# Patient Record
Sex: MALE | Race: WHITE | Employment: OTHER | URBAN - NONMETROPOLITAN AREA
[De-identification: names, ages, dates, MRNs, and addresses within clinical notes are randomized per-mention and may not be internally consistent; named-entity substitution may affect disease eponyms.]

---

## 2017-01-17 ENCOUNTER — OFFICE VISIT (OUTPATIENT)
Dept: OTOLARYNGOLOGY | Age: 62
End: 2017-01-17

## 2017-01-17 VITALS
TEMPERATURE: 98 F | DIASTOLIC BLOOD PRESSURE: 60 MMHG | HEART RATE: 60 BPM | RESPIRATION RATE: 12 BRPM | HEIGHT: 71 IN | WEIGHT: 174.5 LBS | SYSTOLIC BLOOD PRESSURE: 110 MMHG | BODY MASS INDEX: 24.43 KG/M2

## 2017-01-17 DIAGNOSIS — J38.4 EDEMA OF LARYNX: ICD-10-CM

## 2017-01-17 DIAGNOSIS — K21.00 GASTROESOPHAGEAL REFLUX DISEASE WITH ESOPHAGITIS: ICD-10-CM

## 2017-01-17 DIAGNOSIS — K14.8 LARGE TONGUE: ICD-10-CM

## 2017-01-17 DIAGNOSIS — K22.70 BARRETT'S ESOPHAGUS WITHOUT DYSPLASIA: ICD-10-CM

## 2017-01-17 DIAGNOSIS — H93.11 TINNITUS, RIGHT: ICD-10-CM

## 2017-01-17 DIAGNOSIS — G47.30 SLEEP-RELATED BREATHING DISORDER: ICD-10-CM

## 2017-01-17 DIAGNOSIS — C32.9 PRIMARY SQUAMOUS CELL CARCINOMA OF LARYNX (HCC): Primary | ICD-10-CM

## 2017-01-17 DIAGNOSIS — T66.XXXD EFFECTS, RADIATION, SUBSEQUENT ENCOUNTER: ICD-10-CM

## 2017-01-17 PROCEDURE — G8598 ASA/ANTIPLAT THER USED: HCPCS | Performed by: OTOLARYNGOLOGY

## 2017-01-17 PROCEDURE — 1036F TOBACCO NON-USER: CPT | Performed by: OTOLARYNGOLOGY

## 2017-01-17 PROCEDURE — G8484 FLU IMMUNIZE NO ADMIN: HCPCS | Performed by: OTOLARYNGOLOGY

## 2017-01-17 PROCEDURE — 3017F COLORECTAL CA SCREEN DOC REV: CPT | Performed by: OTOLARYNGOLOGY

## 2017-01-17 PROCEDURE — 31575 DIAGNOSTIC LARYNGOSCOPY: CPT | Performed by: OTOLARYNGOLOGY

## 2017-01-17 PROCEDURE — G8427 DOCREV CUR MEDS BY ELIG CLIN: HCPCS | Performed by: OTOLARYNGOLOGY

## 2017-01-17 PROCEDURE — G8420 CALC BMI NORM PARAMETERS: HCPCS | Performed by: OTOLARYNGOLOGY

## 2017-01-17 PROCEDURE — 99212 OFFICE O/P EST SF 10 MIN: CPT | Performed by: OTOLARYNGOLOGY

## 2017-01-17 ASSESSMENT — ENCOUNTER SYMPTOMS
CHOKING: 0
SINUS PRESSURE: 0
CHEST TIGHTNESS: 0
SHORTNESS OF BREATH: 0
WHEEZING: 0
RHINORRHEA: 0
DIARRHEA: 0
STRIDOR: 0
COUGH: 0
ABDOMINAL PAIN: 0
VOICE CHANGE: 0
COLOR CHANGE: 0
SORE THROAT: 0
NAUSEA: 0
TROUBLE SWALLOWING: 0
APNEA: 0
FACIAL SWELLING: 0
VOMITING: 0

## 2017-02-17 ENCOUNTER — TELEPHONE (OUTPATIENT)
Dept: INTERNAL MEDICINE | Age: 62
End: 2017-02-17

## 2017-02-17 ENCOUNTER — OFFICE VISIT (OUTPATIENT)
Dept: OTOLARYNGOLOGY | Age: 62
End: 2017-02-17

## 2017-02-17 VITALS
RESPIRATION RATE: 12 BRPM | HEART RATE: 60 BPM | SYSTOLIC BLOOD PRESSURE: 108 MMHG | TEMPERATURE: 98.1 F | BODY MASS INDEX: 24.58 KG/M2 | HEIGHT: 71 IN | WEIGHT: 175.6 LBS | DIASTOLIC BLOOD PRESSURE: 60 MMHG

## 2017-02-17 DIAGNOSIS — I71.20 THORACIC AORTIC ANEURYSM WITHOUT RUPTURE: Primary | ICD-10-CM

## 2017-02-17 DIAGNOSIS — T66.XXXD EFFECTS, RADIATION, SUBSEQUENT ENCOUNTER: ICD-10-CM

## 2017-02-17 DIAGNOSIS — C32.9 PRIMARY SQUAMOUS CELL CARCINOMA OF LARYNX (HCC): Primary | ICD-10-CM

## 2017-02-17 DIAGNOSIS — R53.83 OTHER FATIGUE: ICD-10-CM

## 2017-02-17 PROCEDURE — G8427 DOCREV CUR MEDS BY ELIG CLIN: HCPCS | Performed by: OTOLARYNGOLOGY

## 2017-02-17 PROCEDURE — 1036F TOBACCO NON-USER: CPT | Performed by: OTOLARYNGOLOGY

## 2017-02-17 PROCEDURE — G8484 FLU IMMUNIZE NO ADMIN: HCPCS | Performed by: OTOLARYNGOLOGY

## 2017-02-17 PROCEDURE — 3017F COLORECTAL CA SCREEN DOC REV: CPT | Performed by: OTOLARYNGOLOGY

## 2017-02-17 PROCEDURE — 31575 DIAGNOSTIC LARYNGOSCOPY: CPT | Performed by: OTOLARYNGOLOGY

## 2017-02-17 PROCEDURE — 99213 OFFICE O/P EST LOW 20 MIN: CPT | Performed by: OTOLARYNGOLOGY

## 2017-02-17 PROCEDURE — G8420 CALC BMI NORM PARAMETERS: HCPCS | Performed by: OTOLARYNGOLOGY

## 2017-02-17 PROCEDURE — G8598 ASA/ANTIPLAT THER USED: HCPCS | Performed by: OTOLARYNGOLOGY

## 2017-02-17 ASSESSMENT — ENCOUNTER SYMPTOMS
SORE THROAT: 0
SHORTNESS OF BREATH: 0
NAUSEA: 0
VOMITING: 0
APNEA: 0
CHEST TIGHTNESS: 0
SINUS PRESSURE: 0
RHINORRHEA: 0
ABDOMINAL PAIN: 0
CHOKING: 0
FACIAL SWELLING: 0
DIARRHEA: 0
TROUBLE SWALLOWING: 0
STRIDOR: 0
COUGH: 0
WHEEZING: 0
COLOR CHANGE: 0
VOICE CHANGE: 0

## 2017-02-21 ENCOUNTER — TELEPHONE (OUTPATIENT)
Dept: INTERNAL MEDICINE | Age: 62
End: 2017-02-21

## 2017-02-23 ENCOUNTER — INITIAL CONSULT (OUTPATIENT)
Dept: PULMONOLOGY | Age: 62
End: 2017-02-23

## 2017-02-23 VITALS
BODY MASS INDEX: 24.58 KG/M2 | SYSTOLIC BLOOD PRESSURE: 108 MMHG | OXYGEN SATURATION: 95 % | HEIGHT: 71 IN | WEIGHT: 175.6 LBS | DIASTOLIC BLOOD PRESSURE: 62 MMHG | HEART RATE: 68 BPM

## 2017-02-23 DIAGNOSIS — R06.81 WITNESSED APNEIC SPELLS: ICD-10-CM

## 2017-02-23 DIAGNOSIS — R06.83 SNORING: ICD-10-CM

## 2017-02-23 DIAGNOSIS — G47.19 EXCESSIVE DAYTIME SLEEPINESS: Primary | ICD-10-CM

## 2017-02-23 DIAGNOSIS — G47.8 NON-RESTORATIVE SLEEP: ICD-10-CM

## 2017-02-23 PROCEDURE — G8420 CALC BMI NORM PARAMETERS: HCPCS | Performed by: INTERNAL MEDICINE

## 2017-02-23 PROCEDURE — 3017F COLORECTAL CA SCREEN DOC REV: CPT | Performed by: INTERNAL MEDICINE

## 2017-02-23 PROCEDURE — 1036F TOBACCO NON-USER: CPT | Performed by: INTERNAL MEDICINE

## 2017-02-23 PROCEDURE — G8484 FLU IMMUNIZE NO ADMIN: HCPCS | Performed by: INTERNAL MEDICINE

## 2017-02-23 PROCEDURE — G8598 ASA/ANTIPLAT THER USED: HCPCS | Performed by: INTERNAL MEDICINE

## 2017-02-23 PROCEDURE — G8427 DOCREV CUR MEDS BY ELIG CLIN: HCPCS | Performed by: INTERNAL MEDICINE

## 2017-02-23 PROCEDURE — 99214 OFFICE O/P EST MOD 30 MIN: CPT | Performed by: INTERNAL MEDICINE

## 2017-02-28 DIAGNOSIS — G47.33 OSA (OBSTRUCTIVE SLEEP APNEA): Primary | ICD-10-CM

## 2017-03-02 DIAGNOSIS — G47.33 OSA (OBSTRUCTIVE SLEEP APNEA): Primary | ICD-10-CM

## 2017-03-24 ENCOUNTER — OFFICE VISIT (OUTPATIENT)
Dept: OTOLARYNGOLOGY | Age: 62
End: 2017-03-24

## 2017-03-24 VITALS
HEART RATE: 76 BPM | SYSTOLIC BLOOD PRESSURE: 100 MMHG | WEIGHT: 177.7 LBS | TEMPERATURE: 98 F | BODY MASS INDEX: 24.78 KG/M2 | DIASTOLIC BLOOD PRESSURE: 76 MMHG | RESPIRATION RATE: 16 BRPM

## 2017-03-24 DIAGNOSIS — C32.9 PRIMARY SQUAMOUS CELL CARCINOMA OF LARYNX (HCC): Primary | ICD-10-CM

## 2017-03-24 DIAGNOSIS — T66.XXXD EFFECTS, RADIATION, SUBSEQUENT ENCOUNTER: ICD-10-CM

## 2017-03-24 DIAGNOSIS — K05.6 PERIODONTAL DISEASE: ICD-10-CM

## 2017-03-24 DIAGNOSIS — K22.70 BARRETT'S ESOPHAGUS WITHOUT DYSPLASIA: ICD-10-CM

## 2017-03-24 PROCEDURE — 3017F COLORECTAL CA SCREEN DOC REV: CPT | Performed by: OTOLARYNGOLOGY

## 2017-03-24 PROCEDURE — G8420 CALC BMI NORM PARAMETERS: HCPCS | Performed by: OTOLARYNGOLOGY

## 2017-03-24 PROCEDURE — G8427 DOCREV CUR MEDS BY ELIG CLIN: HCPCS | Performed by: OTOLARYNGOLOGY

## 2017-03-24 PROCEDURE — 31575 DIAGNOSTIC LARYNGOSCOPY: CPT | Performed by: OTOLARYNGOLOGY

## 2017-03-24 PROCEDURE — 99213 OFFICE O/P EST LOW 20 MIN: CPT | Performed by: OTOLARYNGOLOGY

## 2017-03-24 PROCEDURE — G8484 FLU IMMUNIZE NO ADMIN: HCPCS | Performed by: OTOLARYNGOLOGY

## 2017-03-24 PROCEDURE — G8598 ASA/ANTIPLAT THER USED: HCPCS | Performed by: OTOLARYNGOLOGY

## 2017-03-24 PROCEDURE — 1036F TOBACCO NON-USER: CPT | Performed by: OTOLARYNGOLOGY

## 2017-03-24 ASSESSMENT — ENCOUNTER SYMPTOMS
NAUSEA: 0
CHEST TIGHTNESS: 0
VOMITING: 0
SHORTNESS OF BREATH: 0
VOICE CHANGE: 0
APNEA: 0
SINUS PRESSURE: 0
ABDOMINAL PAIN: 0
WHEEZING: 0
FACIAL SWELLING: 0
TROUBLE SWALLOWING: 0
SORE THROAT: 0
RHINORRHEA: 0
COLOR CHANGE: 0
CHOKING: 0
COUGH: 0
STRIDOR: 0
DIARRHEA: 0

## 2017-04-25 ENCOUNTER — OFFICE VISIT (OUTPATIENT)
Dept: OTOLARYNGOLOGY | Age: 62
End: 2017-04-25

## 2017-04-25 VITALS
HEIGHT: 71 IN | WEIGHT: 180 LBS | HEART RATE: 62 BPM | DIASTOLIC BLOOD PRESSURE: 60 MMHG | SYSTOLIC BLOOD PRESSURE: 100 MMHG | BODY MASS INDEX: 25.2 KG/M2 | RESPIRATION RATE: 15 BRPM | TEMPERATURE: 98.5 F

## 2017-04-25 DIAGNOSIS — C32.9 PRIMARY SQUAMOUS CELL CARCINOMA OF LARYNX (HCC): Primary | ICD-10-CM

## 2017-04-25 DIAGNOSIS — T66.XXXD EFFECTS, RADIATION, SUBSEQUENT ENCOUNTER: ICD-10-CM

## 2017-04-25 PROCEDURE — 99212 OFFICE O/P EST SF 10 MIN: CPT | Performed by: OTOLARYNGOLOGY

## 2017-04-25 PROCEDURE — G8419 CALC BMI OUT NRM PARAM NOF/U: HCPCS | Performed by: OTOLARYNGOLOGY

## 2017-04-25 PROCEDURE — 3017F COLORECTAL CA SCREEN DOC REV: CPT | Performed by: OTOLARYNGOLOGY

## 2017-04-25 PROCEDURE — G8598 ASA/ANTIPLAT THER USED: HCPCS | Performed by: OTOLARYNGOLOGY

## 2017-04-25 PROCEDURE — 31575 DIAGNOSTIC LARYNGOSCOPY: CPT | Performed by: OTOLARYNGOLOGY

## 2017-04-25 PROCEDURE — 1036F TOBACCO NON-USER: CPT | Performed by: OTOLARYNGOLOGY

## 2017-04-25 PROCEDURE — G8427 DOCREV CUR MEDS BY ELIG CLIN: HCPCS | Performed by: OTOLARYNGOLOGY

## 2017-04-25 ASSESSMENT — ENCOUNTER SYMPTOMS
COUGH: 0
APNEA: 0
CHEST TIGHTNESS: 0
SHORTNESS OF BREATH: 0
RHINORRHEA: 0
ABDOMINAL PAIN: 0
STRIDOR: 0
FACIAL SWELLING: 0
SINUS PRESSURE: 0
SORE THROAT: 0
TROUBLE SWALLOWING: 0
DIARRHEA: 0
VOMITING: 0
NAUSEA: 0
COLOR CHANGE: 0
WHEEZING: 0
VOICE CHANGE: 0
CHOKING: 0

## 2017-05-05 ENCOUNTER — OFFICE VISIT (OUTPATIENT)
Dept: PULMONOLOGY | Age: 62
End: 2017-05-05

## 2017-05-05 VITALS
BODY MASS INDEX: 25.2 KG/M2 | OXYGEN SATURATION: 98 % | DIASTOLIC BLOOD PRESSURE: 64 MMHG | SYSTOLIC BLOOD PRESSURE: 114 MMHG | RESPIRATION RATE: 17 BRPM | WEIGHT: 180 LBS | HEART RATE: 58 BPM | HEIGHT: 71 IN

## 2017-05-05 DIAGNOSIS — Z99.89 OSA ON CPAP: ICD-10-CM

## 2017-05-05 DIAGNOSIS — G47.33 OSA ON CPAP: ICD-10-CM

## 2017-05-05 PROCEDURE — 3017F COLORECTAL CA SCREEN DOC REV: CPT | Performed by: PHYSICIAN ASSISTANT

## 2017-05-05 PROCEDURE — 1036F TOBACCO NON-USER: CPT | Performed by: PHYSICIAN ASSISTANT

## 2017-05-05 PROCEDURE — G8419 CALC BMI OUT NRM PARAM NOF/U: HCPCS | Performed by: PHYSICIAN ASSISTANT

## 2017-05-05 PROCEDURE — G8427 DOCREV CUR MEDS BY ELIG CLIN: HCPCS | Performed by: PHYSICIAN ASSISTANT

## 2017-05-05 PROCEDURE — G8598 ASA/ANTIPLAT THER USED: HCPCS | Performed by: PHYSICIAN ASSISTANT

## 2017-05-05 PROCEDURE — 99213 OFFICE O/P EST LOW 20 MIN: CPT | Performed by: PHYSICIAN ASSISTANT

## 2017-05-19 DIAGNOSIS — K21.00 GASTROESOPHAGEAL REFLUX DISEASE WITH ESOPHAGITIS: ICD-10-CM

## 2017-05-24 RX ORDER — OMEPRAZOLE 40 MG/1
CAPSULE, DELAYED RELEASE ORAL
Qty: 60 CAPSULE | Refills: 6 | Status: SHIPPED | OUTPATIENT
Start: 2017-05-24 | End: 2017-06-08 | Stop reason: SDUPTHER

## 2017-06-08 ENCOUNTER — OFFICE VISIT (OUTPATIENT)
Dept: INTERNAL MEDICINE | Age: 62
End: 2017-06-08

## 2017-06-08 VITALS
SYSTOLIC BLOOD PRESSURE: 120 MMHG | HEART RATE: 64 BPM | DIASTOLIC BLOOD PRESSURE: 72 MMHG | BODY MASS INDEX: 25.31 KG/M2 | HEIGHT: 71 IN | WEIGHT: 180.8 LBS

## 2017-06-08 DIAGNOSIS — E78.00 PURE HYPERCHOLESTEROLEMIA: ICD-10-CM

## 2017-06-08 DIAGNOSIS — I25.810 CORONARY ARTERY DISEASE INVOLVING CORONARY BYPASS GRAFT OF NATIVE HEART WITHOUT ANGINA PECTORIS: Primary | ICD-10-CM

## 2017-06-08 DIAGNOSIS — I10 ESSENTIAL HYPERTENSION: ICD-10-CM

## 2017-06-08 DIAGNOSIS — K21.00 GASTROESOPHAGEAL REFLUX DISEASE WITH ESOPHAGITIS: ICD-10-CM

## 2017-06-08 PROCEDURE — G8598 ASA/ANTIPLAT THER USED: HCPCS | Performed by: INTERNAL MEDICINE

## 2017-06-08 PROCEDURE — 3017F COLORECTAL CA SCREEN DOC REV: CPT | Performed by: INTERNAL MEDICINE

## 2017-06-08 PROCEDURE — G8419 CALC BMI OUT NRM PARAM NOF/U: HCPCS | Performed by: INTERNAL MEDICINE

## 2017-06-08 PROCEDURE — 1036F TOBACCO NON-USER: CPT | Performed by: INTERNAL MEDICINE

## 2017-06-08 PROCEDURE — G8427 DOCREV CUR MEDS BY ELIG CLIN: HCPCS | Performed by: INTERNAL MEDICINE

## 2017-06-08 PROCEDURE — 93000 ELECTROCARDIOGRAM COMPLETE: CPT | Performed by: INTERNAL MEDICINE

## 2017-06-08 PROCEDURE — 99213 OFFICE O/P EST LOW 20 MIN: CPT | Performed by: INTERNAL MEDICINE

## 2017-06-08 RX ORDER — ATORVASTATIN CALCIUM 20 MG/1
TABLET, FILM COATED ORAL
Qty: 30 TABLET | Refills: 6 | Status: SHIPPED | OUTPATIENT
Start: 2017-06-08 | End: 2018-03-22 | Stop reason: SDUPTHER

## 2017-06-08 RX ORDER — OMEPRAZOLE 40 MG/1
CAPSULE, DELAYED RELEASE ORAL
Qty: 30 CAPSULE | Refills: 6 | Status: ON HOLD | OUTPATIENT
Start: 2017-06-08 | End: 2018-08-27 | Stop reason: ALTCHOICE

## 2017-06-08 RX ORDER — TERAZOSIN 5 MG/1
5 CAPSULE ORAL NIGHTLY
Qty: 30 CAPSULE | Refills: 6 | Status: ON HOLD | OUTPATIENT
Start: 2017-06-08 | End: 2017-10-04 | Stop reason: HOSPADM

## 2017-06-08 RX ORDER — METOPROLOL SUCCINATE 25 MG/1
25 TABLET, EXTENDED RELEASE ORAL 2 TIMES DAILY
Qty: 60 TABLET | Refills: 6 | Status: SHIPPED | OUTPATIENT
Start: 2017-06-08 | End: 2017-09-29 | Stop reason: SDUPTHER

## 2017-06-08 ASSESSMENT — PATIENT HEALTH QUESTIONNAIRE - PHQ9
SUM OF ALL RESPONSES TO PHQ QUESTIONS 1-9: 0
2. FEELING DOWN, DEPRESSED OR HOPELESS: 0
SUM OF ALL RESPONSES TO PHQ9 QUESTIONS 1 & 2: 0
1. LITTLE INTEREST OR PLEASURE IN DOING THINGS: 0

## 2017-07-13 ENCOUNTER — OFFICE VISIT (OUTPATIENT)
Dept: OTOLARYNGOLOGY | Age: 62
End: 2017-07-13

## 2017-07-13 VITALS
HEIGHT: 71 IN | DIASTOLIC BLOOD PRESSURE: 84 MMHG | BODY MASS INDEX: 25.7 KG/M2 | SYSTOLIC BLOOD PRESSURE: 134 MMHG | WEIGHT: 183.6 LBS | HEART RATE: 68 BPM | RESPIRATION RATE: 16 BRPM | TEMPERATURE: 97.7 F

## 2017-07-13 DIAGNOSIS — C32.9 PRIMARY SQUAMOUS CELL CARCINOMA OF LARYNX (HCC): Primary | ICD-10-CM

## 2017-07-13 DIAGNOSIS — T66.XXXD EFFECTS, RADIATION, SUBSEQUENT ENCOUNTER: ICD-10-CM

## 2017-07-13 PROCEDURE — 99212 OFFICE O/P EST SF 10 MIN: CPT | Performed by: OTOLARYNGOLOGY

## 2017-07-13 PROCEDURE — 1036F TOBACCO NON-USER: CPT | Performed by: OTOLARYNGOLOGY

## 2017-07-13 PROCEDURE — 31575 DIAGNOSTIC LARYNGOSCOPY: CPT | Performed by: OTOLARYNGOLOGY

## 2017-07-13 PROCEDURE — 3017F COLORECTAL CA SCREEN DOC REV: CPT | Performed by: OTOLARYNGOLOGY

## 2017-07-13 PROCEDURE — G8598 ASA/ANTIPLAT THER USED: HCPCS | Performed by: OTOLARYNGOLOGY

## 2017-07-13 PROCEDURE — G8427 DOCREV CUR MEDS BY ELIG CLIN: HCPCS | Performed by: OTOLARYNGOLOGY

## 2017-07-13 PROCEDURE — G8419 CALC BMI OUT NRM PARAM NOF/U: HCPCS | Performed by: OTOLARYNGOLOGY

## 2017-07-13 ASSESSMENT — ENCOUNTER SYMPTOMS
TROUBLE SWALLOWING: 0
COLOR CHANGE: 0
SHORTNESS OF BREATH: 0
ABDOMINAL PAIN: 0
SINUS PRESSURE: 0
DIARRHEA: 0
APNEA: 0
RHINORRHEA: 0
FACIAL SWELLING: 0
NAUSEA: 0
COUGH: 0
VOICE CHANGE: 0
CHEST TIGHTNESS: 0
WHEEZING: 0
SORE THROAT: 0
STRIDOR: 0
VOMITING: 0
CHOKING: 0

## 2017-07-28 RX ORDER — NITROGLYCERIN 0.3 MG/1
TABLET SUBLINGUAL
Qty: 30 TABLET | Refills: 3 | Status: ON HOLD | OUTPATIENT
Start: 2017-07-28 | End: 2019-07-13 | Stop reason: HOSPADM

## 2017-09-07 ENCOUNTER — HOSPITAL ENCOUNTER (OUTPATIENT)
Dept: RADIATION ONCOLOGY | Age: 62
Discharge: HOME | End: 2017-09-07
Payer: COMMERCIAL

## 2017-09-07 PROCEDURE — 99211 OFF/OP EST MAY X REQ PHY/QHP: CPT | Performed by: RADIOLOGY

## 2017-09-13 RX ORDER — METOPROLOL SUCCINATE 50 MG/1
TABLET, EXTENDED RELEASE ORAL
Qty: 30 TABLET | Refills: 5 | Status: SHIPPED | OUTPATIENT
Start: 2017-09-13 | End: 2018-02-12 | Stop reason: ALTCHOICE

## 2017-09-14 ENCOUNTER — OFFICE VISIT (OUTPATIENT)
Dept: ENT CLINIC | Age: 62
End: 2017-09-14
Payer: COMMERCIAL

## 2017-09-14 VITALS
RESPIRATION RATE: 16 BRPM | TEMPERATURE: 98 F | WEIGHT: 182.2 LBS | SYSTOLIC BLOOD PRESSURE: 118 MMHG | DIASTOLIC BLOOD PRESSURE: 76 MMHG | HEART RATE: 76 BPM | BODY MASS INDEX: 25.41 KG/M2

## 2017-09-14 DIAGNOSIS — Z99.89 OSA ON CPAP: ICD-10-CM

## 2017-09-14 DIAGNOSIS — J38.7 MASS OF LARYNX: Primary | ICD-10-CM

## 2017-09-14 DIAGNOSIS — G47.33 OSA ON CPAP: ICD-10-CM

## 2017-09-14 DIAGNOSIS — C32.9 PRIMARY SQUAMOUS CELL CARCINOMA OF LARYNX (HCC): ICD-10-CM

## 2017-09-14 PROCEDURE — 99214 OFFICE O/P EST MOD 30 MIN: CPT | Performed by: OTOLARYNGOLOGY

## 2017-09-14 PROCEDURE — 3017F COLORECTAL CA SCREEN DOC REV: CPT | Performed by: OTOLARYNGOLOGY

## 2017-09-14 PROCEDURE — G8598 ASA/ANTIPLAT THER USED: HCPCS | Performed by: OTOLARYNGOLOGY

## 2017-09-14 PROCEDURE — G8427 DOCREV CUR MEDS BY ELIG CLIN: HCPCS | Performed by: OTOLARYNGOLOGY

## 2017-09-14 PROCEDURE — 31575 DIAGNOSTIC LARYNGOSCOPY: CPT | Performed by: OTOLARYNGOLOGY

## 2017-09-14 PROCEDURE — G8417 CALC BMI ABV UP PARAM F/U: HCPCS | Performed by: OTOLARYNGOLOGY

## 2017-09-14 PROCEDURE — 1036F TOBACCO NON-USER: CPT | Performed by: OTOLARYNGOLOGY

## 2017-09-14 ASSESSMENT — ENCOUNTER SYMPTOMS
CHOKING: 0
APNEA: 0
SHORTNESS OF BREATH: 0
COLOR CHANGE: 0
RHINORRHEA: 0
CHEST TIGHTNESS: 0
TROUBLE SWALLOWING: 0
COUGH: 0
VOICE CHANGE: 0
SORE THROAT: 0
SINUS PRESSURE: 0
ABDOMINAL PAIN: 0
STRIDOR: 0
DIARRHEA: 0
VOMITING: 0
WHEEZING: 0
NAUSEA: 0
FACIAL SWELLING: 0

## 2017-09-15 ENCOUNTER — HOSPITAL ENCOUNTER (OUTPATIENT)
Age: 62
Discharge: HOME | End: 2017-09-15
Payer: COMMERCIAL

## 2017-09-15 DIAGNOSIS — J38.7 MASS OF LARYNX: ICD-10-CM

## 2017-09-15 DIAGNOSIS — Z99.89 OSA ON CPAP: ICD-10-CM

## 2017-09-15 DIAGNOSIS — C32.9 PRIMARY SQUAMOUS CELL CARCINOMA OF LARYNX (HCC): ICD-10-CM

## 2017-09-15 DIAGNOSIS — G47.33 OSA ON CPAP: ICD-10-CM

## 2017-09-15 LAB
ALBUMIN SERPL-MCNC: 4.5 G/DL (ref 3.5–5.1)
ALP BLD-CCNC: 76 U/L (ref 38–126)
ALT SERPL-CCNC: 15 U/L (ref 11–66)
ANION GAP SERPL CALCULATED.3IONS-SCNC: 14 MEQ/L (ref 8–16)
AST SERPL-CCNC: 14 U/L (ref 5–40)
BASOPHILS # BLD: 0.6 %
BASOPHILS ABSOLUTE: 0 THOU/MM3 (ref 0–0.1)
BILIRUB SERPL-MCNC: 0.5 MG/DL (ref 0.3–1.2)
BUN BLDV-MCNC: 13 MG/DL (ref 7–22)
CALCIUM SERPL-MCNC: 9.6 MG/DL (ref 8.5–10.5)
CHLORIDE BLD-SCNC: 100 MEQ/L (ref 98–111)
CO2: 28 MEQ/L (ref 23–33)
CREAT SERPL-MCNC: 0.8 MG/DL (ref 0.4–1.2)
EKG ATRIAL RATE: 48 BPM
EKG P AXIS: 31 DEGREES
EKG P-R INTERVAL: 144 MS
EKG Q-T INTERVAL: 432 MS
EKG QRS DURATION: 80 MS
EKG QTC CALCULATION (BAZETT): 385 MS
EKG R AXIS: 57 DEGREES
EKG T AXIS: 69 DEGREES
EKG VENTRICULAR RATE: 48 BPM
EOSINOPHIL # BLD: 1.3 %
EOSINOPHILS ABSOLUTE: 0.1 THOU/MM3 (ref 0–0.4)
GFR SERPL CREATININE-BSD FRML MDRD: > 90 ML/MIN/1.73M2
GLUCOSE BLD-MCNC: 100 MG/DL (ref 70–108)
HCT VFR BLD CALC: 46.4 % (ref 42–52)
HEMOGLOBIN: 15.7 GM/DL (ref 14–18)
LYMPHOCYTES # BLD: 11.5 %
LYMPHOCYTES ABSOLUTE: 0.7 THOU/MM3 (ref 1–4.8)
MCH RBC QN AUTO: 31.8 PG (ref 27–31)
MCHC RBC AUTO-ENTMCNC: 33.8 GM/DL (ref 33–37)
MCV RBC AUTO: 94.1 FL (ref 80–94)
MONOCYTES # BLD: 10.1 %
MONOCYTES ABSOLUTE: 0.6 THOU/MM3 (ref 0.4–1.3)
NUCLEATED RED BLOOD CELLS: 0 /100 WBC
PDW BLD-RTO: 13.6 % (ref 11.5–14.5)
PLATELET # BLD: 226 THOU/MM3 (ref 130–400)
PMV BLD AUTO: 8.3 MCM (ref 7.4–10.4)
POTASSIUM SERPL-SCNC: 4.7 MEQ/L (ref 3.5–5.2)
RBC # BLD: 4.93 MILL/MM3 (ref 4.7–6.1)
RBC # BLD: NORMAL 10*6/UL
SEG NEUTROPHILS: 76.5 %
SEGMENTED NEUTROPHILS ABSOLUTE COUNT: 4.7 THOU/MM3 (ref 1.8–7.7)
SODIUM BLD-SCNC: 142 MEQ/L (ref 135–145)
TOTAL PROTEIN: 6.6 G/DL (ref 6.1–8)
WBC # BLD: 6.1 THOU/MM3 (ref 4.8–10.8)

## 2017-09-15 PROCEDURE — 93005 ELECTROCARDIOGRAM TRACING: CPT

## 2017-09-15 PROCEDURE — 80053 COMPREHEN METABOLIC PANEL: CPT

## 2017-09-15 PROCEDURE — 36415 COLL VENOUS BLD VENIPUNCTURE: CPT

## 2017-09-15 PROCEDURE — 85025 COMPLETE CBC W/AUTO DIFF WBC: CPT

## 2017-09-18 ENCOUNTER — HOSPITAL ENCOUNTER (OUTPATIENT)
Dept: CT IMAGING | Age: 62
Discharge: HOME | End: 2017-09-18
Payer: COMMERCIAL

## 2017-09-18 DIAGNOSIS — J38.7 MASS OF LARYNX: ICD-10-CM

## 2017-09-18 DIAGNOSIS — C32.9 PRIMARY SQUAMOUS CELL CARCINOMA OF LARYNX (HCC): ICD-10-CM

## 2017-09-18 PROCEDURE — 6360000004 HC RX CONTRAST MEDICATION: Performed by: OTOLARYNGOLOGY

## 2017-09-18 PROCEDURE — 70491 CT SOFT TISSUE NECK W/DYE: CPT

## 2017-09-18 RX ADMIN — IOPAMIDOL 65 ML: 755 INJECTION, SOLUTION INTRAVENOUS at 07:54

## 2017-09-20 ENCOUNTER — OFFICE VISIT (OUTPATIENT)
Dept: INTERNAL MEDICINE CLINIC | Age: 62
End: 2017-09-20
Payer: COMMERCIAL

## 2017-09-20 VITALS
HEART RATE: 76 BPM | WEIGHT: 181 LBS | DIASTOLIC BLOOD PRESSURE: 76 MMHG | SYSTOLIC BLOOD PRESSURE: 128 MMHG | BODY MASS INDEX: 25.24 KG/M2

## 2017-09-20 DIAGNOSIS — Z01.818 PREOP EXAMINATION: Primary | ICD-10-CM

## 2017-09-20 DIAGNOSIS — I10 ESSENTIAL HYPERTENSION: ICD-10-CM

## 2017-09-20 DIAGNOSIS — Z95.1 S/P CABG (CORONARY ARTERY BYPASS GRAFT): ICD-10-CM

## 2017-09-20 DIAGNOSIS — C32.9 PRIMARY SQUAMOUS CELL CARCINOMA OF LARYNX (HCC): ICD-10-CM

## 2017-09-20 DIAGNOSIS — E78.5 HYPERLIPIDEMIA, UNSPECIFIED HYPERLIPIDEMIA TYPE: ICD-10-CM

## 2017-09-20 DIAGNOSIS — I25.10 ASCVD (ARTERIOSCLEROTIC CARDIOVASCULAR DISEASE): ICD-10-CM

## 2017-09-20 DIAGNOSIS — Z99.89 OSA ON CPAP: ICD-10-CM

## 2017-09-20 DIAGNOSIS — G47.33 OSA ON CPAP: ICD-10-CM

## 2017-09-20 PROCEDURE — 3017F COLORECTAL CA SCREEN DOC REV: CPT | Performed by: INTERNAL MEDICINE

## 2017-09-20 PROCEDURE — G8598 ASA/ANTIPLAT THER USED: HCPCS | Performed by: INTERNAL MEDICINE

## 2017-09-20 PROCEDURE — 99214 OFFICE O/P EST MOD 30 MIN: CPT | Performed by: INTERNAL MEDICINE

## 2017-09-20 PROCEDURE — G8417 CALC BMI ABV UP PARAM F/U: HCPCS | Performed by: INTERNAL MEDICINE

## 2017-09-20 PROCEDURE — G8427 DOCREV CUR MEDS BY ELIG CLIN: HCPCS | Performed by: INTERNAL MEDICINE

## 2017-09-20 PROCEDURE — 1036F TOBACCO NON-USER: CPT | Performed by: INTERNAL MEDICINE

## 2017-09-20 NOTE — LETTER
[address] [address]  [address]  [address] [address]  Phone: [phone]  Fax: [phone]    Meme Aleman MD                         PATIENT: Helder Coe          : 1955      DATE:  17    TO:  Dr. Deana Gore      Patient can proceed with surgery.     The following procedures were reviewed by the physician:    EKG    Labs    Chest X-ray        Sincerely,        Meme Aleman MD

## 2017-09-20 NOTE — PROGRESS NOTES
[facility] PROFESSIONAL SERVS  PHYSICIANS [address] [place] [address], [address]  Dept: [phone]  Dept Fax: [phone]      Chief Complaint   Patient presents with    Pre-op Exam     This patient was referred to me by Dr. Grzegorz Soni for pre-op evaluation prior to laryngeal biopsy/possible resection which is scheduled for 10/4 at [facility]. 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 Recently had a sore throat. Dr. Grzegorz Soni did a laryngoscopy, which looks suspicious      Past Medical History:   Diagnosis Date    Aneurysm ([address] 02/2017    in his chest    CAD (coronary artery disease)     Chronic back pain     Hyperlipidemia     Hypertension     Nausea & vomiting     FOUZIA on CPAP     PONV (postoperative nausea and vomiting)     S/P CABG (coronary artery bypass graft) 2002    [facility]    Snores        Past Surgical History:   Procedure Laterality Date    CARDIAC CATHETERIZATION  8/2002, 4/2004    [facility]    COLONOSCOPY      CORONARY ANGIOPLASTY WITH STENT PLACEMENT  7/2002    [facility]    CORONARY ARTERY BYPASS GRAFT  2002    [facility]    HEMORRHOID SURGERY      KNEE SURGERY Right     MICROLARYNGOSCOPY W BIOPSY  04/28/2016    by Dr Eder Nielsen Marital status:      Spouse name: N/A    Number of children: N/A    Years of education: N/A     Occupational History    Not on file.      Social History Main Topics    Smoking status: Former Smoker     Packs/day: 2.00     Years: 20.00     Quit date: 12/5/2001    Smokeless tobacco: Never Used    Alcohol use No    Drug use: No    Sexual activity: Not on file     Other Topics Concern    Not on file     Social History Narrative       Current Outpatient Prescriptions   Medication Sig Dispense Refill    metoprolol succinate (TOPROL XL) 50 MG extended release tablet TAKE 1/2 TABLET BY MOUTH TWICE DAILY 30 tablet 5    nitroGLYCERIN (NITROSTAT) 0.3 MG SL tablet Place 1 tablet under tongue as needed for chest pain, may repeat every 5 mins. For maximum of 3 tabs in 15 mins. 30 tablet 3    atorvastatin (LIPITOR) 20 MG tablet TAKE 1 TABLET BY MOUTH ONE TIME A DAY (Patient taking differently: nightly TAKE 1 TABLET BY MOUTH ONE TIME A DAY ) 30 tablet 6    omeprazole (PRILOSEC) 40 MG delayed release capsule 1 cap daily 30 capsule 6    terazosin (HYTRIN) 5 MG capsule Take 1 capsule by mouth nightly New higher dose 30 capsule 6    CPAP Machine MISC by Does not apply route Please change CPAP pressure to 8 cm H20. 1 each 0    aspirin 81 MG tablet Take 81 mg by mouth daily      Diphenhydramine-APAP, sleep, (TYLENOL PM EXTRA STRENGTH PO) Take by mouth nightly 2 nightly      Coenzyme Q10 (CO Q-10) 200 MG CAPS Take  by mouth daily.  Multiple Vitamin (MULTI-VITAMIN) TABS Take  by mouth daily.  ARGININE PO Take 500 mg by mouth daily. 2 tab       No current facility-administered medications for this visit. Family History   Problem Relation Age of Onset    Heart Disease Father      MI    High Blood Pressure Father     Cancer Maternal Grandmother     Cancer Maternal Grandfather      No Known Allergies  There is no family history of bleeding disorders  Review of Systems - General ROS: positive for  - sore throat  Psychological ROS: negative  Hematological and Lymphatic ROS: No history of blood clots or bleeding disorder. Respiratory ROS: no cough, shortness of breath, or wheezing  Cardiovascular ROS: no chest pain or dyspnea on exertion  Gastrointestinal ROS: negative  Genito-Urinary ROS: no dysuria, trouble voiding, or hematuria  Musculoskeletal ROS: negative  Neurological ROS: no TIA or stroke symptoms  Dermatological ROS: negative    This patient has  experienced difficulty with general anesthetic, PONV. SLEEP APNEA SCREENING:patient has sleep apnea    Blood pressure 128/76, pulse 76, weight 181 lb (82.1 kg). Body mass index is 25.24 kg/(m^2).     Physical Examination: General appearance - alert, well appearing, and in no distress  Mental status - alert, oriented to person, place, and time  Neck - Neck is supple, no JVD or carotid bruits. No thyromegaly or adenopathy. Chest - clear to auscultation, no wheezes, rales or rhonchi, symmetric air entry  Heart - normal rate, regular rhythm, normal S1, S2, no murmurs, rubs, clicks or gallops  Abdomen - soft, nontender, nondistended, no masses or organomegaly  Neurological - alert, oriented, normal speech, no focal findings or movement disorder noted  Musculoskeletal - no joint tenderness, deformity or swelling  Extremities - peripheral pulses normal, no pedal edema, no clubbing or cyanosis  Skin - normal coloration and turgor, no rashes, no suspicious skin lesions noted    I have reviewed recent diagnostic testing including EKG, CXR, labs    1. Preop examination     2. S/P CABG (coronary artery bypass graft)     3. ASCVD (arteriosclerotic cardiovascular disease)     4. Essential hypertension     5. Hyperlipidemia, unspecified hyperlipidemia type     6. FOUZIA on CPAP     7. Primary squamous cell carcinoma of larynx [facility])                 Patient has no h/o MI or CHF  Patient is active to > 4 mets without any cardiac symptoms. Planned surgery is  intermediate risk. EKG shows marked sinus bradycardia  CBC, CMP, normal     No further testing needed. There are no contraindications to proceed with surgery. Acceptable risk from a cardiopulmonary/medical standpoint. Will notify referring surgeon. Recommend routine DVT/PE prophylaxis as per surgery.

## 2017-09-29 NOTE — PROGRESS NOTES
In preparation for their surgical procedure above patient was screened for Obstructive Sleep Apnea (FOUZIA) using the STOP-Bang Questionnaire by the Pre-Admission Testing department. This is a pre-surgical screening tool for patient safety and serves as a recommendation, this WILL NOT cause cancellation of surgery. STOP-Bang Questionnaire  * Do you currently see a pulmonologist?  Yes     If yes STOP, do not complete. Patient follows with Dr. Curt Larose uses CPAP        Adapted from:   STOP Questionnaire: A Tool to Screen Patients for Obstructive Sleep Apnea   TYLER Mehta.P.C., oNri Stacy M.B.B.S., Debbie Limon M.D., Taj Galvan. Laci Woodruff, Ph.D., Hazel Mike M.B.B.S., Chasity Jackson M.Sc., Poli Kirk M.D., Jennifer Payan. TYLER Ramos.P.C.    Anesthesiology 2008; 437:845-00 Copyright 2008, the [address] of [employer]esthesi[employer], [place] [address].   ----------------------------------------------------------------------------------------------------------------

## 2017-09-29 NOTE — PROGRESS NOTES
NPO after midnight  [employer] and drivers license  Wear comfortable clean clothing  Do not bring jewelry   Shower night before and morning of surgery with a liquid antibacterial soap  Bring list of medications with dosage and how often taken  Follow all instructions given by your physician   needed at discharge

## 2017-10-04 ENCOUNTER — ANESTHESIA (OUTPATIENT)
Dept: OPERATING ROOM | Age: 62
End: 2017-10-04
Payer: COMMERCIAL

## 2017-10-04 ENCOUNTER — ANESTHESIA EVENT (OUTPATIENT)
Dept: OPERATING ROOM | Age: 62
End: 2017-10-04
Payer: COMMERCIAL

## 2017-10-04 ENCOUNTER — HOSPITAL ENCOUNTER (OUTPATIENT)
Age: 62
Setting detail: OUTPATIENT SURGERY
Discharge: HOME | End: 2017-10-04
Attending: OTOLARYNGOLOGY | Admitting: OTOLARYNGOLOGY
Payer: COMMERCIAL

## 2017-10-04 VITALS
WEIGHT: 180.34 LBS | HEIGHT: 71 IN | RESPIRATION RATE: 18 BRPM | TEMPERATURE: 97.6 F | BODY MASS INDEX: 25.25 KG/M2 | SYSTOLIC BLOOD PRESSURE: 135 MMHG | OXYGEN SATURATION: 97 % | HEART RATE: 64 BPM | DIASTOLIC BLOOD PRESSURE: 73 MMHG

## 2017-10-04 VITALS
SYSTOLIC BLOOD PRESSURE: 135 MMHG | RESPIRATION RATE: 12 BRPM | TEMPERATURE: 98.6 F | DIASTOLIC BLOOD PRESSURE: 77 MMHG | OXYGEN SATURATION: 100 %

## 2017-10-04 LAB — POTASSIUM SERPL-SCNC: 4.3 MEQ/L (ref 3.5–5.2)

## 2017-10-04 PROCEDURE — 2500000003 HC RX 250 WO HCPCS: Performed by: NURSE ANESTHETIST, CERTIFIED REGISTERED

## 2017-10-04 PROCEDURE — 7100000010 HC PHASE II RECOVERY - FIRST 15 MIN: Performed by: OTOLARYNGOLOGY

## 2017-10-04 PROCEDURE — 7100000011 HC PHASE II RECOVERY - ADDTL 15 MIN: Performed by: OTOLARYNGOLOGY

## 2017-10-04 PROCEDURE — 7100000000 HC PACU RECOVERY - FIRST 15 MIN: Performed by: OTOLARYNGOLOGY

## 2017-10-04 PROCEDURE — 6360000002 HC RX W HCPCS: Performed by: NURSE ANESTHETIST, CERTIFIED REGISTERED

## 2017-10-04 PROCEDURE — 2580000003 HC RX 258: Performed by: NURSE ANESTHETIST, CERTIFIED REGISTERED

## 2017-10-04 PROCEDURE — 88305 TISSUE EXAM BY PATHOLOGIST: CPT

## 2017-10-04 PROCEDURE — 3700000000 HC ANESTHESIA ATTENDED CARE: Performed by: OTOLARYNGOLOGY

## 2017-10-04 PROCEDURE — 6360000002 HC RX W HCPCS: Performed by: OTOLARYNGOLOGY

## 2017-10-04 PROCEDURE — 3600000013 HC SURGERY LEVEL 3 ADDTL 15MIN: Performed by: OTOLARYNGOLOGY

## 2017-10-04 PROCEDURE — 6370000000 HC RX 637 (ALT 250 FOR IP): Performed by: OTOLARYNGOLOGY

## 2017-10-04 PROCEDURE — 7100000001 HC PACU RECOVERY - ADDTL 15 MIN: Performed by: OTOLARYNGOLOGY

## 2017-10-04 PROCEDURE — 3700000001 HC ADD 15 MINUTES (ANESTHESIA): Performed by: OTOLARYNGOLOGY

## 2017-10-04 PROCEDURE — 3600000003 HC SURGERY LEVEL 3 BASE: Performed by: OTOLARYNGOLOGY

## 2017-10-04 PROCEDURE — 84132 ASSAY OF SERUM POTASSIUM: CPT

## 2017-10-04 PROCEDURE — 36415 COLL VENOUS BLD VENIPUNCTURE: CPT

## 2017-10-04 PROCEDURE — 2580000003 HC RX 258: Performed by: OTOLARYNGOLOGY

## 2017-10-04 RX ORDER — ONDANSETRON 2 MG/ML
INJECTION INTRAMUSCULAR; INTRAVENOUS PRN
Status: DISCONTINUED | OUTPATIENT
Start: 2017-10-04 | End: 2017-10-04 | Stop reason: SDUPTHER

## 2017-10-04 RX ORDER — SODIUM CHLORIDE 9 MG/ML
INJECTION, SOLUTION INTRAVENOUS CONTINUOUS PRN
Status: DISCONTINUED | OUTPATIENT
Start: 2017-10-04 | End: 2017-10-04 | Stop reason: SDUPTHER

## 2017-10-04 RX ORDER — SODIUM CHLORIDE 9 MG/ML
INJECTION, SOLUTION INTRAVENOUS CONTINUOUS
Status: DISCONTINUED | OUTPATIENT
Start: 2017-10-04 | End: 2017-10-04 | Stop reason: HOSPADM

## 2017-10-04 RX ORDER — DEXAMETHASONE SODIUM PHOSPHATE 4 MG/ML
INJECTION, SOLUTION INTRA-ARTICULAR; INTRALESIONAL; INTRAMUSCULAR; INTRAVENOUS; SOFT TISSUE PRN
Status: DISCONTINUED | OUTPATIENT
Start: 2017-10-04 | End: 2017-10-04 | Stop reason: SDUPTHER

## 2017-10-04 RX ORDER — HYDROCODONE BITARTRATE AND ACETAMINOPHEN 7.5; 325 MG/1; MG/1
1 TABLET ORAL EVERY 4 HOURS PRN
Qty: 20 TABLET | Refills: 0 | Status: SHIPPED | OUTPATIENT
Start: 2017-10-04 | End: 2017-11-09 | Stop reason: ALTCHOICE

## 2017-10-04 RX ORDER — ROCURONIUM BROMIDE 10 MG/ML
INJECTION, SOLUTION INTRAVENOUS PRN
Status: DISCONTINUED | OUTPATIENT
Start: 2017-10-04 | End: 2017-10-04 | Stop reason: SDUPTHER

## 2017-10-04 RX ORDER — LABETALOL HYDROCHLORIDE 5 MG/ML
INJECTION, SOLUTION INTRAVENOUS PRN
Status: DISCONTINUED | OUTPATIENT
Start: 2017-10-04 | End: 2017-10-04 | Stop reason: SDUPTHER

## 2017-10-04 RX ORDER — FENTANYL CITRATE 50 UG/ML
INJECTION, SOLUTION INTRAMUSCULAR; INTRAVENOUS PRN
Status: DISCONTINUED | OUTPATIENT
Start: 2017-10-04 | End: 2017-10-04 | Stop reason: SDUPTHER

## 2017-10-04 RX ORDER — NEOSTIGMINE METHYLSULFATE 1 MG/ML
INJECTION, SOLUTION INTRAVENOUS PRN
Status: DISCONTINUED | OUTPATIENT
Start: 2017-10-04 | End: 2017-10-04 | Stop reason: SDUPTHER

## 2017-10-04 RX ORDER — DEXAMETHASONE SODIUM PHOSPHATE 4 MG/ML
12 INJECTION, SOLUTION INTRA-ARTICULAR; INTRALESIONAL; INTRAMUSCULAR; INTRAVENOUS; SOFT TISSUE
Status: DISCONTINUED | OUTPATIENT
Start: 2017-10-04 | End: 2017-10-04 | Stop reason: HOSPADM

## 2017-10-04 RX ORDER — PROPOFOL 10 MG/ML
INJECTION, EMULSION INTRAVENOUS PRN
Status: DISCONTINUED | OUTPATIENT
Start: 2017-10-04 | End: 2017-10-04 | Stop reason: SDUPTHER

## 2017-10-04 RX ORDER — HYDROCODONE BITARTRATE AND ACETAMINOPHEN 7.5; 325 MG/1; MG/1
TABLET ORAL
Status: DISPENSED
Start: 2017-10-04 | End: 2017-10-04

## 2017-10-04 RX ORDER — DEXAMETHASONE SODIUM PHOSPHATE 4 MG/ML
INJECTION, SOLUTION INTRA-ARTICULAR; INTRALESIONAL; INTRAMUSCULAR; INTRAVENOUS; SOFT TISSUE
Status: DISPENSED
Start: 2017-10-04 | End: 2017-10-04

## 2017-10-04 RX ORDER — GLYCOPYRROLATE 0.2 MG/ML
INJECTION INTRAMUSCULAR; INTRAVENOUS PRN
Status: DISCONTINUED | OUTPATIENT
Start: 2017-10-04 | End: 2017-10-04 | Stop reason: SDUPTHER

## 2017-10-04 RX ORDER — LIDOCAINE HYDROCHLORIDE 20 MG/ML
INJECTION, SOLUTION INFILTRATION; PERINEURAL PRN
Status: DISCONTINUED | OUTPATIENT
Start: 2017-10-04 | End: 2017-10-04 | Stop reason: SDUPTHER

## 2017-10-04 RX ORDER — HYDROCODONE BITARTRATE AND ACETAMINOPHEN 7.5; 325 MG/1; MG/1
1 TABLET ORAL EVERY 6 HOURS PRN
Status: DISCONTINUED | OUTPATIENT
Start: 2017-10-04 | End: 2017-10-04 | Stop reason: HOSPADM

## 2017-10-04 RX ADMIN — DEXAMETHASONE SODIUM PHOSPHATE 10 MG: 4 INJECTION, SOLUTION INTRAMUSCULAR; INTRAVENOUS at 08:10

## 2017-10-04 RX ADMIN — Medication 5 MG: at 08:46

## 2017-10-04 RX ADMIN — DEXAMETHASONE SODIUM PHOSPHATE 12 MG: 4 INJECTION, SOLUTION INTRAMUSCULAR; INTRAVENOUS at 07:19

## 2017-10-04 RX ADMIN — GLYCOPYRROLATE 0.4 MG: 0.2 INJECTION, SOLUTION INTRAMUSCULAR; INTRAVENOUS at 09:11

## 2017-10-04 RX ADMIN — Medication 30 MG: at 08:10

## 2017-10-04 RX ADMIN — SODIUM CHLORIDE: 9 INJECTION, SOLUTION INTRAVENOUS at 07:19

## 2017-10-04 RX ADMIN — LABETALOL HYDROCHLORIDE 5 MG: 5 INJECTION, SOLUTION INTRAVENOUS at 08:52

## 2017-10-04 RX ADMIN — PROPOFOL 200 MG: 10 INJECTION, EMULSION INTRAVENOUS at 08:10

## 2017-10-04 RX ADMIN — LIDOCAINE HYDROCHLORIDE 100 MG: 20 INJECTION, SOLUTION INFILTRATION; PERINEURAL at 08:10

## 2017-10-04 RX ADMIN — ONDANSETRON 4 MG: 2 INJECTION INTRAMUSCULAR; INTRAVENOUS at 08:10

## 2017-10-04 RX ADMIN — SODIUM CHLORIDE: 9 INJECTION, SOLUTION INTRAVENOUS at 08:10

## 2017-10-04 RX ADMIN — FENTANYL CITRATE 100 MCG: 50 INJECTION INTRAMUSCULAR; INTRAVENOUS at 08:10

## 2017-10-04 RX ADMIN — NEOSTIGMINE METHYLSULFATE 3 MG: 1 INJECTION, SOLUTION INTRAVENOUS at 09:11

## 2017-10-04 RX ADMIN — GLYCOPYRROLATE 0.2 MG: 0.2 INJECTION, SOLUTION INTRAMUSCULAR; INTRAVENOUS at 08:10

## 2017-10-04 RX ADMIN — HYDROCODONE BITARTRATE AND ACETAMINOPHEN 1 TABLET: 7.5; 325 TABLET ORAL at 11:05

## 2017-10-04 ASSESSMENT — PAIN SCALES - GENERAL
PAINLEVEL_OUTOF10: 5
PAINLEVEL_OUTOF10: 4
PAINLEVEL_OUTOF10: 5
PAINLEVEL_OUTOF10: 3
PAINLEVEL_OUTOF10: 3
PAINLEVEL_OUTOF10: 5

## 2017-10-04 ASSESSMENT — PULMONARY FUNCTION TESTS
PIF_VALUE: 0
PIF_VALUE: 17
PIF_VALUE: 16
PIF_VALUE: 16
PIF_VALUE: 17
PIF_VALUE: 0
PIF_VALUE: 1
PIF_VALUE: 17
PIF_VALUE: 2
PIF_VALUE: 17
PIF_VALUE: 18
PIF_VALUE: 17
PIF_VALUE: 26
PIF_VALUE: 16
PIF_VALUE: 18
PIF_VALUE: 19
PIF_VALUE: 1
PIF_VALUE: 1
PIF_VALUE: 24
PIF_VALUE: 18
PIF_VALUE: 17
PIF_VALUE: 17
PIF_VALUE: 1
PIF_VALUE: 17
PIF_VALUE: 17
PIF_VALUE: 1
PIF_VALUE: 16
PIF_VALUE: 17
PIF_VALUE: 17
PIF_VALUE: 1
PIF_VALUE: 6
PIF_VALUE: 17
PIF_VALUE: 1
PIF_VALUE: 17
PIF_VALUE: 0
PIF_VALUE: 1
PIF_VALUE: 2
PIF_VALUE: 17
PIF_VALUE: 2
PIF_VALUE: 16
PIF_VALUE: 17
PIF_VALUE: 3
PIF_VALUE: 16
PIF_VALUE: 17
PIF_VALUE: 16
PIF_VALUE: 17
PIF_VALUE: 20
PIF_VALUE: 2
PIF_VALUE: 17
PIF_VALUE: 17
PIF_VALUE: 2
PIF_VALUE: 17
PIF_VALUE: 17
PIF_VALUE: 21
PIF_VALUE: 17
PIF_VALUE: 18
PIF_VALUE: 18
PIF_VALUE: 17
PIF_VALUE: 1
PIF_VALUE: 17
PIF_VALUE: 18
PIF_VALUE: 21
PIF_VALUE: 17

## 2017-10-04 NOTE — ANESTHESIA PRE PROCEDURE
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    Primary squamous cell carcinoma of larynx (HCC) C32.9    FOUZIA on CPAP G47.33, Z99.89       Past Medical History:        Diagnosis Date    Aneurysm ([address] 02/2017    in his chest    CAD (coronary artery disease)     Chronic back pain     Hyperlipidemia     Hypertension     Nausea & vomiting     FOUZIA on CPAP     PONV (postoperative nausea and vomiting)     S/P CABG (coronary artery bypass graft) 2002    [facility]    Snores        Past Surgical History:        Procedure Laterality Date    CARDIAC CATHETERIZATION  8/2002, 4/2004    [facility]    COLONOSCOPY      CORONARY ANGIOPLASTY WITH STENT PLACEMENT  7/2002    [facility]    CORONARY ARTERY BYPASS GRAFT  2002    [facility]    HEMORRHOID SURGERY      KNEE SURGERY Right     MICROLARYNGOSCOPY W BIOPSY  04/28/2016    by Dr Gabrielle Palacios       Social History:    Social History   Substance Use Topics    Smoking status: Former Smoker     Packs/day: 2.00     Years: 20.00     Quit date: 12/5/2001    Smokeless tobacco: Never Used    Alcohol use No                                Counseling given: Not Answered      Vital Signs (Current):   Vitals:    09/29/17 0814 10/04/17 0643   BP:  (!) 180/81   Pulse:  57   Resp:  18   Temp:  36.7 °C (98.1 °F)   TempSrc:  Temporal   SpO2:  100%   Weight: 181 lb (82.1 kg) 180 lb 5.4 oz (81.8 kg)   Height: 5' 10\" (1.778 m) 5' 11\" (1.803 m) BP Readings from Last 3 Encounters:   10/04/17 (!) 180/81   09/20/17 128/76   09/14/17 118/76       NPO Status: Time of last liquid consumption: 2200                        Time of last solid consumption: 2200                        Date of last liquid consumption: 10/03/17                        Date of last solid food consumption: 10/03/17    BMI:   Wt Readings from Last 3 Encounters:   10/04/17 180 lb 5.4 oz (81.8 kg)   09/20/17 181 lb (82.1 kg)   09/14/17 182 lb 3.2 oz (82.6 kg)     Body mass index is 25.15 kg/(m^2). CBC:   Lab Results   Component Value Date    WBC 6.1 09/15/2017    RBC 4.93 09/15/2017    RBC 4.86 12/01/2011    HGB 15.7 09/15/2017    HCT 46.4 09/15/2017    MCV 94.1 09/15/2017    RDW 13.6 09/15/2017     09/15/2017       CMP:   Lab Results   Component Value Date     09/15/2017    K 4.7 09/15/2017     09/15/2017    CO2 28 09/15/2017    BUN 13 09/15/2017    CREATININE 0.8 09/15/2017    LABGLOM >90 09/15/2017    GLUCOSE 100 09/15/2017    GLUCOSE 95 12/01/2011    PROT 6.6 09/15/2017    CALCIUM 9.6 09/15/2017    BILITOT 0.5 09/15/2017    ALKPHOS 76 09/15/2017    AST 14 09/15/2017    ALT 15 09/15/2017       POC Tests: No results for input(s): POCGLU, POCNA, POCK, POCCL, POCBUN, POCHEMO, POCHCT in the last 72 hours.     Coags:   Lab Results   Component Value Date    APTT 29.8 05/23/2014       HCG (If Applicable): No results found for: PREGTESTUR, PREGSERUM, HCG, HCGQUANT     ABGs: No results found for: PHART, PO2ART, OYX6JVU, XRO7HFU, BEART, E9YGSUPA     Type & Screen (If Applicable):  Lab Results   Component Value Date    LABRH NEG 05/23/2014       Anesthesia Evaluation     history of anesthetic complications: PONV  Airway: Mallampati: II  TM distance: >3 FB   Neck ROM: full  Mouth opening: > = 3 FB Dental:    (+) other      Pulmonary:normal exam    (+) sleep apnea: on CPAP,         Cardiovascular:  Exercise tolerance: good (>4 METS),   (+) hypertension: moderate, CAD:

## 2017-10-04 NOTE — ANESTHESIA POSTPROCEDURE EVALUATION
Department of Anesthesiology  Postprocedure Note    Patient: Mary Lou Garcia  MRN: [id number]  YOB: 1955  Date of evaluation: 10/4/2017  Time:  4:59 PM     Procedure Summary     Date Anesthesia Start Anesthesia Stop Room / Location    10/04/17 0800 0924 [facility] 05 / [facility]       Procedure Diagnosis Surgeon Responsible Provider    LARYNGOSCOPY MICRO WITH BIOPSY (N/A ) (MASS OF LARYNX FALSE VOCL CORD, RIGHT, PRIMARY SQUAMOUS CELL CARCINOMA OF LARYNX ([address])) Leandro Julien MD No responsible provider of record. Anesthesia Type: general    Maggie Phase I: Maggie Score: 10    Maggie Phase II: Maggie Score: 10    Last vitals:  BP      Temp      Pulse     Resp      SpO2        Anesthesia Post Evaluation    Patient location during evaluation: PACU  Patient participation: complete - patient participated  Level of consciousness: awake and alert  Airway patency: patent  Nausea & Vomiting: no nausea and no vomiting  Complications: no  Cardiovascular status: hemodynamically stable  Respiratory status: acceptable  Hydration status: euvolemic    [facility]  POST-ANESTHESIA NOTE       Name:  Mary Lou Garcia                                         Age:  57 y.o. MRN:  [id number]      Last Vitals:  /73  Pulse 64  Temp 97.6 °F (36.4 °C) (Temporal)   Resp 18  Ht 5' 11\" (1.803 m)  Wt 180 lb 5.4 oz (81.8 kg)  SpO2 97%  BMI 25.15 kg/m2  No data found.       Level of Consciousness:  Awake    Respiratory:  Stable    Oxygen Saturation:  Stable    Cardiovascular:  Stable    Hydration:  Adequate    PONV:  Stable    Post-op Pain:  Adequate analgesia    Post-op Assessment:  No apparent anesthetic complications    Additional Follow-Up / Treatment / Comment:  None    Jose D Jay MD  October 4, 2017   4:59 PM

## 2017-10-04 NOTE — PROGRESS NOTES
Pt in bed with family by side. No drainage noted in back of throat. No frequent swallowing noted. Pt is asking for popscile. And ice water.

## 2017-10-04 NOTE — OP NOTE
[address] [id number]                               OPERATIVE REPORT    PATIENT NAME: Robyn Preciado                       :             1955  MED REC NO:   [id number]                            ROOM:  ACCOUNT NO:   [de-identified]                            ADMISSION DATE:  10/04/2017  PROVIDER:     Dorene Martin. 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 PROCEDURE:  Direct microlaryngoscopy and biopsy of right false vocal  cord. SURGEON:  Saturnino Tapia M.D. ANESTHESIA:  General endotracheal.    HISTORY AND OPERATIVE FINDINGS:  This 75-year-old male with a history  of moderately well differentiated invasive squamous cell carcinoma  that was P16 negative in right false vocal cord treated with full  course of radiation. He has done well until recently when a  discoloration and then a fullness developed in the mid right false  vocal cord. This was in the region of the prior tumor. He was  brought to the operating room today for biopsy of this while it was  still small. Findings of surgery revealed a tough submucosal mass in the right false vocal cord, possibly a tumor scar or recurrent carcinoma. Photographic documentation was obtained. There was one  small arterial bleeder that was cauterized with angled cupped laryngeal forceps and  monopolar cautery with good control of bleeding. Larynx had minimal  swelling, as he was pretreated with Decadron.     PROCEDURE:  After adequate level of general endotracheal anesthesia  had been obtained with a small #6 endotracheal tube, the pyriform  sinus, vallecula, postcricoid areas were examined with the Dedo  laryngoscope. No abnormalities were noted. The endolarynx was  brought into view. True vocal cords and false vocal cords were  examined and the lesion was noted on the right false vocal cord. This  scope was then fixed in place. Using a 30 degree Urology telescope,  photographs were taken prior to the biopsy. Palpation indicated that  it did seem to be firm freely localized mass in the middle third of  the right false vocal cord, that was submucosal with some pale  discoloration of overlying mucosa. The overlying mucosa was not  adherent to this mass. The mucosa overlying the mass was incised and . Cup forceps  were then used to remove the tissue. Scissors were also required because of  the toughness of the mass, using scissors along the deep edge to get  adequate biopsy. There was excellent hemostasis throughout except  with the last biopsy it was a tiny arterial bleeder, which was  controlled topically with epinephrine on a pledget and then the area  cauterized with monopolar cautery using the angle cup forceps to  deliver it. Care was taken not to touch any of other structures or  scope. This produced excellent control with bleeding. The bed was  swiped, no further bleeding occurred. The endolarynx was sprayed with 4% Xylocaine topical.  Throat was  suctioned. The scope was withdrawn. The patient was awakened,  extubated, and taken to recovery room in satisfactory condition. There were no complications. In particular, because of the bleeding, the patient needs to stay off  of his alpha blocker and aspirin for 2 weeks. His blood pressure is  difficult to control and was requested he now be placed on ACE  inhibitors or ARB to minimize postoperative swelling. They will  monitor his blood pressure and check with Dr. Jamie Naranjo for any issues  with that.   He is on metoprolol, which is not a problem for the  surgery and could be increased. I will leave that to Dr. Eliot Patten  judgment depending on the blood pressure readings.         Margarita Carlson M.D.    D: 10/04/2017 10:16:52       T: 10/04/2017 11:44:17     [email or web address]  Job#: [id number]     Doc#: [id number]

## 2017-10-04 NOTE — IP AVS SNAPSHOT
nitroGLYCERIN 0.3 MG SL tablet   Commonly known as:  NITROSTAT   Place 1 tablet under tongue as needed for chest pain, may repeat every 5 mins. For maximum of 3 tabs in 15 mins. omeprazole 40 MG delayed release capsule   Commonly known as:  PRILOSEC   1 cap daily                                         TYLENOL PM EXTRA STRENGTH PO   Take by mouth nightly 2 nightly                                           These are the medications you have told us you were taking at home, STOP taking them after you leave the hospital     aspirin 81 MG tablet       terazosin 5 MG capsule   Commonly known as:  HYTRIN            Where to Get Your Medications      These medications were sent to [address] [facility] [phone]  [address]     Phone:  [phone]     HYDROcodone-acetaminophen 7.5-325 MG per tablet               Allergies as of 10/4/2017     No Known Allergies      Immunizations as of 10/4/2017     Name Date Dose VIS Date Route    Influenza Vaccine, unspecified formulation 9/15/2016 -- -- --    Comment: [id number] [address]     Influenza Whole 10/1/2008 -- -- --      Last Vitals          Most Recent Value    Temp  97.6 °F (36.4 °C)    Pulse  55    Resp  18    BP  (!)  168/78         After Visit Summary    This summary was created for you. Thank you for entrusting your care to us.   The following information includes details about your hospital/visit stay along with steps you should take to help with your recovery once you leave the hospital.  In this packet, you will find information about the topics listed below:    · Instructions about your medications including a list of your home medications  · A summary of your hospital visit  · Follow-up appointments once you have left the hospital  · Your care plan at home You may receive a survey regarding the care you received during your stay. Your input is valuable to us. We encourage you to complete and return your survey in the envelope provided. We hope you will choose us in the future for your healthcare needs. Patient Information     Patient Name JUNG Holden 1955      Care Provided at:     Name Address Phone       [address] [phone]            Your Visit    Here you will find information about your visit, including the reason for your visit. Please take this sheet with you when you visit your doctor or other health care provider in the future. It will help determine the best possible medical care for you at that time. If you have any questions once you leave the hospital, please call the department phone number listed below. Why you were here     Your primary diagnosis was:  Not on File      Visit Information     Date & Time Provider Department Dept. Phone    10/4/2017 Elma Mitchell MD [address] [facility] [phone]       Follow-up Appointments    Below is a list of your follow-up and future appointments. This may not be a complete list as you may have made appointments directly with providers that we are not aware of or your providers may have made some for you. Please call your providers to confirm appointments. It is important to keep your appointments. Please bring your current insurance card, photo ID, co-pay, and all medication bottles to your appointment. If self-pay, payment is expected at the time of service.         Future Appointments     10/6/2017 1:00 PM     Appointment with Elma Mitchell MD at [facility] ([phone])   [address] [place] [address]       11/10/2017 8:45 AM     Appointment with Sunny Valenzuela PA-C at [address] [place] [address] ([phone]) your provider does not use [facility] in his/her office    For questions regarding your MyChart account call [phone]. If you have a clinical question, please call your doctor's office. The information on all pages of the After Visit Summary has been reviewed with me, the patient and/or responsible adult, by my health care provider(s). I had the opportunity to ask questions regarding this information. I understand I should dispose of my armband safely at home to protect my health information. A complete copy of the After Visit Summary has been given to me, the patient and/or responsible adult.            Patient Signature/Responsible Adult:____________________    Clinician Signature:_____________________    Date:_____________________    Time:_____________________

## 2017-10-04 NOTE — IP AVS SNAPSHOT
Patient Information     Patient Name JUNG Pizarro 1955      SEDATION/ANALGESIA INFORMATION/HOME GOING ADVICE     SEDATION / ANALGESIA INFORMATION / HOME GOING ADVICE  You have received the sedation/analgesia medication during your visit    Sedation/analgesia is used during short medical procedures under controlled supervision. The medication will produce a strong relaxation. You will be able to hear, speak and follow instructions, but your memory and alertness will be decreased. You will be able to swallow and breathe on your own. During sedation/analgesia your blood pressure, heart and breathing will be watched closely. After the procedure, you may not remember what was said or done. You may have the following effects from the medication. \" Drowsiness, dizziness, sleepiness or confusion. \" Difficulty remembering or delayed reaction times. \" Loss of fine muscle control or difficulty with your balance especially while walking. \" Difficulty focusing or blurred vision. You may not be aware of slight changes in your behavior and/or your reaction time because of the medication used during the procedure. Therefore you should follow these instructions. \" Have someone responsible help you with your care. \" Do not drive for 24 hours. \" Do not operate equipment for 24 hours (lawnmowers, power tools, kitchen accessories, stove). \" Do not drink any alcoholic beverages for a minimum of 24 hours. \" Do not make important personal, legal or business decisions for 24 hours. \" You may experience dizziness or lightheadedness. Move slowly and carefully, do not make sudden position changes. \" Drink extra amounts of fluids today. \" Increase your diet as tolerated (unless you have received specific instructions from your doctor). \" If you feel nauseated, continue with liquids until the nausea is gone. \" Notify your physician if you have not urinated within 8 hours after the procedure.

## 2017-10-04 NOTE — PROGRESS NOTES
Pt is in bed resting comfortably asking for pain medication. No frequent swallowing noted. Pt is drinking and eating applesauce without issues. Will continue to monitor.

## 2017-10-04 NOTE — H&P
Geraldine Flaherty is a 57 y.o. male who was referred by No ref. provider found for:       Chief Complaint   Patient presents with    Other       2 month follow up   .     HPI:      Geraldine Flaherty is a 57 y.o. 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 .  Except for the dryness is he remains asymptomatic. On his last visit I noticed slight change in the mucosa in the anterior right false vocal cord.     History:      No Known Allergies   Current Facility-Administered Medications           Current Outpatient Prescriptions   Medication Sig Dispense Refill    metoprolol succinate (TOPROL XL) 50 MG extended release tablet TAKE 1/2 TABLET BY MOUTH TWICE DAILY 30 tablet 5    nitroGLYCERIN (NITROSTAT) 0.3 MG SL tablet Place 1 tablet under tongue as needed for chest pain, may repeat every 5 mins. For maximum of 3 tabs in 15 mins. 30 tablet 3    PrednisoLONE 5 MG (21) TBPK Take by mouth        atorvastatin (LIPITOR) 20 MG tablet TAKE 1 TABLET BY MOUTH ONE TIME A DAY 30 tablet 6    omeprazole (PRILOSEC) 40 MG delayed release capsule 1 cap daily 30 capsule 6    terazosin (HYTRIN) 5 MG capsule Take 1 capsule by mouth nightly New higher dose 30 capsule 6    metoprolol succinate (TOPROL XL) 25 MG extended release tablet Take 1 tablet by mouth 2 times daily 60 tablet 6    CPAP Machine MISC by Does not apply route Please change CPAP pressure to 8 cm H20. 1 each 0    aspirin 81 MG tablet Take 81 mg by mouth daily        Diphenhydramine-APAP, sleep, (TYLENOL PM EXTRA STRENGTH PO) Take by mouth nightly 2 nightly        Coenzyme Q10 (CO Q-10) 200 MG CAPS Take  by mouth daily.          Multiple Vitamin (MULTI-VITAMIN) TABS Take  by mouth daily.          ARGININE PO Take 500 mg by mouth daily.  2 tab          No current facility-administered medications for this visit.           Past Medical History         Past Medical History:   Diagnosis Date    Aneurysm ([facility] [address] 02/2017     in his chest    CAD (coronary artery disease)      Chronic back pain      Hyperlipidemia      Hypertension      Nausea & vomiting      FOUZIA on CPAP      PONV (postoperative nausea and vomiting)      S/P CABG (coronary artery bypass graft) 2002     [facility]    Snores            Past Surgical History          Past Surgical History:   Procedure Laterality Date    CARDIAC CATHETERIZATION   8/2002, 4/2004     [facility]    COLONOSCOPY        CORONARY ANGIOPLASTY WITH STENT PLACEMENT   7/2002     [facility]    CORONARY ARTERY BYPASS GRAFT   2002     [facility]    HEMORRHOID SURGERY        KNEE SURGERY Right      MICROLARYNGOSCOPY W BIOPSY   04/28/2016     by Dr Leesa Hightower          Family History           Family History   Problem Relation Age of Onset    Heart Disease Father         MI    High Blood Pressure Father      Cancer Maternal Grandmother      Cancer Maternal Grandfather                 Social History   Substance Use Topics    Smoking status: Former Smoker       Packs/day: 2.00       Years: 20.00       Quit date: 12/5/2001    Smokeless tobacco: Never Used    Alcohol use No         Subjective:       Review of Systems   Constitutional: Negative for activity change, appetite change, chills, diaphoresis, fatigue, fever and unexpected weight change. HENT: Negative for congestion, dental problem, ear discharge, ear pain, facial swelling, hearing loss, mouth sores, nosebleeds, postnasal drip, rhinorrhea, sinus pressure, sneezing, sore throat, tinnitus, trouble swallowing and voice change. Eyes: Negative for visual disturbance. Respiratory: Negative for apnea, cough, choking, chest tightness, shortness of breath, wheezing and stridor. Cardiovascular: Negative for chest pain, palpitations and leg swelling. Gastrointestinal: Negative for abdominal pain, diarrhea, nausea and vomiting. Endocrine: Negative for cold intolerance, heat intolerance, polydipsia and polyuria.    Genitourinary: Negative for dysuria, enuresis and hematuria. Musculoskeletal: Negative for arthralgias, gait problem, neck pain and neck stiffness. Skin: Negative for color change and rash. Allergic/Immunologic: Negative for environmental allergies, food allergies and immunocompromised state. Neurological: Negative for dizziness, syncope, facial asymmetry, speech difficulty, light-headedness and headaches. Hematological: Negative for adenopathy. Does not bruise/bleed easily. Psychiatric/Behavioral: Negative for confusion and sleep disturbance. The patient is not nervous/anxious.          Objective:   /76 (Site: Right Arm, Position: Sitting)  Pulse 76  Temp 98 °F (36.7 °C) (Oral)   Resp 16  Wt 182 lb 3.2 oz (82.6 kg)  BMI 25.41 kg/m2     Physical Exam   Constitutional: He is oriented to person, place, and time. He appears well-developed and well-nourished. He is cooperative. HENT:   Head: Normocephalic and atraumatic. Head is without laceration. Right Ear: Hearing, tympanic membrane, external ear and ear canal normal. No drainage or swelling. Tympanic membrane is not scarred, not perforated and not erythematous. Tympanic membrane mobility is normal (on pneumatic massage). No middle ear effusion. Left Ear: Hearing, tympanic membrane, external ear and ear canal normal. No drainage or swelling. Tympanic membrane is not scarred, not perforated and not erythematous. Tympanic membrane mobility is normal (on pneumatic massage). No middle ear effusion. Nose: Nose normal. No mucosal edema, rhinorrhea or septal deviation. Mouth/Throat: Uvula is midline, oropharynx is clear and moist and mucous membranes are normal. Mucous membranes are not pale and not dry. No oral lesions. No uvula swelling. No oropharyngeal exudate, posterior oropharyngeal edema or posterior oropharyngeal erythema.    Turbinates: normal  LIps: lips normal    Teeth and gums:good dentition Mallampati 1  Tonsils:Unremarkable  Base of tongue: symmetric,  Larynx, mirror exam:see procedure note       Eyes: Right eye exhibits normal extraocular motion and no nystagmus. Left eye exhibits normal extraocular motion and no nystagmus. Conjugate gaze   Neck: Trachea normal and phonation normal. Neck supple. No spinous process tenderness present. No tracheal deviation present. No thyroid mass and no thyromegaly present. No adenopathy. Salivary glands not enlarged and normal to palpation     Cardiovascular: Normal rate and regular rhythm. No murmur heard. Pulmonary/Chest: Effort normal and breath sounds normal. No stridor. Chest wall is not dull to percussion. Neurological: He is alert and oriented to person, place, and time. No cranial nerve deficit (VIIth N function intact bilat). Psychiatric: He has a normal mood and affect. Nursing note and vitals reviewed. His voice is good.        PROCEDURE: FIBEROPTIC LARYNGOSCOPY     A fiberoptic laryngoscopy was performed under topical anesthesia, after using Afrin and Lidocaine spray in the nasal fossa. The nasal fossa, nasopharynx, hypopharynx and larynx were carefully examined. Base of tongue was symmetrical.  There was mild diffuse edema of the laryngeal structures and hypopharynx consistent with his radiation. This is no worse. Epiglottis appeared normal and was not retrodisplaced. True vocal cords had normal mobility. There was more erythema of the right anterior false vocal cord than before, with some stiffness and slight elevation of mucosa. There was a small whitish area in the center of this area, which could be an ulceration. This is very suspicious for recurrent tumor. No pooling in the pyriform sinuses.        Data:  All of the past medical history, past surgical history, family history, social history, allergies and current medications were reviewed with the patient. Assessment & Plan   Diagnoses and all orders for this visit:     1.  Mass of larynx, false vocal cord, right  ID LARYNGOSCOPY FLEXIBLE DIAGNOSTIC

## 2017-10-06 ENCOUNTER — OFFICE VISIT (OUTPATIENT)
Dept: ENT CLINIC | Age: 62
End: 2017-10-06
Payer: COMMERCIAL

## 2017-10-06 VITALS
HEART RATE: 64 BPM | DIASTOLIC BLOOD PRESSURE: 84 MMHG | WEIGHT: 181.1 LBS | SYSTOLIC BLOOD PRESSURE: 122 MMHG | TEMPERATURE: 98.4 F | HEIGHT: 71 IN | RESPIRATION RATE: 16 BRPM | BODY MASS INDEX: 25.35 KG/M2

## 2017-10-06 DIAGNOSIS — J38.4 EDEMA OF LARYNX: ICD-10-CM

## 2017-10-06 DIAGNOSIS — R49.9 CHANGE IN VOICE: ICD-10-CM

## 2017-10-06 DIAGNOSIS — H93.11 TINNITUS, RIGHT: ICD-10-CM

## 2017-10-06 DIAGNOSIS — C32.9 PRIMARY SQUAMOUS CELL CARCINOMA OF LARYNX (HCC): ICD-10-CM

## 2017-10-06 DIAGNOSIS — G47.33 OSA ON CPAP: ICD-10-CM

## 2017-10-06 DIAGNOSIS — J38.7 MASS OF LARYNX: Primary | ICD-10-CM

## 2017-10-06 DIAGNOSIS — K21.00 GASTROESOPHAGEAL REFLUX DISEASE WITH ESOPHAGITIS: ICD-10-CM

## 2017-10-06 DIAGNOSIS — Z99.89 OSA ON CPAP: ICD-10-CM

## 2017-10-06 DIAGNOSIS — K22.70 BARRETT'S ESOPHAGUS WITHOUT DYSPLASIA: ICD-10-CM

## 2017-10-06 DIAGNOSIS — K14.8 LARGE TONGUE: ICD-10-CM

## 2017-10-06 DIAGNOSIS — G47.30 SLEEP-RELATED BREATHING DISORDER: ICD-10-CM

## 2017-10-06 DIAGNOSIS — K05.6 PERIODONTAL DISEASE: ICD-10-CM

## 2017-10-06 DIAGNOSIS — T66.XXXD EFFECTS, RADIATION, SUBSEQUENT ENCOUNTER: ICD-10-CM

## 2017-10-06 PROCEDURE — 1036F TOBACCO NON-USER: CPT | Performed by: OTOLARYNGOLOGY

## 2017-10-06 PROCEDURE — 99212 OFFICE O/P EST SF 10 MIN: CPT | Performed by: OTOLARYNGOLOGY

## 2017-10-06 PROCEDURE — 3017F COLORECTAL CA SCREEN DOC REV: CPT | Performed by: OTOLARYNGOLOGY

## 2017-10-06 PROCEDURE — G8484 FLU IMMUNIZE NO ADMIN: HCPCS | Performed by: OTOLARYNGOLOGY

## 2017-10-06 PROCEDURE — G8427 DOCREV CUR MEDS BY ELIG CLIN: HCPCS | Performed by: OTOLARYNGOLOGY

## 2017-10-06 PROCEDURE — G8598 ASA/ANTIPLAT THER USED: HCPCS | Performed by: OTOLARYNGOLOGY

## 2017-10-06 PROCEDURE — G8417 CALC BMI ABV UP PARAM F/U: HCPCS | Performed by: OTOLARYNGOLOGY

## 2017-10-06 ASSESSMENT — ENCOUNTER SYMPTOMS
ABDOMINAL PAIN: 0
NAUSEA: 0
FACIAL SWELLING: 0
COLOR CHANGE: 0
CHOKING: 0
TROUBLE SWALLOWING: 0
RHINORRHEA: 0
SINUS PRESSURE: 0
VOMITING: 0
WHEEZING: 0
VOICE CHANGE: 0
CHEST TIGHTNESS: 0
SHORTNESS OF BREATH: 0
COUGH: 0
DIARRHEA: 0
STRIDOR: 0
APNEA: 0
SORE THROAT: 0

## 2017-10-06 NOTE — MR AVS SNAPSHOT
For questions regarding your [employer] system account call [phone]. If you have a clinical question, please call your doctor's office.

## 2017-10-06 NOTE — LETTER
[facility]  [facility] [address]  [address]  [place]  Phone: [phone]  Fax: [phone]    Timur Herman MD        October 23, 2017    Mozell Boas, MD  [place] [address]    Patient: Lashon Trejo   MR Number: [id number]   YOB: 1955   Date of Visit: 10/6/2017     Dear Mozell Boas,    I recently saw your patient, Lashon Trejo, regarding His recent biopsy. This was benign, presumably tumor scar. Below are the relevant portions of my assessment and plan of care. Assessment & Plan   Diagnoses and all orders for this visit:    1. Mass of larynx, false vocal cord, right     2. Primary squamous cell carcinoma of larynx (HCC)     3. FOUZIA on CPAP     4. Effects, radiation, subsequent encounter     5. Brown's esophagus without dysplasia     6. Periodontal disease     7. Sleep-related breathing disorder     8. Large tongue base     9. Edema of larynx     10. Tinnitus, right     11. Gastroesophageal reflux disease with esophagitis     12. Change in voice         The findings were explained and his questions were answered. He can go back to work with no restrictions. I suspect that as the edema of his larynx from his radiation therapy gradually subsided, the tumor scar became more prominent than the surrounding tissue and was a different color, and this made it look like it was enlarging. I will see him back in 1 month. Return for Follow-Up, CA larynx. If you have questions, please do not hesitate to call me. I look forward to following Monique Radford along with you.     Sincerely,          Timur Herman MD

## 2017-10-06 NOTE — PROGRESS NOTES
[address] PROFESSIONAL SERVS  [facility]  [address] [facility]  [address]  Dept: [phone]  Dept Fax: [phone]  Loc: [phone]    Rai Fernandez is a 57 y.o. male who was referred by No ref. provider found for:  Chief Complaint   Patient presents with    Post-Op Check     Patient is here for post op check for microlaryngoscopy w/ biopsy 10/4/17   . HPI:     Rai Fernandez is a 57 y.o. male who presents today for post op exam, Direct microlaryngoscopy and biopsy of right false vocal cord on 10/4/17. Surgical Pathology results were reviewed with the patient. Surgical Pathology results:  FINAL DIAGNOSIS:  Right false vocal cord, submucosal mass, biopsy:      Inflamed and ulcerated squamous mucosa with reactive changes and      stromal changes consistent with previous radiation therapy.   Negative for dysplasia and malignancy. His throat is a little sore, but not too bad he states. History:     No Known Allergies  Current Outpatient Prescriptions   Medication Sig Dispense Refill    HYDROcodone-acetaminophen (NORCO) 7.5-325 MG per tablet Take 1 tablet by mouth every 4 hours as needed for Pain . 20 tablet 0    metoprolol succinate (TOPROL XL) 50 MG extended release tablet TAKE 1/2 TABLET BY MOUTH TWICE DAILY 30 tablet 5    nitroGLYCERIN (NITROSTAT) 0.3 MG SL tablet Place 1 tablet under tongue as needed for chest pain, may repeat every 5 mins. For maximum of 3 tabs in 15 mins.  30 tablet 3    atorvastatin (LIPITOR) 20 MG tablet TAKE 1 TABLET BY MOUTH ONE TIME A DAY (Patient taking differently: nightly TAKE 1 TABLET BY MOUTH ONE TIME A DAY ) 30 tablet 6    omeprazole (PRILOSEC) 40 MG delayed release capsule 1 cap daily 30 capsule 6    CPAP Machine MISC by Does not apply route Please change CPAP pressure to 8 cm H20. 1 each 0    Diphenhydramine-APAP, sleep, (TYLENOL PM EXTRA STRENGTH PO) Take by mouth nightly 2 nightly      Coenzyme Q10 (CO Q-10) 200 MG CAPS Take  by mouth daily.  Multiple Vitamin (MULTI-VITAMIN) TABS Take  by mouth daily.  ARGININE PO Take 500 mg by mouth daily. 2 tab       No current facility-administered medications for this visit. Past Medical History:   Diagnosis Date    Aneurysm ([address] 02/2017    in his chest    CAD (coronary artery disease)     Chronic back pain     Hyperlipidemia     Hypertension     Nausea & vomiting     FOUZIA on CPAP     PONV (postoperative nausea and vomiting)     S/P CABG [facility]coronary artery bypass graft) 2002    [facility]    Snores       Past Surgical History:   Procedure Laterality Date    CARDIAC CATHETERIZATION  8/2002, 4/2004    [facility]    COLONOSCOPY      CORONARY ANGIOPLASTY WITH STENT PLACEMENT  7/2002    [facility]    CORONARY ARTERY BYPASS GRAFT  2002    [facility]    HEMORRHOID SURGERY      KNEE SURGERY Right     LARYNGOSCOPY N/A 10/4/2017    LARYNGOSCOPY MICRO WITH BIOPSY performed by Donald Inman MD at [address] [place] [address] MICROLARYNGOSCOPY W BIOPSY  04/28/2016    by Dr Cameron Dodson     Family History   Problem Relation Age of Onset    Heart Disease Father      MI    High Blood Pressure Father     Cancer Maternal Grandmother     Cancer Maternal Grandfather      Social History   Substance Use Topics    Smoking status: Former Smoker     Packs/day: 2.00     Years: 20.00     Quit date: 12/5/2001    Smokeless tobacco: Never Used    Alcohol use No       Subjective:      Review of Systems   Constitutional: Negative for activity change, appetite change, chills, diaphoresis, fatigue, fever and unexpected weight change. HENT: Negative for congestion, dental problem, ear discharge, ear pain, facial swelling, hearing loss, mouth sores, nosebleeds, postnasal drip, rhinorrhea, sinus pressure, sneezing, sore throat, tinnitus, trouble swallowing and voice change. Eyes: Negative for visual disturbance. Respiratory: Negative for apnea, cough, choking, chest tightness, shortness of breath, wheezing and stridor.

## 2017-10-23 ENCOUNTER — HOSPITAL ENCOUNTER (OUTPATIENT)
Dept: RADIATION ONCOLOGY | Age: 62
Discharge: HOME | End: 2017-10-23
Payer: COMMERCIAL

## 2017-10-23 PROCEDURE — 99211 OFF/OP EST MAY X REQ PHY/QHP: CPT | Performed by: RADIOLOGY

## 2017-10-23 NOTE — PROGRESS NOTES
[facility], [address]  Phone: [phone]   Toll Free: [phone]   Fax: [phone]    RADIATION ONCOLOGY FOLLOW UP REPORT    PATIENT NAME:  Traci Wilcox     : 1955  MEDICAL RECORD NO: [id number]    LOCATION: [facility] NO: [id number]      PROVIDER: Joanna Hess MD        DATE OF SERVICE: 10/23/2017    FOLLOW UP PHYSICIANS: Nghia Steinberg; Diana Mercado; ALEKSANDRA George    DIAGNOSIS: C32 --  Squamous cell carcinoma of the supraglottic larynx (right false vocal cord), T1 N0 M0, Stage I, P-16 negative; s/p definitive radiation therapy. K02.9 -- radiation-induced dental caries (due to xerostomia). DATE OF DIAGNOSIS: 2016    END OF TREATMENT DATE: 2016    ECOG PERFORMANCE STATUS: 0-1    PAIN: 0    CHAPERONE: Wife is present. HISTORY OF PRESENT ILLNESS:  Mr. Abena Mishra initially presented with a prolonged history of greater than one year of sore throat. This had been gradually worsening though he had been using cough drops to alleviate the pain to some extent. CT scan of the neck and 2016 was read as unremarkable but with some \"reactive\" lymph nodes. He then developed pain radiating to the right ear. When the problems did not resolve on repeated courses of antibiotic therapy, he underwent otolaryngology evaluation. He was found to have a lesion involving the anterior right false vocal cord. Biopsy confirmed squamous cell carcinoma. In accordance with clinical practice guidelines, he received a recommendation for definitive radiation therapy, to which he was agreeable. Mr. Abena Mishra was able to complete his course of radiation therapy in a timely manner without any prolonged unplanned treatment interruptions. However he did develop a painful esophagitis. Initially this developed about one half of the way through his therapy course and was only with swallowing.   The pain became more intense and also previous radiation therapy. Negative for dysplasia and malignancy. 9/15/2017 CBC: WBC 6.1. Hemoglobin 15.7. Hematocrit 46.4. Platelets 243. MEDICATIONS:   Current Outpatient Prescriptions   Medication Sig Dispense Refill    HYDROcodone-acetaminophen (NORCO) 7.5-325 MG per tablet Take 1 tablet by mouth every 4 hours as needed for Pain . 20 tablet 0    metoprolol succinate (TOPROL XL) 50 MG extended release tablet TAKE 1/2 TABLET BY MOUTH TWICE DAILY 30 tablet 5    nitroGLYCERIN (NITROSTAT) 0.3 MG SL tablet Place 1 tablet under tongue as needed for chest pain, may repeat every 5 mins. For maximum of 3 tabs in 15 mins. 30 tablet 3    atorvastatin (LIPITOR) 20 MG tablet TAKE 1 TABLET BY MOUTH ONE TIME A DAY (Patient taking differently: nightly TAKE 1 TABLET BY MOUTH ONE TIME A DAY ) 30 tablet 6    omeprazole (PRILOSEC) 40 MG delayed release capsule 1 cap daily 30 capsule 6    CPAP Machine MISC by Does not apply route Please change CPAP pressure to 8 cm H20. 1 each 0    Diphenhydramine-APAP, sleep, (TYLENOL PM EXTRA STRENGTH PO) Take by mouth nightly 2 nightly      Coenzyme Q10 (CO Q-10) 200 MG CAPS Take  by mouth daily.  Multiple Vitamin (MULTI-VITAMIN) TABS Take  by mouth daily.  ARGININE PO Take 500 mg by mouth daily. 2 tab       No current facility-administered medications for this encounter. REVIEW OF SYSTEMS:  As per history of present illness. All others negative. EXAMINATION:   GENERAL: Very pleasant, well-developed adult male alert and oriented in no distress. VITAL SIGNS:  Weight 83.2 kg. Temperature 34.0 Celsius. Pulse 57. Respirations 18. Blood pressure 125/75. Pulse oximetry 98%. ECOG performance status and pain rating as shown above. HEENT: PERRLA/EOMI. Ears, nose and lips unremarkable on external examination. The oral cavity and oropharynx shows moderate xerostomia. There is vertical cracking visible in the lower incisors.   One of the molars has broken off at the gum line on the left. There are no visible mucosal lesions or exudates. NECK: No palpable adenopathy. No JVD. LYMPH: No supraclavicular or axillary adenopathy. LUNGS: Clear to auscultation. No wheezes or strider. HEART: Non-tachycardic. ABDOMEN: Soft, nontender with unremarkable bowel sounds. EXTREMITIES: No clubbing or cyanosis. NEUROLOGIC EXAMINATION: Cranial nerves grossly intact. No focal neurologic deficits. No gait abnormality. ASSESSMENT AND PLAN:   Mr. Ria Ramirez remains clinically free of malignancy at this time. He has developed a dental complication from his radiation therapy, related to the xerostomia. This likely will require removal of his lower teeth. Extractions in turn will require use of hyperbaric oxygen therapy before and after the planned procedure. It is not had any other unexpected complications related to treatment at this time. I will schedule a checkup with Mr. Ria Ramirez in mid December. This will include fiber optic examination at that time. ATTESTATION: 30 minutes spent with patient, greater than 50% in counseling/education/coordination of care. Electronically signed by Tamara Qureshi MD on 10/23/17 at 4:27 PM      Cc:  Nghia Ramsey; Sha Harvey; ALEKSANDRA George      This document was created using a voice-recognition program.  Computer-generated transcription errors may be present.

## 2017-11-09 ENCOUNTER — OFFICE VISIT (OUTPATIENT)
Dept: ENT CLINIC | Age: 62
End: 2017-11-09
Payer: COMMERCIAL

## 2017-11-09 VITALS
HEIGHT: 71 IN | BODY MASS INDEX: 25.62 KG/M2 | DIASTOLIC BLOOD PRESSURE: 64 MMHG | TEMPERATURE: 97.8 F | RESPIRATION RATE: 14 BRPM | WEIGHT: 183 LBS | SYSTOLIC BLOOD PRESSURE: 124 MMHG | HEART RATE: 60 BPM

## 2017-11-09 DIAGNOSIS — G47.33 OSA ON CPAP: ICD-10-CM

## 2017-11-09 DIAGNOSIS — T66.XXXD EFFECTS, RADIATION, SUBSEQUENT ENCOUNTER: ICD-10-CM

## 2017-11-09 DIAGNOSIS — Z99.89 OSA ON CPAP: ICD-10-CM

## 2017-11-09 DIAGNOSIS — K14.8 LARGE TONGUE: ICD-10-CM

## 2017-11-09 DIAGNOSIS — K22.70 BARRETT'S ESOPHAGUS WITHOUT DYSPLASIA: ICD-10-CM

## 2017-11-09 DIAGNOSIS — C32.9 PRIMARY SQUAMOUS CELL CARCINOMA OF LARYNX (HCC): Primary | ICD-10-CM

## 2017-11-09 DIAGNOSIS — K05.6 PERIODONTAL DISEASE: ICD-10-CM

## 2017-11-09 DIAGNOSIS — K21.00 GASTROESOPHAGEAL REFLUX DISEASE WITH ESOPHAGITIS: ICD-10-CM

## 2017-11-09 PROCEDURE — 99212 OFFICE O/P EST SF 10 MIN: CPT | Performed by: OTOLARYNGOLOGY

## 2017-11-09 PROCEDURE — G8417 CALC BMI ABV UP PARAM F/U: HCPCS | Performed by: OTOLARYNGOLOGY

## 2017-11-09 PROCEDURE — 31575 DIAGNOSTIC LARYNGOSCOPY: CPT | Performed by: OTOLARYNGOLOGY

## 2017-11-09 PROCEDURE — G8427 DOCREV CUR MEDS BY ELIG CLIN: HCPCS | Performed by: OTOLARYNGOLOGY

## 2017-11-09 PROCEDURE — 3017F COLORECTAL CA SCREEN DOC REV: CPT | Performed by: OTOLARYNGOLOGY

## 2017-11-09 PROCEDURE — G8598 ASA/ANTIPLAT THER USED: HCPCS | Performed by: OTOLARYNGOLOGY

## 2017-11-09 PROCEDURE — 1036F TOBACCO NON-USER: CPT | Performed by: OTOLARYNGOLOGY

## 2017-11-09 PROCEDURE — G8484 FLU IMMUNIZE NO ADMIN: HCPCS | Performed by: OTOLARYNGOLOGY

## 2017-11-09 ASSESSMENT — ENCOUNTER SYMPTOMS
NAUSEA: 0
APNEA: 0
VOICE CHANGE: 0
TROUBLE SWALLOWING: 0
SINUS PRESSURE: 0
SHORTNESS OF BREATH: 0
ABDOMINAL PAIN: 0
STRIDOR: 0
RHINORRHEA: 0
CHEST TIGHTNESS: 0
COLOR CHANGE: 0
FACIAL SWELLING: 0
COUGH: 0
SORE THROAT: 0
VOMITING: 0
DIARRHEA: 0
CHOKING: 0
WHEEZING: 0

## 2017-11-09 NOTE — LETTER
[facility]  [facility]  [address]  [address]  Phone: [phone]  Fax: [phone]    Lanora Harada, MD        December 4, 2017    Naomi Gaytan MD  [address]    Patient: Hilda Ambriz   MR Number: [id number]   YOB: 1955   Date of Visit: 11/9/2017     Dear Naomi Gaytan,    I recently saw your patient, Hilda Ambriz, regarding His laryngeal cancer. He remains free of apparent tumor. Below are the relevant portions of my assessment and plan of care. Assessment & Plan   Diagnoses and all orders for this visit:    1. Primary squamous cell carcinoma of larynx (HCC)  AK LARYNGOSCOPY FLEXIBLE DIAGNOSTIC   2. FOUZIA on CPAP     3. Effects, radiation, subsequent encounter     4. Brown's esophagus without dysplasia     5. Periodontal disease     6. Large tongue base     7. Gastroesophageal reflux disease with esophagitis         The findings were explained and his questions were answered. I advised him to avoid acidic, salty and spicy foods as his biopsy site continues to heal. No sign of residual tumor    I will see him back in 2 months. Return in about 2 months (around 1/9/2018) for Follow-Up CA larynx. If you have questions, please do not hesitate to call me. I look forward to following Mehul Browning along with you.     Sincerely,          Lanora Harada, MD

## 2017-11-09 NOTE — PROGRESS NOTES
[address] PROFESSIONAL SERVS  [facility]  [address] [facility]  Dylan Bhandari [address]  Dept: [phone]  Dept Fax: [phone]  Loc: [phone]    Arlin Serra is a 57 y.o. male who was referred by No ref. provider found for:  Chief Complaint   Patient presents with    1 Month Follow-Up     Patient is here for 1 month follow up    . HPI:     Arlin Serra is a 57 y.o. male who presents today for 1 month follow up. He states his throat is the same as at his last visit. Some foods he eats tend to burn his throat. History:     No Known Allergies  Current Outpatient Prescriptions   Medication Sig Dispense Refill    metoprolol succinate (TOPROL XL) 50 MG extended release tablet TAKE 1/2 TABLET BY MOUTH TWICE DAILY 30 tablet 5    nitroGLYCERIN (NITROSTAT) 0.3 MG SL tablet Place 1 tablet under tongue as needed for chest pain, may repeat every 5 mins. For maximum of 3 tabs in 15 mins. 30 tablet 3    atorvastatin (LIPITOR) 20 MG tablet TAKE 1 TABLET BY MOUTH ONE TIME A DAY (Patient taking differently: nightly TAKE 1 TABLET BY MOUTH ONE TIME A DAY ) 30 tablet 6    omeprazole (PRILOSEC) 40 MG delayed release capsule 1 cap daily 30 capsule 6    CPAP Machine MISC by Does not apply route Please change CPAP pressure to 8 cm H20. 1 each 0    Diphenhydramine-APAP, sleep, (TYLENOL PM EXTRA STRENGTH PO) Take by mouth nightly 2 nightly      Coenzyme Q10 (CO Q-10) 200 MG CAPS Take  by mouth daily.  Multiple Vitamin (MULTI-VITAMIN) TABS Take  by mouth daily.  ARGININE PO Take 500 mg by mouth daily. 2 tab       No current facility-administered medications for this visit.       Past Medical History:   Diagnosis Date    Aneurysm ([address] 02/2017    in his chest    CAD (coronary artery disease)     Chronic back pain     Hyperlipidemia     Hypertension     Nausea & vomiting     FOUZIA on CPAP     PONV (postoperative nausea and vomiting)     S/P CABG (coronary artery bypass graft) 2002 state.   Neurological: Negative for dizziness, syncope, facial asymmetry, speech difficulty, light-headedness and headaches. Hematological: Negative for adenopathy. Does not bruise/bleed easily. Psychiatric/Behavioral: Negative for confusion and sleep disturbance. The patient is not nervous/anxious. Objective:     /64 (Site: Left Arm, Position: Sitting)   Pulse 60   Temp 97.8 °F (36.6 °C) (Oral)   Resp 14   Ht 5' 11\" (1.803 m)   Wt 183 lb (83 kg)   BMI 25.52 kg/m²     Physical Exam     PROCEDURE: FIBEROPTIC LARYNGOSCOPY    A fiberoptic laryngoscopy was performed under topical anesthesia, after using Afrin and Lidocaine spray in the nasal fossa. The nasal fossa, nasopharynx, hypopharynx and larynx were carefully examined. Base of tongue was symmetrical. Epiglottis appeared normal and was not retrodisplaced. True vocal cords had normal mobility. There was no erythema. No mucosal lesions or masses were noted. No pooling in the pyriform sinuses. Biopsy site has a small remaining ulceration, which would explain his symptoms when swallowing spicy foods. Data:  All of the past medical history, past surgical history, family history, social history, allergies and current medications were reviewed with the patient. Assessment & Plan   Diagnoses and all orders for this visit:    1. Primary squamous cell carcinoma of larynx (HCC)  HI LARYNGOSCOPY FLEXIBLE DIAGNOSTIC   2. FOUZIA on CPAP     3. Effects, radiation, subsequent encounter     4. Brown's esophagus without dysplasia     5. Periodontal disease     6. Large tongue base     7. Gastroesophageal reflux disease with esophagitis         The findings were explained and his questions were answered. I advised him to avoid acidic, salty and spicy foods as his biopsy site continues to heal. No sign of residual tumor    I will see him back in 2 months. Return in about 2 months (around 1/9/2018) for Follow-Up CA larynx.        Michelle Silveira,

## 2017-11-10 ENCOUNTER — OFFICE VISIT (OUTPATIENT)
Dept: PULMONOLOGY | Age: 62
End: 2017-11-10
Payer: COMMERCIAL

## 2017-11-10 VITALS
BODY MASS INDEX: 25.81 KG/M2 | SYSTOLIC BLOOD PRESSURE: 112 MMHG | OXYGEN SATURATION: 98 % | WEIGHT: 184.4 LBS | HEIGHT: 71 IN | DIASTOLIC BLOOD PRESSURE: 62 MMHG | HEART RATE: 61 BPM

## 2017-11-10 DIAGNOSIS — Z99.89 OSA ON CPAP: Primary | ICD-10-CM

## 2017-11-10 DIAGNOSIS — C32.9 PRIMARY SQUAMOUS CELL CARCINOMA OF LARYNX (HCC): ICD-10-CM

## 2017-11-10 DIAGNOSIS — G47.33 OSA ON CPAP: Primary | ICD-10-CM

## 2017-11-10 PROCEDURE — 99213 OFFICE O/P EST LOW 20 MIN: CPT | Performed by: PHYSICIAN ASSISTANT

## 2017-11-10 PROCEDURE — G8598 ASA/ANTIPLAT THER USED: HCPCS | Performed by: PHYSICIAN ASSISTANT

## 2017-11-10 PROCEDURE — G8417 CALC BMI ABV UP PARAM F/U: HCPCS | Performed by: PHYSICIAN ASSISTANT

## 2017-11-10 PROCEDURE — G8427 DOCREV CUR MEDS BY ELIG CLIN: HCPCS | Performed by: PHYSICIAN ASSISTANT

## 2017-11-10 PROCEDURE — G8484 FLU IMMUNIZE NO ADMIN: HCPCS | Performed by: PHYSICIAN ASSISTANT

## 2017-11-10 PROCEDURE — 1036F TOBACCO NON-USER: CPT | Performed by: PHYSICIAN ASSISTANT

## 2017-11-10 PROCEDURE — 3017F COLORECTAL CA SCREEN DOC REV: CPT | Performed by: PHYSICIAN ASSISTANT

## 2017-11-10 NOTE — PROGRESS NOTES
and Lymphatic ROS: negative  Endocrine ROS: negative  Respiratory ROS: no cough, shortness of breath, or wheezing  Cardiovascular ROS: no chest pain   Gastrointestinal ROS: no abdominal pain, change in bowel habits, or black or bloody stools  Musculoskeletal ROS: negative  Neurological ROS: negative    Physical Exam:    BMI:  Body mass index is 25.72 kg/m². Wt Readings from Last 3 Encounters:   11/10/17 184 lb 6.4 oz (83.6 kg)   11/09/17 183 lb (83 kg)   10/06/17 181 lb 1.6 oz (82.1 kg)     Vitals: /62 (Site: Left Arm, Position: Sitting)   Pulse 61   Ht 5' 11\" (1.803 m)   Wt 184 lb 6.4 oz (83.6 kg)   SpO2 98%   BMI 25.72 kg/m²       General appearance: alert and oriented to person, place and time, well-developed and well-nourished, in no acute distress  Nose: Nares normal. Septum midline. Mucosa normal. No drainage or sinus tenderness. Oropharynx:  negative  Lungs: clear to auscultation bilaterally  Heart: regular rate and rhythm, S1, S2 normal, no murmur, click, rub or gallop  Extremities: extremities normal, atraumatic, no cyanosis or edema  Neurologic: Mental status: Alert, oriented, thought content appropriate      ASSESSMENT/DIAGNOSIS    1. FOUZIA on CPAP     2. Primary squamous cell carcinoma of larynx ([facility]              Plan   Do you need any equipment today? No  - he has chronic dry mouth from radiation  - instructed on adjusting humidification to improve dry mouth   - He  was advised to continue current positive airway pressure therapy with above described pressure. - He  advised to keep good compliance with current recommended pressure to get optimal results and clinical improvement  - Recommend 7-9 hours of sleep with PAP  - He was advised to call [employer] regarding supplies if needed. - He call my office for earlier appointment if needed for worsening of sleep symptoms.   - He was instructed on weight loss  - Demetri Banerjee was educated about my impression and plan.  Patient verbalizes understanding.   We will see Arlin Serra back in: 6 months with download    Myke Mott PA-C, DYLON  11/10/2017

## 2017-12-01 DIAGNOSIS — I25.810 CORONARY ARTERY DISEASE INVOLVING CORONARY BYPASS GRAFT OF NATIVE HEART WITHOUT ANGINA PECTORIS: ICD-10-CM

## 2017-12-01 DIAGNOSIS — I10 ESSENTIAL HYPERTENSION: ICD-10-CM

## 2017-12-01 DIAGNOSIS — E78.00 PURE HYPERCHOLESTEROLEMIA: ICD-10-CM

## 2017-12-01 LAB
ALBUMIN SERPL-MCNC: 4.2 G/DL (ref 3.5–5.1)
ALP BLD-CCNC: 90 U/L (ref 38–126)
ALT SERPL-CCNC: 19 U/L (ref 11–66)
ANION GAP SERPL CALCULATED.3IONS-SCNC: 9 MEQ/L (ref 8–16)
AST SERPL-CCNC: 18 U/L (ref 5–40)
BILIRUB SERPL-MCNC: 0.4 MG/DL (ref 0.3–1.2)
BILIRUBIN DIRECT: < 0.2 MG/DL (ref 0–0.3)
BUN BLDV-MCNC: 11 MG/DL (ref 7–22)
CALCIUM SERPL-MCNC: 9.5 MG/DL (ref 8.5–10.5)
CHLORIDE BLD-SCNC: 104 MEQ/L (ref 98–111)
CHOLESTEROL, TOTAL: 144 MG/DL (ref 100–199)
CO2: 27 MEQ/L (ref 23–33)
CREAT SERPL-MCNC: 0.7 MG/DL (ref 0.4–1.2)
GFR SERPL CREATININE-BSD FRML MDRD: > 90 ML/MIN/1.73M2
GLUCOSE BLD-MCNC: 97 MG/DL (ref 70–108)
HCT VFR BLD CALC: 44.9 % (ref 42–52)
HDLC SERPL-MCNC: 41 MG/DL
HEMOGLOBIN: 15.1 GM/DL (ref 14–18)
LDL CHOLESTEROL CALCULATED: 71 MG/DL
MCH RBC QN AUTO: 31.2 PG (ref 27–31)
MCHC RBC AUTO-ENTMCNC: 33.6 GM/DL (ref 33–37)
MCV RBC AUTO: 92.7 FL (ref 80–94)
PDW BLD-RTO: 12.6 % (ref 11.5–14.5)
PLATELET # BLD: 190 THOU/MM3 (ref 130–400)
PMV BLD AUTO: 8 MCM (ref 7.4–10.4)
POTASSIUM SERPL-SCNC: 4.9 MEQ/L (ref 3.5–5.2)
RBC # BLD: 4.84 MILL/MM3 (ref 4.7–6.1)
SODIUM BLD-SCNC: 140 MEQ/L (ref 135–145)
TOTAL PROTEIN: 6.6 G/DL (ref 6.1–8)
TRIGL SERPL-MCNC: 160 MG/DL (ref 0–199)
WBC # BLD: 5.5 THOU/MM3 (ref 4.8–10.8)

## 2017-12-01 NOTE — PROGRESS NOTES
Venipuncture obtained from left arm. Patient tolerated the procedure without complications or complaints.

## 2017-12-11 ENCOUNTER — OFFICE VISIT (OUTPATIENT)
Dept: INTERNAL MEDICINE CLINIC | Age: 62
End: 2017-12-11
Payer: COMMERCIAL

## 2017-12-11 VITALS
SYSTOLIC BLOOD PRESSURE: 150 MMHG | WEIGHT: 184 LBS | BODY MASS INDEX: 25.76 KG/M2 | HEART RATE: 54 BPM | HEIGHT: 71 IN | DIASTOLIC BLOOD PRESSURE: 80 MMHG

## 2017-12-11 DIAGNOSIS — Z99.89 OSA ON CPAP: ICD-10-CM

## 2017-12-11 DIAGNOSIS — C32.9 LARYNGEAL CANCER (HCC): ICD-10-CM

## 2017-12-11 DIAGNOSIS — G47.33 OSA ON CPAP: ICD-10-CM

## 2017-12-11 DIAGNOSIS — I25.10 ASCVD (ARTERIOSCLEROTIC CARDIOVASCULAR DISEASE): Primary | ICD-10-CM

## 2017-12-11 DIAGNOSIS — I10 ESSENTIAL HYPERTENSION: ICD-10-CM

## 2017-12-11 DIAGNOSIS — M54.12 CERVICAL RADICULOPATHY: ICD-10-CM

## 2017-12-11 DIAGNOSIS — Z95.1 S/P CABG (CORONARY ARTERY BYPASS GRAFT): ICD-10-CM

## 2017-12-11 PROCEDURE — G8427 DOCREV CUR MEDS BY ELIG CLIN: HCPCS | Performed by: INTERNAL MEDICINE

## 2017-12-11 PROCEDURE — 3017F COLORECTAL CA SCREEN DOC REV: CPT | Performed by: INTERNAL MEDICINE

## 2017-12-11 PROCEDURE — 99214 OFFICE O/P EST MOD 30 MIN: CPT | Performed by: INTERNAL MEDICINE

## 2017-12-11 PROCEDURE — 93000 ELECTROCARDIOGRAM COMPLETE: CPT | Performed by: INTERNAL MEDICINE

## 2017-12-11 PROCEDURE — G8598 ASA/ANTIPLAT THER USED: HCPCS | Performed by: INTERNAL MEDICINE

## 2017-12-11 PROCEDURE — 1036F TOBACCO NON-USER: CPT | Performed by: INTERNAL MEDICINE

## 2017-12-11 PROCEDURE — G8417 CALC BMI ABV UP PARAM F/U: HCPCS | Performed by: INTERNAL MEDICINE

## 2017-12-11 PROCEDURE — G8482 FLU IMMUNIZE ORDER/ADMIN: HCPCS | Performed by: INTERNAL MEDICINE

## 2017-12-11 RX ORDER — TERAZOSIN 5 MG/1
5 CAPSULE ORAL NIGHTLY
COMMUNITY
End: 2018-02-08 | Stop reason: SDUPTHER

## 2017-12-11 NOTE — PROGRESS NOTES
TAKE 1 TABLET BY MOUTH ONE TIME A DAY ) 30 tablet 6    omeprazole (PRILOSEC) 40 MG delayed release capsule 1 cap daily 30 capsule 6    CPAP Machine MISC by Does not apply route Please change CPAP pressure to 8 cm H20. 1 each 0    Diphenhydramine-APAP, sleep, (TYLENOL PM EXTRA STRENGTH PO) Take by mouth nightly 2 nightly      Coenzyme Q10 (CO Q-10) 200 MG CAPS Take  by mouth daily.  Multiple Vitamin (MULTI-VITAMIN) TABS Take  by mouth daily.  ARGININE PO Take 500 mg by mouth daily. 2 tab       No current facility-administered medications for this visit. Past Surgical History:   Procedure Laterality Date    CARDIAC [facility]HETERIZATION  8/2002, 4/2004    [facility]    COLONOSCOPY      CORONARY ANGIOPLASTY WITH STENT PLACEMENT  7/2002    [facility]    CORONARY ARTERY BYPASS GRAFT  2002    [facility]    HEMORRHOID SURGERY      KNEE SURGERY Right     LARYNGOSCOPY N/A 10/4/2017    LARYNGOSCOPY MICRO WITH BIOPSY performed by Lea Lanza MD at [address] [place] [address] MICROLARYNGOSCOPY W BIOPSY  04/28/2016    by Dr Michelle Spears       No Known Allergies    Social History     Social History    Marital status:      Spouse name: N/A    Number of children: N/A    Years of education: N/A     Occupational History    Not on file.      Social History Main Topics    Smoking status: Former Smoker     Packs/day: 2.00     Years: 20.00     Quit date: 12/5/2001    Smokeless tobacco: Never Used    Alcohol use No    Drug use: No    Sexual activity: Not on file     Other Topics Concern    Not on file     Social History Narrative    No narrative on file   3 children  [employer]    Family History   Problem Relation Age of Onset    Heart Disease Father      MI    High Blood Pressure Father     Cancer Maternal Grandmother     Cancer Maternal Grandfather        Review of Systems - General ROS: negative  Psychological ROS: Negativenegative  Hematological and Lymphatic ROS: negative  Respiratory ROS: no cough, shortness of

## 2017-12-18 ENCOUNTER — HOSPITAL ENCOUNTER (OUTPATIENT)
Dept: RADIATION ONCOLOGY | Age: 62
Discharge: HOME | End: 2017-12-18
Payer: COMMERCIAL

## 2017-12-18 PROCEDURE — 99211 OFF/OP EST MAY X REQ PHY/QHP: CPT | Performed by: RADIOLOGY

## 2017-12-26 ENCOUNTER — HOSPITAL ENCOUNTER (OUTPATIENT)
Dept: MRI IMAGING | Age: 62
Discharge: HOME | End: 2017-12-26
Payer: COMMERCIAL

## 2017-12-26 DIAGNOSIS — M54.12 CERVICAL RADICULOPATHY: ICD-10-CM

## 2017-12-26 PROCEDURE — 6360000004 HC RX CONTRAST MEDICATION: Performed by: INTERNAL MEDICINE

## 2017-12-26 PROCEDURE — A9579 GAD-BASE MR CONTRAST NOS,1ML: HCPCS | Performed by: INTERNAL MEDICINE

## 2017-12-26 PROCEDURE — 72156 MRI NECK SPINE W/O & W/DYE: CPT

## 2017-12-26 RX ADMIN — GADOTERIDOL 15 ML: 279.3 INJECTION, SOLUTION INTRAVENOUS at 08:00

## 2017-12-27 ENCOUNTER — OFFICE VISIT (OUTPATIENT)
Dept: INTERNAL MEDICINE CLINIC | Age: 62
End: 2017-12-27
Payer: COMMERCIAL

## 2017-12-27 VITALS
BODY MASS INDEX: 26.29 KG/M2 | HEIGHT: 71 IN | WEIGHT: 187.8 LBS | DIASTOLIC BLOOD PRESSURE: 72 MMHG | HEART RATE: 62 BPM | SYSTOLIC BLOOD PRESSURE: 120 MMHG

## 2017-12-27 DIAGNOSIS — I25.10 ASCVD (ARTERIOSCLEROTIC CARDIOVASCULAR DISEASE): ICD-10-CM

## 2017-12-27 DIAGNOSIS — Z95.1 S/P CABG (CORONARY ARTERY BYPASS GRAFT): ICD-10-CM

## 2017-12-27 DIAGNOSIS — Z99.89 OSA ON CPAP: ICD-10-CM

## 2017-12-27 DIAGNOSIS — G47.33 OSA ON CPAP: ICD-10-CM

## 2017-12-27 DIAGNOSIS — M54.12 CERVICAL RADICULOPATHY: Primary | ICD-10-CM

## 2017-12-27 DIAGNOSIS — E78.5 HYPERLIPIDEMIA, UNSPECIFIED HYPERLIPIDEMIA TYPE: ICD-10-CM

## 2017-12-27 DIAGNOSIS — I10 ESSENTIAL HYPERTENSION: ICD-10-CM

## 2017-12-27 PROCEDURE — G8482 FLU IMMUNIZE ORDER/ADMIN: HCPCS | Performed by: INTERNAL MEDICINE

## 2017-12-27 PROCEDURE — G8427 DOCREV CUR MEDS BY ELIG CLIN: HCPCS | Performed by: INTERNAL MEDICINE

## 2017-12-27 PROCEDURE — G8417 CALC BMI ABV UP PARAM F/U: HCPCS | Performed by: INTERNAL MEDICINE

## 2017-12-27 PROCEDURE — 3017F COLORECTAL CA SCREEN DOC REV: CPT | Performed by: INTERNAL MEDICINE

## 2017-12-27 PROCEDURE — G8598 ASA/ANTIPLAT THER USED: HCPCS | Performed by: INTERNAL MEDICINE

## 2017-12-27 PROCEDURE — 1036F TOBACCO NON-USER: CPT | Performed by: INTERNAL MEDICINE

## 2017-12-27 PROCEDURE — 99213 OFFICE O/P EST LOW 20 MIN: CPT | Performed by: INTERNAL MEDICINE

## 2017-12-27 RX ORDER — ASPIRIN 81 MG/1
81 TABLET ORAL DAILY
Status: ON HOLD | COMMUNITY
End: 2021-05-12 | Stop reason: HOSPADM

## 2017-12-27 NOTE — PROGRESS NOTES
[facility] PROFESSIONAL SERVS  PHYSICIANS [facility] [address]  [facility], [address]  Dept: [phone]  Dept Fax: [phone]      Chief Complaint   Patient presents with    Follow Up After Procedure      2 weeks review MRI        Patient presents for evaluation of ASCVD. I saw him 2 weeks ago  This patient is followed regularly by Dr. Jamie Naranjo  Patient used to see Dr. Bella Bertrand who has left the practice    He had a bypass 18 years ago, stents prior to that. He has hypertension, hyperlipidemia. For the last 2 years, she's had laryngeal cancer status post XRT. He is followed by Dr. Lea Soriano as well as Dr. Audrey Linares    His only complaint is left shoulder tingling, numbness. He's had it for several years but is getting worse. Usually if he sits upright. It doesn't bother him, but now he is getting it at work. Tingling and pain will go down all the way to his fingers  He did have an EMG which is abnormal.  He was sent to [facility] but never really followed up after that. I ordered an MRI of the cervical spine and had him come back today  Patient Active Problem List   Diagnosis    Hypertension    Hyperlipidemia    CAD (coronary artery disease)    S/P CABG (coronary artery bypass graft)    Chronic back pain    Chronic total occlusion of coronary artery    Referred otalgia of right ear    Tinnitus    Asymmetrical sensorineural hearing loss    Primary squamous cell carcinoma of larynx (HCC)    FOUZIA on CPAP       Current Outpatient Prescriptions   Medication Sig Dispense Refill    aspirin EC 81 MG EC tablet Take 81 mg by mouth daily      terazosin (HYTRIN) 5 MG capsule Take 5 mg by mouth nightly      metoprolol succinate (TOPROL XL) 50 MG extended release tablet TAKE 1/2 TABLET BY MOUTH TWICE DAILY 30 tablet 5    nitroGLYCERIN (NITROSTAT) 0.3 MG SL tablet Place 1 tablet under tongue as needed for chest pain, may repeat every 5 mins. For maximum of 3 tabs in 15 mins.  30 tablet 3    atorvastatin

## 2018-01-09 ENCOUNTER — OFFICE VISIT (OUTPATIENT)
Dept: ENT CLINIC | Age: 63
End: 2018-01-09
Payer: COMMERCIAL

## 2018-01-09 VITALS
DIASTOLIC BLOOD PRESSURE: 60 MMHG | WEIGHT: 185.7 LBS | SYSTOLIC BLOOD PRESSURE: 118 MMHG | TEMPERATURE: 98.2 F | RESPIRATION RATE: 16 BRPM | HEIGHT: 71 IN | HEART RATE: 78 BPM | BODY MASS INDEX: 26 KG/M2

## 2018-01-09 DIAGNOSIS — C32.9 PRIMARY SQUAMOUS CELL CARCINOMA OF LARYNX (HCC): Primary | ICD-10-CM

## 2018-01-09 DIAGNOSIS — H90.3 ASYMMETRICAL SENSORINEURAL HEARING LOSS: ICD-10-CM

## 2018-01-09 DIAGNOSIS — J38.4 EDEMA OF LARYNX: ICD-10-CM

## 2018-01-09 DIAGNOSIS — T66.XXXD EFFECTS, RADIATION, SUBSEQUENT ENCOUNTER: ICD-10-CM

## 2018-01-09 DIAGNOSIS — R53.83 OTHER FATIGUE: ICD-10-CM

## 2018-01-09 PROCEDURE — 3017F COLORECTAL CA SCREEN DOC REV: CPT | Performed by: OTOLARYNGOLOGY

## 2018-01-09 PROCEDURE — 99213 OFFICE O/P EST LOW 20 MIN: CPT | Performed by: OTOLARYNGOLOGY

## 2018-01-09 PROCEDURE — G8598 ASA/ANTIPLAT THER USED: HCPCS | Performed by: OTOLARYNGOLOGY

## 2018-01-09 PROCEDURE — 31575 DIAGNOSTIC LARYNGOSCOPY: CPT | Performed by: OTOLARYNGOLOGY

## 2018-01-09 PROCEDURE — G8417 CALC BMI ABV UP PARAM F/U: HCPCS | Performed by: OTOLARYNGOLOGY

## 2018-01-09 PROCEDURE — 1036F TOBACCO NON-USER: CPT | Performed by: OTOLARYNGOLOGY

## 2018-01-09 PROCEDURE — G8427 DOCREV CUR MEDS BY ELIG CLIN: HCPCS | Performed by: OTOLARYNGOLOGY

## 2018-01-09 PROCEDURE — G8482 FLU IMMUNIZE ORDER/ADMIN: HCPCS | Performed by: OTOLARYNGOLOGY

## 2018-01-09 ASSESSMENT — ENCOUNTER SYMPTOMS
RECTAL PAIN: 0
TROUBLE SWALLOWING: 0
COLOR CHANGE: 0
ANAL BLEEDING: 0
FACIAL SWELLING: 0
DIARRHEA: 0
CHEST TIGHTNESS: 0
APNEA: 0
BLOOD IN STOOL: 0
EYE ITCHING: 0
VOMITING: 0
EYE PAIN: 0
SINUS PRESSURE: 0
SORE THROAT: 0
CONSTIPATION: 0
NAUSEA: 0
ABDOMINAL PAIN: 0
CHOKING: 0
BACK PAIN: 0
STRIDOR: 0
VOICE CHANGE: 0
EYE REDNESS: 0
COUGH: 0
WHEEZING: 0
PHOTOPHOBIA: 0
SHORTNESS OF BREATH: 0
RHINORRHEA: 0
EYE DISCHARGE: 0
ABDOMINAL DISTENTION: 0

## 2018-02-06 NOTE — PROCEDURES
[facility]                            [address], [address]      Phone: [phone]   Toll Free: [phone]   Fax: [phone]                             RADIATION ONCOLOGY REPORT    PATIENT NAME: Dorinda Yanes                      :        1955  MED REC NO:   [id number]                           ROOM:  ACCOUNT NO:   [de-identified]                           ADMIT DATE:  PROVIDER:     Abby Brownlee. [facility] dentist, Dr. Radha Chau at [phone] called today regarding the need for  hyperbaric oxygen. The patient has three abscessed teeth that need to be  removed as well as number of other caries teeth, essentially he need  complete extraction. The question was thus he still need hyperbaric  oxygen. His radiation was completed in 2016. The patient needs the hyperbaric oxygen protocol prior to and after any  dental extraction. This is entirely necessary to help prevent  osteoradionecrosis. There should be no insurance problems with this  because of the necessity. I have explained this to Dr. Radha Chau. I have  asked him to give us a call in the future should he have any questions,  problems, or insurance needs.         Pierec Lagunas M.D.    D: 2018 11:30:18       T: 2018 2:37:16     [email or web address]  Job#: [id number]     Doc#: [id number]    CC:

## 2018-02-08 ENCOUNTER — HOSPITAL ENCOUNTER (OUTPATIENT)
Dept: INTERVENTIONAL RADIOLOGY/VASCULAR | Age: 63
Discharge: HOME | End: 2018-02-08
Payer: COMMERCIAL

## 2018-02-08 VITALS
OXYGEN SATURATION: 98 % | DIASTOLIC BLOOD PRESSURE: 78 MMHG | HEART RATE: 58 BPM | RESPIRATION RATE: 18 BRPM | SYSTOLIC BLOOD PRESSURE: 128 MMHG | TEMPERATURE: 97.8 F

## 2018-02-08 DIAGNOSIS — I10 ESSENTIAL HYPERTENSION: ICD-10-CM

## 2018-02-08 PROCEDURE — 6360000002 HC RX W HCPCS

## 2018-02-08 PROCEDURE — 2580000003 HC RX 258

## 2018-02-08 PROCEDURE — 6360000004 HC RX CONTRAST MEDICATION: Performed by: RADIOLOGY

## 2018-02-08 PROCEDURE — 64479 NJX AA&/STRD TFRM EPI C/T 1: CPT

## 2018-02-08 PROCEDURE — 2500000003 HC RX 250 WO HCPCS

## 2018-02-08 RX ORDER — METHYLPREDNISOLONE ACETATE 80 MG/ML
80 INJECTION, SUSPENSION INTRA-ARTICULAR; INTRALESIONAL; INTRAMUSCULAR; SOFT TISSUE ONCE
Status: COMPLETED | OUTPATIENT
Start: 2018-02-08 | End: 2018-02-08

## 2018-02-08 RX ADMIN — Medication 0.5 ML: at 08:22

## 2018-02-08 RX ADMIN — IOHEXOL 1 ML: 180 INJECTION INTRAVENOUS at 08:22

## 2018-02-08 RX ADMIN — METHYLPREDNISOLONE ACETATE 80 MG: 80 INJECTION, SUSPENSION INTRA-ARTICULAR; INTRALESIONAL; INTRAMUSCULAR; SOFT TISSUE at 08:22

## 2018-02-08 ASSESSMENT — PAIN DESCRIPTION - LOCATION
LOCATION: NECK
LOCATION: NECK

## 2018-02-08 ASSESSMENT — PAIN DESCRIPTION - PAIN TYPE
TYPE: ACUTE PAIN
TYPE: ACUTE PAIN

## 2018-02-08 ASSESSMENT — PAIN SCALES - GENERAL
PAINLEVEL_OUTOF10: 3
PAINLEVEL_OUTOF10: 3

## 2018-02-08 NOTE — PROGRESS NOTES
Department of Radiology  Post Procedure Progress Note      Pre-Procedure Diagnosis:  Cervical radiculopathy     Procedure Performed:  Epidural block/steroid injection      Anesthesia: local     Findings: successful    Immediate Complications:  None    Estimated Blood Loss: minimal    SEE DICTATED PROCEDURE NOTE FOR COMPLETE DETAILS.       Kayla Garnica MD   2/8/2018 8:29 AM

## 2018-02-08 NOTE — PROGRESS NOTES
Formulation and discussion of sedation / procedure plans, risks, benefits, side effects and alternatives with patient and/or responsible adult completed.     Electronically signed by Juliane Alfonso MD on 2/8/2018 at 8:29 AM

## 2018-02-08 NOTE — PROGRESS NOTES
3005 Pt arrives ambulatory with wife for nerve block. Procedure explained to pt and questions answered. PT RIGHTS AND RESPONSIBILITIES OFFERED TO PT. Pedal push and pull and hand grasp are equal and strong. Pt has complaints of numbness and tingling in left shoulder radiating to left hand. Pt has no complaints of pain. 0756 Pt to [facility] via bed. 0830 Pt back from [facility]. Vitals stable. Site is clean and dry. Pt provided with water. Pt has tingling in left shoulder. 0845 Vitals stable. Bandaid unchanged. 0900 pt discharged ambulatory with wife with instructions with no complaints. Bandaid clean, dry and intact.  Vitals stable.          __m__ Safety:       (Environmental)   Rosie to environment   Ensure ID band is correct and in place/ allergy band as needed   Assess for fall risk   Initiate fall precautions as applicable (fall band, side rails, etc.)   Call light within reach   Bed in low position/ wheels locked    _m___ Pain:        Assess pain level and characteristics   Administer analgesics as ordered   Assess effectiveness of pain management and report to MD as needed    _m___ Knowledge Deficit:   Assess baseline knowledge   Provide teaching at level of understanding   Provide teaching via preferred learning method   Evaluate teaching effectiveness    _m___ Hemodynamic/Respiratory Status:       (Pre and Post Procedure Monitoring)   Assess/Monitor vital signs and LOC   Assess Baseline SpO2 prior to any sedation   Obtain weight/height   Assess vital signs/ LOC until patient meets discharge criteria   Monitor procedure site and notify MD of any issues

## 2018-02-08 NOTE — PROGRESS NOTES
0800 Patient received in IR for selective cervical nerve root block. Pt has numbness/tingling and pain in the left shoulder down to the left hand that has been going on for 10-15 years. 0802 This procedure has been fully reviewed with the patient and written informed consent has been obtained. [id number] Procedure started with Dr. Pushpa Krishna. 0776 Procedure completed; patient tolerated well. Band aid to neck; no bleeding noted  0825 Patient on cart; comfort ensured. 0830 Patient taken to OPN via cart.

## 2018-02-09 RX ORDER — TERAZOSIN 5 MG/1
5 CAPSULE ORAL NIGHTLY
Qty: 30 CAPSULE | Refills: 5 | Status: SHIPPED | OUTPATIENT
Start: 2018-02-09 | End: 2018-08-07 | Stop reason: SDUPTHER

## 2018-02-12 ENCOUNTER — HOSPITAL ENCOUNTER (OUTPATIENT)
Dept: GENERAL RADIOLOGY | Age: 63
Discharge: HOME | End: 2018-02-12
Payer: COMMERCIAL

## 2018-02-12 ENCOUNTER — HOSPITAL ENCOUNTER (OUTPATIENT)
Age: 63
Discharge: HOME | End: 2018-02-12
Payer: COMMERCIAL

## 2018-02-12 ENCOUNTER — HOSPITAL ENCOUNTER (OUTPATIENT)
Dept: WOUND CARE | Age: 63
Discharge: HOME | End: 2018-02-12
Payer: COMMERCIAL

## 2018-02-12 VITALS
HEIGHT: 71 IN | TEMPERATURE: 97.7 F | BODY MASS INDEX: 25.79 KG/M2 | SYSTOLIC BLOOD PRESSURE: 155 MMHG | WEIGHT: 184.2 LBS | RESPIRATION RATE: 18 BRPM | DIASTOLIC BLOOD PRESSURE: 76 MMHG | HEART RATE: 56 BPM | OXYGEN SATURATION: 98 %

## 2018-02-12 DIAGNOSIS — I10 ESSENTIAL HYPERTENSION: ICD-10-CM

## 2018-02-12 DIAGNOSIS — T66.XXXS LATE EFFECT OF RADIATION: ICD-10-CM
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
## 2018-02-12 DIAGNOSIS — C32.9 PRIMARY SQUAMOUS CELL CARCINOMA OF LARYNX (HCC): ICD-10-CM

## 2018-02-12 DIAGNOSIS — I71.23 ANEURYSM OF DESCENDING THORACIC AORTA: ICD-10-CM

## 2018-02-12 DIAGNOSIS — M27.2 OSTEORADIONECROSIS OF MANDIBLE: Primary | ICD-10-CM

## 2018-02-12 DIAGNOSIS — T66.XXXD EFFECTS, RADIATION, SUBSEQUENT ENCOUNTER: ICD-10-CM
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
## 2018-02-12 DIAGNOSIS — M27.2 OSTEORADIONECROSIS OF MANDIBLE: ICD-10-CM

## 2018-02-12 DIAGNOSIS — R53.83 OTHER FATIGUE: ICD-10-CM

## 2018-02-12 DIAGNOSIS — J38.4 EDEMA OF LARYNX: ICD-10-CM

## 2018-02-12 DIAGNOSIS — I71.21 ANEURYSM OF ASCENDING AORTA: ICD-10-CM

## 2018-02-12 LAB
EKG ATRIAL RATE: 48 BPM
EKG P AXIS: 55 DEGREES
EKG P-R INTERVAL: 146 MS
EKG Q-T INTERVAL: 440 MS
EKG QRS DURATION: 80 MS
EKG QTC CALCULATION (BAZETT): 393 MS
EKG R AXIS: 75 DEGREES
EKG T AXIS: 10 DEGREES
EKG VENTRICULAR RATE: 48 BPM
T4 FREE: 1.3 NG/DL (ref 0.93–1.76)
TSH SERPL DL<=0.05 MIU/L-ACNC: 2.01 UIU/ML (ref 0.4–4.2)

## 2018-02-12 PROCEDURE — 36415 COLL VENOUS BLD VENIPUNCTURE: CPT

## 2018-02-12 PROCEDURE — 93005 ELECTROCARDIOGRAM TRACING: CPT

## 2018-02-12 PROCEDURE — 84439 ASSAY OF FREE THYROXINE: CPT

## 2018-02-12 PROCEDURE — 99204 OFFICE O/P NEW MOD 45 MIN: CPT | Performed by: NURSE PRACTITIONER

## 2018-02-12 PROCEDURE — 99213 OFFICE O/P EST LOW 20 MIN: CPT

## 2018-02-12 PROCEDURE — 84481 FREE ASSAY (FT-3): CPT

## 2018-02-12 PROCEDURE — 84443 ASSAY THYROID STIM HORMONE: CPT

## 2018-02-12 PROCEDURE — 71046 X-RAY EXAM CHEST 2 VIEWS: CPT

## 2018-02-12 RX ORDER — METOPROLOL SUCCINATE 50 MG/1
50 TABLET, EXTENDED RELEASE ORAL DAILY
Status: ON HOLD | COMMUNITY
End: 2018-08-27 | Stop reason: ALTCHOICE

## 2018-02-12 RX ORDER — PENICILLIN V POTASSIUM 500 MG/1
500 TABLET ORAL 4 TIMES DAILY
COMMUNITY
End: 2018-02-26

## 2018-02-12 ASSESSMENT — PAIN SCALES - GENERAL: PAINLEVEL_OUTOF10: 0

## 2018-02-12 NOTE — PROGRESS NOTES
Chest CTA prior authorization received through [employer]  Authorization # [id number]  Chest CTA scheduled for 2/22/18 at 2:20 pm, arrival at 1:50 pm to outpatient express testing, nothing to eat or drink 4 hours prior to test.  Creatinine level ordered, to be drawn prior to CTA.
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
## 2018-02-13 LAB — T3 FREE: 2.75 PG/ML (ref 2.02–4.43)

## 2018-02-16 RX ORDER — ATORVASTATIN CALCIUM 20 MG/1
20 TABLET, FILM COATED ORAL DAILY
Qty: 30 TABLET | Refills: 5 | Status: SHIPPED | OUTPATIENT
Start: 2018-02-16 | End: 2018-02-27

## 2018-02-22 ENCOUNTER — HOSPITAL ENCOUNTER (OUTPATIENT)
Dept: CT IMAGING | Age: 63
Discharge: HOME | End: 2018-02-22
Payer: COMMERCIAL

## 2018-02-22 DIAGNOSIS — M27.2 OSTEORADIONECROSIS OF MANDIBLE: ICD-10-CM

## 2018-02-22 DIAGNOSIS — I10 ESSENTIAL HYPERTENSION: ICD-10-CM

## 2018-02-22 DIAGNOSIS — I71.23 ANEURYSM OF DESCENDING THORACIC AORTA: ICD-10-CM
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
## 2018-02-22 DIAGNOSIS — T66.XXXS LATE EFFECT OF RADIATION: ICD-10-CM

## 2018-02-22 LAB — POC CREATININE WHOLE BLOOD: 0.9 MG/DL (ref 0.5–1.2)

## 2018-02-22 PROCEDURE — 71275 CT ANGIOGRAPHY CHEST: CPT

## 2018-02-22 PROCEDURE — 6360000004 HC RX CONTRAST MEDICATION: Performed by: NURSE PRACTITIONER

## 2018-02-22 PROCEDURE — 82565 ASSAY OF CREATININE: CPT

## 2018-02-22 RX ADMIN — IOPAMIDOL 100 ML: 755 INJECTION, SOLUTION INTRAVENOUS at 14:12

## 2018-02-23 ENCOUNTER — HOSPITAL ENCOUNTER (OUTPATIENT)
Dept: RADIATION ONCOLOGY | Age: 63
Discharge: HOME | End: 2018-02-23
Payer: COMMERCIAL

## 2018-02-23 ENCOUNTER — TELEPHONE (OUTPATIENT)
Dept: HYPERBARIC MEDICINE | Age: 63
End: 2018-02-23

## 2018-02-23 DIAGNOSIS — M27.2 OSTEORADIONECROSIS OF MANDIBLE: ICD-10-CM

## 2018-02-23 DIAGNOSIS — T66.XXXS LATE EFFECT OF RADIATION: ICD-10-CM
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
## 2018-02-23 PROBLEM — I71.21 ANEURYSM OF ASCENDING AORTA: Status: ACTIVE | Noted: 2018-02-12

## 2018-02-23 PROCEDURE — 99211 OFF/OP EST MAY X REQ PHY/QHP: CPT | Performed by: RADIOLOGY

## 2018-02-23 NOTE — TELEPHONE ENCOUNTER
Spoke with patient about HBO appointment. Explained to patient about HBO regulations and patient stated understanding. Patient to come to appointment Monday at 1:30pm. Patient to arrive at least 15 minutes early for treatment.

## 2018-02-26 ENCOUNTER — HOSPITAL ENCOUNTER (OUTPATIENT)
Dept: HYPERBARIC MEDICINE | Age: 63
Setting detail: THERAPIES SERIES
Discharge: HOME | End: 2018-02-26
Payer: COMMERCIAL

## 2018-02-26 VITALS
SYSTOLIC BLOOD PRESSURE: 163 MMHG | DIASTOLIC BLOOD PRESSURE: 77 MMHG | HEART RATE: 57 BPM | OXYGEN SATURATION: 99 % | RESPIRATION RATE: 18 BRPM | TEMPERATURE: 96.6 F

## 2018-02-26 DIAGNOSIS — M27.2 OSTEORADIONECROSIS OF MANDIBLE: ICD-10-CM
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
## 2018-02-26 DIAGNOSIS — T66.XXXS LATE EFFECT OF RADIATION: ICD-10-CM

## 2018-02-26 PROCEDURE — G0277 HBOT, FULL BODY CHAMBER, 30M: HCPCS

## 2018-02-26 NOTE — PLAN OF CARE
Problem: Physical Regulation:  Goal: Tolerate HBO therapy and barotrauma will be prevented  Tolerate HBO therapy and barotrauma will be prevented  Outcome: Completed Date Met: 02/26/18  No s/s of seizure noted. No s/s of barotrauma noted. Pt demonstrated equalization of ears during pressurization. Comments: Care plan reviewed with patient. Patient verbalize understanding of the plan of care and contribute to goal setting.

## 2018-02-26 NOTE — PROGRESS NOTES
[facility]  Hyperbaric Oxygen Therapy   Progress Note      NAME: Moises Grant  MEDICAL RECORD NUMBER:  [id number]  AGE: 57 y.o. GENDER: male  : 1955  EPISODE DATE:  2018     Subjective     HBO Treatment Number: 1 out of Total Treatments: 30    HBO Diagnosis:               Indications: Late Effect of Radiation (Osteoradionecrosis of mandble )    Safety checks performed prior to treatment. See doc flowsheets for documentation. 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 XXXS    Aneurysm of ascending aorta (HCC) I71.2          No results for input(s): POCGLU in the last 72 hours.     Pre treatment Vital Signs       Temp: 96.6 °F (35.9 °C)     Pulse: 57     Resp: 18     BP: (!) 163/77       Post treatment Vital Signs  Temp: 96.6 °F (35.9 °C)  Pulse: 57  Resp: 18  BP: (!) 163/77      Assessment        Physical Exam:  General Appearance:alert and oriented to person, place and time, well-developed and well-nourished, in no acute distress    ENT:tympanic membrane, external ear and ear canal normal bilaterally, oropharynx clear and moist with normal mucous membranes     Teed Grade Right: Normal  Left: Normal    Pulmonary/Chest:  clear to auscultation bilaterally- no wheezes, rales or rhonchi, normal air movement, no respiratory distress    Cardiovascular:  normal rate, regular rhythm, normal S1 and S2, no murmurs, rubs, clicks or gallops    Episode Date    2018     Treatment

## 2018-02-27 ENCOUNTER — HOSPITAL ENCOUNTER (OUTPATIENT)
Dept: HYPERBARIC MEDICINE | Age: 63
Setting detail: THERAPIES SERIES
Discharge: HOME | End: 2018-02-27
Payer: COMMERCIAL

## 2018-02-27 VITALS
DIASTOLIC BLOOD PRESSURE: 87 MMHG | OXYGEN SATURATION: 100 % | RESPIRATION RATE: 18 BRPM | HEART RATE: 57 BPM | TEMPERATURE: 96.5 F | SYSTOLIC BLOOD PRESSURE: 139 MMHG

## 2018-02-27 DIAGNOSIS — M27.2 OSTEORADIONECROSIS OF MANDIBLE: ICD-10-CM

## 2018-02-27 DIAGNOSIS — T66.XXXS LATE EFFECT OF RADIATION: ICD-10-CM
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
## 2018-02-27 PROCEDURE — G0277 HBOT, FULL BODY CHAMBER, 30M: HCPCS

## 2018-02-27 ASSESSMENT — PAIN SCALES - GENERAL: PAINLEVEL_OUTOF10: 0

## 2018-02-28 ENCOUNTER — HOSPITAL ENCOUNTER (OUTPATIENT)
Dept: HYPERBARIC MEDICINE | Age: 63
Setting detail: THERAPIES SERIES
Discharge: HOME | End: 2018-02-28
Payer: COMMERCIAL

## 2018-02-28 VITALS
HEIGHT: 71 IN | RESPIRATION RATE: 18 BRPM | DIASTOLIC BLOOD PRESSURE: 72 MMHG | HEART RATE: 69 BPM | OXYGEN SATURATION: 100 % | WEIGHT: 184 LBS | TEMPERATURE: 96.3 F | BODY MASS INDEX: 25.76 KG/M2 | SYSTOLIC BLOOD PRESSURE: 156 MMHG
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
## 2018-02-28 DIAGNOSIS — M27.2 OSTEORADIONECROSIS OF MANDIBLE: ICD-10-CM

## 2018-02-28 DIAGNOSIS — T66.XXXS LATE EFFECT OF RADIATION: ICD-10-CM

## 2018-02-28 PROCEDURE — G0277 HBOT, FULL BODY CHAMBER, 30M: HCPCS

## 2018-02-28 ASSESSMENT — PAIN SCALES - GENERAL
PAINLEVEL_OUTOF10: 0
PAINLEVEL_OUTOF10: 0

## 2018-02-28 NOTE — PROGRESS NOTES
available to furnish assistance & direction throughout the procedure. Plan          Denver Narrow is a 57 y.o. male  successfully completed today's hyperbaric oxygen treatment at [address] [facility]. In my clinical judgement, ongoing HBO therapy is necessary at this time, given a threat to patient function, limb or life from the current condition. Supervision and attendance of Hyperbaric Oxygen Therapy provided. Continue HBO treatment as outlined in the treatment plan. Hyperbaric Oxygen:  Pt tolerated Treatment Number: 3 well today without complications.      Electronically signed by Rakel Ramirez MD on 2/28/2018 at 3:23 PM

## 2018-02-28 NOTE — PLAN OF CARE
Problem: Physical Regulation:  Goal: Tolerate HBO therapy and barotrauma will be prevented  Tolerate HBO therapy and barotrauma will be prevented  Outcome: Completed Date Met: 02/28/18  . No s/s of seizure noted. No s/s of barotrauma noted. Pt demonstrated equalization of ears during pressurization. Comments: Care plan reviewed with patient. Patient verbalize understanding of the plan of care and contribute to goal setting.

## 2018-03-01 ENCOUNTER — HOSPITAL ENCOUNTER (OUTPATIENT)
Dept: HYPERBARIC MEDICINE | Age: 63
Setting detail: THERAPIES SERIES
Discharge: HOME | End: 2018-03-01
Payer: COMMERCIAL

## 2018-03-01 VITALS
WEIGHT: 184 LBS | HEIGHT: 71 IN | HEART RATE: 60 BPM | BODY MASS INDEX: 25.76 KG/M2 | TEMPERATURE: 96.9 F | SYSTOLIC BLOOD PRESSURE: 148 MMHG | RESPIRATION RATE: 16 BRPM | DIASTOLIC BLOOD PRESSURE: 80 MMHG | OXYGEN SATURATION: 100 %

## 2018-03-01 DIAGNOSIS — T66.XXXS LATE EFFECT OF RADIATION: ICD-10-CM

## 2018-03-01 DIAGNOSIS — M27.2 OSTEORADIONECROSIS OF MANDIBLE: ICD-10-CM
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
## 2018-03-01 PROCEDURE — G0277 HBOT, FULL BODY CHAMBER, 30M: HCPCS

## 2018-03-01 PROCEDURE — 99183 HYPERBARIC OXYGEN THERAPY: CPT | Performed by: NURSE PRACTITIONER

## 2018-03-01 RX ORDER — DOXYCYCLINE HYCLATE 100 MG/1
100 CAPSULE ORAL 2 TIMES DAILY
COMMUNITY
End: 2018-04-05

## 2018-03-01 ASSESSMENT — PAIN SCALES - GENERAL
PAINLEVEL_OUTOF10: 0
PAINLEVEL_OUTOF10: 0

## 2018-03-01 NOTE — PROGRESS NOTES
None    Adverse Event: no      I was present on these premises and immediately available to furnish assistance & direction throughout the procedure. Plan          Bethany Bullard is a 57 y.o. male  successfully completed today's hyperbaric oxygen treatment at [address] [place] [address]. In my clinical judgement, ongoing HBO therapy is necessary at this time, given a threat to patient function, limb or life from the current condition. Supervision and attendance of Hyperbaric Oxygen Therapy provided. Continue HBO treatment as outlined in the treatment plan. Hyperbaric Oxygen:  Pt tolerated Treatment Number: 4 well today without complications.      Electronically signed by Salvador Cabrera CNP on 3/1/2018 at 6:33 PM

## 2018-03-02 ENCOUNTER — HOSPITAL ENCOUNTER (OUTPATIENT)
Dept: HYPERBARIC MEDICINE | Age: 63
Setting detail: THERAPIES SERIES
Discharge: HOME | End: 2018-03-02
Payer: COMMERCIAL

## 2018-03-02 VITALS
SYSTOLIC BLOOD PRESSURE: 144 MMHG | RESPIRATION RATE: 18 BRPM | TEMPERATURE: 95.6 F | HEART RATE: 56 BPM | OXYGEN SATURATION: 100 % | DIASTOLIC BLOOD PRESSURE: 70 MMHG

## 2018-03-02 PROCEDURE — G0277 HBOT, FULL BODY CHAMBER, 30M: HCPCS

## 2018-03-02 ASSESSMENT — PAIN SCALES - GENERAL
PAINLEVEL_OUTOF10: 0
PAINLEVEL_OUTOF10: 0

## 2018-03-02 NOTE — PLAN OF CARE
Problem: Physical Regulation:  Goal: Tolerate HBO therapy and barotrauma will be prevented  Tolerate HBO therapy and barotrauma will be prevented  Outcome: Completed Date Met: 03/02/18  No s/s of seizure noted. No s/s of barotrauma noted. Pt demonstrated equalization of ears during pressurization. Comments: Care plan reviewed with patient. Patient verbalize understanding of the plan of care and contribute to goal setting.

## 2018-03-02 NOTE — PROGRESS NOTES
[address] [address]  Hyperbaric Oxygen Therapy   Progress Note      NAME: Santo Boogie  MEDICAL RECORD NUMBER:  [id number]  AGE: 57 y.o. 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 See doc flowsheets for documentation. 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 XXXS    Aneurysm of ascending aorta (HCC) I71.2          No results for input(s): POCGLU in the last 72 hours.     Pre treatment Vital Signs       Temp: 95.1 °F (35.1 °C)     Pulse: 68     Resp: 18     BP: (!) 148/76       Post treatment Vital Signs  Temp: 95.6 °F (35.3 °C)  Pulse: 56  Resp: 18  BP: (!) 144/70      Assessment        Physical Exam:  General Appearance:  alert and oriented to person, place and time, well-developed and well-nourished, in no acute distress    Pre Tympanic Membrane Assessment:  normal color    Post Tympanic Membrane Assessment:  Right: Normal  Left: Normal    Pulmonary/Chest:  clear to auscultation bilaterally- no wheezes, rales or rhonchi, normal air movement, no respiratory distress    Cardiovascular:  normal       Treatment Start Time: 1337     Pressure Reached Time: 1347  BECCA Rate: 2         Decompression Time: 1517   Treatment End Time: 1527    Symptoms Noted During Treatment: None    Adverse Event: no      I was present on these premises and immediately available to furnish assistance & direction throughout the procedure. Plan          Elaine Armstrong is a 57 y.o. male  successfully completed today's hyperbaric oxygen treatment at [address] [place] [address]. In my clinical judgement, ongoing HBO therapy is necessary at this time, given a threat to patient function, limb or life from the current condition. Supervision and attendance of Hyperbaric Oxygen Therapy provided. Continue HBO treatment as outlined in the treatment plan. Hyperbaric Oxygen:  Pt tolerated Treatment Number: 5 well today without complications.      Electronically signed by Carrie Bolaños MD on 3/2/2018 at 3:39 PM

## 2018-03-05 ENCOUNTER — HOSPITAL ENCOUNTER (OUTPATIENT)
Dept: HYPERBARIC MEDICINE | Age: 63
Setting detail: THERAPIES SERIES
Discharge: HOME | End: 2018-03-05
Payer: COMMERCIAL

## 2018-03-05 VITALS
RESPIRATION RATE: 16 BRPM | OXYGEN SATURATION: 100 % | DIASTOLIC BLOOD PRESSURE: 90 MMHG | SYSTOLIC BLOOD PRESSURE: 146 MMHG | HEART RATE: 56 BPM | BODY MASS INDEX: 25.76 KG/M2 | WEIGHT: 184 LBS | TEMPERATURE: 96 F | HEIGHT: 71 IN
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
## 2018-03-05 DIAGNOSIS — T66.XXXS LATE EFFECT OF RADIATION: ICD-10-CM

## 2018-03-05 DIAGNOSIS — M27.2 OSTEORADIONECROSIS OF MANDIBLE: ICD-10-CM

## 2018-03-05 PROCEDURE — G0277 HBOT, FULL BODY CHAMBER, 30M: HCPCS

## 2018-03-05 ASSESSMENT — PAIN SCALES - GENERAL: PAINLEVEL_OUTOF10: 0

## 2018-03-05 NOTE — PROGRESS NOTES
Treatment End Time: 6802    Symptoms Noted During Treatment: None    Adverse Event: no      I was present on these premises and immediately available to furnish assistance & direction throughout the procedure. Plan          Yao Castro is a 57 y.o. male  successfully completed today's hyperbaric oxygen treatment at [address] [place] [address]. In my clinical judgement, ongoing HBO therapy is necessary at this time, given a threat to patient function, limb or life from the current condition. Supervision and attendance of Hyperbaric Oxygen Therapy provided. Continue HBO treatment as outlined in the treatment plan. Hyperbaric Oxygen:  Pt tolerated Treatment Number: 6 well today without complications.      Electronically signed by Neil Mahoney MD on 3/5/2018 at 4:30 PM

## 2018-03-06 ENCOUNTER — HOSPITAL ENCOUNTER (OUTPATIENT)
Dept: HYPERBARIC MEDICINE | Age: 63
Setting detail: THERAPIES SERIES
Discharge: HOME | End: 2018-03-06
Payer: COMMERCIAL

## 2018-03-06 VITALS
SYSTOLIC BLOOD PRESSURE: 144 MMHG | DIASTOLIC BLOOD PRESSURE: 73 MMHG | HEART RATE: 54 BPM | TEMPERATURE: 96.5 F | OXYGEN SATURATION: 100 % | RESPIRATION RATE: 16 BRPM

## 2018-03-06 DIAGNOSIS — M27.2 OSTEORADIONECROSIS OF MANDIBLE: ICD-10-CM
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
## 2018-03-06 DIAGNOSIS — T66.XXXS LATE EFFECT OF RADIATION: ICD-10-CM

## 2018-03-06 PROCEDURE — 99183 HYPERBARIC OXYGEN THERAPY: CPT | Performed by: NURSE PRACTITIONER

## 2018-03-06 PROCEDURE — G0277 HBOT, FULL BODY CHAMBER, 30M: HCPCS

## 2018-03-06 ASSESSMENT — PAIN SCALES - GENERAL
PAINLEVEL_OUTOF10: 0
PAINLEVEL_OUTOF10: 0

## 2018-03-06 NOTE — PLAN OF CARE
Problem: Physical Regulation:  Goal: Tolerate HBO therapy and barotrauma will be prevented  Tolerate HBO therapy and barotrauma will be prevented  Outcome: Completed Date Met: 03/06/18  No s/s of seizure noted. No s/s of barotrauma noted. Pt demonstrated equalization of ears during pressurization. Comments: Care plan reviewed with patient. Patient verbalize understanding of the plan of care and contribute to goal setting.

## 2018-03-06 NOTE — PROGRESS NOTES
[address] [place] [address]  Hyperbaric Oxygen Therapy   Progress Note      NAME: Marek Leo  MEDICAL RECORD NUMBER:  [id number]  AGE: 57 y.o. GENDER: male  : 1955  EPISODE DATE:  3/6/2018     Subjective     HBO Treatment Number: 7 out of Total Treatments: 30 (20 presurgical 10 post)    HBO Diagnosis:               Indications: Late Effect of Radiation (Osteoradionecrosis of Mandible )    Safety checks performed prior to treatment. See doc flowsheets for documentation. 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 XXXS    Aneurysm of ascending aorta (HCC) I71.2          No results for input(s): POCGLU in the last 72 hours.     Pre treatment Vital Signs       Temp: 96.4 °F (35.8 °C)     Pulse: 64     Resp: 18     BP: (!) 164/79       Post treatment Vital Signs  Temp: 96.5 °F (35.8 °C)  Pulse: 54  Resp: 16  BP: (!) 144/73      Assessment        Physical Exam:  General Appearance:  alert and oriented to person, place and time    Pre Tympanic Membrane Assessment:  tympanic membranes intact bilaterally    Post Tympanic Membrane Assessment:  Right: Normal  Left: Normal    Pulmonary/Chest:  clear to auscultation bilaterally- no wheezes, rales or rhonchi, normal air movement, no respiratory distress    Cardiovascular:  normal, regular rate and rhythm       Treatment Start Time: 1132     Pressure Reached Time: 1142  BECCA Rate: 2         Decompression Time: 1312   Treatment End Time: 1322    Symptoms

## 2018-03-07 ENCOUNTER — HOSPITAL ENCOUNTER (OUTPATIENT)
Dept: HYPERBARIC MEDICINE | Age: 63
Setting detail: THERAPIES SERIES
Discharge: HOME | End: 2018-03-07
Payer: COMMERCIAL

## 2018-03-07 VITALS
RESPIRATION RATE: 18 BRPM | SYSTOLIC BLOOD PRESSURE: 159 MMHG | HEART RATE: 56 BPM | TEMPERATURE: 95.9 F | OXYGEN SATURATION: 100 % | DIASTOLIC BLOOD PRESSURE: 84 MMHG

## 2018-03-07 DIAGNOSIS — M27.2 OSTEORADIONECROSIS OF MANDIBLE: ICD-10-CM

## 2018-03-07 DIAGNOSIS — T66.XXXS LATE EFFECT OF RADIATION: ICD-10-CM
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
## 2018-03-07 PROCEDURE — G0277 HBOT, FULL BODY CHAMBER, 30M: HCPCS

## 2018-03-07 ASSESSMENT — PAIN SCALES - GENERAL
PAINLEVEL_OUTOF10: 0
PAINLEVEL_OUTOF10: 0

## 2018-03-07 NOTE — PLAN OF CARE
Problem: Physical Regulation:  Goal: Tolerate HBO therapy and barotrauma will be prevented  Tolerate HBO therapy and barotrauma will be prevented  Outcome: Completed Date Met: 03/07/18  No s/s of seizure noted. No s/s of barotrauma noted. Pt demonstrated equalization of ears during pressurization. Comments: Care plan reviewed with patient. Patient verbalize understanding of the plan of care and contribute to goal setting.

## 2018-03-08 ENCOUNTER — HOSPITAL ENCOUNTER (OUTPATIENT)
Dept: HYPERBARIC MEDICINE | Age: 63
Setting detail: THERAPIES SERIES
Discharge: HOME | End: 2018-03-08
Payer: COMMERCIAL

## 2018-03-08 VITALS
DIASTOLIC BLOOD PRESSURE: 79 MMHG | RESPIRATION RATE: 18 BRPM | OXYGEN SATURATION: 98 % | SYSTOLIC BLOOD PRESSURE: 157 MMHG | TEMPERATURE: 96.9 F | HEART RATE: 57 BPM

## 2018-03-08 DIAGNOSIS — M27.2 OSTEORADIONECROSIS OF MANDIBLE: ICD-10-CM

## 2018-03-08 DIAGNOSIS — T66.XXXS LATE EFFECT OF RADIATION: ICD-10-CM
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
## 2018-03-08 PROCEDURE — G0277 HBOT, FULL BODY CHAMBER, 30M: HCPCS

## 2018-03-08 PROCEDURE — 99183 HYPERBARIC OXYGEN THERAPY: CPT | Performed by: NURSE PRACTITIONER

## 2018-03-08 ASSESSMENT — PAIN SCALES - GENERAL
PAINLEVEL_OUTOF10: 0
PAINLEVEL_OUTOF10: 0

## 2018-03-08 NOTE — PROGRESS NOTES
[facility]  Hyperbaric Oxygen Therapy   Progress Note      NAME: India Nichols  MEDICAL RECORD NUMBER:  [id number]  AGE: 57 y.o. GENDER: male  : 1955  EPISODE DATE:  3/8/2018     Subjective     HBO Treatment Number: 9 out of Total Treatments: 30 (20 presurgical and 10 post )    HBO Diagnosis:               Indications: Late Effect of Radiation (Osteoradionecrosis of Jaw)    Safety checks performed prior to treatment. See doc flowsheets for documentation. 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 XXXS    Aneurysm of ascending aorta (HCC) I71.2          No results for input(s): POCGLU in the last 72 hours.     Pre treatment Vital Signs       Temp: 96.5 °F (35.8 °C)     Pulse: 68     Resp: 18     BP: (!) 155/71       Post treatment Vital Signs  Temp: 96.9 °F (36.1 °C)  Pulse: 57  Resp: 18  BP: (!) 157/79      Assessment        Physical Exam:  General Appearance:  alert and oriented to person, place and time    Pre Tympanic Membrane Assessment:  tympanic membranes intact bilaterally    Post Tympanic Membrane Assessment:  Right: Normal  Left: Normal    Pulmonary/Chest:  clear to auscultation bilaterally- no wheezes, rales or rhonchi, normal air movement, no respiratory distress    Cardiovascular:  normal, regular rate and rhythm       Treatment Start Time: 1324     Pressure Reached Time: 1334  BECCA Rate: 2  Treatment Status: Other (Comment) (NONE)      Decompression Time: 0519

## 2018-03-08 NOTE — PLAN OF CARE
Problem: Physical Regulation:  Goal: Tolerate HBO therapy and barotrauma will be prevented  Tolerate HBO therapy and barotrauma will be prevented  Outcome: Completed Date Met: 03/08/18  No s/s of seizure noted. No s/s of barotrauma noted. Pt demonstrated equalization of ears during pressurization. Comments: Care plan reviewed with patient. Patient verbalize understanding of the plan of care and contribute to goal setting.

## 2018-03-09 ENCOUNTER — OFFICE VISIT (OUTPATIENT)
Dept: ENT CLINIC | Age: 63
End: 2018-03-09
Payer: COMMERCIAL

## 2018-03-09 ENCOUNTER — HOSPITAL ENCOUNTER (OUTPATIENT)
Dept: HYPERBARIC MEDICINE | Age: 63
Setting detail: THERAPIES SERIES
Discharge: HOME | End: 2018-03-09
Payer: COMMERCIAL

## 2018-03-09 VITALS
TEMPERATURE: 97.2 F | BODY MASS INDEX: 25.9 KG/M2 | SYSTOLIC BLOOD PRESSURE: 120 MMHG | RESPIRATION RATE: 16 BRPM | DIASTOLIC BLOOD PRESSURE: 70 MMHG | HEART RATE: 74 BPM | HEIGHT: 71 IN | WEIGHT: 185 LBS

## 2018-03-09 VITALS
DIASTOLIC BLOOD PRESSURE: 72 MMHG | TEMPERATURE: 95.7 F | HEART RATE: 65 BPM | SYSTOLIC BLOOD PRESSURE: 151 MMHG | RESPIRATION RATE: 18 BRPM | OXYGEN SATURATION: 58 %

## 2018-03-09 DIAGNOSIS — K22.70 BARRETT'S ESOPHAGUS WITHOUT DYSPLASIA: ICD-10-CM

## 2018-03-09 DIAGNOSIS — C32.9 PRIMARY SQUAMOUS CELL CARCINOMA OF LARYNX (HCC): Primary | ICD-10-CM

## 2018-03-09 DIAGNOSIS — T66.XXXS LATE EFFECT OF RADIATION: ICD-10-CM

## 2018-03-09 DIAGNOSIS — M27.2 OSTEORADIONECROSIS OF MANDIBLE: ICD-10-CM

## 2018-03-09 DIAGNOSIS — J38.4 EDEMA OF LARYNX: ICD-10-CM

## 2018-03-09 DIAGNOSIS — K14.8 LARGE TONGUE: ICD-10-CM
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
## 2018-03-09 DIAGNOSIS — T66.XXXD EFFECTS, RADIATION, SUBSEQUENT ENCOUNTER: ICD-10-CM

## 2018-03-09 DIAGNOSIS — E03.4 HYPOTHYROIDISM DUE TO ACQUIRED ATROPHY OF THYROID: ICD-10-CM

## 2018-03-09 DIAGNOSIS — K05.6 PERIODONTAL DISEASE: ICD-10-CM

## 2018-03-09 DIAGNOSIS — Z99.89 OSA ON CPAP: ICD-10-CM

## 2018-03-09 DIAGNOSIS — K21.00 GASTROESOPHAGEAL REFLUX DISEASE WITH ESOPHAGITIS: ICD-10-CM

## 2018-03-09 DIAGNOSIS — G47.33 OSA ON CPAP: ICD-10-CM

## 2018-03-09 DIAGNOSIS — H90.3 ASYMMETRICAL SENSORINEURAL HEARING LOSS: ICD-10-CM

## 2018-03-09 PROCEDURE — 99213 OFFICE O/P EST LOW 20 MIN: CPT | Performed by: OTOLARYNGOLOGY

## 2018-03-09 PROCEDURE — G0277 HBOT, FULL BODY CHAMBER, 30M: HCPCS

## 2018-03-09 PROCEDURE — 31575 DIAGNOSTIC LARYNGOSCOPY: CPT | Performed by: OTOLARYNGOLOGY

## 2018-03-09 RX ORDER — LEVOTHYROXINE SODIUM 0.05 MG/1
50 TABLET ORAL DAILY
Qty: 30 TABLET | Refills: 3 | Status: SHIPPED | OUTPATIENT
Start: 2018-03-09 | End: 2018-06-29 | Stop reason: SDUPTHER

## 2018-03-09 ASSESSMENT — ENCOUNTER SYMPTOMS
APNEA: 0
SINUS PRESSURE: 0
WHEEZING: 0
VOICE CHANGE: 0
CHOKING: 0
TROUBLE SWALLOWING: 0
CHEST TIGHTNESS: 0
STRIDOR: 0
FACIAL SWELLING: 0
COUGH: 0
DIARRHEA: 0
SORE THROAT: 0
RHINORRHEA: 0
COLOR CHANGE: 0
VOMITING: 0
ABDOMINAL PAIN: 0
NAUSEA: 0
SHORTNESS OF BREATH: 0

## 2018-03-09 ASSESSMENT — PAIN SCALES - GENERAL
PAINLEVEL_OUTOF10: 0
PAINLEVEL_OUTOF10: 0

## 2018-03-09 NOTE — PLAN OF CARE
Problem: Physical Regulation:  Goal: Tolerate HBO therapy and barotrauma will be prevented  Tolerate HBO therapy and barotrauma will be prevented  Outcome: Completed Date Met: 03/09/18  No s/s of seizure noted. No s/s of barotrauma noted. Pt demonstrated equalization of ears during pressurization. Comments: Care plan reviewed with patient. Patient verbalize understanding of the plan of care and contribute to goal setting.

## 2018-03-09 NOTE — PROGRESS NOTES
mg by mouth daily. 2 tab       No current facility-administered medications for this visit. Past Medical History:   Diagnosis Date    Ascending Aneurysm ([address] 02/2017    Ascending    CAD (coronary artery disease)     Chronic back pain     Hyperlipidemia     Hypertension     Nausea & vomiting     FOUZIA on CPAP     PONV (postoperative nausea and vomiting)     S/P CABG (coronary artery bypass graft) 2002    [facility]    Snores       Past Surgical History:   Procedure Laterality Date    CARDIAC CATHETERIZATION  8/2002, 4/2004    [facility]    COLONOSCOPY      CORONARY ANGIOPLASTY WITH STENT PLACEMENT  7/2002    [facility]    CORONARY ARTERY BYPASS GRAFT  2002    [facility]    HEMORRHOID SURGERY      KNEE SURGERY Right     LARYNGOSCOPY N/A 10/4/2017    LARYNGOSCOPY MICRO WITH BIOPSY performed by Jeremías Medina MD at [address] [place] [address] Drive MICROLARYNGOSCOPY W BIOPSY  04/28/2016    by Dr Chavo Mejia     Family History   Problem Relation Age of Onset    Heart Disease Father      MI    High Blood Pressure Father     Cancer Maternal Grandmother     Cancer Maternal Grandfather      Social History   Substance Use Topics    Smoking status: Former Smoker     Packs/day: 2.00     Years: 20.00     Quit date: 12/5/2001    Smokeless tobacco: Never Used    Alcohol use No       Subjective:      Review of Systems   Constitutional: Negative for activity change, appetite change, chills, diaphoresis, fatigue, fever and unexpected weight change. HENT: Negative for congestion, dental problem, ear discharge, ear pain, facial swelling, hearing loss, mouth sores, nosebleeds, postnasal drip, rhinorrhea, sinus pressure, sneezing, sore throat, tinnitus, trouble swallowing and voice change. Eyes: Negative for visual disturbance. Respiratory: Negative for apnea, cough, choking, chest tightness, shortness of breath, wheezing and stridor. Cardiovascular: Negative for chest pain, palpitations and leg swelling.    Gastrointestinal: Negative for abdominal LARYNGOSCOPY FLEXIBLE DIAGNOSTIC    levothyroxine (SYNTHROID) 50 MCG tablet   5. FOZUIA on CPAP     6. Brown's esophagus without dysplasia     7. Periodontal disease     8. Large tongue base     9. Gastroesophageal reflux disease with esophagitis  ID LARYNGOSCOPY FLEXIBLE DIAGNOSTIC   10. Hypothyroidism due to acquired atrophy of thyroid  levothyroxine (SYNTHROID) 50 MCG tablet       The findings were explained and his questions were answered. His TSH is is starting to climb. With his radiation, hypothyroidism is inevitable, and he is already symptomatic. . We dicussed putting him on a low dose of Synthroid for his thyroid function since he is tired all the time. He would like to try the Synthroid. I emphasized the importance of brand name only. I will see him back in 2 months. I will have him get repeat labs then. Return in about 2 months (around 5/9/2018) for Follow-Up. Maria A Santa CMA ([facility]), am scribing for, and in the presence of Dr. Tata Miranda. Electronically signed by Laury Xie on 3/9/18 at 9:22 AM.     (Please note that portions of this note were completed with a voice recognition program. Efforts were made to edit the dictations but occasionally words are mis-transcribed.)    I agree to the above documentation placed by my scribe. I have personally evaluated this patient. Additional findings are as noted. I reviewed the scribe's note and agree with the documented findings and plan of care. Any areas of disagreement are corrected. I agree with the chief complaint, past medical history, past surgical history, allergies, medications, social and family history as documented unless otherwise noted below.      Electronically signed by Bret Cowan MD on 3/10/2018 at 4:28 PM

## 2018-03-12 ENCOUNTER — HOSPITAL ENCOUNTER (OUTPATIENT)
Dept: HYPERBARIC MEDICINE | Age: 63
Setting detail: THERAPIES SERIES
Discharge: HOME | End: 2018-03-12
Payer: COMMERCIAL

## 2018-03-12 VITALS
RESPIRATION RATE: 18 BRPM | DIASTOLIC BLOOD PRESSURE: 75 MMHG | TEMPERATURE: 95.4 F | OXYGEN SATURATION: 100 % | SYSTOLIC BLOOD PRESSURE: 162 MMHG | HEART RATE: 80 BPM

## 2018-03-12 PROCEDURE — G0277 HBOT, FULL BODY CHAMBER, 30M: HCPCS

## 2018-03-12 ASSESSMENT — PAIN SCALES - GENERAL
PAINLEVEL_OUTOF10: 0
PAINLEVEL_OUTOF10: 0

## 2018-03-13 ENCOUNTER — HOSPITAL ENCOUNTER (OUTPATIENT)
Dept: HYPERBARIC MEDICINE | Age: 63
Setting detail: THERAPIES SERIES
Discharge: HOME | End: 2018-03-13
Payer: COMMERCIAL

## 2018-03-13 VITALS
OXYGEN SATURATION: 100 % | RESPIRATION RATE: 18 BRPM | SYSTOLIC BLOOD PRESSURE: 159 MMHG | HEART RATE: 69 BPM | TEMPERATURE: 95.8 F | DIASTOLIC BLOOD PRESSURE: 75 MMHG

## 2018-03-13 PROCEDURE — G0277 HBOT, FULL BODY CHAMBER, 30M: HCPCS

## 2018-03-13 ASSESSMENT — PAIN SCALES - GENERAL
PAINLEVEL_OUTOF10: 0
PAINLEVEL_OUTOF10: 0

## 2018-03-13 NOTE — PLAN OF CARE
Problem: Physical Regulation:  Goal: Tolerate HBO therapy and barotrauma will be prevented  Tolerate HBO therapy and barotrauma will be prevented  Outcome: Completed Date Met: 03/13/18  No s/s of seizure noted. No s/s of barotrauma noted. Pt demonstrated equalization of ears during pressurization. Comments: Care plan reviewed with patient . Patient  verbalize understanding of the plan of care and contribute to goal setting.

## 2018-03-13 NOTE — PROGRESS NOTES
End Time: 1505    Symptoms Noted During Treatment: None    Adverse Event: no      I was present on these premises and immediately available to furnish assistance & direction throughout the procedure. Plan          Juan Ramon Matamoros is a 57 y.o. male  successfully completed today's hyperbaric oxygen treatment at [address] [facility]. In my clinical judgement, ongoing HBO therapy is necessary at this time, given a threat to patient function, limb or life from the current condition. Supervision and attendance of Hyperbaric Oxygen Therapy provided. Continue HBO treatment as outlined in the treatment plan. Hyperbaric Oxygen:  Pt tolerated Treatment Number: 15 well today without complications.      Electronically signed by Armand Gee MD on 3/12/2018 at 9:26 AM

## 2018-03-14 ENCOUNTER — HOSPITAL ENCOUNTER (OUTPATIENT)
Dept: HYPERBARIC MEDICINE | Age: 63
Setting detail: THERAPIES SERIES
Discharge: HOME | End: 2018-03-14
Payer: COMMERCIAL

## 2018-03-14 VITALS
DIASTOLIC BLOOD PRESSURE: 66 MMHG | HEIGHT: 71 IN | RESPIRATION RATE: 18 BRPM | SYSTOLIC BLOOD PRESSURE: 133 MMHG | HEART RATE: 55 BPM | BODY MASS INDEX: 25.9 KG/M2 | WEIGHT: 185 LBS | TEMPERATURE: 96.3 F | OXYGEN SATURATION: 100 %

## 2018-03-14 DIAGNOSIS — M27.2 OSTEORADIONECROSIS OF MANDIBLE: ICD-10-CM
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
## 2018-03-14 DIAGNOSIS — T66.XXXS LATE EFFECT OF RADIATION: ICD-10-CM

## 2018-03-14 PROCEDURE — G0277 HBOT, FULL BODY CHAMBER, 30M: HCPCS

## 2018-03-14 ASSESSMENT — PAIN SCALES - GENERAL
PAINLEVEL_OUTOF10: 0
PAINLEVEL_OUTOF10: 0

## 2018-03-14 NOTE — PROGRESS NOTES
End Time: 1512    Symptoms Noted During Treatment: None    Adverse Event: no      I was present on these premises and immediately available to furnish assistance & direction throughout the procedure. Plan          Mg Garza is a 57 y.o. male  successfully completed today's hyperbaric oxygen treatment at [facility]. In my clinical judgement, ongoing HBO therapy is necessary at this time, given a threat to patient function, limb or life from the current condition. Supervision and attendance of Hyperbaric Oxygen Therapy provided. Continue HBO treatment as outlined in the treatment plan. Hyperbaric Oxygen:  Pt tolerated Treatment Number: 12 well today without complications.      Electronically signed by Mariama Gonzalez MD on 3/13/2018 at 10:23 PM

## 2018-03-15 ENCOUNTER — HOSPITAL ENCOUNTER (OUTPATIENT)
Dept: HYPERBARIC MEDICINE | Age: 63
Setting detail: THERAPIES SERIES
Discharge: HOME | End: 2018-03-15
Payer: COMMERCIAL

## 2018-03-15 VITALS
HEART RATE: 53 BPM | SYSTOLIC BLOOD PRESSURE: 152 MMHG | TEMPERATURE: 96.1 F | RESPIRATION RATE: 18 BRPM | OXYGEN SATURATION: 100 % | DIASTOLIC BLOOD PRESSURE: 72 MMHG
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
## 2018-03-15 DIAGNOSIS — T66.XXXS LATE EFFECT OF RADIATION: ICD-10-CM

## 2018-03-15 DIAGNOSIS — M27.2 OSTEORADIONECROSIS OF MANDIBLE: ICD-10-CM

## 2018-03-15 PROCEDURE — G0277 HBOT, FULL BODY CHAMBER, 30M: HCPCS

## 2018-03-15 PROCEDURE — 99183 HYPERBARIC OXYGEN THERAPY: CPT | Performed by: NURSE PRACTITIONER

## 2018-03-15 ASSESSMENT — PAIN SCALES - GENERAL
PAINLEVEL_OUTOF10: 0
PAINLEVEL_OUTOF10: 0

## 2018-03-16 ENCOUNTER — HOSPITAL ENCOUNTER (OUTPATIENT)
Dept: HYPERBARIC MEDICINE | Age: 63
Setting detail: THERAPIES SERIES
Discharge: HOME | End: 2018-03-16
Payer: COMMERCIAL

## 2018-03-16 VITALS
TEMPERATURE: 96.7 F | RESPIRATION RATE: 16 BRPM | SYSTOLIC BLOOD PRESSURE: 155 MMHG | OXYGEN SATURATION: 100 % | DIASTOLIC BLOOD PRESSURE: 74 MMHG | HEART RATE: 52 BPM
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
## 2018-03-16 DIAGNOSIS — T66.XXXS LATE EFFECT OF RADIATION: ICD-10-CM

## 2018-03-16 DIAGNOSIS — M27.2 OSTEORADIONECROSIS OF MANDIBLE: ICD-10-CM

## 2018-03-16 PROCEDURE — 99183 HYPERBARIC OXYGEN THERAPY: CPT | Performed by: NURSE PRACTITIONER

## 2018-03-16 PROCEDURE — G0277 HBOT, FULL BODY CHAMBER, 30M: HCPCS

## 2018-03-16 ASSESSMENT — PAIN SCALES - GENERAL
PAINLEVEL_OUTOF10: 0
PAINLEVEL_OUTOF10: 0

## 2018-03-16 NOTE — PLAN OF CARE
Problem: Physical Regulation:  Goal: Tolerate HBO therapy and barotrauma will be prevented  Tolerate HBO therapy and barotrauma will be prevented  Outcome: Completed Date Met: 03/16/18  No s/s of seizure noted. No s/s of barotrauma noted. Pt demonstrated equalization of ears during pressurization. Comments: Care plan reviewed with patient. Patient verbalize understanding of the plan of care and contribute to goal setting.   \

## 2018-03-16 NOTE — PROGRESS NOTES
[address] [address]  Hyperbaric Oxygen Therapy & HBO Utilization Review   Progress Note      NAME: India Nichols  MEDICAL RECORD NUMBER:  [id number]  AGE: 57 y.o. GENDER: male  : 1955  EPISODE DATE:  3/16/2018     Subjective     HBO Treatment Number: 15 out of Total Treatments: 30    HBO Diagnosis:               Indications: Late Effect of Radiation (Osteoradionecrosis of Mandible)    Safety checks performed prior to treatment. See doc flowsheets for documentation. 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 XXXS    Aneurysm of ascending aorta (HCC) I71.2          No results for input(s): POCGLU in the last 72 hours.     Pre treatment Vital Signs       Temp: 96.2 °F (35.7 °C)     Pulse: 57     Resp: 18     BP: (!) 151/71       Post treatment Vital Signs  Temp: 96.7 °F (35.9 °C)  Pulse: 52  Resp: 16  BP: (!) 155/74      Assessment        Physical Exam:  General Appearance:  alert and oriented to person, place and time    Pre Tympanic Membrane Assessment:  tympanic membranes intact bilaterally    Post Tympanic Membrane Assessment:  Right: Normal  Left: Normal    Pulmonary/Chest:  clear to auscultation bilaterally- no wheezes, rales or rhonchi, normal air movement, no respiratory distress    Cardiovascular:  normal, regular rate and rhythm       Treatment Start Time: 1345     Pressure Reached Time: 1355  BECCA Rate: 2  Treatment Status: Other (Comment) (NONE)      Decompression Time: 5901

## 2018-03-19 ENCOUNTER — HOSPITAL ENCOUNTER (OUTPATIENT)
Dept: HYPERBARIC MEDICINE | Age: 63
Setting detail: THERAPIES SERIES
Discharge: HOME | End: 2018-03-19
Payer: COMMERCIAL

## 2018-03-19 VITALS
DIASTOLIC BLOOD PRESSURE: 74 MMHG | TEMPERATURE: 95.9 F | RESPIRATION RATE: 16 BRPM | HEART RATE: 53 BPM | OXYGEN SATURATION: 99 % | SYSTOLIC BLOOD PRESSURE: 144 MMHG

## 2018-03-19 PROCEDURE — G0277 HBOT, FULL BODY CHAMBER, 30M: HCPCS

## 2018-03-19 ASSESSMENT — PAIN SCALES - GENERAL
PAINLEVEL_OUTOF10: 0
PAINLEVEL_OUTOF10: 0

## 2018-03-20 ENCOUNTER — HOSPITAL ENCOUNTER (OUTPATIENT)
Dept: HYPERBARIC MEDICINE | Age: 63
Setting detail: THERAPIES SERIES
Discharge: HOME | End: 2018-03-20
Payer: COMMERCIAL

## 2018-03-20 VITALS
RESPIRATION RATE: 18 BRPM | SYSTOLIC BLOOD PRESSURE: 147 MMHG | DIASTOLIC BLOOD PRESSURE: 73 MMHG | OXYGEN SATURATION: 96 % | TEMPERATURE: 96.5 F | HEART RATE: 53 BPM

## 2018-03-20 DIAGNOSIS — M27.2 OSTEORADIONECROSIS OF MANDIBLE: ICD-10-CM
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
## 2018-03-20 DIAGNOSIS — T66.XXXS LATE EFFECT OF RADIATION: ICD-10-CM

## 2018-03-20 PROCEDURE — G0277 HBOT, FULL BODY CHAMBER, 30M: HCPCS

## 2018-03-20 ASSESSMENT — PAIN SCALES - GENERAL
PAINLEVEL_OUTOF10: 0
PAINLEVEL_OUTOF10: 0

## 2018-03-20 NOTE — PROGRESS NOTES
[address] [address]  Hyperbaric Oxygen Therapy   Progress Note      NAME: Negro Fair  MEDICAL RECORD NUMBER:  [id number]  AGE: 57 y.o. GENDER: male  : 1955  EPISODE DATE:  3/20/2018     Subjective     HBO Treatment Number: 17 out of Total Treatments: 30    HBO Diagnosis:               Indications: Late Effect of Radiation (Osteoradionecrosis of Mandible )    Safety checks performed prior to treatment. See doc flowsheets for documentation. 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 XXXS    Aneurysm of ascending aorta (HCC) I71.2          No results for input(s): POCGLU in the last 72 hours.     Pre treatment Vital Signs       Temp: 96.3 °F (35.7 °C)     Pulse: 60     Resp: 18     BP: 136/66       Post treatment Vital Signs  Temp: 96.5 °F (35.8 °C)  Pulse: 53  Resp: 18  BP: (!) 147/73      Assessment        Physical Exam:  General Appearance:  alert and oriented to person, place and time, well-developed and well-nourished, in no acute distress    Pre Tympanic Membrane Assessment:  tympanic membranes intact bilaterally    Post Tympanic Membrane Assessment:  Right: Normal  Left: Normal    Pulmonary/Chest:  clear to auscultation bilaterally- no wheezes, rales or rhonchi, normal air movement, no respiratory distress    Cardiovascular:  normal       Treatment Start Time: 1329     Pressure Reached Time: 1339  BECCA Rate: 2  Treatment Status: No Air break      Decompression Time: 1509

## 2018-03-20 NOTE — PLAN OF CARE
Problem: Physical Regulation:  Goal: Tolerate HBO therapy and barotrauma will be prevented  Tolerate HBO therapy and barotrauma will be prevented  Outcome: Completed Date Met: 03/20/18  No s/s of seizure noted. No s/s of barotrauma noted. Pt demonstrated equalization of ears during pressurization. Comments: Care plan reviewed with patient. Patient verbalize understanding of the plan of care and contribute to goal setting.

## 2018-03-20 NOTE — PROGRESS NOTES
[address] [address]  Hyperbaric Oxygen Therapy   Progress Note      NAME: Michelle Hamilton  MEDICAL RECORD NUMBER:  [id number]  AGE: 57 y.o. GENDER: male  : 1955  EPISODE DATE:  3/19/2018     Subjective     HBO Treatment Number: 16 out of Total Treatments: 30    HBO Diagnosis:               Indications: Late Effect of Radiation (osteonecrosis of mandible)    Safety checks performed prior to treatment. See doc flowsheets for documentation. 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 XXXS    Aneurysm of ascending aorta (HCC) I71.2          No results for input(s): POCGLU in the last 72 hours.     Pre treatment Vital Signs       Temp: 95.8 °F (35.4 °C)     Pulse: 70     Resp: 16     BP: (!) 142/70       Post treatment Vital Signs  Temp: 95.9 °F (35.5 °C)  Pulse: 53  Resp: 16  BP: (!) 144/74      Assessment        Physical Exam:  General Appearance:  alert and oriented to person, place and time, well-developed and well-nourished, in no acute distress    Pre Tympanic Membrane Assessment:  tympanic membranes intact bilaterally    Post Tympanic Membrane Assessment:  Right: Normal  Left: Normal    Pulmonary/Chest:  clear to auscultation bilaterally- no wheezes, rales or rhonchi, normal air movement, no respiratory distress    Cardiovascular:  regular rate and rhythm       Treatment Start Time: 1316     Pressure Reached Time: 1326  BECCA Rate: 2  Treatment Status:  (none)      Decompression Time:

## 2018-03-21 ENCOUNTER — HOSPITAL ENCOUNTER (OUTPATIENT)
Dept: HYPERBARIC MEDICINE | Age: 63
Setting detail: THERAPIES SERIES
Discharge: HOME | End: 2018-03-21
Payer: COMMERCIAL

## 2018-03-21 VITALS
TEMPERATURE: 95.9 F | DIASTOLIC BLOOD PRESSURE: 69 MMHG | HEART RATE: 56 BPM | SYSTOLIC BLOOD PRESSURE: 149 MMHG | RESPIRATION RATE: 18 BRPM | OXYGEN SATURATION: 100 %

## 2018-03-21 DIAGNOSIS — T66.XXXS LATE EFFECT OF RADIATION: ICD-10-CM

## 2018-03-21 DIAGNOSIS — M27.2 OSTEORADIONECROSIS OF MANDIBLE: ICD-10-CM
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
## 2018-03-21 PROCEDURE — G0277 HBOT, FULL BODY CHAMBER, 30M: HCPCS

## 2018-03-21 ASSESSMENT — PAIN SCALES - GENERAL
PAINLEVEL_OUTOF10: 0
PAINLEVEL_OUTOF10: 0

## 2018-03-22 ENCOUNTER — HOSPITAL ENCOUNTER (OUTPATIENT)
Dept: HYPERBARIC MEDICINE | Age: 63
Setting detail: THERAPIES SERIES
Discharge: HOME | End: 2018-03-22
Payer: COMMERCIAL

## 2018-03-22 VITALS
DIASTOLIC BLOOD PRESSURE: 71 MMHG | HEART RATE: 52 BPM | OXYGEN SATURATION: 100 % | TEMPERATURE: 96 F | SYSTOLIC BLOOD PRESSURE: 146 MMHG | RESPIRATION RATE: 18 BRPM
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
## 2018-03-22 DIAGNOSIS — M27.2 OSTEORADIONECROSIS OF MANDIBLE: ICD-10-CM

## 2018-03-22 DIAGNOSIS — T66.XXXS LATE EFFECT OF RADIATION: ICD-10-CM

## 2018-03-22 PROCEDURE — 99183 HYPERBARIC OXYGEN THERAPY: CPT | Performed by: NURSE PRACTITIONER

## 2018-03-22 PROCEDURE — G0277 HBOT, FULL BODY CHAMBER, 30M: HCPCS

## 2018-03-22 ASSESSMENT — PAIN SCALES - GENERAL
PAINLEVEL_OUTOF10: 0
PAINLEVEL_OUTOF10: 0

## 2018-03-22 NOTE — PROGRESS NOTES
Noted During Treatment: None    Adverse Event: no      I was present on these premises and immediately available to furnish assistance & direction throughout the procedure. Plan          Terrence Wellington is a 57 y.o. male  successfully completed today's hyperbaric oxygen treatment at [address] [place] [address]. In my clinical judgement, ongoing HBO therapy is necessary at this time, given a threat to patient function, limb or life from the current condition. Supervision and attendance of Hyperbaric Oxygen Therapy provided. Continue HBO treatment as outlined in the treatment plan. Hyperbaric Oxygen:  Pt tolerated Treatment Number: 19 well today without complications.      Electronically signed by Ant Duckworth CNP on 3/22/2018 at 4:42 PM

## 2018-03-23 ENCOUNTER — HOSPITAL ENCOUNTER (OUTPATIENT)
Dept: HYPERBARIC MEDICINE | Age: 63
Setting detail: THERAPIES SERIES
Discharge: HOME | End: 2018-03-23
Payer: COMMERCIAL

## 2018-03-23 VITALS
HEART RATE: 57 BPM | RESPIRATION RATE: 18 BRPM | SYSTOLIC BLOOD PRESSURE: 135 MMHG | TEMPERATURE: 96.1 F | OXYGEN SATURATION: 100 % | DIASTOLIC BLOOD PRESSURE: 70 MMHG

## 2018-03-23 DIAGNOSIS — M27.2 OSTEORADIONECROSIS OF MANDIBLE: ICD-10-CM
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
## 2018-03-23 DIAGNOSIS — T66.XXXS LATE EFFECT OF RADIATION: ICD-10-CM

## 2018-03-23 PROCEDURE — G0277 HBOT, FULL BODY CHAMBER, 30M: HCPCS

## 2018-03-23 ASSESSMENT — PAIN SCALES - GENERAL
PAINLEVEL_OUTOF10: 0
PAINLEVEL_OUTOF10: 0

## 2018-03-24 RX ORDER — ATORVASTATIN CALCIUM 20 MG/1
TABLET, FILM COATED ORAL
Qty: 90 TABLET | Refills: 2 | Status: SHIPPED | OUTPATIENT
Start: 2018-03-24 | End: 2018-12-19 | Stop reason: SDUPTHER

## 2018-03-26 ENCOUNTER — APPOINTMENT (OUTPATIENT)
Dept: HYPERBARIC MEDICINE | Age: 63
End: 2018-03-26
Payer: COMMERCIAL

## 2018-03-27 ENCOUNTER — APPOINTMENT (OUTPATIENT)
Dept: HYPERBARIC MEDICINE | Age: 63
End: 2018-03-27
Payer: COMMERCIAL

## 2018-03-27 RX ORDER — METOPROLOL SUCCINATE 25 MG/1
25 TABLET, EXTENDED RELEASE ORAL 2 TIMES DAILY
Qty: 60 TABLET | Refills: 5 | Status: SHIPPED | OUTPATIENT
Start: 2018-03-27 | End: 2018-09-05 | Stop reason: SDUPTHER

## 2018-03-28 ENCOUNTER — APPOINTMENT (OUTPATIENT)
Dept: HYPERBARIC MEDICINE | Age: 63
End: 2018-03-28
Payer: COMMERCIAL

## 2018-03-29 ENCOUNTER — APPOINTMENT (OUTPATIENT)
Dept: HYPERBARIC MEDICINE | Age: 63
End: 2018-03-29
Payer: COMMERCIAL

## 2018-03-30 ENCOUNTER — APPOINTMENT (OUTPATIENT)
Dept: HYPERBARIC MEDICINE | Age: 63
End: 2018-03-30
Payer: COMMERCIAL

## 2018-04-02 ENCOUNTER — HOSPITAL ENCOUNTER (OUTPATIENT)
Dept: HYPERBARIC MEDICINE | Age: 63
Setting detail: THERAPIES SERIES
Discharge: HOME | End: 2018-04-02
Payer: COMMERCIAL

## 2018-04-02 VITALS
TEMPERATURE: 96.2 F | DIASTOLIC BLOOD PRESSURE: 78 MMHG | BODY MASS INDEX: 25.9 KG/M2 | WEIGHT: 185 LBS | HEIGHT: 71 IN | SYSTOLIC BLOOD PRESSURE: 150 MMHG | HEART RATE: 57 BPM | RESPIRATION RATE: 18 BRPM | OXYGEN SATURATION: 100 %
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
## 2018-04-02 DIAGNOSIS — T66.XXXS LATE EFFECT OF RADIATION: ICD-10-CM

## 2018-04-02 DIAGNOSIS — M27.2 OSTEORADIONECROSIS OF MANDIBLE: ICD-10-CM

## 2018-04-02 PROCEDURE — G0277 HBOT, FULL BODY CHAMBER, 30M: HCPCS

## 2018-04-02 ASSESSMENT — PAIN SCALES - GENERAL
PAINLEVEL_OUTOF10: 0
PAINLEVEL_OUTOF10: 0

## 2018-04-03 ENCOUNTER — HOSPITAL ENCOUNTER (OUTPATIENT)
Dept: HYPERBARIC MEDICINE | Age: 63
Setting detail: THERAPIES SERIES
Discharge: HOME | End: 2018-04-03
Payer: COMMERCIAL

## 2018-04-03 VITALS
RESPIRATION RATE: 18 BRPM | DIASTOLIC BLOOD PRESSURE: 77 MMHG | OXYGEN SATURATION: 100 % | TEMPERATURE: 95.8 F | SYSTOLIC BLOOD PRESSURE: 165 MMHG | HEART RATE: 55 BPM
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
## 2018-04-03 DIAGNOSIS — T66.XXXS LATE EFFECT OF RADIATION: ICD-10-CM

## 2018-04-03 DIAGNOSIS — M27.2 OSTEORADIONECROSIS OF MANDIBLE: ICD-10-CM

## 2018-04-03 PROCEDURE — G0277 HBOT, FULL BODY CHAMBER, 30M: HCPCS

## 2018-04-03 ASSESSMENT — PAIN SCALES - GENERAL
PAINLEVEL_OUTOF10: 0
PAINLEVEL_OUTOF10: 0

## 2018-04-04 ENCOUNTER — HOSPITAL ENCOUNTER (OUTPATIENT)
Dept: HYPERBARIC MEDICINE | Age: 63
Setting detail: THERAPIES SERIES
Discharge: HOME | End: 2018-04-04
Payer: COMMERCIAL

## 2018-04-04 VITALS
TEMPERATURE: 96.2 F | HEART RATE: 54 BPM | SYSTOLIC BLOOD PRESSURE: 143 MMHG | OXYGEN SATURATION: 100 % | DIASTOLIC BLOOD PRESSURE: 67 MMHG | RESPIRATION RATE: 18 BRPM

## 2018-04-04 DIAGNOSIS — T66.XXXS LATE EFFECT OF RADIATION: ICD-10-CM
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
## 2018-04-04 DIAGNOSIS — M27.2 OSTEORADIONECROSIS OF MANDIBLE: ICD-10-CM

## 2018-04-04 PROCEDURE — G0277 HBOT, FULL BODY CHAMBER, 30M: HCPCS

## 2018-04-04 ASSESSMENT — PAIN SCALES - GENERAL
PAINLEVEL_OUTOF10: 0
PAINLEVEL_OUTOF10: 0

## 2018-04-05 ENCOUNTER — HOSPITAL ENCOUNTER (OUTPATIENT)
Dept: HYPERBARIC MEDICINE | Age: 63
Setting detail: THERAPIES SERIES
Discharge: HOME | End: 2018-04-05
Payer: COMMERCIAL

## 2018-04-05 VITALS
TEMPERATURE: 98.2 F | HEART RATE: 55 BPM | DIASTOLIC BLOOD PRESSURE: 69 MMHG | OXYGEN SATURATION: 100 % | RESPIRATION RATE: 18 BRPM | SYSTOLIC BLOOD PRESSURE: 146 MMHG

## 2018-04-05 DIAGNOSIS — T66.XXXS LATE EFFECT OF RADIATION: ICD-10-CM

## 2018-04-05 DIAGNOSIS — M27.2 OSTEORADIONECROSIS OF MANDIBLE: ICD-10-CM
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
## 2018-04-05 PROCEDURE — 99183 HYPERBARIC OXYGEN THERAPY: CPT | Performed by: NURSE PRACTITIONER

## 2018-04-05 PROCEDURE — G0277 HBOT, FULL BODY CHAMBER, 30M: HCPCS

## 2018-04-05 ASSESSMENT — PAIN SCALES - GENERAL
PAINLEVEL_OUTOF10: 0
PAINLEVEL_OUTOF10: 0

## 2018-04-06 ENCOUNTER — HOSPITAL ENCOUNTER (OUTPATIENT)
Dept: HYPERBARIC MEDICINE | Age: 63
Setting detail: THERAPIES SERIES
Discharge: HOME | End: 2018-04-06
Payer: COMMERCIAL

## 2018-04-06 VITALS
RESPIRATION RATE: 18 BRPM | SYSTOLIC BLOOD PRESSURE: 142 MMHG | TEMPERATURE: 96.8 F | HEART RATE: 58 BPM | DIASTOLIC BLOOD PRESSURE: 68 MMHG | OXYGEN SATURATION: 98 %

## 2018-04-06 DIAGNOSIS — M27.2 OSTEORADIONECROSIS OF MANDIBLE: ICD-10-CM
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
## 2018-04-06 DIAGNOSIS — T66.XXXS LATE EFFECT OF RADIATION: ICD-10-CM

## 2018-04-06 PROCEDURE — 99183 HYPERBARIC OXYGEN THERAPY: CPT | Performed by: NURSE PRACTITIONER

## 2018-04-06 PROCEDURE — G0277 HBOT, FULL BODY CHAMBER, 30M: HCPCS

## 2018-04-06 ASSESSMENT — PAIN SCALES - GENERAL
PAINLEVEL_OUTOF10: 0
PAINLEVEL_OUTOF10: 0

## 2018-04-09 ENCOUNTER — HOSPITAL ENCOUNTER (OUTPATIENT)
Dept: HYPERBARIC MEDICINE | Age: 63
Setting detail: THERAPIES SERIES
Discharge: HOME | End: 2018-04-09
Payer: COMMERCIAL

## 2018-04-09 VITALS
WEIGHT: 185 LBS | DIASTOLIC BLOOD PRESSURE: 75 MMHG | SYSTOLIC BLOOD PRESSURE: 164 MMHG | RESPIRATION RATE: 16 BRPM | TEMPERATURE: 96.1 F | HEART RATE: 57 BPM | HEIGHT: 71 IN | OXYGEN SATURATION: 100 % | BODY MASS INDEX: 25.9 KG/M2

## 2018-04-09 DIAGNOSIS — T66.XXXS LATE EFFECT OF RADIATION: ICD-10-CM
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
## 2018-04-09 DIAGNOSIS — M27.2 OSTEORADIONECROSIS OF MANDIBLE: ICD-10-CM

## 2018-04-09 PROCEDURE — G0277 HBOT, FULL BODY CHAMBER, 30M: HCPCS

## 2018-04-09 ASSESSMENT — PAIN SCALES - GENERAL
PAINLEVEL_OUTOF10: 0
PAINLEVEL_OUTOF10: 0

## 2018-04-10 ENCOUNTER — HOSPITAL ENCOUNTER (OUTPATIENT)
Dept: HYPERBARIC MEDICINE | Age: 63
Setting detail: THERAPIES SERIES
Discharge: HOME | End: 2018-04-10
Payer: COMMERCIAL

## 2018-04-10 VITALS
OXYGEN SATURATION: 100 % | HEART RATE: 57 BPM | DIASTOLIC BLOOD PRESSURE: 75 MMHG | RESPIRATION RATE: 18 BRPM | SYSTOLIC BLOOD PRESSURE: 147 MMHG | TEMPERATURE: 95.8 F
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
## 2018-04-10 DIAGNOSIS — M27.2 OSTEORADIONECROSIS OF MANDIBLE: ICD-10-CM

## 2018-04-10 DIAGNOSIS — T66.XXXS LATE EFFECT OF RADIATION: ICD-10-CM

## 2018-04-10 PROCEDURE — G0277 HBOT, FULL BODY CHAMBER, 30M: HCPCS

## 2018-04-10 ASSESSMENT — PAIN SCALES - GENERAL
PAINLEVEL_OUTOF10: 0
PAINLEVEL_OUTOF10: 0

## 2018-04-11 ENCOUNTER — HOSPITAL ENCOUNTER (OUTPATIENT)
Dept: HYPERBARIC MEDICINE | Age: 63
Setting detail: THERAPIES SERIES
Discharge: HOME | End: 2018-04-11
Payer: COMMERCIAL

## 2018-04-11 VITALS
RESPIRATION RATE: 16 BRPM | OXYGEN SATURATION: 100 % | HEART RATE: 55 BPM | DIASTOLIC BLOOD PRESSURE: 74 MMHG | TEMPERATURE: 95.5 F | SYSTOLIC BLOOD PRESSURE: 144 MMHG
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
## 2018-04-11 DIAGNOSIS — M27.2 OSTEORADIONECROSIS OF MANDIBLE: ICD-10-CM

## 2018-04-11 DIAGNOSIS — T66.XXXS LATE EFFECT OF RADIATION: ICD-10-CM

## 2018-04-11 PROCEDURE — G0277 HBOT, FULL BODY CHAMBER, 30M: HCPCS

## 2018-04-11 ASSESSMENT — PAIN SCALES - GENERAL
PAINLEVEL_OUTOF10: 0
PAINLEVEL_OUTOF10: 0

## 2018-04-12 ENCOUNTER — HOSPITAL ENCOUNTER (OUTPATIENT)
Dept: HYPERBARIC MEDICINE | Age: 63
Setting detail: THERAPIES SERIES
Discharge: HOME | End: 2018-04-12
Payer: COMMERCIAL

## 2018-04-12 VITALS
DIASTOLIC BLOOD PRESSURE: 72 MMHG | SYSTOLIC BLOOD PRESSURE: 140 MMHG | OXYGEN SATURATION: 100 % | HEART RATE: 54 BPM | RESPIRATION RATE: 16 BRPM | TEMPERATURE: 95.9 F

## 2018-04-12 DIAGNOSIS — T66.XXXS LATE EFFECT OF RADIATION: ICD-10-CM

## 2018-04-12 DIAGNOSIS — M27.2 OSTEORADIONECROSIS OF MANDIBLE: ICD-10-CM
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
## 2018-04-12 PROCEDURE — G0277 HBOT, FULL BODY CHAMBER, 30M: HCPCS

## 2018-04-12 ASSESSMENT — PAIN SCALES - GENERAL
PAINLEVEL_OUTOF10: 0
PAINLEVEL_OUTOF10: 0

## 2018-04-13 ENCOUNTER — HOSPITAL ENCOUNTER (OUTPATIENT)
Dept: HYPERBARIC MEDICINE | Age: 63
Setting detail: THERAPIES SERIES
Discharge: HOME | End: 2018-04-13
Payer: COMMERCIAL

## 2018-04-13 VITALS
TEMPERATURE: 96 F | HEART RATE: 52 BPM | OXYGEN SATURATION: 100 % | DIASTOLIC BLOOD PRESSURE: 73 MMHG | SYSTOLIC BLOOD PRESSURE: 146 MMHG | RESPIRATION RATE: 16 BRPM

## 2018-04-13 DIAGNOSIS — M27.2 OSTEORADIONECROSIS OF MANDIBLE: ICD-10-CM
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
## 2018-04-13 DIAGNOSIS — T66.XXXS LATE EFFECT OF RADIATION: ICD-10-CM

## 2018-04-13 PROCEDURE — G0277 HBOT, FULL BODY CHAMBER, 30M: HCPCS

## 2018-04-13 ASSESSMENT — PAIN SCALES - GENERAL
PAINLEVEL_OUTOF10: 0
PAINLEVEL_OUTOF10: 0

## 2018-05-04 ENCOUNTER — HOSPITAL ENCOUNTER (OUTPATIENT)
Dept: RADIATION ONCOLOGY | Age: 63
Discharge: HOME | End: 2018-05-04
Payer: COMMERCIAL

## 2018-05-07 PROCEDURE — 99211 OFF/OP EST MAY X REQ PHY/QHP: CPT | Performed by: RADIOLOGY

## 2018-05-11 ENCOUNTER — HOSPITAL ENCOUNTER (OUTPATIENT)
Age: 63
Discharge: HOME | End: 2018-05-11
Payer: COMMERCIAL

## 2018-05-11 ENCOUNTER — OFFICE VISIT (OUTPATIENT)
Dept: ENT CLINIC | Age: 63
End: 2018-05-11
Payer: COMMERCIAL

## 2018-05-11 VITALS
WEIGHT: 183 LBS | RESPIRATION RATE: 12 BRPM | DIASTOLIC BLOOD PRESSURE: 62 MMHG | TEMPERATURE: 98.1 F | HEART RATE: 60 BPM | HEIGHT: 71 IN | SYSTOLIC BLOOD PRESSURE: 110 MMHG | BODY MASS INDEX: 25.62 KG/M2

## 2018-05-11 DIAGNOSIS — E03.4 HYPOTHYROIDISM DUE TO ACQUIRED ATROPHY OF THYROID: ICD-10-CM

## 2018-05-11 DIAGNOSIS — T66.XXXD EFFECTS, RADIATION, SUBSEQUENT ENCOUNTER: ICD-10-CM

## 2018-05-11 DIAGNOSIS — H53.131 VISION, LOSS, SUDDEN, RIGHT: ICD-10-CM

## 2018-05-11 DIAGNOSIS — C32.9 PRIMARY SQUAMOUS CELL CARCINOMA OF LARYNX (HCC): Primary | ICD-10-CM

## 2018-05-11 DIAGNOSIS — J31.0 RHINITIS MEDICAMENTOSA: ICD-10-CM

## 2018-05-11 DIAGNOSIS — Z99.89 OSA ON CPAP: ICD-10-CM

## 2018-05-11 DIAGNOSIS — T48.5X5A RHINITIS MEDICAMENTOSA: ICD-10-CM

## 2018-05-11 DIAGNOSIS — G47.33 OSA ON CPAP: ICD-10-CM

## 2018-05-11 LAB
T4 FREE: 1.43 NG/DL (ref 0.93–1.76)
TSH SERPL DL<=0.05 MIU/L-ACNC: 0.86 UIU/ML (ref 0.4–4.2)

## 2018-05-11 PROCEDURE — 36415 COLL VENOUS BLD VENIPUNCTURE: CPT

## 2018-05-11 PROCEDURE — 31575 DIAGNOSTIC LARYNGOSCOPY: CPT | Performed by: OTOLARYNGOLOGY

## 2018-05-11 PROCEDURE — 99213 OFFICE O/P EST LOW 20 MIN: CPT | Performed by: OTOLARYNGOLOGY

## 2018-05-11 PROCEDURE — 84439 ASSAY OF FREE THYROXINE: CPT

## 2018-05-11 PROCEDURE — 84443 ASSAY THYROID STIM HORMONE: CPT

## 2018-05-11 RX ORDER — FLUTICASONE PROPIONATE 50 MCG
1 SPRAY, SUSPENSION (ML) NASAL DAILY
Qty: 1 BOTTLE | Refills: 3 | Status: SHIPPED | OUTPATIENT
Start: 2018-05-11 | End: 2018-06-27 | Stop reason: ALTCHOICE

## 2018-05-11 ASSESSMENT — ENCOUNTER SYMPTOMS
APNEA: 0
CHOKING: 0
STRIDOR: 0
NAUSEA: 0
CHEST TIGHTNESS: 0
SINUS PRESSURE: 0
SHORTNESS OF BREATH: 0
ABDOMINAL PAIN: 0
WHEEZING: 0
VOMITING: 0
VOICE CHANGE: 0
COUGH: 0
COLOR CHANGE: 0
DIARRHEA: 0
SORE THROAT: 0
TROUBLE SWALLOWING: 0
FACIAL SWELLING: 0
RHINORRHEA: 0

## 2018-06-25 ENCOUNTER — TELEPHONE (OUTPATIENT)
Dept: INTERNAL MEDICINE CLINIC | Age: 63
End: 2018-06-25

## 2018-06-25 DIAGNOSIS — E78.5 HYPERLIPIDEMIA, UNSPECIFIED HYPERLIPIDEMIA TYPE: Primary | ICD-10-CM

## 2018-06-26 ENCOUNTER — HOSPITAL ENCOUNTER (OUTPATIENT)
Age: 63
Discharge: HOME | End: 2018-06-26
Payer: COMMERCIAL

## 2018-06-26 DIAGNOSIS — E78.5 HYPERLIPIDEMIA, UNSPECIFIED HYPERLIPIDEMIA TYPE: ICD-10-CM

## 2018-06-26 LAB
ALT SERPL-CCNC: 14 U/L (ref 11–66)
LDL CHOLESTEROL DIRECT: 89.07 MG/DL

## 2018-06-26 PROCEDURE — 83721 ASSAY OF BLOOD LIPOPROTEIN: CPT

## 2018-06-26 PROCEDURE — 36415 COLL VENOUS BLD VENIPUNCTURE: CPT

## 2018-06-26 PROCEDURE — 84460 ALANINE AMINO (ALT) (SGPT): CPT

## 2018-06-27 ENCOUNTER — TELEPHONE (OUTPATIENT)
Dept: INTERNAL MEDICINE CLINIC | Age: 63
End: 2018-06-27

## 2018-06-27 ENCOUNTER — OFFICE VISIT (OUTPATIENT)
Dept: INTERNAL MEDICINE CLINIC | Age: 63
End: 2018-06-27
Payer: COMMERCIAL

## 2018-06-27 VITALS
WEIGHT: 187 LBS | HEIGHT: 71 IN | SYSTOLIC BLOOD PRESSURE: 118 MMHG | DIASTOLIC BLOOD PRESSURE: 76 MMHG | BODY MASS INDEX: 26.18 KG/M2 | HEART RATE: 52 BPM

## 2018-06-27 DIAGNOSIS — I10 ESSENTIAL HYPERTENSION: ICD-10-CM

## 2018-06-27 DIAGNOSIS — C32.9 PRIMARY SQUAMOUS CELL CARCINOMA OF LARYNX (HCC): ICD-10-CM

## 2018-06-27 DIAGNOSIS — I71.21 ANEURYSM OF ASCENDING AORTA: ICD-10-CM

## 2018-06-27 DIAGNOSIS — E78.5 HYPERLIPIDEMIA, UNSPECIFIED HYPERLIPIDEMIA TYPE: Primary | ICD-10-CM

## 2018-06-27 DIAGNOSIS — H52.11 MYOPIA OF RIGHT EYE: ICD-10-CM

## 2018-06-27 DIAGNOSIS — Z95.1 S/P CABG (CORONARY ARTERY BYPASS GRAFT): ICD-10-CM

## 2018-06-27 DIAGNOSIS — Z92.89 HISTORY OF HYPERBARIC OXYGEN THERAPY: ICD-10-CM

## 2018-06-27 DIAGNOSIS — I25.10 ASCVD (ARTERIOSCLEROTIC CARDIOVASCULAR DISEASE): Primary | ICD-10-CM

## 2018-06-27 DIAGNOSIS — E78.5 HYPERLIPIDEMIA, UNSPECIFIED HYPERLIPIDEMIA TYPE: ICD-10-CM

## 2018-06-27 PROCEDURE — 93000 ELECTROCARDIOGRAM COMPLETE: CPT | Performed by: INTERNAL MEDICINE

## 2018-06-27 PROCEDURE — 99214 OFFICE O/P EST MOD 30 MIN: CPT | Performed by: INTERNAL MEDICINE

## 2018-06-27 ASSESSMENT — PATIENT HEALTH QUESTIONNAIRE - PHQ9
2. FEELING DOWN, DEPRESSED OR HOPELESS: 0
SUM OF ALL RESPONSES TO PHQ QUESTIONS 1-9: 0
SUM OF ALL RESPONSES TO PHQ9 QUESTIONS 1 & 2: 0
1. LITTLE INTEREST OR PLEASURE IN DOING THINGS: 0

## 2018-06-29 DIAGNOSIS — T66.XXXD EFFECTS, RADIATION, SUBSEQUENT ENCOUNTER: ICD-10-CM

## 2018-06-29 DIAGNOSIS — E03.4 HYPOTHYROIDISM DUE TO ACQUIRED ATROPHY OF THYROID: ICD-10-CM

## 2018-07-02 RX ORDER — LEVOTHYROXINE SODIUM 0.05 MG/1
50 TABLET ORAL DAILY
Qty: 30 TABLET | Refills: 3 | Status: SHIPPED | OUTPATIENT
Start: 2018-07-02 | End: 2019-03-12 | Stop reason: SDUPTHER

## 2018-07-31 ENCOUNTER — OFFICE VISIT (OUTPATIENT)
Dept: ENT CLINIC | Age: 63
End: 2018-07-31
Payer: COMMERCIAL

## 2018-07-31 VITALS
SYSTOLIC BLOOD PRESSURE: 130 MMHG | BODY MASS INDEX: 26.1 KG/M2 | WEIGHT: 186.4 LBS | TEMPERATURE: 97.8 F | HEIGHT: 71 IN | RESPIRATION RATE: 12 BRPM | DIASTOLIC BLOOD PRESSURE: 70 MMHG | HEART RATE: 56 BPM

## 2018-07-31 DIAGNOSIS — H90.3 ASYMMETRICAL SENSORINEURAL HEARING LOSS: ICD-10-CM

## 2018-07-31 DIAGNOSIS — G47.33 OSA ON CPAP: ICD-10-CM

## 2018-07-31 DIAGNOSIS — E03.4 HYPOTHYROIDISM DUE TO ACQUIRED ATROPHY OF THYROID: ICD-10-CM

## 2018-07-31 DIAGNOSIS — Z99.89 OSA ON CPAP: ICD-10-CM

## 2018-07-31 DIAGNOSIS — C32.9 PRIMARY SQUAMOUS CELL CARCINOMA OF LARYNX (HCC): Primary | ICD-10-CM

## 2018-07-31 DIAGNOSIS — T66.XXXD EFFECTS, RADIATION, SUBSEQUENT ENCOUNTER: ICD-10-CM

## 2018-07-31 PROCEDURE — 31575 DIAGNOSTIC LARYNGOSCOPY: CPT | Performed by: OTOLARYNGOLOGY

## 2018-07-31 PROCEDURE — 99213 OFFICE O/P EST LOW 20 MIN: CPT | Performed by: OTOLARYNGOLOGY

## 2018-07-31 ASSESSMENT — ENCOUNTER SYMPTOMS
WHEEZING: 0
ABDOMINAL PAIN: 0
NAUSEA: 0
FACIAL SWELLING: 0
SORE THROAT: 0
SHORTNESS OF BREATH: 0
CHOKING: 0
COLOR CHANGE: 0
RHINORRHEA: 0
VOICE CHANGE: 0
APNEA: 0
CHEST TIGHTNESS: 0
STRIDOR: 0
COUGH: 0
VOMITING: 0
TROUBLE SWALLOWING: 0
SINUS PRESSURE: 0
DIARRHEA: 0

## 2018-07-31 NOTE — PROGRESS NOTES
[address] [facility] [address], NOSE AND THROAT  [address]  [place] [address]  Dept: [phone]  Dept Fax: [phone]  Loc: [phone]    Valeria Johnson is a 57 y.o. male who was referred by No ref. provider found for:  Chief Complaint   Patient presents with    Squamous Cell Carcinoma     Patient is here for Follow up if primary squamous cell carcinoma of larynx and rhinitis medicamentosa   . HPI:     Valeria Johnson is a 57 y.o. male who presents today for follow up for his primary squamous cell carcinoma of larynx. He complains of a dry throat. History:     No Known Allergies  Current Outpatient Prescriptions   Medication Sig Dispense Refill    levothyroxine (SYNTHROID) 50 MCG tablet TAKE 1 TABLET BY MOUTH DAILY 30 tablet 3    metoprolol succinate (TOPROL XL) 25 MG extended release tablet Take 1 tablet by mouth 2 times daily 60 tablet 5    atorvastatin (LIPITOR) 20 MG tablet TAKE 1 TABLET BY MOUTH ONE TIME A DAY 90 tablet 2    metoprolol succinate (TOPROL XL) 50 MG extended release tablet Take 50 mg by mouth daily      aspirin EC 81 MG EC tablet Take 81 mg by mouth daily      nitroGLYCERIN (NITROSTAT) 0.3 MG SL tablet Place 1 tablet under tongue as needed for chest pain, may repeat every 5 mins. For maximum of 3 tabs in 15 mins. 30 tablet 3    omeprazole (PRILOSEC) 40 MG delayed release capsule 1 cap daily 30 capsule 6    CPAP Machine MISC by Does not apply route Please change CPAP pressure to 8 cm H20. 1 each 0    Diphenhydramine-APAP, sleep, (TYLENOL PM EXTRA STRENGTH PO) Take by mouth nightly 2 nightly      Coenzyme Q10 (CO Q-10) 200 MG CAPS Take  by mouth daily.  Multiple Vitamin (MULTI-VITAMIN) TABS Take  by mouth daily.  ARGININE PO Take 500 mg by mouth daily.  2 tab      ipratropium (ATROVENT) 0.06 % nasal spray 2 sprays by Nasal route 3 times daily 1 Bottle 3    terazosin (HYTRIN) 5 MG capsule TAKE 1 CAPSULE BY MOUTH NIGHTLY 30 capsule 5     No current facility-administered medications for this visit. Past Medical History:   Diagnosis Date    Ascending Aneurysm ([address] 02/2017    Ascending    CAD (coronary artery disease)     Chronic back pain     History of hyperbaric oxygen therapy     after bottom teeth removal     Hyperlipidemia     Hypertension     Nausea & vomiting     FOUZIA on CPAP     PONV (postoperative nausea and vomiting)     S/P CABG (coronary artery bypass graft) 2002    [facility]    Snores     Vision loss of right eye     during hyperbaric chamber oxygen therapy       Past Surgical History:   Procedure Laterality Date    CARDIAC CATHETERIZATION  8/2002, 4/2004    [facility]    COLONOSCOPY      CORONARY ANGIOPLASTY WITH STENT PLACEMENT  7/2002    [facility]    CORONARY ARTERY BYPASS GRAFT  2002    [facility]    DENTAL SURGERY      bottom teeth removal    HEMORRHOID SURGERY      KNEE SURGERY Right     LARYNGOSCOPY N/A 10/4/2017    LARYNGOSCOPY MICRO WITH BIOPSY performed by Belkys Beach MD at [address] [place] [address] MICROLARYNGOSCOPY W BIOPSY  04/28/2016    by Dr Darci Ronquillo     Family History   Problem Relation Age of Onset    Heart Disease Father         MI    High Blood Pressure Father     Cancer Maternal Grandmother     Cancer Maternal Grandfather      Social History   Substance Use Topics    Smoking status: Former Smoker     Packs/day: 2.00     Years: 20.00     Quit date: 12/5/2001    Smokeless tobacco: Never Used    Alcohol use No       Subjective:      Review of Systems   Constitutional: Negative for activity change, appetite change, chills, diaphoresis, fatigue, fever and unexpected weight change. HENT: Negative for congestion, dental problem, ear discharge, ear pain, facial swelling, hearing loss, mouth sores, nosebleeds, postnasal drip, rhinorrhea, sinus pressure, sneezing, sore throat, tinnitus, trouble swallowing and voice change. Eyes: Negative for visual disturbance.    Respiratory: Negative for apnea, cough, choking, chest tightness, shortness of breath, wheezing and stridor. Cardiovascular: Negative for chest pain, palpitations and leg swelling. Gastrointestinal: Negative for abdominal pain, diarrhea, nausea and vomiting. Endocrine: Negative for cold intolerance, heat intolerance, polydipsia and polyuria. Genitourinary: Negative for dysuria, enuresis and hematuria. Musculoskeletal: Negative for arthralgias, gait problem, neck pain and neck stiffness. Skin: Negative for color change and rash. Allergic/Immunologic: Negative for environmental allergies, food allergies and immunocompromised state. Neurological: Negative for dizziness, syncope, facial asymmetry, speech difficulty, light-headedness and headaches. Hematological: Negative for adenopathy. Does not bruise/bleed easily. Psychiatric/Behavioral: Negative for confusion and sleep disturbance. The patient is not nervous/anxious. Objective:     /70 (Site: Right Arm, Position: Sitting)   Pulse 56   Temp 97.8 °F (36.6 °C) (Oral)   Resp 12   Ht 5' 11\" (1.803 m)   Wt 186 lb 6.4 oz (84.6 kg)   BMI 26.00 kg/m²     Physical Exam     PROCEDURE: FIBEROPTIC LARYNGOSCOPY    A fiberoptic laryngoscopy was performed under topical anesthesia, after using Afrin and Lidocaine spray in the nasal fossa. The nasal fossa, nasopharynx, hypopharynx and larynx were carefully examined. Base of tongue was symmetrical. Epiglottis appeared normal and was not retrodisplaced. True vocal cords had normal mobility. There was mild diffuse erythema. No mucosal lesions or masses were noted. No pooling in the pyriform sinuses. Data:  All of the past medical history, past surgical history, family history, social history, allergies and current medications were reviewed with the patient. Assessment & Plan   Diagnoses and all orders for this visit:     Diagnosis Orders   1.  Primary squamous cell carcinoma of larynx ([facility] [address]  [place] LARYNGOSCOPY [facility]   2. Effects, radiation, subsequent encounter  [place] LARYNGOSCOPY [facility]NOST[facility]   3. Hypothyroidism due to acquired atrophy of thyroid     4. FOUZIA on CPAP     5. Asymmetrical sensorineural hearing loss         The findings were explained and his questions were answered. He is doing extremely well. I will see him back in 2 months. Jani Martin CMA ([facility]), am scribing for, and in the presence of Dr. Diana Braraza. Electronically signed by Dayna Estrella on 7/31/18 at 4:15 PM.     (Please note that portions of this note were completed with a voice recognition program. Efforts were made to edit the dictations but occasionally words are mis-transcribed.)    I agree to the above documentation placed by my scribe. I have personally evaluated this patient. Additional findings are as noted. I reviewed the scribe's note and agree with the documented findings and plan of care. Any areas of disagreement are corrected. I agree with the chief complaint, past medical history, past surgical history, allergies, medications, social and family history as documented unless otherwise noted below.      Electronically signed by Amber Kingston MD on 8/26/2018 at 4:33 PM

## 2018-08-07 DIAGNOSIS — I10 ESSENTIAL HYPERTENSION: ICD-10-CM

## 2018-08-08 RX ORDER — TERAZOSIN 5 MG/1
5 CAPSULE ORAL NIGHTLY
Qty: 30 CAPSULE | Refills: 5 | Status: SHIPPED | OUTPATIENT
Start: 2018-08-08 | End: 2018-09-05 | Stop reason: SDUPTHER

## 2018-08-23 ENCOUNTER — OFFICE VISIT (OUTPATIENT)
Dept: PULMONOLOGY | Age: 63
End: 2018-08-23
Payer: COMMERCIAL

## 2018-08-23 VITALS
SYSTOLIC BLOOD PRESSURE: 138 MMHG | WEIGHT: 186 LBS | BODY MASS INDEX: 26.04 KG/M2 | HEIGHT: 71 IN | OXYGEN SATURATION: 98 % | DIASTOLIC BLOOD PRESSURE: 70 MMHG | HEART RATE: 61 BPM

## 2018-08-23 DIAGNOSIS — R09.81 NASAL CONGESTION: ICD-10-CM

## 2018-08-23 DIAGNOSIS — G47.33 OSA ON CPAP: Primary | ICD-10-CM

## 2018-08-23 DIAGNOSIS — Z99.89 OSA ON CPAP: Primary | ICD-10-CM

## 2018-08-23 DIAGNOSIS — C32.9 PRIMARY SQUAMOUS CELL CARCINOMA OF LARYNX (HCC): ICD-10-CM

## 2018-08-23 DIAGNOSIS — J34.3 NASAL TURBINATE HYPERTROPHY: ICD-10-CM

## 2018-08-23 PROCEDURE — 99214 OFFICE O/P EST MOD 30 MIN: CPT | Performed by: PHYSICIAN ASSISTANT

## 2018-08-23 RX ORDER — IPRATROPIUM BROMIDE 42 UG/1
2 SPRAY, METERED NASAL 3 TIMES DAILY
Qty: 1 BOTTLE | Refills: 3 | Status: SHIPPED | OUTPATIENT
Start: 2018-08-23 | End: 2019-07-24 | Stop reason: SDUPTHER

## 2018-08-23 ASSESSMENT — ENCOUNTER SYMPTOMS
SINUS PAIN: 0
EYES NEGATIVE: 1
GASTROINTESTINAL NEGATIVE: 1
SORE THROAT: 0
RESPIRATORY NEGATIVE: 1
HEARTBURN: 0
NAUSEA: 0
WHEEZING: 0
ORTHOPNEA: 0
SPUTUM PRODUCTION: 0
SHORTNESS OF BREATH: 0
COUGH: 0

## 2018-08-23 NOTE — PROGRESS NOTES
[facility]      Ad Vang                                         [id number]  8/23/2018   Chief Complaint   Patient presents with    Sleep Apnea     Fouzia 6 month f/u with download        Pt of Dr. Ellie BRANNON Download:   Original or initial  AHI: 22.9     Date of initial study: 2/827/17  Weight at nowqq032 lbs   [] Compliant 20  %   [x]  Noncompliant 77 %     PAP Type CPAP  Level  8   Avg Hrs/Day 2 hr 53 min  AHI: 1.0   Recorded compliance dates ,   to 7/23/18-8/21/18  Machine/Mfg: ResMed  Interface: FFM    Provider:  [x][facility]  [][facility]  [][facility]  [][facility]         [][facility] []Other:   Neck Size: 15  Mallampati 3  ESS: 9     Here is a scan of the most recent download:          Presentation:   Jacinda Will presents for sleep medicine follow up for obstructive sleep apnea  Since the last visit, Jacinda Will is not doing reasonably well with their sleep machine. Other comments: He has trouble tolerating PAP secondary to nasal congestion. He has been on Flonase with some improvement. He has to take PAP off after about 2-3 hours so his compliance is poor. He does feel better when he gets 5+ hours of usage. He has chronic dry mouth from radiation for neck cancer. Equipment issues: The pressure is  acceptable, the mask is acceptable and he  is  using the humidity. Sleep issues:  Do you feel better? Yes  More rested? Yes   Better concentration? yes    Progress History:   Since last visit any new medical issues? No  New ER or hospitlal visits? No  Any new or changes in medicines? No  Any new sleep medicines?  No        Past Medical History:   Diagnosis Date    Ascending Aneurysm ([address] 02/2017    Ascending    CAD (coronary artery disease)     Chronic back pain     History of hyperbaric oxygen therapy     after bottom teeth removal     Hyperlipidemia     Hypertension     Nausea & vomiting     FOUZIA on CPAP     PONV (postoperative nausea and vomiting)     S/P CABG [facility]coronary artery bypass graft) 2002    [facility]    Snores     Vision loss of right eye     during hyperbaric chamber oxygen therapy        Past Surgical History:   Procedure Laterality Date    CARDIAC CATHETERIZATION  8/2002, 4/2004    [facility]    COLONOSCOPY      CORONARY ANGIOPLASTY WITH STENT PLACEMENT  7/2002    [facility]    CORONARY ARTERY BYPASS GRAFT  2002    [facility]    DENTAL SURGERY      bottom teeth removal    HEMORRHOID SURGERY      KNEE SURGERY Right     LARYNGOSCOPY N/A 10/4/2017    LARYNGOSCOPY MICRO WITH BIOPSY performed by Royal Adalberto MD at [address]  04/28/2016    by Dr Virgen Beebe       Social History   Substance Use Topics    Smoking status: Former Smoker     Packs/day: 2.00     Years: 20.00     Quit date: 12/5/2001    Smokeless tobacco: Never Used    Alcohol use No       No Known Allergies    Current Outpatient Prescriptions   Medication Sig Dispense Refill    ipratropium (ATROVENT) 0.06 % nasal spray 2 sprays by Nasal route 3 times daily 1 Bottle 3    levothyroxine (SYNTHROID) 50 MCG tablet TAKE 1 TABLET BY MOUTH DAILY 30 tablet 3    metoprolol succinate (TOPROL XL) 25 MG extended release tablet Take 1 tablet by mouth 2 times daily 60 tablet 5    atorvastatin (LIPITOR) 20 MG tablet TAKE 1 TABLET BY MOUTH ONE TIME A DAY 90 tablet 2    metoprolol succinate (TOPROL XL) 50 MG extended release tablet Take 50 mg by mouth daily      aspirin EC 81 MG EC tablet Take 81 mg by mouth daily      nitroGLYCERIN (NITROSTAT) 0.3 MG SL tablet Place 1 tablet under tongue as needed for chest pain, may repeat every 5 mins. For maximum of 3 tabs in 15 mins.  30 tablet 3    omeprazole (PRILOSEC) 40 MG delayed release capsule 1 cap daily 30 capsule 6    CPAP Machine MISC by Does not apply route Please change CPAP pressure to 8 cm H20. 1 each 0    Diphenhydramine-APAP, sleep, (TYLENOL PM EXTRA STRENGTH PO) Take by mouth nightly 2 nightly      Coenzyme Q10 (CO Q-10) 200 MG CAPS Take  by oropharyngeal exudate. Eyes: Pupils are equal, round, and reactive to light. Neck: Normal range of motion. Neck supple. Cardiovascular: Normal rate, regular rhythm and normal heart sounds. Pulmonary/Chest: Effort normal and breath sounds normal. No stridor. No respiratory distress. Abdominal: Soft. Bowel sounds are normal.   Musculoskeletal: Normal range of motion. He exhibits no edema. Neurological: He is alert and oriented to person, place, and time. Gait normal.   Skin: Skin is warm and dry. He is not diaphoretic. Psychiatric: Mood, memory, affect and judgment normal.         ASSESSMENT/DIAGNOSIS     Diagnosis Orders   1. FOUZIA on CPAP     2. Primary squamous cell carcinoma of larynx (HCC)     3. Nasal congestion     4. Nasal turbinate hypertrophy              Plan   Do you need any equipment today? No  - will add ipratropium nasal spray to Flonase  - Hopefully improving nasal congestion will improve compliance  - He  was advised to continue current positive airway pressure therapy with above described pressure. - He  advised to keep good compliance with current recommended pressure to get optimal results and clinical improvement  - Recommend 7-9 hours of sleep with PAP  - He was advised to call [employer] regarding supplies if needed. - He call my office for earlier appointment if needed for worsening of sleep symptoms.   - He was instructed on weight loss  - Quyen Renteria was educated about my impression and plan. Patient verbalizes understanding.   We will see Freddie Mcclellan back in: 3 months with download    Ledy Garcia PA-C, DARIAS  8/23/2018

## 2018-08-27 ENCOUNTER — ANESTHESIA (OUTPATIENT)
Dept: OPERATING ROOM | Age: 63
End: 2018-08-27
Payer: COMMERCIAL

## 2018-08-27 ENCOUNTER — ANESTHESIA EVENT (OUTPATIENT)
Dept: OPERATING ROOM | Age: 63
End: 2018-08-27
Payer: COMMERCIAL

## 2018-08-27 ENCOUNTER — HOSPITAL ENCOUNTER (OUTPATIENT)
Age: 63
Setting detail: OUTPATIENT SURGERY
Discharge: HOME | End: 2018-08-27
Attending: OPHTHALMOLOGY | Admitting: OPHTHALMOLOGY
Payer: COMMERCIAL

## 2018-08-27 VITALS
TEMPERATURE: 97.2 F | HEART RATE: 57 BPM | DIASTOLIC BLOOD PRESSURE: 68 MMHG | HEIGHT: 71 IN | BODY MASS INDEX: 25.9 KG/M2 | RESPIRATION RATE: 16 BRPM | SYSTOLIC BLOOD PRESSURE: 131 MMHG | OXYGEN SATURATION: 98 % | WEIGHT: 185 LBS

## 2018-08-27 VITALS
DIASTOLIC BLOOD PRESSURE: 56 MMHG | OXYGEN SATURATION: 100 % | SYSTOLIC BLOOD PRESSURE: 140 MMHG | RESPIRATION RATE: 12 BRPM

## 2018-08-27 PROBLEM — H43.391 VITREOUS OPACITIES OF RIGHT EYE: Chronic | Status: RESOLVED | Noted: 2018-08-27 | Resolved: 2018-08-27

## 2018-08-27 PROBLEM — H43.391 VITREOUS OPACITIES OF RIGHT EYE: Chronic | Status: ACTIVE | Noted: 2018-08-27

## 2018-08-27 LAB
GFR NON-AFRICAN AMERICAN: >60 ML/MIN
GFR SERPL CREATININE-BSD FRML MDRD: >60 ML/MIN
GFR SERPL CREATININE-BSD FRML MDRD: NORMAL ML/MIN/{1.73_M2}
GLUCOSE BLD-MCNC: 100 MG/DL (ref 74–100)
POC CREATININE: 0.94 MG/DL (ref 0.51–1.19)

## 2018-08-27 PROCEDURE — 82565 ASSAY OF CREATININE: CPT

## 2018-08-27 PROCEDURE — 6360000002 HC RX W HCPCS: Performed by: OPHTHALMOLOGY

## 2018-08-27 PROCEDURE — 6360000002 HC RX W HCPCS: Performed by: NURSE ANESTHETIST, CERTIFIED REGISTERED

## 2018-08-27 PROCEDURE — 7100000041 HC SPAR PHASE II RECOVERY - ADDTL 15 MIN: Performed by: OPHTHALMOLOGY

## 2018-08-27 PROCEDURE — 3600000014 HC SURGERY LEVEL 4 ADDTL 15MIN: Performed by: OPHTHALMOLOGY

## 2018-08-27 PROCEDURE — 6370000000 HC RX 637 (ALT 250 FOR IP): Performed by: OPHTHALMOLOGY

## 2018-08-27 PROCEDURE — 3700000001 HC ADD 15 MINUTES (ANESTHESIA): Performed by: OPHTHALMOLOGY

## 2018-08-27 PROCEDURE — 2500000003 HC RX 250 WO HCPCS: Performed by: OPHTHALMOLOGY

## 2018-08-27 PROCEDURE — 3600000004 HC SURGERY LEVEL 4 BASE: Performed by: OPHTHALMOLOGY

## 2018-08-27 PROCEDURE — 82947 ASSAY GLUCOSE BLOOD QUANT: CPT

## 2018-08-27 PROCEDURE — 2709999900 HC NON-CHARGEABLE SUPPLY: Performed by: OPHTHALMOLOGY

## 2018-08-27 PROCEDURE — 2580000003 HC RX 258: Performed by: OPHTHALMOLOGY

## 2018-08-27 PROCEDURE — 7100000040 HC SPAR PHASE II RECOVERY - FIRST 15 MIN: Performed by: OPHTHALMOLOGY

## 2018-08-27 PROCEDURE — 3700000000 HC ANESTHESIA ATTENDED CARE: Performed by: OPHTHALMOLOGY

## 2018-08-27 PROCEDURE — 2580000003 HC RX 258: Performed by: NURSE ANESTHETIST, CERTIFIED REGISTERED

## 2018-08-27 RX ORDER — PHENYLEPHRINE HYDROCHLORIDE 100 MG/ML
1 SOLUTION/ DROPS OPHTHALMIC EVERY 5 MIN PRN
Status: COMPLETED | OUTPATIENT
Start: 2018-08-27 | End: 2018-08-27

## 2018-08-27 RX ORDER — BUPIVACAINE HYDROCHLORIDE 7.5 MG/ML
INJECTION, SOLUTION EPIDURAL; RETROBULBAR PRN
Status: DISCONTINUED | OUTPATIENT
Start: 2018-08-27 | End: 2018-08-27 | Stop reason: HOSPADM

## 2018-08-27 RX ORDER — CEFTAZIDIME 1 G/1
INJECTION, POWDER, FOR SOLUTION INTRAMUSCULAR; INTRAVENOUS PRN
Status: DISCONTINUED | OUTPATIENT
Start: 2018-08-27 | End: 2018-08-27 | Stop reason: HOSPADM

## 2018-08-27 RX ORDER — TOBRAMYCIN AND DEXAMETHASONE 3; 1 MG/ML; MG/ML
1 SUSPENSION/ DROPS OPHTHALMIC
Status: COMPLETED | OUTPATIENT
Start: 2018-08-27 | End: 2018-08-27

## 2018-08-27 RX ORDER — PROPOFOL 10 MG/ML
INJECTION, EMULSION INTRAVENOUS PRN
Status: DISCONTINUED | OUTPATIENT
Start: 2018-08-27 | End: 2018-08-27 | Stop reason: SDUPTHER

## 2018-08-27 RX ORDER — SODIUM CHLORIDE 0.9 % (FLUSH) 0.9 %
SYRINGE (ML) INJECTION PRN
Status: DISCONTINUED | OUTPATIENT
Start: 2018-08-27 | End: 2018-08-27 | Stop reason: HOSPADM

## 2018-08-27 RX ORDER — FENTANYL CITRATE 50 UG/ML
INJECTION, SOLUTION INTRAMUSCULAR; INTRAVENOUS PRN
Status: DISCONTINUED | OUTPATIENT
Start: 2018-08-27 | End: 2018-08-27 | Stop reason: SDUPTHER

## 2018-08-27 RX ORDER — TROPICAMIDE 10 MG/ML
1 SOLUTION/ DROPS OPHTHALMIC EVERY 5 MIN PRN
Status: COMPLETED | OUTPATIENT
Start: 2018-08-27 | End: 2018-08-27

## 2018-08-27 RX ORDER — SODIUM CHLORIDE, SODIUM LACTATE, POTASSIUM CHLORIDE, CALCIUM CHLORIDE 600; 310; 30; 20 MG/100ML; MG/100ML; MG/100ML; MG/100ML
INJECTION, SOLUTION INTRAVENOUS CONTINUOUS PRN
Status: DISCONTINUED | OUTPATIENT
Start: 2018-08-27 | End: 2018-08-27 | Stop reason: SDUPTHER

## 2018-08-27 RX ORDER — SODIUM CHLORIDE, SODIUM LACTATE, POTASSIUM CHLORIDE, CALCIUM CHLORIDE 600; 310; 30; 20 MG/100ML; MG/100ML; MG/100ML; MG/100ML
INJECTION, SOLUTION INTRAVENOUS CONTINUOUS
Status: DISCONTINUED | OUTPATIENT
Start: 2018-08-27 | End: 2018-08-27 | Stop reason: HOSPADM

## 2018-08-27 RX ORDER — MIDAZOLAM HYDROCHLORIDE 1 MG/ML
INJECTION INTRAMUSCULAR; INTRAVENOUS PRN
Status: DISCONTINUED | OUTPATIENT
Start: 2018-08-27 | End: 2018-08-27 | Stop reason: SDUPTHER

## 2018-08-27 RX ORDER — RANITIDINE 150 MG/1
150 TABLET ORAL 2 TIMES DAILY
Refills: 2 | COMMUNITY
Start: 2018-08-21 | End: 2018-10-18 | Stop reason: ALTCHOICE

## 2018-08-27 RX ORDER — ERYTHROMYCIN 5 MG/G
OINTMENT OPHTHALMIC PRN
Status: DISCONTINUED | OUTPATIENT
Start: 2018-08-27 | End: 2018-08-27 | Stop reason: HOSPADM

## 2018-08-27 RX ORDER — LIDOCAINE HYDROCHLORIDE 20 MG/ML
INJECTION, SOLUTION INFILTRATION; PERINEURAL PRN
Status: DISCONTINUED | OUTPATIENT
Start: 2018-08-27 | End: 2018-08-27 | Stop reason: HOSPADM

## 2018-08-27 RX ADMIN — TOBRAMYCIN AND DEXAMETHASONE 1 DROP: 3; 1 SUSPENSION/ DROPS OPHTHALMIC at 09:29

## 2018-08-27 RX ADMIN — SODIUM CHLORIDE, POTASSIUM CHLORIDE, SODIUM LACTATE AND CALCIUM CHLORIDE: 600; 310; 30; 20 INJECTION, SOLUTION INTRAVENOUS at 09:14

## 2018-08-27 RX ADMIN — SODIUM CHLORIDE, POTASSIUM CHLORIDE, SODIUM LACTATE AND CALCIUM CHLORIDE: 600; 310; 30; 20 INJECTION, SOLUTION INTRAVENOUS at 10:25

## 2018-08-27 RX ADMIN — PHENYLEPHRINE HYDROCHLORIDE 1 DROP: 100 SOLUTION/ DROPS OPHTHALMIC at 09:22

## 2018-08-27 RX ADMIN — PHENYLEPHRINE HYDROCHLORIDE 1 DROP: 100 SOLUTION/ DROPS OPHTHALMIC at 09:26

## 2018-08-27 RX ADMIN — PHENYLEPHRINE HYDROCHLORIDE 1 DROP: 100 SOLUTION/ DROPS OPHTHALMIC at 09:17

## 2018-08-27 RX ADMIN — PROPOFOL 40 MG: 10 INJECTION, EMULSION INTRAVENOUS at 10:32

## 2018-08-27 RX ADMIN — TROPICAMIDE 1 DROP: 10 SOLUTION/ DROPS OPHTHALMIC at 09:16

## 2018-08-27 RX ADMIN — TROPICAMIDE 1 DROP: 10 SOLUTION/ DROPS OPHTHALMIC at 09:26

## 2018-08-27 RX ADMIN — FENTANYL CITRATE 50 MCG: 50 INJECTION, SOLUTION INTRAMUSCULAR; INTRAVENOUS at 10:30

## 2018-08-27 RX ADMIN — MIDAZOLAM HYDROCHLORIDE 1 MG: 1 INJECTION, SOLUTION INTRAMUSCULAR; INTRAVENOUS at 10:28

## 2018-08-27 RX ADMIN — TROPICAMIDE 1 DROP: 10 SOLUTION/ DROPS OPHTHALMIC at 09:22

## 2018-08-27 RX ADMIN — FENTANYL CITRATE 50 MCG: 50 INJECTION, SOLUTION INTRAMUSCULAR; INTRAVENOUS at 10:28

## 2018-08-27 ASSESSMENT — PULMONARY FUNCTION TESTS
PIF_VALUE: 0
PIF_VALUE: 1
PIF_VALUE: 0

## 2018-08-27 ASSESSMENT — PAIN SCALES - GENERAL
PAINLEVEL_OUTOF10: 0

## 2018-08-27 ASSESSMENT — LIFESTYLE VARIABLES: SMOKING_STATUS: 0

## 2018-08-27 ASSESSMENT — PAIN - FUNCTIONAL ASSESSMENT: PAIN_FUNCTIONAL_ASSESSMENT: 0-10

## 2018-08-27 NOTE — H&P
1 tablet under tongue as needed for chest pain, may repeat every 5 mins. For maximum of 3 tabs in 15 mins. 17   Baldev George MD   CPAP Machine MISC by Does not apply route Please change CPAP pressure to 8 cm H20. 17   Sybil Silva PA-C     Past Medical History    has a past medical history of Ascending Aneurysm ([facility] [address] Brown's esophagus; CAD (coronary artery disease); Cancer ([facility] [address] Chronic back pain; GERD (gastroesophageal reflux disease); Hepatitis C; History of hyperbaric oxygen therapy; Hyperlipidemia; Hypertension; Left shoulder pain; Nausea & vomiting; FOUZIA on CPAP; Osteoradionecrosis of mandible; PONV (postoperative nausea and vomiting); S/P CABG (coronary artery bypass graft); Snores; Thyroid disease; Vision loss of right eye; Vitreous opacities; and Wears dentures. Past Surgical History   has a past surgical history that includes Coronary artery bypass graft (); knee surgery (Right); Hemorrhoid surgery; Colonoscopy; Cardiac catheterization (2002, 2004); Coronary angioplasty with stent (2002); MICROLARYNGOSCOPY W BIOPSY (2016); laryngoscopy (N/A, 10/4/2017); and Dental surgery. Social History   reports that he quit smoking about 16 years ago. He has a 40.00 pack-year smoking history. He has never used smokeless tobacco.   reports that he does not drink alcohol. reports that he does not use drugs. Marital Status   Children 3  Occupation sells farm machinery   Family History  Family Status   Relation Status    Father Alive    Mother Alive    [place]     MGF      family history includes Cancer in his maternal grandfather and maternal grandmother; Heart Disease in his father; High Blood Pressure in his father. OBJECTIVE:   VITALS:  height is 5' 11\" (1.803 m) and weight is 185 lb (83.9 kg). His temporal temperature is 97.9 °F (36.6 °C). His blood pressure is 176/84 (abnormal) and his pulse is 57. His respiration is 16 and oxygen saturation is 100%. CONSTITUTIONAL:alert & orientated x 3, no acute distress. Friendly. SKIN:  Warm and dry, no rashes   HEAD:  Normocephalic, atraumatic   EYES: PERRLA. EOMI  Wearing glasses  EARS:  Hearing grossly WNL. NOSE:  Nares patent. No rhinorrhea   MOUTH/THROAT:  Benign, full upper dentures, no bottom teeth  NECK:supple, no lymphadenopathy  LUNGS: Respirations even and non-labored. Clear to auscultation bilaterally, no wheezes, rales, or rhonchi. CARDIOVASCULAR: Regular rate and rhythm. ABDOMEN: soft, non tender, non distended, no masses or organomegaly   EXTREMITIES: no edema bilateral lower extremities. Peripheral pulses are intact. IMPRESSIONS:   1. Vitreous opacities       Diagnosis Date    Ascending Aneurysm ([facility] [address] 02/2017    Ascending, 4.2 cm per pt    Brown's esophagus     CAD (coronary artery disease)     Cancer (HCC)     throat cancer, s/p radiation    Chronic back pain     GERD (gastroesophageal reflux disease)     Hepatitis C     as child    History of hyperbaric oxygen therapy     after bottom teeth removed after rdiation therapy    Hyperlipidemia     Hypertension     Left shoulder pain     nerve pain , ? 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    PLANS:   1. Vitrectomy- OD    NAV  MCCALL CNP  Electronically signed 8/27/2018 at 9:20 AM

## 2018-08-27 NOTE — ANESTHESIA PRE PROCEDURE
Department of Anesthesiology  Preprocedure Note       Name:  Kecia Rizo   Age:  57 y.o.  :  1955                                          MRN:  [id number]         Date:  2018      Surgeon: Fabiana Govea):  Ricky Brady MD    Procedure: Procedure(s):  VITRECTOMY 25 GAUGE    Medications prior to admission:   Prior to Admission medications    Medication Sig Start Date End Date Taking? Authorizing Provider   ranitidine (ZANTAC) 150 MG tablet Take 150 mg by mouth 2 times daily  18  Yes Historical Provider, MD   ipratropium (ATROVENT) 0.06 % nasal spray 2 sprays by Nasal route 3 times daily 18 Yes Alise Neri PA-C   terazosin (HYTRIN) 5 MG capsule TAKE 1 CAPSULE BY MOUTH NIGHTLY 18  Yes Manuel Tony MD   levothyroxine (SYNTHROID) 50 MCG tablet TAKE 1 TABLET BY MOUTH DAILY 18  Yes DEA Yeboah - CNP   metoprolol succinate (TOPROL XL) 25 MG extended release tablet Take 1 tablet by mouth 2 times daily 3/27/18  Yes Manuel Tony MD   atorvastatin (LIPITOR) 20 MG tablet TAKE 1 TABLET BY MOUTH ONE TIME A DAY 3/24/18  Yes Manuel Tony MD   aspirin EC 81 MG EC tablet Take 81 mg by mouth daily   Yes Historical Provider, MD   Diphenhydramine-APAP, sleep, (TYLENOL PM EXTRA STRENGTH PO) Take by mouth nightly 2 nightly   Yes Historical Provider, MD   Coenzyme Q10 (CO Q-10) 200 MG CAPS Take  by mouth daily. Yes Historical Provider, MD   Multiple Vitamin (MULTI-VITAMIN) TABS Take  by mouth daily. Yes Historical Provider, MD   ARGININE PO Take 500 mg by mouth daily. 2 tab   Yes Historical Provider, MD   nitroGLYCERIN (NITROSTAT) 0.3 MG SL tablet Place 1 tablet under tongue as needed for chest pain, may repeat every 5 mins. For maximum of 3 tabs in 15 mins.  17   Baldev George MD   CPAP Machine MISC by Does not apply route Please change CPAP pressure to 8 cm H20. 17   Alise Neri PA-C       Current medications:    Current Facility-Administered Medications   Medication Dose Route Frequency Provider Last Rate Last Dose    tobramycin-dexamethasone (TOBRADEX) ophthalmic suspension 1 drop  1 drop Right Eye On Call to [address], MD        tropicamide (MYDRIACYL) 1 % ophthalmic solution 1 drop  1 drop Right Eye Q5 Min PRN Felipa Hoang MD        phenylephrine (FAUSTINA-SYNEPHRINE) 10 % ophthalmic solution 1 drop  1 drop Right Eye Q5 Min PRN Felipa Hoang MD        lactated ringers infusion   Intravenous Continuous Felipa Hoang MD           Allergies:  No Known Allergies    Problem List:    Patient Active Problem List   Diagnosis Code    Hypertension I10    Hyperlipidemia E78.5    CAD (coronary artery disease) I25.10    S/P CABG (coronary artery bypass graft) Z95.1    Chronic back pain M54.9, G89.29    Chronic total occlusion of coronary artery I25.82    Referred otalgia of right ear H92.01    Tinnitus H93.19    Asymmetrical sensorineural hearing loss H90.5    Primary squamous cell carcinoma of larynx (HCC) C32.9    FOUZIA on CPAP G47.33, Z99.89    Osteoradionecrosis of mandible M27.2    Late effect of radiation T66. XXXS    Aneurysm of ascending aorta (HCC) I71.2       Past Medical History:        Diagnosis Date    Ascending Aneurysm ([facility] [address] 02/2017    Ascending, 4.2 cm per pt    CAD (coronary artery disease)     Cancer (HCC)     throat cancer, s/p radiation    Chronic back pain     Hepatitis C     as child    History of hyperbaric oxygen therapy     after bottom teeth removed after rdiation therapy    Hyperlipidemia     Hypertension     Left shoulder pain     nerve pain , ? secondary to radiation, has had \" numbing shot \"    Nausea & vomiting     FOUZIA on CPAP     PONV (postoperative nausea and vomiting)     only after OHS    S/P [facility]coronary artery bypass graft) 2002    [facility]    Snores     Thyroid disease     Vision loss of right eye     during hyperbaric chamber oxygen therapy        Past Surgical History: pain nerve pain , ? secondary to radiation, has had \" numbing shot \" Hepatitis C as child

## 2018-09-05 DIAGNOSIS — I10 ESSENTIAL HYPERTENSION: ICD-10-CM

## 2018-09-07 RX ORDER — METOPROLOL SUCCINATE 25 MG/1
25 TABLET, EXTENDED RELEASE ORAL 2 TIMES DAILY
Qty: 180 TABLET | Refills: 1 | Status: SHIPPED | OUTPATIENT
Start: 2018-09-07 | End: 2019-03-12 | Stop reason: SDUPTHER

## 2018-09-07 RX ORDER — TERAZOSIN 5 MG/1
5 CAPSULE ORAL NIGHTLY
Qty: 90 CAPSULE | Refills: 1 | Status: SHIPPED | OUTPATIENT
Start: 2018-09-07 | End: 2019-03-12 | Stop reason: SDUPTHER

## 2018-09-17 RX ORDER — METHYLPREDNISOLONE ACETATE 80 MG/ML
80 INJECTION, SUSPENSION INTRA-ARTICULAR; INTRALESIONAL; INTRAMUSCULAR; SOFT TISSUE ONCE
Status: CANCELLED | OUTPATIENT
Start: 2018-09-17 | End: 2018-09-17

## 2018-09-18 ENCOUNTER — HOSPITAL ENCOUNTER (OUTPATIENT)
Dept: INTERVENTIONAL RADIOLOGY/VASCULAR | Age: 63
Discharge: HOME | End: 2018-09-18
Payer: MEDICARE

## 2018-09-18 VITALS
BODY MASS INDEX: 26.36 KG/M2 | HEART RATE: 60 BPM | TEMPERATURE: 98.3 F | SYSTOLIC BLOOD PRESSURE: 145 MMHG | WEIGHT: 189 LBS | OXYGEN SATURATION: 99 % | DIASTOLIC BLOOD PRESSURE: 92 MMHG | RESPIRATION RATE: 16 BRPM

## 2018-09-18 PROCEDURE — 6360000004 HC RX CONTRAST MEDICATION: Performed by: RADIOLOGY

## 2018-09-18 PROCEDURE — 2500000003 HC RX 250 WO HCPCS

## 2018-09-18 PROCEDURE — 6360000002 HC RX W HCPCS

## 2018-09-18 PROCEDURE — 2580000003 HC RX 258

## 2018-09-18 PROCEDURE — 2709999900 HC NON-CHARGEABLE SUPPLY

## 2018-09-18 PROCEDURE — 64479 NJX AA&/STRD TFRM EPI C/T 1: CPT | Performed by: RADIOLOGY

## 2018-09-18 RX ORDER — METHYLPREDNISOLONE ACETATE 80 MG/ML
80 INJECTION, SUSPENSION INTRA-ARTICULAR; INTRALESIONAL; INTRAMUSCULAR; SOFT TISSUE ONCE
Status: COMPLETED | OUTPATIENT
Start: 2018-09-18 | End: 2018-09-18

## 2018-09-18 RX ADMIN — Medication 0.5 ML: at 14:10

## 2018-09-18 RX ADMIN — METHYLPREDNISOLONE ACETATE 80 MG: 80 INJECTION, SUSPENSION INTRA-ARTICULAR; INTRALESIONAL; INTRAMUSCULAR; SOFT TISSUE at 14:10

## 2018-09-18 RX ADMIN — IOHEXOL 1 ML: 180 INJECTION INTRAVENOUS at 14:10

## 2018-09-18 ASSESSMENT — PAIN - FUNCTIONAL ASSESSMENT: PAIN_FUNCTIONAL_ASSESSMENT: 0-10

## 2018-09-18 ASSESSMENT — PAIN SCALES - GENERAL: PAINLEVEL_OUTOF10: 0

## 2018-09-18 NOTE — PROGRESS NOTES
1355 Patient received in IR for cervical epidural injection. Left shoulder and arm pain and numbness. 1359 This procedure has been fully reviewed with the patient and written informed consent has been obtained. 1408 Procedure started with Dr. Kelly Saunders. 1414 Procedure completed; patient tolerated well. Band aid to neck; no bleeding noted  1416 Patient on cart; comfort ensured. 1418 Patient taken to OPN via cart.

## 2018-09-18 NOTE — PROGRESS NOTES
1305 Patient arrived to OPN for SNRB with wife at side. Patient stated has numbness and tingling left arm and hand shoulder pain. 1424 Patient received from IR post SNRB. Band aid to neck dry and intact. 1445 Band aid dry and intact. Patient denies complaints of pain  1453 Discharge instructions reviewed with patient and wife verbalized understanding.   1503 Patient discharged to home in stable condition    _m___ Safety:       (Environmental)  [place] to environment   Ensure ID band is correct and in place/ allergy band as needed   Assess for fall risk   Initiate fall precautions as applicable (fall band, side rails, etc.)   Call light within reach   Bed in low position/ wheels locked    __m__ Pain:        Assess pain level and characteristics   Administer analgesics as ordered   Assess effectiveness of pain management and report to MD as needed    __m__ Knowledge Deficit:   Assess baseline knowledge   Provide teaching at level of understanding   Provide teaching via preferred learning method   Evaluate teaching effectiveness    _m___ Hemodynamic/Respiratory Status:       (Pre and Post Procedure Monitoring)   Assess/Monitor vital signs and LOC   Assess Baseline SpO2 prior to any sedation   Obtain weight/height   Assess vital signs/ LOC until patient meets discharge criteria   Monitor procedure site and notify MD of any issues

## 2018-09-18 NOTE — PROGRESS NOTES
Formulation and discussion of sedation / procedure plans, risks, benefits, side effects and alternatives with patient and/or responsible adult completed.     Electronically signed by Evelyne Sanders MD on 9/18/2018 at 2:15 PM

## 2018-10-18 ENCOUNTER — OFFICE VISIT (OUTPATIENT)
Dept: ENT CLINIC | Age: 63
End: 2018-10-18
Payer: MEDICARE

## 2018-10-18 VITALS
HEART RATE: 60 BPM | WEIGHT: 186.5 LBS | RESPIRATION RATE: 14 BRPM | SYSTOLIC BLOOD PRESSURE: 118 MMHG | DIASTOLIC BLOOD PRESSURE: 76 MMHG | HEIGHT: 71 IN | TEMPERATURE: 98.2 F | BODY MASS INDEX: 26.11 KG/M2

## 2018-10-18 DIAGNOSIS — C32.9 PRIMARY SQUAMOUS CELL CARCINOMA OF LARYNX (HCC): Primary | ICD-10-CM

## 2018-10-18 DIAGNOSIS — Z99.89 OSA ON CPAP: ICD-10-CM

## 2018-10-18 DIAGNOSIS — G47.33 OSA ON CPAP: ICD-10-CM

## 2018-10-18 DIAGNOSIS — J38.4 EDEMA OF LARYNX: ICD-10-CM

## 2018-10-18 DIAGNOSIS — E03.4 HYPOTHYROIDISM DUE TO ACQUIRED ATROPHY OF THYROID: ICD-10-CM

## 2018-10-18 DIAGNOSIS — J31.0 RHINITIS MEDICAMENTOSA: ICD-10-CM

## 2018-10-18 DIAGNOSIS — T48.5X5A RHINITIS MEDICAMENTOSA: ICD-10-CM

## 2018-10-18 DIAGNOSIS — H90.3 ASYMMETRICAL SENSORINEURAL HEARING LOSS: ICD-10-CM

## 2018-10-18 DIAGNOSIS — T66.XXXD EFFECTS, RADIATION, SUBSEQUENT ENCOUNTER: ICD-10-CM

## 2018-10-18 DIAGNOSIS — K22.70 BARRETT'S ESOPHAGUS WITHOUT DYSPLASIA: ICD-10-CM

## 2018-10-18 PROCEDURE — 1101F PT FALLS ASSESS-DOCD LE1/YR: CPT | Performed by: OTOLARYNGOLOGY

## 2018-10-18 PROCEDURE — G8484 FLU IMMUNIZE NO ADMIN: HCPCS | Performed by: OTOLARYNGOLOGY

## 2018-10-18 PROCEDURE — 4040F PNEUMOC VAC/ADMIN/RCVD: CPT | Performed by: OTOLARYNGOLOGY

## 2018-10-18 PROCEDURE — 3017F COLORECTAL CA SCREEN DOC REV: CPT | Performed by: OTOLARYNGOLOGY

## 2018-10-18 PROCEDURE — 31575 DIAGNOSTIC LARYNGOSCOPY: CPT | Performed by: OTOLARYNGOLOGY

## 2018-10-18 PROCEDURE — G8417 CALC BMI ABV UP PARAM F/U: HCPCS | Performed by: OTOLARYNGOLOGY

## 2018-10-18 PROCEDURE — 1036F TOBACCO NON-USER: CPT | Performed by: OTOLARYNGOLOGY

## 2018-10-18 PROCEDURE — G8598 ASA/ANTIPLAT THER USED: HCPCS | Performed by: OTOLARYNGOLOGY

## 2018-10-18 PROCEDURE — G8427 DOCREV CUR MEDS BY ELIG CLIN: HCPCS | Performed by: OTOLARYNGOLOGY

## 2018-10-18 PROCEDURE — 1123F ACP DISCUSS/DSCN MKR DOCD: CPT | Performed by: OTOLARYNGOLOGY

## 2018-10-18 PROCEDURE — 99213 OFFICE O/P EST LOW 20 MIN: CPT | Performed by: OTOLARYNGOLOGY

## 2018-10-18 RX ORDER — OMEPRAZOLE 20 MG/1
20 CAPSULE, DELAYED RELEASE ORAL DAILY
COMMUNITY

## 2018-10-18 ASSESSMENT — ENCOUNTER SYMPTOMS
PHOTOPHOBIA: 0
RECTAL PAIN: 0
VOMITING: 0
COLOR CHANGE: 0
CONSTIPATION: 0
ANAL BLEEDING: 0
DIARRHEA: 0
EYE PAIN: 0
APNEA: 0
COUGH: 0
SINUS PRESSURE: 0
ABDOMINAL PAIN: 0
FACIAL SWELLING: 0
EYE REDNESS: 0
SHORTNESS OF BREATH: 0
EYE DISCHARGE: 0
CHOKING: 0
WHEEZING: 0
EYE ITCHING: 0
VOICE CHANGE: 0
BACK PAIN: 0
SORE THROAT: 0
NAUSEA: 0
RHINORRHEA: 0
TROUBLE SWALLOWING: 0
BLOOD IN STOOL: 0
ABDOMINAL DISTENTION: 0
STRIDOR: 0
CHEST TIGHTNESS: 0

## 2018-11-08 ENCOUNTER — HOSPITAL ENCOUNTER (OUTPATIENT)
Dept: RADIATION ONCOLOGY | Age: 63
Discharge: HOME | End: 2018-11-08
Payer: MEDICARE

## 2018-11-08 PROCEDURE — 99211 OFF/OP EST MAY X REQ PHY/QHP: CPT | Performed by: RADIOLOGY

## 2018-12-04 ENCOUNTER — OFFICE VISIT (OUTPATIENT)
Dept: PULMONOLOGY | Age: 63
End: 2018-12-04
Payer: MEDICARE

## 2018-12-04 VITALS
OXYGEN SATURATION: 97 % | WEIGHT: 191.8 LBS | BODY MASS INDEX: 26.85 KG/M2 | DIASTOLIC BLOOD PRESSURE: 78 MMHG | SYSTOLIC BLOOD PRESSURE: 138 MMHG | HEART RATE: 88 BPM | HEIGHT: 71 IN

## 2018-12-04 DIAGNOSIS — R09.81 NASAL CONGESTION: ICD-10-CM

## 2018-12-04 DIAGNOSIS — G47.33 OSA ON CPAP: Primary | ICD-10-CM

## 2018-12-04 DIAGNOSIS — Z99.89 OSA ON CPAP: Primary | ICD-10-CM

## 2018-12-04 DIAGNOSIS — C32.9 PRIMARY SQUAMOUS CELL CARCINOMA OF LARYNX (HCC): ICD-10-CM

## 2018-12-04 PROCEDURE — G8427 DOCREV CUR MEDS BY ELIG CLIN: HCPCS | Performed by: PHYSICIAN ASSISTANT

## 2018-12-04 PROCEDURE — 99213 OFFICE O/P EST LOW 20 MIN: CPT | Performed by: PHYSICIAN ASSISTANT

## 2018-12-04 PROCEDURE — G8417 CALC BMI ABV UP PARAM F/U: HCPCS | Performed by: PHYSICIAN ASSISTANT

## 2018-12-04 PROCEDURE — 1101F PT FALLS ASSESS-DOCD LE1/YR: CPT | Performed by: PHYSICIAN ASSISTANT

## 2018-12-04 PROCEDURE — 4040F PNEUMOC VAC/ADMIN/RCVD: CPT | Performed by: PHYSICIAN ASSISTANT

## 2018-12-04 PROCEDURE — 1123F ACP DISCUSS/DSCN MKR DOCD: CPT | Performed by: PHYSICIAN ASSISTANT

## 2018-12-04 PROCEDURE — 3017F COLORECTAL CA SCREEN DOC REV: CPT | Performed by: PHYSICIAN ASSISTANT

## 2018-12-04 PROCEDURE — 1036F TOBACCO NON-USER: CPT | Performed by: PHYSICIAN ASSISTANT

## 2018-12-04 PROCEDURE — G8598 ASA/ANTIPLAT THER USED: HCPCS | Performed by: PHYSICIAN ASSISTANT

## 2018-12-04 PROCEDURE — G8484 FLU IMMUNIZE NO ADMIN: HCPCS | Performed by: PHYSICIAN ASSISTANT

## 2018-12-04 ASSESSMENT — ENCOUNTER SYMPTOMS
EYES NEGATIVE: 1
DIARRHEA: 0
BACK PAIN: 0
CHEST TIGHTNESS: 0
STRIDOR: 0
ALLERGIC/IMMUNOLOGIC NEGATIVE: 1
NAUSEA: 0
COUGH: 0
WHEEZING: 0
SHORTNESS OF BREATH: 0

## 2018-12-04 NOTE — PROGRESS NOTES
Hypertension     Left shoulder pain     nerve pain , ? secondary to radiation, has had \" numbing shot \"    Nausea & vomiting     FOUZIA on CPAP     Osteoradionecrosis of mandible     s/p radiation for throat cancer    PONV (postoperative nausea and vomiting)     only after OHS    S/P CABG (coronary artery bypass graft) 2002    [facility]    Snores     Thyroid disease     Vision loss of right eye     during hyperbaric chamber oxygen therapy     Vitreous opacities     right    Wears dentures     full upper, no bottom teeth and no use of dentures on bottom       Past Surgical History:   Procedure Laterality Date    CARDIAC CATHETERIZATION  8/2002, 4/2004    [facility]    COLONOSCOPY      CORONARY ANGIOPLASTY WITH STENT PLACEMENT  7/2002    [facility]    CORONARY ARTERY BYPASS GRAFT  2002    [facility]    DENTAL SURGERY      bottom teeth removal    HEMORRHOID SURGERY      KNEE SURGERY Right     LARYNGOSCOPY N/A 10/4/2017    L[facility]NGOS[facility] MICRO WITH [facility]SY [facility]  04/28/2016    by Dr Lia Parmar VITREOUS,SUBRET/CHOROID FLUID Right 8/27/2018    VITRECTOMY 22 GAUGE performed by Karlo Sanderson MD at [address] VITRECTOMY Right 08/27/2018     VITRECTOMY 25 GAUGE (Right Eye)       Social History   Substance Use Topics    Smoking status: Former Smoker     Packs/day: 2.00     Years: 20.00     Quit date: 12/5/2001    Smokeless tobacco: Never Used    Alcohol use No       No Known Allergies    Current Outpatient Prescriptions   Medication Sig Dispense Refill    omeprazole (PRILOSEC) 20 MG delayed release capsule Take 20 mg by mouth 2 times daily      metoprolol succinate (TOPROL XL) 25 MG extended release tablet Take 1 tablet by mouth 2 times daily 180 tablet 1    terazosin (HYTRIN) 5 MG capsule Take 1 capsule by mouth nightly 90 capsule 1    ipratropium (ATROVENT) 0.06 % nasal spray 2 sprays by Nasal route 3 times daily 1 Bottle 3    levothyroxine will see Vincent Burnham back in: 5 months with download    Pura Llanes PA-C, MPAS  12/4/2018

## 2018-12-11 ENCOUNTER — HOSPITAL ENCOUNTER (OUTPATIENT)
Dept: GENERAL RADIOLOGY | Age: 63
Discharge: HOME | End: 2018-12-11
Payer: MEDICARE

## 2018-12-11 ENCOUNTER — HOSPITAL ENCOUNTER (OUTPATIENT)
Age: 63
Discharge: HOME | End: 2018-12-11
Payer: MEDICARE

## 2018-12-11 DIAGNOSIS — E78.5 HYPERLIPIDEMIA, UNSPECIFIED HYPERLIPIDEMIA TYPE: ICD-10-CM

## 2018-12-11 DIAGNOSIS — I10 ESSENTIAL HYPERTENSION: ICD-10-CM

## 2018-12-11 DIAGNOSIS — C32.9 PRIMARY SQUAMOUS CELL CARCINOMA OF LARYNX (HCC): ICD-10-CM

## 2018-12-11 LAB
ALT SERPL-CCNC: 21 U/L (ref 11–66)
CHOLESTEROL, TOTAL: 153 MG/DL (ref 100–199)
HDLC SERPL-MCNC: 45 MG/DL
LDL CHOLESTEROL CALCULATED: 74 MG/DL
TRIGL SERPL-MCNC: 170 MG/DL (ref 0–199)
TSH SERPL DL<=0.05 MIU/L-ACNC: 1.31 UIU/ML (ref 0.4–4.2)

## 2018-12-11 PROCEDURE — 80061 LIPID PANEL: CPT

## 2018-12-11 PROCEDURE — 84460 ALANINE AMINO (ALT) (SGPT): CPT

## 2018-12-11 PROCEDURE — 36415 COLL VENOUS BLD VENIPUNCTURE: CPT

## 2018-12-11 PROCEDURE — 71046 X-RAY EXAM CHEST 2 VIEWS: CPT

## 2018-12-11 PROCEDURE — 84443 ASSAY THYROID STIM HORMONE: CPT

## 2018-12-19 ENCOUNTER — TELEPHONE (OUTPATIENT)
Dept: INTERNAL MEDICINE CLINIC | Age: 63
End: 2018-12-19

## 2018-12-19 ENCOUNTER — OFFICE VISIT (OUTPATIENT)
Dept: INTERNAL MEDICINE CLINIC | Age: 63
End: 2018-12-19
Payer: MEDICARE

## 2018-12-19 VITALS
WEIGHT: 191 LBS | BODY MASS INDEX: 26.74 KG/M2 | DIASTOLIC BLOOD PRESSURE: 70 MMHG | SYSTOLIC BLOOD PRESSURE: 128 MMHG | HEIGHT: 71 IN | HEART RATE: 52 BPM

## 2018-12-19 DIAGNOSIS — Z95.1 S/P CABG (CORONARY ARTERY BYPASS GRAFT): ICD-10-CM

## 2018-12-19 DIAGNOSIS — C32.9 PRIMARY SQUAMOUS CELL CARCINOMA OF LARYNX (HCC): ICD-10-CM

## 2018-12-19 DIAGNOSIS — M54.12 CERVICAL RADICULOPATHY: ICD-10-CM

## 2018-12-19 DIAGNOSIS — E78.5 HYPERLIPIDEMIA, UNSPECIFIED HYPERLIPIDEMIA TYPE: ICD-10-CM

## 2018-12-19 DIAGNOSIS — Z99.89 OSA ON CPAP: ICD-10-CM

## 2018-12-19 DIAGNOSIS — I71.21 ANEURYSM OF ASCENDING AORTA: ICD-10-CM

## 2018-12-19 DIAGNOSIS — I25.10 ASCVD (ARTERIOSCLEROTIC CARDIOVASCULAR DISEASE): Primary | ICD-10-CM

## 2018-12-19 DIAGNOSIS — G47.33 OSA ON CPAP: ICD-10-CM

## 2018-12-19 DIAGNOSIS — I10 ESSENTIAL HYPERTENSION: ICD-10-CM

## 2018-12-19 PROCEDURE — 99214 OFFICE O/P EST MOD 30 MIN: CPT | Performed by: INTERNAL MEDICINE

## 2018-12-19 PROCEDURE — 3017F COLORECTAL CA SCREEN DOC REV: CPT | Performed by: INTERNAL MEDICINE

## 2018-12-19 PROCEDURE — G8598 ASA/ANTIPLAT THER USED: HCPCS | Performed by: INTERNAL MEDICINE

## 2018-12-19 PROCEDURE — 1123F ACP DISCUSS/DSCN MKR DOCD: CPT | Performed by: INTERNAL MEDICINE

## 2018-12-19 PROCEDURE — G8482 FLU IMMUNIZE ORDER/ADMIN: HCPCS | Performed by: INTERNAL MEDICINE

## 2018-12-19 PROCEDURE — 1036F TOBACCO NON-USER: CPT | Performed by: INTERNAL MEDICINE

## 2018-12-19 PROCEDURE — 4040F PNEUMOC VAC/ADMIN/RCVD: CPT | Performed by: INTERNAL MEDICINE

## 2018-12-19 PROCEDURE — G8428 CUR MEDS NOT DOCUMENT: HCPCS | Performed by: INTERNAL MEDICINE

## 2018-12-19 PROCEDURE — 1101F PT FALLS ASSESS-DOCD LE1/YR: CPT | Performed by: INTERNAL MEDICINE

## 2018-12-19 PROCEDURE — G8417 CALC BMI ABV UP PARAM F/U: HCPCS | Performed by: INTERNAL MEDICINE

## 2018-12-19 PROCEDURE — 93000 ELECTROCARDIOGRAM COMPLETE: CPT | Performed by: INTERNAL MEDICINE

## 2018-12-19 RX ORDER — ATORVASTATIN CALCIUM 20 MG/1
TABLET, FILM COATED ORAL
Qty: 90 TABLET | Refills: 2 | Status: ON HOLD
Start: 2018-12-19 | End: 2019-07-13 | Stop reason: HOSPADM

## 2018-12-19 RX ORDER — ACETAMINOPHEN 500 MG
500 TABLET ORAL EVERY 6 HOURS PRN
COMMUNITY
End: 2021-05-05

## 2018-12-19 ASSESSMENT — PATIENT HEALTH QUESTIONNAIRE - PHQ9
1. LITTLE INTEREST OR PLEASURE IN DOING THINGS: 0
SUM OF ALL RESPONSES TO PHQ QUESTIONS 1-9: 0
2. FEELING DOWN, DEPRESSED OR HOPELESS: 0
SUM OF ALL RESPONSES TO PHQ9 QUESTIONS 1 & 2: 0
SUM OF ALL RESPONSES TO PHQ QUESTIONS 1-9: 0

## 2019-01-15 NOTE — PROGRESS NOTES
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 K02.9 -- radiation-induced dental caries (due to xerostomia).       DATE OF DIAGNOSIS: 2016     END OF TREATMENT: 2016     ECOG PERFORMANCE STATUS: 0-1     PAIN: 0     CHAPERONE: Wife is present.        HISTORY OF PRESENT ILLNESS:  Mr. Camille Jacobson initially presented with a prolonged history of greater than one year of sore throat.  This had been gradually worsening though he had been using cough drops to alleviate the pain to some extent.  CT scan of the neck and 2016 was read as unremarkable but with some \"reactive\" lymph nodes.  He then developed pain radiating to the right ear.  When the problems did not resolve on repeated courses of antibiotic therapy, he underwent otolaryngology evaluation. Jens Lora was found to have a lesion involving the anterior right false vocal cord.  Biopsy confirmed squamous cell carcinoma.  In accordance with clinical practice guidelines, he received a recommendation for definitive radiation therapy, to which he was agreeable.   Mr. Camille Jacobson was able to complete his course of radiation therapy in a timely manner without any prolonged unplanned treatment interruptions. Joellyn Phoenix he did develop a painful esophagitis. Mayuri Bolanos this developed about one half of the way through his therapy course and was only with swallowing.  The pain became more intense 89.07    6/26/2018: ALT: 14    8/27/2018: Creatinine: 0.94    8/27/2018: POC glucose: 100      MEDICATIONS:   Current Outpatient Prescriptions   Medication Sig Dispense Refill    acetaminophen (TYLENOL) 500 MG tablet Take 500 mg by mouth every 6 hours as needed for Pain      atorvastatin (LIPITOR) 20 MG tablet TAKE 1 TABLET BY MOUTH ONE TIME A DAY 90 tablet 2    omeprazole (PRILOSEC) 20 MG delayed release capsule Take 20 mg by mouth 2 times daily      metoprolol succinate (TOPROL XL) 25 MG extended release tablet Take 1 tablet by mouth 2 times daily 180 tablet 1    terazosin (HYTRIN) 5 MG capsule Take 1 capsule by mouth nightly 90 capsule 1    ipratropium (ATROVENT) 0.06 % nasal spray 2 sprays by Nasal route 3 times daily 1 Bottle 3    levothyroxine (SYNTHROID) 50 MCG tablet TAKE 1 TABLET BY MOUTH DAILY 30 tablet 3    aspirin EC 81 MG EC tablet Take 81 mg by mouth daily      nitroGLYCERIN (NITROSTAT) 0.3 MG SL tablet Place 1 tablet under tongue as needed for chest pain, may repeat every 5 mins. For maximum of 3 tabs in 15 mins. 30 tablet 3    CPAP Machine MISC by Does not apply route Please change CPAP pressure to 8 cm H20. 1 each 0    Diphenhydramine-APAP, sleep, (TYLENOL PM EXTRA STRENGTH PO) Take by mouth nightly 2 nightly      Coenzyme Q10 (CO Q-10) 200 MG CAPS Take  by mouth daily.  Multiple Vitamin (MULTI-VITAMIN) TABS Take  by mouth daily.  ARGININE PO Take 500 mg by mouth daily. 2 tab       No current facility-administered medications for this encounter. ROS: As noted in the HPI and Interval history, otherwise negative. Constitutional: Denies fever, chills, unintentional weight change. HEENT: Recent vision difficulties resulting in some vision change, likely will need cataract surgery. Denies new hearing change. Denies dysphagia or odynophagia. Respiratory: Denies worsening dyspnea. Denies hemoptysis, coughing, wheezing or sputum production.   Cardiovascular: Denies

## 2019-01-15 NOTE — PROGRESS NOTES
intense and also was present at rest though not as severe as when he was trying to swallow.  The pain was up to 8 on a scale of 1-10.  This was alleviated to some extent using MLB solution.  Despite the discomfort, he was able to maintain his weight throughout the course of radiation therapy.  After completion of treatment.  Mr. Alize Gore had a prolonged recuperative time with persistent pain.  He also had the expected local edema and hoarseness.  He has been compliant with the dental recommendations. Librado Roland, he has developed problems with his teeth chipping and breaking as a complication of his radiation therapy.         INTERVAL HISTORY:   Yisel Teixeira returns to radiation oncology today 2/23/2018 for a follow-up appointment as part of his survivorship care after undergoing definitive radiation therapy for cancer of the supraglottis. As noted, he has developed a complication from treatment with his teeth breaking. His dentist was managing this for a time, but the problem became so severe that extractions are indicated. Lexii Craven was seen for a consultation regarding hyperbaric oxygen therapy on 2/12/2018. The treatment is strongly recommended due to his significant risk of developing osteoradionecrosis in relation to his previous radiation. The current plan is for and 20 times before surgery and then 10 dives following his extractions. Lexii Craven continues to follow closely in otolaryngology. He last saw Dr. Kirill Brewer in January, and will be seeing him again in March. Dr. Kirill Brewer is scoping him very frequently, and therefore he is not undergoing fiber-optic examinations in this department. Lexii Craven continues to experience pronounced xerostomia causing throat dryness and occasional associated soreness, more pronounced in the mornings. .  His taste has never fully recovered from radiation therapy. Taste remains at about 50% of normal pretreatment levels.   His subcutaneous soft tissue edema of the neck is doing

## 2019-01-17 ENCOUNTER — OFFICE VISIT (OUTPATIENT)
Dept: PHYSICAL MEDICINE AND REHAB | Age: 64
End: 2019-01-17
Payer: MEDICARE

## 2019-01-17 VITALS
DIASTOLIC BLOOD PRESSURE: 77 MMHG | HEIGHT: 71 IN | BODY MASS INDEX: 26.88 KG/M2 | HEART RATE: 61 BPM | WEIGHT: 192 LBS | SYSTOLIC BLOOD PRESSURE: 136 MMHG

## 2019-01-17 DIAGNOSIS — R20.2 PARESTHESIA AND PAIN OF BOTH UPPER EXTREMITIES: ICD-10-CM

## 2019-01-17 DIAGNOSIS — M79.601 PARESTHESIA AND PAIN OF BOTH UPPER EXTREMITIES: ICD-10-CM

## 2019-01-17 DIAGNOSIS — M79.602 PARESTHESIA AND PAIN OF BOTH UPPER EXTREMITIES: ICD-10-CM

## 2019-01-17 DIAGNOSIS — M50.90 CERVICAL BACK PAIN WITH EVIDENCE OF DISC DISEASE: ICD-10-CM

## 2019-01-17 PROCEDURE — 99204 OFFICE O/P NEW MOD 45 MIN: CPT | Performed by: PHYSICAL MEDICINE & REHABILITATION

## 2019-01-17 PROCEDURE — 1036F TOBACCO NON-USER: CPT | Performed by: PHYSICAL MEDICINE & REHABILITATION

## 2019-01-17 PROCEDURE — 1123F ACP DISCUSS/DSCN MKR DOCD: CPT | Performed by: PHYSICAL MEDICINE & REHABILITATION

## 2019-01-17 PROCEDURE — G8417 CALC BMI ABV UP PARAM F/U: HCPCS | Performed by: PHYSICAL MEDICINE & REHABILITATION

## 2019-01-17 PROCEDURE — G8427 DOCREV CUR MEDS BY ELIG CLIN: HCPCS | Performed by: PHYSICAL MEDICINE & REHABILITATION

## 2019-01-17 PROCEDURE — 4040F PNEUMOC VAC/ADMIN/RCVD: CPT | Performed by: PHYSICAL MEDICINE & REHABILITATION

## 2019-01-17 PROCEDURE — 3017F COLORECTAL CA SCREEN DOC REV: CPT | Performed by: PHYSICAL MEDICINE & REHABILITATION

## 2019-01-17 PROCEDURE — G8482 FLU IMMUNIZE ORDER/ADMIN: HCPCS | Performed by: PHYSICAL MEDICINE & REHABILITATION

## 2019-01-17 PROCEDURE — G8598 ASA/ANTIPLAT THER USED: HCPCS | Performed by: PHYSICAL MEDICINE & REHABILITATION

## 2019-01-18 ENCOUNTER — OFFICE VISIT (OUTPATIENT)
Dept: ENT CLINIC | Age: 64
End: 2019-01-18
Payer: MEDICARE

## 2019-01-18 VITALS
TEMPERATURE: 98.2 F | DIASTOLIC BLOOD PRESSURE: 72 MMHG | RESPIRATION RATE: 16 BRPM | HEIGHT: 71 IN | BODY MASS INDEX: 26.88 KG/M2 | HEART RATE: 80 BPM | SYSTOLIC BLOOD PRESSURE: 118 MMHG | WEIGHT: 192.02 LBS

## 2019-01-18 DIAGNOSIS — C32.9 PRIMARY SQUAMOUS CELL CARCINOMA OF LARYNX (HCC): Primary | ICD-10-CM

## 2019-01-18 DIAGNOSIS — K05.6 PERIODONTAL DISEASE: ICD-10-CM

## 2019-01-18 DIAGNOSIS — K22.70 BARRETT'S ESOPHAGUS WITHOUT DYSPLASIA: ICD-10-CM

## 2019-01-18 DIAGNOSIS — H61.23 BILATERAL IMPACTED CERUMEN: ICD-10-CM

## 2019-01-18 DIAGNOSIS — H90.3 ASYMMETRICAL SENSORINEURAL HEARING LOSS: ICD-10-CM

## 2019-01-18 DIAGNOSIS — E03.4 HYPOTHYROIDISM DUE TO ACQUIRED ATROPHY OF THYROID: ICD-10-CM

## 2019-01-18 DIAGNOSIS — K21.00 GASTROESOPHAGEAL REFLUX DISEASE WITH ESOPHAGITIS: ICD-10-CM

## 2019-01-18 DIAGNOSIS — T66.XXXD EFFECTS, RADIATION, SUBSEQUENT ENCOUNTER: ICD-10-CM

## 2019-01-18 DIAGNOSIS — Z99.89 OSA ON CPAP: ICD-10-CM

## 2019-01-18 DIAGNOSIS — G47.30 SLEEP-RELATED BREATHING DISORDER: ICD-10-CM

## 2019-01-18 DIAGNOSIS — G47.33 OSA ON CPAP: ICD-10-CM

## 2019-01-18 PROCEDURE — 1123F ACP DISCUSS/DSCN MKR DOCD: CPT | Performed by: OTOLARYNGOLOGY

## 2019-01-18 PROCEDURE — 1101F PT FALLS ASSESS-DOCD LE1/YR: CPT | Performed by: OTOLARYNGOLOGY

## 2019-01-18 PROCEDURE — 3017F COLORECTAL CA SCREEN DOC REV: CPT | Performed by: OTOLARYNGOLOGY

## 2019-01-18 PROCEDURE — 4040F PNEUMOC VAC/ADMIN/RCVD: CPT | Performed by: OTOLARYNGOLOGY

## 2019-01-18 PROCEDURE — G8598 ASA/ANTIPLAT THER USED: HCPCS | Performed by: OTOLARYNGOLOGY

## 2019-01-18 PROCEDURE — G8427 DOCREV CUR MEDS BY ELIG CLIN: HCPCS | Performed by: OTOLARYNGOLOGY

## 2019-01-18 PROCEDURE — 69210 REMOVE IMPACTED EAR WAX UNI: CPT | Performed by: OTOLARYNGOLOGY

## 2019-01-18 PROCEDURE — 31575 DIAGNOSTIC LARYNGOSCOPY: CPT | Performed by: OTOLARYNGOLOGY

## 2019-01-18 PROCEDURE — 99213 OFFICE O/P EST LOW 20 MIN: CPT | Performed by: OTOLARYNGOLOGY

## 2019-01-18 PROCEDURE — 1036F TOBACCO NON-USER: CPT | Performed by: OTOLARYNGOLOGY

## 2019-01-18 PROCEDURE — G8417 CALC BMI ABV UP PARAM F/U: HCPCS | Performed by: OTOLARYNGOLOGY

## 2019-01-18 PROCEDURE — G8482 FLU IMMUNIZE ORDER/ADMIN: HCPCS | Performed by: OTOLARYNGOLOGY

## 2019-01-18 ASSESSMENT — ENCOUNTER SYMPTOMS
APNEA: 0
CHEST TIGHTNESS: 0
ABDOMINAL PAIN: 0
SHORTNESS OF BREATH: 0
WHEEZING: 0
COUGH: 0
FACIAL SWELLING: 0
VOMITING: 0
VOICE CHANGE: 0
CHOKING: 0
TROUBLE SWALLOWING: 0
DIARRHEA: 0
STRIDOR: 0
SINUS PRESSURE: 0
RHINORRHEA: 0
COLOR CHANGE: 0
SORE THROAT: 0
NAUSEA: 0

## 2019-01-24 ENCOUNTER — TELEPHONE (OUTPATIENT)
Dept: INTERNAL MEDICINE CLINIC | Age: 64
End: 2019-01-24

## 2019-01-31 ENCOUNTER — OFFICE VISIT (OUTPATIENT)
Dept: INTERNAL MEDICINE CLINIC | Age: 64
End: 2019-01-31
Payer: MEDICARE

## 2019-01-31 ENCOUNTER — TELEPHONE (OUTPATIENT)
Dept: INTERNAL MEDICINE CLINIC | Age: 64
End: 2019-01-31

## 2019-01-31 ENCOUNTER — TELEPHONE (OUTPATIENT)
Dept: PHYSICAL MEDICINE AND REHAB | Age: 64
End: 2019-01-31

## 2019-01-31 VITALS
BODY MASS INDEX: 26.32 KG/M2 | OXYGEN SATURATION: 98 % | SYSTOLIC BLOOD PRESSURE: 110 MMHG | WEIGHT: 188 LBS | HEIGHT: 71 IN | HEART RATE: 60 BPM | DIASTOLIC BLOOD PRESSURE: 70 MMHG

## 2019-01-31 DIAGNOSIS — M54.12 CERVICAL RADICULOPATHY: Primary | ICD-10-CM

## 2019-01-31 DIAGNOSIS — G47.33 OSA ON CPAP: ICD-10-CM

## 2019-01-31 DIAGNOSIS — I25.10 ASCVD (ARTERIOSCLEROTIC CARDIOVASCULAR DISEASE): ICD-10-CM

## 2019-01-31 DIAGNOSIS — Z95.1 S/P CABG (CORONARY ARTERY BYPASS GRAFT): ICD-10-CM

## 2019-01-31 DIAGNOSIS — I10 ESSENTIAL HYPERTENSION: ICD-10-CM

## 2019-01-31 DIAGNOSIS — Z99.89 OSA ON CPAP: ICD-10-CM

## 2019-01-31 DIAGNOSIS — E78.5 HYPERLIPIDEMIA, UNSPECIFIED HYPERLIPIDEMIA TYPE: ICD-10-CM

## 2019-01-31 PROCEDURE — 99213 OFFICE O/P EST LOW 20 MIN: CPT | Performed by: INTERNAL MEDICINE

## 2019-01-31 PROCEDURE — G8417 CALC BMI ABV UP PARAM F/U: HCPCS | Performed by: INTERNAL MEDICINE

## 2019-01-31 PROCEDURE — 1123F ACP DISCUSS/DSCN MKR DOCD: CPT | Performed by: INTERNAL MEDICINE

## 2019-01-31 PROCEDURE — G8482 FLU IMMUNIZE ORDER/ADMIN: HCPCS | Performed by: INTERNAL MEDICINE

## 2019-01-31 PROCEDURE — 1036F TOBACCO NON-USER: CPT | Performed by: INTERNAL MEDICINE

## 2019-01-31 PROCEDURE — 4040F PNEUMOC VAC/ADMIN/RCVD: CPT | Performed by: INTERNAL MEDICINE

## 2019-01-31 PROCEDURE — 1101F PT FALLS ASSESS-DOCD LE1/YR: CPT | Performed by: INTERNAL MEDICINE

## 2019-01-31 PROCEDURE — G8598 ASA/ANTIPLAT THER USED: HCPCS | Performed by: INTERNAL MEDICINE

## 2019-01-31 PROCEDURE — G8427 DOCREV CUR MEDS BY ELIG CLIN: HCPCS | Performed by: INTERNAL MEDICINE

## 2019-01-31 PROCEDURE — 3017F COLORECTAL CA SCREEN DOC REV: CPT | Performed by: INTERNAL MEDICINE

## 2019-02-04 ENCOUNTER — HOSPITAL ENCOUNTER (OUTPATIENT)
Dept: MRI IMAGING | Age: 64
Discharge: HOME | End: 2019-02-04
Payer: MEDICARE
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
## 2019-02-04 DIAGNOSIS — M54.12 CERVICAL RADICULOPATHY: ICD-10-CM

## 2019-02-04 DIAGNOSIS — C32.9 PRIMARY SQUAMOUS CELL CARCINOMA OF LARYNX (HCC): ICD-10-CM

## 2019-02-04 DIAGNOSIS — M27.2 OSTEORADIONECROSIS OF MANDIBLE: ICD-10-CM

## 2019-02-04 DIAGNOSIS — T66.XXXS LATE EFFECT OF RADIATION: ICD-10-CM

## 2019-02-04 DIAGNOSIS — I71.23 ANEURYSM OF DESCENDING THORACIC AORTA: ICD-10-CM

## 2019-02-04 LAB
CREATININE, WHOLE BLOOD: 0.9 MG/DL (ref 0.5–1.2)
ESTIMATED GFR, PCACC: 90 ML/MIN/1.73M2

## 2019-02-04 PROCEDURE — 6360000004 HC RX CONTRAST MEDICATION: Performed by: INTERNAL MEDICINE

## 2019-02-04 PROCEDURE — 82565 ASSAY OF CREATININE: CPT

## 2019-02-04 PROCEDURE — 72156 MRI NECK SPINE W/O & W/DYE: CPT

## 2019-02-04 PROCEDURE — A9579 GAD-BASE MR CONTRAST NOS,1ML: HCPCS | Performed by: INTERNAL MEDICINE

## 2019-02-04 RX ADMIN — GADOTERIDOL 17 ML: 279.3 INJECTION, SOLUTION INTRAVENOUS at 13:55

## 2019-02-12 ENCOUNTER — OFFICE VISIT (OUTPATIENT)
Dept: INTERNAL MEDICINE CLINIC | Age: 64
End: 2019-02-12
Payer: MEDICARE

## 2019-02-12 VITALS
WEIGHT: 188 LBS | HEART RATE: 60 BPM | HEIGHT: 71 IN | SYSTOLIC BLOOD PRESSURE: 136 MMHG | DIASTOLIC BLOOD PRESSURE: 74 MMHG | BODY MASS INDEX: 26.32 KG/M2

## 2019-02-12 DIAGNOSIS — I10 ESSENTIAL HYPERTENSION: ICD-10-CM

## 2019-02-12 DIAGNOSIS — Z95.1 S/P CABG (CORONARY ARTERY BYPASS GRAFT): ICD-10-CM

## 2019-02-12 DIAGNOSIS — I25.10 ASCVD (ARTERIOSCLEROTIC CARDIOVASCULAR DISEASE): ICD-10-CM

## 2019-02-12 DIAGNOSIS — M48.02 CERVICAL SPINAL STENOSIS: Primary | ICD-10-CM

## 2019-02-12 DIAGNOSIS — G47.33 OSA ON CPAP: ICD-10-CM

## 2019-02-12 DIAGNOSIS — C32.9 PRIMARY SQUAMOUS CELL CARCINOMA OF LARYNX (HCC): ICD-10-CM

## 2019-02-12 DIAGNOSIS — I71.21 ANEURYSM OF ASCENDING AORTA: ICD-10-CM

## 2019-02-12 DIAGNOSIS — E78.5 HYPERLIPIDEMIA, UNSPECIFIED HYPERLIPIDEMIA TYPE: ICD-10-CM

## 2019-02-12 DIAGNOSIS — Z99.89 OSA ON CPAP: ICD-10-CM

## 2019-02-12 PROCEDURE — G8482 FLU IMMUNIZE ORDER/ADMIN: HCPCS | Performed by: INTERNAL MEDICINE

## 2019-02-12 PROCEDURE — G8417 CALC BMI ABV UP PARAM F/U: HCPCS | Performed by: INTERNAL MEDICINE

## 2019-02-12 PROCEDURE — 3017F COLORECTAL CA SCREEN DOC REV: CPT | Performed by: INTERNAL MEDICINE

## 2019-02-12 PROCEDURE — 1101F PT FALLS ASSESS-DOCD LE1/YR: CPT | Performed by: INTERNAL MEDICINE

## 2019-02-12 PROCEDURE — 99212 OFFICE O/P EST SF 10 MIN: CPT | Performed by: INTERNAL MEDICINE

## 2019-02-12 PROCEDURE — G8598 ASA/ANTIPLAT THER USED: HCPCS | Performed by: INTERNAL MEDICINE

## 2019-02-12 PROCEDURE — G8427 DOCREV CUR MEDS BY ELIG CLIN: HCPCS | Performed by: INTERNAL MEDICINE

## 2019-02-12 PROCEDURE — 1123F ACP DISCUSS/DSCN MKR DOCD: CPT | Performed by: INTERNAL MEDICINE

## 2019-02-12 PROCEDURE — 4040F PNEUMOC VAC/ADMIN/RCVD: CPT | Performed by: INTERNAL MEDICINE

## 2019-02-12 PROCEDURE — 1036F TOBACCO NON-USER: CPT | Performed by: INTERNAL MEDICINE

## 2019-02-12 ASSESSMENT — PATIENT HEALTH QUESTIONNAIRE - PHQ9
SUM OF ALL RESPONSES TO PHQ QUESTIONS 1-9: 0
1. LITTLE INTEREST OR PLEASURE IN DOING THINGS: 0
2. FEELING DOWN, DEPRESSED OR HOPELESS: 0
SUM OF ALL RESPONSES TO PHQ QUESTIONS 1-9: 0
SUM OF ALL RESPONSES TO PHQ9 QUESTIONS 1 & 2: 0

## 2019-02-13 ENCOUNTER — OFFICE VISIT (OUTPATIENT)
Dept: PHYSICAL MEDICINE AND REHAB | Age: 64
End: 2019-02-13
Payer: MEDICARE

## 2019-02-13 VITALS — HEIGHT: 71 IN | BODY MASS INDEX: 27.02 KG/M2 | WEIGHT: 193 LBS

## 2019-02-13 DIAGNOSIS — M54.12 CERVICAL RADICULOPATHY: Primary | ICD-10-CM

## 2019-02-13 PROCEDURE — 99204 OFFICE O/P NEW MOD 45 MIN: CPT | Performed by: PHYSICAL MEDICINE & REHABILITATION

## 2019-02-13 RX ORDER — GABAPENTIN 300 MG/1
CAPSULE ORAL
Qty: 90 CAPSULE | Refills: 1 | Status: SHIPPED | OUTPATIENT
Start: 2019-02-13 | End: 2019-07-08 | Stop reason: SDUPTHER

## 2019-02-13 ASSESSMENT — ENCOUNTER SYMPTOMS
CHEST TIGHTNESS: 0
ALLERGIC/IMMUNOLOGIC NEGATIVE: 1
DIARRHEA: 0
SORE THROAT: 1
SHORTNESS OF BREATH: 0
EYE DISCHARGE: 0
TROUBLE SWALLOWING: 1
CONSTIPATION: 1
EYE PAIN: 0

## 2019-03-11 ENCOUNTER — TELEPHONE (OUTPATIENT)
Dept: ENT CLINIC | Age: 64
End: 2019-03-11

## 2019-03-11 DIAGNOSIS — T66.XXXD EFFECTS, RADIATION, SUBSEQUENT ENCOUNTER: ICD-10-CM

## 2019-03-11 DIAGNOSIS — E03.4 HYPOTHYROIDISM DUE TO ACQUIRED ATROPHY OF THYROID: ICD-10-CM

## 2019-03-11 DIAGNOSIS — I10 ESSENTIAL HYPERTENSION: ICD-10-CM

## 2019-03-12 RX ORDER — METOPROLOL SUCCINATE 25 MG/1
25 TABLET, EXTENDED RELEASE ORAL 2 TIMES DAILY
Qty: 180 TABLET | Refills: 1 | Status: SHIPPED | OUTPATIENT
Start: 2019-03-12 | End: 2019-06-19 | Stop reason: SDUPTHER

## 2019-03-12 RX ORDER — TERAZOSIN 5 MG/1
5 CAPSULE ORAL NIGHTLY
Qty: 90 CAPSULE | Refills: 1 | Status: SHIPPED | OUTPATIENT
Start: 2019-03-12 | End: 2019-06-19 | Stop reason: SDUPTHER

## 2019-03-12 RX ORDER — LEVOTHYROXINE SODIUM 0.05 MG/1
50 TABLET ORAL DAILY
Qty: 90 TABLET | Refills: 1 | Status: SHIPPED | OUTPATIENT
Start: 2019-03-12 | End: 2020-01-03

## 2019-03-28 ENCOUNTER — OFFICE VISIT (OUTPATIENT)
Dept: PHYSICAL MEDICINE AND REHAB | Age: 64
End: 2019-03-28
Payer: MEDICARE

## 2019-03-28 VITALS
HEIGHT: 71 IN | SYSTOLIC BLOOD PRESSURE: 134 MMHG | BODY MASS INDEX: 27.02 KG/M2 | HEART RATE: 56 BPM | WEIGHT: 193 LBS | DIASTOLIC BLOOD PRESSURE: 85 MMHG

## 2019-03-28 DIAGNOSIS — M47.812 SPONDYLOSIS OF CERVICAL REGION WITHOUT MYELOPATHY OR RADICULOPATHY: Primary | ICD-10-CM

## 2019-03-28 PROCEDURE — G8482 FLU IMMUNIZE ORDER/ADMIN: HCPCS | Performed by: PHYSICAL MEDICINE & REHABILITATION

## 2019-03-28 PROCEDURE — 1036F TOBACCO NON-USER: CPT | Performed by: PHYSICAL MEDICINE & REHABILITATION

## 2019-03-28 PROCEDURE — 1123F ACP DISCUSS/DSCN MKR DOCD: CPT | Performed by: PHYSICAL MEDICINE & REHABILITATION

## 2019-03-28 PROCEDURE — 99213 OFFICE O/P EST LOW 20 MIN: CPT | Performed by: PHYSICAL MEDICINE & REHABILITATION

## 2019-03-28 PROCEDURE — 4040F PNEUMOC VAC/ADMIN/RCVD: CPT | Performed by: PHYSICAL MEDICINE & REHABILITATION

## 2019-03-28 PROCEDURE — G8417 CALC BMI ABV UP PARAM F/U: HCPCS | Performed by: PHYSICAL MEDICINE & REHABILITATION

## 2019-03-28 PROCEDURE — 3017F COLORECTAL CA SCREEN DOC REV: CPT | Performed by: PHYSICAL MEDICINE & REHABILITATION

## 2019-03-28 PROCEDURE — G8427 DOCREV CUR MEDS BY ELIG CLIN: HCPCS | Performed by: PHYSICAL MEDICINE & REHABILITATION

## 2019-03-28 PROCEDURE — G8598 ASA/ANTIPLAT THER USED: HCPCS | Performed by: PHYSICAL MEDICINE & REHABILITATION

## 2019-03-28 RX ORDER — GABAPENTIN 300 MG/1
300 CAPSULE ORAL 3 TIMES DAILY
Qty: 270 CAPSULE | Refills: 1 | Status: SHIPPED | OUTPATIENT
Start: 2019-03-28 | End: 2020-01-03 | Stop reason: SDUPTHER

## 2019-04-01 ENCOUNTER — HOSPITAL ENCOUNTER (OUTPATIENT)
Dept: CT IMAGING | Age: 64
Discharge: HOME | End: 2019-04-01
Payer: MEDICARE

## 2019-04-01 DIAGNOSIS — I71.20 THORACIC AORTIC ANEURYSM WITHOUT RUPTURE: ICD-10-CM

## 2019-04-01 LAB
CREATININE, WHOLE BLOOD: 0.9 MG/DL (ref 0.5–1.2)
ESTIMATED GFR, PCACC: 90 ML/MIN/1.73M2

## 2019-04-01 PROCEDURE — 6360000004 HC RX CONTRAST MEDICATION: Performed by: THORACIC SURGERY (CARDIOTHORACIC VASCULAR SURGERY)

## 2019-04-01 PROCEDURE — 82565 ASSAY OF CREATININE: CPT

## 2019-04-01 PROCEDURE — 71275 CT ANGIOGRAPHY CHEST: CPT

## 2019-04-01 RX ADMIN — IOPAMIDOL 100 ML: 755 INJECTION, SOLUTION INTRAVENOUS at 12:44

## 2019-04-12 ENCOUNTER — OFFICE VISIT (OUTPATIENT)
Dept: ENT CLINIC | Age: 64
End: 2019-04-12
Payer: MEDICARE

## 2019-04-12 VITALS
DIASTOLIC BLOOD PRESSURE: 80 MMHG | WEIGHT: 199 LBS | HEART RATE: 76 BPM | BODY MASS INDEX: 27.75 KG/M2 | SYSTOLIC BLOOD PRESSURE: 132 MMHG | RESPIRATION RATE: 16 BRPM | TEMPERATURE: 97.9 F

## 2019-04-12 DIAGNOSIS — T48.5X5A RHINITIS MEDICAMENTOSA: ICD-10-CM

## 2019-04-12 DIAGNOSIS — H53.131 VISION, LOSS, SUDDEN, RIGHT: ICD-10-CM

## 2019-04-12 DIAGNOSIS — E03.4 HYPOTHYROIDISM DUE TO ACQUIRED ATROPHY OF THYROID: ICD-10-CM

## 2019-04-12 DIAGNOSIS — J31.0 RHINITIS MEDICAMENTOSA: ICD-10-CM

## 2019-04-12 DIAGNOSIS — C32.9 PRIMARY SQUAMOUS CELL CARCINOMA OF LARYNX (HCC): Primary | ICD-10-CM

## 2019-04-12 DIAGNOSIS — G47.33 OSA ON CPAP: ICD-10-CM

## 2019-04-12 DIAGNOSIS — K22.70 BARRETT'S ESOPHAGUS WITHOUT DYSPLASIA: ICD-10-CM

## 2019-04-12 DIAGNOSIS — K05.6 PERIODONTAL DISEASE: ICD-10-CM

## 2019-04-12 DIAGNOSIS — H90.3 ASYMMETRICAL SENSORINEURAL HEARING LOSS: ICD-10-CM

## 2019-04-12 DIAGNOSIS — R49.9 CHANGE IN VOICE: ICD-10-CM

## 2019-04-12 DIAGNOSIS — K21.00 GASTROESOPHAGEAL REFLUX DISEASE WITH ESOPHAGITIS: ICD-10-CM

## 2019-04-12 DIAGNOSIS — T66.XXXD EFFECTS, RADIATION, SUBSEQUENT ENCOUNTER: ICD-10-CM

## 2019-04-12 DIAGNOSIS — Z99.89 OSA ON CPAP: ICD-10-CM

## 2019-04-12 DIAGNOSIS — K14.8 LARGE TONGUE: ICD-10-CM

## 2019-04-12 PROCEDURE — 1036F TOBACCO NON-USER: CPT | Performed by: OTOLARYNGOLOGY

## 2019-04-12 PROCEDURE — 1123F ACP DISCUSS/DSCN MKR DOCD: CPT | Performed by: OTOLARYNGOLOGY

## 2019-04-12 PROCEDURE — 4040F PNEUMOC VAC/ADMIN/RCVD: CPT | Performed by: OTOLARYNGOLOGY

## 2019-04-12 PROCEDURE — 3017F COLORECTAL CA SCREEN DOC REV: CPT | Performed by: OTOLARYNGOLOGY

## 2019-04-12 PROCEDURE — 31575 DIAGNOSTIC LARYNGOSCOPY: CPT | Performed by: OTOLARYNGOLOGY

## 2019-04-12 PROCEDURE — 99213 OFFICE O/P EST LOW 20 MIN: CPT | Performed by: OTOLARYNGOLOGY

## 2019-04-12 PROCEDURE — G8417 CALC BMI ABV UP PARAM F/U: HCPCS | Performed by: OTOLARYNGOLOGY

## 2019-04-12 PROCEDURE — G8427 DOCREV CUR MEDS BY ELIG CLIN: HCPCS | Performed by: OTOLARYNGOLOGY

## 2019-04-12 PROCEDURE — G8598 ASA/ANTIPLAT THER USED: HCPCS | Performed by: OTOLARYNGOLOGY

## 2019-04-12 ASSESSMENT — ENCOUNTER SYMPTOMS
SORE THROAT: 0
COLOR CHANGE: 0
SHORTNESS OF BREATH: 0
DIARRHEA: 0
CHOKING: 0
FACIAL SWELLING: 0
VOMITING: 0
VOICE CHANGE: 0
TROUBLE SWALLOWING: 0
APNEA: 0
SINUS PRESSURE: 0
NAUSEA: 0
RHINORRHEA: 0
COUGH: 0
WHEEZING: 0
ABDOMINAL PAIN: 0
CHEST TIGHTNESS: 0
STRIDOR: 0

## 2019-04-12 NOTE — PROGRESS NOTES
[address] [address], NOSE AND THROAT  [facility]  [facility] [place] [address] [address]  Dept: [phone]  Dept Fax: [phone]  Loc: [phone]    Román Drummond is a 70 y.o. male who was referred byNo ref. provider found for:  Chief Complaint   Patient presents with    Follow-up     Patient here for 3 month follow up. Roma Myers HPI:     Román Drummond is a 70 y.o. male who presents today for 3 month follow up Squamous cell carcinoma of larynx. He is doing well. His throat is sore every morning, it is dry. He is tired. He goes to bed at 8:30 pm.  He does not have much energy. He is taking a multivitamin. He is not taking a statin drug. He is taking Co Q 10 200 mg once daily. He had his scan done for his aneurysm and they said he was fine. He is retired from selling farming machinery. History:     No Known Allergies  Current Outpatient Medications   Medication Sig Dispense Refill    gabapentin (NEURONTIN) 300 MG capsule Take 1 capsule by mouth 3 times daily for 180 days. Intended supply: 90 days 270 capsule 1    metoprolol succinate (TOPROL XL) 25 MG extended release tablet Take 1 tablet by mouth 2 times daily 180 tablet 1    terazosin (HYTRIN) 5 MG capsule Take 1 capsule by mouth nightly 90 capsule 1    levothyroxine (SYNTHROID) 50 MCG tablet Take 1 tablet by mouth Daily 90 tablet 1    acetaminophen (TYLENOL) 500 MG tablet Take 500 mg by mouth every 6 hours as needed for Pain      atorvastatin (LIPITOR) 20 MG tablet TAKE 1 TABLET BY MOUTH ONE TIME A DAY 90 tablet 2    omeprazole (PRILOSEC) 20 MG delayed release capsule Take 20 mg by mouth 2 times daily      ipratropium (ATROVENT) 0.06 % nasal spray 2 sprays by Nasal route 3 times daily 1 Bottle 3    aspirin EC 81 MG EC tablet Take 81 mg by mouth daily      nitroGLYCERIN (NITROSTAT) 0.3 MG SL tablet Place 1 tablet under tongue as needed for chest pain, may repeat every 5 mins.  For maximum state.   Neurological: Negative for dizziness, syncope, facial asymmetry, speech difficulty, light-headedness and headaches. Hematological: Negative for adenopathy. Does not bruise/bleed easily. Psychiatric/Behavioral: Negative for confusion and sleep disturbance. The patient is not nervous/anxious. Objective:     /80 (Site: Left Upper Arm, Position: Sitting)   Pulse 76   Temp 97.9 °F (36.6 °C) (Oral)   Resp 16   Wt 199 lb (90.3 kg)   BMI 27.75 kg/m²     Physical Exam   Neck: No palpable adenopathy    PROCEDURE: FIBEROPTIC LARYNGOSCOPY    A fiberoptic laryngoscopy was performed under topical anesthesia, after using Afrin and Lidocaine spray in the nasal fossa. The nasal fossa, nasopharynx, hypopharynx and larynx were carefully examined. Base of tongue was symmetrical. Epiglottis appeared normal and was not retrodisplaced. True vocal cords had normal mobility. There was no erythema. No mucosal lesions or masses were noted. No pooling in the pyriform sinuses. Data:  All of the past medical history, past surgical history, family history,social history, allergies and current medications were reviewed with the patient. Assessment & Plan   Diagnoses and all orders for this visit:     Diagnosis Orders   1. Primary squamous cell carcinoma of larynx (HCC)  GA LARYNGOSCOPY FLEXIBLE DIAGNOSTIC   2. Effects, radiation, subsequent encounter  GA LARYNGOSCOPY FLEXIBLE DIAGNOSTIC    TSH without Reflex   3. Hypothyroidism due to acquired atrophy of thyroid  TSH without Reflex   4. Asymmetrical sensorineural hearing loss     5. Brown's esophagus without dysplasia     6. Periodontal disease     7. Gastroesophageal reflux disease with esophagitis     8. FOUZIA on CPAP     9. Rhinitis medicamentosa     10. Large tongue base     11. Vision, loss, sudden, right     12. Change in voice         The findings were explained and his questions were answered. I will see him in 3 months. He can take.   QH- Absorb PQQ

## 2019-04-12 NOTE — PROGRESS NOTES
Verbal order from Dr. Leslie Baker to anesthetize the patient's nasal cavity. After checking the patient's allergy list to confirm no listed medication allergy and verbally asking the patient, the patient's nasal cavity was anesthetized with topical 4% Xylocaine 4 sprays each nostril and Abhishek-Synephrine 4 sprays each nostril .

## 2019-04-14 PROBLEM — M79.601 PARESTHESIA AND PAIN OF BOTH UPPER EXTREMITIES: Status: ACTIVE | Noted: 2019-04-14

## 2019-04-14 PROBLEM — M50.90 CERVICAL BACK PAIN WITH EVIDENCE OF DISC DISEASE: Status: ACTIVE | Noted: 2019-04-14

## 2019-04-14 PROBLEM — R20.2 PARESTHESIA AND PAIN OF BOTH UPPER EXTREMITIES: Status: ACTIVE | Noted: 2019-04-14

## 2019-04-14 PROBLEM — M79.602 PARESTHESIA AND PAIN OF BOTH UPPER EXTREMITIES: Status: ACTIVE | Noted: 2019-04-14

## 2019-04-14 ASSESSMENT — ENCOUNTER SYMPTOMS
CONSTIPATION: 1
RESPIRATORY NEGATIVE: 1
EYES NEGATIVE: 1

## 2019-04-14 NOTE — PROGRESS NOTES
BIOPSY (04/28/2016); laryngoscopy (N/A, 10/4/2017); Dental surgery; vitrectomy (Right, 08/27/2018); and pr releas vitreous,subret/choroid fluid (Right, 8/27/2018). PCP: Fito Whitney MD    Allergies:    No Known Allergies     Current Medications:     Current Outpatient Medications:     acetaminophen (TYLENOL) 500 MG tablet, Take 500 mg by mouth every 6 hours as needed for Pain, Disp: , Rfl:     atorvastatin (LIPITOR) 20 MG tablet, TAKE 1 TABLET BY MOUTH ONE TIME A DAY, Disp: 90 tablet, Rfl: 2    omeprazole (PRILOSEC) 20 MG delayed release capsule, Take 20 mg by mouth 2 times daily, Disp: , Rfl:     ipratropium (ATROVENT) 0.06 % nasal spray, 2 sprays by Nasal route 3 times daily, Disp: 1 Bottle, Rfl: 3    aspirin EC 81 MG EC tablet, Take 81 mg by mouth daily, Disp: , Rfl:     nitroGLYCERIN (NITROSTAT) 0.3 MG SL tablet, Place 1 tablet under tongue as needed for chest pain, may repeat every 5 mins. For maximum of 3 tabs in 15 mins. , Disp: 30 tablet, Rfl: 3    CPAP Machine MISC, by Does not apply route Please change CPAP pressure to 8 cm H20., Disp: 1 each, Rfl: 0    Diphenhydramine-APAP, sleep, (TYLENOL PM EXTRA STRENGTH PO), Take by mouth nightly 2 nightly, Disp: , Rfl:     Coenzyme Q10 (CO Q-10) 200 MG CAPS, Take  by mouth daily. , Disp: , Rfl:     Multiple Vitamin (MULTI-VITAMIN) TABS, Take  by mouth daily. , Disp: , Rfl:     ARGININE PO, Take 500 mg by mouth daily. 2 tab, Disp: , Rfl:     gabapentin (NEURONTIN) 300 MG capsule, Take 1 capsule by mouth 3 times daily for 180 days.  Intended supply: 90 days, Disp: 270 capsule, Rfl: 1    metoprolol succinate (TOPROL XL) 25 MG extended release tablet, Take 1 tablet by mouth 2 times daily, Disp: 180 tablet, Rfl: 1    terazosin (HYTRIN) 5 MG capsule, Take 1 capsule by mouth nightly, Disp: 90 capsule, Rfl: 1    levothyroxine (SYNTHROID) 50 MCG tablet, Take 1 tablet by mouth Daily, Disp: 90 tablet, Rfl: 1    gabapentin (NEURONTIN) 300 MG capsule, 1 cap at 10 pm)x 5 days then BID (10 pm, 2 pm)  X 5 days then TID (10 pm, 2 pm, 8 am). , Disp: 90 capsule, Rfl: 1     Social History:  Social History     Socioeconomic History    Marital status:      Spouse name: Not on file    Number of children: Not on file    Years of education: Not on file    Highest education level: Not on file   Occupational History    Not on file   Social Needs    Financial resource strain: Not on file    Food insecurity:     Worry: Not on file     Inability: Not on file    Transportation needs:     Medical: Not on file     Non-medical: Not on file   Tobacco Use    Smoking status: Former Smoker     Packs/day: 2.00     Years: 20.00     Pack years: 40.00     Last attempt to quit: 2001     Years since quittin.3    Smokeless tobacco: Never Used   Substance and Sexual Activity    Alcohol use: No     Alcohol/week: 0.0 oz    Drug use: No    Sexual activity: Not on file   Lifestyle    Physical activity:     Days per week: Not on file     Minutes per session: Not on file    Stress: Not on file   Relationships    Social connections:     Talks on phone: Not on file     Gets together: Not on file     Attends Alevism service: Not on file     Active member of club or organization: Not on file     Attends meetings of clubs or organizations: Not on file     Relationship status: Not on file    Intimate partner violence:     Fear of current or ex partner: Not on file     Emotionally abused: Not on file     Physically abused: Not on file     Forced sexual activity: Not on file   Other Topics Concern    Not on file   Social History Narrative    Not on file       ADLs: He is independent with all of his A.D.L.'s. IADLs: He is independent with all of his I.A.D.L.'s.    Family History:   family history includes Cancer in his maternal grandfather and maternal grandmother; Heart Disease in his father; High Blood Pressure in his father.     Review ofSystems:  Review of Systems Constitutional: Negative. HENT: Negative. Eyes: Negative. Respiratory: Negative. Cardiovascular: Negative. Gastrointestinal: Positive for constipation. Endocrine: Negative. Genitourinary: Negative. Musculoskeletal: Positive for neck pain and neck stiffness. Skin: Negative. Neurological: Positive for numbness. Hematological: Negative. Psychiatric/Behavioral: Negative. All other systems reviewed and are negative. Physical Exam:  /77   Pulse 61   Ht 5' 11\" (1.803 m)   Wt 192 lb (87.1 kg)   BMI 26.78 kg/m²     He was noted to be awake, alert, and in no acute distress. Orientation:   person, place, time  Mood: within normal limits  Affect: calm  Generalappearance: Patient is well nourished, well developed, well groomed and in no acute distress, normal affect  Heart:  Normal S1 and S2.  Regular rhythm. No murmurs, gallops, or rubs. Lungs:  Percussion normal. Good diaphragmatic excursion. Lungs clear to auscultation bilaterally  Abdomen:  Abdomen soft, non-tender. BS normal. No masses,  No organomegaly  Memory:   normal,   Attention/Concentration: normal  Language:  normal  Cranial Nerves:  cranial nerves II-XII are grossly intact  ROM:  abnormal - with respect to his cervical spine. His forward flexion was W.N.L. His cervical extension was 10 degrees. Bilateral lateral rotation was 30 degrees and bilateral lateral bending was 20 degrees. Motor Exam: Bilateral upper limb strength was W.N.L. Tone:  normal  Muscle bulk: within normal limits except for moderate muscle wasting located in the Left greater than the Right supraspinatus muscles. Sensory: Diminished in the bilateral C-5 - C-8 dermatomes. Bilateral T-1 and T-2 dermatomal sensation was W.N.L. Coordination:   normal  DeepTendon Reflexes: Absent bilateral upper limb DTR's. Skin: warm and dry, no rash or erythema  Edema: None in either lower limb.     Diagnostics:  His electronic medical records were reviewed. Impression:  · Cervical pain with mild spinal canal stenosis at C-5 - C-7 along with Left-sided foraminal stenosis and Right-sided foraminal stenosis at C-4 - C-5. · C-5 - C-8 bilateral upper limb paresthesia. Plan:  · Patient should use a soft cervical collar for pain relief. · He should be started on Mobic 15 mg to be taken once daily. · He should be started on Neurontin 100-300 mg three times daily. · He should follow-up with his P.C.P. as needed. Return if symptoms worsen or fail to improve. It was my pleasure to evaluate Lowanda Flakes today. Please call with any concerns or questions. 50 minutes were spent while performing this evaluation.      Glenn Arevalo MD

## 2019-04-19 ENCOUNTER — HOSPITAL ENCOUNTER (OUTPATIENT)
Dept: RADIATION ONCOLOGY | Age: 64
Discharge: HOME | End: 2019-04-19
Payer: MEDICARE

## 2019-04-19 PROCEDURE — 99211 OFF/OP EST MAY X REQ PHY/QHP: CPT | Performed by: RADIOLOGY

## 2019-05-06 ENCOUNTER — OFFICE VISIT (OUTPATIENT)
Dept: PULMONOLOGY | Age: 64
End: 2019-05-06
Payer: MEDICARE

## 2019-05-06 VITALS
BODY MASS INDEX: 27.52 KG/M2 | HEIGHT: 71 IN | SYSTOLIC BLOOD PRESSURE: 118 MMHG | WEIGHT: 196.6 LBS | DIASTOLIC BLOOD PRESSURE: 64 MMHG | HEART RATE: 72 BPM | OXYGEN SATURATION: 98 %

## 2019-05-06 DIAGNOSIS — R09.81 NASAL CONGESTION: ICD-10-CM

## 2019-05-06 DIAGNOSIS — Z99.89 OSA ON CPAP: Primary | ICD-10-CM

## 2019-05-06 DIAGNOSIS — G47.10 HYPERSOMNIA: ICD-10-CM

## 2019-05-06 DIAGNOSIS — G47.33 OSA ON CPAP: Primary | ICD-10-CM

## 2019-05-06 PROCEDURE — G8598 ASA/ANTIPLAT THER USED: HCPCS | Performed by: PHYSICIAN ASSISTANT

## 2019-05-06 PROCEDURE — 1123F ACP DISCUSS/DSCN MKR DOCD: CPT | Performed by: PHYSICIAN ASSISTANT

## 2019-05-06 PROCEDURE — G8417 CALC BMI ABV UP PARAM F/U: HCPCS | Performed by: PHYSICIAN ASSISTANT

## 2019-05-06 PROCEDURE — G8427 DOCREV CUR MEDS BY ELIG CLIN: HCPCS | Performed by: PHYSICIAN ASSISTANT

## 2019-05-06 PROCEDURE — 3017F COLORECTAL CA SCREEN DOC REV: CPT | Performed by: PHYSICIAN ASSISTANT

## 2019-05-06 PROCEDURE — 99214 OFFICE O/P EST MOD 30 MIN: CPT | Performed by: PHYSICIAN ASSISTANT

## 2019-05-06 PROCEDURE — 1036F TOBACCO NON-USER: CPT | Performed by: PHYSICIAN ASSISTANT

## 2019-05-06 PROCEDURE — 4040F PNEUMOC VAC/ADMIN/RCVD: CPT | Performed by: PHYSICIAN ASSISTANT

## 2019-05-06 ASSESSMENT — ENCOUNTER SYMPTOMS
NAUSEA: 0
COUGH: 0
BACK PAIN: 0
SHORTNESS OF BREATH: 0
ALLERGIC/IMMUNOLOGIC NEGATIVE: 1
WHEEZING: 0
CHEST TIGHTNESS: 0
DIARRHEA: 0
STRIDOR: 0
EYES NEGATIVE: 1

## 2019-05-06 NOTE — PROGRESS NOTES
[facility]      Ridge Carey         [id number]  5/6/2019   Chief Complaint   Patient presents with    Follow-up     5 month FOUZIA with [facility] download         Pt of Dr. Andrzej Verdugo     PAP Download:   Original or initialAHI: 22.9     Date of initial study: 2/27/17      Compliant  40%     Noncompliant 57 %     PAP Type airsense Level  8 cmh2o    Avg Hrs/Day 3:49  AHI: 0.9   Recorded compliance dates ,4/3/19-5/2/19  Machine/Mfg: resmed  Interface: ffm     Provider:    _x_SR-HME           Jahaira Slipper        __ Maite Proud    __ Normal            __[facility] __[facility]   __[place]:       __Other    Neck Size: 15  Mallampati Mallampati 3  ESS:  6  SAQLI: 60    Here is a scan of the most recent download:              Presentation:   Kulwinder Ulrich presents for sleep medicine follow up for obstructive sleep apnea  Since the last visit, Kulwinder Ulrich is doing ok with PAP but not able to keep PAP on longer than 3 -4 hours. He is following with Dr. Landon Blanca for nasal congestion and hx of laryngeal cancer. He is stilled tired during the day. Equipment issues: The pressure is  acceptable, the mask is acceptable and he  is  using the humidity. Sleep issues:  Do you feel better? No  More rested? No   Better concentration? yes    Progress History:   Since last visit any new medical issues? No  New ER or hospitlal visits? No  Any new or changes in medicines? No  Any new sleep medicines?  No        Past Medical History:   Diagnosis Date    Ascending Aneurysm ([facility] [address] 02/2017    Ascending, 4.2 cm per pt    Brown's esophagus     CAD (coronary artery disease)     Cancer ([facility] [address]     throat cancer, s/p radiation    Chronic back pain     GERD (gastroesophageal reflux disease)     Hepatitis C     as child    History of hyperbaric oxygen therapy     after bottom teeth removed after rdiation therapy    Hyperlipidemia     Hypertension     Left shoulder pain     nerve pain , ? secondary to radiation, has had \" Take 1 tablet by mouth Daily 90 tablet 1    acetaminophen (TYLENOL) 500 MG tablet Take 500 mg by mouth every 6 hours as needed for Pain      atorvastatin (LIPITOR) 20 MG tablet TAKE 1 TABLET BY MOUTH ONE TIME A DAY 90 tablet 2    omeprazole (PRILOSEC) 20 MG delayed release capsule Take 20 mg by mouth 2 times daily      ipratropium (ATROVENT) 0.06 % nasal spray 2 sprays by Nasal route 3 times daily 1 Bottle 3    aspirin EC 81 MG EC tablet Take 81 mg by mouth daily      nitroGLYCERIN (NITROSTAT) 0.3 MG SL tablet Place 1 tablet under tongue as needed for chest pain, may repeat every 5 mins. For maximum of 3 tabs in 15 mins. 30 tablet 3    CPAP Machine MISC by Does not apply route Please change CPAP pressure to 8 cm H20. 1 each 0    Diphenhydramine-APAP, sleep, (TYLENOL PM EXTRA STRENGTH PO) Take by mouth nightly 2 nightly      Coenzyme Q10 (CO Q-10) 200 MG CAPS Take  by mouth daily.  Multiple Vitamin (MULTI-VITAMIN) TABS Take  by mouth daily.  ARGININE PO Take 500 mg by mouth daily. 2 tab      gabapentin (NEURONTIN) 300 MG capsule 1 cap at 10 pm)x 5 days then BID (10 pm, 2 pm)  X 5 days then TID (10 pm, 2 pm, 8 am). 90 capsule 1     No current facility-administered medications for this visit. Family History   Problem Relation Age of Onset    Heart Disease Father         MI    High Blood Pressure Father     Cancer Maternal Grandmother     Cancer Maternal Grandfather         Review of Systems -   Review of Systems   Constitutional: Negative for activity change, appetite change, chills and fever. HENT: Positive for congestion. Negative for postnasal drip. Eyes: Negative. Respiratory: Negative for cough, chest tightness, shortness of breath, wheezing and stridor. Cardiovascular: Negative for chest pain and leg swelling. Gastrointestinal: Negative for diarrhea and nausea. Endocrine: Negative. Genitourinary: Negative. Musculoskeletal: Negative.   Negative for arthralgias

## 2019-05-09 NOTE — PROGRESS NOTES
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 K02.9 -- radiation-induced dental caries (due to xerostomia). DATE OF DIAGNOSIS: 2016     END OF TREATMENT: 2016     ECOG PERFORMANCE STATUS: 0-1     PAIN: 0     CHAPERONE: Wife is present.        HISTORY OF PRESENT ILLNESS:  Mr. Jennifer Mccoy initially presented with a prolonged history of greater than one year of sore throat.  This had been gradually worsening though he had been using cough drops to alleviate the pain to some extent.  CT scan of the neck and 2016 was read as unremarkable but with some \"reactive\" lymph nodes.  He then developed pain radiating to the right ear.  When the problems did not resolve on repeated courses of antibiotic therapy, he underwent otolaryngology evaluation. [facility] was found to have a lesion involving the anterior right false vocal cord.  Biopsy confirmed squamous cell carcinoma.  In accordance with clinical practice guidelines, he received a recommendation for definitive radiation therapy, to which he was agreeable.   Mr. Jennifer Mccoy was able to complete his course of radiation therapy in a timely manner without any prolonged unplanned treatment interruptions. Ryan Spencer he did develop a painful esophagitis. Era Churchill this developed about one half of the way through his therapy course and was only with swallowing.  The pain became more intense and  Multiple Vitamin (MULTI-VITAMIN) TABS Take  by mouth daily.  ARGININE PO Take 500 mg by mouth daily. 2 tab       No current facility-administered medications for this encounter. REVIEW OF SYSTEMS:  Constitutional: Denies fever, chills, unintentional weight change. HEENT: The patient better; some recent complaints of tinnitus. Denies dysphagia or odynophagia. Respiratory: Denies worsening dyspnea. Denies hemoptysis, coughing, wheezing or sputum production. Cardiovascular: Denies chest pain, palpitations or syncope. GI: Denies hematemesis or rectal bleeding. Denies nausea or vomiting. Denies recent change in bowel habits. : Denies hematuria or dysuria. Musculoskeletal: DJD; cervical spinal canal stenosis with multilevel foramen stenosis and cervical radiculopathy; planned for surgical intervention; if posterior approach, no concerns regarding radiation therapy as the skin and soft tissues overlying the spine posteriorly were spared from treatment. Extremities: DJD. Metabolic/endocrine: Hypothyroidism. Neurological: Denies seizures, fainting or tremors. Integument: Denies rashes or ulcerations. EXAMINATION:   GENERAL: Pleasant, well-developed adult male, alert and oriented ×3 in no cardiorespiratory distress; clear mentation with appropriate affect. VITAL SIGNS:  Weight: 89.1 kg. Temperature: 35.3 Celsius. Pulse: 58.  Respirations: 16.  Blood pressure: 129/64. Pulse oximetry: O2 saturation 99% on room air. HEENT: Normocephalic, atraumatic. PERRL/EOMI. Ears, nose and lips unremarkable on external examination. Oral cavity with modest xerostomia, edentulous. No suspicious lesions or exudates. NECK: No JVD. No cervical lymphadenopathy. Post-radiation subcutaneous soft tissue fibrosis is present as expected. LYMPH: No supraclavicular or axillary lymphadenopathy. LUNGS: Clear. HEART: Non-tachycardic.   ABDOMEN: Nondistended, nontender with unremarkable bowel sounds. EXTREMITIES: No clubbing or cyanosis. NEUROLOGIC EXAMINATION: Cranial nerves grossly intact. ASSESSMENT:   1. No clinical evidence of recurrent head and neck cancer. 2. Persistent xerostomia and taste alteration. 3. Pronounced dental toxicity despite meticulous dental care. 4. No other unexpected side effects related to radiation therapy. 5. Treatment-induced hypothyroidism (expected side effects). PLAN:  1. Radiation oncology follow-up August 2019. 2. [facility] was instructed to call and arrange for an earlier appointment if he has any additional problems, questions or concerns. 3. Chest imaging do December 2019.  4. Continue care in otolaryngology, Dentistry, and with other physicians/providers. 5. Continue basic and preventative healthcare; continue surveillance in accordance with clinical practice guidelines. Electronically signed by Bernadette Clay MD   Radiation Oncology        CC: Nghia Morrison; Augustine Ross  ACC: Tumor Registry: [address]        This document was created using a voice-recognition program.  Computer generated transcription errors may be present.

## 2019-05-29 ENCOUNTER — HOSPITAL ENCOUNTER (OUTPATIENT)
Age: 64
Discharge: HOME | End: 2019-05-29
Payer: MEDICARE

## 2019-05-29 ENCOUNTER — HOSPITAL ENCOUNTER (OUTPATIENT)
Dept: ULTRASOUND IMAGING | Age: 64
Discharge: HOME | End: 2019-05-29
Payer: MEDICARE

## 2019-05-29 DIAGNOSIS — T66.XXXD EFFECTS, RADIATION, SUBSEQUENT ENCOUNTER: ICD-10-CM

## 2019-05-29 DIAGNOSIS — I71.40 ABDOMINAL AORTIC ANEURYSM (AAA) WITHOUT RUPTURE: ICD-10-CM

## 2019-05-29 DIAGNOSIS — E03.4 HYPOTHYROIDISM DUE TO ACQUIRED ATROPHY OF THYROID: ICD-10-CM

## 2019-05-29 LAB
PROSTATE SPECIFIC ANTIGEN: 0.77 NG/ML (ref 0–1)
TSH SERPL DL<=0.05 MIU/L-ACNC: 0.72 UIU/ML (ref 0.4–4.2)

## 2019-05-29 PROCEDURE — 84443 ASSAY THYROID STIM HORMONE: CPT

## 2019-05-29 PROCEDURE — 76775 US EXAM ABDO BACK WALL LIM: CPT

## 2019-05-29 PROCEDURE — G0103 PSA SCREENING: HCPCS

## 2019-05-29 PROCEDURE — 36415 COLL VENOUS BLD VENIPUNCTURE: CPT

## 2019-06-19 ENCOUNTER — OFFICE VISIT (OUTPATIENT)
Dept: INTERNAL MEDICINE CLINIC | Age: 64
End: 2019-06-19
Payer: MEDICARE

## 2019-06-19 VITALS
HEART RATE: 61 BPM | RESPIRATION RATE: 12 BRPM | DIASTOLIC BLOOD PRESSURE: 71 MMHG | HEIGHT: 71 IN | BODY MASS INDEX: 27.23 KG/M2 | WEIGHT: 194.5 LBS | SYSTOLIC BLOOD PRESSURE: 132 MMHG

## 2019-06-19 DIAGNOSIS — E78.5 HYPERLIPIDEMIA, UNSPECIFIED HYPERLIPIDEMIA TYPE: Primary | ICD-10-CM

## 2019-06-19 DIAGNOSIS — I25.10 ASCVD (ARTERIOSCLEROTIC CARDIOVASCULAR DISEASE): ICD-10-CM

## 2019-06-19 DIAGNOSIS — I10 ESSENTIAL HYPERTENSION: ICD-10-CM

## 2019-06-19 PROCEDURE — 3017F COLORECTAL CA SCREEN DOC REV: CPT | Performed by: NURSE PRACTITIONER

## 2019-06-19 PROCEDURE — G8427 DOCREV CUR MEDS BY ELIG CLIN: HCPCS | Performed by: NURSE PRACTITIONER

## 2019-06-19 PROCEDURE — 99214 OFFICE O/P EST MOD 30 MIN: CPT | Performed by: NURSE PRACTITIONER

## 2019-06-19 PROCEDURE — G8598 ASA/ANTIPLAT THER USED: HCPCS | Performed by: NURSE PRACTITIONER

## 2019-06-19 PROCEDURE — 93000 ELECTROCARDIOGRAM COMPLETE: CPT | Performed by: NURSE PRACTITIONER

## 2019-06-19 PROCEDURE — 4040F PNEUMOC VAC/ADMIN/RCVD: CPT | Performed by: NURSE PRACTITIONER

## 2019-06-19 PROCEDURE — G8417 CALC BMI ABV UP PARAM F/U: HCPCS | Performed by: NURSE PRACTITIONER

## 2019-06-19 PROCEDURE — 1123F ACP DISCUSS/DSCN MKR DOCD: CPT | Performed by: NURSE PRACTITIONER

## 2019-06-19 PROCEDURE — 1036F TOBACCO NON-USER: CPT | Performed by: NURSE PRACTITIONER

## 2019-06-19 RX ORDER — METOPROLOL SUCCINATE 25 MG/1
25 TABLET, EXTENDED RELEASE ORAL 2 TIMES DAILY
Qty: 180 TABLET | Refills: 1 | Status: ON HOLD | OUTPATIENT
Start: 2019-06-19 | End: 2019-07-12 | Stop reason: SDUPTHER

## 2019-06-19 RX ORDER — TERAZOSIN 5 MG/1
5 CAPSULE ORAL NIGHTLY
Qty: 90 CAPSULE | Refills: 1 | Status: SHIPPED | OUTPATIENT
Start: 2019-06-19 | End: 2020-02-26

## 2019-06-19 NOTE — PROGRESS NOTES
07/12/2019    CHOL 153 12/11/2018    TRIG 170 12/11/2018    HDL 45 12/11/2018    LDLDIRECT 89.07 06/26/2018    ALT 21 12/11/2018    AST 18 12/01/2017     07/12/2019    K 4.0 07/12/2019     07/12/2019    CREATININE 0.8 07/12/2019    BUN 15 07/12/2019    CO2 26 07/12/2019    TSH 0.725 05/29/2019    PSA 0.77 05/29/2019    INR 0.97 07/12/2019       Assessment/Plan      1. Essential hypertension    - terazosin (HYTRIN) 5 MG capsule; Take 1 capsule by mouth nightly  Dispense: 90 capsule; Refill: 1  - EKG 12 Lead  - Stress test, lexiscan; Future  - Medications reviewed, continue current regimen    2. Hyperlipidemia, unspecified hyperlipidemia type    3. ASCVD (arteriosclerotic cardiovascular disease)        Return in about 6 months (around 12/19/2019). Sooner if needed. Patient given educational materials - see patient instructions. Discussed use, benefit, and side effects of prescribed medications. All patient questions answered. Pt voiced understanding. Reviewed health maintenance.        Electronically signed DEA Rodriguez CNP on 6/19/19 at 9:12 AM

## 2019-07-03 ENCOUNTER — HOSPITAL ENCOUNTER (OUTPATIENT)
Dept: NON INVASIVE DIAGNOSTICS | Age: 64
Discharge: HOME | End: 2019-07-03
Payer: MEDICARE

## 2019-07-03 VITALS — WEIGHT: 190 LBS | HEIGHT: 71 IN | BODY MASS INDEX: 26.6 KG/M2

## 2019-07-03 DIAGNOSIS — I10 ESSENTIAL HYPERTENSION: ICD-10-CM

## 2019-07-03 PROCEDURE — 93017 CV STRESS TEST TRACING ONLY: CPT

## 2019-07-03 PROCEDURE — 2709999900 HC NON-CHARGEABLE SUPPLY

## 2019-07-03 PROCEDURE — 6360000002 HC RX W HCPCS

## 2019-07-03 PROCEDURE — 78452 HT MUSCLE IMAGE SPECT MULT: CPT

## 2019-07-03 PROCEDURE — 3430000000 HC RX DIAGNOSTIC RADIOPHARMACEUTICAL: Performed by: ORTHOPAEDIC SURGERY

## 2019-07-03 PROCEDURE — A9500 TC99M SESTAMIBI: HCPCS | Performed by: ORTHOPAEDIC SURGERY

## 2019-07-03 RX ADMIN — Medication 32 MILLICURIE: at 14:20

## 2019-07-03 RX ADMIN — Medication 10.3 MILLICURIE: at 12:49

## 2019-07-05 ENCOUNTER — TELEPHONE (OUTPATIENT)
Dept: INTERNAL MEDICINE CLINIC | Age: 64
End: 2019-07-05

## 2019-07-05 DIAGNOSIS — Z95.1 S/P CABG (CORONARY ARTERY BYPASS GRAFT): ICD-10-CM

## 2019-07-05 DIAGNOSIS — I25.10 ASCVD (ARTERIOSCLEROTIC CARDIOVASCULAR DISEASE): ICD-10-CM

## 2019-07-05 DIAGNOSIS — R94.39 ABNORMAL STRESS TEST: Primary | ICD-10-CM

## 2019-07-08 ENCOUNTER — OFFICE VISIT (OUTPATIENT)
Dept: CARDIOLOGY CLINIC | Age: 64
End: 2019-07-08
Payer: MEDICARE

## 2019-07-08 ENCOUNTER — TELEPHONE (OUTPATIENT)
Dept: CARDIOLOGY CLINIC | Age: 64
End: 2019-07-08

## 2019-07-08 VITALS
HEART RATE: 68 BPM | WEIGHT: 194 LBS | SYSTOLIC BLOOD PRESSURE: 138 MMHG | BODY MASS INDEX: 27.16 KG/M2 | HEIGHT: 71 IN | DIASTOLIC BLOOD PRESSURE: 64 MMHG

## 2019-07-08 DIAGNOSIS — I25.83 CORONARY ARTERY DISEASE DUE TO LIPID RICH PLAQUE: Primary | ICD-10-CM

## 2019-07-08 DIAGNOSIS — I25.10 CORONARY ARTERY DISEASE DUE TO LIPID RICH PLAQUE: Primary | ICD-10-CM

## 2019-07-08 PROCEDURE — 4040F PNEUMOC VAC/ADMIN/RCVD: CPT | Performed by: INTERNAL MEDICINE

## 2019-07-08 PROCEDURE — 99204 OFFICE O/P NEW MOD 45 MIN: CPT | Performed by: INTERNAL MEDICINE

## 2019-07-08 PROCEDURE — G8427 DOCREV CUR MEDS BY ELIG CLIN: HCPCS | Performed by: INTERNAL MEDICINE

## 2019-07-08 PROCEDURE — G8417 CALC BMI ABV UP PARAM F/U: HCPCS | Performed by: INTERNAL MEDICINE

## 2019-07-08 PROCEDURE — 1123F ACP DISCUSS/DSCN MKR DOCD: CPT | Performed by: INTERNAL MEDICINE

## 2019-07-08 PROCEDURE — 3017F COLORECTAL CA SCREEN DOC REV: CPT | Performed by: INTERNAL MEDICINE

## 2019-07-08 PROCEDURE — G8598 ASA/ANTIPLAT THER USED: HCPCS | Performed by: INTERNAL MEDICINE

## 2019-07-08 PROCEDURE — 1036F TOBACCO NON-USER: CPT | Performed by: INTERNAL MEDICINE

## 2019-07-08 NOTE — TELEPHONE ENCOUNTER
[address] from Dr. Angel Harmon office called requesting OV after abnormal stress test with Dr. Dorcas Schafer. There is a referral in place, but Dr. Angel Harmon wants seen sooner then I can schedule. FPT Dr. Berenda Severs, lov 6-19-14. You can call pt with appt.  [phone]

## 2019-07-08 NOTE — PROGRESS NOTES
Procedure   Chest pains, dyspnea on exertion  Abnormal MPI (anterior ischemia)  CAD s/p CABG (LIMA to LAD, VG to OM and VG RCA) , s/p PCI  Thoracic anuerysm 4.1 cm  HTN  HLD    Intermittent anginal type of symptoms, dyspnea  Abnormal MPI  On Aspirin, statin and beta blocker  Will get [facility] to evaluate coronaries  Discussed R/B/A, patient would like to proceed  The patient is asked to make an attempt to improve diet and exercise patterns to aid in medical management of this problem. Advised more plant based nutrition/meditarrean diet   Advised patient to call office or seek immediate medical attention if there is any new onset of  any chest pain, sob, palpitations, lightheadedness, dizziness, orthopnea, PND or pedal edema. All medication side effects were discussed in details. Thank youfor allowing me to participate in the care of this patient. Please do not hesitate to contact me for any further questions. Return in about 1 month (around 8/5/2019), or if symptoms worsen or fail to improve, for Review testing, Regular follow up.        Electronically signed by Lawrence Noble MD [facility]  7/12/2019 at 4:12 PM

## 2019-07-10 ENCOUNTER — OFFICE VISIT (OUTPATIENT)
Dept: PHYSICAL MEDICINE AND REHAB | Age: 64
End: 2019-07-10
Payer: MEDICARE

## 2019-07-10 VITALS
BODY MASS INDEX: 27.16 KG/M2 | SYSTOLIC BLOOD PRESSURE: 138 MMHG | HEART RATE: 80 BPM | DIASTOLIC BLOOD PRESSURE: 76 MMHG | WEIGHT: 194 LBS | HEIGHT: 71 IN

## 2019-07-10 DIAGNOSIS — M54.12 CERVICAL RADICULOPATHY: Primary | ICD-10-CM

## 2019-07-10 PROCEDURE — G8598 ASA/ANTIPLAT THER USED: HCPCS | Performed by: PHYSICAL MEDICINE & REHABILITATION

## 2019-07-10 PROCEDURE — 1036F TOBACCO NON-USER: CPT | Performed by: PHYSICAL MEDICINE & REHABILITATION

## 2019-07-10 PROCEDURE — 1123F ACP DISCUSS/DSCN MKR DOCD: CPT | Performed by: PHYSICAL MEDICINE & REHABILITATION

## 2019-07-10 PROCEDURE — 4040F PNEUMOC VAC/ADMIN/RCVD: CPT | Performed by: PHYSICAL MEDICINE & REHABILITATION

## 2019-07-10 PROCEDURE — G8417 CALC BMI ABV UP PARAM F/U: HCPCS | Performed by: PHYSICAL MEDICINE & REHABILITATION

## 2019-07-10 PROCEDURE — 3017F COLORECTAL CA SCREEN DOC REV: CPT | Performed by: PHYSICAL MEDICINE & REHABILITATION

## 2019-07-10 PROCEDURE — G8427 DOCREV CUR MEDS BY ELIG CLIN: HCPCS | Performed by: PHYSICAL MEDICINE & REHABILITATION

## 2019-07-10 PROCEDURE — 99214 OFFICE O/P EST MOD 30 MIN: CPT | Performed by: PHYSICAL MEDICINE & REHABILITATION

## 2019-07-11 ENCOUNTER — PREP FOR PROCEDURE (OUTPATIENT)
Dept: CARDIOLOGY | Age: 64
End: 2019-07-11

## 2019-07-11 RX ORDER — SODIUM CHLORIDE 0.9 % (FLUSH) 0.9 %
10 SYRINGE (ML) INJECTION PRN
Status: CANCELLED | OUTPATIENT
Start: 2019-07-11

## 2019-07-11 RX ORDER — SODIUM CHLORIDE 0.9 % (FLUSH) 0.9 %
10 SYRINGE (ML) INJECTION EVERY 12 HOURS SCHEDULED
Status: CANCELLED | OUTPATIENT
Start: 2019-07-11

## 2019-07-11 RX ORDER — SODIUM CHLORIDE 9 MG/ML
INJECTION, SOLUTION INTRAVENOUS CONTINUOUS
Status: CANCELLED | OUTPATIENT
Start: 2019-07-11

## 2019-07-11 RX ORDER — ASPIRIN 325 MG
325 TABLET ORAL ONCE
Status: CANCELLED | OUTPATIENT
Start: 2019-07-11 | End: 2019-07-11

## 2019-07-11 RX ORDER — NITROGLYCERIN 0.4 MG/1
0.4 TABLET SUBLINGUAL EVERY 5 MIN PRN
Status: CANCELLED | OUTPATIENT
Start: 2019-07-11

## 2019-07-12 ENCOUNTER — HOSPITAL ENCOUNTER (OUTPATIENT)
Dept: INPATIENT UNIT | Age: 64
LOS: 1 days | Discharge: HOME | End: 2019-07-13
Attending: INTERNAL MEDICINE | Admitting: INTERNAL MEDICINE
Payer: MEDICARE

## 2019-07-12 DIAGNOSIS — I25.83 CORONARY ARTERY DISEASE DUE TO LIPID RICH PLAQUE: ICD-10-CM

## 2019-07-12 DIAGNOSIS — I25.10 CORONARY ARTERY DISEASE DUE TO LIPID RICH PLAQUE: ICD-10-CM

## 2019-07-12 PROBLEM — Z95.820 STATUS POST ANGIOPLASTY WITH STENT: Status: ACTIVE | Noted: 2019-07-12

## 2019-07-12 LAB
ABO: NORMAL
ACTIVATED CLOTTING TIME: 246 SECONDS (ref 1–150)
ANION GAP SERPL CALCULATED.3IONS-SCNC: 11 MEQ/L (ref 8–16)
ANTIBODY SCREEN: NORMAL
APTT: 30.9 SECONDS (ref 22–38)
BUN BLDV-MCNC: 15 MG/DL (ref 7–22)
CALCIUM SERPL-MCNC: 9.4 MG/DL (ref 8.5–10.5)
CHLORIDE BLD-SCNC: 104 MEQ/L (ref 98–111)
CO2: 26 MEQ/L (ref 23–33)
CREAT SERPL-MCNC: 0.8 MG/DL (ref 0.4–1.2)
EKG ATRIAL RATE: 56 BPM
EKG P AXIS: 17 DEGREES
EKG P-R INTERVAL: 142 MS
EKG Q-T INTERVAL: 414 MS
EKG QRS DURATION: 82 MS
EKG QTC CALCULATION (BAZETT): 399 MS
EKG R AXIS: 27 DEGREES
EKG T AXIS: 69 DEGREES
EKG VENTRICULAR RATE: 56 BPM
ERYTHROCYTE [DISTWIDTH] IN BLOOD BY AUTOMATED COUNT: 12.5 % (ref 11.5–14.5)
ERYTHROCYTE [DISTWIDTH] IN BLOOD BY AUTOMATED COUNT: 43 FL (ref 35–45)
GFR SERPL CREATININE-BSD FRML MDRD: > 90 ML/MIN/1.73M2
GLUCOSE BLD-MCNC: 105 MG/DL (ref 70–108)
HCT VFR BLD CALC: 46 % (ref 42–52)
HEMOGLOBIN: 15 GM/DL (ref 14–18)
INR BLD: 0.97 (ref 0.85–1.13)
MCH RBC QN AUTO: 30.8 PG (ref 26–33)
MCHC RBC AUTO-ENTMCNC: 32.6 GM/DL (ref 32.2–35.5)
MCV RBC AUTO: 94.5 FL (ref 80–94)
PLATELET # BLD: 197 THOU/MM3 (ref 130–400)
PMV BLD AUTO: 9.7 FL (ref 9.4–12.4)
POTASSIUM REFLEX MAGNESIUM: 4 MEQ/L (ref 3.5–5.2)
RBC # BLD: 4.87 MILL/MM3 (ref 4.7–6.1)
RH FACTOR: NORMAL
SODIUM BLD-SCNC: 141 MEQ/L (ref 135–145)
WBC # BLD: 5.3 THOU/MM3 (ref 4.8–10.8)

## 2019-07-12 PROCEDURE — 6370000000 HC RX 637 (ALT 250 FOR IP): Performed by: INTERNAL MEDICINE

## 2019-07-12 PROCEDURE — 85347 COAGULATION TIME ACTIVATED: CPT

## 2019-07-12 PROCEDURE — 85610 PROTHROMBIN TIME: CPT

## 2019-07-12 PROCEDURE — 93010 ELECTROCARDIOGRAM REPORT: CPT | Performed by: INTERNAL MEDICINE

## 2019-07-12 PROCEDURE — 93459 L HRT ART/GRFT ANGIO: CPT | Performed by: INTERNAL MEDICINE

## 2019-07-12 PROCEDURE — 92928 PRQ TCAT PLMT NTRAC ST 1 LES: CPT | Performed by: INTERNAL MEDICINE

## 2019-07-12 PROCEDURE — 86850 RBC ANTIBODY SCREEN: CPT

## 2019-07-12 PROCEDURE — C1874 STENT, COATED/COV W/DEL SYS: HCPCS

## 2019-07-12 PROCEDURE — C1894 INTRO/SHEATH, NON-LASER: HCPCS

## 2019-07-12 PROCEDURE — 86901 BLOOD TYPING SEROLOGIC RH(D): CPT

## 2019-07-12 PROCEDURE — C1769 GUIDE WIRE: HCPCS

## 2019-07-12 PROCEDURE — C9600 PERC DRUG-EL COR STENT SING: HCPCS | Performed by: INTERNAL MEDICINE

## 2019-07-12 PROCEDURE — 86900 BLOOD TYPING SEROLOGIC ABO: CPT

## 2019-07-12 PROCEDURE — 93005 ELECTROCARDIOGRAM TRACING: CPT | Performed by: PHYSICIAN ASSISTANT

## 2019-07-12 PROCEDURE — 2500000003 HC RX 250 WO HCPCS

## 2019-07-12 PROCEDURE — 6360000004 HC RX CONTRAST MEDICATION: Performed by: INTERNAL MEDICINE

## 2019-07-12 PROCEDURE — 85730 THROMBOPLASTIN TIME PARTIAL: CPT

## 2019-07-12 PROCEDURE — 6360000002 HC RX W HCPCS

## 2019-07-12 PROCEDURE — 2709999900 HC NON-CHARGEABLE SUPPLY

## 2019-07-12 PROCEDURE — 93571 IV DOP VEL&/PRESS C FLO 1ST: CPT | Performed by: INTERNAL MEDICINE

## 2019-07-12 PROCEDURE — 2580000003 HC RX 258: Performed by: PHYSICIAN ASSISTANT

## 2019-07-12 PROCEDURE — C1887 CATHETER, GUIDING: HCPCS

## 2019-07-12 PROCEDURE — 80048 BASIC METABOLIC PNL TOTAL CA: CPT

## 2019-07-12 PROCEDURE — C1760 CLOSURE DEV, VASC: HCPCS

## 2019-07-12 PROCEDURE — C1725 CATH, TRANSLUMIN NON-LASER: HCPCS

## 2019-07-12 PROCEDURE — 85027 COMPLETE CBC AUTOMATED: CPT

## 2019-07-12 PROCEDURE — 36415 COLL VENOUS BLD VENIPUNCTURE: CPT

## 2019-07-12 PROCEDURE — 6370000000 HC RX 637 (ALT 250 FOR IP)

## 2019-07-12 RX ORDER — ACETAMINOPHEN 500 MG
500 TABLET ORAL EVERY 6 HOURS PRN
Status: DISCONTINUED | OUTPATIENT
Start: 2019-07-12 | End: 2019-07-12 | Stop reason: SDUPTHER

## 2019-07-12 RX ORDER — ASPIRIN 325 MG
325 TABLET ORAL ONCE
Status: DISCONTINUED | OUTPATIENT
Start: 2019-07-12 | End: 2019-07-13

## 2019-07-12 RX ORDER — ONDANSETRON 2 MG/ML
4 INJECTION INTRAMUSCULAR; INTRAVENOUS EVERY 6 HOURS PRN
Status: DISCONTINUED | OUTPATIENT
Start: 2019-07-12 | End: 2019-07-13 | Stop reason: HOSPADM

## 2019-07-12 RX ORDER — SODIUM CHLORIDE 9 MG/ML
75 INJECTION, SOLUTION INTRAVENOUS CONTINUOUS
Status: ACTIVE | OUTPATIENT
Start: 2019-07-12 | End: 2019-07-12

## 2019-07-12 RX ORDER — LEVOTHYROXINE SODIUM 0.05 MG/1
50 TABLET ORAL DAILY
Status: DISCONTINUED | OUTPATIENT
Start: 2019-07-13 | End: 2019-07-13 | Stop reason: HOSPADM

## 2019-07-12 RX ORDER — METOPROLOL SUCCINATE 25 MG/1
25 TABLET, EXTENDED RELEASE ORAL 2 TIMES DAILY
Status: ON HOLD | COMMUNITY
End: 2019-07-13 | Stop reason: HOSPADM

## 2019-07-12 RX ORDER — NITROGLYCERIN 0.4 MG/1
0.4 TABLET SUBLINGUAL EVERY 5 MIN PRN
Status: DISCONTINUED | OUTPATIENT
Start: 2019-07-12 | End: 2019-07-13 | Stop reason: HOSPADM

## 2019-07-12 RX ORDER — METOPROLOL SUCCINATE 25 MG/1
50 TABLET, EXTENDED RELEASE ORAL 2 TIMES DAILY
Qty: 60 TABLET | Refills: 0 | Status: SHIPPED
Start: 2019-07-12 | End: 2019-07-13 | Stop reason: HOSPADM

## 2019-07-12 RX ORDER — TERAZOSIN 5 MG/1
5 CAPSULE ORAL NIGHTLY
Status: DISCONTINUED | OUTPATIENT
Start: 2019-07-12 | End: 2019-07-13 | Stop reason: HOSPADM

## 2019-07-12 RX ORDER — MULTIVITAMIN WITH FOLIC ACID 400 MCG
1 TABLET ORAL DAILY
Status: DISCONTINUED | OUTPATIENT
Start: 2019-07-12 | End: 2019-07-13 | Stop reason: HOSPADM

## 2019-07-12 RX ORDER — METOPROLOL SUCCINATE 25 MG/1
50 TABLET, EXTENDED RELEASE ORAL 2 TIMES DAILY
Status: DISCONTINUED | OUTPATIENT
Start: 2019-07-12 | End: 2019-07-13 | Stop reason: HOSPADM

## 2019-07-12 RX ORDER — OMEPRAZOLE 20 MG/1
20 CAPSULE, DELAYED RELEASE ORAL 2 TIMES DAILY
Status: DISCONTINUED | OUTPATIENT
Start: 2019-07-12 | End: 2019-07-13 | Stop reason: HOSPADM

## 2019-07-12 RX ORDER — ARGININE 500 MG
500 TABLET ORAL DAILY
Status: DISCONTINUED | OUTPATIENT
Start: 2019-07-12 | End: 2019-07-12 | Stop reason: CLARIF

## 2019-07-12 RX ORDER — GABAPENTIN 300 MG/1
300 CAPSULE ORAL 3 TIMES DAILY
Status: DISCONTINUED | OUTPATIENT
Start: 2019-07-12 | End: 2019-07-13 | Stop reason: HOSPADM

## 2019-07-12 RX ORDER — SODIUM CHLORIDE 0.9 % (FLUSH) 0.9 %
10 SYRINGE (ML) INJECTION EVERY 12 HOURS SCHEDULED
Status: DISCONTINUED | OUTPATIENT
Start: 2019-07-12 | End: 2019-07-13 | Stop reason: HOSPADM

## 2019-07-12 RX ORDER — SODIUM CHLORIDE 0.9 % (FLUSH) 0.9 %
10 SYRINGE (ML) INJECTION PRN
Status: DISCONTINUED | OUTPATIENT
Start: 2019-07-12 | End: 2019-07-12 | Stop reason: SDUPTHER

## 2019-07-12 RX ORDER — SODIUM CHLORIDE 9 MG/ML
INJECTION, SOLUTION INTRAVENOUS CONTINUOUS
Status: DISCONTINUED | OUTPATIENT
Start: 2019-07-12 | End: 2019-07-12 | Stop reason: SDUPTHER

## 2019-07-12 RX ORDER — ACETAMINOPHEN 325 MG/1
650 TABLET ORAL EVERY 4 HOURS PRN
Status: DISCONTINUED | OUTPATIENT
Start: 2019-07-12 | End: 2019-07-13 | Stop reason: HOSPADM

## 2019-07-12 RX ORDER — METOPROLOL SUCCINATE 25 MG/1
25 TABLET, EXTENDED RELEASE ORAL 2 TIMES DAILY
Status: DISCONTINUED | OUTPATIENT
Start: 2019-07-12 | End: 2019-07-12

## 2019-07-12 RX ORDER — IPRATROPIUM BROMIDE 42 UG/1
2 SPRAY, METERED NASAL 3 TIMES DAILY
Status: DISCONTINUED | OUTPATIENT
Start: 2019-07-12 | End: 2019-07-13 | Stop reason: HOSPADM

## 2019-07-12 RX ORDER — ASPIRIN 81 MG/1
81 TABLET ORAL DAILY
Status: DISCONTINUED | OUTPATIENT
Start: 2019-07-13 | End: 2019-07-13 | Stop reason: HOSPADM

## 2019-07-12 RX ORDER — ACETAMINOPHEN,DIPHENHYDRAMINE HCL 500; 25 MG/1; MG/1
2 TABLET, FILM COATED ORAL NIGHTLY
Status: DISCONTINUED | OUTPATIENT
Start: 2019-07-12 | End: 2019-07-13 | Stop reason: HOSPADM

## 2019-07-12 RX ORDER — ATORVASTATIN CALCIUM 20 MG/1
20 TABLET, FILM COATED ORAL DAILY
Status: DISCONTINUED | OUTPATIENT
Start: 2019-07-12 | End: 2019-07-13

## 2019-07-12 RX ORDER — UBIDECARENONE 75 MG
1 CAPSULE ORAL DAILY
Status: DISCONTINUED | OUTPATIENT
Start: 2019-07-12 | End: 2019-07-12 | Stop reason: RX

## 2019-07-12 RX ORDER — SODIUM CHLORIDE 0.9 % (FLUSH) 0.9 %
10 SYRINGE (ML) INJECTION PRN
Status: DISCONTINUED | OUTPATIENT
Start: 2019-07-12 | End: 2019-07-13 | Stop reason: HOSPADM

## 2019-07-12 RX ORDER — ACETAMINOPHEN,DIPHENHYDRAMINE HCL 500; 25 MG/1; MG/1
2 TABLET, FILM COATED ORAL NIGHTLY
COMMUNITY

## 2019-07-12 RX ADMIN — ATORVASTATIN CALCIUM 20 MG: 20 TABLET, FILM COATED ORAL at 20:27

## 2019-07-12 RX ADMIN — TERAZOSIN HYDROCHLORIDE 5 MG: 5 CAPSULE ORAL at 20:31

## 2019-07-12 RX ADMIN — GABAPENTIN 300 MG: 300 CAPSULE ORAL at 20:28

## 2019-07-12 RX ADMIN — TICAGRELOR 180 MG: 90 TABLET ORAL at 12:54

## 2019-07-12 RX ADMIN — SODIUM CHLORIDE: 9 INJECTION, SOLUTION INTRAVENOUS at 08:36

## 2019-07-12 RX ADMIN — OMEPRAZOLE 20 MG: 20 CAPSULE, DELAYED RELEASE ORAL at 20:29

## 2019-07-12 RX ADMIN — IOPAMIDOL 170 ML: 755 INJECTION, SOLUTION INTRAVENOUS at 12:39

## 2019-07-12 RX ADMIN — ACETAMINOPHEN,DIPHENHYDRAMINE HCL 2 TABLET: 500; 25 TABLET, FILM COATED ORAL at 20:28

## 2019-07-12 RX ADMIN — METOPROLOL SUCCINATE 50 MG: 25 TABLET, EXTENDED RELEASE ORAL at 20:23

## 2019-07-12 NOTE — PROCEDURES
decided to pursue percutaneous coronary intervention  of the mid LAD hazy-appearing 60% to 70% lesion. We first used a 2.5 x  12 mm compliant Trek balloon to measure as well dilate the lesion. Subsequent to this, we measured a distal normal portion of the LAD with  QCA which showed the vessel caliber was about 2.3 mm in diameter. At  this point, we used a 2.5 x 28 mm Xience Jayna drug-eluting stent and  it was deployed from normal to normal segment fashion. The stent was  post dilated with a 3.75 x 20 mm balloon at high pressures to ensure  good vessel wall apposition. Repeat angiograms after removal of the  balloon showed good MARCY-3 flow throughout the entire vessel as well as  branch vessel. There are no obvious dissections or perforations  throughout the vessel. After this, all wires and catheters were removed  and the right common femoral artery access hemostasis was achieved with  an Angio-Seal closure device. The patient tolerated the procedure well without any significant issues  or complications. He was transferred back to his room in a stable  condition. SUMMARY:  Successful FFR-guided PCI of mid LAD with 2.5 x 28 mm Xience  Jayna stent which was post dilated to 2.8 proximally and 2.5 distally. RECOMMENDATIONS:  1. DAPT for at least one year. 2.  Aggressive risk factor modification. 3.  Lipid-lowering therapy. 4.  Cardiac rehab.  5.  Followup in clinic in one to two weeks. All procedure details and management plans were discussed in detail with  the patient and family members and they were in agreement with the plan.         Titus Lopez MD    D: 07/12/2019 12:57:57       T: 07/12/2019 15:14:51     [email or web address]  Job#: [id number]     Doc#: [id number]    CC:

## 2019-07-12 NOTE — OP NOTE
[address] [address]  Sedation/Analgesia Post Sedation Record        Pt Name: Sebastian Landers  MRN: [id number]  YOB: 1955  Procedure Performed By: Ria Dorman MD  Primary Care Physician: Deana Devi MD        POST-PROCEDURE    Physicians/Assistants: Rai Dorman MD    Procedure Performed:  Left heart cath and FFR guided PCI to mid LAD                                  Sedation/Anesthesia:  Local Anesthesia and IV Conscious Sedation with continuous O2 monitoring    Estimated Blood Loss: 10 cc     Specimens Removed:  [x]None []Other:      Disposition of Specimen:  []Pathology []Other        Complications:   [x]None Immediate []Other:       Post Procedure Diagnosis/Findings:    Coronary Artery Disease   Patent VG to RCA  Patent VG to OM  Occluded LIMA to LAD (Known)  Occluded Radial to Diagonal (Known)  Mid LAD with hazy 60-70% stenosis; FFR was 0.73 (significant )  S/p PCI of mid LAD with JORGE         Recommendations:    DAPT for 1 yr  Aggressive risk factor modification  Lipid lowering therapy  Cardiac rehab  Monitor groin site  Follow up in clinci in 2-4 weeks              Rai Dorman MD  Electronically signed 7/12/2019 at 1:17 PM

## 2019-07-12 NOTE — PLAN OF CARE
Care plan reviewed with patient and wife. Patient and wife verbalize understanding of the plan of care and contribute to goal setting.

## 2019-07-12 NOTE — H&P
Aneurysm ([facility] [address] 02/2017    Ascending, 4.2 cm per pt    Brown's esophagus     CAD (coronary artery disease)     Cancer (HCC)     throat cancer, s/p radiation    Chronic back pain     GERD (gastroesophageal reflux disease)     Hepatitis C     as child    History of hyperbaric oxygen therapy     after bottom teeth removed after rdiation therapy    Hyperlipidemia     Hypertension     Left shoulder pain     nerve pain , ? secondary to radiation, has had \" numbing shot \"    Nausea & vomiting     FOUZIA on CPAP     Osteoradionecrosis of mandible     s/p radiation for throat cancer    PONV (postoperative nausea and vomiting)     only after OHS    S/P CABG [facility]coronary artery bypass graft) 2002    [facility]    Snores     Thyroid disease     Vision loss of right eye     during hyperbaric chamber oxygen therapy     Vitreous opacities     right    Wears dentures     full upper, no bottom teeth and no use of dentures on bottom         Past Surgical History:   Procedure Laterality Date    CARDIAC CATHETERIZATION  8/2002, 4/2004    [facility]    COLONOSCOPY      CORONARY ANGIOPLASTY WITH STENT PLACEMENT  7/2002    [facility]    CORONARY ARTERY BYPASS GRAFT  2002    [facility]    DENTAL SURGERY      bottom teeth removal    HEMORRHOID SURGERY      KNEE SURGERY Right     LARYNGOSCOPY N/A 10/4/2017    LARYNGOSCOPY MICRO WITH BIOPSY performed by Malik Martinez MD at [address]  04/28/2016    by Dr Annetta Donohue VITREOUS,SUBRET/CHOROID FLUID Right 8/27/2018    VITRECTOMY 25 GAUGE performed by Price Caicedo MD at [address] VITRECTOMY Right 08/27/2018     VITRECTOMY 25 GAUGE (Right Eye)           No Known Allergies      MEDICATIONS   Coumadin Use Last 5 Days [x]No []Yes  Antiplatelet drug therapy use last 5 days  []No [x]Yes  Other anticoagulant use last 5 days  [x]No []Yes      No current facility-administered medications on file prior to encounter.       Current Outpatient Medications on File

## 2019-07-12 NOTE — PROGRESS NOTES
Pt admitted to   [address] ambulatory for cardiac cath. Pt NPO. Patient accompanied by wife. Vital signs obtained. Assessment and data collection initiated. Oriented to room. Policies and procedures for 2E explained   All questions answered with no further questions at this time.

## 2019-07-12 NOTE — PROGRESS NOTES
[address] 56 taken over from cath lab, right groin stable . Patient reinstructed on bedrest, instructed to keep legs straight, not to cross legs, not to lift head off of pillow, not to laugh hard, if coughs to guard site with hand, voices understanding, taking water without difficulty. Ancef is infused. 1 Dr Liberty Maldonado called regarding [employer] order, coupon given to wife. 1600 Dr Liberty Maldonado called regarding 5 beats VT, discharge cancelled. 1640 Up in [place],  tolerated activity well. 1710 Report to [facility] nurse on 3b.

## 2019-07-13 VITALS
SYSTOLIC BLOOD PRESSURE: 149 MMHG | DIASTOLIC BLOOD PRESSURE: 78 MMHG | RESPIRATION RATE: 18 BRPM | WEIGHT: 190.3 LBS | BODY MASS INDEX: 26.64 KG/M2 | HEART RATE: 57 BPM | TEMPERATURE: 98.2 F | OXYGEN SATURATION: 96 % | HEIGHT: 71 IN

## 2019-07-13 PROCEDURE — 99238 HOSP IP/OBS DSCHRG MGMT 30/<: CPT | Performed by: NURSE PRACTITIONER

## 2019-07-13 PROCEDURE — 6370000000 HC RX 637 (ALT 250 FOR IP): Performed by: INTERNAL MEDICINE

## 2019-07-13 RX ORDER — ATORVASTATIN CALCIUM 40 MG/1
40 TABLET, FILM COATED ORAL DAILY
Qty: 30 TABLET | Refills: 3 | Status: SHIPPED | OUTPATIENT
Start: 2019-07-13 | End: 2019-07-29 | Stop reason: SDUPTHER

## 2019-07-13 RX ORDER — NITROGLYCERIN 0.4 MG/1
TABLET SUBLINGUAL
Qty: 25 TABLET | Refills: 3 | Status: SHIPPED | OUTPATIENT
Start: 2019-07-13 | End: 2021-01-12 | Stop reason: SDUPTHER

## 2019-07-13 RX ORDER — ATORVASTATIN CALCIUM 40 MG/1
40 TABLET, FILM COATED ORAL DAILY
Status: DISCONTINUED | OUTPATIENT
Start: 2019-07-13 | End: 2019-07-13 | Stop reason: HOSPADM

## 2019-07-13 RX ORDER — METOPROLOL SUCCINATE 50 MG/1
50 TABLET, EXTENDED RELEASE ORAL 2 TIMES DAILY
Qty: 30 TABLET | Refills: 3 | Status: SHIPPED | OUTPATIENT
Start: 2019-07-13 | End: 2019-07-29 | Stop reason: SDUPTHER

## 2019-07-13 RX ORDER — NITROGLYCERIN 0.4 MG/1
0.4 TABLET SUBLINGUAL EVERY 5 MIN PRN
Qty: 25 TABLET | Refills: 3 | Status: SHIPPED
Start: 2019-07-13 | End: 2019-07-13 | Stop reason: HOSPADM

## 2019-07-13 RX ADMIN — GABAPENTIN 300 MG: 300 CAPSULE ORAL at 09:58

## 2019-07-13 RX ADMIN — METOPROLOL SUCCINATE 50 MG: 25 TABLET, EXTENDED RELEASE ORAL at 09:59

## 2019-07-13 RX ADMIN — LEVOTHYROXINE SODIUM 50 MCG: 0.05 TABLET ORAL at 06:42

## 2019-07-13 RX ADMIN — TICAGRELOR 90 MG: 90 TABLET ORAL at 09:57

## 2019-07-13 RX ADMIN — ASPIRIN 81 MG: 81 TABLET ORAL at 09:58

## 2019-07-13 RX ADMIN — OMEPRAZOLE 20 MG: 20 CAPSULE, DELAYED RELEASE ORAL at 09:59

## 2019-07-13 ASSESSMENT — PAIN SCALES - GENERAL: PAINLEVEL_OUTOF10: 0

## 2019-07-13 NOTE — PROGRESS NOTES
1100:  Discharge teaching and instructions for diagnosis/procedure of cardiac catherization completed with patient using teachback method. AVS reviewed. prescriptions were picked up by the patients wife and she had the brilinta coupon and used it for the brilinta. . Patient voiced understanding regarding prescriptions, follow up appointments, and care of self at home. Discharged in a wheelchair to  home with support per self     Patients cath site, right groin, is soft dry and intact. He denies any chest pain or sob. Teachback method was used to make sure he understands what meds are new and when all medications are due  Wife was present during the discharge instructions.

## 2019-07-13 NOTE — DISCHARGE SUMMARY
Cardiology Discharge Summary      Patient Identification:  Felicity Zavaleta  : 1955  MRN: [id number]   Account: [de-identified]     Admit date: 2019  Discharge date: 19    Attending provider: Willie Polanco MD        Primary care provider: Palmira Kiran MD     Admission Diagnoses:  Angina  VILLANUEVA  Abnormal stress test with mild to mod anterior and apical ischemia  CAD S/P CABG (LIMA to LAD, VG to OM and VG RCA)  , S/P PCI  HTN  HLD  H/o thoracic aneurysm 4.1 cm  FOUZIA on CPAP      Discharge Diagnoses:    Abnormal stress test with mild to mod anterior and apical ischemia  CAD S/P CABG (LIMA to LAD, VG to OM and VG RCA)  , S/P PCI  HTN  HLD  H/o thoracic anuerysm 4.1 cm  FOUZIA on CPAP  S/P Left heart catheterization, Graft angiography, LV Gram, FFR of LAD, and PCI / JORGE to mid LAD    Hospital Course:   Felicity Zavaleta is a 70 y.o. male with history of intermittent chest pain, increasing sob with exertion, abnormal stress test, CAD with H/o CABG and PCI, HTN, HLD, thoracic aneurysm, and FOUZIA on CPAP admitted to [address] [place] [address] on 2019 for LHC with Dr. Salvador Diamond to further evaluate coronary artery anatomy. LHC revealed mid LAD disease confirmed with FFR and subsequent PCI / JORGE of the mid LAD was completed. He convalesced overnight without complaints or events and on 19 was hemodynamically stable and agreeable to discharge home. At the time of discharge Lizbet Madera was pain free, in SR, ambulating without difficulty or complaints, and right groin procedure site soft without pain, bleeding, drainage, redness, or warmth. Right lower extremity with normal color / temperature, evident movement / sensation, and pulses present. He will follow up in cardiology office in 1-2 weeks.            Procedures:   Left heart catheterization  Graft angiography  LV Gram  FFR of LAD  PCI / JORGE to mid LAD    Code Status: Full Code     Consults:   none    Examination:  Vitals:BP changed:    · medication strength  · how much to take  · Another medication with the same name was removed. Continue taking this medication, and follow the directions you see here. nitroGLYCERIN 0.4 MG SL tablet  Commonly known as:  NITROSTAT  up to max of 3 total doses. If no relief after 1 dose, call 911. What changed:    · medication strength  · additional instructions        CONTINUE taking these medications    acetaminophen 500 MG tablet  Commonly known as:  TYLENOL     ARGININE PO     aspirin EC 81 MG EC tablet     Co Q-10 200 MG Caps     CPAP Machine Misc  by Does not apply route Please change CPAP pressure to 8 cm H20.     gabapentin 300 MG capsule  Commonly known as:  NEURONTIN  Take 1 capsule by mouth 3 times daily for 180 days. Intended supply: 90 days     ipratropium 0.06 % nasal spray  Commonly known as:  ATROVENT  2 sprays by Nasal route 3 times daily     levothyroxine 50 MCG tablet  Commonly known as:  SYNTHROID  Take 1 tablet by mouth Daily     MULTI-VITAMIN Tabs     omeprazole 20 MG delayed release capsule  Commonly known as:  PRILOSEC     terazosin 5 MG capsule  Commonly known as:  HYTRIN  Take 1 capsule by mouth nightly     TYLENOL PM EXTRA STRENGTH  MG tablet  Generic drug:  diphenhydrAMINE-APAP (sleep)           Where to Get Your Medications      These medications were sent to [address] [place] [address] [phone]  [place] [address]    Phone:  [phone]   · atorvastatin 40 MG tablet  · metoprolol succinate 50 MG extended release tablet  · nitroGLYCERIN 0.4 MG SL tablet  · ticagrelor 90 MG Tabs tablet         Significant Diagnostics:   Radiology: No results found.     Labs:   Recent Results (from the past 72 hour(s))   APTT    Collection Time: 07/12/19  8:15 AM   Result Value Ref Range    aPTT 30.9 22.0 - 38.0 seconds   Basic Metabolic Panel w/ Reflex to MG    Collection Time: 07/12/19  8:15 AM Result Value Ref Range    Sodium 141 135 - 145 meq/L    Potassium reflex Magnesium 4.0 3.5 - 5.2 meq/L    Chloride 104 98 - 111 meq/L    CO2 26 23 - 33 meq/L    Glucose 105 70 - 108 mg/dL    BUN 15 7 - 22 mg/dL    CREATININE 0.8 0.4 - 1.2 mg/dL    Calcium 9.4 8.5 - 10.5 mg/dL   CBC    Collection Time: 07/12/19  8:15 AM   Result Value Ref Range    WBC 5.3 4.8 - 10.8 thou/mm3    RBC 4.87 4.70 - 6.10 mill/mm3    Hemoglobin 15.0 14.0 - 18.0 gm/dl    Hematocrit 46.0 42.0 - 52.0 %    MCV 94.5 (H) 80.0 - 94.0 fL    MCH 30.8 26.0 - 33.0 pg    MCHC 32.6 32.2 - 35.5 gm/dl    RDW-CV 12.5 11.5 - 14.5 %    RDW-SD 43.0 35.0 - 45.0 fL    Platelets 434 726 - 156 thou/mm3    MPV 9.7 9.4 - 12.4 fL   Protime-INR    Collection Time: 07/12/19  8:15 AM   Result Value Ref Range    INR 0.97 0.85 - 1.13   TYPE AND SCREEN    Collection Time: 07/12/19  8:15 AM   Result Value Ref Range    ABO O     Rh Factor NEG     Antibody Screen NEG    Anion Gap    Collection Time: 07/12/19  8:15 AM   Result Value Ref Range    Anion Gap 11.0 8.0 - 16.0 meq/L   Glomerular Filtration Rate, Estimated    Collection Time: 07/12/19  8:15 AM   Result Value Ref Range    Est, Glom Filt Rate >90 ml/min/1.73m2   Electrocardiogram, 12-lead     Collection Time: 07/12/19  8:50 AM   Result Value Ref Range    Ventricular Rate 56 BPM    Atrial Rate 56 BPM    P-R Interval 142 ms    QRS Duration 82 ms    Q-T Interval 414 ms    QTc Calculation (Bazett) 399 ms    P Axis 17 degrees    R Axis 27 degrees    T Axis 69 degrees   POCT activated clotting time    Collection Time: 07/12/19 12:21 PM   Result Value Ref Range    Activated Clotting Time 246 (H) 1 - 150 seconds     LHC / PCI : 7/12/19  CORONARY ANGIOGRAM:  1. Right coronary artery has a mild to moderate luminal irregularities  in the proximal portion. Mid portion has total occlusion. Distally the  flow is received by the graft to the stent. 2.  Left main artery is patent and it gives rise to LAD and circumflex.   3.

## 2019-07-18 ASSESSMENT — ENCOUNTER SYMPTOMS
NAUSEA: 0
ABDOMINAL PAIN: 0
ABDOMINAL DISTENTION: 0
COUGH: 0
DIARRHEA: 0
BACK PAIN: 1
PHOTOPHOBIA: 0
CONSTIPATION: 0
SINUS PAIN: 0
WHEEZING: 0
SHORTNESS OF BREATH: 0
BLOOD IN STOOL: 0
SINUS PRESSURE: 0
TROUBLE SWALLOWING: 1
CHOKING: 0
VOMITING: 0

## 2019-07-24 RX ORDER — IPRATROPIUM BROMIDE 42 UG/1
2 SPRAY, METERED NASAL 3 TIMES DAILY
Qty: 2 EACH | Refills: 11 | Status: SHIPPED | OUTPATIENT
Start: 2019-07-24 | End: 2021-04-13 | Stop reason: SDUPTHER

## 2019-07-26 ENCOUNTER — OFFICE VISIT (OUTPATIENT)
Dept: ENT CLINIC | Age: 64
End: 2019-07-26
Payer: MEDICARE

## 2019-07-26 VITALS
DIASTOLIC BLOOD PRESSURE: 64 MMHG | HEART RATE: 64 BPM | SYSTOLIC BLOOD PRESSURE: 122 MMHG | HEIGHT: 71 IN | TEMPERATURE: 97.9 F | WEIGHT: 167 LBS | BODY MASS INDEX: 23.38 KG/M2 | RESPIRATION RATE: 12 BRPM

## 2019-07-26 DIAGNOSIS — T66.XXXD EFFECTS, RADIATION, SUBSEQUENT ENCOUNTER: ICD-10-CM

## 2019-07-26 DIAGNOSIS — K14.8 LARGE TONGUE: ICD-10-CM

## 2019-07-26 DIAGNOSIS — G47.33 OSA ON CPAP: ICD-10-CM

## 2019-07-26 DIAGNOSIS — K21.00 GASTROESOPHAGEAL REFLUX DISEASE WITH ESOPHAGITIS: ICD-10-CM

## 2019-07-26 DIAGNOSIS — Z99.89 OSA ON CPAP: ICD-10-CM

## 2019-07-26 DIAGNOSIS — K22.70 BARRETT'S ESOPHAGUS WITHOUT DYSPLASIA: ICD-10-CM

## 2019-07-26 DIAGNOSIS — R09.89 THROAT CLEARING: ICD-10-CM

## 2019-07-26 DIAGNOSIS — C32.9 PRIMARY SQUAMOUS CELL CARCINOMA OF LARYNX (HCC): Primary | ICD-10-CM

## 2019-07-26 DIAGNOSIS — H53.131 VISION, LOSS, SUDDEN, RIGHT: ICD-10-CM

## 2019-07-26 DIAGNOSIS — E03.4 HYPOTHYROIDISM DUE TO ACQUIRED ATROPHY OF THYROID: ICD-10-CM

## 2019-07-26 DIAGNOSIS — K05.6 PERIODONTAL DISEASE: ICD-10-CM

## 2019-07-26 DIAGNOSIS — J39.2 THROAT DRYNESS: ICD-10-CM

## 2019-07-26 PROCEDURE — 4040F PNEUMOC VAC/ADMIN/RCVD: CPT | Performed by: OTOLARYNGOLOGY

## 2019-07-26 PROCEDURE — 1036F TOBACCO NON-USER: CPT | Performed by: OTOLARYNGOLOGY

## 2019-07-26 PROCEDURE — 1123F ACP DISCUSS/DSCN MKR DOCD: CPT | Performed by: OTOLARYNGOLOGY

## 2019-07-26 PROCEDURE — 31575 DIAGNOSTIC LARYNGOSCOPY: CPT | Performed by: OTOLARYNGOLOGY

## 2019-07-26 PROCEDURE — G8427 DOCREV CUR MEDS BY ELIG CLIN: HCPCS | Performed by: OTOLARYNGOLOGY

## 2019-07-26 PROCEDURE — G8420 CALC BMI NORM PARAMETERS: HCPCS | Performed by: OTOLARYNGOLOGY

## 2019-07-26 PROCEDURE — 1111F DSCHRG MED/CURRENT MED MERGE: CPT | Performed by: OTOLARYNGOLOGY

## 2019-07-26 PROCEDURE — 3017F COLORECTAL CA SCREEN DOC REV: CPT | Performed by: OTOLARYNGOLOGY

## 2019-07-26 PROCEDURE — G8598 ASA/ANTIPLAT THER USED: HCPCS | Performed by: OTOLARYNGOLOGY

## 2019-07-26 ASSESSMENT — ENCOUNTER SYMPTOMS
WHEEZING: 0
DIARRHEA: 0
ABDOMINAL PAIN: 0
NAUSEA: 0
COUGH: 0
VOICE CHANGE: 0
CHEST TIGHTNESS: 0
COLOR CHANGE: 0
RHINORRHEA: 0
STRIDOR: 0
VOMITING: 0
SORE THROAT: 0
SINUS PRESSURE: 0
SHORTNESS OF BREATH: 0
FACIAL SWELLING: 0
TROUBLE SWALLOWING: 0
APNEA: 0
CHOKING: 0

## 2019-07-26 NOTE — PROGRESS NOTES
times daily      aspirin EC 81 MG EC tablet Take 81 mg by mouth daily      CPAP Machine MISC by Does not apply route Please change CPAP pressure to 8 cm H20. 1 each 0    Coenzyme Q10 (CO Q-10) 200 MG CAPS Take  by mouth daily.  Multiple Vitamin (MULTI-VITAMIN) TABS Take  by mouth daily.  ARGININE PO Take 500 mg by mouth daily. 2 tab       No current facility-administered medications for this visit.       Past Medical History:   Diagnosis Date    Ascending Aneurysm ([address] 02/2017    Ascending, 4.2 cm per pt    Brown's esophagus     CAD (coronary artery disease)     Cancer (HCC)     throat cancer, s/p radiation    Chronic back pain     GERD (gastroesophageal reflux disease)     Hepatitis C     as child    History of hyperbaric oxygen therapy     after bottom teeth removed after rdiation therapy    Hyperlipidemia     Hypertension     Left shoulder pain     nerve pain , ? secondary to radiation, has had \" numbing shot \"    Nausea & vomiting     FOUZIA on CPAP     Osteoradionecrosis of mandible     s/p radiation for throat cancer    PONV (postoperative nausea and vomiting)     only after OHS    [facility]cor[facility]    Snores     Thyroid disease     Vision loss of right eye     during hyperbaric chamber oxygen therapy     Vitreous opacities     right    Wears dentures     full upper, no bottom teeth and no use of dentures on bottom      Past Surgical History:   Procedure Laterality Date    CARDIAC [facility]HETERIZATION  8/2002, 4/2004    [facility]    COLONOSCOPY      CORONARY ANGIOPLASTY WITH STENT PLACEMENT  7/2002    [facility]    CORONARY ANGIOPLASTY WITH STENT PLACEMENT N/A 07/12/2019    MID LAD    CORONARY ARTERY BYPASS GRAFT  2002    [facility]    DENTAL SURGERY      bottom teeth removal    HEMORRHOID SURGERY      KNEE SURGERY Right     LARYNGOSCOPY N/A 10/4/2017    LARYNGOSCOPY MICRO WITH BIOPSY performed by Bertrand Marin MD at [address] [place] [address] 15 [address]

## 2019-07-29 ENCOUNTER — OFFICE VISIT (OUTPATIENT)
Dept: CARDIOLOGY CLINIC | Age: 64
End: 2019-07-29
Payer: MEDICARE

## 2019-07-29 VITALS
WEIGHT: 188 LBS | HEART RATE: 58 BPM | BODY MASS INDEX: 26.32 KG/M2 | HEIGHT: 71 IN | DIASTOLIC BLOOD PRESSURE: 78 MMHG | SYSTOLIC BLOOD PRESSURE: 116 MMHG

## 2019-07-29 DIAGNOSIS — I25.10 CORONARY ARTERY DISEASE INVOLVING NATIVE CORONARY ARTERY OF NATIVE HEART WITHOUT ANGINA PECTORIS: Primary | ICD-10-CM

## 2019-07-29 DIAGNOSIS — Z95.1 S/P CABG (CORONARY ARTERY BYPASS GRAFT): ICD-10-CM

## 2019-07-29 DIAGNOSIS — I10 ESSENTIAL HYPERTENSION: ICD-10-CM

## 2019-07-29 DIAGNOSIS — Z95.820 S/P ANGIOPLASTY WITH STENT: ICD-10-CM

## 2019-07-29 PROCEDURE — 1111F DSCHRG MED/CURRENT MED MERGE: CPT | Performed by: PHYSICIAN ASSISTANT

## 2019-07-29 PROCEDURE — 3017F COLORECTAL CA SCREEN DOC REV: CPT | Performed by: PHYSICIAN ASSISTANT

## 2019-07-29 PROCEDURE — G8417 CALC BMI ABV UP PARAM F/U: HCPCS | Performed by: PHYSICIAN ASSISTANT

## 2019-07-29 PROCEDURE — G8598 ASA/ANTIPLAT THER USED: HCPCS | Performed by: PHYSICIAN ASSISTANT

## 2019-07-29 PROCEDURE — 1036F TOBACCO NON-USER: CPT | Performed by: PHYSICIAN ASSISTANT

## 2019-07-29 PROCEDURE — 1123F ACP DISCUSS/DSCN MKR DOCD: CPT | Performed by: PHYSICIAN ASSISTANT

## 2019-07-29 PROCEDURE — 99213 OFFICE O/P EST LOW 20 MIN: CPT | Performed by: PHYSICIAN ASSISTANT

## 2019-07-29 PROCEDURE — 4040F PNEUMOC VAC/ADMIN/RCVD: CPT | Performed by: PHYSICIAN ASSISTANT

## 2019-07-29 PROCEDURE — G8428 CUR MEDS NOT DOCUMENT: HCPCS | Performed by: PHYSICIAN ASSISTANT

## 2019-07-29 RX ORDER — ATORVASTATIN CALCIUM 40 MG/1
40 TABLET, FILM COATED ORAL DAILY
Qty: 90 TABLET | Refills: 1 | Status: SHIPPED | OUTPATIENT
Start: 2019-07-29 | End: 2020-03-30 | Stop reason: SDUPTHER

## 2019-07-29 RX ORDER — METOPROLOL SUCCINATE 50 MG/1
50 TABLET, EXTENDED RELEASE ORAL 2 TIMES DAILY
Qty: 180 TABLET | Refills: 1 | Status: SHIPPED | OUTPATIENT
Start: 2019-07-29 | End: 2019-11-19 | Stop reason: SDUPTHER

## 2019-07-29 NOTE — PROGRESS NOTES
needs: Medical: None     Non-medical: None   Tobacco Use    Smoking status: Former Smoker     Packs/day: 2.00     Years: 20.00     Pack years: 40.00     Last attempt to quit: 2001     Years since quittin.6    Smokeless tobacco: Never Used   Substance and Sexual Activity    Alcohol use: No     Alcohol/week: 0.0 standard drinks    Drug use: No    Sexual activity: None   Lifestyle    Physical activity:     Days per week: None     Minutes per session: None    Stress: None   Relationships    Social connections:     Talks on phone: None     Gets together: None     Attends Scientologist service: None     Active member of club or organization: None     Attends meetings of clubs or organizations: None     Relationship status: None    Intimate partner violence:     Fear of current or ex partner: None     Emotionally abused: None     Physically abused: None     Forced sexual activity: None   Other Topics Concern    None   Social History Narrative    None       Family History   Problem Relation Age of Onset    Heart Disease Father         MI    High Blood Pressure Father     Cancer Maternal Grandmother     Cancer Maternal Grandfather        Blood pressure 116/78, pulse 58, height 5' 11\" (1.803 m), weight 188 lb (85.3 kg). General:   Well developed, well nourished  Lungs:   Clear to auscultation  Heart:    Normal S1 S2, No murmur, rubs, or gallops  Abdomen:   Soft, non tender, no organomegalies, positive bowel sounds  Extremities:   No edema, no cyanosis, good peripheral pulses  Neurological:   Awake, alert, oriented. No obvious focal deficits  Musculoskeletal:  No obvious deformities        Cardiac catheterization 2019  Successful FFR-guided PCI of mid LAD with 2.5 x 28 mm Xience  Jayna stent which was post dilated to 2.8 proximally and 2.5 distally.        Diagnosis Orders   1. Coronary artery disease involving native coronary artery of native heart without angina pectoris     2.  S/P

## 2019-08-02 NOTE — PLAN OF CARE
Aerobic endurance goal to be measured in minutes. Start endurance training per patient tolerance at 1-8 minutes per exercise type, progressing to a total of 31-60 minutes using various modes of training (see Exercise Prescription). Progression:    [x] Weekly 5-10% intensity progression, as tolerated, during cardiac rehab sessions. [x] 13-17 Rate of Perceived Exertion on the Amador Scale (6-20). Measurable Muscular Strength Goal:  Starting at 1-5 lbs x 8 reps, progressing to 5-25 lbs x 15 reps per patient tolerance.       Electronically signed by Brandie Royal RN on 8/2/19 at 3:49 PM

## 2019-08-08 ENCOUNTER — HOSPITAL ENCOUNTER (OUTPATIENT)
Dept: CARDIAC REHAB | Age: 64
Setting detail: THERAPIES SERIES
Discharge: HOME | End: 2019-08-08
Payer: MEDICARE

## 2019-08-08 PROCEDURE — G0423 INTENS CARDIAC REHAB NO EXER: HCPCS

## 2019-08-08 PROCEDURE — G0422 INTENS CARDIAC REHAB W/EXERC: HCPCS

## 2019-08-08 NOTE — PLAN OF CARE
Depression Present? [] Yes      [x] No  If yes, please explain:   Signs and Symptoms of Depression Present? [] Yes      [] No  If yes, please explain:  ** Signs and Symptoms of Depression Present? [] Yes      [] No  If yes, please explain:  ** Signs and Symptoms of Depression Present? [] Yes      [] No  If yes, please explain:  ** Signs and Symptoms of Depression Present? [] Yes      [] No  If yes, please explain:  **   Signs and Symptoms of Anxiety Present? [] Yes      [x] No  If yes, please explain:   Signs and Symptoms of Anxiety Present? [] Yes      [] No  If yes, please explain:  ** Signs and Symptoms of Anxiety Present? [] Yes      [] No  If yes, please explain:  ** Signs and Symptoms of Anxiety Present? [] Yes      [] No  If yes, please explain:  ** Signs and Symptoms of Anxiety Present? [] Yes      [] No  If yes, please explain:  **   [] Patient has poor eye contact   [] Flat affect present. [] Signs of anxiety, anger or hostility    [] Signs social isolation present. []  Signs of alcohol or substance abuse       PSYCHOSOCIAL PLAN PSYCHOSOCIAL PLAN PSYCHOSOCIAL PLAN PSYCHOSOCIAL PLAN PSYCHOSOCIAL PLAN   *Interventions* *Interventions* *Interventions* *Interventions* *Interventions*   *Please check if needed  [] Psych Consult  [] Physician Referral  [x] Stress Management Skills *Please check if needed  [] Psych Consult  [] Physician Referral  [] Stress Management Skills *Please check if needed  [] Psych Consult  [] Physician Referral  [] Stress Management Skills *Please check if needed  [] Psych Consult  [] Physician Referral  [] Stress Management Skills *Please check if needed  [] Psych Consult  [] Physician Referral  [] Stress Management Skills   Is patient currently taking anti-depressant or anti-anxiety medications? [] Yes      [x] No  If yes, please list medications:   Change in anti-depressant or anti-anxiety medications?   [] Yes      [] No  If yes, please list recommended video education sessions  [x] Takes medications as prescribed 100% of the time   [] ** Abel's risk factor/education goals are as follows:    [x] Optimal BP <140/90  [] Blood Sugar <120  [x] Attend recommended video education sessions  [x] Takes medications as prescribed 100% of the time   [] ** Abel's risk factor/education goals are as follows:    [x] Optimal BP <140/90  [] Blood Sugar <120  [x] Attend recommended video education sessions  [x] Takes medications as prescribed 100% of the time   [] ** Kathryn Weston achieved risk factor goals?   [] Yes    [] No  If no, why?  **     Monitored telemetry has revealed SB/NSR    [x] associated symptoms SOB Monitored telemetry has revealed **  [] documented arrhythmia at increasing workloads  [] associated symptoms ** Monitored telemetry has revealed  [] documented arrhythmia at increasing workloads  [] associated symptoms ** Monitored telemetry has revealed **  [] documented arrhythmia at increasing workloads  [] associated symptoms ** Monitored telemetry has revealed **  [] documented arrhythmia at increasing workloads  [] associated symptoms **   Physician Response    [x] Cardiac rehab is reasonably and medically necessary for continuous cardiac monitoring surveillance  of patient's cardiac activity  [x] Initiate continuous telemerty monitoring and notify me with any concerns  [] Other   Physician Response    [x] Cardiac rehab is reasonably and medically necessary for continuous cardiac monitoring surveillance  of patient's cardiac activity  [x] Continue continuous telemerty monitoring and notify me with any concerns  [] Other     Physician Response      [x] Cardiac rehab is reasonably and medically necessary for continuous cardiac monitoring surveillance  of patient's cardiac activity  [x] Continue continuous telemerty monitoring and notify me with any concerns   [] Other     Physician Response    [x] Cardiac rehab is reasonably and medically necessary for continuous

## 2019-08-12 ENCOUNTER — HOSPITAL ENCOUNTER (OUTPATIENT)
Dept: CARDIAC REHAB | Age: 64
Setting detail: THERAPIES SERIES
Discharge: HOME | End: 2019-08-12
Payer: MEDICARE

## 2019-08-12 PROCEDURE — G0422 INTENS CARDIAC REHAB W/EXERC: HCPCS

## 2019-08-12 PROCEDURE — G0423 INTENS CARDIAC REHAB NO EXER: HCPCS

## 2019-08-14 ENCOUNTER — HOSPITAL ENCOUNTER (OUTPATIENT)
Dept: CARDIAC REHAB | Age: 64
Setting detail: THERAPIES SERIES
Discharge: HOME | End: 2019-08-14
Payer: MEDICARE

## 2019-08-14 PROCEDURE — G0423 INTENS CARDIAC REHAB NO EXER: HCPCS

## 2019-08-14 PROCEDURE — G0422 INTENS CARDIAC REHAB W/EXERC: HCPCS

## 2019-08-14 NOTE — PROGRESS NOTES
Sherry BETTS.:  1955    Acct Number: [de-identified]   MRN:  [id number]                             [facility] NUTRITION WORKSHOP             Date: 2019        Session # _______    Cesar Linares class covered:    ()  Label Reading  ()  Menus  ()  Targeting Nutrition Priorities  (X)  Fueling a Healthy Body    Readiness to change:    ( ) Pre-contemplative   ( ) Contemplative - ambivalent about change    ( ) Action - ready to set action plan and implement   (X ) Maintenance - has made change and is trying, and or practicing different alternative behaviors      Kaitlynn Rabago was in the Workshop with the Dietitian for 40 minutes. The content was presented via Powerpoint, lecture, and patient participation based format. Motivational interviewing was utilized when needed, to promote change. Patient voiced understanding.     Electronically signed by Anurag Gomez RD LD [address]  on 2019 at 3:17 PM

## 2019-08-16 ENCOUNTER — HOSPITAL ENCOUNTER (OUTPATIENT)
Dept: CARDIAC REHAB | Age: 64
Setting detail: THERAPIES SERIES
Discharge: HOME | End: 2019-08-16
Payer: MEDICARE

## 2019-08-16 PROCEDURE — G0422 INTENS CARDIAC REHAB W/EXERC: HCPCS

## 2019-08-16 PROCEDURE — G0423 INTENS CARDIAC REHAB NO EXER: HCPCS

## 2019-08-19 ENCOUNTER — HOSPITAL ENCOUNTER (OUTPATIENT)
Dept: CARDIAC REHAB | Age: 64
Setting detail: THERAPIES SERIES
Discharge: HOME | End: 2019-08-19
Payer: MEDICARE

## 2019-08-19 PROCEDURE — G0423 INTENS CARDIAC REHAB NO EXER: HCPCS

## 2019-08-19 PROCEDURE — G0422 INTENS CARDIAC REHAB W/EXERC: HCPCS

## 2019-08-21 ENCOUNTER — HOSPITAL ENCOUNTER (OUTPATIENT)
Dept: CARDIAC REHAB | Age: 64
Setting detail: THERAPIES SERIES
Discharge: HOME | End: 2019-08-21
Payer: MEDICARE

## 2019-08-21 PROCEDURE — G0423 INTENS CARDIAC REHAB NO EXER: HCPCS

## 2019-08-21 PROCEDURE — G0422 INTENS CARDIAC REHAB W/EXERC: HCPCS

## 2019-08-23 ENCOUNTER — HOSPITAL ENCOUNTER (OUTPATIENT)
Dept: CARDIAC REHAB | Age: 64
Setting detail: THERAPIES SERIES
Discharge: HOME | End: 2019-08-23
Payer: MEDICARE

## 2019-08-23 PROCEDURE — G0422 INTENS CARDIAC REHAB W/EXERC: HCPCS

## 2019-08-23 PROCEDURE — G0423 INTENS CARDIAC REHAB NO EXER: HCPCS

## 2019-08-26 ENCOUNTER — HOSPITAL ENCOUNTER (OUTPATIENT)
Dept: CARDIAC REHAB | Age: 64
Setting detail: THERAPIES SERIES
Discharge: HOME | End: 2019-08-26
Payer: MEDICARE

## 2019-08-26 PROCEDURE — G0422 INTENS CARDIAC REHAB W/EXERC: HCPCS

## 2019-08-26 PROCEDURE — G0423 INTENS CARDIAC REHAB NO EXER: HCPCS

## 2019-08-28 ENCOUNTER — HOSPITAL ENCOUNTER (OUTPATIENT)
Dept: CARDIAC REHAB | Age: 64
Setting detail: THERAPIES SERIES
Discharge: HOME | End: 2019-08-28
Payer: MEDICARE

## 2019-08-28 PROCEDURE — G0423 INTENS CARDIAC REHAB NO EXER: HCPCS

## 2019-08-28 PROCEDURE — G0422 INTENS CARDIAC REHAB W/EXERC: HCPCS

## 2019-08-28 NOTE — PROGRESS NOTES
Video Education Report - ICR/CR    Name:  Alfonso Romeo     Date:  8/28/2019  MRN: [id number]     Session #:    Session Length: 40 min    Recommended Videos        []01 [employer] - Program Overview   34:22    []02 Overview of [employer] Eating Plan   34:10    []03 Becoming a Pritikin    33:08     []04 Diseases of Our Time - Part 1   34:22    [x]05 Calorie Density     33:39       Comments:  Video completed,

## 2019-08-29 ENCOUNTER — HOSPITAL ENCOUNTER (OUTPATIENT)
Dept: RADIATION ONCOLOGY | Age: 64
Discharge: HOME | End: 2019-08-29
Payer: MEDICARE

## 2019-08-29 VITALS
WEIGHT: 187.61 LBS | SYSTOLIC BLOOD PRESSURE: 126 MMHG | HEART RATE: 53 BPM | DIASTOLIC BLOOD PRESSURE: 65 MMHG | RESPIRATION RATE: 18 BRPM | TEMPERATURE: 96.3 F | OXYGEN SATURATION: 96 % | BODY MASS INDEX: 26.17 KG/M2

## 2019-08-29 PROCEDURE — 99212 OFFICE O/P EST SF 10 MIN: CPT | Performed by: RADIOLOGY

## 2019-08-30 ENCOUNTER — HOSPITAL ENCOUNTER (OUTPATIENT)
Dept: CARDIAC REHAB | Age: 64
Setting detail: THERAPIES SERIES
Discharge: HOME | End: 2019-08-30
Payer: MEDICARE

## 2019-08-30 PROCEDURE — G0422 INTENS CARDIAC REHAB W/EXERC: HCPCS

## 2019-08-30 PROCEDURE — G0423 INTENS CARDIAC REHAB NO EXER: HCPCS

## 2019-08-30 NOTE — PROGRESS NOTES
Video Education Report - ICR/CR    Name:  Felicity Zavaleta     Date:  8/30/2019  MRN: [id number]     Session #:    Session Length: 40 min    Recommended Videos        []01 [employer] - Program Overview   34:22    []02 Overview of [employer] Eating Plan   34:10    []03 Becoming a Pritikin    33:08     []04 Diseases of Our Time - Part 1   34:22    []05 Calorie Density     33:39   []06 Label Reading - Part 1    32:15   []07 Move it      32.54   []08 Healthy Minds, Bodies, Hearts   32:14   []09 Dining Out - Part 1    32:28   [x]10 Heart Disease Risk Reduction   32:13       Comments:  Video completed,

## 2019-09-03 NOTE — PROGRESS NOTES
intense and also was present at rest though not as severe as when he was trying to swallow.  The pain was up to 8 on a scale of 1-10.  This was alleviated to some extent using MLB solution.  Despite the discomfort, he was able to maintain his weight throughout the course of radiation therapy.  After completion of treatment.  Mr. Kirsten Loera had a prolonged recuperative time with persistent pain.  He also had the expected local edema and hoarseness.  He has been compliant with the dental recommendations. Brain Simpers, he developed problems with his teeth chipping and breaking as a complication of his radiation therapy, eventually requiring full dental extraction.           INTERVAL HISTORY:   Sari Riddle returns to radiation oncology today 8/29/2019 for a follow-up appointment. He is not wearing his lower dentures as he has been unable to get a good comfortable fit. However, he is able to maintain his nutritional intake using the upper dentures. His taste is stable at this time, and he has persistent xerostomia. His vision is also stable having had to undergo cataract surgery, possibly related to his need for hyperbaric oxygen treatment. His tinnitus is stable. Mr. Tena Avers most recent fiber-optic examination was about a month ago. He has no evidence of recurrent malignancy. His posttreatment side effects are stable and he has not developed any new complications. TESTS:   RADIOLOGIC STUDIES:  5/29/2019: Ultrasound abdominal aorta limited:  Impression   Stable small proximal abdominal aortic aneurysm.        LABORATORY STUDIES:   CBC:   Lab Results   Component Value Date    WBC 5.3 07/12/2019    RBC 4.87 07/12/2019    RBC 4.86 12/01/2011    HGB 15.0 07/12/2019    HCT 46.0 07/12/2019    MCV 94.5 07/12/2019    MCH 30.8 07/12/2019    MCHC 32.6 07/12/2019    RDW 12.6 12/01/2017     07/12/2019    MPV 9.7 07/12/2019     CMP:  Lab Results   Component Value Date     07/12/2019    K 4.0 07/12/2019

## 2019-09-04 ENCOUNTER — HOSPITAL ENCOUNTER (OUTPATIENT)
Dept: CARDIAC REHAB | Age: 64
Setting detail: THERAPIES SERIES
Discharge: HOME | End: 2019-09-04
Payer: MEDICARE

## 2019-09-04 ENCOUNTER — TELEPHONE (OUTPATIENT)
Dept: ENT CLINIC | Age: 64
End: 2019-09-04

## 2019-09-04 PROCEDURE — G0422 INTENS CARDIAC REHAB W/EXERC: HCPCS

## 2019-09-04 PROCEDURE — G0423 INTENS CARDIAC REHAB NO EXER: HCPCS

## 2019-09-04 RX ORDER — LEVOTHYROXINE SODIUM 0.05 MG/1
50 TABLET ORAL DAILY
Qty: 90 TABLET | Refills: 0 | Status: SHIPPED | OUTPATIENT
Start: 2019-09-04 | End: 2019-11-15 | Stop reason: SDUPTHER

## 2019-09-04 NOTE — PROGRESS NOTES
manager  *Required MET level=5.0  *Return to Work Date: 8/5/2019 Return to work:  Has Kai Yun returned to work? [x] Yes    [] No        Kai Yun is doing well at work. Return to work:  Has Kai Yun returned to work? [] Yes    [] No    Return to work date set? [] Yes, **    [] No    Kai Yun is doing ** at work. Return to work:  Has Kai Yun returned to work? [] Yes    [] No    Return to work date set? [] Yes, **    [] No    Kai Yun is doing ** at work. Return to work:  Has Kai Yun returned to work? [] Yes    [] No    Return to work? [] Yes, **    [] No    *Required MET Level achieved for job duties? [] Yes    [] No   Orthopedic Limitations/  [x] Yes    [] No  If yes please list:  Shoulder nerve. Orthopedic Limitations  *If patient has orthopedic issue:   Actions/  accomodations needed to make Kai Yun successful :comfort Orthopedic Limitations   Orthopedic Limitations   Orthopedic Limitations     Fall Risk  Fall risk assessed? [x] Yes      [] No    Balance Issues? [] Yes      [x] No     [] Walker [] Cane    [x] Safety issues reviewed      Fall Risk  *If patient is a fall risk, action needed to accommodate:na  [address] Exercise  [] Yes    [x] No  Type:   Frequency:   Duration: Home Exercise  [x] Yes    [] No  Type: walking Frequency:2x week  Duration: 20 min Home Exercise  [] Yes    [] No  Type: **  Frequency: **  Duration: ** Home Exercise  [] Yes    [] No  Type: **  Frequency: **  Duration: ** Home Exercise  [] Yes    [] No  Type: **  Frequency: **  Duration: **   Angina with Activity? [x] Yes    [] No  Angina Management: rest but has nitro as needed Angina with Activity? [x] Yes    [] No  Angina Management: rest Angina with Activity? [] Yes    [] No  Angina Management: ** Angina with Activity? [] Yes    [] No  Angina Management: ** Angina with Activity?   [] Yes    [] No  Angina Management: **   EXERCISE PLAN EXERCISE PLAN EXERCISE PLAN EXERCISE PLAN EXERCISE PLAN   *Interventions* MET Level = 2.7  RPE = 12-15 Intensity:  Max MET Level = 3.5  RPE = 12-15 Intensity:  Max MET Level = **  RPE = 12-15 Intensity:  Max MET Level = **  RPE = 12-15 Intensity:  Max MET Level = ** RPE = 12-15   Progression: increase aerobic activity up to 15 min over next 4 weeks by increasing time 2-3 min/week. Progression:  Increase METS 5-10% over next 4 weeks Progression:   Progression: Progression:  Increase time/intensity when RPE <13, and HR is in [facility]   [x] Yes      [] No  Upper and Lower body strength training 2x/wk    Wt: 2#       Reps:  8-15    *Increase wt. after completing 15 reps with an RPE of <12/13. [x] Yes      [] No  Upper and Lower body strength training 2x/wk    Wt: 3#       Reps:  8-15    *Increase wt. after completing 15 reps with an RPE of <12/13. [] Yes      [] No  Upper and Lower body strength training 2x/wk    Wt: **#       Reps:  8-15    *Increase wt. after completing 15 reps with an RPE of <12/13. [] Yes      [] No  Upper and Lower body strength training 2x/wk    Wt: **#       Reps:  8-15    *Increase wt. after completing 15 reps with an RPE of <12/13. Continue Strength Training at home   [] Exercise Log & Strength training handout given     Wt: **#       Reps:  8-15    *Increase wt. after completing 15 reps with an RPE of <12/13. Home Exercise  *Abel verbalizes planning to walk 2-3 days/week for 10-15 min on days not in rehab. Home Exercise  *Abel verbalizes planning to walk 2-3 days/week for 20-30 min on days not in rehab. Home Exercise  *Abel verbalizes planning to ** ** days/week for ** min on days not in rehab. Home Exercise  *Abel verbalizes planning to ** ** days/week for ** min on days not in rehab.  Home Exercise  *Caty Villarez his/her plan to ** ** days/week for ** min @ **   *Education* *Education* *Education* *Education* *Education*   RPE Scale  [x] Yes      [] No  Exercise Safety  [x] Yes      [] No  Equipment Orientation  [x] Yes      [] No  S/S to Report  [x] Yes      [] No  Warm Up/Cool Down  [x] Yes      [] No  Home Exercise  [x] Yes      [] No    All Exercise Education Completed  [] Yes      [] No   *Goals* *Goals* *Goals* *Goals* *Goals*   Initial Exercise Goals Exercise Goals  Exercise Goals   Exercise Goals  Exercise Goals   Abel plans to:  [x] Attend exercise sessions 3x/wk  [x] initiate home exercise 2-3x/wk for 10-20 min  [x] Increase 6 min walk distance by 10%  [] ** Abel plans to:  [x] Attend exercise sessions 3x/wk  [x] continue home exercise 2-3x/wk for 20-30 min  [] ** Abel's plans to:  [x] Attend exercise sessions 3x/wk  [x] continue home exercise 3-4x/wk for 30-45 min  [x] Determine plan of exercise following rehab  [] ** Abel's plans to:  **   Abel achieved exercise goals?     []  Yes    [] No  If no, why?  **  [] Increased 6 min walk distance by 10%  [] Currently exercising 30-60 min/day, 5-7days/wk   [] Plans to continue exercise on own  [] Plans to join a local fitness center to continue exercise  [] Does not plan to continue to exercise after rehab   Return to ADL or Hobbies:  Mk Hammerjose armandobirdie would like to improve strength and endurance so he is able to return to activity without SOB wants to complete bucket list.  Return to ADL or Hobbies:  Chrisglory Corina would like to improve strength and endurance so he is able to return to activity without SOB Return to ADL or Hobbies:  Chrisglory Corina would like to improve strength and endurance so he/she is able to return to ** Return to ADL or Hobbies:  Chrisglory Corina would like to improve strength and endurance so he/she is able to return to ** Return to ADL or Hobbies:  Chrisglory Corina would like to improve strength and endurance so he/she is able to return to **    *MET level required for above goal:  5.0 METs MET level Achieved:  3.5 METS MET level Achieved:  **METS MET level Achieved:  **METS MET level Achieved:  **METS     Individual Cardiac Treatment Plan - Nutrition  NUTRITION  ASSESSMENT/PLAN NUTRITION  REASSESSMENT NUTRITION   REASSESSMENT NUTRITION   REASSESSMENT NUTRITION  DISCHARGE/FOLLOW-UP   Stages of Change Stages of Change Stages of Change Stages of Change Stages of Change   [] Pre Contemplation  [x] Contemplation  [] Preparation  [] Action  [] Maintenance  [] Relapse [] Pre Contemplation  [] Contemplation  [x] Preparation  [] Action  [] Maintenance  [] Relapse [] Pre Contemplation  [] Contemplation  [] Preparation  [] Action  [] Maintenance  [] Relapse [] Pre Contemplation  [] Contemplation  [] Preparation  [] Action  [] Maintenance  [] Relapse [] Pre Contemplation  [] Contemplation  [] Preparation  [] Action  [] Maintenance  [] Relapse   NUTRITION ASSESSMENT NUTRITION ASSESSMENT NUTRITION ASSESSMENT NUTRITION ASSESSMENT NUTRITION ASSESSMENT   Weight Management  Weight: 190        Height: 5'10\"  BMI: 26.6 Weight Management  Weight:189                  Weight Management  Weight: **                  Weight Management  Weight: ** Weight Management  Weight: **                    BMI: **   Eating Plan  Current eating habits: regular Eating Plan  Changes:none reorted Eating Plan  Changes: Eating Plan  Changes: Eating Plan Improvements:   Alcohol Use  [x] none          [] daily  [] weekly      [] special   Type:   Amount:       Diet Assessment Tool:  RATE YOUR PLATE  *Given to patient to complete and return. Diet Assessment Tool:    Score: 57/69       Diet Assessment Tool: RATE YOUR PLATE  Score: **/08   NUTRITION PLAN NUTRITION PLAN NUTRITION PLAN NUTRITION PLAN NUTRITION PLAN   *Interventions* *Interventions* *Interventions* *Interventions* *Interventions*   Initial Survey given Goal Setting Discussion:   [x] Yes      [] No       Follow Up Survey Reviewed & Goals Updated:     Professional Referral  Please check if needed. [] Dietician Consult   [] Wt. Management Referral  [] Other:  Professional Referral  Please check if needed.   [] Dietician Consult   [] Wt. Use  Change in smoking status   [] Yes      [] No    Quit date: **   Tobacco Use  Change in smoking status   [] Yes      [] No    Quit date: ** Tobacco Use  Change in smoking status   [] Yes      [] No    Quit date: **            Learning Barriers  Please select one:  [] Speech  [] Literacy  [] Hearing  [] Cognitive  [x] Vision  [x] Ready to Learn Learning Barriers Addressed:   [x] Yes      [] No  Ready to learn   Learning Barriers Addressed:   [] Yes      [] No   Learning Barriers Addressed:  [] Yes      [] No Learning Barriers Addressed:  [] Yes      [] No     RISK FACTOR/EDUCATION PLAN RISK FACTOR/EDUCATION PLAN RISK FACTOR/EDUCATION PLAN RISK FACTOR/EDUCATION PLAN RISK FACTOR/EDUCATION PLAN   *Interventions* *Interventions* *Interventions* *Interventions* *Interventions*   Recommended Educational Videos    01-Overview of The [employer] Eating Plan  02-Menu  03-Becoming A Pritikin   04-Move It!   05-Diseases of Our Time-Part 1  06-Cooking Breakfast & Snacks  07-Healthy Mind/Body/Heart  08-Calorie Density  09-Cooking Salads/Dressings  10-Heart Disease Risk Reduction  11-Dining Out-Part 1  12-How Our Thoughts Heal Heart  13-Facts on Fat  14-Label Reading-Part 1  15-HTN/Heart Disease  16-Body Composition  17-Cooking Soups & Dessert  18-Diseases of Our Times-Part   19-Cooking Dinner & Sides  20-Biomechanical Limitations  21-Biology of Weight Control  22-Dining Out - Part 2  23-Metabolic Syndrome & Belly Fat  24-Planning Your Eating Strategy  25-Lable Reading- Part 2  26-Decoding Lab Results  27-Vitamins & Minerals  28-Improve Performance  29-Aging Enhancing Your QoL  30-Nurtition Action Plan  31-Exercise Action Plan  32-Intro to Yoga  33-Sleep Disorders Completed Videos        8/8/2019  Overview of The [address] [place] [address]  8/12Menu workshop  8/16Pritikin   8/19Move it  8/21Disease of time  8/23Cook breakfast  8/26Healthy minds  8/28Calorie density  8/30Heart Disease risk            Completed Videos Completed the time   [] ** Abel's risk factor/education goals are as follows:    [x] Optimal BP <140/90  [] Blood Sugar <120  [x] Attend recommended video education sessions  [x] Takes medications as prescribed 100% of the time   [] ** Abel's risk factor/education goals are as follows:    [x] Optimal BP <140/90  [] Blood Sugar <120  [x] Attend recommended video education sessions  [x] Takes medications as prescribed 100% of the time   [] ** Abel's risk factor/education goals are as follows:    [x] Optimal BP <140/90  [] Blood Sugar <120  [x] Attend recommended video education sessions  [x] Takes medications as prescribed 100% of the time   [] ** Aida Olivares achieved risk factor goals?   [] Yes    [] No  If no, why?  **     Monitored telemetry has revealed SB/NSR    [x] associated symptoms SOB Monitored telemetry has revealed NSB/SR  [x] associated symptoms SOB Monitored telemetry has revealed  [] documented arrhythmia at increasing workloads  [] associated symptoms ** Monitored telemetry has revealed **  [] documented arrhythmia at increasing workloads  [] associated symptoms ** Monitored telemetry has revealed **  [] documented arrhythmia at increasing workloads  [] associated symptoms **   Physician Response    [x] Cardiac rehab is reasonably and medically necessary for continuous cardiac monitoring surveillance  of patient's cardiac activity  [x] Initiate continuous telemerty monitoring and notify me with any concerns  [] Other   Physician Response    [x] Cardiac rehab is reasonably and medically necessary for continuous cardiac monitoring surveillance  of patient's cardiac activity  [x] Continue continuous telemerty monitoring and notify me with any concerns  [] Other     Physician Response      [x] Cardiac rehab is reasonably and medically necessary for continuous cardiac monitoring surveillance  of patient's cardiac activity  [x] Continue continuous telemerty monitoring and notify me with any concerns   [] Other     Physician

## 2019-09-04 NOTE — PROGRESS NOTES
Gamaliel BETTS.:  1955    Acct Number: [de-identified]   MRN:  [id number]                             [facility] NUTRITION WORKSHOP             Date: 2019        Session # _______    Cesia Bautista class covered:    ()  Label Reading  ()  Menus  (X)  Targeting Nutrition Priorities  ()  Fueling a Healthy Body    Readiness to change:    ( ) Pre-contemplative   ( ) Contemplative - ambivalent about change    ( ) Action - ready to set action plan and implement   (X ) Maintenance - has made change and is trying, and or practicing different alternative behaviors     Ronald Moy was in the Workshop with the Dietitian for 38 minutes. The content was presented via Powerpoint, lecture, and patient participation based format. Motivational interviewing was utilized when needed, to promote change. Patient voiced understanding.     Electronically signed by Nichole Garcia RD LD [address]  on 2019 at 3:21 PM

## 2019-09-06 ENCOUNTER — HOSPITAL ENCOUNTER (OUTPATIENT)
Dept: CARDIAC REHAB | Age: 64
Setting detail: THERAPIES SERIES
Discharge: HOME | End: 2019-09-06
Payer: MEDICARE

## 2019-09-06 PROCEDURE — G0423 INTENS CARDIAC REHAB NO EXER: HCPCS

## 2019-09-06 PROCEDURE — G0422 INTENS CARDIAC REHAB W/EXERC: HCPCS

## 2019-09-09 ENCOUNTER — HOSPITAL ENCOUNTER (OUTPATIENT)
Dept: CARDIAC REHAB | Age: 64
Setting detail: THERAPIES SERIES
Discharge: HOME | End: 2019-09-09
Payer: MEDICARE

## 2019-09-09 PROCEDURE — G0422 INTENS CARDIAC REHAB W/EXERC: HCPCS

## 2019-09-09 PROCEDURE — G0423 INTENS CARDIAC REHAB NO EXER: HCPCS

## 2019-09-09 NOTE — PROGRESS NOTES
Video Education Report - ICR/CR    Name:  Sarina Aponte     Date:  9/9/2019  MRN: [id number]     Session #:    Session Length: 40 min    Recommended Videos        []01 [employer] - Program Overview   34:22    []02 Overview of Pritikin Eating Plan   34:10    []03 Becoming a Pritikin    33:08     []04 Diseases of Our Time - Part 1   34:22    []05 Calorie Density     33:39   []06 Label Reading - Part 1    32:15   []07 Move it      32.54   []08 Healthy Minds, Bodies, Hearts   32:14   []09 Dining Out - Part 1    32:28   []10 Heart Disease Risk Reduction   88:96   []34 Metabolic Syndrome and Belly Fat  31:52   []12 Facts on Fat     35:29   []13 Diseases of Our Time - Part 2   33:07   []14 Biology of Weight Control   32:36   []15 Biomechanical Limitations   35:20   []16 Nutrition Action Plan    34:23    Additional Videos         []17 Hypertension & Heart Disease   32:39        []18 Cooking Breakfasts and Snacks  32:00   []19 Planning Your Eating Strategy   33:30   []20 Label Reading - Part 2    32:36  []21 Cooking Soups and Desserts   31:41  [x]22 How Our Thoughts Can Heal Our Hearts 33:05      Comments:  Video completed,

## 2019-09-11 ENCOUNTER — HOSPITAL ENCOUNTER (OUTPATIENT)
Dept: CARDIAC REHAB | Age: 64
Setting detail: THERAPIES SERIES
Discharge: HOME | End: 2019-09-11
Payer: MEDICARE

## 2019-09-11 PROCEDURE — G0422 INTENS CARDIAC REHAB W/EXERC: HCPCS

## 2019-09-11 PROCEDURE — G0423 INTENS CARDIAC REHAB NO EXER: HCPCS

## 2019-09-13 ENCOUNTER — HOSPITAL ENCOUNTER (OUTPATIENT)
Dept: CARDIAC REHAB | Age: 64
Setting detail: THERAPIES SERIES
Discharge: HOME | End: 2019-09-13
Payer: MEDICARE

## 2019-09-13 PROCEDURE — G0423 INTENS CARDIAC REHAB NO EXER: HCPCS

## 2019-09-13 PROCEDURE — G0422 INTENS CARDIAC REHAB W/EXERC: HCPCS

## 2019-09-16 ENCOUNTER — APPOINTMENT (OUTPATIENT)
Dept: CARDIAC REHAB | Age: 64
End: 2019-09-16
Payer: MEDICARE

## 2019-09-16 ENCOUNTER — HOSPITAL ENCOUNTER (OUTPATIENT)
Dept: CARDIAC REHAB | Age: 64
Setting detail: THERAPIES SERIES
End: 2019-09-16
Payer: MEDICARE

## 2019-09-18 ENCOUNTER — APPOINTMENT (OUTPATIENT)
Dept: CARDIAC REHAB | Age: 64
End: 2019-09-18
Payer: MEDICARE

## 2019-09-18 ENCOUNTER — HOSPITAL ENCOUNTER (OUTPATIENT)
Dept: CARDIAC REHAB | Age: 64
Setting detail: THERAPIES SERIES
End: 2019-09-18
Payer: MEDICARE

## 2019-09-20 ENCOUNTER — HOSPITAL ENCOUNTER (OUTPATIENT)
Dept: CARDIAC REHAB | Age: 64
Setting detail: THERAPIES SERIES
End: 2019-09-20
Payer: MEDICARE

## 2019-09-20 ENCOUNTER — HOSPITAL ENCOUNTER (OUTPATIENT)
Dept: CARDIAC REHAB | Age: 64
Setting detail: THERAPIES SERIES
Discharge: HOME | End: 2019-09-20
Payer: MEDICARE

## 2019-09-23 ENCOUNTER — HOSPITAL ENCOUNTER (OUTPATIENT)
Dept: CARDIAC REHAB | Age: 64
Setting detail: THERAPIES SERIES
Discharge: HOME | End: 2019-09-23
Payer: MEDICARE

## 2019-09-23 PROCEDURE — G0423 INTENS CARDIAC REHAB NO EXER: HCPCS

## 2019-09-23 PROCEDURE — G0422 INTENS CARDIAC REHAB W/EXERC: HCPCS

## 2019-09-25 ENCOUNTER — HOSPITAL ENCOUNTER (OUTPATIENT)
Dept: CARDIAC REHAB | Age: 64
Setting detail: THERAPIES SERIES
Discharge: HOME | End: 2019-09-25
Payer: MEDICARE

## 2019-09-25 PROCEDURE — G0422 INTENS CARDIAC REHAB W/EXERC: HCPCS

## 2019-09-25 PROCEDURE — G0423 INTENS CARDIAC REHAB NO EXER: HCPCS

## 2019-09-27 ENCOUNTER — HOSPITAL ENCOUNTER (OUTPATIENT)
Dept: CARDIAC REHAB | Age: 64
Setting detail: THERAPIES SERIES
Discharge: HOME | End: 2019-09-27
Payer: MEDICARE

## 2019-09-27 PROCEDURE — G0423 INTENS CARDIAC REHAB NO EXER: HCPCS

## 2019-09-27 PROCEDURE — G0422 INTENS CARDIAC REHAB W/EXERC: HCPCS

## 2019-09-30 ENCOUNTER — HOSPITAL ENCOUNTER (OUTPATIENT)
Dept: CARDIAC REHAB | Age: 64
Setting detail: THERAPIES SERIES
Discharge: HOME | End: 2019-09-30
Payer: MEDICARE

## 2019-09-30 PROCEDURE — G0423 INTENS CARDIAC REHAB NO EXER: HCPCS

## 2019-09-30 PROCEDURE — G0422 INTENS CARDIAC REHAB W/EXERC: HCPCS

## 2019-10-02 ENCOUNTER — HOSPITAL ENCOUNTER (OUTPATIENT)
Dept: CARDIAC REHAB | Age: 64
Setting detail: THERAPIES SERIES
Discharge: HOME | End: 2019-10-02
Payer: MEDICARE

## 2019-10-02 PROCEDURE — G0423 INTENS CARDIAC REHAB NO EXER: HCPCS

## 2019-10-02 PROCEDURE — G0422 INTENS CARDIAC REHAB W/EXERC: HCPCS

## 2019-10-04 ENCOUNTER — HOSPITAL ENCOUNTER (OUTPATIENT)
Dept: CARDIAC REHAB | Age: 64
Setting detail: THERAPIES SERIES
Discharge: HOME | End: 2019-10-04
Payer: MEDICARE

## 2019-10-04 PROCEDURE — G0422 INTENS CARDIAC REHAB W/EXERC: HCPCS

## 2019-10-04 PROCEDURE — G0423 INTENS CARDIAC REHAB NO EXER: HCPCS

## 2019-10-07 ENCOUNTER — HOSPITAL ENCOUNTER (OUTPATIENT)
Dept: CARDIAC REHAB | Age: 64
Setting detail: THERAPIES SERIES
Discharge: HOME | End: 2019-10-07
Payer: MEDICARE

## 2019-10-07 PROCEDURE — G0423 INTENS CARDIAC REHAB NO EXER: HCPCS

## 2019-10-07 PROCEDURE — G0422 INTENS CARDIAC REHAB W/EXERC: HCPCS

## 2019-10-09 ENCOUNTER — HOSPITAL ENCOUNTER (OUTPATIENT)
Dept: CARDIAC REHAB | Age: 64
Setting detail: THERAPIES SERIES
Discharge: HOME | End: 2019-10-09
Payer: MEDICARE

## 2019-10-09 PROCEDURE — G0423 INTENS CARDIAC REHAB NO EXER: HCPCS

## 2019-10-09 PROCEDURE — G0422 INTENS CARDIAC REHAB W/EXERC: HCPCS

## 2019-10-11 ENCOUNTER — HOSPITAL ENCOUNTER (OUTPATIENT)
Dept: CARDIAC REHAB | Age: 64
Setting detail: THERAPIES SERIES
Discharge: HOME | End: 2019-10-11
Payer: MEDICARE

## 2019-10-11 PROCEDURE — G0422 INTENS CARDIAC REHAB W/EXERC: HCPCS

## 2019-10-11 PROCEDURE — G0423 INTENS CARDIAC REHAB NO EXER: HCPCS

## 2019-10-14 ENCOUNTER — HOSPITAL ENCOUNTER (OUTPATIENT)
Dept: CARDIAC REHAB | Age: 64
Setting detail: THERAPIES SERIES
Discharge: HOME | End: 2019-10-14
Payer: MEDICARE

## 2019-10-14 PROCEDURE — G0423 INTENS CARDIAC REHAB NO EXER: HCPCS

## 2019-10-14 PROCEDURE — G0422 INTENS CARDIAC REHAB W/EXERC: HCPCS

## 2019-10-15 ENCOUNTER — OFFICE VISIT (OUTPATIENT)
Dept: PHYSICAL MEDICINE AND REHAB | Age: 64
End: 2019-10-15
Payer: MEDICARE

## 2019-10-15 VITALS
BODY MASS INDEX: 26.27 KG/M2 | DIASTOLIC BLOOD PRESSURE: 80 MMHG | HEIGHT: 71 IN | WEIGHT: 187.61 LBS | SYSTOLIC BLOOD PRESSURE: 140 MMHG

## 2019-10-15 DIAGNOSIS — M19.019 SHOULDER ARTHRITIS: ICD-10-CM

## 2019-10-15 DIAGNOSIS — M54.12 CERVICAL RADICULOPATHY: Primary | ICD-10-CM

## 2019-10-15 PROCEDURE — 1036F TOBACCO NON-USER: CPT | Performed by: PHYSICAL MEDICINE & REHABILITATION

## 2019-10-15 PROCEDURE — G8484 FLU IMMUNIZE NO ADMIN: HCPCS | Performed by: PHYSICAL MEDICINE & REHABILITATION

## 2019-10-15 PROCEDURE — G8417 CALC BMI ABV UP PARAM F/U: HCPCS | Performed by: PHYSICAL MEDICINE & REHABILITATION

## 2019-10-15 PROCEDURE — 4040F PNEUMOC VAC/ADMIN/RCVD: CPT | Performed by: PHYSICAL MEDICINE & REHABILITATION

## 2019-10-15 PROCEDURE — G8427 DOCREV CUR MEDS BY ELIG CLIN: HCPCS | Performed by: PHYSICAL MEDICINE & REHABILITATION

## 2019-10-15 PROCEDURE — 1123F ACP DISCUSS/DSCN MKR DOCD: CPT | Performed by: PHYSICAL MEDICINE & REHABILITATION

## 2019-10-15 PROCEDURE — G8598 ASA/ANTIPLAT THER USED: HCPCS | Performed by: PHYSICAL MEDICINE & REHABILITATION

## 2019-10-15 PROCEDURE — 3017F COLORECTAL CA SCREEN DOC REV: CPT | Performed by: PHYSICAL MEDICINE & REHABILITATION

## 2019-10-15 PROCEDURE — 99214 OFFICE O/P EST MOD 30 MIN: CPT | Performed by: PHYSICAL MEDICINE & REHABILITATION

## 2019-10-15 RX ORDER — TRAMADOL HYDROCHLORIDE 50 MG/1
50 TABLET ORAL EVERY 8 HOURS PRN
Qty: 20 TABLET | Refills: 0 | Status: SHIPPED | OUTPATIENT
Start: 2019-10-15 | End: 2019-10-22

## 2019-10-16 ENCOUNTER — HOSPITAL ENCOUNTER (OUTPATIENT)
Dept: CARDIAC REHAB | Age: 64
Setting detail: THERAPIES SERIES
Discharge: HOME | End: 2019-10-16
Payer: MEDICARE

## 2019-10-16 PROCEDURE — G0423 INTENS CARDIAC REHAB NO EXER: HCPCS

## 2019-10-16 PROCEDURE — G0422 INTENS CARDIAC REHAB W/EXERC: HCPCS

## 2019-10-18 ENCOUNTER — HOSPITAL ENCOUNTER (OUTPATIENT)
Dept: CARDIAC REHAB | Age: 64
Setting detail: THERAPIES SERIES
Discharge: HOME | End: 2019-10-18
Payer: MEDICARE

## 2019-10-18 ENCOUNTER — HOSPITAL ENCOUNTER (OUTPATIENT)
Dept: CARDIAC REHAB | Age: 64
Setting detail: THERAPIES SERIES
End: 2019-10-18
Payer: MEDICARE

## 2019-10-21 ENCOUNTER — HOSPITAL ENCOUNTER (OUTPATIENT)
Dept: CARDIAC REHAB | Age: 64
Setting detail: THERAPIES SERIES
Discharge: HOME | End: 2019-10-21
Payer: MEDICARE

## 2019-10-21 PROCEDURE — G0423 INTENS CARDIAC REHAB NO EXER: HCPCS

## 2019-10-21 PROCEDURE — G0422 INTENS CARDIAC REHAB W/EXERC: HCPCS

## 2019-10-23 ENCOUNTER — HOSPITAL ENCOUNTER (OUTPATIENT)
Dept: CARDIAC REHAB | Age: 64
Setting detail: THERAPIES SERIES
Discharge: HOME | End: 2019-10-23
Payer: MEDICARE

## 2019-10-23 PROCEDURE — G0422 INTENS CARDIAC REHAB W/EXERC: HCPCS

## 2019-10-23 PROCEDURE — G0423 INTENS CARDIAC REHAB NO EXER: HCPCS

## 2019-10-25 ENCOUNTER — HOSPITAL ENCOUNTER (OUTPATIENT)
Dept: CARDIAC REHAB | Age: 64
Setting detail: THERAPIES SERIES
Discharge: HOME | End: 2019-10-25
Payer: MEDICARE

## 2019-10-25 ENCOUNTER — OFFICE VISIT (OUTPATIENT)
Dept: ENT CLINIC | Age: 64
End: 2019-10-25
Payer: MEDICARE

## 2019-10-25 VITALS
DIASTOLIC BLOOD PRESSURE: 84 MMHG | HEART RATE: 64 BPM | RESPIRATION RATE: 18 BRPM | TEMPERATURE: 98 F | BODY MASS INDEX: 26.12 KG/M2 | SYSTOLIC BLOOD PRESSURE: 100 MMHG | HEIGHT: 71 IN | WEIGHT: 186.6 LBS

## 2019-10-25 DIAGNOSIS — K05.6 PERIODONTAL DISEASE: ICD-10-CM

## 2019-10-25 DIAGNOSIS — K22.70 BARRETT'S ESOPHAGUS WITHOUT DYSPLASIA: ICD-10-CM

## 2019-10-25 DIAGNOSIS — T66.XXXD EFFECTS, RADIATION, SUBSEQUENT ENCOUNTER: ICD-10-CM

## 2019-10-25 DIAGNOSIS — C32.9 PRIMARY SQUAMOUS CELL CARCINOMA OF LARYNX (HCC): Primary | ICD-10-CM

## 2019-10-25 DIAGNOSIS — E03.4 HYPOTHYROIDISM DUE TO ACQUIRED ATROPHY OF THYROID: ICD-10-CM

## 2019-10-25 PROCEDURE — 99213 OFFICE O/P EST LOW 20 MIN: CPT | Performed by: OTOLARYNGOLOGY

## 2019-10-25 PROCEDURE — G0422 INTENS CARDIAC REHAB W/EXERC: HCPCS

## 2019-10-25 PROCEDURE — 1036F TOBACCO NON-USER: CPT | Performed by: OTOLARYNGOLOGY

## 2019-10-25 PROCEDURE — 1123F ACP DISCUSS/DSCN MKR DOCD: CPT | Performed by: OTOLARYNGOLOGY

## 2019-10-25 PROCEDURE — 31575 DIAGNOSTIC LARYNGOSCOPY: CPT | Performed by: OTOLARYNGOLOGY

## 2019-10-25 PROCEDURE — 4040F PNEUMOC VAC/ADMIN/RCVD: CPT | Performed by: OTOLARYNGOLOGY

## 2019-10-25 PROCEDURE — G8598 ASA/ANTIPLAT THER USED: HCPCS | Performed by: OTOLARYNGOLOGY

## 2019-10-25 PROCEDURE — G8417 CALC BMI ABV UP PARAM F/U: HCPCS | Performed by: OTOLARYNGOLOGY

## 2019-10-25 PROCEDURE — G8484 FLU IMMUNIZE NO ADMIN: HCPCS | Performed by: OTOLARYNGOLOGY

## 2019-10-25 PROCEDURE — G8427 DOCREV CUR MEDS BY ELIG CLIN: HCPCS | Performed by: OTOLARYNGOLOGY

## 2019-10-25 PROCEDURE — 3017F COLORECTAL CA SCREEN DOC REV: CPT | Performed by: OTOLARYNGOLOGY

## 2019-10-25 PROCEDURE — G0423 INTENS CARDIAC REHAB NO EXER: HCPCS

## 2019-10-25 ASSESSMENT — ENCOUNTER SYMPTOMS
SHORTNESS OF BREATH: 0
NAUSEA: 0
COUGH: 0
SORE THROAT: 0
DIARRHEA: 0
CHOKING: 0
STRIDOR: 0
VOICE CHANGE: 0
COLOR CHANGE: 0
APNEA: 0
ABDOMINAL PAIN: 0
VOMITING: 0
RHINORRHEA: 0
SINUS PRESSURE: 0
CHEST TIGHTNESS: 0
FACIAL SWELLING: 0
WHEEZING: 0
TROUBLE SWALLOWING: 0

## 2019-10-28 ENCOUNTER — HOSPITAL ENCOUNTER (OUTPATIENT)
Dept: CARDIAC REHAB | Age: 64
Setting detail: THERAPIES SERIES
Discharge: HOME | End: 2019-10-28
Payer: MEDICARE

## 2019-10-28 PROCEDURE — G0423 INTENS CARDIAC REHAB NO EXER: HCPCS

## 2019-10-28 PROCEDURE — G0422 INTENS CARDIAC REHAB W/EXERC: HCPCS

## 2019-10-30 ENCOUNTER — HOSPITAL ENCOUNTER (OUTPATIENT)
Dept: CARDIAC REHAB | Age: 64
Setting detail: THERAPIES SERIES
Discharge: HOME | End: 2019-10-30
Payer: MEDICARE

## 2019-10-30 PROCEDURE — G0422 INTENS CARDIAC REHAB W/EXERC: HCPCS

## 2019-10-30 PROCEDURE — G0423 INTENS CARDIAC REHAB NO EXER: HCPCS

## 2019-11-01 ENCOUNTER — HOSPITAL ENCOUNTER (OUTPATIENT)
Dept: CARDIAC REHAB | Age: 64
Setting detail: THERAPIES SERIES
Discharge: HOME | End: 2019-11-01
Payer: MEDICARE

## 2019-11-01 PROCEDURE — G0423 INTENS CARDIAC REHAB NO EXER: HCPCS

## 2019-11-01 PROCEDURE — G0422 INTENS CARDIAC REHAB W/EXERC: HCPCS

## 2019-11-04 ENCOUNTER — APPOINTMENT (OUTPATIENT)
Dept: CARDIAC REHAB | Age: 64
End: 2019-11-04
Payer: MEDICARE

## 2019-11-04 ENCOUNTER — HOSPITAL ENCOUNTER (OUTPATIENT)
Dept: CARDIAC REHAB | Age: 64
Setting detail: THERAPIES SERIES
End: 2019-11-04
Payer: MEDICARE

## 2019-11-06 ENCOUNTER — HOSPITAL ENCOUNTER (OUTPATIENT)
Dept: CARDIAC REHAB | Age: 64
Setting detail: THERAPIES SERIES
Discharge: HOME | End: 2019-11-06
Payer: MEDICARE

## 2019-11-06 PROCEDURE — G0423 INTENS CARDIAC REHAB NO EXER: HCPCS

## 2019-11-06 PROCEDURE — G0422 INTENS CARDIAC REHAB W/EXERC: HCPCS

## 2019-11-08 ENCOUNTER — HOSPITAL ENCOUNTER (OUTPATIENT)
Dept: CARDIAC REHAB | Age: 64
Setting detail: THERAPIES SERIES
Discharge: HOME | End: 2019-11-08
Payer: MEDICARE

## 2019-11-08 PROCEDURE — G0423 INTENS CARDIAC REHAB NO EXER: HCPCS

## 2019-11-08 PROCEDURE — G0422 INTENS CARDIAC REHAB W/EXERC: HCPCS

## 2019-11-11 ENCOUNTER — HOSPITAL ENCOUNTER (OUTPATIENT)
Dept: CARDIAC REHAB | Age: 64
Setting detail: THERAPIES SERIES
Discharge: HOME | End: 2019-11-11
Payer: MEDICARE

## 2019-11-11 PROCEDURE — G0422 INTENS CARDIAC REHAB W/EXERC: HCPCS

## 2019-11-11 PROCEDURE — G0423 INTENS CARDIAC REHAB NO EXER: HCPCS

## 2019-11-13 ENCOUNTER — HOSPITAL ENCOUNTER (OUTPATIENT)
Dept: CARDIAC REHAB | Age: 64
Setting detail: THERAPIES SERIES
Discharge: HOME | End: 2019-11-13
Payer: MEDICARE

## 2019-11-13 PROCEDURE — G0423 INTENS CARDIAC REHAB NO EXER: HCPCS

## 2019-11-13 PROCEDURE — G0422 INTENS CARDIAC REHAB W/EXERC: HCPCS

## 2019-11-15 ENCOUNTER — OFFICE VISIT (OUTPATIENT)
Dept: CARDIOLOGY CLINIC | Age: 64
End: 2019-11-15
Payer: MEDICARE

## 2019-11-15 ENCOUNTER — APPOINTMENT (OUTPATIENT)
Dept: CARDIAC REHAB | Age: 64
End: 2019-11-15
Payer: MEDICARE

## 2019-11-15 VITALS
BODY MASS INDEX: 25.48 KG/M2 | HEART RATE: 56 BPM | DIASTOLIC BLOOD PRESSURE: 64 MMHG | SYSTOLIC BLOOD PRESSURE: 136 MMHG | WEIGHT: 182 LBS | HEIGHT: 71 IN

## 2019-11-15 DIAGNOSIS — I25.83 CORONARY ARTERY DISEASE DUE TO LIPID RICH PLAQUE: Primary | ICD-10-CM

## 2019-11-15 DIAGNOSIS — I25.10 CORONARY ARTERY DISEASE DUE TO LIPID RICH PLAQUE: Primary | ICD-10-CM

## 2019-11-15 PROCEDURE — 99213 OFFICE O/P EST LOW 20 MIN: CPT | Performed by: INTERNAL MEDICINE

## 2019-11-15 PROCEDURE — G8427 DOCREV CUR MEDS BY ELIG CLIN: HCPCS | Performed by: INTERNAL MEDICINE

## 2019-11-15 PROCEDURE — G8484 FLU IMMUNIZE NO ADMIN: HCPCS | Performed by: INTERNAL MEDICINE

## 2019-11-15 PROCEDURE — 3017F COLORECTAL CA SCREEN DOC REV: CPT | Performed by: INTERNAL MEDICINE

## 2019-11-15 PROCEDURE — 1123F ACP DISCUSS/DSCN MKR DOCD: CPT | Performed by: INTERNAL MEDICINE

## 2019-11-15 PROCEDURE — G8417 CALC BMI ABV UP PARAM F/U: HCPCS | Performed by: INTERNAL MEDICINE

## 2019-11-15 PROCEDURE — 4040F PNEUMOC VAC/ADMIN/RCVD: CPT | Performed by: INTERNAL MEDICINE

## 2019-11-15 PROCEDURE — 1036F TOBACCO NON-USER: CPT | Performed by: INTERNAL MEDICINE

## 2019-11-15 PROCEDURE — G8598 ASA/ANTIPLAT THER USED: HCPCS | Performed by: INTERNAL MEDICINE

## 2019-11-19 RX ORDER — METOPROLOL SUCCINATE 50 MG/1
50 TABLET, EXTENDED RELEASE ORAL 2 TIMES DAILY
Qty: 180 TABLET | Refills: 3 | Status: SHIPPED | OUTPATIENT
Start: 2019-11-19 | End: 2019-12-24 | Stop reason: SDUPTHER

## 2019-12-04 ENCOUNTER — OFFICE VISIT (OUTPATIENT)
Dept: INTERNAL MEDICINE CLINIC | Age: 64
End: 2019-12-04
Payer: MEDICARE

## 2019-12-04 VITALS
HEIGHT: 71 IN | OXYGEN SATURATION: 98 % | HEART RATE: 57 BPM | BODY MASS INDEX: 25.76 KG/M2 | SYSTOLIC BLOOD PRESSURE: 130 MMHG | DIASTOLIC BLOOD PRESSURE: 80 MMHG | RESPIRATION RATE: 12 BRPM | WEIGHT: 184 LBS

## 2019-12-04 DIAGNOSIS — I10 ESSENTIAL HYPERTENSION: ICD-10-CM

## 2019-12-04 DIAGNOSIS — M25.552 LEFT HIP PAIN: ICD-10-CM

## 2019-12-04 DIAGNOSIS — E78.5 HYPERLIPIDEMIA, UNSPECIFIED HYPERLIPIDEMIA TYPE: Primary | ICD-10-CM

## 2019-12-04 DIAGNOSIS — I25.10 ASCVD (ARTERIOSCLEROTIC CARDIOVASCULAR DISEASE): ICD-10-CM

## 2019-12-04 PROCEDURE — G8484 FLU IMMUNIZE NO ADMIN: HCPCS | Performed by: NURSE PRACTITIONER

## 2019-12-04 PROCEDURE — G8427 DOCREV CUR MEDS BY ELIG CLIN: HCPCS | Performed by: NURSE PRACTITIONER

## 2019-12-04 PROCEDURE — 3017F COLORECTAL CA SCREEN DOC REV: CPT | Performed by: NURSE PRACTITIONER

## 2019-12-04 PROCEDURE — 1123F ACP DISCUSS/DSCN MKR DOCD: CPT | Performed by: NURSE PRACTITIONER

## 2019-12-04 PROCEDURE — G8417 CALC BMI ABV UP PARAM F/U: HCPCS | Performed by: NURSE PRACTITIONER

## 2019-12-04 PROCEDURE — 99214 OFFICE O/P EST MOD 30 MIN: CPT | Performed by: NURSE PRACTITIONER

## 2019-12-04 PROCEDURE — G8598 ASA/ANTIPLAT THER USED: HCPCS | Performed by: NURSE PRACTITIONER

## 2019-12-04 PROCEDURE — 4040F PNEUMOC VAC/ADMIN/RCVD: CPT | Performed by: NURSE PRACTITIONER

## 2019-12-04 PROCEDURE — 1036F TOBACCO NON-USER: CPT | Performed by: NURSE PRACTITIONER

## 2019-12-04 ASSESSMENT — ENCOUNTER SYMPTOMS
CONSTIPATION: 0
BACK PAIN: 1
NAUSEA: 0
ABDOMINAL PAIN: 0
SHORTNESS OF BREATH: 0
VOMITING: 0
WHEEZING: 0
CHOKING: 0
PHOTOPHOBIA: 0
DIARRHEA: 0
SINUS PAIN: 0
TROUBLE SWALLOWING: 1
ABDOMINAL DISTENTION: 0
BLOOD IN STOOL: 0
SINUS PRESSURE: 0
COUGH: 0

## 2019-12-24 RX ORDER — METOPROLOL SUCCINATE 50 MG/1
50 TABLET, EXTENDED RELEASE ORAL 2 TIMES DAILY
Qty: 180 TABLET | Refills: 3 | Status: SHIPPED | OUTPATIENT
Start: 2019-12-24 | End: 2020-08-28 | Stop reason: ALTCHOICE

## 2019-12-27 ENCOUNTER — OFFICE VISIT (OUTPATIENT)
Dept: PULMONOLOGY | Age: 64
End: 2019-12-27
Payer: MEDICARE

## 2019-12-27 VITALS
OXYGEN SATURATION: 98 % | DIASTOLIC BLOOD PRESSURE: 62 MMHG | HEIGHT: 71 IN | WEIGHT: 184 LBS | HEART RATE: 54 BPM | SYSTOLIC BLOOD PRESSURE: 112 MMHG | BODY MASS INDEX: 25.76 KG/M2

## 2019-12-27 DIAGNOSIS — I25.10 CORONARY ARTERY DISEASE INVOLVING NATIVE HEART WITHOUT ANGINA PECTORIS, UNSPECIFIED VESSEL OR LESION TYPE: ICD-10-CM

## 2019-12-27 DIAGNOSIS — G47.33 OSA ON CPAP: Primary | ICD-10-CM

## 2019-12-27 DIAGNOSIS — R09.81 NASAL CONGESTION: ICD-10-CM

## 2019-12-27 DIAGNOSIS — Z99.89 OSA ON CPAP: Primary | ICD-10-CM

## 2019-12-27 DIAGNOSIS — C32.9 PRIMARY SQUAMOUS CELL CARCINOMA OF LARYNX (HCC): ICD-10-CM

## 2019-12-27 PROCEDURE — G8484 FLU IMMUNIZE NO ADMIN: HCPCS | Performed by: PHYSICIAN ASSISTANT

## 2019-12-27 PROCEDURE — 3017F COLORECTAL CA SCREEN DOC REV: CPT | Performed by: PHYSICIAN ASSISTANT

## 2019-12-27 PROCEDURE — 1123F ACP DISCUSS/DSCN MKR DOCD: CPT | Performed by: PHYSICIAN ASSISTANT

## 2019-12-27 PROCEDURE — G8417 CALC BMI ABV UP PARAM F/U: HCPCS | Performed by: PHYSICIAN ASSISTANT

## 2019-12-27 PROCEDURE — 99214 OFFICE O/P EST MOD 30 MIN: CPT | Performed by: PHYSICIAN ASSISTANT

## 2019-12-27 PROCEDURE — G8427 DOCREV CUR MEDS BY ELIG CLIN: HCPCS | Performed by: PHYSICIAN ASSISTANT

## 2019-12-27 PROCEDURE — 4040F PNEUMOC VAC/ADMIN/RCVD: CPT | Performed by: PHYSICIAN ASSISTANT

## 2019-12-27 PROCEDURE — G8598 ASA/ANTIPLAT THER USED: HCPCS | Performed by: PHYSICIAN ASSISTANT

## 2019-12-27 PROCEDURE — 1036F TOBACCO NON-USER: CPT | Performed by: PHYSICIAN ASSISTANT

## 2019-12-27 ASSESSMENT — ENCOUNTER SYMPTOMS
STRIDOR: 0
CHEST TIGHTNESS: 0
ALLERGIC/IMMUNOLOGIC NEGATIVE: 1
DIARRHEA: 0
BACK PAIN: 0
EYES NEGATIVE: 1
SHORTNESS OF BREATH: 0
COUGH: 0
NAUSEA: 0
WHEEZING: 0

## 2020-01-03 ENCOUNTER — TELEPHONE (OUTPATIENT)
Dept: ENT CLINIC | Age: 65
End: 2020-01-03

## 2020-01-03 RX ORDER — LEVOTHYROXINE SODIUM 0.05 MG/1
50 TABLET ORAL DAILY
Qty: 90 TABLET | Refills: 1 | OUTPATIENT
Start: 2020-01-03

## 2020-01-03 RX ORDER — LEVOTHYROXINE SODIUM 0.05 MG/1
50 TABLET ORAL DAILY
Qty: 90 TABLET | Refills: 1 | Status: SHIPPED | OUTPATIENT
Start: 2020-01-03 | End: 2020-06-25 | Stop reason: SDUPTHER

## 2020-01-03 NOTE — TELEPHONE ENCOUNTER
Patient's wife called (Who is on HIPAA) she stated that patient only has 9 synthroid pills left. He is scheduled to see Dr. J Carlos Parra on 1/31/20. He would like to have a refill and 3 month supply of the medication sent to [facility]. Please advise.

## 2020-01-03 NOTE — TELEPHONE ENCOUNTER
Called and spoke with patient's wife (who is on HIPAA) I let her know that a script was sent and he is to keep his scheduled appointment. She verbalized understanding.

## 2020-01-08 RX ORDER — GABAPENTIN 300 MG/1
300 CAPSULE ORAL 3 TIMES DAILY
Qty: 270 CAPSULE | Refills: 1 | Status: SHIPPED | OUTPATIENT
Start: 2020-01-08 | End: 2020-10-08 | Stop reason: SDUPTHER

## 2020-01-31 ENCOUNTER — OFFICE VISIT (OUTPATIENT)
Dept: ENT CLINIC | Age: 65
End: 2020-01-31
Payer: MEDICARE

## 2020-01-31 VITALS
SYSTOLIC BLOOD PRESSURE: 126 MMHG | DIASTOLIC BLOOD PRESSURE: 80 MMHG | RESPIRATION RATE: 12 BRPM | BODY MASS INDEX: 25.9 KG/M2 | HEART RATE: 48 BPM | HEIGHT: 71 IN | WEIGHT: 185 LBS | TEMPERATURE: 98.1 F

## 2020-01-31 PROCEDURE — 4040F PNEUMOC VAC/ADMIN/RCVD: CPT | Performed by: OTOLARYNGOLOGY

## 2020-01-31 PROCEDURE — 1036F TOBACCO NON-USER: CPT | Performed by: OTOLARYNGOLOGY

## 2020-01-31 PROCEDURE — 31575 DIAGNOSTIC LARYNGOSCOPY: CPT | Performed by: OTOLARYNGOLOGY

## 2020-01-31 PROCEDURE — 99213 OFFICE O/P EST LOW 20 MIN: CPT | Performed by: OTOLARYNGOLOGY

## 2020-01-31 PROCEDURE — G8427 DOCREV CUR MEDS BY ELIG CLIN: HCPCS | Performed by: OTOLARYNGOLOGY

## 2020-01-31 PROCEDURE — 3017F COLORECTAL CA SCREEN DOC REV: CPT | Performed by: OTOLARYNGOLOGY

## 2020-01-31 PROCEDURE — 1123F ACP DISCUSS/DSCN MKR DOCD: CPT | Performed by: OTOLARYNGOLOGY

## 2020-01-31 PROCEDURE — G8484 FLU IMMUNIZE NO ADMIN: HCPCS | Performed by: OTOLARYNGOLOGY

## 2020-01-31 PROCEDURE — G8417 CALC BMI ABV UP PARAM F/U: HCPCS | Performed by: OTOLARYNGOLOGY

## 2020-01-31 ASSESSMENT — ENCOUNTER SYMPTOMS
VOMITING: 0
WHEEZING: 0
CHEST TIGHTNESS: 0
VOICE CHANGE: 0
SORE THROAT: 0
DIARRHEA: 0
TROUBLE SWALLOWING: 0
ABDOMINAL PAIN: 0
SHORTNESS OF BREATH: 0
SINUS PRESSURE: 0
CHOKING: 0
RHINORRHEA: 0
COUGH: 0
FACIAL SWELLING: 0
APNEA: 0
NAUSEA: 0
STRIDOR: 0
COLOR CHANGE: 0

## 2020-01-31 NOTE — PROGRESS NOTES
[facility] PROFESSIONAL SERVS  [facility] EAR, NOSE AND THROAT  [address] [place] [address] [place] [address]  Dept: [phone]  Dept Fax: [phone]  Loc: [phone]    Liv Gambino is a 75 y.o. male who was referred byNo ref. provider found for:  Chief Complaint   Patient presents with    Follow-up     3 month follow up, Squamous cell carcinoma of larynx   . HPI:     Liv Gambino is a 75 y.o. male who presents today for follow-up on his laryngeal cancer. He is doing well. He i has s finished his hyperbaric and his dental extraction site has healed    History:     No Known Allergies  Current Outpatient Medications   Medication Sig Dispense Refill    gabapentin (NEURONTIN) 300 MG capsule Take 1 capsule by mouth 3 times daily for 180 days. Intended supply: 90 days 270 capsule 1    levothyroxine (SYNTHROID) 50 MCG tablet Take 1 tablet by mouth daily 90 tablet 1    metoprolol succinate (TOPROL XL) 50 MG extended release tablet Take 1 tablet by mouth 2 times daily 180 tablet 3    ticagrelor (BRILINTA) 90 MG TABS tablet Take 1 tablet by mouth 2 times daily 180 tablet 3    atorvastatin (LIPITOR) 40 MG tablet Take 1 tablet by mouth daily 90 tablet 1    ipratropium (ATROVENT) 0.06 % nasal spray 2 sprays by Nasal route 3 times daily 2 each 11    nitroGLYCERIN (NITROSTAT) 0.4 MG SL tablet up to max of 3 total doses.  If no relief after 1 dose, call 911. 25 tablet 3    diphenhydrAMINE-APAP, sleep, (TYLENOL PM EXTRA STRENGTH)  MG tablet Take 2 tablets by mouth nightly      terazosin (HYTRIN) 5 MG capsule Take 1 capsule by mouth nightly 90 capsule 1    acetaminophen (TYLENOL) 500 MG tablet Take 500 mg by mouth every 6 hours as needed for Pain      omeprazole (PRILOSEC) 20 MG delayed release capsule Take 20 mg by mouth 2 times daily      aspirin EC 81 MG EC tablet Take 81 mg by mouth daily      CPAP Machine MISC by Does not apply route Please change CPAP pressure to 8 cm H20. 1 each 0    Coenzyme Q10 (CO Q-10) 200 MG CAPS Take  by mouth daily.  Multiple Vitamin (MULTI-VITAMIN) TABS Take  by mouth daily.  ARGININE PO Take 500 mg by mouth daily. 2 tab       No current facility-administered medications for this visit.       Past Medical History:   Diagnosis Date    Ascending Aneurysm ([address] 02/2017    Ascending, 4.2 cm per pt    Brown's esophagus     CAD (coronary artery disease)     Cancer (HCC)     throat cancer, s/p radiation    Chronic back pain     GERD (gastroesophageal reflux disease)     Hepatitis C     as child    History of hyperbaric oxygen therapy     after bottom teeth removed after rdiation therapy    Hyperlipidemia     Hypertension     Left shoulder pain     nerve pain , ? secondary to radiation, has had \" numbing shot \"    Nausea & vomiting     FOUZIA on CPAP     Osteoradionecrosis of mandible     s/p radiation for throat cancer    PONV (postoperative nausea and vomiting)     only after OHS    S/P CABG [facility]coronary artery bypass graft) 2002    [facility]    Snores     Thyroid disease     Vision loss of right eye     during hyperbaric chamber oxygen therapy     Vitreous opacities     right    Wears dentures     full upper, no bottom teeth and no use of dentures on bottom      Past Surgical History:   Procedure Laterality Date    CARDIAC CATHETERIZATION  8/2002, 4/2004    [facility]    COLONOSCOPY      CORONARY ANGIOPLASTY WITH STENT PLACEMENT  7/2002    [facility]    CORONARY ANGIOPLASTY WITH STENT PLACEMENT N/A 07/12/2019    [facility]    CORONARY ARTERY BYPASS GRAFT  2002    [facility]    DENTAL SURGERY      bottom teeth removal    HEMORRHOID SURGERY      KNEE SURGERY Right     LARYNGOSCOPY N/A 10/4/2017    LARYNGOSCOPY MICRO WITH BIOPSY performed by Smita Poole MD at [address]  04/28/2016    by Dr Constantin Trejo VITREOUS,SUBRET/CHOROID FLUID Right 8/27/2018    VITRECTOMY 25 GAUGE performed by Josué Camacho MD at [facility] VITRECTOMY Right 2018     VITRECTOMY 25 GAUGE (Right Eye)     Family History   Problem Relation Age of Onset    Heart Disease Father         MI    High Blood Pressure Father     Cancer Maternal Grandmother     Cancer Maternal Grandfather      Social History     Tobacco Use    Smoking status: Former Smoker     Packs/day: 2.00     Years: 20.00     Pack years: 40.00     Last attempt to quit: 2001     Years since quittin.1    Smokeless tobacco: Never Used   Substance Use Topics    Alcohol use: No     Alcohol/week: 0.0 standard drinks       Subjective:      Review of Systems   Constitutional: Negative for activity change, appetite change, chills, diaphoresis, fatigue, fever and unexpected weight change. HENT: Negative for congestion, dental problem, ear discharge, ear pain, facial swelling, hearing loss, mouth sores, nosebleeds, postnasal drip, rhinorrhea, sinus pressure, sneezing, sore throat, tinnitus, trouble swallowing and voice change. Eyes: Negative for visual disturbance. Respiratory: Negative for apnea, cough, choking, chest tightness, shortness of breath, wheezing and stridor. Cardiovascular: Negative for chest pain, palpitations and leg swelling. Gastrointestinal: Negative for abdominal pain, diarrhea, nausea and vomiting. Endocrine: Negative for cold intolerance, heat intolerance, polydipsia and polyuria. Genitourinary: Negative for dysuria, enuresis and hematuria. Musculoskeletal: Negative for arthralgias, gait problem, neck pain and neck stiffness. Skin: Negative for color change and rash. Allergic/Immunologic: Negative for environmental allergies, food allergies and immunocompromised state. Neurological: Negative for dizziness, syncope, facial asymmetry, speech difficulty, light-headedness and headaches. Hematological: Negative for adenopathy. Does not bruise/bleed easily. Psychiatric/Behavioral: Negative for confusion and sleep disturbance.  The patient is not nervous/anxious. Objective:   /80 (Site: Left Upper Arm)   Pulse (!) 48   Temp 98.1 °F (36.7 °C) (Oral)   Resp 12   Ht 5' 11\" (1.803 m)   Wt 185 lb (83.9 kg)   BMI 25.80 kg/m²     Physical Exam   Voice: Clear  Neck: No palpable adenopathy    PROCEDURE: FIBEROPTIC LARYNGOSCOPY     A fiberoptic laryngoscopy was performed under topical anesthesia, after using Afrin and Lidocaine spray in the nasal fossa. The nasal fossa, nasopharynx, hypopharynx and larynx were carefully examined. Base of tongue was symmetrical. Epiglottis appeared normal and was not retrodisplaced. True vocal cords had normal mobility. There was normal erythema. No mucosal lesions or masses were noted. No pooling in the pyriform sinuses. Data:  All of the past medical history, past surgical history, family history,social history, allergies and current medications were reviewed with the patient. Assessment & Plan   Diagnoses and all orders for this visit:     Diagnosis Orders   1. Primary squamous cell carcinoma of larynx (HCC)     2. Effects, radiation, subsequent encounter     3. Hypothyroidism due to acquired atrophy of thyroid     4. Brown's esophagus without dysplasia     5. FOUZIA on CPAP         The findings were explained and his questions were answered. Doing very well return visit in 3 months       Cabrera Palacio. Bang Foster MD    **This report has been created using voice recognition software. It may contain minor errors which are inherent in voicerecognition technology. **

## 2020-02-26 RX ORDER — TERAZOSIN 5 MG/1
CAPSULE ORAL
Qty: 90 CAPSULE | Refills: 1 | Status: SHIPPED | OUTPATIENT
Start: 2020-02-26 | End: 2020-08-24

## 2020-03-02 ENCOUNTER — HOSPITAL ENCOUNTER (OUTPATIENT)
Dept: RADIATION ONCOLOGY | Age: 65
Discharge: HOME | End: 2020-03-02
Payer: MEDICARE

## 2020-03-02 ENCOUNTER — HOSPITAL ENCOUNTER (OUTPATIENT)
Age: 65
Discharge: HOME | End: 2020-03-02
Payer: MEDICARE

## 2020-03-02 ENCOUNTER — HOSPITAL ENCOUNTER (OUTPATIENT)
Dept: GENERAL RADIOLOGY | Age: 65
Discharge: HOME | End: 2020-03-02
Payer: MEDICARE

## 2020-03-02 VITALS
TEMPERATURE: 96.4 F | BODY MASS INDEX: 25.98 KG/M2 | DIASTOLIC BLOOD PRESSURE: 67 MMHG | HEART RATE: 54 BPM | OXYGEN SATURATION: 98 % | SYSTOLIC BLOOD PRESSURE: 132 MMHG | WEIGHT: 186.29 LBS | RESPIRATION RATE: 16 BRPM

## 2020-03-02 PROCEDURE — 73502 X-RAY EXAM HIP UNI 2-3 VIEWS: CPT

## 2020-03-02 PROCEDURE — 99212 OFFICE O/P EST SF 10 MIN: CPT | Performed by: RADIOLOGY

## 2020-03-05 NOTE — PROGRESS NOTES
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 Date of Diagnosis: 2016  K02.9 -- Radiation-induced dental caries (due to xerostomia). U10.623 -- Former smoker       RADIATION THERAPY TREATMENT HISTORY:  Treatment Course Number: 1  Treatment Site (s) Modality Dose (cGy From To Fractions/  Elapsed Days   Larynx/FVC 6MV photons 1575 cGy 2016   FVC Primary + Nodes (simultaneous integrated boost) 6MV photons 4725 cGy 2016     Cumulative Dose to False Vocal Cord Primary: 6300 cGy        CHAPERONE: Wife is present.        HISTORY OF PRESENT ILLNESS:  Mr. Rohan Rojo initially presented with a prolonged history of greater than one year of sore throat.  This had been gradually worsening though he had been using cough drops to alleviate the pain to some extent.  CT scan of the neck and 2016 was read as unremarkable but with some \"reactive\" lymph nodes.  He then developed pain radiating to the right ear.  When the problems did not resolve on repeated courses of antibiotic therapy, he underwent otolaryngology evaluation. Lavinia Schroedermichelle was found to have a lesion involving the anterior right false vocal cord.  Biopsy confirmed squamous cell carcinoma.  In accordance with clinical practice guidelines, he received a recommendation for definitive radiation therapy, to which he was agreeable.   Mr. Rohan Rojo He does not have any new complaints today. TESTS:   LABORATORY STUDIES:   CBC:   Lab Results   Component Value Date    WBC 5.3 07/12/2019    RBC 4.87 07/12/2019    RBC 4.86 12/01/2011    HGB 15.0 07/12/2019    HCT 46.0 07/12/2019    MCV 94.5 07/12/2019    MCH 30.8 07/12/2019    MCHC 32.6 07/12/2019    RDW 12.6 12/01/2017     07/12/2019    MPV 9.7 07/12/2019     CMP:  Lab Results   Component Value Date     07/12/2019    K 4.0 07/12/2019     07/12/2019    CO2 26 07/12/2019    BUN 15 07/12/2019    CREATININE 0.8 07/12/2019    LABGLOM >90 07/12/2019    GLUCOSE 105 07/12/2019    GLUCOSE 95 12/01/2011    PROT 6.6 12/01/2017    LABALBU 4.2 12/01/2017    LABALBU 4.3 12/01/2011    CALCIUM 9.4 07/12/2019    BILITOT 0.4 12/01/2017    ALKPHOS 90 12/01/2017    AST 18 12/01/2017    ALT 21 12/11/2018     Onc labs:   Lab Results   Component Value Date    PSA 0.77 05/29/2019         MEDICATIONS:   Current Outpatient Medications   Medication Sig Dispense Refill    terazosin (HYTRIN) 5 MG capsule TAKE 1 CAPSULE NIGHTLY 90 capsule 1    gabapentin (NEURONTIN) 300 MG capsule Take 1 capsule by mouth 3 times daily for 180 days. Intended supply: 90 days 270 capsule 1    levothyroxine (SYNTHROID) 50 MCG tablet Take 1 tablet by mouth daily 90 tablet 1    metoprolol succinate (TOPROL XL) 50 MG extended release tablet Take 1 tablet by mouth 2 times daily 180 tablet 3    ticagrelor (BRILINTA) 90 MG TABS tablet Take 1 tablet by mouth 2 times daily 180 tablet 3    atorvastatin (LIPITOR) 40 MG tablet Take 1 tablet by mouth daily 90 tablet 1    ipratropium (ATROVENT) 0.06 % nasal spray 2 sprays by Nasal route 3 times daily 2 each 11    nitroGLYCERIN (NITROSTAT) 0.4 MG SL tablet up to max of 3 total doses.  If no relief after 1 dose, call 911. 25 tablet 3    diphenhydrAMINE-APAP, sleep, (TYLENOL PM EXTRA STRENGTH)  MG tablet Take 2 tablets by mouth nightly      acetaminophen (TYLENOL) 500 MG tablet Take 500 mg mucosa shows xerostomia without visible ulcerative or exophytic lesions. Visible oropharynx within normal limits. NECK: Postradiation cutaneous/subcutaneous and soft-tissue fibrosis, stable. No palpable lymphadenopathy. THORAX:   LYMPH: No supraclavicular or axillary adenopathy. LUNGS: Lungs clear to auscultation. HEART: Non-tachycardic, regular. ABDOMEN: Nondistended, nontender with unremarkable bowel sounds. EXTREMITIES: No clubbing or cyanosis. NEUROLOGIC EXAMINATION: Cranial nerves grossly intact. ASSESSMENT:  1. Remains clinically without evidence of recurrence nearly 4 years after completing definitive radiation therapy for cancer of the false vocal cord. 2.  Radiation-induced dental disease resulting in need for complete dental extractions; radiation induced hypothyroidism; persistent xerostomia, persistent taste alteration. 3.  Former smoker. PLAN:   1. Initiate annual low dose CT lung screening. 2.  Radiation oncology follow-up in 6 months. As usual, Travisaissatou Arguello was instructed to call and arrange for an earlier appointment if needed for any problems, questions or concerns. 3.  Continue care in otolaryngology, and with other physicians/providers. 4.  Continue surveillance and basic/preventive/supportive health care in accordance with clinical practice guidelines. Electronically signed by Kat Villalobos MD  Radiation Oncology    [address]; Clarine Curling  ACC: Tumor [address]      This document was created using a voice-recognition program.  Computer generated transcription errors may be present.

## 2020-03-06 ENCOUNTER — TELEPHONE (OUTPATIENT)
Dept: INTERNAL MEDICINE CLINIC | Age: 65
End: 2020-03-06

## 2020-03-06 NOTE — TELEPHONE ENCOUNTER
----- Message from DEA oSod CNP sent at 3/3/2020  7:49 AM EST -----  Xray of the hip reviewed. Shows osteoarthritis. Refer to ortho for evaluation.

## 2020-03-13 ENCOUNTER — HOSPITAL ENCOUNTER (OUTPATIENT)
Dept: CT IMAGING | Age: 65
Discharge: HOME | End: 2020-03-13
Payer: MEDICARE

## 2020-03-13 ENCOUNTER — HOSPITAL ENCOUNTER (OUTPATIENT)
Age: 65
Discharge: HOME | End: 2020-03-13
Payer: MEDICARE

## 2020-03-13 PROCEDURE — G0297 LDCT FOR LUNG CA SCREEN: HCPCS

## 2020-03-30 RX ORDER — ATORVASTATIN CALCIUM 40 MG/1
40 TABLET, FILM COATED ORAL DAILY
Qty: 90 TABLET | Refills: 0 | Status: SHIPPED | OUTPATIENT
Start: 2020-03-30 | End: 2020-07-06

## 2020-06-01 ENCOUNTER — HOSPITAL ENCOUNTER (OUTPATIENT)
Age: 65
Discharge: HOME | End: 2020-06-01
Payer: MEDICARE

## 2020-06-01 DIAGNOSIS — E78.5 HYPERLIPIDEMIA, UNSPECIFIED HYPERLIPIDEMIA TYPE: ICD-10-CM

## 2020-06-01 LAB
ALBUMIN SERPL-MCNC: 4.1 G/DL (ref 3.5–5.1)
ALP BLD-CCNC: 80 U/L (ref 38–126)
ALT SERPL-CCNC: 14 U/L (ref 11–66)
ANION GAP SERPL CALCULATED.3IONS-SCNC: 9 MEQ/L (ref 8–16)
AST SERPL-CCNC: 17 U/L (ref 5–40)
BILIRUB SERPL-MCNC: 0.5 MG/DL (ref 0.3–1.2)
BUN BLDV-MCNC: 15 MG/DL (ref 7–22)
CALCIUM SERPL-MCNC: 8.9 MG/DL (ref 8.5–10.5)
CHLORIDE BLD-SCNC: 110 MEQ/L (ref 98–111)
CHOLESTEROL, TOTAL: 119 MG/DL (ref 100–199)
CO2: 25 MEQ/L (ref 23–33)
CREAT SERPL-MCNC: 0.8 MG/DL (ref 0.4–1.2)
ERYTHROCYTE [DISTWIDTH] IN BLOOD BY AUTOMATED COUNT: 13.5 % (ref 11.5–14.5)
ERYTHROCYTE [DISTWIDTH] IN BLOOD BY AUTOMATED COUNT: 49.1 FL (ref 35–45)
GFR SERPL CREATININE-BSD FRML MDRD: > 90 ML/MIN/1.73M2
GLUCOSE BLD-MCNC: 90 MG/DL (ref 70–108)
HCT VFR BLD CALC: 43.1 % (ref 42–52)
HDLC SERPL-MCNC: 47 MG/DL
HEMOGLOBIN: 13.6 GM/DL (ref 14–18)
LDL CHOLESTEROL CALCULATED: 60 MG/DL
MCH RBC QN AUTO: 31.2 PG (ref 26–33)
MCHC RBC AUTO-ENTMCNC: 31.6 GM/DL (ref 32.2–35.5)
MCV RBC AUTO: 98.9 FL (ref 80–94)
PLATELET # BLD: 184 THOU/MM3 (ref 130–400)
PMV BLD AUTO: 10.4 FL (ref 9.4–12.4)
POTASSIUM SERPL-SCNC: 4.1 MEQ/L (ref 3.5–5.2)
RBC # BLD: 4.36 MILL/MM3 (ref 4.7–6.1)
SODIUM BLD-SCNC: 144 MEQ/L (ref 135–145)
TOTAL PROTEIN: 6 G/DL (ref 6.1–8)
TRIGL SERPL-MCNC: 60 MG/DL (ref 0–199)
WBC # BLD: 4.9 THOU/MM3 (ref 4.8–10.8)

## 2020-06-01 PROCEDURE — 85027 COMPLETE CBC AUTOMATED: CPT

## 2020-06-01 PROCEDURE — 36415 COLL VENOUS BLD VENIPUNCTURE: CPT

## 2020-06-01 PROCEDURE — 80053 COMPREHEN METABOLIC PANEL: CPT

## 2020-06-01 PROCEDURE — 80061 LIPID PANEL: CPT

## 2020-06-02 ENCOUNTER — OFFICE VISIT (OUTPATIENT)
Dept: INTERNAL MEDICINE CLINIC | Age: 65
End: 2020-06-02
Payer: MEDICARE

## 2020-06-02 VITALS
SYSTOLIC BLOOD PRESSURE: 120 MMHG | WEIGHT: 188 LBS | DIASTOLIC BLOOD PRESSURE: 68 MMHG | HEART RATE: 64 BPM | TEMPERATURE: 97.7 F | HEIGHT: 70 IN | BODY MASS INDEX: 26.92 KG/M2

## 2020-06-02 PROCEDURE — 4040F PNEUMOC VAC/ADMIN/RCVD: CPT | Performed by: NURSE PRACTITIONER

## 2020-06-02 PROCEDURE — 99214 OFFICE O/P EST MOD 30 MIN: CPT | Performed by: NURSE PRACTITIONER

## 2020-06-02 PROCEDURE — 1036F TOBACCO NON-USER: CPT | Performed by: NURSE PRACTITIONER

## 2020-06-02 PROCEDURE — 1123F ACP DISCUSS/DSCN MKR DOCD: CPT | Performed by: NURSE PRACTITIONER

## 2020-06-02 PROCEDURE — G8417 CALC BMI ABV UP PARAM F/U: HCPCS | Performed by: NURSE PRACTITIONER

## 2020-06-02 PROCEDURE — 3017F COLORECTAL CA SCREEN DOC REV: CPT | Performed by: NURSE PRACTITIONER

## 2020-06-02 PROCEDURE — G8427 DOCREV CUR MEDS BY ELIG CLIN: HCPCS | Performed by: NURSE PRACTITIONER

## 2020-06-02 RX ORDER — DOCUSATE SODIUM 100 MG/1
100 CAPSULE, LIQUID FILLED ORAL 2 TIMES DAILY PRN
Qty: 60 CAPSULE | Refills: 1 | Status: SHIPPED | OUTPATIENT
Start: 2020-06-02 | End: 2020-08-01

## 2020-06-02 ASSESSMENT — PATIENT HEALTH QUESTIONNAIRE - PHQ9
1. LITTLE INTEREST OR PLEASURE IN DOING THINGS: 0
SUM OF ALL RESPONSES TO PHQ9 QUESTIONS 1 & 2: 0
SUM OF ALL RESPONSES TO PHQ QUESTIONS 1-9: 0
SUM OF ALL RESPONSES TO PHQ QUESTIONS 1-9: 0
2. FEELING DOWN, DEPRESSED OR HOPELESS: 0

## 2020-06-20 ASSESSMENT — ENCOUNTER SYMPTOMS
BLOOD IN STOOL: 0
DIARRHEA: 0
WHEEZING: 0
CHOKING: 0
SHORTNESS OF BREATH: 0
PHOTOPHOBIA: 0
TROUBLE SWALLOWING: 1
SINUS PAIN: 0
BACK PAIN: 1
SINUS PRESSURE: 0
VOMITING: 0
COUGH: 0
ABDOMINAL DISTENTION: 0
NAUSEA: 0
CONSTIPATION: 0
ABDOMINAL PAIN: 0

## 2020-06-25 RX ORDER — LEVOTHYROXINE SODIUM 0.05 MG/1
50 TABLET ORAL DAILY
Qty: 90 TABLET | Refills: 1 | Status: SHIPPED | OUTPATIENT
Start: 2020-06-25 | End: 2021-01-15 | Stop reason: SDUPTHER

## 2020-07-06 RX ORDER — ATORVASTATIN CALCIUM 40 MG/1
TABLET, FILM COATED ORAL
Qty: 90 TABLET | Refills: 0 | Status: SHIPPED | OUTPATIENT
Start: 2020-07-06 | End: 2020-08-28 | Stop reason: SDUPTHER

## 2020-07-23 ENCOUNTER — TELEPHONE (OUTPATIENT)
Dept: PULMONOLOGY | Age: 65
End: 2020-07-23

## 2020-07-23 NOTE — TELEPHONE ENCOUNTER
Spoke to pt wife Jose Miguel Courser about rescheduling appt. She stated pt has not been wearing machine since they got back from vacation and that it plugs him up. She is going to talk to him and call the office back.

## 2020-07-31 ENCOUNTER — OFFICE VISIT (OUTPATIENT)
Dept: ENT CLINIC | Age: 65
End: 2020-07-31
Payer: MEDICARE

## 2020-07-31 VITALS
HEIGHT: 71 IN | SYSTOLIC BLOOD PRESSURE: 94 MMHG | RESPIRATION RATE: 14 BRPM | HEART RATE: 74 BPM | BODY MASS INDEX: 25.76 KG/M2 | TEMPERATURE: 97.8 F | WEIGHT: 184 LBS | DIASTOLIC BLOOD PRESSURE: 66 MMHG

## 2020-07-31 PROCEDURE — G8417 CALC BMI ABV UP PARAM F/U: HCPCS | Performed by: OTOLARYNGOLOGY

## 2020-07-31 PROCEDURE — 99213 OFFICE O/P EST LOW 20 MIN: CPT | Performed by: OTOLARYNGOLOGY

## 2020-07-31 PROCEDURE — 3017F COLORECTAL CA SCREEN DOC REV: CPT | Performed by: OTOLARYNGOLOGY

## 2020-07-31 PROCEDURE — 4040F PNEUMOC VAC/ADMIN/RCVD: CPT | Performed by: OTOLARYNGOLOGY

## 2020-07-31 PROCEDURE — 1123F ACP DISCUSS/DSCN MKR DOCD: CPT | Performed by: OTOLARYNGOLOGY

## 2020-07-31 PROCEDURE — G8427 DOCREV CUR MEDS BY ELIG CLIN: HCPCS | Performed by: OTOLARYNGOLOGY

## 2020-07-31 PROCEDURE — 1036F TOBACCO NON-USER: CPT | Performed by: OTOLARYNGOLOGY

## 2020-07-31 PROCEDURE — 31575 DIAGNOSTIC LARYNGOSCOPY: CPT | Performed by: OTOLARYNGOLOGY

## 2020-07-31 ASSESSMENT — ENCOUNTER SYMPTOMS
RHINORRHEA: 0
COUGH: 0
TROUBLE SWALLOWING: 0
COLOR CHANGE: 0
NAUSEA: 0
DIARRHEA: 0
STRIDOR: 0
VOICE CHANGE: 0
WHEEZING: 0
SINUS PRESSURE: 0
CHEST TIGHTNESS: 0
SHORTNESS OF BREATH: 0
SORE THROAT: 0
FACIAL SWELLING: 0
APNEA: 0
VOMITING: 0
CHOKING: 0
ABDOMINAL PAIN: 0

## 2020-07-31 NOTE — PROGRESS NOTES
[facility] PROFESSIONAL SERVS  [facility] EAR, NOSE AND THROAT  [address] [place], [place] [address] [address]  Dept: [phone]  Dept Fax: [phone]  Loc: [phone]    Dot Arriola is a 75 y.o. male who was referred byNo ref. provider found for:  Chief Complaint   Patient presents with    Follow-up     Patient is here for a 3 month follow up. Conchetta Peaks HPI:     Dot Arriola is a 75 y.o. male who presents today for ***. History:     No Known Allergies  Current Outpatient Medications   Medication Sig Dispense Refill    ticagrelor (BRILINTA) 90 MG TABS tablet Take 1 tablet by mouth 2 times daily 180 tablet 0    atorvastatin (LIPITOR) 40 MG tablet TAKE 1 TABLET BY MOUTH ONE TIME A DAY  90 tablet 0    levothyroxine (SYNTHROID) 50 MCG tablet Take 1 tablet by mouth daily 90 tablet 1    terazosin (HYTRIN) 5 MG capsule TAKE 1 CAPSULE NIGHTLY 90 capsule 1    metoprolol succinate (TOPROL XL) 50 MG extended release tablet Take 1 tablet by mouth 2 times daily 180 tablet 3    diphenhydrAMINE-APAP, sleep, (TYLENOL PM EXTRA STRENGTH)  MG tablet Take 2 tablets by mouth nightly      omeprazole (PRILOSEC) 20 MG delayed release capsule Take 20 mg by mouth 2 times daily      aspirin EC 81 MG EC tablet Take 81 mg by mouth daily      CPAP Machine MISC by Does not apply route Please change CPAP pressure to 8 cm H20. 1 each 0    Coenzyme Q10 (CO Q-10) 200 MG CAPS Take  by mouth daily.  Multiple Vitamin (MULTI-VITAMIN) TABS Take  by mouth daily.  ARGININE PO Take 500 mg by mouth daily. 2 tab      docusate sodium (COLACE) 100 MG capsule Take 1 capsule by mouth 2 times daily as needed for Constipation (Patient not taking: Reported on 7/31/2020) 60 capsule 1    gabapentin (NEURONTIN) 300 MG capsule Take 1 capsule by mouth 3 times daily for 180 days.  Intended supply: 90 days 270 capsule 1    ipratropium (ATROVENT) 0.06 % nasal spray 2 sprays by Nasal route 3 times daily 2 each 11    nitroGLYCERIN (NITROSTAT) 0.4 MG SL tablet up to max of 3 total doses. If no relief after 1 dose, call 911. (Patient not taking: Reported on 7/31/2020) 25 tablet 3    acetaminophen (TYLENOL) 500 MG tablet Take 500 mg by mouth every 6 hours as needed for Pain       No current facility-administered medications for this visit.       Past Medical History:   Diagnosis Date    Ascending Aneurysm ([address] 02/2017    Ascending, 4.2 cm per pt    Brown's esophagus     CAD (coronary artery disease)     Cancer (HCC)     throat cancer, s/p radiation    Chronic back pain     GERD (gastroesophageal reflux disease)     Hepatitis C     as child    History of hyperbaric oxygen therapy     after bottom teeth removed after rdiation therapy    Hyperlipidemia     Hypertension     Left shoulder pain     nerve pain , ? secondary to radiation, has had \" numbing shot \"    Nausea & vomiting     FOUZIA on CPAP     Osteoradionecrosis of mandible     s/p radiation for throat cancer    PONV (postoperative nausea and vomiting)     only after OHS    S/P CABG [facility]coronary artery bypass graft) 2002    [facility]    Snores     Thyroid disease     Vision loss of right eye     during hyperbaric chamber oxygen therapy     Vitreous opacities     right    Wears dentures     full upper, no bottom teeth and no use of dentures on bottom      Past Surgical History:   Procedure Laterality Date    CARDIAC CATHETERIZATION  8/2002, 4/2004    [facility]    COLONOSCOPY      CORONARY ANGIOPLASTY WITH STENT PLACEMENT  7/2002    [facility]    CORONARY ANGIOPLASTY WITH STENT PLACEMENT N/A 07/12/2019    [facility]    CORONARY ARTERY BYPASS GRAFT  2002    [facility]    DENTAL SURGERY      bottom teeth removal    HEMORRHOID SURGERY      KNEE SURGERY Right     LARYNGOSCOPY N/A 10/4/2017    LARYNGOSCOPY MICRO WITH BIOPSY performed by Michael Farias MD at [address] [place]  04/28/2016    by Dr Jaz Parham VITREOUS,SUBRET/CHOROID FLUID Right Psychiatric/Behavioral: Negative for confusion and sleep disturbance. The patient is not nervous/anxious. Objective:     BP 94/66 (Site: Left Upper Arm, Position: Sitting)   Pulse 74   Temp 97.8 °F (36.6 °C) (Infrared)   Resp 14   Ht 5' 11\" (1.803 m)   Wt 184 lb (83.5 kg)   BMI 25.66 kg/m²     Physical Exam    Data:  All of the past medical history, past surgical history, family history,social history, allergies and current medications were reviewed with the patient. Assessment & Plan   Diagnoses and all orders for this visit:    {No diagnosis found. (Refresh or delete this SmartLink)}    The findings were explained and his questions were answered. No follow-ups on file. Amanda REILLY CMA ([facility]), am scribing for, and in the presence of Dr. Pradip Xiong. Electronically signed by Manoj Mendes CMA ([facility]) on 7/31/20 at 11:42 AM EDT.      (Please note that portions of this note were completed with a voice recognition program. Efforts were made to edit the dictations butoccasionally words are mis-transcribed.)

## 2020-07-31 NOTE — PROGRESS NOTES
Verbal order from Dr. Brenda Alexander to anesthetize the patient's nasal cavity. After checking the patient's allergy list to confirm no listed medication allergy and verbally asking the patient, the patient's nasal cavity was anesthetized with topical 4% Xylocaine 4 sprays each nostril and Abhishek-Synephrine 4 sprays each nostril .

## 2020-07-31 NOTE — PROGRESS NOTES
current facility-administered medications for this visit.          Past Medical History        Past Medical History:   Diagnosis Date    Ascending Aneurysm ([address] 02/2017     Ascending, 4.2 cm per pt    Brown's esophagus      CAD (coronary artery disease)      Cancer (HCC)       throat cancer, s/p radiation    Chronic back pain      GERD (gastroesophageal reflux disease)      Hepatitis C       as child    History of hyperbaric oxygen therapy       after bottom teeth removed after rdiation therapy    Hyperlipidemia      Hypertension      Left shoulder pain       nerve pain , ? secondary to radiation, has had \" numbing shot \"    Nausea & vomiting      FOUZIA on CPAP      Osteoradionecrosis of mandible       s/p radiation for throat cancer    PONV (postoperative nausea and vomiting)       only after OHS    S/P CABG (coronary artery bypass graft) 2002     [facility]    Snores      Thyroid disease      Vision loss of right eye       during hyperbaric chamber oxygen therapy     Vitreous opacities       right    Wears dentures       full upper, no bottom teeth and no use of dentures on bottom         Past Surgical History         Past Surgical History:   Procedure Laterality Date    CARDIAC CATHETERIZATION   8/2002, 4/2004     [facility]    COLONOSCOPY        CORONARY ANGIOPLASTY WITH STENT PLACEMENT   7/2002     [facility]    CORONARY ANGIOPLASTY WITH STENT PLACEMENT N/A 07/12/2019     [facility]    CORONARY ARTERY BYPASS GRAFT   [address] [place] [address]    DENTAL SURGERY         bottom teeth removal    HEMORRHOID SURGERY        KNEE SURGERY Right      LARYNGOSCOPY N/A 10/4/2017     LARYNGOSCOPY MICRO WITH BIOPSY performed by Brien Perry MD at [address] [place] [address] [place]   04/28/2016     by Dr Greene Pac VITREOUS,SUBRET/CHOROID FLUID Right 8/27/2018     VITRECTOMY 25 GAUGE performed by Kayleen Londono MD at [place] [address] Right 08/27/2018      VITRECTOMY 25 GAUGE (Right Eye)        Family History         Family History   Problem Relation Age of Onset    Heart Disease Father           MI    High Blood Pressure Father      Cancer Maternal Grandmother      Cancer Maternal Grandfather          Social History            Tobacco Use    Smoking status: Former Smoker       Packs/day: 2.00       Years: 20.00       Pack years: 40.00       Last attempt to quit: 2001       Years since quittin.1    Smokeless tobacco: Never Used   Substance Use Topics    Alcohol use: No       Alcohol/week: 0.0 standard drinks        Subjective:      Review of Systems   Constitutional: Negative for activity change, appetite change, chills, diaphoresis, fatigue, fever and unexpected weight change. HENT: Negative for congestion, dental problem, ear discharge, ear pain, facial swelling, hearing loss, mouth sores, nosebleeds, postnasal drip, rhinorrhea, sinus pressure, sneezing, sore throat, tinnitus, trouble swallowing and voice change. Eyes: Negative for visual disturbance. Respiratory: Negative for apnea, cough, choking, chest tightness, shortness of breath, wheezing and stridor. Cardiovascular: Negative for chest pain, palpitations and leg swelling. Gastrointestinal: Negative for abdominal pain, diarrhea, nausea and vomiting. Endocrine: Negative for cold intolerance, heat intolerance, polydipsia and polyuria. Genitourinary: Negative for dysuria, enuresis and hematuria. Musculoskeletal: Negative for arthralgias, gait problem, neck pain and neck stiffness. Skin: Negative for color change and rash. Allergic/Immunologic: Negative for environmental allergies, food allergies and immunocompromised state. Neurological: Negative for dizziness, syncope, facial asymmetry, speech difficulty, light-headedness and headaches. Hematological: Negative for adenopathy. Does not bruise/bleed easily. Psychiatric/Behavioral: Negative for confusion and sleep disturbance. The patient is not nervous/anxious.       Objective:   /80 (Site: Left Upper Arm)   Pulse (!) 48   Temp 98.1 °F (36.7 °C) (Oral)   Resp 12   Ht 5' 11\" (1.803 m)   Wt 185 lb (83.9 kg)   BMI 25.80 kg/m²      Physical Exam   Voice: Clear  Neck: No palpable adenopathy     PROCEDURE: FIBEROPTIC LARYNGOSCOPY     A fiberoptic laryngoscopy was performed under topical anesthesia, after using Afrin and Lidocaine spray in the nasal fossa. The nasal fossa, nasopharynx, hypopharynx and larynx were carefully examined. Base of tongue was symmetrical. Epiglottis appeared normal and was not retrodisplaced. True vocal cords had normal mobility. There was normal erythema. No mucosal lesions or masses were noted. No pooling in the pyriform sinuses.     Data:  All of the past medical history, past surgical history, family history,social history, allergies and current medications were reviewed with the patient. Assessment & Plan   Diagnoses and all orders for this visit:      Diagnosis Orders   1. Primary squamous cell carcinoma of larynx (HCC)      2. Effects, radiation, subsequent encounter      3. Hypothyroidism due to acquired atrophy of thyroid      4. Brown's esophagus without dysplasia      5. FOUZIA on CPAP           The findings were explained and his questions were answered. Doing very well return visit in 3 months        Jeremías Tamez MD     **This report has been created using voice recognition software. It may contain minor errors which are inherent in voicerecognition technology. **

## 2020-08-24 RX ORDER — TERAZOSIN 5 MG/1
CAPSULE ORAL
Qty: 90 CAPSULE | Refills: 1 | Status: SHIPPED | OUTPATIENT
Start: 2020-08-24 | End: 2021-01-12 | Stop reason: SDUPTHER

## 2020-08-28 ENCOUNTER — OFFICE VISIT (OUTPATIENT)
Dept: CARDIOLOGY CLINIC | Age: 65
End: 2020-08-28
Payer: MEDICARE

## 2020-08-28 VITALS
HEIGHT: 71 IN | BODY MASS INDEX: 25.65 KG/M2 | SYSTOLIC BLOOD PRESSURE: 130 MMHG | WEIGHT: 183.2 LBS | DIASTOLIC BLOOD PRESSURE: 64 MMHG | HEART RATE: 54 BPM

## 2020-08-28 PROCEDURE — 4040F PNEUMOC VAC/ADMIN/RCVD: CPT | Performed by: INTERNAL MEDICINE

## 2020-08-28 PROCEDURE — 1036F TOBACCO NON-USER: CPT | Performed by: INTERNAL MEDICINE

## 2020-08-28 PROCEDURE — G8427 DOCREV CUR MEDS BY ELIG CLIN: HCPCS | Performed by: INTERNAL MEDICINE

## 2020-08-28 PROCEDURE — 93000 ELECTROCARDIOGRAM COMPLETE: CPT | Performed by: INTERNAL MEDICINE

## 2020-08-28 PROCEDURE — 99214 OFFICE O/P EST MOD 30 MIN: CPT | Performed by: INTERNAL MEDICINE

## 2020-08-28 PROCEDURE — 1123F ACP DISCUSS/DSCN MKR DOCD: CPT | Performed by: INTERNAL MEDICINE

## 2020-08-28 PROCEDURE — G8417 CALC BMI ABV UP PARAM F/U: HCPCS | Performed by: INTERNAL MEDICINE

## 2020-08-28 PROCEDURE — 3017F COLORECTAL CA SCREEN DOC REV: CPT | Performed by: INTERNAL MEDICINE

## 2020-08-28 RX ORDER — ATORVASTATIN CALCIUM 40 MG/1
TABLET, FILM COATED ORAL
Qty: 90 TABLET | Refills: 3 | Status: SHIPPED | OUTPATIENT
Start: 2020-08-28 | End: 2020-10-05

## 2020-08-28 RX ORDER — METOPROLOL SUCCINATE 50 MG/1
50 TABLET, EXTENDED RELEASE ORAL 2 TIMES DAILY
Qty: 180 TABLET | Refills: 3 | Status: SHIPPED | OUTPATIENT
Start: 2020-08-28 | End: 2020-08-28 | Stop reason: ALTCHOICE

## 2020-08-28 NOTE — PROGRESS NOTES
[facility],[place] [address] [address] 903 [facility] [address]  Dept: [phone]  Dept Fax: [phone]  Loc: [phone]    Visit Date: 8/28/2020    Mr. Jere Verdugo is a 75 y.o. male  who presented for:  Chief Complaint   Patient presents with    6 Month Follow-Up    Coronary Artery Disease       HPI:   71 yo M c hx of CAD s/p CABG (VG-RCA, VG-OM; LIMA to LAD (occluded)); s/p PCI, FOUZIA, HTN, HLD, thoracic anuerysm (4.1cm, follows CT-Surgery at [facility]) is here for a follow up. Denies any chest pain, sob, palpitations, lightheadedness, dizziness, orthopnea, PND or pedal edema. Reports more fatigue lately,. Current Outpatient Medications:     terazosin (HYTRIN) 5 MG capsule, TAKE 1 CAPSULE NIGHTLY, Disp: 90 capsule, Rfl: 1    ticagrelor (BRILINTA) 90 MG TABS tablet, Take 1 tablet by mouth 2 times daily, Disp: 180 tablet, Rfl: 0    atorvastatin (LIPITOR) 40 MG tablet, TAKE 1 TABLET BY MOUTH ONE TIME A DAY , Disp: 90 tablet, Rfl: 0    levothyroxine (SYNTHROID) 50 MCG tablet, Take 1 tablet by mouth daily, Disp: 90 tablet, Rfl: 1    gabapentin (NEURONTIN) 300 MG capsule, Take 1 capsule by mouth 3 times daily for 180 days. Intended supply: 90 days, Disp: 270 capsule, Rfl: 1    ipratropium (ATROVENT) 0.06 % nasal spray, 2 sprays by Nasal route 3 times daily, Disp: 2 each, Rfl: 11    diphenhydrAMINE-APAP, sleep, (TYLENOL PM EXTRA STRENGTH)  MG tablet, Take 2 tablets by mouth nightly, Disp: , Rfl:     acetaminophen (TYLENOL) 500 MG tablet, Take 500 mg by mouth every 6 hours as needed for Pain, Disp: , Rfl:     omeprazole (PRILOSEC) 20 MG delayed release capsule, Take 20 mg by mouth 2 times daily, Disp: , Rfl:     aspirin EC 81 MG EC tablet, Take 81 mg by mouth daily, Disp: , Rfl:     CPAP Machine MISC, by Does not apply route Please change CPAP pressure to 8 cm H20., Disp: 1 each, Rfl: 0    Coenzyme Q10 (CO Q-10) 200 MG CAPS, Take  by mouth daily. , Disp: , Rfl:     Multiple Vitamin (MULTI-VITAMIN) TABS, Take  by mouth daily. , Disp: , Rfl:     ARGININE PO, Take 500 mg by mouth daily. 2 tab, Disp: , Rfl:     nitroGLYCERIN (NITROSTAT) 0.4 MG SL tablet, up to max of 3 total doses. If no relief after 1 dose, call 911. (Patient not taking: Reported on 7/31/2020), Disp: 25 tablet, Rfl: 3    Past Medical History  Femi Quiroga  has a past medical history of Ascending Aneurysm ([facility] [address] Brown's esophagus, CAD (coronary artery disease), Cancer ([facility] [address] Chronic back pain, GERD (gastroesophageal reflux disease), Hepatitis C, History of hyperbaric oxygen therapy, Hyperlipidemia, Hypertension, Left shoulder pain, Nausea & vomiting, FOUZIA on CPAP, Osteoradionecrosis of mandible, PONV (postoperative nausea and vomiting), S/P CABG (coronary artery bypass graft), Snores, Thyroid disease, Vision loss of right eye, Vitreous opacities, and Wears dentures. Social History  Femi Quiroga  reports that he quit smoking about 18 years ago. He has a 40.00 pack-year smoking history. He has never used smokeless tobacco. He reports that he does not drink alcohol or use drugs. Family History  Femi Quiroga family history includes Cancer in his maternal grandfather and maternal grandmother; Heart Disease in his father; High Blood Pressure in his father.     Past Surgical History   Past Surgical History:   Procedure Laterality Date    CARDIAC CATHETERIZATION  8/2002, 4/2004    [facility]    COLONOSCOPY      CORONARY ANGIOPLASTY WITH STENT PLACEMENT  7/2002    [facility]    CORONARY ANGIOPLASTY WITH STENT PLACEMENT N/A 07/12/2019    MID LAD    CORONARY ARTERY BYPASS GRAFT  2002    [facility]    DENTAL SURGERY      bottom teeth removal    HEMORRHOID SURGERY      KNEE SURGERY Right     LARYNGOSCOPY N/A 10/4/2017    LARYNGOSCOPY MICRO WITH BIOPSY performed by Bobbi Lucia MD at [address] [place]  04/28/2016    by Dr Erika Boyle VITREOUS,SUBRET/CHOROID FLUID Right 8/27/2018    VITRECTOMY 25 GAUGE performed by Polina Cervantes MD at [address] VITRECTOMY Right 08/27/2018     VITRECTOMY 25 GAUGE (Right Eye)       Subjective:     REVIEW OF SYSTEMS  Constitutional: denies sweats, chills and fever  HENT: denies  congestion, sinus pressure, sneezing and sore throat. Eyes: denies  pain, discharge, redness and itching. Respiratory: denies apnea, cough  Gastrointestinal: denies blood in stool, constipation, diarrhea   Endocrine: denies cold intolerance, heat intolerance, polydipsia. Genitourinary: denies dysuria, enuresis, flank pain and hematuria. Musculoskeletal: denies arthralgias, joint swelling and neck pain. Neurological: denies numbness and headaches. Psychiatric/Behavioral: denies agitation, confusion, decreased concentration and dysphoric mood    All others reviewed and are negative. Objective:     /64   Pulse 54   Ht 5' 11\" (1.803 m)   Wt 183 lb 3.2 oz (83.1 kg)   BMI 25.55 kg/m²     Wt Readings from Last 3 Encounters:   08/28/20 183 lb 3.2 oz (83.1 kg)   07/31/20 184 lb (83.5 kg)   06/02/20 188 lb (85.3 kg)     BP Readings from Last 3 Encounters:   08/28/20 130/64   07/31/20 94/66   06/02/20 120/68       PHYSICAL EXAM  Constitutional: Oriented to person, place, and time. Appears well-developed and well-nourished. HENT:   Head: Normocephalic and atraumatic. Eyes: EOM are normal. Pupils are equal, round, and reactive to light. Neck: Normal range of motion. Neck supple. No JVD present. Cardiovascular: Normal rate , normal heart sounds and intact distal pulses. Pulmonary/Chest: Effort normal and breath sounds normal. No respiratory distress. No wheezes. No rales. Abdominal: Soft. Bowel sounds are normal. No distension. There is no tenderness. Musculoskeletal: Normal range of motion. No edema. Neurological: Alert and oriented to person, place, and time. No cranial nerve deficit. Coordination normal.   Skin: Skin is warm and dry.    Psychiatric: Normal mood and affect.        Lab Results   Component Value Date    CKTOTAL 60 2015       Lab Results   Component Value Date    WBC 4.9 2020    RBC 4.36 2020    RBC 4.86 2011    HGB 13.6 2020    HCT 43.1 2020    MCV 98.9 2020    MCH 31.2 2020    MCHC 31.6 2020    RDW 12.6 2017     2020    MPV 10.4 2020       Lab Results   Component Value Date     2020    K 4.1 2020    K 4.0 2019     2020    CO2 25 2020    BUN 15 2020    LABALBU 4.1 2020    LABALBU 4.3 2011    CREATININE 0.8 2020    CALCIUM 8.9 2020    LABGLOM >90 2020    GLUCOSE 90 2020    GLUCOSE 95 2011       Lab Results   Component Value Date    ALKPHOS 80 2020    ALT 14 2020    AST 17 2020    PROT 6.0 2020    BILITOT 0.5 2020    BILIDIR <0.2 2017    LABALBU 4.1 2020    LABALBU 4.3 2011       No results found for: MG    Lab Results   Component Value Date    INR 0.97 2019         No results found for: LABA1C    Lab Results   Component Value Date    TRIG 60 2020    HDL 47 2020    LDLCALC 60 2020    LDLDIRECT 89.07 2018       Lab Results   Component Value Date    TSH 0.725 2019         Testing Reviewed:      I haveindividually reviewed the below cardiac tests    EKG:    ECHO:   Results for orders placed during the hospital encounter of 12   Echocardiogram complete 2D with doppler with color    Narrative [facility]  [address] [address], [address]  Phone: [phone]  Fax: [phone]    Transthoracic Echocardiogram  2D, M-mode, Doppler, and Color Doppler    Name: Veronica Malone  : 1955  MR #: [id number]  Study date: 2012  Account #: [de-identified]  Ht-Wt-BSA: 71 in- 184.6 lb- 2.04 mÂ²    Reading Physician:  Vadim Alves DO  Technologist:  Pamela Florentino, [facility]  Referring Physician:  Jaimie Clemons MD Divya Kingston for study: hyperlipidemia, CAD, CABG, chest pain    HISTORY: PRIOR HISTORY: CAD, CABG, HTN, hyperlipidemia, ex smoker, chest pain  /PROCEDURE: The procedure was performed in outpatient. This was a routine study. Diastolic blood pressure was 95 mmHg, at the start of the study. Image quality  was adequate. LEFT VENTRICLE: Size was normal. Systolic function was normal. Ejection  fraction was estimated in the range of 55 % to 65 %. There were no regional  wall motion abnormalities. Wall thickness was normal. DOPPLER: Doppler  parameters were consistent with abnormal left ventricular relaxation (grade 1  diastolic dysfunction). AORTIC VALVE: The valve was possibly bicuspid. Leaflets exhibited normal  thickness, calcification, and normal cuspal separation. DOPPLER: Transaortic  velocity was within the normal range. There was no evidence for stenosis. There  was trivial regurgitation. AORTA: The root exhibited mild dilatation. MITRAL VALVE: Valve structure was normal. There was normal leaflet separation. DOPPLER: The transmitral velocity was within the normal range. There was no  evidence for stenosis. There was trivial regurgitation. LEFT ATRIUM: Size was normal.    RIGHT VENTRICLE: The ventricle was mildly to moderately dilated. Systolic  function was normal. Wall thickness was normal.    PULMONIC VALVE: Leaflets exhibited normal thickness, no calcification, and  normal cuspal separation. DOPPLER: The transpulmonic velocity was within the  normal range. There was trivial regurgitation. PULMONARY ARTERY: The size was normal. DOPPLER: Systolic pressure was within  the normal range. TRICUSPID VALVE: The valve structure was normal. There was normal leaflet  separation. DOPPLER: The transtricuspid velocity was within the normal range. There was no evidence for stenosis. There was trivial regurgitation.     RIGHT ATRIUM: Size was normal.    SYSTEMIC VEINS: IVC: The inferior vena cava was normal in size. PERICARDIUM: There was no pericardial effusion. The pericardium was normal in  appearance. SYSTEM MEASUREMENT TABLES    2D mode  LA Dimension (2D): 3.3 cm  FS (2D-Cubed): 30 %  FS (2D-Teich): 30 %  IVSd (2D): 1 cm  IVSd; Mean chosen (2D): 1 cm  LVIDd (2D): 5.4 cm  LVIDs (2D): 3.8 cm  LVOT Area (2D): 3.1 cm2  LVPWd (2D): 1 cm  RVIDd (2D): 3.2 cm    M mode  AoR Diam (MM): 3.3 cm  AV Cusp Sep (MM): 2.5 cm  MV D-E Exc: 1.6 cm  MV EF [place] (MM): 9.4 cm/s    Unspecified Scan Mode  VTI; Antegrade Flow: 20.9 cm  Vmax; Antegrade Flow: 1 m/s  LVOT Diam: 2 cm  LVOT Vmax: 82.8 cm/s  MV Peak A Stephen; Mean: 53.8 cm/s  MV Peak E Stephen; Antegrade Flow: 65.2 cm/s  Vmax; Antegrade Flow: 74.5 cm/s  TV Peak A Stephen: 41 cm/s  TV Peak E Stephen; Antegrade Flow: 52.8 cm/s    SUMMARY:    Left ventricle:  Size was normal.  Systolic function was normal. Ejection fraction was estimated in the range of  55 % to 65 %. There were no regional wall motion abnormalities. Doppler parameters were consistent with abnormal left ventricular relaxation  (grade 1 diastolic dysfunction). Right ventricle: The ventricle was mildly to moderately dilated. Aortic valve: The valve was possibly bicuspid. Leaflets exhibited normal thickness,  calcification, and normal cuspal separation. Aorta, systemic arteries: The root exhibited mild dilatation. Prepared and signed by    Morenita Ledesma DO  Signed 27-Jul-2012 17:43:35         STRESS:    CATH:    Assessment/Plan       Diagnosis Orders   1. Coronary artery disease due to lipid rich plaque  EKG 12 Lead     Recent FFR guided PCI  CAD s/p CABG (LIMA to LAD, VG to OM and VG RCA) , s/p PCI  Bradycardia   Thoracic anuerysm 4.1 cm  HTN  HLD     On Aspirin, Brilinta, statin   No bleeding issues  Will d/c metoprolol due to bradycardia and fatigue  The patient is asked to make an attempt to improve diet and exercise patterns to aid in medical management of this problem.   Advised more plant based nutrition/meditarrean diet   Advised patient to call office or seek immediate medical attention if there is any new onset of  any chest pain, sob, palpitations, lightheadedness, dizziness, orthopnea, PND or pedal edema. All medication side effects were discussed in details. Thank youfor allowing me to participate in the care of this patient. Please do not hesitate to contact me for any further questions. Return in about 6 months (around 2/28/2021) for Review testing, Regular follow up.        Electronically signed by Breezy Garcia MD [facility]  8/28/2020 at 11:07 AM

## 2020-08-28 NOTE — PROGRESS NOTES
6 month follow-up. He denies having any chest pain, dizziness or MATHEW.  lightheadedness, palpitations or MATHEW. He has SOB worse with exertion. EKG completed.

## 2020-09-25 ENCOUNTER — HOSPITAL ENCOUNTER (OUTPATIENT)
Dept: RADIATION ONCOLOGY | Age: 65
Discharge: HOME | End: 2020-09-25
Payer: MEDICARE

## 2020-09-25 VITALS
HEART RATE: 67 BPM | WEIGHT: 184 LBS | SYSTOLIC BLOOD PRESSURE: 151 MMHG | TEMPERATURE: 98.4 F | DIASTOLIC BLOOD PRESSURE: 78 MMHG | BODY MASS INDEX: 25.66 KG/M2 | OXYGEN SATURATION: 97 % | RESPIRATION RATE: 16 BRPM

## 2020-09-25 PROCEDURE — 99212 OFFICE O/P EST SF 10 MIN: CPT | Performed by: RADIOLOGY

## 2020-09-25 PROCEDURE — 99213 OFFICE O/P EST LOW 20 MIN: CPT | Performed by: RADIOLOGY

## 2020-10-05 RX ORDER — ATORVASTATIN CALCIUM 40 MG/1
TABLET, FILM COATED ORAL
Qty: 90 TABLET | Refills: 0 | Status: SHIPPED | OUTPATIENT
Start: 2020-10-05 | End: 2021-01-12 | Stop reason: SDUPTHER

## 2020-10-06 RX ORDER — LEVOTHYROXINE SODIUM 0.05 MG/1
50 TABLET ORAL DAILY
Qty: 14 TABLET | Refills: 0 | Status: SHIPPED | OUTPATIENT
Start: 2020-10-06 | End: 2021-01-12

## 2020-10-06 NOTE — TELEPHONE ENCOUNTER
Patient will be out of town when the refill arrives at his home through the mail in. Patient is asking for just 2 weeks worth to make sure he has enough to cover him while out of town. He would like the Rx sent to [facility]. Also notified patient of labs to be completed. Patient verbalized understanding and thanked me.

## 2020-10-06 NOTE — TELEPHONE ENCOUNTER
Patient called and stated he will be going out of state and would need his levothyroxine (Synthroid) medication refilled.  [facility] [place].

## 2020-10-06 NOTE — TELEPHONE ENCOUNTER
The patient was given a 90-day supply with 1 refill back on 6/25/2020. He should still have the refill. I recommended he contact the pharmacy and inquire about the refill. If they do not have the refill available I will provide a new prescription. I would like the patient to get updated thyroid labs as well. Order placed for the labs.

## 2020-10-08 RX ORDER — GABAPENTIN 300 MG/1
300 CAPSULE ORAL 3 TIMES DAILY
Qty: 270 CAPSULE | Refills: 1 | Status: SHIPPED | OUTPATIENT
Start: 2020-10-08 | End: 2021-05-05 | Stop reason: DRUGHIGH

## 2020-10-08 NOTE — TELEPHONE ENCOUNTER
Paola Cochran called requesting a refill on the following medications:  Requested Prescriptions     Pending Prescriptions Disp Refills    gabapentin (NEURONTIN) 300 MG capsule 270 capsule 1     Sig: Take 1 capsule by mouth 3 times daily for 180 days.  Intended supply: 90 days     Pharmacy verified:  yes      Date of last visit: 10-15-19  Date of next visit (if applicable): Visit date not found        PLEASE CALL PT WHEN THIS IS FILLED AS THEY ARE LEAVING ON [address] [place] [address]

## 2020-12-29 ENCOUNTER — NURSE ONLY (OUTPATIENT)
Dept: LAB | Age: 65
End: 2020-12-29

## 2020-12-29 LAB
T4 FREE: 1.28 NG/DL (ref 0.93–1.76)
TSH SERPL DL<=0.05 MIU/L-ACNC: 1.61 UIU/ML (ref 0.4–4.2)

## 2021-01-01 LAB — SARS-COV-2, IGG: POSITIVE

## 2021-01-12 ENCOUNTER — OFFICE VISIT (OUTPATIENT)
Dept: INTERNAL MEDICINE CLINIC | Age: 66
End: 2021-01-12
Payer: MEDICARE

## 2021-01-12 VITALS
HEART RATE: 66 BPM | SYSTOLIC BLOOD PRESSURE: 132 MMHG | BODY MASS INDEX: 26.18 KG/M2 | DIASTOLIC BLOOD PRESSURE: 72 MMHG | WEIGHT: 187 LBS | TEMPERATURE: 97.1 F | HEIGHT: 71 IN

## 2021-01-12 DIAGNOSIS — M54.40 CHRONIC BILATERAL LOW BACK PAIN WITH SCIATICA, SCIATICA LATERALITY UNSPECIFIED: ICD-10-CM

## 2021-01-12 DIAGNOSIS — K75.9 HEPATITIS: ICD-10-CM

## 2021-01-12 DIAGNOSIS — G89.29 CHRONIC BILATERAL LOW BACK PAIN WITH SCIATICA, SCIATICA LATERALITY UNSPECIFIED: ICD-10-CM

## 2021-01-12 DIAGNOSIS — Z95.1 S/P CABG (CORONARY ARTERY BYPASS GRAFT): ICD-10-CM

## 2021-01-12 DIAGNOSIS — I10 ESSENTIAL HYPERTENSION: ICD-10-CM

## 2021-01-12 DIAGNOSIS — I71.21 ANEURYSM OF ASCENDING AORTA: Primary | ICD-10-CM

## 2021-01-12 PROCEDURE — G8427 DOCREV CUR MEDS BY ELIG CLIN: HCPCS | Performed by: INTERNAL MEDICINE

## 2021-01-12 PROCEDURE — 1123F ACP DISCUSS/DSCN MKR DOCD: CPT | Performed by: INTERNAL MEDICINE

## 2021-01-12 PROCEDURE — G8484 FLU IMMUNIZE NO ADMIN: HCPCS | Performed by: INTERNAL MEDICINE

## 2021-01-12 PROCEDURE — G8417 CALC BMI ABV UP PARAM F/U: HCPCS | Performed by: INTERNAL MEDICINE

## 2021-01-12 PROCEDURE — 1036F TOBACCO NON-USER: CPT | Performed by: INTERNAL MEDICINE

## 2021-01-12 PROCEDURE — 4040F PNEUMOC VAC/ADMIN/RCVD: CPT | Performed by: INTERNAL MEDICINE

## 2021-01-12 PROCEDURE — 99214 OFFICE O/P EST MOD 30 MIN: CPT | Performed by: INTERNAL MEDICINE

## 2021-01-12 PROCEDURE — 3017F COLORECTAL CA SCREEN DOC REV: CPT | Performed by: INTERNAL MEDICINE

## 2021-01-12 RX ORDER — TRAMADOL HYDROCHLORIDE 50 MG/1
50 TABLET ORAL EVERY 4 HOURS PRN
Qty: 18 TABLET | Refills: 0 | Status: SHIPPED | OUTPATIENT
Start: 2021-01-12 | End: 2021-01-15

## 2021-01-12 RX ORDER — ATORVASTATIN CALCIUM 40 MG/1
40 TABLET, FILM COATED ORAL DAILY
Qty: 90 TABLET | Refills: 1 | Status: SHIPPED | OUTPATIENT
Start: 2021-01-12 | End: 2021-01-14 | Stop reason: SDUPTHER

## 2021-01-12 RX ORDER — NITROGLYCERIN 0.4 MG/1
TABLET SUBLINGUAL
Qty: 25 TABLET | Refills: 3 | Status: SHIPPED | OUTPATIENT
Start: 2021-01-12

## 2021-01-12 RX ORDER — TERAZOSIN 5 MG/1
CAPSULE ORAL
Qty: 90 CAPSULE | Refills: 1 | Status: SHIPPED | OUTPATIENT
Start: 2021-01-12 | End: 2021-01-14 | Stop reason: SDUPTHER

## 2021-01-12 ASSESSMENT — PATIENT HEALTH QUESTIONNAIRE - PHQ9
SUM OF ALL RESPONSES TO PHQ QUESTIONS 1-9: 0
SUM OF ALL RESPONSES TO PHQ QUESTIONS 1-9: 0
2. FEELING DOWN, DEPRESSED OR HOPELESS: 0
SUM OF ALL RESPONSES TO PHQ QUESTIONS 1-9: 0

## 2021-01-12 NOTE — PROGRESS NOTES
[address]   [facility] [address]  [address] [facility], [address]  Dept: [phone]  Dept Fax: [phone]    YOB: 1955    Hypertension and CABG    77 y.o. male   here for follow-up of above issues. He does have medical issues, but no CP or SOB, denies any recent fever or chillls. Had COVID back in October , was in [facility] . He completed radiation after he was diagnosed with laryngeal cancer. I recommend he gets COVID vaccine when available. He is having left leg pain, not able to cross the legs. No back surgeries, and no recent falls. He had some back pain   Radiating to the leg at times, no hematuria, fever or chills. He also had   CT chest , which revealed a ascending aortic aneurysm, will need a follow up, as last CT 3/2020 . He has pinched nerve of the neck and seen dr. Marcelo Vo, he recommended conservative Rx. Pati Rodriguez was being followed by Americo Shelby, since I am back with the practice he elected to follow-up with me again. Seen him almost 3 to 4years ago. Current Outpatient Medications   Medication Sig Dispense Refill    atorvastatin (LIPITOR) 40 MG tablet Take 1 tablet by mouth daily 90 tablet 1    terazosin (HYTRIN) 5 MG capsule TAKE 1 CAPSULE NIGHTLY 90 capsule 1    nitroGLYCERIN (NITROSTAT) 0.4 MG SL tablet up to max of 3 total doses. If no relief after 1 dose, call 911. 25 tablet 3    traMADol (ULTRAM) 50 MG tablet Take 1 tablet by mouth every 4 hours as needed for Pain for up to 3 days. Intended supply: 3 days. Take lowest dose possible to manage pain 18 tablet 0    gabapentin (NEURONTIN) 300 MG capsule Take 1 capsule by mouth 3 times daily for 180 days.  Intended supply: 90 days 270 capsule 1    ticagrelor (BRILINTA) 90 MG TABS tablet Take 1 tablet by mouth 2 times daily 180 tablet 0    levothyroxine (SYNTHROID) 50 MCG tablet Take 1 tablet by mouth daily 90 tablet 1    ipratropium (ATROVENT) 0.06 % nasal spray 2 sprays by Nasal route 3 times daily 2 each 11    diphenhydrAMINE-APAP, sleep, (TYLENOL PM EXTRA STRENGTH)  MG tablet Take 2 tablets by mouth nightly      acetaminophen (TYLENOL) 500 MG tablet Take 500 mg by mouth every 6 hours as needed for Pain      omeprazole (PRILOSEC) 20 MG delayed release capsule Take 20 mg by mouth 2 times daily      aspirin EC 81 MG EC tablet Take 81 mg by mouth daily      Coenzyme Q10 (CO Q-10) 200 MG CAPS Take  by mouth daily.  Multiple Vitamin (MULTI-VITAMIN) TABS Take  by mouth daily.  ARGININE PO Take 500 mg by mouth daily. 2 tab      CPAP Machine MISC by Does not apply route Please change CPAP pressure to 8 cm H20. (Patient not taking: Reported on 1/12/2021) 1 each 0     No current facility-administered medications for this visit.         Past Medical History:   Diagnosis Date    Ascending Aneurysm ([facility] [address] 02/2017    Ascending, 4.2 cm per pt    Brown's esophagus     CAD (coronary artery disease)     Cancer (HCC)     throat cancer, s/p radiation    Chronic back pain     GERD (gastroesophageal reflux disease)     Hepatitis C     as child    History of hyperbaric oxygen therapy     after bottom teeth removed after rdiation therapy    Hyperlipidemia     Hypertension     Left shoulder pain     nerve pain , ? secondary to radiation, has had \" numbing shot \"    Nausea & vomiting     FOUZIA on CPAP     Osteoradionecrosis of mandible     s/p radiation for throat cancer    PONV (postoperative nausea and vomiting)     only after OHS    S/P CABG (coronary artery bypass graft) 2002    [address] [facility]    Snores     Thyroid disease     Vision loss of right eye     during hyperbaric chamber oxygen therapy     Vitreous opacities     right    Wears dentures     full upper, no bottom teeth and no use of dentures on bottom       Social History     Socioeconomic History    Marital status:      Spouse name: Not on file    Number of children: Not on file    Years of education: Not on file    Highest education level: Not on file   Occupational History    Not on file   Social Needs    Financial resource strain: Not on file    Food insecurity     Worry: Not on file     Inability: Not on file    Transportation needs     Medical: Not on file     Non-medical: Not on file   Tobacco Use    Smoking status: Former Smoker     Packs/day: 2.00     Years: 20.00     Pack years: 40.00     Quit date: 2001     Years since quittin.1    Smokeless tobacco: Never Used   Substance and Sexual Activity    Alcohol use: No     Alcohol/week: 0.0 standard drinks    Drug use: No    Sexual activity: Not on file   Lifestyle    Physical activity     Days per week: Not on file     Minutes per session: Not on file    Stress: Not on file   Relationships    Social connections     Talks on phone: Not on file     Gets together: Not on file     Attends Faith service: Not on file     Active member of club or organization: Not on file     Attends meetings of clubs or organizations: Not on file     Relationship status: Not on file    Intimate partner violence     Fear of current or ex partner: Not on file     Emotionally abused: Not on file     Physically abused: Not on file     Forced sexual activity: Not on file   Other Topics Concern    Not on file   Social History Narrative    Not on file       Family History   Problem Relation Age of Onset    Heart Disease Father         MI    High Blood Pressure Father     Cancer Maternal Grandmother     Cancer Maternal Grandfather        No Known Allergies    Review of Systems     As above.      /72 (Site: Left Upper Arm)   Pulse 66   Temp 97.1 °F (36.2 °C)   Ht 5' 10.98\" (1.803 m)   Wt 187 lb (84.8 kg)   BMI 26.09 kg/m²      Physical Examination: General appearance -:[id number]\"DYNBI, well appearing, and in no distress  Mental status - alert and oriented    Neck - supple, no significant adenopathy  Chest - clear to auscultation, no wheezes, rales or rhonchi, symmetric air entry Heart - normal rate, regular rhythm, normal S1, S2, no murmurs, rubs, clicks or gallops  Abdomen - soft, nontender, nondistended, no masses or organomegaly  no abdominal bruits. Non-obese Protuberant: No  Neurological - alert, oriented, normal speech, no focal findings or movement disorder noted, motor and sensory grossly normal bilaterally  Musculoskeletal - no joint tenderness, deformity or swelling  But left leg weakness. Extremities - peripheral pulses normal, no pedal edema, no clubbing or cyanosis, Petra's sign negative bilaterally  Prosthesis: No  Skin - normal coloration and turgor, no rashes, no suspicious skin lesions noted      I have reviewed recent diagnostic testing including labs, EKG. Please see EKG for interpretation. Diagnosis Orders   1. Aneurysm of ascending aorta (HCC)  CT CHEST WO CONTRAST   2. Essential hypertension  terazosin (HYTRIN) 5 MG capsule    Basic Metabolic Panel    CBC   3. S/P CABG (coronary artery bypass graft)  Lipid Panel   4. Chronic bilateral low back pain with sciatica, sciatica laterality unspecified  XR LUMBAR SPINE (MIN 4 VIEWS)    traMADol (ULTRAM) 50 MG tablet    [facility] - [place]   5.  Hepatitis  Hepatitis C Antibody    Hepatitis B Surface Antibody       Orders Placed This Encounter   Procedures    CT CHEST WO CONTRAST     Standing Status:   Future     Standing Expiration Date:   1/12/2022    XR LUMBAR SPINE (MIN 4 VIEWS)     Standing Status:   Future     Standing Expiration Date:   1/12/2022    Basic Metabolic Panel     BNV     Standing Status:   Future     Standing Expiration Date:   1/12/2022    CBC     BNV     Standing Status:   Future     Standing Expiration Date:   1/12/2022    Lipid Panel     Before next visit     Standing Status:   Future     Standing Expiration Date:   1/12/2022     Order Specific Question:   Is Patient Fasting?/# of Hours     Answer:   12 hours    Hepatitis C Antibody     Standing Status:   Future     Standing Expiration Date:   1/12/2022    Hepatitis B Surface Antibody     Standing Status:   Future     Standing Expiration Date:   1/12/2022   [facility] - [facility]     Referral Priority:   Routine     Referral Type:   Eval and Treat     Referral Reason:   Specialty Services Required     Requested Specialty:   Physical Therapy     Number of Visits Requested:   1       Outpatient Encounter Medications as of 1/12/2021   Medication Sig Dispense Refill    atorvastatin (LIPITOR) 40 MG tablet Take 1 tablet by mouth daily 90 tablet 1    terazosin (HYTRIN) 5 MG capsule TAKE 1 CAPSULE NIGHTLY 90 capsule 1    nitroGLYCERIN (NITROSTAT) 0.4 MG SL tablet up to max of 3 total doses. If no relief after 1 dose, call 911. 25 tablet 3    traMADol (ULTRAM) 50 MG tablet Take 1 tablet by mouth every 4 hours as needed for Pain for up to 3 days. Intended supply: 3 days. Take lowest dose possible to manage pain 18 tablet 0    gabapentin (NEURONTIN) 300 MG capsule Take 1 capsule by mouth 3 times daily for 180 days. Intended supply: 90 days 270 capsule 1    ticagrelor (BRILINTA) 90 MG TABS tablet Take 1 tablet by mouth 2 times daily 180 tablet 0    levothyroxine (SYNTHROID) 50 MCG tablet Take 1 tablet by mouth daily 90 tablet 1    ipratropium (ATROVENT) 0.06 % nasal spray 2 sprays by Nasal route 3 times daily 2 each 11    diphenhydrAMINE-APAP, sleep, (TYLENOL PM EXTRA STRENGTH)  MG tablet Take 2 tablets by mouth nightly      acetaminophen (TYLENOL) 500 MG tablet Take 500 mg by mouth every 6 hours as needed for Pain      omeprazole (PRILOSEC) 20 MG delayed release capsule Take 20 mg by mouth 2 times daily      aspirin EC 81 MG EC tablet Take 81 mg by mouth daily      Coenzyme Q10 (CO Q-10) 200 MG CAPS Take  by mouth daily.  Multiple Vitamin (MULTI-VITAMIN) TABS Take  by mouth daily.  ARGININE PO Take 500 mg by mouth daily.  2 tab      [DISCONTINUED] levothyroxine (SYNTHROID) 50 MCG tablet Take 1 tablet by mouth daily for 14 days 14 tablet 0    [DISCONTINUED] atorvastatin (LIPITOR) 40 MG tablet TAKE 1 TABLET BY MOUTH ONE TIME A DAY  90 tablet 0    [DISCONTINUED] terazosin (HYTRIN) 5 MG capsule TAKE 1 CAPSULE NIGHTLY 90 capsule 1    [DISCONTINUED] nitroGLYCERIN (NITROSTAT) 0.4 MG SL tablet up to max of 3 total doses. If no relief after 1 dose, call 911. 25 tablet 3    CPAP Machine MISC by Does not apply route Please change CPAP pressure to 8 cm H20. (Patient not taking: Reported on 1/12/2021) 1 each 0     No facility-administered encounter medications on file as of 1/12/2021. Controlled Substances Monitoring:  Yes     Diagnosis Orders   1. Aneurysm of ascending aorta (HCC)  CT CHEST WO CONTRAST   2. Essential hypertension  terazosin (HYTRIN) 5 MG capsule    Basic Metabolic Panel    CBC   3. S/P CABG (coronary artery bypass graft)  Lipid Panel   4. Chronic bilateral low back pain with sciatica, sciatica laterality unspecified  XR LUMBAR SPINE (MIN 4 VIEWS)    traMADol (ULTRAM) 50 MG tablet    [facility] - [place]   5. Hepatitis  Hepatitis C Antibody    Hepatitis B Surface Antibody             Plan: 1. Ct chest -to follow up on his ascending aortic aneurysm, they recommended a 6-month CT follow-up. 2.  Hypertension is stable on the current regimen which includes  Hytrin 5 mg daily      3. Previous CABG currently stable on current pharmacotherapy,  Being followed up by Dr. Alondra Diaz from cardiology. 4. Spine film lumbo sacral spine,  Ultram on a prn basis, and PT. May need CT spine eventually, he does have mild left thigh/ leg weakness  No previous back surgery. ? Suspect radiculopathy, no UTI symptoms. And was given Ultram for short-term and then I recommend he could take ibuprofen over-the-counter for him milligrams to 3 times a day for short-term, does not have any history of GI bleeds.     5. Labs soon , also patient had hepatitis C at age 1, alk phos with normal LFTs we will check hepatic surface antibodies. Reevaluate in 3 months, sooner if needed        Signed by: Cinthia Tubbs M.D.     [address] [facility]  [address], [address] [place] [address] Dr REBECA MENDOZA II.OTILIO, [facility]    Tel  [phone]   Fax [phone]

## 2021-01-13 ENCOUNTER — HOSPITAL ENCOUNTER (OUTPATIENT)
Dept: CT IMAGING | Age: 66
Discharge: HOME | End: 2021-01-13
Payer: MEDICARE

## 2021-01-13 ENCOUNTER — TELEPHONE (OUTPATIENT)
Dept: INTERNAL MEDICINE CLINIC | Age: 66
End: 2021-01-13

## 2021-01-13 ENCOUNTER — HOSPITAL ENCOUNTER (OUTPATIENT)
Age: 66
Discharge: HOME | End: 2021-01-13
Payer: MEDICARE

## 2021-01-13 DIAGNOSIS — I71.21 ANEURYSM OF ASCENDING AORTA: ICD-10-CM

## 2021-01-13 PROCEDURE — 71250 CT THORAX DX C-: CPT

## 2021-01-13 NOTE — TELEPHONE ENCOUNTER
[facility] called saying pt is there for lumbar spine films. Dr. Rodolfo Boateng ordered 4 view. They normally do 2 view. If more views are required the doc needs to specify what views are needed. Per VO from Dr Brielle De La Fuente called [facility] back and advised them 2 views are fine. I change order in the system.

## 2021-01-14 ENCOUNTER — TELEPHONE (OUTPATIENT)
Dept: ENT CLINIC | Age: 66
End: 2021-01-14

## 2021-01-14 DIAGNOSIS — I71.21 ANEURYSM OF ASCENDING AORTA: Primary | ICD-10-CM

## 2021-01-14 DIAGNOSIS — I10 ESSENTIAL HYPERTENSION: ICD-10-CM

## 2021-01-14 RX ORDER — TERAZOSIN 5 MG/1
CAPSULE ORAL
Qty: 90 CAPSULE | Refills: 1 | Status: SHIPPED | OUTPATIENT
Start: 2021-01-14 | End: 2021-04-13 | Stop reason: SDUPTHER

## 2021-01-14 RX ORDER — ATORVASTATIN CALCIUM 40 MG/1
40 TABLET, FILM COATED ORAL DAILY
Qty: 90 TABLET | Refills: 1 | Status: SHIPPED | OUTPATIENT
Start: 2021-01-14 | End: 2021-04-13 | Stop reason: SDUPTHER

## 2021-01-15 ENCOUNTER — HOSPITAL ENCOUNTER (OUTPATIENT)
Dept: GENERAL RADIOLOGY | Age: 66
Discharge: HOME | End: 2021-01-15
Payer: MEDICARE

## 2021-01-15 ENCOUNTER — HOSPITAL ENCOUNTER (OUTPATIENT)
Age: 66
Discharge: HOME | End: 2021-01-15
Payer: MEDICARE

## 2021-01-15 DIAGNOSIS — G89.29 CHRONIC BILATERAL LOW BACK PAIN WITH SCIATICA, SCIATICA LATERALITY UNSPECIFIED: ICD-10-CM

## 2021-01-15 DIAGNOSIS — K75.9 HEPATITIS: ICD-10-CM

## 2021-01-15 DIAGNOSIS — I10 ESSENTIAL HYPERTENSION: ICD-10-CM

## 2021-01-15 DIAGNOSIS — M54.40 CHRONIC BILATERAL LOW BACK PAIN WITH SCIATICA, SCIATICA LATERALITY UNSPECIFIED: ICD-10-CM

## 2021-01-15 DIAGNOSIS — Z95.1 S/P CABG (CORONARY ARTERY BYPASS GRAFT): ICD-10-CM

## 2021-01-15 LAB
ANION GAP SERPL CALCULATED.3IONS-SCNC: 6 MEQ/L (ref 8–16)
BUN BLDV-MCNC: 19 MG/DL (ref 7–22)
CALCIUM SERPL-MCNC: 9.5 MG/DL (ref 8.5–10.5)
CHLORIDE BLD-SCNC: 107 MEQ/L (ref 98–111)
CHOLESTEROL, TOTAL: 138 MG/DL (ref 100–199)
CO2: 27 MEQ/L (ref 23–33)
CREAT SERPL-MCNC: 0.9 MG/DL (ref 0.4–1.2)
ERYTHROCYTE [DISTWIDTH] IN BLOOD BY AUTOMATED COUNT: 13.2 % (ref 11.5–14.5)
ERYTHROCYTE [DISTWIDTH] IN BLOOD BY AUTOMATED COUNT: 46.7 FL (ref 35–45)
GFR SERPL CREATININE-BSD FRML MDRD: 84 ML/MIN/1.73M2
GLUCOSE BLD-MCNC: 98 MG/DL (ref 70–108)
HBV SURFACE AB TITR SER: NEGATIVE {TITER}
HCT VFR BLD CALC: 47.8 % (ref 42–52)
HDLC SERPL-MCNC: 47 MG/DL
HEMOGLOBIN: 15.4 GM/DL (ref 14–18)
HEPATITIS C ANTIBODY: NEGATIVE
LDL CHOLESTEROL CALCULATED: 73 MG/DL
MCH RBC QN AUTO: 30.9 PG (ref 26–33)
MCHC RBC AUTO-ENTMCNC: 32.2 GM/DL (ref 32.2–35.5)
MCV RBC AUTO: 95.8 FL (ref 80–94)
PLATELET # BLD: 231 THOU/MM3 (ref 130–400)
PMV BLD AUTO: 10.4 FL (ref 9.4–12.4)
POTASSIUM SERPL-SCNC: 4.3 MEQ/L (ref 3.5–5.2)
RBC # BLD: 4.99 MILL/MM3 (ref 4.7–6.1)
SODIUM BLD-SCNC: 140 MEQ/L (ref 135–145)
TRIGL SERPL-MCNC: 91 MG/DL (ref 0–199)
WBC # BLD: 4.8 THOU/MM3 (ref 4.8–10.8)

## 2021-01-15 PROCEDURE — 86803 HEPATITIS C AB TEST: CPT

## 2021-01-15 PROCEDURE — 72100 X-RAY EXAM L-S SPINE 2/3 VWS: CPT

## 2021-01-15 PROCEDURE — 80048 BASIC METABOLIC PNL TOTAL CA: CPT

## 2021-01-15 PROCEDURE — 86706 HEP B SURFACE ANTIBODY: CPT

## 2021-01-15 PROCEDURE — 85027 COMPLETE CBC AUTOMATED: CPT

## 2021-01-15 PROCEDURE — 36415 COLL VENOUS BLD VENIPUNCTURE: CPT

## 2021-01-15 PROCEDURE — 80061 LIPID PANEL: CPT

## 2021-01-15 RX ORDER — LEVOTHYROXINE SODIUM 0.05 MG/1
50 TABLET ORAL DAILY
Qty: 90 TABLET | Refills: 1 | Status: SHIPPED | OUTPATIENT
Start: 2021-01-15 | End: 2021-04-13 | Stop reason: SDUPTHER

## 2021-01-18 DIAGNOSIS — R93.89 ABNORMAL X-RAY: ICD-10-CM

## 2021-01-18 DIAGNOSIS — M54.40 ACUTE BILATERAL LOW BACK PAIN WITH SCIATICA, SCIATICA LATERALITY UNSPECIFIED: Primary | ICD-10-CM

## 2021-01-19 ENCOUNTER — OFFICE VISIT (OUTPATIENT)
Dept: CARDIOTHORACIC SURGERY | Age: 66
End: 2021-01-19
Payer: MEDICARE

## 2021-01-19 VITALS
BODY MASS INDEX: 26.46 KG/M2 | DIASTOLIC BLOOD PRESSURE: 89 MMHG | SYSTOLIC BLOOD PRESSURE: 163 MMHG | HEIGHT: 71 IN | HEART RATE: 70 BPM | WEIGHT: 189 LBS

## 2021-01-19 DIAGNOSIS — I71.21 ANEURYSM OF ASCENDING AORTA: Primary | ICD-10-CM

## 2021-01-19 PROCEDURE — G8427 DOCREV CUR MEDS BY ELIG CLIN: HCPCS | Performed by: THORACIC SURGERY (CARDIOTHORACIC VASCULAR SURGERY)

## 2021-01-19 PROCEDURE — 3017F COLORECTAL CA SCREEN DOC REV: CPT | Performed by: THORACIC SURGERY (CARDIOTHORACIC VASCULAR SURGERY)

## 2021-01-19 PROCEDURE — 99205 OFFICE O/P NEW HI 60 MIN: CPT | Performed by: THORACIC SURGERY (CARDIOTHORACIC VASCULAR SURGERY)

## 2021-01-19 PROCEDURE — 1036F TOBACCO NON-USER: CPT | Performed by: THORACIC SURGERY (CARDIOTHORACIC VASCULAR SURGERY)

## 2021-01-19 PROCEDURE — 1123F ACP DISCUSS/DSCN MKR DOCD: CPT | Performed by: THORACIC SURGERY (CARDIOTHORACIC VASCULAR SURGERY)

## 2021-01-19 PROCEDURE — 4040F PNEUMOC VAC/ADMIN/RCVD: CPT | Performed by: THORACIC SURGERY (CARDIOTHORACIC VASCULAR SURGERY)

## 2021-01-19 PROCEDURE — G8484 FLU IMMUNIZE NO ADMIN: HCPCS | Performed by: THORACIC SURGERY (CARDIOTHORACIC VASCULAR SURGERY)

## 2021-01-19 PROCEDURE — G8417 CALC BMI ABV UP PARAM F/U: HCPCS | Performed by: THORACIC SURGERY (CARDIOTHORACIC VASCULAR SURGERY)

## 2021-01-19 ASSESSMENT — ENCOUNTER SYMPTOMS
APNEA: 0
ABDOMINAL PAIN: 0
BACK PAIN: 0
EYE DISCHARGE: 0
COLOR CHANGE: 0

## 2021-01-19 NOTE — PROGRESS NOTES
[address] [facility] SURGERY  [address] [facility] [address]  Dept: [phone]  Dept Fax: [phone]: [phone]    Visit Date: 1/19/2021    Mr. Selena Garsia is a 77 y.o.male  who presented for:  Chief Complaint   Patient presents with    New Patient     Ascending aortic aneurysm       HPI:   77year old male followed with asymptomatic ascending aortic aneurysm. Recent CT chest dictated as showing maximal diameter of 4.2 cm. Patient denies chest or back pain. Patient is s/p remote CABG, recently underwent PCI to LAD. Current Outpatient Medications:     levothyroxine (SYNTHROID) 50 MCG tablet, Take 1 tablet by mouth daily, Disp: 90 tablet, Rfl: 1    atorvastatin (LIPITOR) 40 MG tablet, Take 1 tablet by mouth daily, Disp: 90 tablet, Rfl: 1    terazosin (HYTRIN) 5 MG capsule, TAKE 1 CAPSULE NIGHTLY, Disp: 90 capsule, Rfl: 1    ticagrelor (BRILINTA) 90 MG TABS tablet, Take 1 tablet by mouth 2 times daily, Disp: 180 tablet, Rfl: 3    nitroGLYCERIN (NITROSTAT) 0.4 MG SL tablet, up to max of 3 total doses. If no relief after 1 dose, call 911., Disp: 25 tablet, Rfl: 3    gabapentin (NEURONTIN) 300 MG capsule, Take 1 capsule by mouth 3 times daily for 180 days. Intended supply: 90 days, Disp: 270 capsule, Rfl: 1    diphenhydrAMINE-APAP, sleep, (TYLENOL PM EXTRA STRENGTH)  MG tablet, Take 2 tablets by mouth nightly, Disp: , Rfl:     acetaminophen (TYLENOL) 500 MG tablet, Take 500 mg by mouth every 6 hours as needed for Pain, Disp: , Rfl:     omeprazole (PRILOSEC) 20 MG delayed release capsule, Take 20 mg by mouth 2 times daily, Disp: , Rfl:     aspirin EC 81 MG EC tablet, Take 81 mg by mouth daily, Disp: , Rfl:     CPAP Machine MISC, by Does not apply route Please change CPAP pressure to 8 cm H20., Disp: 1 each, Rfl: 0    Coenzyme Q10 (CO Q-10) 200 MG CAPS, Take  by mouth daily.   , Disp: , Rfl:   Multiple Vitamin (MULTI-VITAMIN) TABS, Take  by mouth daily. , Disp: , Rfl:     ARGININE PO, Take 500 mg by mouth daily. 2 tab, Disp: , Rfl:     ipratropium (ATROVENT) 0.06 % nasal spray, 2 sprays by Nasal route 3 times daily, Disp: 2 each, Rfl: 11    No Known Allergies    Past Medical History  Kenny Gonsales  has a past medical history of [facility] Brown's esophagus, CAD (coronary artery disease), [facility] Chronic back pain, GERD (gastroesophageal reflux disease), Hepatitis C, History of hyperbaric oxygen therapy, Hyperlipidemia, Hypertension, Left shoulder pain, Nausea & vomiting, FOUZIA on CPAP, Osteoradionecrosis of mandible, PONV (postoperative nausea and vomiting), S/P CABG (coronary artery bypass graft), Snores, Thyroid disease, Vision loss of right eye, Vitreous opacities, and Wears dentures. Social History  Kenny Gonsales  reports that he quit smoking about 19 years ago. He has a 40.00 pack-year smoking history. He has never used smokeless tobacco. He reports that he does not drink alcohol or use drugs. Family History  Kenny Gonsales family history includes Cancer in his maternal grandfather and maternal grandmother; Heart Disease in his father; High Blood Pressure in his father. There is no family history of bicuspid aortic valve, aneurysms, heart transplant, pacemakers, defibrillators, or sudden cardiac death.       Past Surgical History   Past Surgical History:   Procedure Laterality Date    CARDIAC CATHETERIZATION  8/2002, 4/2004    [facility]    COLONOSCOPY      CORONARY ANGIOPLASTY WITH STENT PLACEMENT  7/2002    [facility]    CORONARY ANGIOPLASTY WITH STENT PLACEMENT N/A 07/12/2019    MID LAD    CORONARY ARTERY BYPASS GRAFT  2002    [facility]    DENTAL SURGERY      bottom teeth removal    HEMORRHOID SURGERY      KNEE SURGERY Right     LARYNGOSCOPY N/A 10/4/2017    LARYNGOSCOPY MICRO WITH BIOPSY performed by Xochilt Mccollum MD at [address] [place]  04/28/2016    by Dr Braulio Beckett  MT YURI VITREOUS,SUBRET/CHOROID FLUID Right 8/27/2018    VITRECTOMY 25 GAUGE performed by Luciano Diaz MD at [address] [place] [address] Drive VITRECTOMY Right 08/27/2018     VITRECTOMY 25 GAUGE (Right Eye)       Subjective:     Review of Systems   Constitutional: Negative for activity change and fatigue. HENT: Negative for congestion. Eyes: Negative for discharge. Respiratory: Negative for apnea. Cardiovascular: Negative for chest pain. Gastrointestinal: Negative for abdominal pain. Endocrine: Negative for cold intolerance. Genitourinary: Negative for difficulty urinating. Musculoskeletal: Negative for back pain. Skin: Negative for color change. Allergic/Immunologic: Negative for environmental allergies. Neurological: Negative for dizziness. Hematological: Negative for adenopathy. Psychiatric/Behavioral: Negative for agitation. Objective:     BP (!) 163/89 (Site: Right Upper Arm, Position: Sitting, Cuff Size: Medium Adult)   Pulse 70   Ht 5' 11\" (1.803 m)   Wt 189 lb (85.7 kg)   BMI 26.36 kg/m²     Wt Readings from Last 3 Encounters:   01/19/21 189 lb (85.7 kg)   01/12/21 187 lb (84.8 kg)   09/25/20 184 lb (83.5 kg)     BP Readings from Last 3 Encounters:   01/19/21 (!) 163/89   01/12/21 132/72   09/25/20 (!) 151/78       Physical Exam  Constitutional:       Appearance: Normal appearance. He is normal weight. HENT:      Head: Normocephalic and atraumatic. Right Ear: Tympanic membrane, ear canal and external ear normal.      Left Ear: Tympanic membrane, ear canal and external ear normal.      Nose: Nose normal.      Mouth/Throat:      Mouth: Mucous membranes are moist.      Pharynx: Oropharynx is clear. Eyes:      Extraocular Movements: Extraocular movements intact. Conjunctiva/sclera: Conjunctivae normal.      Pupils: Pupils are equal, round, and reactive to light. Neck:      Musculoskeletal: Normal range of motion and neck supple.    Cardiovascular: Rate and Rhythm: Normal rate and regular rhythm. Pulses: Normal pulses. Heart sounds: Normal heart sounds. No murmur. Pulmonary:      Effort: Pulmonary effort is normal.      Breath sounds: Normal breath sounds. Abdominal:      General: Abdomen is flat. Palpations: Abdomen is soft. Musculoskeletal: Normal range of motion. General: No swelling. Skin:     General: Skin is warm and dry. Capillary Refill: Capillary refill takes 2 to 3 seconds. Neurological:      General: No focal deficit present. Mental Status: He is alert and oriented to person, place, and time.    Psychiatric:         Mood and Affect: Mood normal.         Lab Results   Component Value Date    WBC 4.8 01/15/2021    RBC 4.99 01/15/2021    RBC 4.86 12/01/2011    HGB 15.4 01/15/2021    HCT 47.8 01/15/2021    MCV 95.8 01/15/2021    MCH 30.9 01/15/2021    MCHC 32.2 01/15/2021    RDW 12.6 12/01/2017     01/15/2021    MPV 10.4 01/15/2021       Lab Results   Component Value Date     01/15/2021    K 4.3 01/15/2021    K 4.0 07/12/2019     01/15/2021    CO2 27 01/15/2021    BUN 19 01/15/2021    LABALBU 4.1 06/01/2020    LABALBU 4.3 12/01/2011    CREATININE 0.9 01/15/2021    CALCIUM 9.5 01/15/2021    LABGLOM 84 01/15/2021    GLUCOSE 98 01/15/2021    GLUCOSE 95 12/01/2011       Lab Results   Component Value Date    ALKPHOS 80 06/01/2020    ALT 14 06/01/2020    AST 17 06/01/2020    PROT 6.0 06/01/2020    BILITOT 0.5 06/01/2020    BILIDIR <0.2 12/01/2017    LABALBU 4.1 06/01/2020    LABALBU 4.3 12/01/2011       No results found for: MG    Lab Results   Component Value Date    INR 0.97 07/12/2019         No results found for: LABA1C    Lab Results   Component Value Date    TRIG 91 01/15/2021    HDL 47 01/15/2021    LDLCALC 73 01/15/2021    LDLDIRECT 89.07 06/26/2018       Lab Results   Component Value Date    TSH 1.610 12/29/2020         Testing Reviewed: LEFT ATRIUM: Size was normal.    RIGHT VENTRICLE: The ventricle was mildly to moderately dilated. Systolic  function was normal. Wall thickness was normal.    PULMONIC VALVE: Leaflets exhibited normal thickness, no calcification, and  normal cuspal separation. DOPPLER: The transpulmonic velocity was within the  normal range. There was trivial regurgitation. PULMONARY ARTERY: The size was normal. DOPPLER: Systolic pressure was within  the normal range. TRICUSPID VALVE: The valve structure was normal. There was normal leaflet  separation. DOPPLER: The transtricuspid velocity was within the normal range. There was no evidence for stenosis. There was trivial regurgitation. RIGHT ATRIUM: Size was normal.    SYSTEMIC VEINS: IVC: The inferior vena cava was normal in size. PERICARDIUM: There was no pericardial effusion. The pericardium was normal in  appearance. SYSTEM MEASUREMENT TABLES    2D mode  LA Dimension (2D): 3.3 cm  FS (2D-Cubed): 30 %  FS (2D-Teich): 30 %  IVSd (2D): 1 cm  IVSd; Mean chosen (2D): 1 cm  LVIDd (2D): 5.4 cm  LVIDs (2D): 3.8 cm  LVOT Area (2D): 3.1 cm2  LVPWd (2D): 1 cm  RVIDd (2D): 3.2 cm    M mode  AoR Diam (MM): 3.3 cm  AV Cusp Sep (MM): 2.5 cm  MV D-E Exc: 1.6 cm  MV EF Rooks (MM): 9.4 cm/s    Unspecified Scan Mode  VTI; Antegrade Flow: 20.9 cm  Vmax; Antegrade Flow: 1 m/s  LVOT Diam: 2 cm  LVOT Vmax: 82.8 cm/s  MV Peak A Stephen; Mean: 53.8 cm/s  MV Peak E Stephen; Antegrade Flow: 65.2 cm/s  Vmax; Antegrade Flow: 74.5 cm/s  TV Peak A Stephen: 41 cm/s  TV Peak E Stephen; Antegrade Flow: 52.8 cm/s    SUMMARY:    Left ventricle:  Size was normal.  Systolic function was normal. Ejection fraction was estimated in the range of  55 % to 65 %. There were no regional wall motion abnormalities. Doppler parameters were consistent with abnormal left ventricular relaxation  (grade 1 diastolic dysfunction). Right ventricle: The ventricle was mildly to moderately dilated.     Aortic valve: The valve was possibly bicuspid. Leaflets exhibited normal thickness,  calcification, and normal cuspal separation. Aorta, systemic arteries: The root exhibited mild dilatation. Prepared and signed by    Noah Hercules DO  Signed 27-Jul-2012 17:43:35            Assessment/Plan     1. Aneurysm of ascending aorta [facility])       77year old male asymptomatic mildly aneurysmal ascending aorta, well below threshold of surgical concern (5.5 cm). Discussed issues of good BP control (BP < 130)--patient reports BP measurements at home are good, as well as low dose beta blocker. Patient to start metoprolol 12.5 bid. No further scheduled follow up. Time spent with patient: 80 minutes, of which more than 50% was spent counseling/coordinating the patient's care.     Electronically signed by Lindsay Gonzalez MD   1/19/2021 at 12:02 PM EST

## 2021-01-20 ENCOUNTER — HOSPITAL ENCOUNTER (OUTPATIENT)
Dept: PHYSICAL THERAPY | Age: 66
Setting detail: THERAPIES SERIES
Discharge: HOME | End: 2021-01-20
Payer: MEDICARE

## 2021-01-20 PROCEDURE — 97161 PT EVAL LOW COMPLEX 20 MIN: CPT

## 2021-01-20 PROCEDURE — 97530 THERAPEUTIC ACTIVITIES: CPT

## 2021-01-20 NOTE — FLOWSHEET NOTE
** PLEASE SIGN, DATE AND TIME CERTIFICATION BELOW AND RETURN TO [facility] OUTPATIENT REHABILITATION (FAX #: [phone]). ATTEST/CO-SIGN IF ACCESSING VIA [employer]. THANK YOU.**    I certify that I have examined the patient below and determined that Physical Medicine and Rehabilitation service is necessary and that I approve the established plan of care for up to 90 days or as specifically noted. Attestation, signature or co-signature of physician indicates approval of certification requirements.    ________________________ ____________ __________  Physician Signature   Date   Time  [address] [facility]  PHYSICAL THERAPY  [x] EVALUATION  [] DAILY NOTE (LAND) [] DAILY NOTE (AQUATIC ) [] PROGRESS NOTE [] DISCHARGE NOTE    [] [facility]   [] [facility]    [x] [facility]   [] Jayce Dargracy    Date: 2021  Patient Name:  Daysi Dorsey  : 1955  MRN: [id number]  CSN: [id number]    Referring Practitioner Brenda Velazco MD   Diagnosis Lumbago with sciatica, unspecified side [M54.40]    Treatment Diagnosis LBP, LLE weakness   Date of Evaluation 21    Additional Pertinent History History of Throat CA 4 years ago. Functional Outcome Measure Used Oswestry   Functional Outcome Score 20 (21)       Insurance: Primary: Payor: MEDICARE /  /  / ,   Secondary: GENERIC COMMERCIAL   Authorization Information: Unlimited visits-ionto/HP/CP not covered   Visit # 1, 1/10 for progress note   Visits Allowed: unlimited   Recertification Date:    Physician Follow-Up: 21   Physician Orders: [id number] PT   History of Present Illness: Patient has history of back pain and medial LLE pain for a couple of years and last couple of months has gone into LB. History of throat CA and reports he has some cervical problems-know position of relief. Difficulty raising LLE.       SUBJECTIVE: difficulty with prolonged sitting, walking a long distance, sitting up straight, impaired balance with LLE occasionally wanting to give out    Social/Functional History and Current Status:  Medications and Allergies have been reviewed and are listed on Medical History Questionnaire. Kashif Howard lives with spouse in a single story home with 2 steps without handrail. Task Previous Current   ADLs  Independent Independent   IADL's Independent Independent   Ambulation Independent Independent   Transfers Independent Independent   Recreation Modified Independent Modified Independent   Community Integration Independent Independent   Driving Active  Active    Work Part-Time. Occupation: [employer], seeing customers, sitting at work  [employer]. OBJECTIVE:    Pain: 2/10, medial L thigh, LLT/kidney area. No complaints of cervical pain at present   Palpation A little tenderness and tightness L piraformis. Min tender and tight proximal adductors, L groin/hip flexors/iliopsoas no sacral torsion noted , no lumbar rotation noted today   Observation . If sitting slumped, started with neck and LUE symptoms   Posture Min to mild fwd head and shoulders. Shoulders and pelvis level       Range of Motion Trunk flexion to mid lower leg decreased 25%, extension and lateral bend bilat decreased 25%. Dorsiflexion L 5 degrees, R 8 degrees. SLR L tight hamstrings at 50 degrees. acively lift to approx 30 degrees. R SLR 72 degrees. Hip flexion slightly tight, min tight e rotation L. Strength RLE strength WNL. L hip flexion 3+/5, abduction 4+/5, adduction 4/5, knee flexion and extension 4 to 4+/5, dorsiflexion 4+/5   Sensation WNL   Bed Mobility independent   Transfers independent   Ambulation Ambulates in clinic without AD, slight antalgia, slight toe out bilat > on the R   Stairs nonreciprocally with 0-1 handrail-difficulty and pain leading with the LLE   Balance SLS L 6 sec, R 3 sec   Special Tests Heel/toe walk without difficlty.  Negative hip scour bilat. Positive L VIKASH-hurt medial LLE, negative FAIR bilat. Negative SI shear. Double knee to chest: no change in symptoms. Trunk rolls: pulled a little to the R if hips don't move. Prone: upper lumbar alittle sore with propping-no change in LLE          TREATMENT   Precautions: History of throat CA 4 years ago   Pain:     X in shaded column indicates activity completed today   Modalities Parameters/  Location  Notes                     Manual Therapy Time/Technique  Notes                     Exercise/Intervention   Notes   Supine butterfly 5 10 sec x    Supine ham stretch 3 15 sec x    Standing ham stretch    demo   Standing calf stretch    demo   Standing hip flexor stretch 2 15 sec x    LE strength       Balance,        steps       Body mechanics                       Specific Interventions Next Treatment: manual therapy/massage/stretching PRN. ROM, stretching, strength, core strength, balance, gait, body mechanics    Activity/Treatment Tolerance:  [x]  Patient tolerated treatment well  []  Patient limited by fatigue  []  Patient limited by pain   []  Patient limited by medical complications  []  Other:     Assessment: patient has limitations related to decreased stength, ROm and flexibility, pain medial L thigh and into hip and LB. No sacral torsion or lumbar rotation noted today. Negative hip scour. Tenderness proximal L adductors and hip flexors noted. Body Structures/Functions/Activity Limitations: impaired activity tolerance, impaired balance, impaired ROM, impaired strength, pain and abnormal gait  Prognosis: fair    GOALS:  Patient Goal: travel, golf with less pain    Short Term Goals:  Time Frame: 4 weeks  1. Decrease reports of pain to intermittent to tolerate standing/walking 1 hour for work, sit >1 hour in office chair or car for travel and work  2.   Increase active SLR>60 degrees, dorsiflexion >8 degrees, trunk flexion to ankles for ease of lifting from the floor, transfers, dressing lower body 3.  Patient able to sit erect in a chair for improved ease to reach across desk, tolerate sitting in various chairs without increase in LLE symptoms  4. Increase LLE strength >4+/5 to tolerate leading up steps with LLE, balance      Long Term Goals:  Time Frame: 8 weeks  1. SLS>10 sec bilat for balance, fall prevention, walking on wet or uneven surfaces  2. I HEP to achieve above goals      Patient Education:   [x]  HEP/Education Completed: Plan of Care, Goals, standing and supine ham stretch, butterfly stretch supine, calf stretch, standing hip flexor stretch   [employer] Access Code:  []  No new Education completed  []  Reviewed Prior HEP      []  Patient verbalized and/or demonstrated understanding of education provided. []  Patient unable to verbalize and/or demonstrate understanding of education provided. Will continue education. [x]  Barriers to learning: none    PLAN:  Treatment Recommendations: Strengthening, Range of Motion, Balance Training, Functional Mobility Training, Gait Training, Stair Training, Manual Therapy - Soft Tissue Mobilization, Pain Management, Home Exercise Program, Patient Education, Safety Education and Training and Self-Care Education and Training    [x]  Plan of care initiated. Plan to see patient 2 times per week for 4-8 weeks to address the treatment planned outlined above.   []  Continue with current plan of care  []  Modify plan of care as follows:    []  Hold pending physician visit  []  Discharge    Time In 1000   Time Out 1100   Timed Code Minutes: 23 min   Total Treatment Time: 60 min       Electronically Signed by: Schuyler Plata, PT, DPT [id number]

## 2021-01-21 ENCOUNTER — HOSPITAL ENCOUNTER (OUTPATIENT)
Dept: PHYSICAL THERAPY | Age: 66
Setting detail: THERAPIES SERIES
Discharge: HOME | End: 2021-01-21
Payer: MEDICARE

## 2021-01-21 PROCEDURE — 97140 MANUAL THERAPY 1/> REGIONS: CPT

## 2021-01-21 PROCEDURE — 97530 THERAPEUTIC ACTIVITIES: CPT

## 2021-01-21 NOTE — PROGRESS NOTES
[address] [facility]  PHYSICAL THERAPY  [] EVALUATION  [x] DAILY NOTE (LAND) [] DAILY NOTE (AQUATIC ) [] PROGRESS NOTE [] DISCHARGE NOTE    [] 615 [facility]   [] Samara 90    [x] 6[address] [facility]   [] Amado Storm    Date: 2021  Patient Name:  Meera Morocho  : 1955  MRN: [id number]  CSN: [id number]    Referring Practitioner Flory Granados MD   Diagnosis Lumbago with sciatica, unspecified side [M54.40]    Treatment Diagnosis LBP, LLE weakness   Date of Evaluation 21    Additional Pertinent History History of Throat CA 4 years ago. Functional Outcome Measure Used Oswestry   Functional Outcome Score 20 (21)       Insurance: Primary: Payor: MEDICARE /  /  / ,   Secondary: GENERIC COMMERCIAL   Authorization Information: Unlimited visits-ionto/HP/CP not covered   Visit # 2, 2/10 for progress note   Visits Allowed: unlimited   Recertification Date:    Physician Follow-Up: 21   Physician Orders: Mayme Leak PT   History of Present Illness: Patient has history of back pain and medial LLE pain for a couple of years and last couple of months has gone into LB. History of throat CA and reports he has some cervical problems-know position of relief. Difficulty raising LLE. SUBJECTIVE: no complaints since yesterday's eval. Did HEP-no change. TREATMENT   Precautions: History of throat CA 4 years ago   Pain: 2/10 medial proximal L thigh and groin.     X in shaded column indicates activity completed today   Modalities Parameters/  Location  Notes                     Manual Therapy Time/Technique  Notes   Myofascial release, trigger point release, positional release 8 min x Med/prox adductors and iliopsoas L               Exercise/Intervention   Notes   Supine butterfly 5 10 sec x    Supine ham stretch 3 bilat 20 sec x    Standing ham stretch 3 X  bilat 20 sec x    Standing calf stretch 3Xbilat 20 sec x Standing hip flexor stretch 3 bilat 15 sec x    LE strength       Balance,        steps       Body mechanics       Squat  lunge 10  5 bilat  X  x    Bike seat 4  5 min x      Specific Interventions Next Treatment: manual therapy/massage/stretching PRN. ROM, stretching, strength, core strength, balance, gait, body mechanics    Activity/Treatment Tolerance:  [x]  Patient tolerated treatment well  []  Patient limited by fatigue  []  Patient limited by pain   []  Patient limited by medical complications  []  Other:     Assessment: continues with adductor and iliopsoas tightness and tenderness which limits active hip flexion. Body Structures/Functions/Activity Limitations: impaired activity tolerance, impaired balance, impaired ROM, impaired strength, pain and abnormal gait  Prognosis: fair    GOALS:  Patient Goal: travel, golf with less pain    Short Term Goals:  Time Frame: 4 weeks  1. Decrease reports of pain to intermittent to tolerate standing/walking 1 hour for work, sit >1 hour in office chair or car for travel and work  2. Increase active SLR>60 degrees, dorsiflexion >8 degrees, trunk flexion to ankles for ease of lifting from the floor, transfers, dressing lower body  3. Patient able to sit erect in a chair for improved ease to reach across desk, tolerate sitting in various chairs without increase in LLE symptoms  4. Increase LLE strength >4+/5 to tolerate leading up steps with LLE, balance      Long Term Goals:  Time Frame: 8 weeks  1. SLS>10 sec bilat for balance, fall prevention, walking on wet or uneven surfaces  2. I HEP to achieve above goals      Patient Education:   [x]  HEP/Education Completed: Plan of Care, Goals, standing and supine ham stretch, butterfly stretch supine, calf stretch, standing hip flexor stretch  ? [employer] Access Code:  []  No new Education completed  []  Reviewed Prior HEP      []  Patient verbalized and/or demonstrated understanding of education provided. []  Patient unable to verbalize and/or demonstrate understanding of education provided. Will continue education. [x]  Barriers to learning: none    PLAN:  Treatment Recommendations: Strengthening, Range of Motion, Balance Training, Functional Mobility Training, Gait Training, Stair Training, Manual Therapy - Soft Tissue Mobilization, Pain Management, Home Exercise Program, Patient Education, Safety Education and Training and Self-Care Education and Training    []  Plan of care initiated. Plan to see patient 2 times per week for 4-8 weeks to address the treatment planned outlined above.   [x]  Continue with current plan of care  []  Modify plan of care as follows:    []  Hold pending physician visit  []  Discharge    Time In 0800   Time Out 0840   Timed Code Minutes: 40 min   Total Treatment Time: 40 min       Electronically Signed by: Clemente Valdivia, PT, DPT [id number]

## 2021-01-25 ENCOUNTER — HOSPITAL ENCOUNTER (OUTPATIENT)
Dept: CT IMAGING | Age: 66
Discharge: HOME | End: 2021-01-25
Payer: MEDICARE

## 2021-01-25 DIAGNOSIS — M54.40 ACUTE BILATERAL LOW BACK PAIN WITH SCIATICA, SCIATICA LATERALITY UNSPECIFIED: ICD-10-CM

## 2021-01-25 DIAGNOSIS — R93.89 ABNORMAL X-RAY: ICD-10-CM

## 2021-01-25 PROCEDURE — 72131 CT LUMBAR SPINE W/O DYE: CPT

## 2021-01-26 ENCOUNTER — FOLLOWUP TELEPHONE ENCOUNTER (OUTPATIENT)
Dept: INTERNAL MEDICINE CLINIC | Age: 66
End: 2021-01-26

## 2021-01-26 DIAGNOSIS — M54.40 BACK PAIN OF LUMBOSACRAL REGION WITH SCIATICA: Primary | ICD-10-CM

## 2021-01-26 NOTE — TELEPHONE ENCOUNTER
Called patient today and spoke with Nicholas Lim and his wife Chasity Emmanuel on  the telephone. Was seen recently and because of ongoing back pain which is getting worse a CT of the spine was ordered, is abnormal.  Endings were reviewed with Nicholas Lim today and a long discussion he agreed for second opinion, set up an appointment with Dr. Deana Ziegler, at [facility]. He has  seen Dr. Efren Hansen locally several years ago.     Electronically signed by Britney Branham MD on 1/26/2021 at 10:19 AM

## 2021-01-28 ENCOUNTER — HOSPITAL ENCOUNTER (OUTPATIENT)
Dept: PHYSICAL THERAPY | Age: 66
Setting detail: THERAPIES SERIES
Discharge: HOME | End: 2021-01-28
Payer: MEDICARE

## 2021-01-28 PROCEDURE — 97140 MANUAL THERAPY 1/> REGIONS: CPT

## 2021-01-28 PROCEDURE — 97530 THERAPEUTIC ACTIVITIES: CPT

## 2021-01-28 NOTE — PROGRESS NOTES
[address] [facility]  PHYSICAL THERAPY  [] EVALUATION  [x] DAILY NOTE (LAND) [] DAILY NOTE (AQUATIC ) [] PROGRESS NOTE [] DISCHARGE NOTE    [] 615 [facility]   [] Samara 90    [x] [facility]   [] Rehan Cisneros    Date: 2021  Patient Name:  Amrita Villa  : 1955  MRN: [id number]  CSN: [id number]    Referring Practitioner Rocio Enrique MD   Diagnosis Lumbago with sciatica, unspecified side [M54.40]    Treatment Diagnosis LBP, LLE weakness   Date of Evaluation 21    Additional Pertinent History History of Throat CA 4 years ago. Functional Outcome Measure Used Oswestry   Functional Outcome Score 20 (21)       Insurance: Primary: Payor: MEDICARE /  /  / ,   Secondary: GENERIC COMMERCIAL   Authorization Information: Unlimited visits-ionto/HP/CP not covered   Visit # 3, 3/10 for progress note   Visits Allowed: unlimited   Recertification Date:    Physician Follow-Up: 21   Physician Orders: [facility] PT   History of Present Illness: Patient has history of back pain and medial LLE pain for a couple of years and last couple of months has gone into LB. History of throat CA and reports he has some cervical problems-know position of relief. Difficulty raising LLE. SUBJECTIVE: things were feeling a little better until yesterday-he picked up-lifted up a generator into back of truck. L LB hurts. Occasionally gets tailbone pain. TREATMENT   Precautions: History of throat CA 4 years ago   Pain: 2/10 medial proximal L thigh and groin.  Had LLB pain this morning    X in shaded column indicates activity completed today   Modalities Parameters/  Location  Notes                     Manual Therapy Time/Technique  Notes   Myofascial release, trigger point release, positional release 8 min  Med/prox adductors and iliopsoas L   Muscle energy for R ERS 2 lumbar levels  x          Exercise/Intervention   Notes Supine butterfly 5 10 sec x    Supine ham stretch 3 bilat 20 sec     Standing ham stretch 3 X  bilat 20 sec     Standing calf stretch 3Xbilat 20 sec     Standing hip flexor stretch 3 bilat 15 sec x    LE strength       Balance: SLS,  5X bilat 5 sec x    steps       Seated side stretch 3 bilat  x Best stretch to the R   Trunk rolls 5 bilat Hold 10 sec x    Alt UE/LE    Next visit   Body mechanics       Squat  lunge 10  5 bilat  X  x    Bike seat 4  5 min x      Specific Interventions Next Treatment: manual therapy/massage/stretching PRN. ROM, stretching, strength, core strength, balance, gait, body mechanics    Activity/Treatment Tolerance:  [x]  Patient tolerated treatment well  []  Patient limited by fatigue  []  Patient limited by pain   []  Patient limited by medical complications  []  Other:     Assessment: did well with today's ex  GOALS:  Patient Goal: travel, golf with less pain    Short Term Goals:  Time Frame: 4 weeks  1. Decrease reports of pain to intermittent to tolerate standing/walking 1 hour for work, sit >1 hour in office chair or car for travel and work  2. Increase active SLR>60 degrees, dorsiflexion >8 degrees, trunk flexion to ankles for ease of lifting from the floor, transfers, dressing lower body  3. Patient able to sit erect in a chair for improved ease to reach across desk, tolerate sitting in various chairs without increase in LLE symptoms  4. Increase LLE strength >4+/5 to tolerate leading up steps with LLE, balance      Long Term Goals:  Time Frame: 8 weeks  1. SLS>10 sec bilat for balance, fall prevention, walking on wet or uneven surfaces  2. I HEP to achieve above goals      Patient Education:   [x]  HEP/Education Completed: Plan of Care, Goals, standing and supine ham stretch, butterfly stretch supine, calf stretch, standing hip flexor stretch. Side bend, trunk rolls  ?  [employer] Access Code:  []  No new Education completed  []  Reviewed Prior HEP []  Patient verbalized and/or demonstrated understanding of education provided. []  Patient unable to verbalize and/or demonstrate understanding of education provided. Will continue education. [x]  Barriers to learning: none    PLAN:  Treatment Recommendations: Strengthening, Range of Motion, Balance Training, Functional Mobility Training, Gait Training, Stair Training, Manual Therapy - Soft Tissue Mobilization, Pain Management, Home Exercise Program, Patient Education, Safety Education and Training and Self-Care Education and Training    []  Plan of care initiated. Plan to see patient 2 times per week for 4-8 weeks to address the treatment planned outlined above.   [x]  Continue with current plan of care  []  Modify plan of care as follows:    []  Hold pending physician visit  []  Discharge    Time In 0845   Time out 0925   Timed Code Minutes: 40 min   Total Treatment Time: 40 min       Electronically Signed by: Shelia Segura PT, DPT [id number]

## 2021-01-29 NOTE — TELEPHONE ENCOUNTER
Patient stated she is having difficulty getting in for an appointment with an eye doctor at [facility].     She was advised to call her PCP so that a STAT referral be sent and they could schedule an appointment for her right away.     Please call patient once referral has been refaxed.    Patient's wife called in, she is on HIPAA. She stated patient needs a 90 day refill of the levothyroxine (Synthroid) 50 mcg. Patient uses [email or web address]. Patient's last TSH was in May 2019, it was 0.725 with a range of 0.400-4.20 ulU/mL. Please advise. Wife, Akosua  states we can leave a detailed message if they do not answer.

## 2021-02-01 ENCOUNTER — HOSPITAL ENCOUNTER (OUTPATIENT)
Dept: PHYSICAL THERAPY | Age: 66
Setting detail: THERAPIES SERIES
Discharge: HOME | End: 2021-02-01
Payer: MEDICARE

## 2021-02-01 PROCEDURE — 97530 THERAPEUTIC ACTIVITIES: CPT

## 2021-02-01 NOTE — PROGRESS NOTES
Exercise/Intervention   Notes   Supine butterfly 5 10 sec     Supine ham stretch 3 bilat 20 sec     Standing ham stretch 3 X  bilat 20 sec     Standing calf stretch 3Xbilat 20 sec     Standing hip flexor stretch 3 bilat 15 sec     Standing 3 way hip       Balance: SLS,  5X bilat 5 sec     steps       Seated side stretch 3 bilat   Best stretch to the R   Trunk rolls 5 bilat Hold 10 sec x Felt looser and could roll to the R a little more   Alt UE/LE    Next visit   Body mechanics       Squat  lunge 10  5 bilat        Double knee to chest 10  x Got easier to do, pain a little better   Standing extension 10  x    Lumbar roll, posturing in chair  5 min x    prone  5 min x No pain   Bike seat 4  5 min x      Specific Interventions Next Treatment: manual therapy/massage/stretching PRN. ROM, stretching, strength, core strength, balance, gait, body mechanics    Activity/Treatment Tolerance:  [x]  Patient tolerated treatment well  []  Patient limited by fatigue  []  Patient limited by pain   []  Patient limited by medical complications  []  Other:     Assessment: patient's symptoms controlled well with prone today . To hold all over HEP and focus on prone/symtom control until next visit  GOALS:  Patient Goal: travel, golf with less pain    Short Term Goals:  Time Frame: 4 weeks  1. Decrease reports of pain to intermittent to tolerate standing/walking 1 hour for work, sit >1 hour in office chair or car for travel and work  2. Increase active SLR>60 degrees, dorsiflexion >8 degrees, trunk flexion to ankles for ease of lifting from the floor, transfers, dressing lower body  3. Patient able to sit erect in a chair for improved ease to reach across desk, tolerate sitting in various chairs without increase in LLE symptoms  4. Increase LLE strength >4+/5 to tolerate leading up steps with LLE, balance      Long Term Goals:  Time Frame: 8 weeks  1.   SLS>10 sec bilat for balance, fall prevention, walking on wet or uneven surfaces 2. I HEP to achieve above goals      Patient Education:   [x]  HEP/Education Completed: Plan of Care, Goals, standing and supine ham stretch, butterfly stretch supine, calf stretch, standing hip flexor stretch. Side bend, trunk rolls-hold all of these. Continue with prone every 2 hours, standing extension, use of lumbar roll  ? [employer] Access Code:  []  No new Education completed  []  Reviewed Prior HEP      []  Patient verbalized and/or demonstrated understanding of education provided. []  Patient unable to verbalize and/or demonstrate understanding of education provided. Will continue education. [x]  Barriers to learning: none    PLAN:  Treatment Recommendations: Strengthening, Range of Motion, Balance Training, Functional Mobility Training, Gait Training, Stair Training, Manual Therapy - Soft Tissue Mobilization, Pain Management, Home Exercise Program, Patient Education, Safety Education and Training and Self-Care Education and Training    []  Plan of care initiated. Plan to see patient 2 times per week for 4-8 weeks to address the treatment planned outlined above.   [x]  Continue with current plan of care  []  Modify plan of care as follows:    []  Hold pending physician visit  []  Discharge    Time In 0805   Time out 0845   Timed Code Minutes: 40 min   Total Treatment Time: 40 min       Electronically Signed by: Sania Nicole, PT, DPT [id number]

## 2021-02-02 ENCOUNTER — TELEPHONE (OUTPATIENT)
Dept: PHYSICAL MEDICINE AND REHAB | Age: 66
End: 2021-02-02

## 2021-02-02 ENCOUNTER — OFFICE VISIT (OUTPATIENT)
Dept: ENT CLINIC | Age: 66
End: 2021-02-02
Payer: MEDICARE

## 2021-02-02 VITALS
BODY MASS INDEX: 25.77 KG/M2 | HEART RATE: 72 BPM | SYSTOLIC BLOOD PRESSURE: 112 MMHG | RESPIRATION RATE: 16 BRPM | DIASTOLIC BLOOD PRESSURE: 68 MMHG | WEIGHT: 184.8 LBS | TEMPERATURE: 97.2 F

## 2021-02-02 DIAGNOSIS — G47.33 OSA ON CPAP: ICD-10-CM

## 2021-02-02 DIAGNOSIS — T66.XXXD EFFECTS, RADIATION, SUBSEQUENT ENCOUNTER: ICD-10-CM

## 2021-02-02 DIAGNOSIS — Z87.891 FORMER SMOKER: Primary | ICD-10-CM

## 2021-02-02 DIAGNOSIS — H61.23 BILATERAL IMPACTED CERUMEN: ICD-10-CM

## 2021-02-02 DIAGNOSIS — C32.1 MALIGNANT NEOPLASM OF SUPRAGLOTTIS (HCC): ICD-10-CM

## 2021-02-02 DIAGNOSIS — C32.9 PRIMARY SQUAMOUS CELL CARCINOMA OF LARYNX (HCC): Primary | ICD-10-CM

## 2021-02-02 DIAGNOSIS — Z99.89 OSA ON CPAP: ICD-10-CM

## 2021-02-02 DIAGNOSIS — E03.4 HYPOTHYROIDISM DUE TO ACQUIRED ATROPHY OF THYROID: ICD-10-CM

## 2021-02-02 DIAGNOSIS — K22.70 BARRETT'S ESOPHAGUS WITHOUT DYSPLASIA: ICD-10-CM

## 2021-02-02 PROCEDURE — 69210 REMOVE IMPACTED EAR WAX UNI: CPT | Performed by: OTOLARYNGOLOGY

## 2021-02-02 PROCEDURE — 3017F COLORECTAL CA SCREEN DOC REV: CPT | Performed by: OTOLARYNGOLOGY

## 2021-02-02 PROCEDURE — G8427 DOCREV CUR MEDS BY ELIG CLIN: HCPCS | Performed by: OTOLARYNGOLOGY

## 2021-02-02 PROCEDURE — G8417 CALC BMI ABV UP PARAM F/U: HCPCS | Performed by: OTOLARYNGOLOGY

## 2021-02-02 PROCEDURE — 1123F ACP DISCUSS/DSCN MKR DOCD: CPT | Performed by: OTOLARYNGOLOGY

## 2021-02-02 PROCEDURE — 99214 OFFICE O/P EST MOD 30 MIN: CPT | Performed by: OTOLARYNGOLOGY

## 2021-02-02 PROCEDURE — 1036F TOBACCO NON-USER: CPT | Performed by: OTOLARYNGOLOGY

## 2021-02-02 PROCEDURE — 31575 DIAGNOSTIC LARYNGOSCOPY: CPT | Performed by: OTOLARYNGOLOGY

## 2021-02-02 PROCEDURE — 4040F PNEUMOC VAC/ADMIN/RCVD: CPT | Performed by: OTOLARYNGOLOGY

## 2021-02-02 PROCEDURE — G8484 FLU IMMUNIZE NO ADMIN: HCPCS | Performed by: OTOLARYNGOLOGY

## 2021-02-02 ASSESSMENT — ENCOUNTER SYMPTOMS
ABDOMINAL PAIN: 0
NAUSEA: 0
VOICE CHANGE: 0
RHINORRHEA: 0
WHEEZING: 0
COLOR CHANGE: 0
VOMITING: 0
CHEST TIGHTNESS: 0
FACIAL SWELLING: 0
SORE THROAT: 0
COUGH: 0
SINUS PRESSURE: 0
APNEA: 0
SHORTNESS OF BREATH: 0
DIARRHEA: 0
CHOKING: 0
STRIDOR: 0
TROUBLE SWALLOWING: 0

## 2021-02-02 NOTE — PROGRESS NOTES
[facility] PHYSICIANS [place] [facility]  [facility] EAR, NOSE AND THROAT  [address] [place] [address] [address] [address]  Dept: [phone]  Dept Fax: [phone]  Loc: [phone]    Arleen Stephens is a 77 y.o. male who was referred byNo ref. provider found for:  Chief Complaint   Patient presents with    Follow-up     Patient here for 6 month follow up. Reva Brambila HPI:     Arleen Stephens is a 77 y.o. male who presents today for  follow-up on his laryngeal cancer. [facility] is doing well, except he states his ears feel plugged. Reva Brambila History:     No Known Allergies  Current Outpatient Medications   Medication Sig Dispense Refill    levothyroxine (SYNTHROID) 50 MCG tablet Take 1 tablet by mouth daily 90 tablet 1    atorvastatin (LIPITOR) 40 MG tablet Take 1 tablet by mouth daily 90 tablet 1    terazosin (HYTRIN) 5 MG capsule TAKE 1 CAPSULE NIGHTLY 90 capsule 1    ticagrelor (BRILINTA) 90 MG TABS tablet Take 1 tablet by mouth 2 times daily 180 tablet 3    nitroGLYCERIN (NITROSTAT) 0.4 MG SL tablet up to max of 3 total doses. If no relief after 1 dose, call 911. 25 tablet 3    gabapentin (NEURONTIN) 300 MG capsule Take 1 capsule by mouth 3 times daily for 180 days. Intended supply: 90 days 270 capsule 1    diphenhydrAMINE-APAP, sleep, (TYLENOL PM EXTRA STRENGTH)  MG tablet Take 2 tablets by mouth nightly      acetaminophen (TYLENOL) 500 MG tablet Take 500 mg by mouth every 6 hours as needed for Pain      omeprazole (PRILOSEC) 20 MG delayed release capsule Take 20 mg by mouth 2 times daily      aspirin EC 81 MG EC tablet Take 81 mg by mouth daily      CPAP Machine MISC by Does not apply route Please change CPAP pressure to 8 cm H20. 1 each 0    Coenzyme Q10 (CO Q-10) 200 MG CAPS Take  by mouth daily.  Multiple Vitamin (MULTI-VITAMIN) TABS Take  by mouth daily.  ARGININE PO Take 500 mg by mouth daily.  2 tab      ipratropium (ATROVENT) 0.06 % nasal spray 2 sprays by Nasal route 3 times daily 2 each 11     No current facility-administered medications for this visit.       Past Medical History:   Diagnosis Date    Ascending Aneurysm ([address] 02/2017    Ascending, 4.2 cm per pt    Brown's esophagus     CAD (coronary artery disease)     Cancer (HCC)     throat cancer, s/p radiation    Chronic back pain     GERD (gastroesophageal reflux disease)     Hepatitis C     as child    History of hyperbaric oxygen therapy     after bottom teeth removed after rdiation therapy    Hyperlipidemia     Hypertension     Left shoulder pain     nerve pain , ? secondary to radiation, has had \" numbing shot \"    Nausea & vomiting     FOUZIA on CPAP     Osteoradionecrosis of mandible     s/p radiation for throat cancer    PONV (postoperative nausea and vomiting)     only after OHS    S/P CABG [facility]coronary artery bypass graft) 2002    [facility]    Snores     Thyroid disease     Vision loss of right eye     during hyperbaric chamber oxygen therapy     Vitreous opacities     right    Wears dentures     full upper, no bottom teeth and no use of dentures on bottom      Past Surgical History:   Procedure Laterality Date    CARDIAC CATHETERIZATION  8/2002, 4/2004    [facility]    COLONOSCOPY      CORONARY ANGIOPLASTY WITH STENT PLACEMENT  7/2002    [facility]    CORONARY ANGIOPLASTY WITH STENT PLACEMENT N/A 07/12/2019    [facility]    CORONARY ARTERY BYPASS GRAFT  2002    [facility]    DENTAL SURGERY      bottom teeth removal    HEMORRHOID SURGERY      KNEE SURGERY Right     LARYNGOSCOPY N/A 10/4/2017    LARYNGOSCOPY MICRO WITH BIOPSY performed by Eleanor Soto MD at [address] [place]  04/28/2016    by Dr Maggie Bernstein VITREOUS,SUBRET/CHOROID FLUID Right 8/27/2018    VITRECTOMY 22 GAUGE performed by Magdi Silva MD at [address] VITRECTOMY Right 08/27/2018     VITRECTOMY 25 GAUGE (Right Eye)     Family History   Problem Relation Age of Onset    Heart Disease Father         MI    High Blood Pressure Father     Cancer Maternal Grandmother     Cancer Maternal Grandfather      Social History     Tobacco Use    Smoking status: Former Smoker     Packs/day: 2.00     Years: 20.00     Pack years: 40.00     Quit date: 2001     Years since quittin.2    Smokeless tobacco: Never Used   Substance Use Topics    Alcohol use: No     Alcohol/week: 0.0 standard drinks       Subjective:      Review of Systems   Constitutional: Negative for activity change, appetite change, chills, diaphoresis, fatigue, fever and unexpected weight change. HENT: Negative for congestion, dental problem, ear discharge, ear pain, facial swelling, hearing loss, mouth sores, nosebleeds, postnasal drip, rhinorrhea, sinus pressure, sneezing, sore throat, tinnitus, trouble swallowing and voice change. Eyes: Negative for visual disturbance. Respiratory: Negative for apnea, cough, choking, chest tightness, shortness of breath, wheezing and stridor. Cardiovascular: Negative for chest pain, palpitations and leg swelling. Gastrointestinal: Negative for abdominal pain, diarrhea, nausea and vomiting. Endocrine: Negative for cold intolerance, heat intolerance, polydipsia and polyuria. Genitourinary: Negative for dysuria, enuresis and hematuria. Musculoskeletal: Negative for arthralgias, gait problem, neck pain and neck stiffness. Skin: Negative for color change and rash. Allergic/Immunologic: Negative for environmental allergies, food allergies and immunocompromised state. Neurological: Negative for dizziness, syncope, facial asymmetry, speech difficulty, light-headedness and headaches. Hematological: Negative for adenopathy. Does not bruise/bleed easily. Psychiatric/Behavioral: Negative for confusion and sleep disturbance. The patient is not nervous/anxious.         Objective:   /68 (Site: Left Upper Arm, Position: Sitting)   Pulse 72   Temp 97.2 °F (36.2 °C) (Infrared)   Resp 16   Wt 184 lb 12.8 oz (83.8 kg)   BMI 25.77 kg/m²     Physical Exam   Ears: Bilateral cerumen impactions  Voice normal  Neck. No palpable adenopathy    PROCEDURE: FIBEROPTIC LARYNGOSCOPY     A fiberoptic laryngoscopy was performed under topical anesthesia, after using Afrin and Lidocaine spray in the nasal fossa. The nasal fossa, nasopharynx, hypopharynx and larynx were carefully examined. Base of tongue was symmetrical. Epiglottis appeared normal and was not retrodisplaced. True vocal cords had normal mobility. There was a slightly raised slightly irregular area on the right false vocal cord, middle third. There was a submucosal ecchymosis at the petiole (Brilinta). No pooling in the pyriform sinuses. Cerumen removal using operating microscope, mario   Under the operating microscope, the right ear was cleaned with wire loop, forceps and suction as needed. A similar procedure was performed on the opposite ear. Patient tolerated it well. Findings: Normal ear canals and tympanic membrane. Hearing improved    Data:  All of the past medical history, past surgical history, family history,social history, allergies and current medications were reviewed with the patient. Assessment & Plan   Diagnoses and all orders for this visit:     Diagnosis Orders   1. Primary squamous cell carcinoma of larynx (HCC)  [facility] LARYNGOSCOPY FLEXIBLE DIAGNOSTIC   2. Effects, radiation, subsequent encounter  [facility] LARYNGOSCOPY FLEXIBLE DIAGNOSTIC   3. Brown's esophagus without dysplasia  [facility] LARYNGOSCOPY FLEXIBLE DIAGNOSTIC   4. Hypothyroidism due to acquired atrophy of thyroid     5. FOUZIA on CPAP     6. Bilateral impacted cerumen  [facility] REMOVAL IMPACTED CERUMEN INSTRUMENTATION UNILAT       The findings were explained and his questions were answered. Return in 1 month for follow-up on the right false cord. Andra Isbell. Sebastian Sandoval MD    **This report has been created using voice recognition software.  It may contain minor errors which are inherent in voicerecognition technology. **

## 2021-02-02 NOTE — TELEPHONE ENCOUNTER
Bhavna Galvan requested a refill of Gabepentin. Please call this into the patient's pharmacy. Call into [facility] order pharmacy. Card was scanned.

## 2021-02-04 ENCOUNTER — HOSPITAL ENCOUNTER (OUTPATIENT)
Dept: PHYSICAL THERAPY | Age: 66
Setting detail: THERAPIES SERIES
Discharge: HOME | End: 2021-02-04
Payer: MEDICARE

## 2021-02-04 PROCEDURE — 97530 THERAPEUTIC ACTIVITIES: CPT

## 2021-02-04 NOTE — PROGRESS NOTES
[address] [facility]  PHYSICAL THERAPY  [] EVALUATION  [x] DAILY NOTE (LAND) [] DAILY NOTE (AQUATIC ) [] PROGRESS NOTE [] DISCHARGE NOTE    [] [facility]   [] Samara [address]    [x] [facility]   [] Jose Luis Miranda    Date: 2021  Patient Name:  Arleen Stephens  : 1955  MRN: [id number]  CSN: [id number]    Referring Practitioner Christina Villareal MD   Diagnosis Lumbago with sciatica, unspecified side [M54.40]    Treatment Diagnosis LBP, LLE weakness   Date of Evaluation 21    Additional Pertinent History History of Throat CA 4 years ago. Functional Outcome Measure Used Oswestry   Functional Outcome Score 20 (21)       Insurance: Primary: Payor: MEDICARE /  /  / ,   Secondary: GENERIC COMMERCIAL   Authorization Information: Unlimited visits-ionto/HP/CP not covered   Visit # 5, 5/10 for progress note   Visits Allowed: unlimited   Recertification Date:    Physician Follow-Up: 21   Physician Orders: [facility]   History of Present Illness: Patient has history of back pain and medial LLE pain for a couple of years and last couple of months has gone into LB. History of throat CA and reports he has some cervical problems-know position of relief. Difficulty raising LLE. SUBJECTIVE: since last visit, has just been doing prone at home. Leg not hurting as bad, gets it in the back in the morning. Twisting hin hips and pelvis will vother his leg. L hip flexion feels weaker         TREATMENT   Precautions: History of throat CA 4 years ago   Pain: 1/10 medial proximal L thigh and groin.  0/10 LB    X in shaded column indicates activity completed today   Modalities Parameters/  Location  Notes                     Manual Therapy Time/Technique  Notes   Myofascial release, trigger point release, positional release 8 min  Med/prox adductors and iliopsoas L Muscle energy for L FRS 1 lumbar levels  x L thigh felt a little better. Strength feels the same         Exercise/Intervention   Notes   Supine butterfly 5 10 sec     Supine ham stretch 3 bilat 20 sec     Standing ham stretch 3 X  bilat 20 sec     Standing calf stretch 3Xbilat 20 sec     Standing hip flexor stretch 3 bilat 15 sec     Standing 3 way hip       Balance: SLS,  5X bilat 5 sec     steps       Seated side stretch 3 bilat   Best stretch to the R   Trunk rolls 5 bilat Hold 10 sec  Felt looser and could roll to the R a little more   Alt UE/LE    Next visit   Body mechanics 10 min  x Demo using reg therapy ball, for squat and diagonal lifts   Squat  Lunge 10  5 bilat    x    Double knee to chest 10   Got easier to do, pain a little better   Standing extension 10  z    Lumbar roll, posturing in chair  5 min     Puppy dog pressup 10  x No pain in leg or thigh   Prone prop  1 min x Uncomfortable in LB, L hand wanted to go to sleep-better with pillow under chest.    prone  3.5 min x No pain. Bike seat 4  5 min       Specific Interventions Next Treatment: manual therapy/massage/stretching PRN. ROM, stretching, strength, core strength, balance, gait, body mechanics    Activity/Treatment Tolerance:  [x]  Patient tolerated treatment well  []  Patient limited by fatigue  []  Patient limited by pain   []  Patient limited by medical complications  []  Other:     Assessment: no symptoms at end of session. Well controlled with extension and prone. Will monitor symptoms for progression of ex to include core strength  GOALS:  Patient Goal: travel, golf with less pain    Short Term Goals:  Time Frame: 4 weeks  1. Decrease reports of pain to intermittent to tolerate standing/walking 1 hour for work, sit >1 hour in office chair or car for travel and work  2.   Increase active SLR>60 degrees, dorsiflexion >8 degrees, trunk flexion to ankles for ease of lifting from the floor, transfers, dressing lower body 3.  Patient able to sit erect in a chair for improved ease to reach across desk, tolerate sitting in various chairs without increase in LLE symptoms  4. Increase LLE strength >4+/5 to tolerate leading up steps with LLE, balance      Long Term Goals:  Time Frame: 8 weeks  1. SLS>10 sec bilat for balance, fall prevention, walking on wet or uneven surfaces  2. I HEP to achieve above goals      Patient Education:   [x]  HEP/Education Completed: Plan of Care, Goals, standing and supine ham stretch, butterfly stretch supine, calf stretch, standing hip flexor stretch. Side bend, trunk rolls-hold all of these. Continue with prone every 2 hours, standing extension, use of lumbar roll, puppy dog press-up, body mechanics  ? [employer] Access Code:  []  No new Education completed  []  Reviewed Prior HEP      []  Patient verbalized and/or demonstrated understanding of education provided. []  Patient unable to verbalize and/or demonstrate understanding of education provided. Will continue education. [x]  Barriers to learning: none    PLAN:  Treatment Recommendations: Strengthening, Range of Motion, Balance Training, Functional Mobility Training, Gait Training, Stair Training, Manual Therapy - Soft Tissue Mobilization, Pain Management, Home Exercise Program, Patient Education, Safety Education and Training and Self-Care Education and Training    []  Plan of care initiated. Plan to see patient 2 times per week for 4-8 weeks to address the treatment planned outlined above.   [x]  Continue with current plan of care  []  Modify plan of care as follows:    []  Hold pending physician visit  []  Discharge    Time In 1135   Time out 1213   Timed Code Minutes: 38min   Total Treatment Time: 38 min       Electronically Signed by: Sugar Blake PT, DPT [id number]

## 2021-02-08 ENCOUNTER — HOSPITAL ENCOUNTER (OUTPATIENT)
Dept: PHYSICAL THERAPY | Age: 66
Setting detail: THERAPIES SERIES
Discharge: HOME | End: 2021-02-08
Payer: MEDICARE

## 2021-02-08 PROCEDURE — 97530 THERAPEUTIC ACTIVITIES: CPT

## 2021-02-08 NOTE — PROGRESS NOTES
[address] [facility]  PHYSICAL THERAPY  [] EVALUATION  [x] DAILY NOTE (LAND) [] DAILY NOTE (AQUATIC ) [] PROGRESS NOTE [] DISCHARGE NOTE    [] [facility]   [] [address]    [x] [facility]   [] Tiffanie Sagastume    Date: 2021  Patient Name:  Lio Dorado  : 1955  MRN: [id number]  CSN: [id number]    Referring Practitioner Laura Krishna MD   Diagnosis Lumbago with sciatica, unspecified side [M54.40]    Treatment Diagnosis LBP, LLE weakness   Date of Evaluation 21    Additional Pertinent History History of Throat CA 4 years ago. Functional Outcome Measure Used Oswestry   Functional Outcome Score 20 (21)       Insurance: Primary: Payor: MEDICARE /  /  / ,   Secondary: GENERIC COMMERCIAL   Authorization Information: Unlimited visits-ionto/HP/CP not covered   Visit # 6, 6/10 for progress note   Visits Allowed: unlimited   Recertification Date:    Physician Follow-Up: 21. Dr. Solomon Cover 3/5/21. Physician Orders: [facility]   History of Present Illness: Patient has history of back pain and medial LLE pain for a couple of years and last couple of months has gone into LB. History of throat CA and reports he has some cervical problems-know position of relief. Difficulty raising LLE. SUBJECTIVE: about the same. At times he thinks its a little better but when he twist/turns wrong, it will bother him. Once he starts moving around a little, it will be better. Prone helps relieve symptoms, posturing also helps. TREATMENT   Precautions: History of throat CA 4 years ago   Pain: 2/10 medial proximal L thigh and groin.  0/10 LB    X in shaded column indicates activity completed today   Modalities Parameters/  Location  Notes                     Manual Therapy Time/Technique  Notes   Myofascial release, trigger point release, positional release 8 min  Med/prox adductors and iliopsoas L Muscle energy for L FRS 1 lumbar levels   L thigh felt a little better. Strength feels the same         Exercise/Intervention   Notes   Supine butterfly 10 10 sec x    Supine ham stretch 3 bilat 20 sec     Supine knee to axilla, figure 4/piraformis stretch 3X L 20 sec x    Standing ham stretch 3 X  bilat 20 sec     Standing calf stretch 3Xbilat 20 sec     Standing hip flexor stretch 3 bilat 15 sec     Standing 3 way hip       Balance: SLS,  5X bilat 5 sec     steps       Seated side stretch 3 bilat   Best stretch to the R   Trunk rolls 5 bilat Hold 10 sec  Felt looser and could roll to the R a little more   Alt UE/LE    Next visit   Body mechanics 10 min   Demo using reg therapy ball, for squat and diagonal lifts   Squat  Lunge 10  5 bilat  X  x      bridge 10X 5 sec  x A little nagging prox L thigh-then went to prone X 2 min   Pelvic tilt 10  x Difficulty doing tilt          Double knee to chest 10   Got easier to do, pain a little better   Standing extension 10      Lumbar roll, posturing in chair  5 min     Puppy dog pressup 10  x No pain in leg or thigh   Prone prop  1 min  Uncomfortable in LB, L hand wanted to go to sleep-better with pillow under chest.    prone  2.5 min x No pain. Bike seat 4  5 min x Every time he \"pushes down\" will aggrevate his L leg a little     Specific Interventions Next Treatment: manual therapy/massage/stretching PRN. ROM, stretching, strength, core strength, balance, gait, body mechanics    Activity/Treatment Tolerance:  [x]  Patient tolerated treatment well  []  Patient limited by fatigue  []  Patient limited by pain   []  Patient limited by medical complications  []  Other:     Assessment: continues to do well with prone. Hip stretches were tight and a little uncomfortable.  Tender over L tensor fascia jakub  GOALS:  Patient Goal: travel, golf with less pain    Short Term Goals:  Time Frame: 4 weeks 1.  Decrease reports of pain to intermittent to tolerate standing/walking 1 hour for work, sit >1 hour in office chair or car for travel and work  2. Increase active SLR>60 degrees, dorsiflexion >8 degrees, trunk flexion to ankles for ease of lifting from the floor, transfers, dressing lower body  3. Patient able to sit erect in a chair for improved ease to reach across desk, tolerate sitting in various chairs without increase in LLE symptoms  4. Increase LLE strength >4+/5 to tolerate leading up steps with LLE, balance      Long Term Goals:  Time Frame: 8 weeks  1. SLS>10 sec bilat for balance, fall prevention, walking on wet or uneven surfaces  2. I HEP to achieve above goals      Patient Education:   [x]  HEP/Education Completed: Plan of Care, Goals, standing and supine ham stretch, butterfly stretch supine, calf stretch, standing hip flexor stretch. Side bend, trunk rolls-hold all of these. Continue with prone every 2 hours, standing extension, use of lumbar roll, puppy dog press-up, body mechanics. Supine  And sitting piraformis and knee to axilla stretch  ? [employer] Access Code:  []  No new Education completed  []  Reviewed Prior HEP      []  Patient verbalized and/or demonstrated understanding of education provided. []  Patient unable to verbalize and/or demonstrate understanding of education provided. Will continue education. [x]  Barriers to learning: none    PLAN:  Treatment Recommendations: Strengthening, Range of Motion, Balance Training, Functional Mobility Training, Gait Training, Stair Training, Manual Therapy - Soft Tissue Mobilization, Pain Management, Home Exercise Program, Patient Education, Safety Education and Training and Self-Care Education and Training    []  Plan of care initiated. Plan to see patient 2 times per week for 4-8 weeks to address the treatment planned outlined above.   [x]  Continue with current plan of care  []  Modify plan of care as follows: []  Hold pending physician visit  []  Discharge    Time In 0920   Time out 1000   Timed Code Minutes: 40min   Total Treatment Time: 40 min       Electronically Signed by: Juliet Duke PT, DPT [id number]

## 2021-02-11 ENCOUNTER — HOSPITAL ENCOUNTER (OUTPATIENT)
Dept: PHYSICAL THERAPY | Age: 66
Setting detail: THERAPIES SERIES
Discharge: HOME | End: 2021-02-11
Payer: MEDICARE

## 2021-02-11 PROCEDURE — 97530 THERAPEUTIC ACTIVITIES: CPT

## 2021-02-11 NOTE — PROGRESS NOTES
[address] [facility]  PHYSICAL THERAPY  [] EVALUATION  [] DAILY NOTE (LAND) [] DAILY NOTE (AQUATIC ) [x] PROGRESS NOTE [] DISCHARGE NOTE    [] [address] [facility]   [] [address]    [x] 645 [facility]   [] Tita Hall    Date: 2021  Patient Name:  Singh Marie  : 1955  MRN: [id number]  CSN: [id number]    Referring Practitioner Yovanny Torre MD   Diagnosis Lumbago with sciatica, unspecified side [M54.40]    Treatment Diagnosis LBP, LLE weakness   Date of Evaluation 21    Additional Pertinent History History of Throat CA 4 years ago. Functional Outcome Measure Used Oswestry   Functional Outcome Score 20 (21)       Insurance: Primary: Payor: MEDICARE /  /  / ,   Secondary: GENERIC COMMERCIAL   Authorization Information: Unlimited visits-ionto/HP/CP not covered   Visit # 7, 7/10 for progress note   Visits Allowed: unlimited   Recertification Date: 0/15/33   Physician Follow-Up: 21. Dr. Marie Logan 3/5/21. Physician Orders: [facility]   History of Present Illness: Patient has history of back pain and medial LLE pain for a couple of years and last couple of months has gone into LB. History of throat CA and reports he has some cervical problems-know position of relief. Difficulty raising LLE. SUBJECTIVE: patient went on a golf simulator 20-by the time he was done had significant pain in L hip from this activity, not his back. Difficulty putting slipper/shoe on LLE if he gets in figure 4 type of position. With therapy visits, pain is the better, looser. Some of the exercises do irritate the hip. Ex has helped the back pain. Notes he can stand longer, walk farther for work activities        TREATMENT   Precautions: History of throat CA 4 years ago   Pain: 1/10 medial proximal L thigh and groin.  0/10 LB    X in shaded column indicates activity completed today   Modalities Parameters/  Location  Notes Manual Therapy Time/Technique  Notes   Myofascial release, trigger point release, positional release 8 min  Med/prox adductors and iliopsoas L   Muscle energy for L FRS 1 lumbar levels   L thigh felt a little better. Strength feels the same         Exercise/Intervention   Notes   Supine butterfly 10 10 sec x    Supine ham stretch 3 bilat 20 sec     Supine knee to axilla, figure 4/piraformis stretch 3X L 20 sec     Standing ham stretch 3 X  bilat 20 sec x    Standing calf stretch 3Xbilat 20 sec     Standing hip flexor stretch 3 bilat 15 sec     Standing 3 way hip 10 ea bilat  x  with L leg extension feels it in front of L groin/thigh. Balance: SLS,  5X bilat 5 sec     steps       Seated side stretch 3 bilat   Best stretch to the R   Trunk rolls 5 bilat Hold 10 sec  Felt looser and could roll to the R a little more   Alt UE/LE    Next visit   Body mechanics 10 min   Demo using reg therapy ball, for squat and diagonal lifts   Squat  Lunge 10  5 bilat  X  x    bridge 10X 5 sec   A little nagging prox L thigh-then went to prone X 2 min   Pelvic tilt 10   Difficulty doing tilt          Double knee to chest 10   Got easier to do, pain a little better   Standing extension 10      Lumbar roll, posturing in chair  5 min     Puppy dog pressup 10   No pain in leg or thigh   Prone prop  1 min  Uncomfortable in LB, L hand wanted to go to sleep-better with pillow under chest.    prone  2.5 min  No pain. Bike seat 4  5 min x      Specific Interventions Next Treatment: manual therapy/massage/stretching PRN.  ROM, stretching, strength, core strength, balance, gait, body mechanics    Activity/Treatment Tolerance:  [x]  Patient tolerated treatment well  []  Patient limited by fatigue  []  Patient limited by pain   []  Patient limited by medical complications  []  Other: Assessment: L hip is stiff to get L foot to R knee. Back feeling better. Will discuss with his physician about further testing also to the L hip for possible issues there  GOALS:  Patient Goal: travel, golf with less pain    Short Term Goals:  Time Frame: 4 weeks  1. Decrease reports of pain to intermittent to tolerate standing/walking 1 hour for work, sit >1 hour in office chair or car for travel and work:  PART MET as he could tolerate walking >1/4 mile, >30 min-has to shorten stride length to avoid L hip/thigh discomfort  2. Increase active SLR>60 degrees, dorsiflexion >8 degrees, trunk flexion to ankles for ease of lifting from the floor, transfers, dressing lower body:  PART MET with dorsiflexion bilat 8 degrees, active SLR L 48 degrees with hip/prox thigh pain, R 85 degrees. Active hip flexion L 93, R 105 degrees. (will note groin discomfort at approx 45-60 degrees hip flexion)  3. Patient able to sit erect in a chair for improved ease to reach across desk, tolerate sitting in various chairs without increase in LLE symptoms:  MET most of the time as he is able to sit in chair, at edge of bed erect, more comfortable  4. Increase LLE strength >4+/5 to tolerate leading up steps with LLE, balance:  PART MET with L hip flexion 4/5, abduction and e rotation of hip 4+/5, knee flexion and extension 4+/5      Long Term Goals:  Time Frame: 8 weeks  1. SLS>10 sec bilat for balance, fall prevention, walking on wet or uneven surfaces:  MET with >30 sec bilat-no pain with this activity. 2. I HEP to achieve above goals      Patient Education:   [x]  HEP/Education Completed: Plan of Care, Goals, standing and supine ham stretch, butterfly stretch supine, calf stretch, standing hip flexor stretch. Side bend, trunk rolls-hold all of these. Continue with prone every 2 hours, standing extension, use of lumbar roll, puppy dog press-up, body mechanics.  Supine  And sitting piraformis and knee to axilla stretch

## 2021-02-18 ENCOUNTER — HOSPITAL ENCOUNTER (OUTPATIENT)
Dept: PHYSICAL THERAPY | Age: 66
Setting detail: THERAPIES SERIES
Discharge: HOME | End: 2021-02-18
Payer: MEDICARE

## 2021-02-18 PROCEDURE — 97530 THERAPEUTIC ACTIVITIES: CPT

## 2021-02-18 NOTE — PROGRESS NOTES
[address] [facility]  PHYSICAL THERAPY  [] EVALUATION  [] DAILY NOTE (LAND) [] DAILY NOTE (AQUATIC ) [x] PROGRESS NOTE [] DISCHARGE NOTE    [] 615 [facility]   [] [address]    [x] 645 [facility]   [] Jose Chilel    Date: 2021  Patient Name:  Ruth Parra  : 1955  MRN: [id number]  CSN: [id number]    Referring Practitioner Janell Valencia MD   Diagnosis Lumbago with sciatica, unspecified side [M54.40]    Treatment Diagnosis LBP, LLE weakness   Date of Evaluation 21    Additional Pertinent History History of Throat CA 4 years ago. Functional Outcome Measure Used Oswestry   Functional Outcome Score 20 (21)       Insurance: Primary: Payor: MEDICARE /  /  / ,   Secondary: GENERIC COMMERCIAL   Authorization Information: Unlimited visits-ionto/HP/CP not covered   Visit # 8, 8/10 for progress note   Visits Allowed: unlimited   Recertification Date:    Physician Follow-Up: 21. Dr. Roa Carrier 3/5/21. Physician Orders: [facility]   History of Present Illness: Patient has history of back pain and medial LLE pain for a couple of years and last couple of months has gone into LB. History of throat CA and reports he has some cervical problems-know position of relief. Difficulty raising LLE. SUBJECTIVE: thinks leg is feeling better-not hurting as bad. Crossing leg , pivot on leg may cause hip/groin pain      TREATMENT   Precautions: History of throat CA 4 years ago   Pain: 1/10 medial proximal L thigh and groin. X in shaded column indicates activity completed today   Modalities Parameters/  Location  Notes                     Manual Therapy Time/Technique  Notes   Myofascial release, trigger point release, positional release 8 min  Med/prox adductors and iliopsoas L   Muscle energy for L FRS 1 lumbar levels   L thigh felt a little better.  Strength feels the same         Exercise/Intervention   Notes Supine butterfly 10 10 sec x    Supine ham stretch 3 bilat 20 sec     Supine knee to axilla, figure 4/piraformis stretch 3X L 20 sec     Standing ham stretch 3 X  bilat 20 sec x    Standing calf stretch 3Xbilat 20 sec x    Standing hip flexor stretch 3 bilat 15 sec x    Standing 3 way hip 10 ea bilat  x      Balance: SLS, trombone, bicycle F//B, alphabet (split between legs) 5X ea bilat  x    Steps up/lat-6\" 5 ea bilat  x           Trunk rolls 5 bilat Hold 10 sec x    Alt UE/LE-in all 4's 5 bilat  x    Body mechanics 10 min   Demo using reg therapy ball, for squat and diagonal lifts   Squat  Lunge 10  5 bilat  X  x    bridge 10X 5 sec  x A little nagging prox L thigh-then went to prone X 2 min   Pelvic tilt 10  x Tilt better if he goes to tilt<>arch with back          Double knee to chest 10  x Got easier to do, pain a little better   Standing extension 10      Lumbar roll, posturing in chair  5 min     Puppy dog pressup 10   No pain in leg or thigh   Prone prop  1 min  Uncomfortable in LB, L hand wanted to go to sleep-better with pillow under chest.    prone  2.5 min  No pain. Bike seat 4  5 min x      Specific Interventions Next Treatment: manual therapy/massage/stretching PRN. ROM, stretching, strength, core strength, balance, gait, body mechanics    Activity/Treatment Tolerance:  [x]  Patient tolerated treatment well  []  Patient limited by fatigue  []  Patient limited by pain   []  Patient limited by medical complications  []  Other:     Assessment: did well with all ex today. No tenderness/mild tightness L adductors  GOALS:  Patient Goal: travel, golf with less pain    Short Term Goals:  Time Frame: 4 weeks  1.   Decrease reports of pain to intermittent to tolerate standing/walking 1 hour for work, sit >1 hour in office chair or car for travel and work:  PART MET as he could tolerate walking >1/4 mile, >30 min-has to shorten stride length to avoid L hip/thigh discomfort 2.  Increase active SLR>60 degrees, dorsiflexion >8 degrees, trunk flexion to ankles for ease of lifting from the floor, transfers, dressing lower body:  PART MET with dorsiflexion bilat 8 degrees, active SLR L 48 degrees with hip/prox thigh pain, R 85 degrees. Active hip flexion L 93, R 105 degrees. (will note groin discomfort at approx 45-60 degrees hip flexion)  3. Patient able to sit erect in a chair for improved ease to reach across desk, tolerate sitting in various chairs without increase in LLE symptoms:  MET most of the time as he is able to sit in chair, at edge of bed erect, more comfortable  4. Increase LLE strength >4+/5 to tolerate leading up steps with LLE, balance:  PART MET with L hip flexion 4/5, abduction and e rotation of hip 4+/5, knee flexion and extension 4+/5      Long Term Goals:  Time Frame: 8 weeks  1. SLS>10 sec bilat for balance, fall prevention, walking on wet or uneven surfaces:  MET with >30 sec bilat-no pain with this activity. 2. I HEP to achieve above goals      Patient Education:   [x]  HEP/Education Completed: Plan of Care, Goals, standing and supine ham stretch, butterfly stretch supine, calf stretch, standing hip flexor stretch. Side bend, trunk rolls-hold all of these. Continue with prone every 2 hours, standing extension, use of lumbar roll, puppy dog press-up, body mechanics. Supine  And sitting piraformis and knee to axilla stretch  ? Dattch Access Code:  []  No new Education completed  []  Reviewed Prior HEP      []  Patient verbalized and/or demonstrated understanding of education provided. []  Patient unable to verbalize and/or demonstrate understanding of education provided. Will continue education.   [x]  Barriers to learning: none    PLAN: Treatment Recommendations: Strengthening, Range of Motion, Balance Training, Functional Mobility Training, Gait Training, Stair Training, Manual Therapy - Soft Tissue Mobilization, Pain Management, Home Exercise Program, Patient Education, Safety Education and Training and Self-Care Education and Training    []  Plan of care initiated. Plan to see patient 2 times per week for 4-8 weeks to address the treatment planned outlined above. [x]  Continue with current plan of care.  Will continue 1X/week until 3/4/21 for modification as needed for HEP  []  Modify plan of care as follows:    []  Hold pending physician visit  []  Discharge    Time In 1045   Time out 1130   Timed Code Minutes: 45 min   Total Treatment Time: 45min       Electronically Signed by: Areli Dailey, PT, DPT [id number]

## 2021-02-25 ENCOUNTER — APPOINTMENT (OUTPATIENT)
Dept: PHYSICAL THERAPY | Age: 66
End: 2021-02-25
Payer: MEDICARE

## 2021-03-02 ENCOUNTER — OFFICE VISIT (OUTPATIENT)
Dept: ENT CLINIC | Age: 66
End: 2021-03-02
Payer: MEDICARE

## 2021-03-02 VITALS
WEIGHT: 187.7 LBS | HEART RATE: 64 BPM | SYSTOLIC BLOOD PRESSURE: 124 MMHG | TEMPERATURE: 97.1 F | RESPIRATION RATE: 12 BRPM | DIASTOLIC BLOOD PRESSURE: 72 MMHG | BODY MASS INDEX: 26.18 KG/M2

## 2021-03-02 DIAGNOSIS — Z99.89 OSA ON CPAP: ICD-10-CM

## 2021-03-02 DIAGNOSIS — K22.70 BARRETT'S ESOPHAGUS WITHOUT DYSPLASIA: ICD-10-CM

## 2021-03-02 DIAGNOSIS — T66.XXXD EFFECTS, RADIATION, SUBSEQUENT ENCOUNTER: ICD-10-CM

## 2021-03-02 DIAGNOSIS — C32.9 PRIMARY SQUAMOUS CELL CARCINOMA OF LARYNX (HCC): Primary | ICD-10-CM

## 2021-03-02 DIAGNOSIS — G47.33 OSA ON CPAP: ICD-10-CM

## 2021-03-02 PROCEDURE — 1036F TOBACCO NON-USER: CPT | Performed by: OTOLARYNGOLOGY

## 2021-03-02 PROCEDURE — 31575 DIAGNOSTIC LARYNGOSCOPY: CPT | Performed by: OTOLARYNGOLOGY

## 2021-03-02 PROCEDURE — 4040F PNEUMOC VAC/ADMIN/RCVD: CPT | Performed by: OTOLARYNGOLOGY

## 2021-03-02 PROCEDURE — G8417 CALC BMI ABV UP PARAM F/U: HCPCS | Performed by: OTOLARYNGOLOGY

## 2021-03-02 PROCEDURE — G8427 DOCREV CUR MEDS BY ELIG CLIN: HCPCS | Performed by: OTOLARYNGOLOGY

## 2021-03-02 PROCEDURE — 3017F COLORECTAL CA SCREEN DOC REV: CPT | Performed by: OTOLARYNGOLOGY

## 2021-03-02 PROCEDURE — 99214 OFFICE O/P EST MOD 30 MIN: CPT | Performed by: OTOLARYNGOLOGY

## 2021-03-02 PROCEDURE — 1123F ACP DISCUSS/DSCN MKR DOCD: CPT | Performed by: OTOLARYNGOLOGY

## 2021-03-02 PROCEDURE — G8484 FLU IMMUNIZE NO ADMIN: HCPCS | Performed by: OTOLARYNGOLOGY

## 2021-03-02 ASSESSMENT — ENCOUNTER SYMPTOMS
TROUBLE SWALLOWING: 0
COLOR CHANGE: 0
WHEEZING: 0
NAUSEA: 0
RHINORRHEA: 0
ABDOMINAL PAIN: 0
COUGH: 0
CHEST TIGHTNESS: 0
VOMITING: 0
CHOKING: 0
SHORTNESS OF BREATH: 0
STRIDOR: 0
SINUS PRESSURE: 0
DIARRHEA: 0
APNEA: 0
VOICE CHANGE: 0
SORE THROAT: 0
FACIAL SWELLING: 0

## 2021-03-02 NOTE — PROGRESS NOTES
[facility] [place] [facility]  [facility] EAR, NOSE AND THROAT  [address] [place], [place] [address]  Dept: [phone]  Dept Fax: [phone]  Loc: [phone]    Niraj Osuna is a 77 y.o. male who was referred byNo ref. provider found for:  Chief Complaint   Patient presents with    Follow-up     Patient here for one month follow up. Eric Angry HPI:     Niraj Osuna is a 77 y.o. male who presents today for follow-up on his laryngeal problems. History of primary squamous carcinoma the larynx and has a history of Brown's esophagus. He has had primary radiation therapy. Last month there was an irregular area on his posterior right false vocal cord. Patient states he is still a little hoarse and has a feeling of something in his throat. He is clearing his throat quite a bit. History:     No Known Allergies  Current Outpatient Medications   Medication Sig Dispense Refill    levothyroxine (SYNTHROID) 50 MCG tablet Take 1 tablet by mouth daily 90 tablet 1    atorvastatin (LIPITOR) 40 MG tablet Take 1 tablet by mouth daily 90 tablet 1    terazosin (HYTRIN) 5 MG capsule TAKE 1 CAPSULE NIGHTLY 90 capsule 1    ticagrelor (BRILINTA) 90 MG TABS tablet Take 1 tablet by mouth 2 times daily 180 tablet 3    nitroGLYCERIN (NITROSTAT) 0.4 MG SL tablet up to max of 3 total doses. If no relief after 1 dose, call 911. 25 tablet 3    gabapentin (NEURONTIN) 300 MG capsule Take 1 capsule by mouth 3 times daily for 180 days.  Intended supply: 90 days 270 capsule 1    diphenhydrAMINE-APAP, sleep, (TYLENOL PM EXTRA STRENGTH)  MG tablet Take 2 tablets by mouth nightly      acetaminophen (TYLENOL) 500 MG tablet Take 500 mg by mouth every 6 hours as needed for Pain      omeprazole (PRILOSEC) 20 MG delayed release capsule Take 20 mg by mouth 2 times daily      aspirin EC 81 MG EC tablet Take 81 mg by mouth daily      CPAP Machine MISC by Does not apply route Please change CPAP pressure to 8 cm H20. 1 each 0    Coenzyme Q10 (CO Q-10) 200 MG CAPS Take  by mouth daily.  Multiple Vitamin (MULTI-VITAMIN) TABS Take  by mouth daily.  ARGININE PO Take 500 mg by mouth daily. 2 tab      ipratropium (ATROVENT) 0.06 % nasal spray 2 sprays by Nasal route 3 times daily 2 each 11     No current facility-administered medications for this visit.       Past Medical History:   Diagnosis Date    Ascending Aneurysm ([address] 02/2017    Ascending, 4.2 cm per pt    Brown's esophagus     CAD (coronary artery disease)     Cancer (HCC)     throat cancer, s/p radiation    Chronic back pain     GERD (gastroesophageal reflux disease)     Hepatitis C     as child    History of hyperbaric oxygen therapy     after bottom teeth removed after rdiation therapy    Hyperlipidemia     Hypertension     Left shoulder pain     nerve pain , ? secondary to radiation, has had \" numbing shot \"    Nausea & vomiting     FOUZIA on CPAP     Osteoradionecrosis of mandible     s/p radiation for throat cancer    PONV (postoperative nausea and vomiting)     only after OHS    S/P CABG (coronary artery bypass graft) 2002    [facility]    Snores     Thyroid disease     Vision loss of right eye     during hyperbaric chamber oxygen therapy     Vitreous opacities     right    Wears dentures     full upper, no bottom teeth and no use of dentures on bottom      Past Surgical History:   Procedure Laterality Date    CARDIAC CATHETERIZATION  8/2002, 4/2004    [facility]    COLONOSCOPY      CORONARY ANGIOPLASTY WITH STENT PLACEMENT  7/2002    [facility]    CORONARY ANGIOPLASTY WITH STENT PLACEMENT N/A 07/12/2019    [facility]    CORONARY ARTERY BYPASS GRAFT  2002    [facility]    DENTAL SURGERY      bottom teeth removal    HEMORRHOID SURGERY      KNEE SURGERY Right     LARYNGOSCOPY N/A 10/4/2017    LARYNGOSCOPY MICRO WITH BIOPSY performed by Chantal Veronica MD at [address] [place]  04/28/2016    by Dr Fouzia Akins RELEAS VITREOUS,SUBRET/CHOROID FLUID Right 2018    VITRECTOMY 25 GAUGE performed by Caitlin Woods MD at [address] [place] [address] Drive VITRECTOMY Right 2018     VITRECTOMY 25 GAUGE (Right Eye)     Family History   Problem Relation Age of Onset    Heart Disease Father         MI    High Blood Pressure Father     Cancer Maternal Grandmother     Cancer Maternal Grandfather      Social History     Tobacco Use    Smoking status: Former Smoker     Packs/day: 2.00     Years: 20.00     Pack years: 40.00     Quit date: 2001     Years since quittin.2    Smokeless tobacco: Never Used   Substance Use Topics    Alcohol use: No     Alcohol/week: 0.0 standard drinks       Subjective:      Review of Systems   Constitutional: Negative for activity change, appetite change, chills, diaphoresis, fatigue, fever and unexpected weight change. HENT: Negative for congestion, dental problem, ear discharge, ear pain, facial swelling, hearing loss, mouth sores, nosebleeds, postnasal drip, rhinorrhea, sinus pressure, sneezing, sore throat, tinnitus, trouble swallowing and voice change. Eyes: Negative for visual disturbance. Respiratory: Negative for apnea, cough, choking, chest tightness, shortness of breath, wheezing and stridor. Cardiovascular: Negative for chest pain, palpitations and leg swelling. Gastrointestinal: Negative for abdominal pain, diarrhea, nausea and vomiting. Endocrine: Negative for cold intolerance, heat intolerance, polydipsia and polyuria. Genitourinary: Negative for dysuria, enuresis and hematuria. Musculoskeletal: Negative for arthralgias, gait problem, neck pain and neck stiffness. Skin: Negative for color change and rash. Allergic/Immunologic: Negative for environmental allergies, food allergies and immunocompromised state. Neurological: Negative for dizziness, syncope, facial asymmetry, speech difficulty, light-headedness and headaches. Hematological: Negative for adenopathy.  Does not bruise/bleed easily. Psychiatric/Behavioral: Negative for confusion and sleep disturbance. The patient is not nervous/anxious. Objective:   /72 (Site: Right Upper Arm, Position: Sitting)   Pulse 64   Temp 97.1 °F (36.2 °C) (Infrared)   Resp 12   Wt 187 lb 11.2 oz (85.1 kg)   BMI 26.18 kg/m²     Physical Exam   Voice slight hoarseness  Neck no palpable adenopathy    PROCEDURE: FIBEROPTIC LARYNGOSCOPY     A fiberoptic laryngoscopy was performed under topical anesthesia, after using Afrin and Lidocaine spray in the nasal fossa. The nasal fossa, nasopharynx, hypopharynx and larynx were carefully examined. Base of tongue was symmetrical. Epiglottis appeared normal and was not retrodisplaced. True vocal cords had normal mobility. There is a raised and very erythematous nodule at the right vocal process, a presumed granuloma. There was a slightly raised slightly irregular area on the right false vocal cord, middle third. There still was a submucosal ecchymosis at the petiole (Brilinta). No pooling in the pyriform sinuses. Data:  All of the past medical history, past surgical history, family history,social history, allergies and current medications were reviewed with the patient. Assessment & Plan   Diagnoses and all orders for this visit:     Diagnosis Orders   1. Primary squamous cell carcinoma of larynx (HCC)  [facility] LARYNGOSCOPY FLEXIBLE DIAGNOSTIC   2. Effects, radiation, subsequent encounter  [facility] LARYNGOSCOPY FLEXIBLE DIAGNOSTIC   3. Brown's esophagus without dysplasia  [place] LARYNGOSCOPY FLEXIBLE DIAGNOSTIC   4. FOUZIA on CPAP         The findings were explained and his questions were answered. Problem with throat clearing was discussed with the patient at some length. With his Brown's it may be very difficult for granuloma to heal.  We need to make sure that neither of these areas progress so he will come back in 1 month. Yaz Ott.  Brauloi Beckett MD    **This report has been created using voice recognition software. It may contain minor errors which are inherent in voicerecognition technology. **

## 2021-03-03 ENCOUNTER — OFFICE VISIT (OUTPATIENT)
Dept: CARDIOLOGY CLINIC | Age: 66
End: 2021-03-03
Payer: MEDICARE

## 2021-03-03 VITALS
HEART RATE: 68 BPM | BODY MASS INDEX: 26.18 KG/M2 | DIASTOLIC BLOOD PRESSURE: 88 MMHG | HEIGHT: 71 IN | WEIGHT: 187 LBS | SYSTOLIC BLOOD PRESSURE: 132 MMHG

## 2021-03-03 DIAGNOSIS — I25.83 CORONARY ARTERY DISEASE DUE TO LIPID RICH PLAQUE: Primary | ICD-10-CM

## 2021-03-03 DIAGNOSIS — I25.10 CORONARY ARTERY DISEASE DUE TO LIPID RICH PLAQUE: Primary | ICD-10-CM

## 2021-03-03 PROCEDURE — 99213 OFFICE O/P EST LOW 20 MIN: CPT | Performed by: INTERNAL MEDICINE

## 2021-03-03 PROCEDURE — G8484 FLU IMMUNIZE NO ADMIN: HCPCS | Performed by: INTERNAL MEDICINE

## 2021-03-03 PROCEDURE — G8417 CALC BMI ABV UP PARAM F/U: HCPCS | Performed by: INTERNAL MEDICINE

## 2021-03-03 PROCEDURE — 1123F ACP DISCUSS/DSCN MKR DOCD: CPT | Performed by: INTERNAL MEDICINE

## 2021-03-03 PROCEDURE — 4040F PNEUMOC VAC/ADMIN/RCVD: CPT | Performed by: INTERNAL MEDICINE

## 2021-03-03 PROCEDURE — 3017F COLORECTAL CA SCREEN DOC REV: CPT | Performed by: INTERNAL MEDICINE

## 2021-03-03 PROCEDURE — G8428 CUR MEDS NOT DOCUMENT: HCPCS | Performed by: INTERNAL MEDICINE

## 2021-03-03 PROCEDURE — 1036F TOBACCO NON-USER: CPT | Performed by: INTERNAL MEDICINE

## 2021-03-03 NOTE — PROGRESS NOTES
[facility],[place] [address] [address]03 [facility] [address]  Dept: [phone]  Dept Fax: [phone]  Loc: [phone]    Visit Date: 3/3/2021    Mr. Sergio Alfonso is a 77 y.o. male  who presented for:  Chief Complaint   Patient presents with    Check-Up    Coronary Artery Disease       HPI:   78 yo M c hx of CAD s/p CABG (VG-RCA, VG-OM; LIMA to LAD (occluded)); s/p PCI, FOUZIA, HTN, HLD, thoracic anuerysm (4.1cm, follows CT-Surgery at [facility]) is here for a follow up. Denies any chest pain, sob, palpitations, lightheadedness, dizziness, orthopnea, PND or pedal edema. Current Outpatient Medications:     levothyroxine (SYNTHROID) 50 MCG tablet, Take 1 tablet by mouth daily, Disp: 90 tablet, Rfl: 1    atorvastatin (LIPITOR) 40 MG tablet, Take 1 tablet by mouth daily, Disp: 90 tablet, Rfl: 1    terazosin (HYTRIN) 5 MG capsule, TAKE 1 CAPSULE NIGHTLY, Disp: 90 capsule, Rfl: 1    ticagrelor (BRILINTA) 90 MG TABS tablet, Take 1 tablet by mouth 2 times daily, Disp: 180 tablet, Rfl: 3    nitroGLYCERIN (NITROSTAT) 0.4 MG SL tablet, up to max of 3 total doses. If no relief after 1 dose, call 911., Disp: 25 tablet, Rfl: 3    gabapentin (NEURONTIN) 300 MG capsule, Take 1 capsule by mouth 3 times daily for 180 days. Intended supply: 90 days, Disp: 270 capsule, Rfl: 1    diphenhydrAMINE-APAP, sleep, (TYLENOL PM EXTRA STRENGTH)  MG tablet, Take 2 tablets by mouth nightly, Disp: , Rfl:     acetaminophen (TYLENOL) 500 MG tablet, Take 500 mg by mouth every 6 hours as needed for Pain, Disp: , Rfl:     omeprazole (PRILOSEC) 20 MG delayed release capsule, Take 20 mg by mouth 2 times daily, Disp: , Rfl:     aspirin EC 81 MG EC tablet, Take 81 mg by mouth daily, Disp: , Rfl:     CPAP Machine MISC, by Does not apply route Please change CPAP pressure to 8 cm H20., Disp: 1 each, Rfl: 0    Coenzyme Q10 (CO Q-10) 200 MG CAPS, Take  by mouth daily.   , Disp: , Rfl:    Multiple Vitamin (MULTI-VITAMIN) TABS, Take  by mouth daily. , Disp: , Rfl:     ARGININE PO, Take 500 mg by mouth daily. 2 tab, Disp: , Rfl:     ipratropium (ATROVENT) 0.06 % nasal spray, 2 sprays by Nasal route 3 times daily, Disp: 2 each, Rfl: 11    Past Medical History  Bhavna Galvan  has a past medical history of Ascending Aneurysm ([facility] [address] Brown's esophagus, CAD (coronary artery disease), Cancer ([facility] [address] Chronic back pain, GERD (gastroesophageal reflux disease), Hepatitis C, History of hyperbaric oxygen therapy, Hyperlipidemia, Hypertension, Left shoulder pain, Nausea & vomiting, FOUZIA on CPAP, Osteoradionecrosis of mandible, PONV (postoperative nausea and vomiting), S/P CABG (coronary artery bypass graft), Snores, Thyroid disease, Vision loss of right eye, Vitreous opacities, and Wears dentures. Social History  Bhavna Gavlan  reports that he quit smoking about 19 years ago. He has a 40.00 pack-year smoking history. He has never used smokeless tobacco. He reports that he does not drink alcohol or use drugs. Family History  Bhavna Galvan family history includes Cancer in his maternal grandfather and maternal grandmother; Heart Disease in his father; High Blood Pressure in his father.     Past Surgical History   Past Surgical History:   Procedure Laterality Date    CARDIAC CATHETERIZATION  8/2002, 4/2004    [facility]    COLONOSCOPY      CORONARY ANGIOPLASTY WITH STENT PLACEMENT  7/2002    [facility]    CORONARY ANGIOPLASTY WITH STENT PLACEMENT N/A 07/12/2019    MID LAD    CORONARY ARTERY BYPASS GRAFT  2002    [facility]    DENTAL SURGERY      bottom teeth removal    HEMORRHOID SURGERY      KNEE SURGERY Right     LARYNGOSCOPY N/A 10/4/2017    LARYNGOSCOPY MICRO WITH BIOPSY performed by Britton Heath MD at [address]  04/28/2016    by Dr Nancy Suero VITREOUS,SUBRET/CHOROID FLUID Right 8/27/2018    VITRECTOMY 22 GAUGE performed by Alysa Mascorro MD at [place] [address] Right 08/27/2018     VITRECTOMY Component Value Date    WBC 4.8 01/15/2021    RBC 4.99 01/15/2021    RBC 4.86 2011    HGB 15.4 01/15/2021    HCT 47.8 01/15/2021    MCV 95.8 01/15/2021    MCH 30.9 01/15/2021    MCHC 32.2 01/15/2021    RDW 12.6 2017     01/15/2021    MPV 10.4 01/15/2021       Lab Results   Component Value Date     01/15/2021    K 4.3 01/15/2021    K 4.0 2019     01/15/2021    CO2 27 01/15/2021    BUN 19 01/15/2021    LABALBU 4.1 2020    LABALBU 4.3 2011    CREATININE 0.9 01/15/2021    CALCIUM 9.5 01/15/2021    LABGLOM 84 01/15/2021    GLUCOSE 98 01/15/2021    GLUCOSE 95 2011       Lab Results   Component Value Date    ALKPHOS 80 2020    ALT 14 2020    AST 17 2020    PROT 6.0 2020    BILITOT 0.5 2020    BILIDIR <0.2 2017    LABALBU 4.1 2020    LABALBU 4.3 2011       No results found for: MG    Lab Results   Component Value Date    INR 0.97 2019         No results found for: LABA1C    Lab Results   Component Value Date    TRIG 91 01/15/2021    HDL 47 01/15/2021    LDLCALC 73 01/15/2021    LDLDIRECT 89.07 2018       Lab Results   Component Value Date    TSH 1.610 2020         Testing Reviewed:      I haveindividually reviewed the below cardiac tests    EKG:    ECHO:   Results for orders placed during the hospital encounter of 12   Echocardiogram complete 2D with doppler with color    Narrative [facility]  [address] [facility], [address]  Phone: [phone]  Fax: [phone]    Transthoracic Echocardiogram  2D, M-mode, Doppler, and Color Doppler    Name: Chris March  : 1955  MR #: [id number]  Study date: 2012  Account #: [de-identified]  Ht-Wt-BSA: 70 in- 184.6 lb- 2.04 mÂ²    Reading Physician:  Charity Mcbride DO  Technologist:  Holly Rodriguez, RDCS,RVT,RDMS  Referring Physician:  MD Girish Valverde for study: hyperlipidemia, CAD, CABG, chest pain    HISTORY: PRIOR HISTORY: CAD, CABG, HTN, hyperlipidemia, ex smoker, chest pain  /PROCEDURE: The procedure was performed in outpatient. This was a routine study. Diastolic blood pressure was 95 mmHg, at the start of the study. Image quality  was adequate. LEFT VENTRICLE: Size was normal. Systolic function was normal. Ejection  fraction was estimated in the range of 55 % to 65 %. There were no regional  wall motion abnormalities. Wall thickness was normal. DOPPLER: Doppler  parameters were consistent with abnormal left ventricular relaxation (grade 1  diastolic dysfunction). AORTIC VALVE: The valve was possibly bicuspid. Leaflets exhibited normal  thickness, calcification, and normal cuspal separation. DOPPLER: Transaortic  velocity was within the normal range. There was no evidence for stenosis. There  was trivial regurgitation. AORTA: The root exhibited mild dilatation. MITRAL VALVE: Valve structure was normal. There was normal leaflet separation. DOPPLER: The transmitral velocity was within the normal range. There was no  evidence for stenosis. There was trivial regurgitation. LEFT ATRIUM: Size was normal.    RIGHT VENTRICLE: The ventricle was mildly to moderately dilated. Systolic  function was normal. Wall thickness was normal.    PULMONIC VALVE: Leaflets exhibited normal thickness, no calcification, and  normal cuspal separation. DOPPLER: The transpulmonic velocity was within the  normal range. There was trivial regurgitation. PULMONARY ARTERY: The size was normal. DOPPLER: Systolic pressure was within  the normal range. TRICUSPID VALVE: The valve structure was normal. There was normal leaflet  separation. DOPPLER: The transtricuspid velocity was within the normal range. There was no evidence for stenosis. There was trivial regurgitation. RIGHT ATRIUM: Size was normal.    SYSTEMIC VEINS: IVC: The inferior vena cava was normal in size. PERICARDIUM: There was no pericardial effusion.  The pericardium was normal in  appearance. SYSTEM MEASUREMENT TABLES    2D mode  LA Dimension (2D): 3.3 cm  FS (2D-Cubed): 30 %  FS (2D-Teich): 30 %  IVSd (2D): 1 cm  IVSd; Mean chosen (2D): 1 cm  LVIDd (2D): 5.4 cm  LVIDs (2D): 3.8 cm  LVOT Area (2D): 3.1 cm2  LVPWd (2D): 1 cm  RVIDd (2D): 3.2 cm    M mode  AoR Diam (MM): 3.3 cm  AV Cusp Sep (MM): 2.5 cm  MV D-E Exc: 1.6 cm  MV EF Sharp (MM): 9.4 cm/s    Unspecified Scan Mode  VTI; Antegrade Flow: 20.9 cm  Vmax; Antegrade Flow: 1 m/s  LVOT Diam: 2 cm  LVOT Vmax: 82.8 cm/s  MV Peak A Stephen; Mean: 53.8 cm/s  MV Peak E Stephen; Antegrade Flow: 65.2 cm/s  Vmax; Antegrade Flow: 74.5 cm/s  TV Peak A Stephen: 41 cm/s  TV Peak E Stephen; Antegrade Flow: 52.8 cm/s    SUMMARY:    Left ventricle:  Size was normal.  Systolic function was normal. Ejection fraction was estimated in the range of  55 % to 65 %. There were no regional wall motion abnormalities. Doppler parameters were consistent with abnormal left ventricular relaxation  (grade 1 diastolic dysfunction). Right ventricle: The ventricle was mildly to moderately dilated. Aortic valve: The valve was possibly bicuspid. Leaflets exhibited normal thickness,  calcification, and normal cuspal separation. Aorta, systemic arteries: The root exhibited mild dilatation. Prepared and signed by    Sebastien Arcos DO  Signed 27-Jul-2012 17:43:35         STRESS:    CATH:    Assessment/Plan       Diagnosis Orders   1. Coronary artery disease due to lipid rich plaque       Recent FFR guided PCI  CAD s/p CABG (LIMA to LAD, VG to OM and VG RCA) , s/p PCI  Bradycardia   Thoracic anuerysm 4.1 cm  HTN  HLD     On Aspirin, Brilinta, statin   No bleeding issues  Patietn placed himself back on metoprolol after being taken off due to bradycardia and fatigue  The patient is asked to make an attempt to improve diet and exercise patterns to aid in medical management of this problem.   Advised more plant based nutrition/meditarrean diet   Advised patient to call office or seek immediate medical attention if there is any new onset of  any chest pain, sob, palpitations, lightheadedness, dizziness, orthopnea, PND or pedal edema. All medication side effects were discussed in details. Thank youfor allowing me to participate in the care of this patient. Please do not hesitate to contact me for any further questions. Return in about 8 months (around 11/3/2021), or if symptoms worsen or fail to improve, for Regular follow up, Review testing.        Electronically signed by Ney Montemayor MD [facility]  3/3/2021 at 11:07 AM

## 2021-03-03 NOTE — PROGRESS NOTES
Pt here for 6 mo check up       Pt states med list is correct from 3/2/21    Pt c/o heart palpitations , episode about 2 week ago , noticed a couple times     Pt continues with chest soreness since by pass sx,     Pt denies dizziness, sob,

## 2021-03-04 ENCOUNTER — HOSPITAL ENCOUNTER (OUTPATIENT)
Dept: PHYSICAL THERAPY | Age: 66
Setting detail: THERAPIES SERIES
Discharge: HOME | End: 2021-03-04
Payer: MEDICARE

## 2021-03-04 PROCEDURE — 97530 THERAPEUTIC ACTIVITIES: CPT

## 2021-03-04 NOTE — DISCHARGE SUMMARY
[facility]  PHYSICAL THERAPY  [] EVALUATION  [] DAILY NOTE (LAND) [] DAILY NOTE (AQUATIC ) [] PROGRESS NOTE [x] DISCHARGE NOTE    [] [facility]   [] [address]    [x] [facility]   [] Reginald Habermann    Date: 3/4/2021  Patient Name:  Milagro Davila  : 1955  MRN: [id number]  CSN: [id number]    Referring Practitioner Maretta Boast, MD   Diagnosis Lumbago with sciatica, unspecified side [M54.40]    Treatment Diagnosis LBP, LLE weakness   Date of Evaluation 21    Additional Pertinent History History of Throat CA 4 years ago. Functional Outcome Measure Used Oswestry   Functional Outcome Score 20 (21)       Insurance: Primary: Payor: MEDICARE /  /  / ,   Secondary: GENERIC COMMERCIAL   Authorization Information: Unlimited visits-ionto/HP/CP not covered   Visit # 9, 9 for progress note 3/4/21   Visits Allowed: unlimited   Recertification Date:    Physician Follow-Up: 21. Dr. Bebe WHITMAN. Physician Orders: [facility]   History of Present Illness: Patient has history of back pain and medial LLE pain for a couple of years and last couple of months has gone into LB. History of throat CA and reports he has some cervical problems-know position of relief. Difficulty raising LLE. SUBJECTIVE: overall he is feeling better, he is getting up much better without having to stretch so much. Patient reports he has a benign growth coming back on vocal cords. Crossing L foot to knee-but this is better, twisting on L leg wrong will cause L groin discomfort but not as bad. Virtual golf-after 4 holes, his lateral hip started to hurt. Back not hurting now, sometimes when he gets to certain degree of hip flexion, will get groin and adductor discomfort.        TREATMENT   Precautions: History of throat CA 4 years ago   Pain: denies     X in shaded column indicates activity completed today   Modalities Parameters/ Location  Notes                     Manual Therapy Time/Technique  Notes   Myofascial release, trigger point release, positional release 8 min  Med/prox adductors and iliopsoas L   Muscle energy for L FRS 1 lumbar levels   L thigh felt a little better. Strength feels the same         Exercise/Intervention   Notes   Supine butterfly 10 10 sec     Supine ham stretch 3 bilat 20 sec     Supine knee to axilla, figure 4/piraformis stretch 3X L 20 sec     Standing ham stretch 3 X  bilat 20 sec     Standing calf stretch 3Xbilat 20 sec     Standing hip flexor stretch 3 bilat 15 sec     Standing 3 way hip 10 ea bilat        Balance: SLS, trombone, bicycle F//B, alphabet (split between legs) 5X ea bilat      Steps up/lat-6\" 5 ea bilat             Trunk rolls 5 bilat Hold 10 sec     Alt UE/LE-in all 4's 5 bilat      Body mechanics 10 min  x 10 lifts squat, diagonal , golfers with 10# crate   Squat  Lunge 10  5 bilat        bridge 10X 5 sec   A little nagging prox L thigh-then went to prone X 2 min   Pelvic tilt 10   Tilt better if he goes to tilt<>arch with back   Discussed HEP, progression as needed 10 min  x    Double knee to chest 10  x [place] pop in hip, flarred hip up a little   Standing extension 10      Lumbar roll, posturing in chair  5 min     Puppy dog pressup 10   No pain in leg or thigh   Prone prop  1 min x Eliminated any symptoms in hip    prone  2.5 min  No pain. Bike seat 4  5 min       Specific Interventions Next Treatment: manual therapy/massage/stretching PRN. ROM, stretching, strength, core strength, balance, gait, body mechanics    Activity/Treatment Tolerance:  [x]  Patient tolerated treatment well  []  Patient limited by fatigue  []  Patient limited by pain   []  Patient limited by medical complications  []  Other:     Assessment: did well with all ex today. Achieving goals of therapy.  To follow up with physicians and if additional therapy  Needed in the future, to get a new script  GOALS:  Patient Goal: travel, golf with less pain    Short Term Goals:  Time Frame: 4 weeks  1. Decrease reports of pain to intermittent to tolerate standing/walking 1 hour for work, sit >1 hour in office chair or car for travel and work:  MET per patient-discontinue  2. Increase active SLR>60 degrees, dorsiflexion >8 degrees, trunk flexion to ankles for ease of lifting from the floor, transfers, dressing lower body:   MET with dorsiflexion bilat 8 degrees, active SLR  75 degrees bilat. Active hip flexion L 105, R 102 degrees. 3.  Patient able to sit erect in a chair for improved ease to reach across desk, tolerate sitting in various chairs without increase in LLE symptoms:  MET most of the time as he is able to sit in chair, at edge of bed erect, more comfortable-discontinue  4. Increase LLE strength >4+/5 to tolerate leading up steps with LLE, balance:  MET with L hip flexion and abduction 4+ to 5/5 but causes a little L groin pain      Long Term Goals:  Time Frame: 8 weeks  1. SLS>10 sec bilat for balance, fall prevention, walking on wet or uneven surfaces:  MET with >30 sec bilat-no pain with this activity.-discontinue  2. I HEP to achieve above goals:  MET with good return demo      Patient Education:   [x]  HEP/Education Completed: Plan of Care, Goals, standing and supine ham stretch, butterfly stretch supine, calf stretch, standing hip flexor stretch. Side bend, trunk rolls-hold all of these. Continue with prone every 2 hours, standing extension, use of lumbar roll, puppy dog press-up, body mechanics. Supine  And sitting piraformis and knee to axilla stretch   Medbridge Access Code:  []  No new Education completed  []  Reviewed Prior HEP      []  Patient verbalized and/or demonstrated understanding of education provided. []  Patient unable to verbalize and/or demonstrate understanding of education provided. Will continue education.   [x]  Barriers to learning: none    PLAN:  Treatment Recommendations: Strengthening, Range

## 2021-03-15 ENCOUNTER — HOSPITAL ENCOUNTER (OUTPATIENT)
Dept: CT IMAGING | Age: 66
Discharge: HOME | End: 2021-03-15
Payer: MEDICARE

## 2021-03-15 DIAGNOSIS — C32.1 MALIGNANT NEOPLASM OF SUPRAGLOTTIS (HCC): ICD-10-CM

## 2021-03-15 DIAGNOSIS — Z87.891 FORMER SMOKER: ICD-10-CM

## 2021-03-15 PROCEDURE — 71271 CT THORAX LUNG CANCER SCR C-: CPT

## 2021-04-02 ENCOUNTER — OFFICE VISIT (OUTPATIENT)
Dept: ENT CLINIC | Age: 66
End: 2021-04-02
Payer: MEDICARE

## 2021-04-02 VITALS
WEIGHT: 187.6 LBS | HEIGHT: 71 IN | BODY MASS INDEX: 26.26 KG/M2 | HEART RATE: 60 BPM | DIASTOLIC BLOOD PRESSURE: 62 MMHG | SYSTOLIC BLOOD PRESSURE: 122 MMHG | TEMPERATURE: 97.7 F | RESPIRATION RATE: 14 BRPM

## 2021-04-02 DIAGNOSIS — T66.XXXD EFFECTS, RADIATION, SUBSEQUENT ENCOUNTER: ICD-10-CM

## 2021-04-02 DIAGNOSIS — Z99.89 OSA ON CPAP: ICD-10-CM

## 2021-04-02 DIAGNOSIS — J38.3 LESION OF FALSE VOCAL CORD: Primary | ICD-10-CM

## 2021-04-02 DIAGNOSIS — C32.9 PRIMARY SQUAMOUS CELL CARCINOMA OF LARYNX (HCC): ICD-10-CM

## 2021-04-02 DIAGNOSIS — R09.89 THROAT CLEARING: ICD-10-CM

## 2021-04-02 DIAGNOSIS — K22.70 BARRETT'S ESOPHAGUS WITHOUT DYSPLASIA: ICD-10-CM

## 2021-04-02 DIAGNOSIS — G47.33 OSA ON CPAP: ICD-10-CM

## 2021-04-02 DIAGNOSIS — E03.4 HYPOTHYROIDISM DUE TO ACQUIRED ATROPHY OF THYROID: ICD-10-CM

## 2021-04-02 PROCEDURE — 1036F TOBACCO NON-USER: CPT | Performed by: OTOLARYNGOLOGY

## 2021-04-02 PROCEDURE — 31575 DIAGNOSTIC LARYNGOSCOPY: CPT | Performed by: OTOLARYNGOLOGY

## 2021-04-02 PROCEDURE — 3017F COLORECTAL CA SCREEN DOC REV: CPT | Performed by: OTOLARYNGOLOGY

## 2021-04-02 PROCEDURE — 99215 OFFICE O/P EST HI 40 MIN: CPT | Performed by: OTOLARYNGOLOGY

## 2021-04-02 PROCEDURE — 1123F ACP DISCUSS/DSCN MKR DOCD: CPT | Performed by: OTOLARYNGOLOGY

## 2021-04-02 PROCEDURE — 4040F PNEUMOC VAC/ADMIN/RCVD: CPT | Performed by: OTOLARYNGOLOGY

## 2021-04-02 PROCEDURE — G8417 CALC BMI ABV UP PARAM F/U: HCPCS | Performed by: OTOLARYNGOLOGY

## 2021-04-02 PROCEDURE — G8427 DOCREV CUR MEDS BY ELIG CLIN: HCPCS | Performed by: OTOLARYNGOLOGY

## 2021-04-02 ASSESSMENT — ENCOUNTER SYMPTOMS
RHINORRHEA: 0
COLOR CHANGE: 0
SORE THROAT: 0
CHEST TIGHTNESS: 0
SHORTNESS OF BREATH: 0
CHOKING: 0
TROUBLE SWALLOWING: 0
VOICE CHANGE: 0
NAUSEA: 0
FACIAL SWELLING: 0
DIARRHEA: 0
VOMITING: 0
COUGH: 0
APNEA: 0
SINUS PRESSURE: 0
STRIDOR: 0
WHEEZING: 0
ABDOMINAL PAIN: 0

## 2021-04-02 NOTE — LETTER
[address] [facility] [address]  Phone: [phone]  Fax: [phone]    Kiran You MD        April 2, 2021    [address] [facility]    Patient: Piedad Buenrostro   MR Number: [id number]   YOB: 1955   Date of Visit: 4/2/2021     Dear Dann Martins,    I recently saw your patient, Piedad Buenrostro, regarding cancer surveillance. I have been following him now since his original biopsy in 2016, which showed invasive squamous cell carcinoma treated by radiation therapy. For 2 to 3 months now I have been keeping a close eye on slightly irregular area of mucosa posterior right false vocal cord. This has become more prominent and there is an increasing amount of foreign body sensation. His primary was in the anterior portion of the false vocal cord. We now have equipment available for high resolution photographic documentation    Media Information       Document Information    Photos   ENT 04/02/2021 Flexible Laryngoscopy    04/02/2021 10:16   Attached To:   Piedad Buenrostro   Source Information    Chintan Gasca, JESÚS  [employer]         Below are the relevant portions of my assessment and plan of care. Assessment & Plan   Diagnoses and all orders for this visit:     Diagnosis Orders   1. Primary squamous cell carcinoma of larynx (HCC)  [place] LARYNGOSCOPY FLEXIBLE DIAGNOSTIC    [place] LARYNGOSCOPY,DIRCT,OP SCOPE,BIOPSY   2. Effects, radiation, subsequent encounter     3. Brown's esophagus without dysplasia     4. FOUZIA on CPAP     5. Hypothyroidism due to acquired atrophy of thyroid     6. Throat clearing         The findings were explained and his questions were answered. I have recommended a microlaryngoscopy with biopsy. I discussed this with Dr. Maria Elena Drake and he will review the video images. Jose Smiles He is an ideal candidate for jet ventilation, and I will discuss our working together on this. He agreed. Patient is on Brilinta and aspirin. He was told by his cardiologist that since the stent placement was 2 years ago, that he could have procedures when necessary and be off Brilinta for a few days. He works in [employer] and his voice is extremely important to him    We will be scheduling the procedure once Dr. Nickey Soulier and I have discussed it further. If you have questions, please do not hesitate to call me. I look forward to following Umu Golden along with you.     Sincerely,          Dorla Bosworth, MD

## 2021-04-02 NOTE — PROGRESS NOTES
[facility] [place] [facility]  [facility] EAR, NOSE AND THROAT  [address] [place] [address] [place] [address]  Dept: [phone]  Dept Fax: [phone]  Loc: [phone]    Josué Elmore is a 77 y.o. male who was referred byNo ref. provider found for:  Chief Complaint   Patient presents with    1 Month Follow-Up     Patient is her for 1 month follow up SCC larynx. sore throat at night    . HPI:     Josué Elmore is a 77 y.o. male who presents today for 1 month follow up squamous cell carcinoma. He gets a sore throat at night. Constantly feeling like there is something to swallow or something in his throat, throat clearing. He has a lot of mucus in his throat. Had a  maker and had a stent put in 2 years ago this coming July. He is taking Brilinta and Aspirin. He is still working in sales and talks a lot. He can't sing or hold a tune anymore. History:     No Known Allergies  Current Outpatient Medications   Medication Sig Dispense Refill    levothyroxine (SYNTHROID) 50 MCG tablet Take 1 tablet by mouth daily 90 tablet 1    atorvastatin (LIPITOR) 40 MG tablet Take 1 tablet by mouth daily 90 tablet 1    terazosin (HYTRIN) 5 MG capsule TAKE 1 CAPSULE NIGHTLY 90 capsule 1    ticagrelor (BRILINTA) 90 MG TABS tablet Take 1 tablet by mouth 2 times daily 180 tablet 3    nitroGLYCERIN (NITROSTAT) 0.4 MG SL tablet up to max of 3 total doses. If no relief after 1 dose, call 911. 25 tablet 3    gabapentin (NEURONTIN) 300 MG capsule Take 1 capsule by mouth 3 times daily for 180 days.  Intended supply: 90 days 270 capsule 1    diphenhydrAMINE-APAP, sleep, (TYLENOL PM EXTRA STRENGTH)  MG tablet Take 2 tablets by mouth nightly      acetaminophen (TYLENOL) 500 MG tablet Take 500 mg by mouth every 6 hours as needed for Pain      omeprazole (PRILOSEC) 20 MG delayed release capsule Take 20 mg by mouth 2 times daily      aspirin EC 81 MG EC tablet Take 81 mg by mouth light-headedness and headaches. Hematological: Negative for adenopathy. Does not bruise/bleed easily. Psychiatric/Behavioral: Negative for confusion and sleep disturbance. The patient is not nervous/anxious. Objective:     /62 (Site: Left Upper Arm, Position: Sitting)   Pulse 60   Temp 97.7 °F (36.5 °C) (Infrared)   Resp 14   Ht 5' 11\" (1.803 m)   Wt 187 lb 9.6 oz (85.1 kg)   BMI 26.16 kg/m²     Physical Exam    PROCEDURE: FIBEROPTIC LARYNGOSCOPY    A fiberoptic laryngoscopy was performed under topical anesthesia, after using Afrin and Lidocaine spray in the nasal fossa. The nasal fossa, nasopharynx, hypopharynx and larynx were carefully examined. Base of tongue was symmetrical. Epiglottis appeared normal and was not retrodisplaced. True vocal cords had normal mobility. There was no erythema. The area in the posterior right false vocal cord that we have been watching appears to be more prominent. There is irregularity of the surface. There is one particularly projecting portion most posteriorly. This irregularity would certainly create a foreign body sensation. Malignancy cannot be excluded. No pooling in the pyriform sinuses. Imaging is under media with video and still photos    [facility]   ENT 04/02/2021 Flexible Laryngoscopy    04/02/2021 10:16   Attached To:   [address], [facility]   ENT 04/02/2021 Flexible Laryngoscopy    04/02/2021 10:16   Attached To:   Ken Corcoran   Source Information    Nimco Stark CMA  [facility]         Data:  All of the past medical history, past surgical history, family history,social history, allergies and current medications were reviewed with the patient. Assessment & Plan   Diagnoses and all orders for this visit:     Diagnosis Orders   1.  Primary squamous cell carcinoma of larynx (HCC)  HI LARYNGOSCOPY FLEXIBLE DIAGNOSTIC    AR LARYNGOSCOPY,DIRCT,OP SCOPE,BIOPSY   2. Effects, radiation, subsequent encounter     3. Brown's esophagus without dysplasia     4. FOUZIA on CPAP     5. Hypothyroidism due to acquired atrophy of thyroid     6. Throat clearing         The findings were explained and his questions were answered. Options were discussed including a microlaryngoscopy with biopsy. I discussed this with Dr. Zaid Thurston and he will review the video images. He is an ideal candidate for jet ventilation, and I will discuss our working together on this. He agreed. Patient is on Brilinta and aspirin. He was told by his cardiologist that since the stent placement was 2 years ago that he could have procedures when necessary and be off Brilinta for a few days. He works in [employer] and his voice is extremely important to him    We will be scheduling the procedure once Dr. Zaid Thurston and I have discussed it further. I, Tuan Hoover CMA ([facility]), am scribing for, and in the presence of Dr. Kaylah Urrutia. Electronically signed by Dianna Zamora CMA ([facility]) on 4/2/21 at 9:27 AM EDT. (Please note that portions of this note were completed with a voice recognition program. Efforts were made to edit the dictations butoccasionally words are mis-transcribed.)    I agree to the above documentation placed by my scribe. I have personally evaluated this patient. Additional findings are as noted. I reviewed the scribe's note and agree with the documented findings and plan of care. Any areas of disagreement are corrected. I agree with the chief complaint, past medical history, past surgical history, allergies, medications, social and family history as documented unless otherwise noted below.      Electronically signed by Horace Seip, MD on 4/2/2021 at 4:07 PM

## 2021-04-09 ENCOUNTER — TELEPHONE (OUTPATIENT)
Dept: ENT CLINIC | Age: 66
End: 2021-04-09

## 2021-04-09 NOTE — TELEPHONE ENCOUNTER
This is the patient that I recorded video laryngoscopy that we discussed. He has a lesion posterior right false cord and it looks like she had vent would be the best approach. Please check my message to you under staff messages    I have another patient that needs a paralyzed vocal cord injection.   Please call me if we have not discussed this by the time you read this

## 2021-04-12 NOTE — TELEPHONE ENCOUNTER
Katherine Poole spoke with Dr. Mynor Gusman and she stated he is waiting to hear back from Dr. Hui Morning and has not heard back from anesthesia.

## 2021-04-13 ENCOUNTER — OFFICE VISIT (OUTPATIENT)
Dept: INTERNAL MEDICINE CLINIC | Age: 66
End: 2021-04-13
Payer: MEDICARE

## 2021-04-13 VITALS
WEIGHT: 191 LBS | TEMPERATURE: 97.1 F | SYSTOLIC BLOOD PRESSURE: 130 MMHG | DIASTOLIC BLOOD PRESSURE: 60 MMHG | HEART RATE: 62 BPM | BODY MASS INDEX: 26.74 KG/M2 | HEIGHT: 71 IN

## 2021-04-13 DIAGNOSIS — I10 ESSENTIAL HYPERTENSION: ICD-10-CM

## 2021-04-13 DIAGNOSIS — E78.2 MIXED HYPERLIPIDEMIA: Primary | ICD-10-CM

## 2021-04-13 DIAGNOSIS — Z95.1 HX OF CABG: ICD-10-CM

## 2021-04-13 PROCEDURE — 4040F PNEUMOC VAC/ADMIN/RCVD: CPT | Performed by: INTERNAL MEDICINE

## 2021-04-13 PROCEDURE — G8417 CALC BMI ABV UP PARAM F/U: HCPCS | Performed by: INTERNAL MEDICINE

## 2021-04-13 PROCEDURE — 99214 OFFICE O/P EST MOD 30 MIN: CPT | Performed by: INTERNAL MEDICINE

## 2021-04-13 PROCEDURE — 3017F COLORECTAL CA SCREEN DOC REV: CPT | Performed by: INTERNAL MEDICINE

## 2021-04-13 PROCEDURE — 1123F ACP DISCUSS/DSCN MKR DOCD: CPT | Performed by: INTERNAL MEDICINE

## 2021-04-13 PROCEDURE — 1036F TOBACCO NON-USER: CPT | Performed by: INTERNAL MEDICINE

## 2021-04-13 PROCEDURE — G8427 DOCREV CUR MEDS BY ELIG CLIN: HCPCS | Performed by: INTERNAL MEDICINE

## 2021-04-13 PROCEDURE — 93000 ELECTROCARDIOGRAM COMPLETE: CPT | Performed by: INTERNAL MEDICINE

## 2021-04-13 RX ORDER — LEVOTHYROXINE SODIUM 0.05 MG/1
50 TABLET ORAL DAILY
Qty: 90 TABLET | Refills: 1 | Status: SHIPPED | OUTPATIENT
Start: 2021-04-13 | End: 2021-10-14 | Stop reason: SDUPTHER

## 2021-04-13 RX ORDER — ATORVASTATIN CALCIUM 40 MG/1
40 TABLET, FILM COATED ORAL DAILY
Qty: 90 TABLET | Refills: 1 | Status: SHIPPED | OUTPATIENT
Start: 2021-04-13 | End: 2021-10-14 | Stop reason: SDUPTHER

## 2021-04-13 RX ORDER — TERAZOSIN 5 MG/1
CAPSULE ORAL
Qty: 90 CAPSULE | Refills: 1 | Status: SHIPPED | OUTPATIENT
Start: 2021-04-13 | End: 2021-10-14 | Stop reason: SDUPTHER

## 2021-04-13 RX ORDER — METOPROLOL SUCCINATE 50 MG/1
TABLET, EXTENDED RELEASE ORAL 2 TIMES DAILY
COMMUNITY
End: 2021-07-07 | Stop reason: SDUPTHER

## 2021-04-13 RX ORDER — IPRATROPIUM BROMIDE 42 UG/1
2 SPRAY, METERED NASAL 3 TIMES DAILY
Qty: 2 EACH | Refills: 11 | Status: SHIPPED
Start: 2021-04-13 | End: 2021-05-05 | Stop reason: DRUGHIGH

## 2021-04-13 NOTE — PROGRESS NOTES
[address] [facility] Internal Medicine   [facility] [address] [facility] [address], [address]  Dept: [phone]  Dept Fax: [phone]    YOB: 1955    Hypertension and CABG    77 y.o. male   here for follow-up of above issues. He does have medical issues, but no CP or SOB, denies any recent fever or chillls. Had COVID back in October , was in [facility] . He completed radiation after he was diagnosed with laryngeal cancer. I recommend he gets COVID vaccine when available. He is having left leg pain, not able to cross the legs. No back surgeries, and no recent falls. He had some back pain     He plans on getting his biopsy of vocal cords, as he is having hoarseness , hx of SCC followed by dr. Ad Cash, they are going to set up  Biopsy soon. On further questioning he gets occasional chest wall discomfort, he is lifting upto 50 lbs per his spouse. He does have   Admit to mild chest wall pain, since CABG ~ 20 YRS AGO. Current Outpatient Medications   Medication Sig Dispense Refill    metoprolol succinate (TOPROL XL) 50 MG extended release tablet Take 25 mg by mouth 2 times daily      levothyroxine (SYNTHROID) 50 MCG tablet Take 1 tablet by mouth daily 90 tablet 1    atorvastatin (LIPITOR) 40 MG tablet Take 1 tablet by mouth daily 90 tablet 1    terazosin (HYTRIN) 5 MG capsule TAKE 1 CAPSULE NIGHTLY 90 capsule 1    ticagrelor (BRILINTA) 90 MG TABS tablet Take 1 tablet by mouth 2 times daily 180 tablet 3    nitroGLYCERIN (NITROSTAT) 0.4 MG SL tablet up to max of 3 total doses. If no relief after 1 dose, call 911. 25 tablet 3    gabapentin (NEURONTIN) 300 MG capsule Take 1 capsule by mouth 3 times daily for 180 days.  Intended supply: 90 days (Patient taking differently: Take 300 mg by mouth daily. ) 270 capsule 1    ipratropium (ATROVENT) 0.06 % nasal spray 2 sprays by Nasal route 3 times daily 2 each 11    diphenhydrAMINE-APAP, sleep, (TYLENOL PM EXTRA STRENGTH)  MG tablet Take 2 tablets by mouth nightly      acetaminophen (TYLENOL) 500 MG tablet Take 500 mg by mouth every 6 hours as needed for Pain      omeprazole (PRILOSEC) 20 MG delayed release capsule Take 20 mg by mouth Daily       aspirin EC 81 MG EC tablet Take 81 mg by mouth daily      Coenzyme Q10 (CO Q-10) 200 MG CAPS Take  by mouth daily.  Multiple Vitamin (MULTI-VITAMIN) TABS Take  by mouth daily.  ARGININE PO Take 500 mg by mouth daily. 2 tab      CPAP Machine MISC by Does not apply route Please change CPAP pressure to 8 cm H20. 1 each 0     No current facility-administered medications for this visit.         Past Medical History:   Diagnosis Date    Ascending Aneurysm ([facility] [address] 02/2017    Ascending, 4.2 cm per pt    Brown's esophagus     CAD (coronary artery disease)     Cancer (HCC)     throat cancer, s/p radiation    Chronic back pain     GERD (gastroesophageal reflux disease)     Hepatitis C     as child    History of hyperbaric oxygen therapy     after bottom teeth removed after rdiation therapy    Hyperlipidemia     Hypertension     Left shoulder pain     nerve pain , ? secondary to radiation, has had \" numbing shot \"    Nausea & vomiting     FOUZIA on CPAP     Osteoradionecrosis of mandible     s/p radiation for throat cancer    PONV (postoperative nausea and vomiting)     only after OHS    S[facility]    [facility]    Snores     Thyroid disease     Vision loss of right eye     during hyperbaric chamber oxygen therapy     Vitreous opacities     right    Wears dentures     full upper, no bottom teeth and no use of dentures on bottom       Social History     Socioeconomic History    Marital status:      Spouse name: Not on file    Number of children: Not on file    Years of education: Not on file    Highest education level: Not on file   Occupational History    Not on file   Social Needs    Financial resource strain: Not on file    Food insecurity     Worry: Not on organomegaly  no abdominal bruits. Non-obese Protuberant: No  Neurological - alert, oriented, normal speech, no focal findings or movement disorder noted, motor and sensory grossly normal bilaterally  Musculoskeletal - no joint tenderness, deformity or swelling  But left leg weakness. Extremities - peripheral pulses normal, no pedal edema, no clubbing or cyanosis, Petra's sign negative bilaterally  Prosthesis: No  Skin - normal coloration and turgor, no rashes, no suspicious skin lesions noted      I have reviewed recent diagnostic testing including labs, EKG. Please see EKG for interpretation. Diagnosis Orders   1. Essential hypertension  terazosin (HYTRIN) 5 MG capsule       No orders of the defined types were placed in this encounter. Outpatient Encounter Medications as of 4/13/2021   Medication Sig Dispense Refill    metoprolol succinate (TOPROL XL) 50 MG extended release tablet Take 25 mg by mouth 2 times daily      levothyroxine (SYNTHROID) 50 MCG tablet Take 1 tablet by mouth daily 90 tablet 1    atorvastatin (LIPITOR) 40 MG tablet Take 1 tablet by mouth daily 90 tablet 1    terazosin (HYTRIN) 5 MG capsule TAKE 1 CAPSULE NIGHTLY 90 capsule 1    ticagrelor (BRILINTA) 90 MG TABS tablet Take 1 tablet by mouth 2 times daily 180 tablet 3    nitroGLYCERIN (NITROSTAT) 0.4 MG SL tablet up to max of 3 total doses. If no relief after 1 dose, call 911. 25 tablet 3    gabapentin (NEURONTIN) 300 MG capsule Take 1 capsule by mouth 3 times daily for 180 days.  Intended supply: 90 days (Patient taking differently: Take 300 mg by mouth daily. ) 270 capsule 1    ipratropium (ATROVENT) 0.06 % nasal spray 2 sprays by Nasal route 3 times daily 2 each 11    diphenhydrAMINE-APAP, sleep, (TYLENOL PM EXTRA STRENGTH)  MG tablet Take 2 tablets by mouth nightly      acetaminophen (TYLENOL) 500 MG tablet Take 500 mg by mouth every 6 hours as needed for Pain      omeprazole (PRILOSEC) 20 MG delayed release capsule Take 20 mg by mouth Daily       aspirin EC 81 MG EC tablet Take 81 mg by mouth daily      Coenzyme Q10 (CO Q-10) 200 MG CAPS Take  by mouth daily.  Multiple Vitamin (MULTI-VITAMIN) TABS Take  by mouth daily.  ARGININE PO Take 500 mg by mouth daily. 2 tab      CPAP Machine MISC by Does not apply route Please change CPAP pressure to 8 cm H20. 1 each 0     No facility-administered encounter medications on file as of 4/13/2021. Controlled Substances Monitoring:  Yes     Diagnosis Orders   1. Essential hypertension  terazosin (HYTRIN) 5 MG capsule             Plan: 1. Ct chest -to follow up on his ascending aortic aneurysm, they recommended a 6-month CT follow-up. 2.  Hypertension is stable on the current regimen which includes  Hytrin 5 mg daily      3. Previous CABG currently stable on current pharmacotherapy,  Being followed up by Dr. Slim Dow from cardiology. ekg WAS done  Today NSR with sinus rozina, with non specific T changes due to   atyp CP and upcoming surgery / Biopsy on throat. 4. Spine film lumbo sacral spine,  Ultram on a prn basis, and PT. May need CT spine eventually, he does have mild left thigh/ leg weakness  No previous back surgery. ? Suspect radiculopathy, no UTI symptoms. And was given Ultram for short-term and then I recommend he could take ibuprofen over-the-counter for him milligrams to 3 times a day for short-term, does not have any history of GI bleeds. 5. Labs soon , also patient had \" hepatitis C at age 1\", alk phos with normal LFTs. , hep B and C antibodies are negative , this was d/w pt  He likely had hep A      Reevaluate in 6 months, sooner if needed        Signed by: Jase Mondragon M.D.     [address] [facility] Internal Medicine  [address], [address]    Tel  [phone]   Fax [phone]

## 2021-04-17 DIAGNOSIS — J38.3 LESION OF FALSE VOCAL CORD: Primary | ICD-10-CM

## 2021-04-17 DIAGNOSIS — C32.9 PRIMARY SQUAMOUS CELL CARCINOMA OF LARYNX (HCC): ICD-10-CM

## 2021-04-17 DIAGNOSIS — T66.XXXD EFFECTS, RADIATION, SUBSEQUENT ENCOUNTER: ICD-10-CM

## 2021-04-17 NOTE — PROGRESS NOTES
Joint procedure with Dr. Benjamin Billingsley is planned using jet ventilation once this is worked out with the anesthesia department.   Meanwhile I will proceed with scheduling direct laryngoscopy and biopsy as a first case on a Wednesday morning as soon as practical.

## 2021-04-19 ENCOUNTER — TELEPHONE (OUTPATIENT)
Dept: CARDIOLOGY CLINIC | Age: 66
End: 2021-04-19

## 2021-04-19 ENCOUNTER — TELEPHONE (OUTPATIENT)
Dept: INTERNAL MEDICINE CLINIC | Age: 66
End: 2021-04-19

## 2021-04-19 DIAGNOSIS — Z01.818 PRE-OP TESTING: Primary | ICD-10-CM

## 2021-04-19 NOTE — TELEPHONE ENCOUNTER
Patient is scheduled for microlaryngoscopy with biopsy with Dr. Andra Goins on June 2. We would appreciate cardiac clearance from Dr. Slim Dow. He last saw patient on 3/3/21. Thank you!

## 2021-04-19 NOTE — TELEPHONE ENCOUNTER
Patient is scheduled for microlaryngoscopy with biopsy with Dr. Vane Wasserman on June 2. Dr. Vane Wasserman will want him to hold terazosin, asa, and multi vitamin for 7 days prior to procedure. [place] Namrata will be held for 5 days prior to procedure. Will Dr. Renetta Peña want to cover terazosin with another medication for this time frame? He recently saw patient on 4/13. Please advise. Or if he prefers to see patient before appt to discuss please let me know and I can fax over a pre op clearance form. Thank you!

## 2021-04-19 NOTE — TELEPHONE ENCOUNTER
I spoke to Dr Guanako Cid regarding this case. He also spoke to anesthesia and Dr Guanako Cid is going to preceptor this case with Dr Ayo Giles. The patient is being scheduled on 6/2/21.

## 2021-04-27 ENCOUNTER — NURSE ONLY (OUTPATIENT)
Dept: LAB | Age: 66
End: 2021-04-27

## 2021-04-27 DIAGNOSIS — Z01.818 PRE-OP TESTING: ICD-10-CM

## 2021-04-28 LAB
ALBUMIN SERPL-MCNC: 4.3 G/DL (ref 3.5–5.1)
ALP BLD-CCNC: 88 U/L (ref 38–126)
ALT SERPL-CCNC: 17 U/L (ref 11–66)
ANION GAP SERPL CALCULATED.3IONS-SCNC: 9 MEQ/L (ref 8–16)
AST SERPL-CCNC: 19 U/L (ref 5–40)
BASOPHILS # BLD: 0.4 %
BASOPHILS ABSOLUTE: 0 THOU/MM3 (ref 0–0.1)
BILIRUB SERPL-MCNC: 0.4 MG/DL (ref 0.3–1.2)
BUN BLDV-MCNC: 19 MG/DL (ref 7–22)
CALCIUM SERPL-MCNC: 9.4 MG/DL (ref 8.5–10.5)
CHLORIDE BLD-SCNC: 106 MEQ/L (ref 98–111)
CO2: 27 MEQ/L (ref 23–33)
CREAT SERPL-MCNC: 0.9 MG/DL (ref 0.4–1.2)
EOSINOPHIL # BLD: 1.2 %
EOSINOPHILS ABSOLUTE: 0.1 THOU/MM3 (ref 0–0.4)
ERYTHROCYTE [DISTWIDTH] IN BLOOD BY AUTOMATED COUNT: 13 % (ref 11.5–14.5)
ERYTHROCYTE [DISTWIDTH] IN BLOOD BY AUTOMATED COUNT: 44.4 FL (ref 35–45)
GFR SERPL CREATININE-BSD FRML MDRD: 84 ML/MIN/1.73M2
GLUCOSE BLD-MCNC: 107 MG/DL (ref 70–108)
HCT VFR BLD CALC: 43.5 % (ref 42–52)
HEMOGLOBIN: 14 GM/DL (ref 14–18)
IMMATURE GRANS (ABS): 0.04 THOU/MM3 (ref 0–0.07)
IMMATURE GRANULOCYTES: 0.5 %
LYMPHOCYTES # BLD: 12.5 %
LYMPHOCYTES ABSOLUTE: 0.9 THOU/MM3 (ref 1–4.8)
MCH RBC QN AUTO: 30.4 PG (ref 26–33)
MCHC RBC AUTO-ENTMCNC: 32.2 GM/DL (ref 32.2–35.5)
MCV RBC AUTO: 94.4 FL (ref 80–94)
MONOCYTES # BLD: 8.4 %
MONOCYTES ABSOLUTE: 0.6 THOU/MM3 (ref 0.4–1.3)
NUCLEATED RED BLOOD CELLS: 0 /100 WBC
PLATELET # BLD: 216 THOU/MM3 (ref 130–400)
PMV BLD AUTO: 10.3 FL (ref 9.4–12.4)
POTASSIUM SERPL-SCNC: 4.3 MEQ/L (ref 3.5–5.2)
RBC # BLD: 4.61 MILL/MM3 (ref 4.7–6.1)
SEG NEUTROPHILS: 77 %
SEGMENTED NEUTROPHILS ABSOLUTE COUNT: 5.6 THOU/MM3 (ref 1.8–7.7)
SODIUM BLD-SCNC: 142 MEQ/L (ref 135–145)
TOTAL PROTEIN: 6.3 G/DL (ref 6.1–8)
WBC # BLD: 7.3 THOU/MM3 (ref 4.8–10.8)

## 2021-05-05 RX ORDER — GABAPENTIN 300 MG/1
300 CAPSULE ORAL DAILY
COMMUNITY

## 2021-05-05 RX ORDER — IPRATROPIUM BROMIDE 42 UG/1
2 SPRAY, METERED NASAL 3 TIMES DAILY PRN
COMMUNITY

## 2021-05-08 ENCOUNTER — HOSPITAL ENCOUNTER (OUTPATIENT)
Age: 66
Discharge: HOME | End: 2021-05-08
Payer: MEDICARE

## 2021-05-08 LAB
INFLUENZA A: NOT DETECTED
INFLUENZA B: NOT DETECTED
SARS-COV-2 RNA, RT PCR: NOT DETECTED

## 2021-05-08 PROCEDURE — 87636 SARSCOV2 & INF A&B AMP PRB: CPT

## 2021-05-11 ENCOUNTER — OFFICE VISIT (OUTPATIENT)
Dept: ENT CLINIC | Age: 66
End: 2021-05-11
Payer: MEDICARE

## 2021-05-11 ENCOUNTER — ANESTHESIA EVENT (OUTPATIENT)
Dept: OPERATING ROOM | Age: 66
End: 2021-05-11
Payer: MEDICARE

## 2021-05-11 VITALS
TEMPERATURE: 98 F | HEART RATE: 84 BPM | RESPIRATION RATE: 14 BRPM | BODY MASS INDEX: 26.32 KG/M2 | HEIGHT: 71 IN | WEIGHT: 188 LBS | SYSTOLIC BLOOD PRESSURE: 136 MMHG | DIASTOLIC BLOOD PRESSURE: 74 MMHG

## 2021-05-11 DIAGNOSIS — T66.XXXD EFFECTS, RADIATION, SUBSEQUENT ENCOUNTER: ICD-10-CM

## 2021-05-11 DIAGNOSIS — G47.33 OSA ON CPAP: ICD-10-CM

## 2021-05-11 DIAGNOSIS — Z99.89 OSA ON CPAP: ICD-10-CM

## 2021-05-11 DIAGNOSIS — C32.9 PRIMARY SQUAMOUS CELL CARCINOMA OF LARYNX (HCC): ICD-10-CM

## 2021-05-11 DIAGNOSIS — R49.0 HOARSENESS: ICD-10-CM

## 2021-05-11 DIAGNOSIS — J38.3 LESION OF FALSE VOCAL CORD: Primary | ICD-10-CM

## 2021-05-11 PROCEDURE — 31575 DIAGNOSTIC LARYNGOSCOPY: CPT | Performed by: OTOLARYNGOLOGY

## 2021-05-11 ASSESSMENT — ENCOUNTER SYMPTOMS
CHOKING: 0
CHOKING: 0
VOICE CHANGE: 0
COLOR CHANGE: 0
CHEST TIGHTNESS: 0
SHORTNESS OF BREATH: 0
CHEST TIGHTNESS: 0
RHINORRHEA: 0
STRIDOR: 0
NAUSEA: 0
VOICE CHANGE: 0
APNEA: 0
TROUBLE SWALLOWING: 0
APNEA: 0
COUGH: 0
ABDOMINAL PAIN: 0
RHINORRHEA: 0
FACIAL SWELLING: 0
VOMITING: 0
ABDOMINAL PAIN: 0
STRIDOR: 0
SINUS PRESSURE: 0
COUGH: 0
SINUS PRESSURE: 0
FACIAL SWELLING: 0
SORE THROAT: 0
SHORTNESS OF BREATH: 0
SORE THROAT: 0
NAUSEA: 0
COLOR CHANGE: 0
WHEEZING: 0
TROUBLE SWALLOWING: 0
WHEEZING: 0
VOMITING: 0
DIARRHEA: 0
DIARRHEA: 0

## 2021-05-11 NOTE — PROGRESS NOTES
capsule 1    ticagrelor (BRILINTA) 90 MG TABS tablet Take 1 tablet by mouth 2 times daily (Patient not taking: Reported on 5/11/2021) 180 tablet 3    aspirin EC 81 MG EC tablet Take 81 mg by mouth daily      Coenzyme Q10 (CO Q-10) 200 MG CAPS Take  by mouth daily.  Multiple Vitamin (MULTI-VITAMIN) TABS Take  by mouth daily.  ARGININE PO Take 500 mg by mouth daily 4 tab       No current facility-administered medications for this visit.       Past Medical History:   Diagnosis Date    Ascending Aneurysm ([address] 02/2017    Ascending, 4.2 cm per pt    Brown's esophagus     CAD (coronary artery disease)     Cancer (HCC)     throat cancer, s/p radiation    Chronic back pain     Hepatitis C     as child    History of hyperbaric oxygen therapy     after bottom teeth removed after rdiation therapy    Hyperlipidemia     Hypertension     Left shoulder pain     nerve pain , ? 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    PONV (postoperative nausea and vomiting)     only after OHS    [facility]    Thyroid disease     Vision loss of right eye     during hyperbaric chamber oxygen therapy     Vitreous opacities     right    Wears dentures     full upper, no bottom teeth and no use of dentures on bottom      Past Surgical History:   Procedure Laterality Date    CARDIAC [facility]HETERIZATION  8/2002, 4/2004    [facility]    COLONOSCOPY      CORONARY ANGIOPLASTY WITH STENT PLACEMENT  7/2002    [facility]    CORONARY ANGIOPLASTY WITH STENT PLACEMENT N/A 07/12/2019    [facility]    CORONARY ARTERY BYPASS GRAFT  2002    [facility]    DENTAL SURGERY      bottom teeth removal    HEMORRHOID SURGERY      KNEE SURGERY Right     LARYNGOSCOPY N/A 10/4/2017    LARYNGOSCOPY MICRO WITH BIOPSY performed by Namrata Soni MD at [address] [place]  04/28/2016    by Dr Roxann Velazquez and Affect: Mood and affect normal.         Behavior: Behavior is cooperative. Data:PROCEDURE: FIBEROPTIC LARYNGOSCOPY    A fiberoptic laryngoscopy was performed under topical anesthesia, after using Afrin and Lidocaine spray in the nasal fossa. The nasal fossa, nasopharynx, hypopharynx and larynx were carefully examined. Base of tongue was symmetrical. Epiglottis appeared normal and was not retrodisplaced. True vocal cords had normal mobility. There was no erythema. Raised mucosal irregularities noted on the posterior third of the right false vocal cord. This is somewhat more extensive than last exam. No pooling in the pyriform sinuses. All of the past medical history, past surgical history, family history,social history, allergies and current medications were reviewed with the patient. Assessment & Plan   Diagnoses and all orders for this visit:     Diagnosis Orders   1. Lesion of right false vocal cord  ND LARYNGOSCOPY FLEXIBLE DIAGNOSTIC   2. Primary squamous cell carcinoma of larynx (HCC)  ND LARYNGOSCOPY FLEXIBLE DIAGNOSTIC   3. Effects, radiation, subsequent encounter  ND LARYNGOSCOPY FLEXIBLE DIAGNOSTIC   4. FOUZIA on CPAP  ND LARYNGOSCOPY FLEXIBLE DIAGNOSTIC   5. Hoarseness  ND LARYNGOSCOPY FLEXIBLE DIAGNOSTIC       The findings were explained and his questions were answered. We will proceed with the laryngoscopy and excision of these lesions. The area is radiated tissue and will be slow to heal.  They are well aware of the rationale for proceeding and the risks involved. He and his wife request we proceed. David Novak. Elieser Beckett MD    **This report has been created using voice recognition software. It may contain minor errors which are inherent in voicerecognition technology. **

## 2021-05-11 NOTE — PROGRESS NOTES
Patient contacted regarding COVID-19 screen.   Test was done unable to talk to pt phone call wont go through

## 2021-05-12 ENCOUNTER — HOSPITAL ENCOUNTER (OUTPATIENT)
Age: 66
Setting detail: OUTPATIENT SURGERY
Discharge: HOME | End: 2021-05-12
Attending: OTOLARYNGOLOGY | Admitting: OTOLARYNGOLOGY
Payer: MEDICARE

## 2021-05-12 ENCOUNTER — APPOINTMENT (OUTPATIENT)
Dept: GENERAL RADIOLOGY | Age: 66
End: 2021-05-12
Attending: OTOLARYNGOLOGY
Payer: MEDICARE

## 2021-05-12 ENCOUNTER — ANESTHESIA (OUTPATIENT)
Dept: OPERATING ROOM | Age: 66
End: 2021-05-12
Payer: MEDICARE

## 2021-05-12 VITALS
TEMPERATURE: 95.7 F | DIASTOLIC BLOOD PRESSURE: 102 MMHG | SYSTOLIC BLOOD PRESSURE: 202 MMHG | OXYGEN SATURATION: 100 % | RESPIRATION RATE: 2 BRPM

## 2021-05-12 VITALS
HEIGHT: 71 IN | TEMPERATURE: 97.3 F | RESPIRATION RATE: 18 BRPM | SYSTOLIC BLOOD PRESSURE: 163 MMHG | WEIGHT: 188 LBS | OXYGEN SATURATION: 97 % | HEART RATE: 63 BPM | BODY MASS INDEX: 26.32 KG/M2 | DIASTOLIC BLOOD PRESSURE: 72 MMHG

## 2021-05-12 DIAGNOSIS — D38.0: Primary | ICD-10-CM

## 2021-05-12 PROCEDURE — 2500000003 HC RX 250 WO HCPCS: Performed by: NURSE ANESTHETIST, CERTIFIED REGISTERED

## 2021-05-12 PROCEDURE — 71045 X-RAY EXAM CHEST 1 VIEW: CPT

## 2021-05-12 PROCEDURE — 7100000010 HC PHASE II RECOVERY - FIRST 15 MIN: Performed by: OTOLARYNGOLOGY

## 2021-05-12 PROCEDURE — 31536 LARYNGOSCOPY W/BX & OP SCOPE: CPT | Performed by: OTOLARYNGOLOGY

## 2021-05-12 PROCEDURE — 2709999900 HC NON-CHARGEABLE SUPPLY: Performed by: OTOLARYNGOLOGY

## 2021-05-12 PROCEDURE — 2580000003 HC RX 258: Performed by: OTOLARYNGOLOGY

## 2021-05-12 PROCEDURE — 7100000000 HC PACU RECOVERY - FIRST 15 MIN: Performed by: OTOLARYNGOLOGY

## 2021-05-12 PROCEDURE — 88305 TISSUE EXAM BY PATHOLOGIST: CPT

## 2021-05-12 PROCEDURE — 7100000011 HC PHASE II RECOVERY - ADDTL 15 MIN: Performed by: OTOLARYNGOLOGY

## 2021-05-12 PROCEDURE — 3600000014 HC SURGERY LEVEL 4 ADDTL 15MIN: Performed by: OTOLARYNGOLOGY

## 2021-05-12 PROCEDURE — 6360000002 HC RX W HCPCS: Performed by: NURSE ANESTHETIST, CERTIFIED REGISTERED

## 2021-05-12 PROCEDURE — 3700000000 HC ANESTHESIA ATTENDED CARE: Performed by: OTOLARYNGOLOGY

## 2021-05-12 PROCEDURE — 3600000004 HC SURGERY LEVEL 4 BASE: Performed by: OTOLARYNGOLOGY

## 2021-05-12 PROCEDURE — 6360000002 HC RX W HCPCS: Performed by: ANESTHESIOLOGY

## 2021-05-12 PROCEDURE — 7100000001 HC PACU RECOVERY - ADDTL 15 MIN: Performed by: OTOLARYNGOLOGY

## 2021-05-12 PROCEDURE — 3700000001 HC ADD 15 MINUTES (ANESTHESIA): Performed by: OTOLARYNGOLOGY

## 2021-05-12 PROCEDURE — 6370000000 HC RX 637 (ALT 250 FOR IP): Performed by: OTOLARYNGOLOGY

## 2021-05-12 RX ORDER — PHENYLEPHRINE HYDROCHLORIDE 10 MG/ML
INJECTION INTRAVENOUS PRN
Status: DISCONTINUED | OUTPATIENT
Start: 2021-05-12 | End: 2021-05-12 | Stop reason: SDUPTHER

## 2021-05-12 RX ORDER — SODIUM CHLORIDE 9 MG/ML
INJECTION, SOLUTION INTRAVENOUS CONTINUOUS
Status: DISCONTINUED | OUTPATIENT
Start: 2021-05-12 | End: 2021-05-12 | Stop reason: HOSPADM

## 2021-05-12 RX ORDER — FENTANYL CITRATE 50 UG/ML
INJECTION, SOLUTION INTRAMUSCULAR; INTRAVENOUS PRN
Status: DISCONTINUED | OUTPATIENT
Start: 2021-05-12 | End: 2021-05-12 | Stop reason: SDUPTHER

## 2021-05-12 RX ORDER — ROCURONIUM BROMIDE 10 MG/ML
INJECTION, SOLUTION INTRAVENOUS PRN
Status: DISCONTINUED | OUTPATIENT
Start: 2021-05-12 | End: 2021-05-12 | Stop reason: SDUPTHER

## 2021-05-12 RX ORDER — FENTANYL CITRATE 50 UG/ML
25 INJECTION, SOLUTION INTRAMUSCULAR; INTRAVENOUS EVERY 5 MIN PRN
Status: DISCONTINUED | OUTPATIENT
Start: 2021-05-12 | End: 2021-05-12 | Stop reason: HOSPADM

## 2021-05-12 RX ORDER — FENTANYL CITRATE 50 UG/ML
INJECTION, SOLUTION INTRAMUSCULAR; INTRAVENOUS
Status: DISCONTINUED
Start: 2021-05-12 | End: 2021-05-12 | Stop reason: HOSPADM

## 2021-05-12 RX ORDER — PROPOFOL 10 MG/ML
INJECTION, EMULSION INTRAVENOUS PRN
Status: DISCONTINUED | OUTPATIENT
Start: 2021-05-12 | End: 2021-05-12 | Stop reason: SDUPTHER

## 2021-05-12 RX ORDER — PROPOFOL 10 MG/ML
INJECTION, EMULSION INTRAVENOUS CONTINUOUS PRN
Status: DISCONTINUED | OUTPATIENT
Start: 2021-05-12 | End: 2021-05-12 | Stop reason: SDUPTHER

## 2021-05-12 RX ORDER — DEXAMETHASONE SODIUM PHOSPHATE 10 MG/ML
INJECTION, EMULSION INTRAMUSCULAR; INTRAVENOUS PRN
Status: DISCONTINUED | OUTPATIENT
Start: 2021-05-12 | End: 2021-05-12 | Stop reason: SDUPTHER

## 2021-05-12 RX ORDER — ONDANSETRON 2 MG/ML
INJECTION INTRAMUSCULAR; INTRAVENOUS PRN
Status: DISCONTINUED | OUTPATIENT
Start: 2021-05-12 | End: 2021-05-12 | Stop reason: SDUPTHER

## 2021-05-12 RX ORDER — SUCCINYLCHOLINE/SOD CL,ISO/PF 200MG/10ML
SYRINGE (ML) INTRAVENOUS PRN
Status: DISCONTINUED | OUTPATIENT
Start: 2021-05-12 | End: 2021-05-12 | Stop reason: SDUPTHER

## 2021-05-12 RX ORDER — HYDROCODONE BITARTRATE AND ACETAMINOPHEN 7.5; 325 MG/1; MG/1
1 TABLET ORAL EVERY 6 HOURS PRN
Qty: 28 TABLET | Refills: 0 | Status: SHIPPED | OUTPATIENT
Start: 2021-05-12 | End: 2021-05-19

## 2021-05-12 RX ORDER — LIDOCAINE HCL/PF 100 MG/5ML
SYRINGE (ML) INJECTION PRN
Status: DISCONTINUED | OUTPATIENT
Start: 2021-05-12 | End: 2021-05-12 | Stop reason: SDUPTHER

## 2021-05-12 RX ORDER — EPHEDRINE SULFATE/0.9% NACL/PF 50 MG/5 ML
SYRINGE (ML) INTRAVENOUS PRN
Status: DISCONTINUED | OUTPATIENT
Start: 2021-05-12 | End: 2021-05-12 | Stop reason: SDUPTHER

## 2021-05-12 RX ORDER — HYDROCODONE BITARTRATE AND ACETAMINOPHEN 7.5; 325 MG/1; MG/1
1 TABLET ORAL ONCE
Status: COMPLETED | OUTPATIENT
Start: 2021-05-12 | End: 2021-05-12

## 2021-05-12 RX ADMIN — ROCURONIUM BROMIDE 40 MG: 10 INJECTION INTRAVENOUS at 08:23

## 2021-05-12 RX ADMIN — Medication 10 MG: at 08:06

## 2021-05-12 RX ADMIN — SODIUM CHLORIDE: 9 INJECTION, SOLUTION INTRAVENOUS at 07:14

## 2021-05-12 RX ADMIN — SUGAMMADEX 200 MG: 100 INJECTION, SOLUTION INTRAVENOUS at 08:46

## 2021-05-12 RX ADMIN — PROPOFOL 160 MG: 10 INJECTION, EMULSION INTRAVENOUS at 07:42

## 2021-05-12 RX ADMIN — HYDROCODONE BITARTRATE AND ACETAMINOPHEN 1 TABLET: 7.5; 325 TABLET ORAL at 10:31

## 2021-05-12 RX ADMIN — SODIUM CHLORIDE: 9 INJECTION, SOLUTION INTRAVENOUS at 08:51

## 2021-05-12 RX ADMIN — ROCURONIUM BROMIDE 50 MG: 10 INJECTION INTRAVENOUS at 07:52

## 2021-05-12 RX ADMIN — PROPOFOL 150 MCG/KG/MIN: 10 INJECTION, EMULSION INTRAVENOUS at 08:13

## 2021-05-12 RX ADMIN — ONDANSETRON HYDROCHLORIDE 4 MG: 4 INJECTION, SOLUTION INTRAMUSCULAR; INTRAVENOUS at 08:46

## 2021-05-12 RX ADMIN — Medication 60 MG: at 07:42

## 2021-05-12 RX ADMIN — Medication 140 MG: at 07:42

## 2021-05-12 RX ADMIN — DEXAMETHASONE SODIUM PHOSPHATE 10 MG: 10 INJECTION, EMULSION INTRAMUSCULAR; INTRAVENOUS at 07:58

## 2021-05-12 RX ADMIN — FENTANYL CITRATE 100 MCG: 50 INJECTION, SOLUTION INTRAMUSCULAR; INTRAVENOUS at 07:42

## 2021-05-12 RX ADMIN — FENTANYL CITRATE 50 MCG: 50 INJECTION, SOLUTION INTRAMUSCULAR; INTRAVENOUS at 09:16

## 2021-05-12 RX ADMIN — PHENYLEPHRINE HYDROCHLORIDE 100 MCG: 10 INJECTION INTRAVENOUS at 07:58

## 2021-05-12 ASSESSMENT — PAIN DESCRIPTION - LOCATION
LOCATION: THROAT
LOCATION: THROAT

## 2021-05-12 ASSESSMENT — PULMONARY FUNCTION TESTS
PIF_VALUE: 23
PIF_VALUE: 0
PIF_VALUE: 2
PIF_VALUE: 18
PIF_VALUE: 4
PIF_VALUE: 8
PIF_VALUE: 23
PIF_VALUE: 0
PIF_VALUE: 23
PIF_VALUE: 24
PIF_VALUE: 1
PIF_VALUE: 0
PIF_VALUE: 20
PIF_VALUE: 0
PIF_VALUE: 0
PIF_VALUE: 17
PIF_VALUE: 23
PIF_VALUE: 23
PIF_VALUE: 24
PIF_VALUE: 0
PIF_VALUE: 1
PIF_VALUE: 24
PIF_VALUE: 23
PIF_VALUE: 1
PIF_VALUE: 0
PIF_VALUE: 23
PIF_VALUE: 23
PIF_VALUE: 16
PIF_VALUE: 0
PIF_VALUE: 24
PIF_VALUE: 23
PIF_VALUE: 1
PIF_VALUE: 0
PIF_VALUE: 24
PIF_VALUE: 14
PIF_VALUE: 1
PIF_VALUE: 0
PIF_VALUE: 24
PIF_VALUE: 1
PIF_VALUE: 0
PIF_VALUE: 23
PIF_VALUE: 23
PIF_VALUE: 24

## 2021-05-12 ASSESSMENT — PAIN SCALES - GENERAL
PAINLEVEL_OUTOF10: 3
PAINLEVEL_OUTOF10: 4
PAINLEVEL_OUTOF10: 5
PAINLEVEL_OUTOF10: 4

## 2021-05-12 ASSESSMENT — PAIN - FUNCTIONAL ASSESSMENT: PAIN_FUNCTIONAL_ASSESSMENT: 0-10

## 2021-05-12 ASSESSMENT — PAIN DESCRIPTION - DESCRIPTORS: DESCRIPTORS: ACHING

## 2021-05-12 NOTE — PROGRESS NOTES
ADMITTED TO [facility] AND ORIENTED TO UNIT. SCDS ON. FALL  BANDS ON. PT VERBALIZED APPROVAL FOR FIRST NAME, LAST INITIAL AND PHYSICIAN NAME ON UNIT WHITEBOARD.

## 2021-05-12 NOTE — OP NOTE
[address] [place] [address]                                OPERATIVE REPORT    PATIENT NAME: Dorcas East                      :        1955  MED REC NO:   [id number]                           ROOM:  ACCOUNT NO:   [de-identified]                           ADMIT DATE: 2021  PROVIDER:     Sherrilee Snipe. Paulette Boeck, M.D.    [place]:  2021    OPERATION:  Microlaryngoscopy and biopsy with jet ventilation. SURGEONS:  Andrae Bond. Paulette Boeck, MD; for the jet ventilation, Chaka Cervantes. 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 HISTORY AND OPERATIVE FINDINGS:  This is a 47-year-old male who has been  under surveillance following his full course radiation therapy in 2016  for a squamous cell carcinoma of the anterior right false vocal cord. He had a various granular lesion on the posterior aspect of the right  false vocal cord. This was superficial at surgery. Multiple biopsies  were taken and hemostasis was secured with suction cautery. Jet  ventilation was used during the actual procedure. DESCRIPTION OF PROCEDURE:  After adequate level of general endotracheal  anesthesia had been obtained using a small endotracheal tube, the  Kailyn laryngoscope was introduced into the hypopharynx, holding the  epiglottis forward. This produced an excellent view of the endolarynx. The jet ventilation cannula was attached to the [place] port. The  orotracheal tube was removed and jet ventilation initiated with that  port. This appeared to be quite satisfactory.      The jet ventilation cannula was then introduced from outside of the  laryngoscope into the hypopharynx, into the larynx, and into the subglottic area. This  was extended inferiorly using the video telescope for visualization and  positioned carefully so as not to contact with posterior mucosa and the  ventilation port was not obstructed. This was then anchored in place. Jet ventilation was transferred from the Kailyn cannula to the  standard jet ventilation cannula. All settings were tested and checked. The patient had very stable ventilation. Once that was all secured in  place, I proceeded with the biopsy. Cup forceps were used to remove tissue from the posterior right false  vocal cord, which were placed in formalin for pathology. There was  minimal bleeding. Hemostasis was supplemented with topical epinephrine  and light suction cautery used as needed to stop all the bleeding. Minimal cautery was needed. I should mention that the patient was  pretreated at the beginning of the procedure with 10 mg of Decadron IV. Photographs were taken throughout the procedure. The lesion does appear  to be amenable to CO2 ablation should it prove to be positive, possibly  preventing total laryngectomy. All of the visible soft granular tissue  and the surface, I believe, then was removed. This was not intended to  be a wide excision for malignancy. Estimated blood loss was minimal.  A #7 endotracheal tube was introduced  through the [place] into the subglottic area and the cup inflated. The  tube was secured in place with forceps as [place] was withdrawn. This  was then used to continue to ventilate the patient as the jet  ventilation was terminated until he was awakened and extubated. He  tolerated the procedure well. There was never any time when the SaO2 had  dropped significantly. He went to the recovery room in satisfactory  condition. He tolerated the procedure extremely well.         KRISTEN HANNAH Lorraine Landau, M.D.    D: 05/12/2021 10:01:21       T: 05/12/2021 11:34:34     CP/[email or web address]  Job#: [id number]     Doc#: [id number]    CC:  Paulo Giles M.D. [facility]. MD [place] Carmona.  Andrea Francis M.D.

## 2021-05-12 NOTE — H&P
[facility]  [facility] EAR, NOSE AND THROAT  [address] [place] [address] [place] [address]  Dept: [phone]  Dept Fax: [phone]  Loc: [phone]    Lisandro Terrazas is a 77 y.o. male who was referred byNo ref. provider found for:  No chief complaint on file. Nida Howell HPI:     Lisandro Terrazas is a 77 y.o. male who presents today for a visit regarding his surgery tomorrow. He will be undergoing microlaryngoscopy biopsy of a suspicious area in the right posterior false cord. Is been a few weeks since he was last scoped. He does get hoarse as he talks for a long time. Otherwise he has no pain dysphagia or hemoptysis. Suspicious lesion is on the posterior right false vocal cord. 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 Nida Howell History:     No Known Allergies  No current facility-administered medications for this encounter. Current Outpatient Medications   Medication Sig Dispense Refill    gabapentin (NEURONTIN) 300 MG capsule Take 300 mg by mouth daily.  ipratropium (ATROVENT) 0.06 % nasal spray 2 sprays by Each Nostril route 3 times daily as needed for Rhinitis      metoprolol succinate (TOPROL XL) 50 MG extended release tablet Take by mouth 2 times daily 1/2 tab      levothyroxine (SYNTHROID) 50 MCG tablet Take 1 tablet by mouth daily 90 tablet 1    atorvastatin (LIPITOR) 40 MG tablet Take 1 tablet by mouth daily 90 tablet 1    terazosin (HYTRIN) 5 MG capsule TAKE 1 CAPSULE NIGHTLY (Patient not taking: Reported on 5/11/2021) 90 capsule 1    ticagrelor (BRILINTA) 90 MG TABS tablet Take 1 tablet by mouth 2 times daily (Patient not taking: Reported on 5/11/2021) 180 tablet 3    nitroGLYCERIN (NITROSTAT) 0.4 MG SL tablet up to max of 3 total doses.  If no relief after 1 dose, call 911. 25 tablet 3    diphenhydrAMINE-APAP, sleep, (TYLENOL PM EXTRA STRENGTH)  MG tablet Take 2 tablets by mouth nightly      omeprazole (PRILOSEC) 20 MG delayed release capsule Take 20 mg by mouth Daily       aspirin EC 81 MG EC tablet Take 81 mg by mouth daily      Coenzyme Q10 (CO Q-10) 200 MG CAPS Take  by mouth daily.  Multiple Vitamin (MULTI-VITAMIN) TABS Take  by mouth daily.         ARGININE PO Take 500 mg by mouth daily 4 tab       Past Medical History:   Diagnosis Date    Ascending Aneurysm ([facility] [address] 02/2017    Ascending, 4.2 cm per pt    Brown's esophagus     CAD (coronary artery disease)     Cancer ([facility] [address]     throat cancer, s/p radiation    Chronic back pain     Hepatitis C     as child    History of hyperbaric oxygen therapy     after bottom teeth removed after rdiation therapy    Hyperlipidemia     Hypertension     Left shoulder pain     nerve pain , ? 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    PONV (postoperative nausea and vomiting)     only after OHS    [facility]    Thyroid disease     Vision loss of right eye     during hyperbaric chamber oxygen therapy     Vitreous opacities     right    Wears dentures     full upper, no bottom teeth and no use of dentures on bottom      Past Surgical History:   Procedure Laterality Date    [facility]    COLONOSCOPY      [facility]OP[facility] ST[facility]AC[facility]    CORONARY ANGIOPLASTY WITH STENT PLACEMENT N/A 07/12/2019    [facility]    [facility]RAFT  [facility]    DENTAL SURGERY      bottom teeth removal    HEMORRHOID SURGERY      KNEE SURGERY Right     LARYNGOSCOPY N/A 10/4/2017    LARYNGOSCOPY MICRO WITH BIOPSY performed by Corwin Corbett MD at [address] [place]  04/28/2016    by Dr Coral Mike VITREOUS,SUBRET/CHOROID FLUID Right 8/27/2018    VITRECTOMY 25 GAUGE performed by Psychiatric/Behavioral: Negative for confusion and sleep disturbance. The patient is not nervous/anxious. Objective:   Ht 5' 11\" (1.803 m)   Wt 185 lb (83.9 kg)   BMI 25.80 kg/m²     Physical Exam  Vitals signs and nursing note reviewed. Constitutional:       Appearance: He is well-developed. HENT:      Head: Normocephalic and atraumatic. No laceration. Right Ear: Hearing, ear canal and external ear normal. No drainage or swelling. No middle ear effusion. Tympanic membrane is not perforated or erythematous. Left Ear: Hearing, tympanic membrane, ear canal and external ear normal. No drainage or swelling. No middle ear effusion. Tympanic membrane is not perforated or erythematous. Nose: Nose normal. No septal deviation, mucosal edema or rhinorrhea. Mouth/Throat:      Mouth: Mucous membranes are not pale and not dry. No oral lesions. Pharynx: Oropharynx is clear. Uvula midline. No oropharyngeal exudate or posterior oropharyngeal erythema. Comments: LIps: lips normal     Mallampati 1  Base of tongue: symmetric,  Eyes:      Extraocular Movements:      Right eye: Normal extraocular motion and no nystagmus. Left eye: Normal extraocular motion and no nystagmus. Comments: Conjugate gaze   Neck:      Musculoskeletal: Neck supple. Thyroid: No thyroid mass or thyromegaly. Trachea: Phonation normal. No tracheal deviation. Comments:   Salivary glands not enlarged and normal to palpation    Pulmonary:      Effort: Pulmonary effort is normal. No retractions. Breath sounds: No stridor. Neurological:      Mental Status: He is alert and oriented to person, place, and time. Cranial Nerves: No cranial nerve deficit (VIIth N function intact bilat). Psychiatric:         Mood and Affect: Mood and affect normal.         Behavior: Behavior is cooperative.             Data:PROCEDURE: FIBEROPTIC LARYNGOSCOPY    A fiberoptic laryngoscopy was performed under topical anesthesia, after using Afrin and Lidocaine spray in the nasal fossa. The nasal fossa, nasopharynx, hypopharynx and larynx were carefully examined. Base of tongue was symmetrical. Epiglottis appeared normal and was not retrodisplaced. True vocal cords had normal mobility. There was no erythema. Raised mucosal irregularities noted on the posterior third of the right false vocal cord. This is somewhat more extensive than last exam. No pooling in the pyriform sinuses. All of the past medical history, past surgical history, family history,social history, allergies and current medications were reviewed with the patient. Assessment & Plan   Diagnoses and all orders for this visit:     Diagnosis Orders   1. Lesion of right false vocal cord  MI LARYNGOSCOPY FLEXIBLE DIAGNOSTIC   2. Primary squamous cell carcinoma of larynx (HCC)  MI LARYNGOSCOPY FLEXIBLE DIAGNOSTIC   3. Effects, radiation, subsequent encounter  MI LARYNGOSCOPY FLEXIBLE DIAGNOSTIC   4. FOUZIA on CPAP  MI LARYNGOSCOPY FLEXIBLE DIAGNOSTIC   5. Hoarseness  MI LARYNGOSCOPY FLEXIBLE DIAGNOSTIC       The findings were explained and his questions were answered. We will proceed with the laryngoscopy and excision of these lesions. The area is radiated tissue and will be slow to heal.  They are well aware of the rationale for proceeding and the risks involved. He and his wife request we proceed. Kindra Rogers. Kerwin Valerio MD    **This report has been created using voice recognition software. It may contain minor errors which are inherent in voicerecognition technology. **

## 2021-05-12 NOTE — PROGRESS NOTES
Pt A/O x 4. States \"throat is a little better\". No respiratory issues noted. O2 sat 98% on RA. Will continue to monitor.

## 2021-05-12 NOTE — PROGRESS NOTES
Admitted to PACU from OR   Report received from anesthesia. Lethargic but alert. A/O x 4. 4LPM per N/C on pt  100% O2 sat. C/O sore throat.

## 2021-05-12 NOTE — PROGRESS NOTES
Pt returned to [facility] room 7. Vitals and assessment as charted. 0.9 infusing, @550ml to count from PACU. Pt has sherbert and water. Family at the bedside. Pt and family verbalized understanding of discharge criteria and call light use. Call light in reach.

## 2021-05-12 NOTE — INTERVAL H&P NOTE
Pt Name: Marquez Livingston  MRN: [id number]  YOB: 1955  Date of evaluation: 5/12/2021    I have examined the patient and reviewed the H&P/Consult and there are no changes to the patient or plans.          Electronically signed by Wanda Fischer MD on 5/12/2021 at 7:32 AM

## 2021-05-12 NOTE — PROGRESS NOTES
[facility] to [facility] in stable condition. RA  Pt states \"throat is better\"  No respiratory distress noted. No bleeding noted.

## 2021-05-12 NOTE — ANESTHESIA PRE PROCEDURE
Provider, MD   ARGININE PO Take 500 mg by mouth daily 4 tab   Yes Historical Provider, MD       Current medications:    Current Facility-Administered Medications   Medication Dose Route Frequency Provider Last Rate Last Admin    0.9 % sodium chloride infusion   Intravenous Continuous Katina Berkowitz MD           Allergies:  No Known Allergies    Problem List:    Patient Active Problem List   Diagnosis Code    Hypertension I10    Hyperlipidemia E78.5    ASCVD (arteriosclerotic cardiovascular disease) I25.10    S/P CABG (coronary artery bypass graft) Z95.1    Chronic back pain M54.9, G89.29    Chronic total occlusion of coronary artery I25.82    Asymmetrical sensorineural hearing loss H90.5    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 XXXS    Aneurysm of ascending aorta (HCC) I71.2    Cervical back pain with evidence of disc disease M50.90    Paresthesia and pain of both upper extremities R20.2, M79.601, M79.602    Hypersomnia G47.10    Status post angioplasty with stent Z95.820       Past Medical History:        Diagnosis Date    Ascending Aneurysm ([facility] [address] 02/2017    Ascending, 4.2 cm per pt    Brown's esophagus     CAD (coronary artery disease)     Cancer ([facility] [address]     throat cancer, s/p radiation    Chronic back pain     Hepatitis C     as child    History of hyperbaric oxygen therapy     after bottom teeth removed after rdiation therapy    Hyperlipidemia     Hypertension     Left shoulder pain     nerve pain , ? 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    PONV (postoperative nausea and vomiting)     only after OHS    S/P CABG [facility]coronary artery bypass graft) 2002    [facility]    Thyroid disease     Vision loss of right eye     during hyperbaric chamber oxygen therapy     Vitreous opacities     right    Wears dentures     full upper, no bottom teeth and no use of dentures on bottom       Past Surgical History:        Procedure Laterality Date    [facility]  2002, [facility]    COLONOSCOPY      CORONARY ANGIOPLASTY WITH STENT PLACEMENT  2002    [facility]    CORONARY ANGIOPLASTY WITH STENT PLACEMENT N/A 2019    [facility]    CORONARY ARTERY BYPASS GRAFT      [facility]    DENTAL SURGERY      bottom teeth removal    HEMORRHOID SURGERY      KNEE SURGERY Right     LARYNGOSCOPY N/A 10/4/2017    LARYNGOSCOPY MICRO WITH BIOPSY performed by Brooklyn Tao MD at [address] [place]  2016    by Dr Pascual Downey VITREOUS,SUBRET/CHOROID FLUID Right 2018    VITRECTOMY 22 GAUGE performed by Tamiko Centeno MD at [address] [facility] VITRECTOMY Right 2018     VITRECTOMY 25 GAUGE (Right Eye)       Social History:    Social History     Tobacco Use    Smoking status: Former Smoker     Packs/day: 2.00     Years: 20.00     Pack years: 40.00     Quit date: 2001     Years since quittin.4    Smokeless tobacco: Never Used   Substance Use Topics    Alcohol use: No     Alcohol/week: 0.0 standard drinks                                Counseling given: Not Answered      Vital Signs (Current):   Vitals:    21 1344 21 0645   BP:  (!) 188/88   Pulse:  57   Resp:  18   Temp:  97.3 °F (36.3 °C)   TempSrc:  Temporal   SpO2:  99%   Weight: 185 lb (83.9 kg) 188 lb (85.3 kg)   Height: 5' 11\" (1.803 m) 5' 11\" (1.803 m)                                              BP Readings from Last 3 Encounters:   21 (!) 188/88   21 136/74   21 130/60       NPO Status: Time of last liquid consumption: 06                        Time of last solid consumption:                         Date of last liquid consumption: 21                        Date of last solid food consumption: 21    BMI:   Wt Readings from Last 3 Encounters:   05/12/21 188 lb (85.3 kg)   05/11/21 188 lb (85.3 kg)   04/13/21 191 lb (86.6 kg)     Body mass index is 26.22 kg/m². CBC:   Lab Results   Component Value Date    WBC 7.3 04/27/2021    RBC 4.61 04/27/2021    RBC 4.86 12/01/2011    HGB 14.0 04/27/2021    HCT 43.5 04/27/2021    MCV 94.4 04/27/2021    RDW 12.6 12/01/2017     04/27/2021       CMP:   Lab Results   Component Value Date     04/27/2021    K 4.3 04/27/2021    K 4.0 07/12/2019     04/27/2021    CO2 27 04/27/2021    BUN 19 04/27/2021    CREATININE 0.9 04/27/2021    LABGLOM 84 04/27/2021    GLUCOSE 107 04/27/2021    GLUCOSE 95 12/01/2011    PROT 6.3 04/27/2021    CALCIUM 9.4 04/27/2021    BILITOT 0.4 04/27/2021    ALKPHOS 88 04/27/2021    AST 19 04/27/2021    ALT 17 04/27/2021       POC Tests: No results for input(s): POCGLU, POCNA, POCK, POCCL, POCBUN, POCHEMO, POCHCT in the last 72 hours. Coags:   Lab Results   Component Value Date    INR 0.97 07/12/2019    APTT 30.9 07/12/2019       HCG (If Applicable): No results found for: PREGTESTUR, PREGSERUM, HCG, HCGQUANT     ABGs: No results found for: PHART, PO2ART, MJS3NAI, OUO9TCS, BEART, R2HXGDRB     Type & Screen (If Applicable):  Lab Results   Component Value Date    LABRH NEG 07/12/2019       Drug/Infectious Status (If Applicable):  Lab Results   Component Value Date    HEPCAB Negative 01/15/2021       COVID-19 Screening (If Applicable):   Lab Results   Component Value Date    COVID19 NOT DETECTED 05/08/2021           Anesthesia Evaluation  Patient summary reviewed and Nursing notes reviewed   history of anesthetic complications: PONV.   Airway: Mallampati: II        Dental:          Pulmonary: breath sounds clear to auscultation  (+) sleep apnea:                             Cardiovascular:  Exercise tolerance: good (>4 METS),   (+) hypertension:, CAD:, dysrhythmias:,       ECG reviewed  Rhythm: regular  Rate: normal                    Neuro/Psych: GI/Hepatic/Renal:   (+) hepatitis: C,           Endo/Other:                     Abdominal:       Abdomen: soft. Vascular:                                        Anesthesia Plan      general     ASA 3       Induction: intravenous. MIPS: Postoperative opioids intended and Prophylactic antiemetics administered. Anesthetic plan and risks discussed with patient and spouse. Plan discussed with CRNA.                   67 Leon Franco DO   5/12/2021

## 2021-05-12 NOTE — BRIEF OP NOTE
Brief Postoperative Note      Patient: Sari Li  YOB: 1955  MRN: [id number]    Date of Procedure: 5/12/2021    Pre-Op Diagnosis: LESION OF RIGHT FALSE VOCAL CORD, EFFECTS, RADIATION, PRIMARY SQUAMOUS CELL CARCINOMA OF LARYNX, BARRETTS ESOPHAGUS WITHOUT DYSPLASIA, THROAT CLEARING    Post-Op Diagnosis: Same       Procedure(s):  LARYNGOSCOPY MICRO AND BIOPSY, WITH JET VENT    Surgeon(s):  MD Darrick Wallace MD, putnam    Assistant:  * No surgical staff found *    Anesthesia: General, ET tube, converted to jet ventilation. Estimated Blood Loss (mL): Minimal    Complications: None    Specimens:   ID Type Source Tests Collected by Time Destination   A : posterior right false vocal cord Tissue Larynx SURGICAL PATHOLOGY Sindi Moore MD 5/12/2021 8531        Implants:  * No implants in log *      Drains: * No LDAs found *    Findings: See photos. Raised granular lesion posterior right FVC. Tissue sent for permanent sections.     Electronically signed by Megan Monteiro MD on 5/12/2021 at 9:25 AM

## 2021-05-12 NOTE — ANESTHESIA POSTPROCEDURE EVALUATION
Department of Anesthesiology  Postprocedure Note    Patient: Mendy Braxton  MRN: [id number]  YOB: 1955  Date of evaluation: 5/12/2021  Time:  12:11 PM     Procedure Summary     Date: 05/12/21 Room / Location: [address] T[facility]US [place]    Anesthesia Start: 5576 Anesthesia Stop: 4623    Procedure: LARYNGOSCOPY MICRO AND BIOPSY, WITH JET VENT (N/A ) Diagnosis: (LESION OF RIGHT FALSE VOCAL CORD, EFFECTS, RADIATION, PRIMARY SQUAMOUS CELL CARCINOMA OF LARYNX, BARRETTS ESOPHAGUS WITHOUT DYSPLASIA, THROAT CLEARING)    Surgeons: Santosh Bridges MD Responsible Provider: Michael Majano DO    Anesthesia Type: general ASA Status: 3          Anesthesia Type: general    Maggie Phase I: Maggie Score: 10    Maggie Phase II: Maggie Score: 10    Last vitals: Reviewed and per EMR flowsheets.        Anesthesia Post Evaluation    Patient location during evaluation: PACU  Patient participation: complete - patient participated  Level of consciousness: awake  Airway patency: patent  Nausea & Vomiting: no nausea  Complications: no  Cardiovascular status: hemodynamically stable  Respiratory status: acceptable  Hydration status: stable

## 2021-05-14 ENCOUNTER — OFFICE VISIT (OUTPATIENT)
Dept: ENT CLINIC | Age: 66
End: 2021-05-14
Payer: MEDICARE

## 2021-05-14 VITALS
BODY MASS INDEX: 26.26 KG/M2 | TEMPERATURE: 97.5 F | HEIGHT: 71 IN | RESPIRATION RATE: 14 BRPM | SYSTOLIC BLOOD PRESSURE: 122 MMHG | WEIGHT: 187.6 LBS | HEART RATE: 66 BPM | DIASTOLIC BLOOD PRESSURE: 60 MMHG

## 2021-05-14 DIAGNOSIS — C32.1 MALIGNANT NEOPLASM OF FALSE VOCAL CORDS (HCC): Primary | ICD-10-CM

## 2021-05-14 DIAGNOSIS — R49.0 HOARSENESS: ICD-10-CM

## 2021-05-14 DIAGNOSIS — E03.4 HYPOTHYROIDISM DUE TO ACQUIRED ATROPHY OF THYROID: ICD-10-CM

## 2021-05-14 DIAGNOSIS — K22.70 BARRETT'S ESOPHAGUS WITHOUT DYSPLASIA: ICD-10-CM

## 2021-05-14 DIAGNOSIS — T66.XXXD EFFECTS, RADIATION, SUBSEQUENT ENCOUNTER: ICD-10-CM

## 2021-05-14 DIAGNOSIS — C32.9 PRIMARY SQUAMOUS CELL CARCINOMA OF LARYNX (HCC): ICD-10-CM

## 2021-05-14 PROCEDURE — 1123F ACP DISCUSS/DSCN MKR DOCD: CPT | Performed by: OTOLARYNGOLOGY

## 2021-05-14 PROCEDURE — G8417 CALC BMI ABV UP PARAM F/U: HCPCS | Performed by: OTOLARYNGOLOGY

## 2021-05-14 PROCEDURE — 1036F TOBACCO NON-USER: CPT | Performed by: OTOLARYNGOLOGY

## 2021-05-14 PROCEDURE — G8427 DOCREV CUR MEDS BY ELIG CLIN: HCPCS | Performed by: OTOLARYNGOLOGY

## 2021-05-14 PROCEDURE — 4040F PNEUMOC VAC/ADMIN/RCVD: CPT | Performed by: OTOLARYNGOLOGY

## 2021-05-14 PROCEDURE — 99214 OFFICE O/P EST MOD 30 MIN: CPT | Performed by: OTOLARYNGOLOGY

## 2021-05-14 PROCEDURE — 3017F COLORECTAL CA SCREEN DOC REV: CPT | Performed by: OTOLARYNGOLOGY

## 2021-05-14 ASSESSMENT — ENCOUNTER SYMPTOMS
SHORTNESS OF BREATH: 0
RHINORRHEA: 0
VOMITING: 0
STRIDOR: 0
SORE THROAT: 0
COLOR CHANGE: 0
DIARRHEA: 0
FACIAL SWELLING: 0
CHOKING: 0
ABDOMINAL PAIN: 0
SINUS PRESSURE: 0
COUGH: 0
CHEST TIGHTNESS: 0
NAUSEA: 0
APNEA: 0
WHEEZING: 0
VOICE CHANGE: 0
TROUBLE SWALLOWING: 0

## 2021-05-14 NOTE — PROGRESS NOTES
[facility] [place] [facility]  [facility] EAR, NOSE AND THROAT  [address] [place] [address]  Dept: [phone]  Dept Fax: [phone]  Loc: [phone]    Doug Kenyon is a 77 y.o. male who was referred byNo ref. provider found for:  Chief Complaint   Patient presents with    Post-Op Check     Patient is here post-op microlaryngoscopy with biopsy 2021   . HPI:     Doug Kenyon is a 77 y.o. male who presents today for follow-up on his biopsy of the posterior aspect of the right false vocal cord 2 days ago. The underlying tissue was very hard from the prior radiation. The lesion was very friable and superficial, and was not widely excised, rather just biopsied for tissue diagnosis. Fannie Cervantes Pathology     [facility]                    [id number]   Assoc.                                              Page 1 of 1 [id number]   [place], [place] [address]                                                       PROC: 2021   [facility]                                    RECV: 2021   [address] [facility]                                    RPTD: 2021   [place], [place] [address]                       MRN:  [id number]     LOC: OR                       ACCT: [de-identified]  SEX: M                       : 1955  AGE: 77 Y                          PATHOLOGY REPORT                       ATTN: Gilmar Bess                       REQ: Gilmar Bess       Copies To:   TUTU HEMPHILL       Clinical Information: LESION OF RIGHT FALSE VOCAL CORD, EFFECTS. RADIATION, PRIMARY SQUAMOUS CELL CARCINOMA OF LARYNX, TENORIO'S   ESOPHAGUS WITHOUT DYSPLASIA, THROAT CLEARING     FINAL DIAGNOSIS:   Right posterior false vocal cord biopsy:    Recurrent keratinizing squamous cell carcinoma.      Specimen:   VOCAL CORD BIOPSY, POSTERIOR RIGHT FALSE       Gross Examination:   The container is labeled Rozena Mcmahon, posterior right false vocal cord.    Received in formalin are multiple bits of red-tan pain     Hepatitis C     as child    History of hyperbaric oxygen therapy     after bottom teeth removed after rdiation therapy    Hyperlipidemia     Hypertension     Left shoulder pain     nerve pain , ? 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    PONV (postoperative nausea and vomiting)     only after OHS    S/P CABG [facility]coronary artery bypass graft) 2002    [facility]    Thyroid disease     Vision loss of right eye     during hyperbaric chamber oxygen therapy     Vitreous opacities     right    Wears dentures     full upper, no bottom teeth and no use of dentures on bottom      Past Surgical History:   Procedure Laterality Date    CARDIAC CATHETERIZATION  8/2002, 4/2004    [facility]    COLONOSCOPY      CORONARY ANGIOPLASTY WITH STENT PLACEMENT  7/2002    [facility]    CORONARY ANGIOPLASTY WITH STENT PLACEMENT N/A 07/12/2019    [facility]AD    CORONARY ARTERY BYPASS GRAFT  2002    [facility]    DENTAL SURGERY      bottom teeth removal    HEMORRHOID SURGERY      KNEE SURGERY Right     LARYNGOSCOPY N/A 10/4/2017    LARYNGOSCOPY MICRO WITH BIOPSY performed by Namrata Soni MD at [address] N/A 5/12/2021    LARYNGOSCOPY MICRO AND BIOPSY, WITH JET VENT performed by Namrata Soni MD at [address]  04/28/2016    by Dr Alondra Salazar VITREOUS,SUBRET/CHOROID FLUID Right 8/27/2018    VITRECTOMY 22 GAUGE performed by Chance Spears MD at [address] VITRECTOMY Right 08/27/2018     VITRECTOMY 25 GAUGE (Right Eye)     Family History   Problem Relation Age of Onset    Heart Disease Father         MI    High Blood Pressure Father     Stroke Father     Cancer Maternal Grandmother     Cancer Maternal Grandfather     Diabetes Neg Hx      Social History     Tobacco Use    Smoking status: Former Smoker     Packs/day: 2.00     Years: 20.00     Pack years: 40.00     Quit date: 12/5/2001 Years since quittin.5    Smokeless tobacco: Never Used   Substance Use Topics    Alcohol use: No     Alcohol/week: 0.0 standard drinks       Subjective:      Review of Systems   Constitutional: Negative for activity change, appetite change, chills, diaphoresis, fatigue, fever and unexpected weight change. HENT: Negative for congestion, dental problem, ear discharge, ear pain, facial swelling, hearing loss, mouth sores, nosebleeds, postnasal drip, rhinorrhea, sinus pressure, sneezing, sore throat, tinnitus, trouble swallowing and voice change. Eyes: Negative for visual disturbance. Respiratory: Negative for apnea, cough, choking, chest tightness, shortness of breath, wheezing and stridor. Cardiovascular: Negative for chest pain, palpitations and leg swelling. Gastrointestinal: Negative for abdominal pain, diarrhea, nausea and vomiting. Endocrine: Negative for cold intolerance, heat intolerance, polydipsia and polyuria. Genitourinary: Negative for dysuria, enuresis and hematuria. Musculoskeletal: Negative for arthralgias, gait problem, neck pain and neck stiffness. Skin: Negative for color change and rash. Allergic/Immunologic: Negative for environmental allergies, food allergies and immunocompromised state. Neurological: Negative for dizziness, syncope, facial asymmetry, speech difficulty, light-headedness and headaches. Hematological: Negative for adenopathy. Does not bruise/bleed easily. Psychiatric/Behavioral: Negative for confusion and sleep disturbance. The patient is not nervous/anxious. Objective:   /60 (Site: Left Upper Arm, Position: Sitting)   Pulse 66   Temp 97.5 °F (36.4 °C) (Infrared)   Resp 14   Ht 5' 11\" (1.803 m)   Wt 187 lb 9.6 oz (85.1 kg)   BMI 26.16 kg/m²     Physical Exam  Vitals and nursing note reviewed. Constitutional:       Appearance: He is well-developed. HENT:      Head: Normocephalic and atraumatic. No laceration.       Right Ear: right false vocal cord, 2016   3. Effects, radiation, subsequent encounter     4. Hoarseness     5. Hypothyroidism due to acquired atrophy of thyroid     6. 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 This might be squamous cell carcinoma in situ or a superficially invasive lesion, either way it should be amenable to laser ablation. Patient refuses laryngectomy, and I agree that laryngectomy would not likely to be necessary. Plan for now is to arrange for a jet ventilation/CO2 laser ablation of the new primary squamous cell carcinoma on the posterior right true vocal cord. Patient understands benefits and risks. He and his wife request we proceed. This would be performed jointly with Dr. Korina Thibodeaux, as was the recent biopsy. Chan Weller. Mynor Gusman MD    **This report has been created using voice recognition software. It may contain minor errors which are inherent in voicerecognition technology. **      Addendum: May 18, 2021    Based on my role in the patient's operation regarding his jet ventilation, and the positive biopsies for squamous cell carcinoma, Dr. Mynor Gusman has requested that I join him on his upcoming surgery for resection of the involved soft tissues with the hopes of assisting him with the jet ventilation to enable his transoral resection and with the reconstruction that will hopefully enable him to more quickly get back to oral nutrition postoperatively. The operation needed following the resection will be a suspension microlaryngoscopy with advancement flap reconstruction of supraglottis post resection. It would also require jet ventilation and will take approximately 1 hour in addition to his resection time. I will reviewed the indications benefits limitations and risks of proceeding this manner with the patient preoperatively in the holding area to get his additional informed consent for this portion of his care.     Irasema Pizarro. Santiago Chavez MD

## 2021-05-14 NOTE — Clinical Note
Dear Dr. Rafa Velazquez:  Per our conversation, I have placed an addendum in the patient's chart note for his last clinic visit to reflect our discussions regarding my role in his upcoming operation to facilitate your use of jet ventilation and to provide after resection reconstructive care, and the reconstruction of his supraglottis with area epiglottic fold and post cricoid mucosal advancement flaps. I will discuss this care with the patient in the holding area preoperatively to gain additional consent for that portion of his care. If he does not desire it, I will still help you with the jet ventilation. I will need to clear my schedule for at least an hour and half of clinic time to enable my joining you for this. Rosy Pineda.  Jayshree Centeno MD

## 2021-05-14 NOTE — LETTER
[address] [facility] and [address]  Phone: [phone]  Fax: [phone]    Selena Pena MD        May 17, 2021    [address]  [address] [address]    Patient: Jonel Nettles   MR Number: [id number]   YOB: 1955   Date of Visit: 5/14/2021     Dear Kiesha Jj,    I recently saw your patient, Jonel Nettles, regarding recent laryngeal biopsy. He has another cancer in the posterior portion of the right false cord this time. This is small. We caught it early. I will ablate the area with a laser. Below are the relevant portions of my assessment and plan of care. Assessment & Plan   Diagnoses and all orders for this visit:     Diagnosis Orders   1. 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 Primary squamous cell carcinoma of larynx (HCC)      Anterior right false vocal cord, 2016   3. Effects, radiation, subsequent encounter     4. Hoarseness     5. Hypothyroidism due to acquired atrophy of thyroid     6. 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 This might be squamous cell carcinoma in situ or a superficially invasive lesion, either way it should be amenable to laser ablation. Patient refuses laryngectomy, and I agree that laryngectomy would not likely to be necessary. Plan for now is to arrange for a jet ventilation/CO2 laser ablation of the new primary squamous cell carcinoma on the posterior right true vocal cord. Patient understands benefits and risks. He and his wife request we proceed. This would be performed jointly with Dr. Samra Taveras, as was the recent biopsy. If you have questions, please do not hesitate to call me. I look forward to following Geovany Paula along with you.     Sincerely,          Selena Pena MD

## 2021-06-01 ENCOUNTER — ANESTHESIA EVENT (OUTPATIENT)
Dept: OPERATING ROOM | Age: 66
End: 2021-06-01
Payer: MEDICARE

## 2021-06-01 PROBLEM — T66.XXXD: Status: ACTIVE | Noted: 2021-06-01

## 2021-06-01 PROBLEM — C32.1: Status: ACTIVE | Noted: 2021-06-01

## 2021-06-01 ASSESSMENT — ENCOUNTER SYMPTOMS
CHEST TIGHTNESS: 0
APNEA: 0
SORE THROAT: 0
WHEEZING: 0
VOMITING: 0
DIARRHEA: 0
NAUSEA: 0
SHORTNESS OF BREATH: 0
RHINORRHEA: 0
STRIDOR: 0
SINUS PRESSURE: 0
CHOKING: 0
FACIAL SWELLING: 0
VOICE CHANGE: 0
COUGH: 0
COLOR CHANGE: 0
TROUBLE SWALLOWING: 0
ABDOMINAL PAIN: 0

## 2021-06-02 ENCOUNTER — HOSPITAL ENCOUNTER (OUTPATIENT)
Age: 66
Discharge: HOME | End: 2021-06-03
Attending: OTOLARYNGOLOGY | Admitting: OTOLARYNGOLOGY
Payer: MEDICARE

## 2021-06-02 ENCOUNTER — ANESTHESIA (OUTPATIENT)
Dept: OPERATING ROOM | Age: 66
End: 2021-06-02
Payer: MEDICARE

## 2021-06-02 ENCOUNTER — APPOINTMENT (OUTPATIENT)
Dept: GENERAL RADIOLOGY | Age: 66
End: 2021-06-02
Attending: OTOLARYNGOLOGY
Payer: MEDICARE

## 2021-06-02 VITALS — TEMPERATURE: 97.7 F | SYSTOLIC BLOOD PRESSURE: 152 MMHG | DIASTOLIC BLOOD PRESSURE: 78 MMHG | OXYGEN SATURATION: 100 %

## 2021-06-02 PROBLEM — Z98.890 POST-OPERATIVE STATE: Status: ACTIVE | Noted: 2021-06-02

## 2021-06-02 LAB
BASE EXCESS (CALCULATED): 0 MMOL/L (ref -2.5–2.5)
CALCIUM IONIZED SERUM: 1.2 MMOL/L (ref 1.12–1.32)
COLLECTED BY:: ABNORMAL
GLUCOSE, WHOLE BLOOD: 132 MG/DL (ref 70–108)
HCO3: 25 MMOL/L (ref 23–28)
O2 SATURATION: 99 %
PCO2: 41 MMHG (ref 35–45)
PH BLOOD GAS: 7.39 (ref 7.35–7.45)
PO2: 148 MMHG (ref 71–104)
POTASSIUM, WHOLE BLOOD: 4 MEQ/L (ref 3.5–4.9)
SODIUM, WHOLE BLOOD: 140 MEQ/L (ref 138–146)

## 2021-06-02 PROCEDURE — 2500000003 HC RX 250 WO HCPCS: Performed by: NURSE ANESTHETIST, CERTIFIED REGISTERED

## 2021-06-02 PROCEDURE — 6360000002 HC RX W HCPCS: Performed by: NURSE ANESTHETIST, CERTIFIED REGISTERED

## 2021-06-02 PROCEDURE — 3700000000 HC ANESTHESIA ATTENDED CARE: Performed by: OTOLARYNGOLOGY

## 2021-06-02 PROCEDURE — 36415 COLL VENOUS BLD VENIPUNCTURE: CPT

## 2021-06-02 PROCEDURE — 3600000014 HC SURGERY LEVEL 4 ADDTL 15MIN: Performed by: OTOLARYNGOLOGY

## 2021-06-02 PROCEDURE — 7100000000 HC PACU RECOVERY - FIRST 15 MIN: Performed by: OTOLARYNGOLOGY

## 2021-06-02 PROCEDURE — 3700000001 HC ADD 15 MINUTES (ANESTHESIA): Performed by: OTOLARYNGOLOGY

## 2021-06-02 PROCEDURE — 6360000002 HC RX W HCPCS: Performed by: OTOLARYNGOLOGY

## 2021-06-02 PROCEDURE — 2709999900 HC NON-CHARGEABLE SUPPLY: Performed by: OTOLARYNGOLOGY

## 2021-06-02 PROCEDURE — 96374 THER/PROPH/DIAG INJ IV PUSH: CPT

## 2021-06-02 PROCEDURE — 82947 ASSAY GLUCOSE BLOOD QUANT: CPT

## 2021-06-02 PROCEDURE — 6370000000 HC RX 637 (ALT 250 FOR IP): Performed by: OTOLARYNGOLOGY

## 2021-06-02 PROCEDURE — 3600000004 HC SURGERY LEVEL 4 BASE: Performed by: OTOLARYNGOLOGY

## 2021-06-02 PROCEDURE — 6360000002 HC RX W HCPCS: Performed by: ANESTHESIOLOGY

## 2021-06-02 PROCEDURE — 2580000003 HC RX 258: Performed by: OTOLARYNGOLOGY

## 2021-06-02 PROCEDURE — 84295 ASSAY OF SERUM SODIUM: CPT

## 2021-06-02 PROCEDURE — 7100000001 HC PACU RECOVERY - ADDTL 15 MIN: Performed by: OTOLARYNGOLOGY

## 2021-06-02 PROCEDURE — 82330 ASSAY OF CALCIUM: CPT

## 2021-06-02 PROCEDURE — 99222 1ST HOSP IP/OBS MODERATE 55: CPT | Performed by: OTOLARYNGOLOGY

## 2021-06-02 PROCEDURE — 84132 ASSAY OF SERUM POTASSIUM: CPT

## 2021-06-02 PROCEDURE — 82803 BLOOD GASES ANY COMBINATION: CPT

## 2021-06-02 PROCEDURE — 71045 X-RAY EXAM CHEST 1 VIEW: CPT

## 2021-06-02 PROCEDURE — 88305 TISSUE EXAM BY PATHOLOGIST: CPT

## 2021-06-02 PROCEDURE — 2500000003 HC RX 250 WO HCPCS: Performed by: ANESTHESIOLOGY

## 2021-06-02 PROCEDURE — 96366 THER/PROPH/DIAG IV INF ADDON: CPT

## 2021-06-02 RX ORDER — SODIUM CHLORIDE 9 MG/ML
25 INJECTION, SOLUTION INTRAVENOUS PRN
Status: DISCONTINUED | OUTPATIENT
Start: 2021-06-02 | End: 2021-06-03 | Stop reason: HOSPADM

## 2021-06-02 RX ORDER — ONDANSETRON 4 MG/1
4 TABLET, ORALLY DISINTEGRATING ORAL EVERY 8 HOURS PRN
Status: DISCONTINUED | OUTPATIENT
Start: 2021-06-02 | End: 2021-06-03 | Stop reason: HOSPADM

## 2021-06-02 RX ORDER — PROPOFOL 10 MG/ML
INJECTION, EMULSION INTRAVENOUS CONTINUOUS PRN
Status: DISCONTINUED | OUTPATIENT
Start: 2021-06-02 | End: 2021-06-02 | Stop reason: SDUPTHER

## 2021-06-02 RX ORDER — SODIUM CHLORIDE 0.9 % (FLUSH) 0.9 %
10 SYRINGE (ML) INJECTION EVERY 12 HOURS SCHEDULED
Status: DISCONTINUED | OUTPATIENT
Start: 2021-06-02 | End: 2021-06-03 | Stop reason: HOSPADM

## 2021-06-02 RX ORDER — PROPOFOL 10 MG/ML
INJECTION, EMULSION INTRAVENOUS PRN
Status: DISCONTINUED | OUTPATIENT
Start: 2021-06-02 | End: 2021-06-02

## 2021-06-02 RX ORDER — MORPHINE SULFATE 4 MG/ML
4 INJECTION, SOLUTION INTRAMUSCULAR; INTRAVENOUS
Status: DISCONTINUED | OUTPATIENT
Start: 2021-06-02 | End: 2021-06-03 | Stop reason: HOSPADM

## 2021-06-02 RX ORDER — DEXAMETHASONE SODIUM PHOSPHATE 10 MG/ML
10 INJECTION, EMULSION INTRAMUSCULAR; INTRAVENOUS ONCE
Status: COMPLETED | OUTPATIENT
Start: 2021-06-02 | End: 2021-06-02

## 2021-06-02 RX ORDER — SODIUM CHLORIDE 0.9 % (FLUSH) 0.9 %
10 SYRINGE (ML) INJECTION PRN
Status: DISCONTINUED | OUTPATIENT
Start: 2021-06-02 | End: 2021-06-03 | Stop reason: HOSPADM

## 2021-06-02 RX ORDER — ONDANSETRON 2 MG/ML
4 INJECTION INTRAMUSCULAR; INTRAVENOUS EVERY 6 HOURS PRN
Status: DISCONTINUED | OUTPATIENT
Start: 2021-06-02 | End: 2021-06-03 | Stop reason: HOSPADM

## 2021-06-02 RX ORDER — EPHEDRINE SULFATE/0.9% NACL/PF 50 MG/5 ML
SYRINGE (ML) INTRAVENOUS PRN
Status: DISCONTINUED | OUTPATIENT
Start: 2021-06-02 | End: 2021-06-02 | Stop reason: SDUPTHER

## 2021-06-02 RX ORDER — MEPERIDINE HYDROCHLORIDE 25 MG/ML
12.5 INJECTION INTRAMUSCULAR; INTRAVENOUS; SUBCUTANEOUS EVERY 5 MIN PRN
Status: DISCONTINUED | OUTPATIENT
Start: 2021-06-02 | End: 2021-06-02 | Stop reason: HOSPADM

## 2021-06-02 RX ORDER — METOPROLOL SUCCINATE 25 MG/1
25 TABLET, EXTENDED RELEASE ORAL 2 TIMES DAILY
Status: DISCONTINUED | OUTPATIENT
Start: 2021-06-02 | End: 2021-06-03 | Stop reason: HOSPADM

## 2021-06-02 RX ORDER — GABAPENTIN 300 MG/1
300 CAPSULE ORAL DAILY
Status: DISCONTINUED | OUTPATIENT
Start: 2021-06-02 | End: 2021-06-03 | Stop reason: HOSPADM

## 2021-06-02 RX ORDER — NITROGLYCERIN 0.4 MG/1
0.4 TABLET SUBLINGUAL EVERY 5 MIN PRN
Status: DISCONTINUED | OUTPATIENT
Start: 2021-06-02 | End: 2021-06-03 | Stop reason: HOSPADM

## 2021-06-02 RX ORDER — LABETALOL 20 MG/4 ML (5 MG/ML) INTRAVENOUS SYRINGE
5 EVERY 10 MIN PRN
Status: DISCONTINUED | OUTPATIENT
Start: 2021-06-02 | End: 2021-06-02 | Stop reason: HOSPADM

## 2021-06-02 RX ORDER — SODIUM CHLORIDE 9 MG/ML
INJECTION, SOLUTION INTRAVENOUS CONTINUOUS
Status: DISCONTINUED | OUTPATIENT
Start: 2021-06-02 | End: 2021-06-02

## 2021-06-02 RX ORDER — SODIUM CHLORIDE 9 MG/ML
INJECTION, SOLUTION INTRAVENOUS CONTINUOUS
Status: DISCONTINUED | OUTPATIENT
Start: 2021-06-02 | End: 2021-06-03 | Stop reason: HOSPADM

## 2021-06-02 RX ORDER — FENTANYL CITRATE 50 UG/ML
50 INJECTION, SOLUTION INTRAMUSCULAR; INTRAVENOUS EVERY 5 MIN PRN
Status: DISCONTINUED | OUTPATIENT
Start: 2021-06-02 | End: 2021-06-02 | Stop reason: HOSPADM

## 2021-06-02 RX ORDER — IPRATROPIUM BROMIDE 42 UG/1
2 SPRAY, METERED NASAL 3 TIMES DAILY PRN
Status: DISCONTINUED | OUTPATIENT
Start: 2021-06-02 | End: 2021-06-03 | Stop reason: HOSPADM

## 2021-06-02 RX ORDER — HYDROCODONE BITARTRATE AND ACETAMINOPHEN 5; 325 MG/1; MG/1
1 TABLET ORAL EVERY 6 HOURS PRN
Status: DISCONTINUED | OUTPATIENT
Start: 2021-06-02 | End: 2021-06-03 | Stop reason: HOSPADM

## 2021-06-02 RX ORDER — LEVOTHYROXINE SODIUM 0.05 MG/1
50 TABLET ORAL DAILY
Status: DISCONTINUED | OUTPATIENT
Start: 2021-06-03 | End: 2021-06-03 | Stop reason: HOSPADM

## 2021-06-02 RX ORDER — MIDAZOLAM HYDROCHLORIDE 1 MG/ML
INJECTION INTRAMUSCULAR; INTRAVENOUS PRN
Status: DISCONTINUED | OUTPATIENT
Start: 2021-06-02 | End: 2021-06-02 | Stop reason: SDUPTHER

## 2021-06-02 RX ORDER — MORPHINE SULFATE 2 MG/ML
2 INJECTION, SOLUTION INTRAMUSCULAR; INTRAVENOUS
Status: DISCONTINUED | OUTPATIENT
Start: 2021-06-02 | End: 2021-06-03 | Stop reason: HOSPADM

## 2021-06-02 RX ORDER — FENTANYL CITRATE 50 UG/ML
25 INJECTION, SOLUTION INTRAMUSCULAR; INTRAVENOUS EVERY 5 MIN PRN
Status: DISCONTINUED | OUTPATIENT
Start: 2021-06-02 | End: 2021-06-02 | Stop reason: HOSPADM

## 2021-06-02 RX ORDER — PROMETHAZINE HYDROCHLORIDE 25 MG/ML
12.5 INJECTION, SOLUTION INTRAMUSCULAR; INTRAVENOUS
Status: DISCONTINUED | OUTPATIENT
Start: 2021-06-02 | End: 2021-06-02 | Stop reason: HOSPADM

## 2021-06-02 RX ORDER — PROPOFOL 10 MG/ML
INJECTION, EMULSION INTRAVENOUS PRN
Status: DISCONTINUED | OUTPATIENT
Start: 2021-06-02 | End: 2021-06-02 | Stop reason: SDUPTHER

## 2021-06-02 RX ORDER — FENTANYL CITRATE 50 UG/ML
INJECTION, SOLUTION INTRAMUSCULAR; INTRAVENOUS PRN
Status: DISCONTINUED | OUTPATIENT
Start: 2021-06-02 | End: 2021-06-02 | Stop reason: SDUPTHER

## 2021-06-02 RX ORDER — PANTOPRAZOLE SODIUM 40 MG/1
40 TABLET, DELAYED RELEASE ORAL
Status: DISCONTINUED | OUTPATIENT
Start: 2021-06-02 | End: 2021-06-03 | Stop reason: HOSPADM

## 2021-06-02 RX ORDER — ROCURONIUM BROMIDE 10 MG/ML
INJECTION, SOLUTION INTRAVENOUS PRN
Status: DISCONTINUED | OUTPATIENT
Start: 2021-06-02 | End: 2021-06-02 | Stop reason: SDUPTHER

## 2021-06-02 RX ADMIN — SUGAMMADEX 200 MG: 100 INJECTION, SOLUTION INTRAVENOUS at 15:08

## 2021-06-02 RX ADMIN — LABETALOL 20 MG/4 ML (5 MG/ML) INTRAVENOUS SYRINGE 5 MG: at 16:02

## 2021-06-02 RX ADMIN — MIDAZOLAM 2 MG: 1 INJECTION INTRAMUSCULAR; INTRAVENOUS at 12:58

## 2021-06-02 RX ADMIN — HYDROCODONE BITARTRATE AND ACETAMINOPHEN 1 TABLET: 5; 325 TABLET ORAL at 23:30

## 2021-06-02 RX ADMIN — ROCURONIUM BROMIDE 40 MG: 10 INJECTION INTRAVENOUS at 13:03

## 2021-06-02 RX ADMIN — Medication 15 MG: at 13:27

## 2021-06-02 RX ADMIN — HYDROMORPHONE HYDROCHLORIDE 0.5 MG: 1 INJECTION, SOLUTION INTRAMUSCULAR; INTRAVENOUS; SUBCUTANEOUS at 15:46

## 2021-06-02 RX ADMIN — LABETALOL 20 MG/4 ML (5 MG/ML) INTRAVENOUS SYRINGE 5 MG: at 16:16

## 2021-06-02 RX ADMIN — MORPHINE SULFATE 2 MG: 2 INJECTION, SOLUTION INTRAMUSCULAR; INTRAVENOUS at 18:32

## 2021-06-02 RX ADMIN — PROPOFOL 100 MCG/KG/MIN: 10 INJECTION, EMULSION INTRAVENOUS at 13:10

## 2021-06-02 RX ADMIN — ROCURONIUM BROMIDE 20 MG: 10 INJECTION INTRAVENOUS at 13:49

## 2021-06-02 RX ADMIN — METOPROLOL SUCCINATE 25 MG: 25 TABLET, FILM COATED, EXTENDED RELEASE ORAL at 20:27

## 2021-06-02 RX ADMIN — PROPOFOL 130 MG: 10 INJECTION, EMULSION INTRAVENOUS at 13:03

## 2021-06-02 RX ADMIN — SODIUM CHLORIDE: 9 INJECTION, SOLUTION INTRAVENOUS at 12:05

## 2021-06-02 RX ADMIN — ROCURONIUM BROMIDE 20 MG: 10 INJECTION INTRAVENOUS at 14:14

## 2021-06-02 RX ADMIN — LABETALOL 20 MG/4 ML (5 MG/ML) INTRAVENOUS SYRINGE 5 MG: at 15:38

## 2021-06-02 RX ADMIN — HYDROMORPHONE HYDROCHLORIDE 0.5 MG: 1 INJECTION, SOLUTION INTRAMUSCULAR; INTRAVENOUS; SUBCUTANEOUS at 15:34

## 2021-06-02 RX ADMIN — FENTANYL CITRATE 50 MCG: 50 INJECTION, SOLUTION INTRAMUSCULAR; INTRAVENOUS at 15:24

## 2021-06-02 RX ADMIN — DEXAMETHASONE SODIUM PHOSPHATE 10 MG: 10 INJECTION, EMULSION INTRAMUSCULAR; INTRAVENOUS at 12:27

## 2021-06-02 RX ADMIN — PIPERACILLIN SODIUM,TAZOBACTAM SODIUM 3375 MG: 3; .375 INJECTION, POWDER, FOR SOLUTION INTRAVENOUS at 13:19

## 2021-06-02 RX ADMIN — HYDROMORPHONE HYDROCHLORIDE 0.5 MG: 1 INJECTION, SOLUTION INTRAMUSCULAR; INTRAVENOUS; SUBCUTANEOUS at 15:53

## 2021-06-02 RX ADMIN — HYDROMORPHONE HYDROCHLORIDE 0.5 MG: 1 INJECTION, SOLUTION INTRAMUSCULAR; INTRAVENOUS; SUBCUTANEOUS at 15:41

## 2021-06-02 RX ADMIN — LABETALOL 20 MG/4 ML (5 MG/ML) INTRAVENOUS SYRINGE 5 MG: at 15:51

## 2021-06-02 RX ADMIN — SODIUM CHLORIDE: 9 INJECTION, SOLUTION INTRAVENOUS at 15:09

## 2021-06-02 RX ADMIN — FENTANYL CITRATE 50 MCG: 50 INJECTION, SOLUTION INTRAMUSCULAR; INTRAVENOUS at 16:01

## 2021-06-02 RX ADMIN — FENTANYL CITRATE 50 MCG: 50 INJECTION, SOLUTION INTRAMUSCULAR; INTRAVENOUS at 13:03

## 2021-06-02 RX ADMIN — FENTANYL CITRATE 50 MCG: 50 INJECTION, SOLUTION INTRAMUSCULAR; INTRAVENOUS at 15:29

## 2021-06-02 ASSESSMENT — PULMONARY FUNCTION TESTS
PIF_VALUE: 16
PIF_VALUE: 0
PIF_VALUE: 16
PIF_VALUE: 16
PIF_VALUE: 0
PIF_VALUE: 17
PIF_VALUE: 16
PIF_VALUE: 0
PIF_VALUE: 0
PIF_VALUE: 16
PIF_VALUE: 0
PIF_VALUE: 16
PIF_VALUE: 2
PIF_VALUE: 16
PIF_VALUE: 0
PIF_VALUE: 16
PIF_VALUE: 15
PIF_VALUE: 0
PIF_VALUE: 0
PIF_VALUE: 17
PIF_VALUE: 1
PIF_VALUE: 0
PIF_VALUE: 16
PIF_VALUE: 0
PIF_VALUE: 0
PIF_VALUE: 17
PIF_VALUE: 0
PIF_VALUE: 22
PIF_VALUE: 17
PIF_VALUE: 0
PIF_VALUE: 0
PIF_VALUE: 16
PIF_VALUE: 0
PIF_VALUE: 16
PIF_VALUE: 16
PIF_VALUE: 0
PIF_VALUE: 18
PIF_VALUE: 0
PIF_VALUE: 16
PIF_VALUE: 16
PIF_VALUE: 0
PIF_VALUE: 29
PIF_VALUE: 3
PIF_VALUE: 12
PIF_VALUE: 0
PIF_VALUE: 2
PIF_VALUE: 3
PIF_VALUE: 0
PIF_VALUE: 17
PIF_VALUE: 0
PIF_VALUE: 16
PIF_VALUE: 0
PIF_VALUE: 16
PIF_VALUE: 0
PIF_VALUE: 0
PIF_VALUE: 18
PIF_VALUE: 0
PIF_VALUE: 16
PIF_VALUE: 5
PIF_VALUE: 0
PIF_VALUE: 16
PIF_VALUE: 0
PIF_VALUE: 0
PIF_VALUE: 17
PIF_VALUE: 0
PIF_VALUE: 17
PIF_VALUE: 0
PIF_VALUE: 18
PIF_VALUE: 17
PIF_VALUE: 0
PIF_VALUE: 16
PIF_VALUE: 0
PIF_VALUE: 1
PIF_VALUE: 0

## 2021-06-02 ASSESSMENT — PAIN SCALES - GENERAL
PAINLEVEL_OUTOF10: 7
PAINLEVEL_OUTOF10: 5
PAINLEVEL_OUTOF10: 3
PAINLEVEL_OUTOF10: 10
PAINLEVEL_OUTOF10: 9
PAINLEVEL_OUTOF10: 10
PAINLEVEL_OUTOF10: 3
PAINLEVEL_OUTOF10: 4
PAINLEVEL_OUTOF10: 9
PAINLEVEL_OUTOF10: 9
PAINLEVEL_OUTOF10: 7

## 2021-06-02 ASSESSMENT — PAIN DESCRIPTION - LOCATION
LOCATION: THROAT
LOCATION: THROAT

## 2021-06-02 ASSESSMENT — PAIN DESCRIPTION - FREQUENCY: FREQUENCY: CONTINUOUS

## 2021-06-02 ASSESSMENT — PAIN DESCRIPTION - ORIENTATION
ORIENTATION: MID
ORIENTATION: MID

## 2021-06-02 ASSESSMENT — PAIN DESCRIPTION - PAIN TYPE
TYPE: SURGICAL PAIN
TYPE: SURGICAL PAIN

## 2021-06-02 ASSESSMENT — PAIN DESCRIPTION - PROGRESSION: CLINICAL_PROGRESSION: NOT CHANGED

## 2021-06-02 ASSESSMENT — PAIN - FUNCTIONAL ASSESSMENT: PAIN_FUNCTIONAL_ASSESSMENT: ACTIVITIES ARE NOT PREVENTED

## 2021-06-02 ASSESSMENT — PAIN DESCRIPTION - DESCRIPTORS: DESCRIPTORS: ACHING

## 2021-06-02 ASSESSMENT — PAIN DESCRIPTION - ONSET: ONSET: ON-GOING

## 2021-06-02 NOTE — ANESTHESIA PRE PROCEDURE
Department of Anesthesiology  Preprocedure Note       Name:  Dorene Torres   Age:  77 y.o.  :  1955                                          MRN:  [id number]         Date:  2021      Surgeon: Anette Rosado):  Leta Heimlich, MD Luther Backer, MD    Procedure: Procedure(s):  (DR. HUNTER) SUSPENSION MICROLARYNGOSCOPY WITH RECONSTRUCTION AND ADVANCEMENT FLAP X2; (DR. GONGORA)  MICROLARYNGOSCOPY AND BX POSTERIOR RIGHT VOCAL CORD      JET VENTILATION AND CO2 LASER    Medications prior to admission:   Prior to Admission medications    Medication Sig Start Date End Date Taking? Authorizing Provider   gabapentin (NEURONTIN) 300 MG capsule Take 300 mg by mouth daily. Historical Provider, MD   ipratropium (ATROVENT) 0.06 % nasal spray 2 sprays by Each Nostril route 3 times daily as needed for Rhinitis    Historical Provider, MD   metoprolol succinate (TOPROL XL) 50 MG extended release tablet Take by mouth 2 times daily 1/2 tab    Historical Provider, MD   levothyroxine (SYNTHROID) 50 MCG tablet Take 1 tablet by mouth daily 21   Baldev George MD   atorvastatin (LIPITOR) 40 MG tablet Take 1 tablet by mouth daily 21   Baldev George MD   terazosin (HYTRIN) 5 MG capsule TAKE 1 CAPSULE NIGHTLY 21   Baldev George MD   nitroGLYCERIN (NITROSTAT) 0.4 MG SL tablet up to max of 3 total doses. If no relief after 1 dose, call 911. 21   Yesenia Mayer MD   diphenhydrAMINE-APAP, sleep, (TYLENOL PM EXTRA STRENGTH)  MG tablet Take 2 tablets by mouth nightly    Historical Provider, MD   omeprazole (PRILOSEC) 20 MG delayed release capsule Take 20 mg by mouth Daily     Historical Provider, MD   Coenzyme Q10 (CO Q-10) 200 MG CAPS Take  by mouth daily. Historical Provider, MD   Multiple Vitamin (MULTI-VITAMIN) TABS Take  by mouth daily.       Historical Provider, MD   ARGININE PO Take 500 mg by mouth daily 4 tab    Historical Provider, MD       Current medications:    No current Hypertension     Left shoulder pain     nerve pain , ? 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    PONV (postoperative nausea and vomiting)     only after OHS    S/P CABG (coronary artery bypass graft)     [facility]    Thyroid disease     Vision loss of right eye     during hyperbaric chamber oxygen therapy     Vitreous opacities     right    Wears dentures     full upper, no bottom teeth and no use of dentures on bottom       Past Surgical History:        Procedure Laterality Date    CARDIAC CATHETERIZATION  2002, 2004    [facility]    COLONOSCOPY      CORONARY ANGIOPLASTY WITH STENT PLACEMENT  2002    [facility]    CORONARY ANGIOPLASTY WITH STENT PLACEMENT N/A 2019    [facility]    CORONARY ARTERY BYPASS GRAFT      [facility]    DENTAL SURGERY      bottom teeth removal    HEMORRHOID SURGERY      KNEE SURGERY Right     LARYNGOSCOPY N/A 10/4/2017    LARYNGOSCOPY MICRO WITH BIOPSY performed by Kaylah Urrutia MD at [address] N/A 2021    LARYNGOSCOPY MICRO AND BIOPSY, WITH JET VENT performed by Kaylah Urrutia MD at [address]  2016    by Dr Jorden Ormond VITREOUS,SUBRET/CHOROID FLUID Right 2018    VITRECTOMY 22 GAUGE performed by Emiliano Kothari MD at [facility] Right 2018     VITRECTOMY 25 GAUGE (Right Eye)       Social History:    Social History     Tobacco Use    Smoking status: Former Smoker     Packs/day: 2.00     Years: 20.00     Pack years: 40.00     Quit date: 2001     Years since quittin.5    Smokeless tobacco: Never Used   Substance Use Topics    Alcohol use: No     Alcohol/week: 0.0 standard drinks                                Counseling given: Not Answered      Vital Signs (Current): There were no vitals filed for this visit.                                            BP Readings from Last 3 Encounters:   06/02/21 (!) 149/81   05/14/21 122/60   05/12/21 (!) 202/102       NPO Status:                                                                                 BMI:   Wt Readings from Last 3 Encounters:   06/02/21 185 lb 6.4 oz (84.1 kg)   05/14/21 187 lb 9.6 oz (85.1 kg)   05/12/21 188 lb (85.3 kg)     There is no height or weight on file to calculate BMI.    CBC:   Lab Results   Component Value Date    WBC 7.3 04/27/2021    RBC 4.61 04/27/2021    RBC 4.86 12/01/2011    HGB 14.0 04/27/2021    HCT 43.5 04/27/2021    MCV 94.4 04/27/2021    RDW 12.6 12/01/2017     04/27/2021       CMP:   Lab Results   Component Value Date     04/27/2021    K 4.3 04/27/2021    K 4.0 07/12/2019     04/27/2021    CO2 27 04/27/2021    BUN 19 04/27/2021    CREATININE 0.9 04/27/2021    LABGLOM 84 04/27/2021    GLUCOSE 107 04/27/2021    GLUCOSE 95 12/01/2011    PROT 6.3 04/27/2021    CALCIUM 9.4 04/27/2021    BILITOT 0.4 04/27/2021    ALKPHOS 88 04/27/2021    AST 19 04/27/2021    ALT 17 04/27/2021       POC Tests: No results for input(s): POCGLU, POCNA, POCK, POCCL, POCBUN, POCHEMO, POCHCT in the last 72 hours. Coags:   Lab Results   Component Value Date    INR 0.97 07/12/2019    APTT 30.9 07/12/2019       HCG (If Applicable): No results found for: PREGTESTUR, PREGSERUM, HCG, HCGQUANT     ABGs: No results found for: PHART, PO2ART, CPA2UMN, JYA7HPK, BEART, R6MGNREJ     Type & Screen (If Applicable):  Lab Results   Component Value Date    LABRH NEG 07/12/2019       Drug/Infectious Status (If Applicable):  Lab Results   Component Value Date    HEPCAB Negative 01/15/2021       COVID-19 Screening (If Applicable):   Lab Results   Component Value Date    COVID19 NOT DETECTED 05/08/2021           Anesthesia Evaluation  Patient summary reviewed and Nursing notes reviewed   history of anesthetic complications: PONV.   Airway: Mallampati: II        Dental:          Pulmonary: breath sounds clear to auscultation  (+) sleep apnea:                             Cardiovascular:  Exercise tolerance: good (>4 METS),   (+) hypertension:, CAD:, dysrhythmias:,       ECG reviewed  Rhythm: regular  Rate: normal                    Neuro/Psych:               GI/Hepatic/Renal:   (+) hepatitis: C,           Endo/Other:                     Abdominal:       Abdomen: soft. Vascular:                                          Anesthesia Plan      general     ASA 3       Induction: intravenous. MIPS: Postoperative opioids intended and Prophylactic antiemetics administered. Anesthetic plan and risks discussed with patient and spouse. Plan discussed with CRNA.                   [address] DO Salvador   6/2/2021

## 2021-06-02 NOTE — INTERVAL H&P NOTE
Pt Name: Carol Liriano  MRN: [id number]  YOB: 1955  Date of evaluation: 6/2/2021    I have examined the patient and reviewed the H&P/Consult and there are no changes to the patient or plans.          Electronically signed by Dona Maloney MD on 6/2/2021 at 12:55 PM

## 2021-06-02 NOTE — PROGRESS NOTES
A 77year old admitted to 6e66 from  accompanied by wife and daughter.   Oriented to room, unit , routine, plan of care and admitting orders

## 2021-06-02 NOTE — INTERVAL H&P NOTE
Pt Name: Laurel Cruz  MRN: [id number]  YOB: 1955  Date of evaluation: 6/2/2021    I have examined the patient and reviewed the H&P/Consult and there are no changes to the patient or plans.          Electronically signed by Tariq Nettles MD on 6/2/2021 at 12:51 PM

## 2021-06-02 NOTE — PROGRESS NOTES
Patient admitted to [facility] room 6 with wife at bedside. Bed in low position side rails up call light in reach. Patient denies questions at this time.

## 2021-06-02 NOTE — H&P
Laurel Cruz is an 77 y.o.  male with biopsy-proven squamous cell carcinoma of the right false vocal fold adjacent to the area epiglottic fold and arytenoid tower structures. The patient is scheduled for suspension microlaryngoscopy with laser resection of this region and reconstruction with mucous membrane advancement flap today in conjunction with Dr. Shawn Conway, my colleague otolaryngologist.    Past Medical History:   Diagnosis Date    Ascending Aneurysm ([facility] [address] 02/2017    Ascending, 4.2 cm per pt    Brown's esophagus     CAD (coronary artery disease)     Cancer ([facility] [address]     throat cancer, s/p radiation    Chronic back pain     Hepatitis C     as child    History of hyperbaric oxygen therapy     after bottom teeth removed after rdiation therapy    Hyperlipidemia     Hypertension     Left shoulder pain     nerve pain , ? 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    PONV (postoperative nausea and vomiting)     only after OHS    S/P CABG (coronary artery bypass graft) 2002    [address]    Thyroid disease     Vision loss of right eye     during hyperbaric chamber oxygen therapy     Vitreous opacities     right    Wears dentures     full upper, no bottom teeth and no use of dentures on bottom       Allergies: No Known Allergies    Active Problems:    * No active hospital problems. *  Resolved Problems:    * No resolved hospital problems. *    Blood pressure (!) 149/81, pulse 58, temperature 97.3 °F (36.3 °C), temperature source Temporal, resp. rate 16, height 5' 11\" (1.803 m), weight 185 lb 6.4 oz (84.1 kg), SpO2 99 %.     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 Plan: To the operating room for resection and reconstruction of same. I reviewed the reconstruction aspects of of this plan with the patient as well as some information relating to the jet ventilation care that he will be getting in order to enable this operation today. I answered his and his family's questions regarding these issues to their satisfaction. I specifically addressed aspects of might relate to postoperative dysphagia and measures that might be used to treat those conditions if indicated. The patient reported understanding the basis of my recommendations and wishing to proceed as recommended.     Camryn Marshall MD  6/2/2021

## 2021-06-02 NOTE — H&P
[facility] [place] [facility]  [facility] EAR, NOSE AND THROAT  [address] [place] [address]  Dept: [phone]  Dept Fax: [phone]  Loc: [phone]    Lisa Graham is a 77 y.o. male who was referred byNo ref. provider found for:  No chief complaint on file. Sanam Gillespie HPI:     Lisa Graham is a 77 y.o. male who presents today for follow-up on his biopsy of the posterior aspect of the right false vocal cord 2 days ago. The underlying tissue was very hard from the prior radiation. The lesion was very friable and superficial, and was not widely excised, rather just biopsied for tissue diagnosis. REBECA NORTONENEKIMBERLY II.[facility] Pathology     [facility]                    [id number]   Assoc.                                              Page 1 of 1   [address]   REBECA BRAN AM OFFENEGG II.[place], [place] [address]                                                       PROC: 2021   [facility]                                    RECV: 2021   [facility]                                    RPTD: 2021   REBECA NORTONENEGG II.[place], [place] [address]                       MRN:  [id number]     LOC: OR                       ACCT: [de-identified]  SEX: M                       : 1955  AGE: 77 Y                          PATHOLOGY REPORT                       ATTN: Brenda Her                       REQ: Brenda Her       Copies To:   TUTU HEMPHILL       Clinical Information: LESION OF RIGHT FALSE VOCAL CORD, EFFECTS. RADIATION, PRIMARY SQUAMOUS CELL CARCINOMA OF LARYNX, TENORIO'S   ESOPHAGUS WITHOUT DYSPLASIA, THROAT CLEARING     FINAL DIAGNOSIS:   Right posterior false vocal cord biopsy:    Recurrent keratinizing squamous cell carcinoma.      Specimen:   VOCAL CORD BIOPSY, POSTERIOR RIGHT FALSE       Gross Examination:   The container is labeled Daniella Gross, posterior right false vocal cord.    Received in formalin are multiple bits of red-tan tissue aggregating   to about 0.8 x 0.5 x 0.2 cm.  1 ns.  PCF/DKR:[id number]     Microscopic Examination:   Sections consist of a keratinizing squamous cell carcinoma with   papillary architecture and features consistent with prior radiation. There is limited lamina propria present for evaluation, and there is no   definite evidence of invasion. Carisa Oquendo                                        <Sign Out Dr. Miranda Fontana M.D., F.C. A. P     History:     No Known Allergies  No current facility-administered medications for this encounter. Current Outpatient Medications   Medication Sig Dispense Refill    gabapentin (NEURONTIN) 300 MG capsule Take 300 mg by mouth daily.  ipratropium (ATROVENT) 0.06 % nasal spray 2 sprays by Each Nostril route 3 times daily as needed for Rhinitis      metoprolol succinate (TOPROL XL) 50 MG extended release tablet Take by mouth 2 times daily 1/2 tab      levothyroxine (SYNTHROID) 50 MCG tablet Take 1 tablet by mouth daily 90 tablet 1    atorvastatin (LIPITOR) 40 MG tablet Take 1 tablet by mouth daily 90 tablet 1    terazosin (HYTRIN) 5 MG capsule TAKE 1 CAPSULE NIGHTLY 90 capsule 1    nitroGLYCERIN (NITROSTAT) 0.4 MG SL tablet up to max of 3 total doses. If no relief after 1 dose, call 911. 25 tablet 3    diphenhydrAMINE-APAP, sleep, (TYLENOL PM EXTRA STRENGTH)  MG tablet Take 2 tablets by mouth nightly      omeprazole (PRILOSEC) 20 MG delayed release capsule Take 20 mg by mouth Daily       Coenzyme Q10 (CO Q-10) 200 MG CAPS Take  by mouth daily.  Multiple Vitamin (MULTI-VITAMIN) TABS Take  by mouth daily.         ARGININE PO Take 500 mg by mouth daily 4 tab       Past Medical History:   Diagnosis Date    Ascending Aneurysm ([facility] [address] 02/2017    Ascending, 4.2 cm per pt    Brown's esophagus     CAD (coronary artery disease)     Cancer ([facility] [address]     throat cancer, s/p radiation    Chronic back pain     Hepatitis C     as child    History of hyperbaric oxygen therapy     after Alcohol use: No     Alcohol/week: 0.0 standard drinks       Subjective:      Review of Systems   Constitutional: Negative for activity change, appetite change, chills, diaphoresis, fatigue, fever and unexpected weight change. HENT: Negative for congestion, dental problem, ear discharge, ear pain, facial swelling, hearing loss, mouth sores, nosebleeds, postnasal drip, rhinorrhea, sinus pressure, sneezing, sore throat, tinnitus, trouble swallowing and voice change. Eyes: Negative for visual disturbance. Respiratory: Negative for apnea, cough, choking, chest tightness, shortness of breath, wheezing and stridor. Cardiovascular: Negative for chest pain, palpitations and leg swelling. Gastrointestinal: Negative for abdominal pain, diarrhea, nausea and vomiting. Endocrine: Negative for cold intolerance, heat intolerance, polydipsia and polyuria. Genitourinary: Negative for dysuria, enuresis and hematuria. Musculoskeletal: Negative for arthralgias, gait problem, neck pain and neck stiffness. Skin: Negative for color change and rash. Allergic/Immunologic: Negative for environmental allergies, food allergies and immunocompromised state. Neurological: Negative for dizziness, syncope, facial asymmetry, speech difficulty, light-headedness and headaches. Hematological: Negative for adenopathy. Does not bruise/bleed easily. Psychiatric/Behavioral: Negative for confusion and sleep disturbance. The patient is not nervous/anxious. Objective: There were no vitals taken for this visit. Physical Exam  Vitals and nursing note reviewed. Constitutional:       Appearance: He is well-developed. HENT:      Head: Normocephalic and atraumatic. No laceration. Right Ear: Hearing, ear canal and external ear normal. No drainage or swelling. No middle ear effusion. Tympanic membrane is not perforated or erythematous.       Left Ear: Hearing, tympanic membrane, ear canal and external ear normal. No drainage or swelling. No middle ear effusion. Tympanic membrane is not perforated or erythematous. Nose: Nose normal. No septal deviation, mucosal edema or rhinorrhea. Mouth/Throat:      Mouth: Mucous membranes are not pale and not dry. No oral lesions. Pharynx: Oropharynx is clear. Uvula midline. No oropharyngeal exudate or posterior oropharyngeal erythema. Comments: LIps: lips normal     Mallampati 1  Base of tongue: symmetric,  Eyes:      Extraocular Movements:      Right eye: Normal extraocular motion and no nystagmus. Left eye: Normal extraocular motion and no nystagmus. Comments: Conjugate gaze   Neck:      Thyroid: No thyroid mass or thyromegaly. Trachea: Phonation normal. No tracheal deviation. Comments:   Salivary glands not enlarged and normal to palpation    Pulmonary:      Effort: Pulmonary effort is normal. No retractions. Breath sounds: No stridor. Musculoskeletal:      Cervical back: Neck supple. Neurological:      Mental Status: He is alert and oriented to person, place, and time. Cranial Nerves: No cranial nerve deficit (VIIth N function intact bilat). Psychiatric:         Mood and Affect: Mood and affect normal.         Behavior: Behavior is cooperative. Data:  All of the past medical history, past surgical history, family history,social history, allergies and current medications were reviewed with the patient. Assessment & Plan   Diagnoses and all orders for this visit:     Diagnosis Orders   1. 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 Primary squamous cell carcinoma of larynx (HCC)      Anterior right false vocal cord, 2016   3. Effects, radiation, subsequent encounter     4. Hoarseness     5. Hypothyroidism due to acquired atrophy of thyroid     6.  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 This might be squamous cell carcinoma in situ or a superficially invasive lesion, either way it should be amenable to laser ablation. Patient refuses laryngectomy, and I agree that laryngectomy would not likely to be necessary. Plan for now is to arrange for a jet ventilation/CO2 laser ablation of the new primary squamous cell carcinoma on the posterior right true vocal cord. Patient understands benefits and risks. He and his wife request we proceed. This would be performed jointly with Dr. David Samuels, as was the recent biopsy. David Novak. Elieser Beckett MD    **This report has been created using voice recognition software. It may contain minor errors which are inherent in voicerecognition technology. **      Addendum: May 18, 2021    Based on my role in the patient's operation regarding his jet ventilation, and the positive biopsies for squamous cell carcinoma, Dr. Elieser Beckett has requested that I join him on his upcoming surgery for resection of the involved soft tissues with the hopes of assisting him with the jet ventilation to enable his transoral resection and with the reconstruction that will hopefully enable him to more quickly get back to oral nutrition postoperatively. The operation needed following the resection will be a suspension microlaryngoscopy with advancement flap reconstruction of supraglottis post resection. It would also require jet ventilation and will take approximately 1 hour in addition to his resection time. I will reviewed the indications benefits limitations and risks of proceeding this manner with the patient preoperatively in the holding area to get his additional informed consent for this portion of his care. Chris Bedolla.  David Samuels MD

## 2021-06-02 NOTE — H&P (VIEW-ONLY)
[facility] [place] [facility]  [facility]SE [facility]  [address] [place] [address] [place] [address]  Dept: [phone]  Dept Fax: [phone]  Loc: [phone]    Gab Corley is a 77 y.o. male who was referred byNo ref. provider found for:  No chief complaint on file. Del Couch HPI:     Gab Corley is a 77 y.o. male who presents today for a visit regarding his surgery tomorrow. He will be undergoing microlaryngoscopy biopsy of a suspicious area in the right posterior false cord. Is been a few weeks since he was last scoped. He does get hoarse as he talks for a long time. Otherwise he has no pain dysphagia or hemoptysis. Suspicious lesion is on the posterior right false vocal cord. 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 Del Couch History:     No Known Allergies  No current facility-administered medications for this encounter. Current Outpatient Medications   Medication Sig Dispense Refill    gabapentin (NEURONTIN) 300 MG capsule Take 300 mg by mouth daily.  ipratropium (ATROVENT) 0.06 % nasal spray 2 sprays by Each Nostril route 3 times daily as needed for Rhinitis      metoprolol succinate (TOPROL XL) 50 MG extended release tablet Take by mouth 2 times daily 1/2 tab      levothyroxine (SYNTHROID) 50 MCG tablet Take 1 tablet by mouth daily 90 tablet 1    atorvastatin (LIPITOR) 40 MG tablet Take 1 tablet by mouth daily 90 tablet 1    terazosin (HYTRIN) 5 MG capsule TAKE 1 CAPSULE NIGHTLY (Patient not taking: Reported on 5/11/2021) 90 capsule 1    ticagrelor (BRILINTA) 90 MG TABS tablet Take 1 tablet by mouth 2 times daily (Patient not taking: Reported on 5/11/2021) 180 tablet 3    nitroGLYCERIN (NITROSTAT) 0.4 MG SL tablet up to max of 3 total doses.  If no relief after 1 dose, call 911. 25 tablet 3    diphenhydrAMINE-APAP, sleep, (TYLENOL PM EXTRA STRENGTH)  MG tablet Take 2 tablets by mouth nightly      omeprazole (PRILOSEC) 20 MG delayed release capsule Take 20 mg by mouth Daily       aspirin EC 81 MG EC tablet Take 81 mg by mouth daily      Coenzyme Q10 (CO Q-10) 200 MG CAPS Take  by mouth daily.  Multiple Vitamin (MULTI-VITAMIN) TABS Take  by mouth daily.         ARGININE PO Take 500 mg by mouth daily 4 tab       Past Medical History:   Diagnosis Date    Ascending Aneurysm ([facility] [address] 02/2017    Ascending, 4.2 cm per pt    Brown's esophagus     CAD (coronary artery disease)     Cancer ([facility] [address]     throat cancer, s/p radiation    Chronic back pain     Hepatitis C     as child    History of hyperbaric oxygen therapy     after bottom teeth removed after rdiation therapy    Hyperlipidemia     Hypertension     Left shoulder pain     nerve pain , ? 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    PONV (postoperative nausea and vomiting)     only after OHS    S[facility]G [facility]coronary artery bypass graft[facility] 2002    [facility]    Thyroid disease     Vision loss of right eye     during hyperbaric chamber oxygen therapy     Vitreous opacities     right    Wears dentures     full upper, no bottom teeth and no use of dentures on bottom      Past Surgical History:   Procedure Laterality Date    CARDIAC CATHETERIZATION  8/2002, 4/2004    [facility]    COLONOSCOPY      CORONARY ANGIOPLASTY WITH STENT PLACEMENT  7/2002    [facility]    CORONARY ANGIOPLASTY WITH STENT PLACEMENT N/A 07/12/2019    [facility]    CORONARY ARTERY BYPASS GRAFT  2002    [facility]    DENTAL SURGERY      bottom teeth removal    HEMORRHOID SURGERY      KNEE SURGERY Right     LARYNGOSCOPY N/A 10/4/2017    LARYNGOSCOPY MICRO WITH BIOPSY performed by Yolanda Koehler MD at [address] [place]  04/28/2016    by Dr Ling Route VITREOUS,SUBRET/CHOROID FLUID Right 8/27/2018    VITRECTOMY 25 GAUGE performed by Antoinette Yan MD at [address] [place] [address] VITRECTOMY Right 2018     VITRECTOMY 25 GAUGE (Right Eye)     Family History   Problem Relation Age of Onset    Heart Disease Father         MI    High Blood Pressure Father     Stroke Father     Cancer Maternal Grandmother     Cancer Maternal Grandfather     Diabetes Neg Hx      Social History     Tobacco Use    Smoking status: Former Smoker     Packs/day: 2.00     Years: 20.00     Pack years: 40.00     Quit date: 2001     Years since quittin.4    Smokeless tobacco: Never Used   Substance Use Topics    Alcohol use: No     Alcohol/week: 0.0 standard drinks       Subjective:      Review of Systems   Constitutional: Negative for activity change, appetite change, chills, diaphoresis, fatigue, fever and unexpected weight change. HENT: Negative for congestion, dental problem, ear discharge, ear pain, facial swelling, hearing loss, mouth sores, nosebleeds, postnasal drip, rhinorrhea, sinus pressure, sneezing, sore throat, tinnitus, trouble swallowing and voice change. Eyes: Negative for visual disturbance. Respiratory: Negative for apnea, cough, choking, chest tightness, shortness of breath, wheezing and stridor. Cardiovascular: Negative for chest pain, palpitations and leg swelling. Gastrointestinal: Negative for abdominal pain, diarrhea, nausea and vomiting. Endocrine: Negative for cold intolerance, heat intolerance, polydipsia and polyuria. Genitourinary: Negative for dysuria, enuresis and hematuria. Musculoskeletal: Negative for arthralgias, gait problem, neck pain and neck stiffness. Skin: Negative for color change and rash. Allergic/Immunologic: Negative for environmental allergies, food allergies and immunocompromised state. Neurological: Negative for dizziness, syncope, facial asymmetry, speech difficulty, light-headedness and headaches. Hematological: Negative for adenopathy. Does not bruise/bleed easily. topical anesthesia, after using Afrin and Lidocaine spray in the nasal fossa. The nasal fossa, nasopharynx, hypopharynx and larynx were carefully examined. Base of tongue was symmetrical. Epiglottis appeared normal and was not retrodisplaced. True vocal cords had normal mobility. There was no erythema. Raised mucosal irregularities noted on the posterior third of the right false vocal cord. This is somewhat more extensive than last exam. No pooling in the pyriform sinuses. All of the past medical history, past surgical history, family history,social history, allergies and current medications were reviewed with the patient. Assessment & Plan   Diagnoses and all orders for this visit:     Diagnosis Orders   1. Lesion of right false vocal cord  NH LARYNGOSCOPY FLEXIBLE DIAGNOSTIC   2. Primary squamous cell carcinoma of larynx (HCC)  NH LARYNGOSCOPY FLEXIBLE DIAGNOSTIC   3. Effects, radiation, subsequent encounter  NH LARYNGOSCOPY FLEXIBLE DIAGNOSTIC   4. FOUZIA on CPAP  NH LARYNGOSCOPY FLEXIBLE DIAGNOSTIC   5. Hoarseness  NH LARYNGOSCOPY FLEXIBLE DIAGNOSTIC       The findings were explained and his questions were answered. We will proceed with the laryngoscopy and excision of these lesions. The area is radiated tissue and will be slow to heal.  They are well aware of the rationale for proceeding and the risks involved. He and his wife request we proceed. Eda Quiroz. Davian Calvillo MD    **This report has been created using voice recognition software. It may contain minor errors which are inherent in voicerecognition technology. **

## 2021-06-02 NOTE — BRIEF OP NOTE
Brief Postoperative Note      Patient: Leo Jorgensen  YOB: 1955  MRN: [id number]    Date of Procedure: 6/2/2021    Pre-Op Diagnosis: SCCA POSTERIOR RIGHT FALSE VOCAL CORD    Post-Op Diagnosis: Same and , Right true vocal cord       Procedure(s):  Transglottic CO2 laser partial resection: right posterior false vocal cord, true vocal cord, ventricle and right arytenoid. Surgeon(s):  MD Blaze Rojas MD      Assistant:  * No surgical staff found *    Anesthesia: Jet ventilation  Estimated Blood Loss (mL): Minimal    Complications: None    Specimens:   ID Type Source Tests Collected by Time Destination   A : right posterior aretnoid margin Tissue Larynx SURGICAL PATHOLOGY Blaze Brooks MD 6/2/2021 1442    B : anterior right true vocal cord anterior third, margin beyond rescetion Tissue Larynx [address] MD [place] 6/2/2021 1458    C : right transglottic rescetion false fold vetricular sulcus posterior, superior true vocal cord & erytenoid Tissue Larynx SURGICAL PATHOLOGY Blaze Brooks MD 6/2/2021 1521    D : superior surface rithg true vocal cord middle third, margin beyond resection Tissue Larynx SURGICAL PATHOLOGY Blaze Brooks MD 6/2/2021 1526        Implants:  * No implants in log *      Drains:   Urethral Catheter 16 fr (Active)   Site Assessment Pink 06/02/21 1523   Urine Color Yellow 06/02/21 1523   Urine Appearance Clear 06/02/21 1523       Findings: This is a 55-year-old male had a squamous of carcinoma of the anterior right false cord approximately 5 years ago treated with radiation therapy. I have followed him ever since. Earlier this year I noticed an irregularity in the mucosa of the right posterior false cord. He was biopsied recently and this was consistent with squamous cell carcinoma.     Findings today showed scattered areas of tumor in the right posterior false vocal cord extending back to the arytenoid and onto the true vocal cord at the ventricle, middle third. Ventricle seemed to collapsed. Palpation suggested there might be deep tumor in the area. The false vocal cord is the primary site and the true vocal cord is a subsite, making the same T2 lesion    CO2 laser resection using jet ventilation was performed removing the entire area. This included the posterior two thirds of the right false vocal cord, with the resection removing the ventricle and the superior portion of the right true vocal cord, along with partial removal of the arytenoid. Vocal process was not violated and remained attached to the true vocal cord. The procedure was amazingly free of bleeding right up until the final removal of the tissue in the middle third of the right true vocal cord superior surface. That area in the middle portion of the false vocal cord had some bleeding which required monopolar cautery, topical epinephrine, and bipolar cautery. A bipolar cautery finally sealed vessels. There were no other issues and that in itself is not a complication. It is a reason to observe him overnight for potential bleeding.     Electronically signed by Rachid Welsh MD on 6/2/2021 at 3:40 PM

## 2021-06-03 ENCOUNTER — TELEPHONE (OUTPATIENT)
Dept: ENT CLINIC | Age: 66
End: 2021-06-03

## 2021-06-03 VITALS
TEMPERATURE: 98 F | SYSTOLIC BLOOD PRESSURE: 141 MMHG | DIASTOLIC BLOOD PRESSURE: 67 MMHG | BODY MASS INDEX: 25.96 KG/M2 | HEIGHT: 71 IN | HEART RATE: 63 BPM | WEIGHT: 185.4 LBS | RESPIRATION RATE: 20 BRPM | OXYGEN SATURATION: 95 %

## 2021-06-03 PROCEDURE — 99024 POSTOP FOLLOW-UP VISIT: CPT | Performed by: NURSE PRACTITIONER

## 2021-06-03 PROCEDURE — 6370000000 HC RX 637 (ALT 250 FOR IP): Performed by: OTOLARYNGOLOGY

## 2021-06-03 PROCEDURE — 31382 PARTIAL REMOVAL OF LARYNX: CPT | Performed by: OTOLARYNGOLOGY

## 2021-06-03 PROCEDURE — 2580000003 HC RX 258: Performed by: OTOLARYNGOLOGY

## 2021-06-03 RX ORDER — CIPROFLOXACIN 500 MG/1
500 TABLET, FILM COATED ORAL 2 TIMES DAILY
Qty: 14 TABLET | Refills: 0 | Status: SHIPPED | OUTPATIENT
Start: 2021-06-03 | End: 2021-06-10

## 2021-06-03 RX ADMIN — SODIUM CHLORIDE: 9 INJECTION, SOLUTION INTRAVENOUS at 01:07

## 2021-06-03 RX ADMIN — PANTOPRAZOLE SODIUM 40 MG: 40 TABLET, DELAYED RELEASE ORAL at 06:28

## 2021-06-03 RX ADMIN — LEVOTHYROXINE SODIUM 50 MCG: 0.05 TABLET ORAL at 06:28

## 2021-06-03 RX ADMIN — HYDROCODONE BITARTRATE AND ACETAMINOPHEN 1 TABLET: 5; 325 TABLET ORAL at 07:56

## 2021-06-03 RX ADMIN — GABAPENTIN 300 MG: 300 CAPSULE ORAL at 07:56

## 2021-06-03 RX ADMIN — METOPROLOL SUCCINATE 25 MG: 25 TABLET, FILM COATED, EXTENDED RELEASE ORAL at 07:56

## 2021-06-03 ASSESSMENT — PAIN SCALES - GENERAL
PAINLEVEL_OUTOF10: 6
PAINLEVEL_OUTOF10: 4
PAINLEVEL_OUTOF10: 4

## 2021-06-03 ASSESSMENT — PAIN DESCRIPTION - DESCRIPTORS
DESCRIPTORS: SORE

## 2021-06-03 ASSESSMENT — PAIN DESCRIPTION - PROGRESSION
CLINICAL_PROGRESSION: GRADUALLY WORSENING
CLINICAL_PROGRESSION: GRADUALLY WORSENING
CLINICAL_PROGRESSION: GRADUALLY IMPROVING

## 2021-06-03 ASSESSMENT — PAIN DESCRIPTION - PAIN TYPE
TYPE: SURGICAL PAIN

## 2021-06-03 ASSESSMENT — PAIN DESCRIPTION - LOCATION
LOCATION: THROAT

## 2021-06-03 NOTE — CARE COORDINATION
6/3/21, 11:34 AM EDT    Patient goals/plan/ treatment preferences discussed by  and . Patient goals/plan/ treatment preferences reviewed with patient/ family. Patient/ family verbalize understanding of discharge plan and are in agreement with goal/plan/treatment preferences. Understanding was demonstrated using the teach back method. AVS provided by RN at time of discharge, which includes all necessary medical information pertaining to the patients current course of illness, treatment, post-discharge goals of care, and treatment preferences. Pt to  Be discharged to home with wife. He denies needs or services.

## 2021-06-03 NOTE — DISCHARGE SUMMARY
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 XXXD    Post-operative state Z98.890     Consultants:  none  Procedures/Diagnostic Test:   Microlaryngoscopy and biopsy with jet ventilation 5/12/2021 with Dr Verónica Gramajo: Amrita Gabriel originally presented to the hospital on 6/2/2021 10:54 AM above described procedure. He was admitted overnight for observation of post-operative bleeding and pain control. At time of discharge, Amrita Gabriel was tolerating a soft diet, having bowel movements, ambulating on his own accord and had adequate analgesia on oral pain medications. Patient had no signs or symptoms of complications.     PHYSICAL EXAMINATION     Discharge Vitals:  height is 5' 11\" (1.803 m) and weight is 185 lb 6.4 oz (84.1 kg). His oral temperature is 98 °F (36.7 °C). His blood pressure is 141/67 (abnormal) and his pulse is 63. His respiration is 20 and oxygen saturation is 95%. General appearance - alert, well appearing, and in no distress and oriented to person, place, and time  HEENT - voice is strong, no e    LABS     No results for input(s): WBC, HGB, HCT, PLT, NA, K, CL, CO2, BUN, CREATININE in the last 72 hours. DISCHARGE INSTRUCTIONS     Discharge Medications:      Medication List        CONTINUE taking these medications      ARGININE PO     atorvastatin 40 MG tablet  Commonly known as: LIPITOR  Take 1 tablet by mouth daily     Co Q-10 200 MG Caps     gabapentin 300 MG capsule  Commonly known as: NEURONTIN     ipratropium 0.06 % nasal spray  Commonly known as: ATROVENT     levothyroxine 50 MCG tablet  Commonly known as: SYNTHROID  Take 1 tablet by mouth daily     metoprolol succinate 50 MG extended release tablet  Commonly known as: TOPROL XL     Multi-Vitamin Tabs     nitroGLYCERIN 0.4 MG SL tablet  Commonly known as: NITROSTAT  up to max of 3 total doses. If no relief after 1 dose, call 911.      omeprazole 20 MG delayed release capsule  Commonly known as: PRILOSEC     terazosin 5 MG capsule  Commonly known as: HYTRIN  TAKE 1 CAPSULE NIGHTLY     ticagrelor 90 MG Tabs tablet  Commonly known as: Brilinta  Take 1 tablet by mouth 2 times daily     Tylenol PM Extra Strength  MG tablet  Generic drug: diphenhydrAMINE-APAP (sleep)               Where to Get Your Medications        Information about where to get these medications is not yet available    Ask your nurse or doctor about these medications  ticagrelor 90 MG Tabs tablet         Electronically signed by DEA Freeman CNP on 6/10/2021 at 9:09 AM

## 2021-06-03 NOTE — DISCHARGE INSTR - DIET

## 2021-06-03 NOTE — DISCHARGE INSTR - ACTIVITY
Continue to be up and about ambulating at home to promote healing, reduce risk of blood clots from forming and aid in bowel function

## 2021-06-03 NOTE — TELEPHONE ENCOUNTER
Called patient wife and informed. Appointment is scheduled for tomorrow and they are going to call before the appointment to make sure it is back in time if not we will keep patient scheduled and keep an eye on the pathology and have patient come later in the day.
Please let patient know Dr Lidia Pérez would like him on an oral antibiotic at home to prevent any infection of the tissues or cartilage in the back of his throat. Rx sent to pharmacy [facility]).
Family/Nurse Practitioner, Parents/Nursing

## 2021-06-03 NOTE — ANESTHESIA POSTPROCEDURE EVALUATION
Department of Anesthesiology  Postprocedure Note    Patient: Jyothi Clarke  MRN: [id number]  YOB: 1955  Date of evaluation: 6/3/2021  Time:  11:09 AM     Procedure Summary     Date: 06/02/21 Room / Location: [address] [place] [address]    Anesthesia Start: 6419 Anesthesia Stop: 3393    Procedure: (DR. HUNTER) SUSPENSION MICROLARYNGOSCOPY WITH RECONSTRUCTION AND ADVANCEMENT FLAP X2; (DR. GONGORA)  MICROLARYNGOSCOPY AND BX POSTERIOR RIGHT VOCAL CORD      JET VENTILATION AND CO2 LASER (N/A ) Diagnosis: (SCC POSTERIOR VOCAL CORD)    Surgeons: Eron Espinosa MD Responsible Provider: Estevan Winchester DO    Anesthesia Type: general ASA Status: 3          Anesthesia Type: general    Maggie Phase I: Maggie Score: 10    Maggie Phase II:      Last vitals: Reviewed and per EMR flowsheets.        Anesthesia Post Evaluation    Patient location during evaluation: PACU  Patient participation: complete - patient participated  Level of consciousness: awake  Airway patency: patent  Nausea & Vomiting: no nausea  Complications: no  Cardiovascular status: hemodynamically stable  Respiratory status: acceptable  Hydration status: stable

## 2021-06-03 NOTE — CARE COORDINATION
DISCHARGE PLANNING EVALUATION: OP/OBSERVATION        6/3/21, 7:27 AM EDT    Janki Shane       Admitted from: PACU 6/2/2021/ 1054   Location: [address] [place] [address] Reason for admit: Post-operative state [Z98.890]   Admit order signed?: yes    Procedure:  Transglottic CO2 laser partial resection:  Squamous cell  carcinoma on the right posterior false vocal cord, true vocal cord,  ventricle, right arytenoid.       Pertinent Info/Orders/Treatment Plan:  Pain and nausea control, ice therapy, Protonix. PCP: Andrei Lobato MD    Patient Goals/Plan/Treatment Preferences: Met with Jericho Duran. He currently lives at home with his wife. Plan is to return at discharge. He denies need for DME and declines HH. Transportation/Food Security/Housekeeping Addressed:  No issues identified.

## 2021-06-03 NOTE — OP NOTE
[address] [place] [address]                                OPERATIVE REPORT    PATIENT NAME: Christiano Lowe                      :        1955  MED REC NO:   [id number]                           ROOM:       0343  ACCOUNT NO:   [de-identified]                           ADMIT DATE: 2021  PROVIDER:     Bina Alexandra. PITO Mcelroykermit Molt:  2021    OPERATIONS:  Transglottic CO2 laser partial resection:  Squamous cell  carcinoma on the right posterior false vocal cord, true vocal cord,  ventricle, right arytenoid. SURGEONS:  Bina Alexandra. Perla Orosco M.D., Norma Yancey. Martha Schafer MD    ANESTHESIA:  General endotracheal converted to jet ventilation and then  back to general endotracheal anesthesia. PREOPERATIVE DIAGNOSIS:  Squamous cell carcinoma posterior right false  vocal cord. POSTOPERATIVE DIAGNOSES:  Squamous cell carcinoma of posterior right  false vocal cord with extension onto the right true vocal cord and into  the soft tissue overlying the arytenoid. HISTORY OF OPERATIVE FINDINGS:  This 42-year-old male has a history of  squamous cell carcinoma of the right false focal cord diagnosed by me  approximately 5 years ago. This was treated with primary radiation  therapy. He has undergone surveillance with interval laryngoscopies  since then. However this year, I noticed an irregularity in the mucosa  of the right posterior false vocal cord. This was observed, it  continued to enlarge and was biopsied. This was reported as consistent  with squamous cell carcinoma. His prior lesion had been anterior,  although a course of entire lesion area had been radiated. Findings today showed scattered areas of tumor on the right posterior  false vocal cord. Septal portion had been biopsied at his last surgery.   The lesions extended back to the arytenoid and onto the superior surface  of the middle third of the right true vocal cord. Ventricle appeared to  be collapsed and possibly involved with cancer as well. Palpation of  the area suggested there might be submucosal recurrence or another  primary from within the ventricle. The right false vocal cord should be  considered to be the primary site and the true vocal cord the subsite. This was therefore T2, N0, M0 lesion. The CO2 laser resection using jet  ventilation was performed with removal of most of the posterior right  false vocal cord, the ventricle, and the superior surface of the right  true vocal cord and some of the arytenoid en bloc. Additional biopsies  were taken as shown beyond the margins of resection in the remaining  surgical bed. This included the middle third of the right true vocal  cord, the deepest portion of the right false vocal cord, and the right  arytenoid. It had been tentatively planned to perform a flap advancement closure of  the defect. Given the additional extension on to the vocal cord and  without the pathology from the permanent sections, it was felt that flap  advancement would be simply covering what might need to be watched  closely for recurrence. The vocal process remained attached to the  vocal cord, and the patient's voice should be functional once he is  healed. He will be observed closely. No signs of infection. He  recently had some dental work and tolerated hyperbaric oxygen therapy  for that. DESCRIPTION OF PROCEDURE:  After an adequate level of general  endotracheal anesthesia had been obtained with a #7 endotracheal tube,  the Kailyn laryngoscope was introduced in the hypopharynx. This was  positioned with the tip in the vallecula which angulated so that it  pulled the epiglottis forward. There was excellent visualization, and  this was held in place with the scope anderson attached to the bed. Video telescope was used with a 30-degree angle to evaluate the larynx  and photos were taken.   Findings were noted as above. In particular,  the extension onto the superior surface of the right true vocal cord and  the ventricle being probably involved. Consideration was given to the  patient having a deep tumor within the ventricle or at least submucosal  and a wider excision was planned. Under direct vision, the jet ventilation cannula was slipped into the  larynx and advanced into the trachea. Care was taken to have the _____  against the mucosa and the tip was not pointed at the posterior wall. Depth was in the lower trachea of the distal tip, but well above the  segun. This was fixed in place and the endotracheal tube was removed. The operating microscope was brought into alignment and carefully  positioned to visualize the entire operative site. Visualization was  excellent. CO2 laser was set on a 1 mm pattern with 16 xie with intermittent  function with a delay of 0.1 seconds. Laser was tested on a wet wooden  tongue blade. It appeared to be quite accurate. CO2 laser was then used to incise the mucosa along the lateral aspect of  the tumor site, on the superior surface of the false vocal cord. This  was extended posteriorly around the back edge of the lesion and down on  to the superior surface of the true vocal cord. Anteriorly, the  incision was carried through the mucosa and down around on to the  superior surface of the true vocal cord, anterior to the identifiable  tumor. This also encompassed the opening for the ventricle. Under direct microscopic vision, the target tissue was excised. Great  care was taken to be precise and removed the ventricle extending out on  to the superior surfaces of the true vocal cord. This was done in many  steps. The anterior end of the arytenoid cartilage was exposed and care  was taken not to mess up the vocal process. Right vocal cord ligament  remained attached to the vocal process.     Hemostasis was supplemented with topical epinephrine, suction cautery  and bipolar cautery. Dissection was carried well into the depths of the  right false vocal cord before extending dissection more medially. The  tissue specimen was removed from posteriorly and laterally,  to begin  forward and it appears that the ventricle was excised successfully. There was some persistent oozing in the middle third of the true vocal  cord and false vocal cord which was controlled with bipolar cautery. Additional tissue specimens were taken from the depth of the wound  including the superior surface of the middle third of the right true  vocal cord, middle portion of the depth of the false vocal cord excision  and portion of the arytenoid and surrounding mucosa. Once hemostasis  had been secured, the scope was introduced into the trachea. Blood was  suctioned from the region of the segun. At this point, it was decided not to pursue the flap reconstruction  since this area would need to be observed closely as it healed and for  recurrences later. It was felt that the true vocal cord on the right  would be fully functional, although it might have less mass on the left  side. Depending on the findings, we will consider a PET-CT. The patient will  be observed overnight for potential bleeding from the excision site, and  we will see him back in the office probably Friday afternoon if the  pathology has been completed. Estimated blood loss was minimal.  There were no complications, and he  tolerated the procedure extremely well. Carleen Chambers M.D.    D: 06/02/2021 17:11:32       T: 06/02/2021 17:21:23     [email or web address]  Job#: [id number]     Doc#: [id number]    CC:  Keon Hayward M.D.

## 2021-06-03 NOTE — DISCHARGE INSTR - OTHER ORDERS
Shower or sponge bathe daily, keep head of bed elevated to reduce swelling and pain.  Call Dr Arabella Sprague office for temps greater than 101F, increase swelling to throat, pain uncontrolled, increase nausea or vomiting, bloody drainage in throat, coughing up bloody drainage, vomiting bloody drainage, unable to urinate

## 2021-06-04 ENCOUNTER — OFFICE VISIT (OUTPATIENT)
Dept: ENT CLINIC | Age: 66
End: 2021-06-04

## 2021-06-04 VITALS
SYSTOLIC BLOOD PRESSURE: 112 MMHG | RESPIRATION RATE: 16 BRPM | BODY MASS INDEX: 25.94 KG/M2 | DIASTOLIC BLOOD PRESSURE: 70 MMHG | TEMPERATURE: 97.3 F | HEART RATE: 76 BPM | WEIGHT: 186 LBS

## 2021-06-04 DIAGNOSIS — T66.XXXD EFFECTS, RADIATION, SUBSEQUENT ENCOUNTER: ICD-10-CM

## 2021-06-04 DIAGNOSIS — R49.0 HOARSENESS: ICD-10-CM

## 2021-06-04 DIAGNOSIS — J38.3 DYSPLASIA OF VOCAL CORD: ICD-10-CM

## 2021-06-04 DIAGNOSIS — C32.9 PRIMARY SQUAMOUS CELL CARCINOMA OF LARYNX (HCC): Primary | ICD-10-CM

## 2021-06-04 PROCEDURE — 99024 POSTOP FOLLOW-UP VISIT: CPT | Performed by: OTOLARYNGOLOGY

## 2021-06-04 ASSESSMENT — ENCOUNTER SYMPTOMS
SHORTNESS OF BREATH: 0
SORE THROAT: 0
VOMITING: 0
FACIAL SWELLING: 0
COLOR CHANGE: 0
CHEST TIGHTNESS: 0
CHOKING: 0
STRIDOR: 0
SINUS PRESSURE: 0
TROUBLE SWALLOWING: 0
DIARRHEA: 0
VOICE CHANGE: 0
NAUSEA: 0
APNEA: 0
ABDOMINAL PAIN: 0
RHINORRHEA: 0
WHEEZING: 0
COUGH: 0

## 2021-06-04 NOTE — PROGRESS NOTES
[facility] [place] [facility]  [facility] EAR, NOSE AND THROAT  [address] [place] [address] [place] [address]  Dept: [phone]  Dept Fax: [phone]  Loc: [phone]    Carola Siddiqui is a 77 y.o. male who was referred byNo ref. provider found for:  Chief Complaint   Patient presents with   [facility] Post-Op Check     Patient here for post op exam and results of his surgical pathology.  Results   . HPI:     Carola Siddiqui is a 77 y.o. male who presents today for post op exam and results of his surgical pathology. Results were reviewed with patient. Pathology:  FINAL DIAGNOSIS:   A. Right posterior arytenoid margin, biopsy:    Benign cartilaginous tissue, negative for malignancy. B. Anterior right true vocal cord, anterior third, biopsy:    Benign, cauterized squamous mucosa. C. Right transglottic resection, false fold, biopsy:    Moderate to severe squamous dysplasia. D. Superior surface right true vocal cord, middle third, biopsy:    Benign cartilaginous tissue, negative for malignancy. He is sore. His voice is excellent. [place] told him to start taking the  Brilinta on Elyse 10 th . He is getting a lot of mucus in his throat. He is coughing. He is taking his pain medication and seems like he can't go without. He is taking his antibiotics. History:     No Known Allergies  Current Outpatient Medications   Medication Sig Dispense Refill    [START ON 6/10/2021] ticagrelor (BRILINTA) 90 MG TABS tablet Take 1 tablet by mouth 2 times daily 60 tablet 0    ciprofloxacin (CIPRO) 500 MG tablet Take 1 tablet by mouth 2 times daily for 7 days 14 tablet 0    gabapentin (NEURONTIN) 300 MG capsule Take 300 mg by mouth daily.       ipratropium (ATROVENT) 0.06 % nasal spray 2 sprays by Each Nostril route 3 times daily as needed for Rhinitis      metoprolol succinate (TOPROL XL) 50 MG extended release tablet Take by mouth 2 times daily 1/2 tab      levothyroxine (SYNTHROID) 50 MCG tablet Take 1 tablet by mouth daily 90 tablet 1    atorvastatin (LIPITOR) 40 MG tablet Take 1 tablet by mouth daily 90 tablet 1    terazosin (HYTRIN) 5 MG capsule TAKE 1 CAPSULE NIGHTLY 90 capsule 1    nitroGLYCERIN (NITROSTAT) 0.4 MG SL tablet up to max of 3 total doses. If no relief after 1 dose, call 911. 25 tablet 3    diphenhydrAMINE-APAP, sleep, (TYLENOL PM EXTRA STRENGTH)  MG tablet Take 2 tablets by mouth nightly      omeprazole (PRILOSEC) 20 MG delayed release capsule Take 20 mg by mouth Daily       Coenzyme Q10 (CO Q-10) 200 MG CAPS Take  by mouth daily.  Multiple Vitamin (MULTI-VITAMIN) TABS Take  by mouth daily.  ARGININE PO Take 500 mg by mouth daily 4 tab       No current facility-administered medications for this visit.      Past Medical History:   Diagnosis Date    Ascending Aneurysm ([facility] [address] 02/2017    Ascending, 4.2 cm per pt    Brown's esophagus     CAD (coronary artery disease)     Cancer (HCC)     throat cancer, s/p radiation    Chronic back pain     Hepatitis C     as child    History of hyperbaric oxygen therapy     after bottom teeth removed after rdiation therapy    Hyperlipidemia     Hypertension     Left shoulder pain     nerve pain , ? 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    PONV (postoperative nausea and vomiting)     only after OHS    S/P [facility]G [facility]coronary [facility] bypass graft) 2002    [facility]    Thyroid disease     Vision loss of right eye     during hyperbaric chamber oxygen therapy     Vitreous opacities     right    Wears dentures     full upper, no bottom teeth and no use of dentures on bottom      Past Surgical History:   Procedure Laterality Date    CARDIAC CATHETERIZATION  8/2002, 4/2004    [facility]    COLONOSCOPY      CORONARY ANGIOPLASTY WITH STENT PLACEMENT  7/2002    [facility]    CORONARY ANGIOPLASTY WITH STENT PLACEMENT N/A 07/12/2019    [facility]    CORONARY ARTERY BYPASS GRAFT      [facility]    DENTAL SURGERY      bottom teeth removal    HEMORRHOID SURGERY      KNEE SURGERY Right     LARYNGOSCOPY N/A 10/4/2017    LARYNGOSCOPY MICRO WITH BIOPSY performed by Sindi Moore MD at [address] [place] [address] [address] 2021    LARYNGOSCOPY MICRO AND BIOPSY, WITH JET VENT performed by Sindi Moore MD at [address] [place] [address] [address] 2021    (DR. HUNTER) SUSPENSION MICROLARYNGOSCOPY WITH RECONSTRUCTION AND ADVANCEMENT FLAP X2; (DR. GONGORA)  MICROLARYNGOSCOPY AND BX POSTERIOR RIGHT VOCAL CORD      JET VENTILATION AND CO2 LASER performed by Darrick Livingston MD at [address]  2016    by Dr Jerrod Wood VITREOUS,SUBRET/CHOROID FLUID Right 2018    VITRECTOMY 22 GAUGE performed by Philly Puckett MD at [place] [address] Right 2018     VITRECTOMY 25 GAUGE (Right Eye)     Family History   Problem Relation Age of Onset    Heart Disease Father         MI    High Blood Pressure Father     Stroke Father     Cancer Maternal Grandmother     Cancer Maternal Grandfather     Diabetes Neg Hx      Social History     Tobacco Use    Smoking status: Former Smoker     Packs/day: 2.00     Years: 20.00     Pack years: 40.00     Quit date: 2001     Years since quittin.5    Smokeless tobacco: Never Used   Substance Use Topics    Alcohol use: No     Alcohol/week: 0.0 standard drinks       Subjective:       Review of Systems   Constitutional: Negative for activity change, appetite change, chills, diaphoresis, fatigue, fever and unexpected weight change. HENT: Negative for congestion, dental problem, ear discharge, ear pain, facial swelling, hearing loss, mouth sores, nosebleeds, postnasal drip, rhinorrhea, sinus pressure, sneezing, sore throat, tinnitus, trouble swallowing and voice change. Eyes: Negative for visual disturbance.    Respiratory: Negative for apnea, cough, choking, chest with chip tip scope       I, Matias Blood CMA ([facility]), am scribing for, and in the presence of Dr. Tamiko Negrete. Electronically signed by Og Jimenez ([facility]) on 6/4/21 at 12:37 PM EDT. (Please note that portions of this note were completed with a voice recognition program. Efforts were made to edit the dictations butoccasionally words are mis-transcribed.)    I agree to the above documentation placed by my scribe. I have personally evaluated this patient. Additional findings are as noted. I reviewed the scribe's note and agree with the documented findings and plan of care. Any areas of disagreement are corrected. I agree with the chief complaint, past medical history, past surgical history, allergies, medications, social and family history as documented unless otherwise noted below.      Electronically signed by Khang Olivares MD on 6/14/2021 at 9:51 PM

## 2021-06-25 ENCOUNTER — OFFICE VISIT (OUTPATIENT)
Dept: ENT CLINIC | Age: 66
End: 2021-06-25
Payer: MEDICARE

## 2021-06-25 VITALS
WEIGHT: 184 LBS | RESPIRATION RATE: 12 BRPM | BODY MASS INDEX: 25.76 KG/M2 | HEART RATE: 72 BPM | HEIGHT: 71 IN | TEMPERATURE: 97.2 F | SYSTOLIC BLOOD PRESSURE: 128 MMHG | DIASTOLIC BLOOD PRESSURE: 70 MMHG

## 2021-06-25 DIAGNOSIS — C32.9 PRIMARY SQUAMOUS CELL CARCINOMA OF LARYNX (HCC): Primary | ICD-10-CM

## 2021-06-25 DIAGNOSIS — T66.XXXD EFFECTS, RADIATION, SUBSEQUENT ENCOUNTER: ICD-10-CM

## 2021-06-25 PROCEDURE — 99024 POSTOP FOLLOW-UP VISIT: CPT | Performed by: OTOLARYNGOLOGY

## 2021-06-25 PROCEDURE — 31575 DIAGNOSTIC LARYNGOSCOPY: CPT | Performed by: OTOLARYNGOLOGY

## 2021-06-25 ASSESSMENT — ENCOUNTER SYMPTOMS
COLOR CHANGE: 0
RHINORRHEA: 0
APNEA: 0
SINUS PRESSURE: 0
SHORTNESS OF BREATH: 0
STRIDOR: 0
NAUSEA: 0
FACIAL SWELLING: 0
CHOKING: 0
TROUBLE SWALLOWING: 0
VOICE CHANGE: 0
CHEST TIGHTNESS: 0
VOMITING: 0
WHEEZING: 0
ABDOMINAL PAIN: 0
DIARRHEA: 0
COUGH: 0
SORE THROAT: 0

## 2021-06-25 NOTE — PROGRESS NOTES
[facility] [place] [facility]  [facility] EAR, NOSE AND THROAT   [place] [address]  Dept: [phone]  Dept Fax: [phone]  Loc: [phone]    Carola Siddiqui is a 77 y.o. male who was referred byNo ref. provider found for:  Chief Complaint   Patient presents with    Follow-up     Patient is here for a 3 week follow up vocal cord biopsy 06/02/2021. Richard Marquez HPI:     Carola Siddiqui is a 77 y.o. male who presents today for 3 week follow up vocal cord biopsy on 6/2/21. He gets coughing spells. Gets hoarse, and he sometimes yawns and gets a sharp pain in throat up to his right ear. He's been using Lueden's cough drops and menthol cough drops. 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 History:     No Known Allergies  Current Outpatient Medications   Medication Sig Dispense Refill    zoster recombinant adjuvanted vaccine (SHINGRIX) 50 MCG/0.5ML SUSR injection Inject 0.5 mLs into the muscle See Admin Instructions 1 dose now and repeat in 2-6 months 0.5 mL 0    terbinafine (LAMISIL) 250 MG tablet Take 1 tablet by mouth daily 30 tablet 2    ticagrelor (BRILINTA) 90 MG TABS tablet Take 1 tablet by mouth 2 times daily 60 tablet 0    gabapentin (NEURONTIN) 300 MG capsule Take 300 mg by mouth daily.  ipratropium (ATROVENT) 0.06 % nasal spray 2 sprays by Each Nostril route 3 times daily as needed for Rhinitis      metoprolol succinate (TOPROL XL) 50 MG extended release tablet Take by mouth 2 times daily 1/2 tab      levothyroxine (SYNTHROID) 50 MCG tablet Take 1 tablet by mouth daily 90 tablet 1    atorvastatin (LIPITOR) 40 MG tablet Take 1 tablet by mouth daily 90 tablet 1    terazosin (HYTRIN) 5 MG capsule TAKE 1 CAPSULE NIGHTLY 90 capsule 1    nitroGLYCERIN (NITROSTAT) 0.4 MG SL tablet up to max of 3 total doses.  If no relief after 1 dose, call 911. 25 tablet 3    diphenhydrAMINE-APAP, sleep, (TYLENOL PM EXTRA STRENGTH)  MG tablet Take 2 tablets by mouth nightly      omeprazole (PRILOSEC) 20 MG delayed release capsule Take 20 mg by mouth Daily       Coenzyme Q10 (CO Q-10) 200 MG CAPS Take  by mouth daily.  Multiple Vitamin (MULTI-VITAMIN) TABS Take  by mouth daily.  ARGININE PO Take 500 mg by mouth daily 4 tab       No current facility-administered medications for this visit.      Past Medical History:   Diagnosis Date    Ascending Aneurysm ([address] 02/2017    Ascending, 4.2 cm per pt    Brown's esophagus     CAD (coronary artery disease)     Cancer (HCC)     throat cancer, s/p radiation    Chronic back pain     Hepatitis C     as child    History of hyperbaric oxygen therapy     after bottom teeth removed after rdiation therapy    Hyperlipidemia     Hypertension     Left shoulder pain     nerve pain , ? 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    PONV (postoperative nausea and vomiting)     only after OHS    S/P [facility]G [facility]coronary artery bypass graft) 2002    [facility]    Thyroid disease     Vision loss of right eye     during hyperbaric chamber oxygen therapy     Vitreous opacities     right    Wears dentures     full upper, no bottom teeth and no use of dentures on bottom      Past Surgical History:   Procedure Laterality Date    CARDIAC CATHETERIZATION  8/2002, 4/2004    [facility]    COLONOSCOPY      CORONARY ANGIOPLASTY WITH STENT PLACEMENT  7/2002    [facility]    CORONARY ANGIOPLASTY WITH STENT PLACEMENT N/A 07/12/2019    [facility]    CORONARY ARTERY BYPASS GRAFT  2002    [facility]    DENTAL SURGERY      bottom teeth removal    HEMORRHOID SURGERY      KNEE SURGERY Right     LARYNGOSCOPY N/A 10/4/2017    LARYNGOSCOPY MICRO WITH BIOPSY performed by Missy Foy MD at [address] [place] [address] 5/12/2021    LARYNGOSCOPY MICRO AND BIOPSY, WITH JET VENT performed by Missy Foy MD at [address] [facility] N/A 2021    (DR. HUNTER) SUSPENSION MICROLARYNGOSCOPY WITH RECONSTRUCTION AND ADVANCEMENT FLAP X2; (DR. GONGORA)  MICROLARYNGOSCOPY AND BX POSTERIOR RIGHT VOCAL CORD      JET VENTILATION AND CO2 LASER performed by Mary Mccurdy MD at [address] [place]  2016    by Dr Clarissa Squires VITREOUS,SUBRET/CHOROID FLUID Right 2018    VITRECTOMY 22 GAUGE performed by Lizzette Osorio MD at [place] [address] Right 2018     VITRECTOMY 25 GAUGE (Right Eye)     Family History   Problem Relation Age of Onset    Heart Disease Father         MI    High Blood Pressure Father     Stroke Father     Cancer Maternal Grandmother     Cancer Maternal Grandfather     Diabetes Neg Hx      Social History     Tobacco Use    Smoking status: Former Smoker     Packs/day: 2.00     Years: 20.00     Pack years: 40.00     Quit date: 2001     Years since quittin.5    Smokeless tobacco: Never Used   Substance Use Topics    Alcohol use: No     Alcohol/week: 0.0 standard drinks       Subjective:       Review of Systems   Constitutional: Negative for activity change, appetite change, chills, diaphoresis, fatigue, fever and unexpected weight change. HENT: Negative for congestion, dental problem, ear discharge, ear pain, facial swelling, hearing loss, mouth sores, nosebleeds, postnasal drip, rhinorrhea, sinus pressure, sneezing, sore throat, tinnitus, trouble swallowing and voice change. Eyes: Negative for visual disturbance. Respiratory: Negative for apnea, cough, choking, chest tightness, shortness of breath, wheezing and stridor. Cardiovascular: Negative for chest pain, palpitations and leg swelling. Gastrointestinal: Negative for abdominal pain, diarrhea, nausea and vomiting. Endocrine: Negative for cold intolerance, heat intolerance, polydipsia and polyuria. Genitourinary: Negative for dysuria, enuresis and hematuria.    Musculoskeletal: Negative for arthralgias, gait problem, neck pain and neck stiffness. Skin: Negative for color change and rash. Allergic/Immunologic: Negative for environmental allergies, food allergies and immunocompromised state. Neurological: Negative for dizziness, syncope, facial asymmetry, speech difficulty, light-headedness and headaches. Hematological: Negative for adenopathy. Does not bruise/bleed easily. Psychiatric/Behavioral: Negative for confusion and sleep disturbance. The patient is not nervous/anxious. Objective:     /70 (Site: Right Upper Arm, Position: Sitting)   Pulse 72   Temp 97.2 °F (36.2 °C) (Infrared)   Resp 12   Ht 5' 11\" (1.803 m)   Wt 184 lb (83.5 kg)   BMI 25.66 kg/m²     Physical Exam    PROCEDURE: FIBEROPTIC LARYNGOSCOPY    A fiberoptic laryngoscopy was performed under topical anesthesia, after using Afrin and Lidocaine spray in the nasal fossa. The nasal fossa, nasopharynx, hypopharynx and larynx were carefully examined. Base of tongue was symmetrical. Epiglottis appeared normal and was not retrodisplaced. True vocal cords had normal mobility. There was no erythema. No mucosal lesions or masses were noted. No pooling in the pyriform sinuses. ,  No redness, no swelling. Data:  All of the past medical history, past surgical history, family history,social history, allergies and current medications were reviewed with the patient. Assessment & Plan   Diagnoses and all orders for this visit:     Diagnosis Orders   1. Primary squamous cell carcinoma of larynx (HCC)     2. Effects, radiation, subsequent encounter         The findings were explained and his questions were answered. Options were discussed including following up 4 th week in July. He agreed. Use Lueden's wild cherry cough drops NOTHING MENTHOL. Use Mucinex DM to suppress cough. He will schedule an appointment after he gets back from vacation. Follow up 4th week in July.   Call for increasing pain       [employer] JESÚS Sofia ([facility]), am scribing for, and in the presence of Dr. Laure Wallace. Electronically signed by Liana Buchanan CMA ([facility]) on 6/25/21 at 1:45 PM EDT. (Please note that portions of this note were completed with a voice recognition program. Efforts were made to edit the dictations butoccasionally words are mis-transcribed.)    I agree to the above documentation placed by my scribe. I have personally evaluated this patient. Additional findings are as noted. I reviewed the scribe's note and agree with the documented findings and plan of care. Any areas of disagreement are corrected. I agree with the chief complaint, past medical history, past surgical history, allergies, medications, social and family history as documented unless otherwise noted below.      Electronically signed by Erin Lindsey MD on 6/25/2021 at 9:45 PM

## 2021-06-25 NOTE — PROGRESS NOTES
Verbal order from Dr. Elizabeth Handsome to anesthetize the patient's nasal cavity. After checking the patient's allergy list to confirm no listed medication allergy and verbally asking the patient, the patient's nasal cavity was anesthetized with topical 4% Xylocaine 4 sprays in right nostril and Abhishek-Synephrine 4 sprays in right nostril .

## 2021-07-02 PROBLEM — Z98.890 POST-OPERATIVE STATE: Status: RESOLVED | Noted: 2021-06-02 | Resolved: 2021-07-02

## 2021-07-07 RX ORDER — METOPROLOL SUCCINATE 50 MG/1
25 TABLET, EXTENDED RELEASE ORAL 2 TIMES DAILY
Qty: 10 TABLET | Refills: 0 | OUTPATIENT
Start: 2021-07-07 | End: 2021-10-14 | Stop reason: SDUPTHER

## 2021-07-07 NOTE — TELEPHONE ENCOUNTER
On vacation, [place], [place]. Pt forgot to pack Metoprolol. Would like Rx sent to  [address] [place] [address]. Taurus Morales [address] [place] [address]: [phone]  They will be on vacation through the 17th. Called into above WM.

## 2021-07-28 ENCOUNTER — HOSPITAL ENCOUNTER (OUTPATIENT)
Dept: RADIATION ONCOLOGY | Age: 66
Discharge: HOME | End: 2021-07-28
Payer: MEDICARE

## 2021-07-28 VITALS
TEMPERATURE: 98.2 F | DIASTOLIC BLOOD PRESSURE: 74 MMHG | BODY MASS INDEX: 25.8 KG/M2 | SYSTOLIC BLOOD PRESSURE: 145 MMHG | WEIGHT: 185 LBS | RESPIRATION RATE: 16 BRPM | HEART RATE: 53 BPM | OXYGEN SATURATION: 99 %

## 2021-07-28 PROCEDURE — 99212 OFFICE O/P EST SF 10 MIN: CPT | Performed by: RADIOLOGY

## 2021-07-28 PROCEDURE — 99213 OFFICE O/P EST LOW 20 MIN: CPT | Performed by: RADIOLOGY

## 2021-07-30 ENCOUNTER — OFFICE VISIT (OUTPATIENT)
Dept: ENT CLINIC | Age: 66
End: 2021-07-30
Payer: MEDICARE

## 2021-07-30 VITALS
BODY MASS INDEX: 25.93 KG/M2 | HEIGHT: 71 IN | RESPIRATION RATE: 12 BRPM | DIASTOLIC BLOOD PRESSURE: 70 MMHG | HEART RATE: 73 BPM | TEMPERATURE: 97.4 F | WEIGHT: 185.2 LBS | SYSTOLIC BLOOD PRESSURE: 138 MMHG

## 2021-07-30 DIAGNOSIS — H92.01 REFERRED OTALGIA OF RIGHT EAR: ICD-10-CM

## 2021-07-30 DIAGNOSIS — C32.9 PRIMARY SQUAMOUS CELL CARCINOMA OF LARYNX (HCC): Primary | ICD-10-CM

## 2021-07-30 DIAGNOSIS — R49.0 HOARSENESS: ICD-10-CM

## 2021-07-30 DIAGNOSIS — T66.XXXD EFFECTS, RADIATION, SUBSEQUENT ENCOUNTER: ICD-10-CM

## 2021-07-30 PROCEDURE — 4040F PNEUMOC VAC/ADMIN/RCVD: CPT | Performed by: OTOLARYNGOLOGY

## 2021-07-30 PROCEDURE — G8417 CALC BMI ABV UP PARAM F/U: HCPCS | Performed by: OTOLARYNGOLOGY

## 2021-07-30 PROCEDURE — 99212 OFFICE O/P EST SF 10 MIN: CPT | Performed by: OTOLARYNGOLOGY

## 2021-07-30 PROCEDURE — 1036F TOBACCO NON-USER: CPT | Performed by: OTOLARYNGOLOGY

## 2021-07-30 PROCEDURE — 31575 DIAGNOSTIC LARYNGOSCOPY: CPT | Performed by: OTOLARYNGOLOGY

## 2021-07-30 PROCEDURE — 1123F ACP DISCUSS/DSCN MKR DOCD: CPT | Performed by: OTOLARYNGOLOGY

## 2021-07-30 PROCEDURE — G8427 DOCREV CUR MEDS BY ELIG CLIN: HCPCS | Performed by: OTOLARYNGOLOGY

## 2021-07-30 PROCEDURE — 3017F COLORECTAL CA SCREEN DOC REV: CPT | Performed by: OTOLARYNGOLOGY

## 2021-07-30 ASSESSMENT — ENCOUNTER SYMPTOMS
VOMITING: 0
COLOR CHANGE: 0
DIARRHEA: 0
ABDOMINAL PAIN: 0
TROUBLE SWALLOWING: 0
CHOKING: 0
VOICE CHANGE: 0
FACIAL SWELLING: 0
WHEEZING: 0
STRIDOR: 0
RHINORRHEA: 0
SHORTNESS OF BREATH: 0
SINUS PRESSURE: 0
COUGH: 0
SORE THROAT: 0
NAUSEA: 0
CHEST TIGHTNESS: 0
APNEA: 0

## 2021-07-30 NOTE — PROGRESS NOTES
[facility] [place] [facility]  [facility]  [address] [place] [address] [place] [address]  Dept: [phone]  Dept Fax: [phone]  Loc: [phone]    Whitney Roberts is a 77 y.o. male who was referred byNo ref. provider found for:  Chief Complaint   Patient presents with    Follow-up     pt. here for follow-up. SCC of larynx, New scope   . HPI:     Whitney Roberts is a 77 y.o. male who presents today for follow up Squamous cell carcinoma of larynx, New scope. He has pain in larynx, with shooting paint that radiates to his right ear. Worse when he swallows. 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  ipratropium (ATROVENT) 0.06 % nasal spray 2 sprays by Each Nostril route 3 times daily as needed for Rhinitis      levothyroxine (SYNTHROID) 50 MCG tablet Take 1 tablet by mouth daily 90 tablet 1    atorvastatin (LIPITOR) 40 MG tablet Take 1 tablet by mouth daily 90 tablet 1    terazosin (HYTRIN) 5 MG capsule TAKE 1 CAPSULE NIGHTLY 90 capsule 1    nitroGLYCERIN (NITROSTAT) 0.4 MG SL tablet up to max of 3 total doses.  If no relief after 1 dose, call 911. 25 tablet 3    diphenhydrAMINE-APAP, sleep, (TYLENOL PM EXTRA STRENGTH)  MG tablet Take 2 tablets by mouth nightly      omeprazole (PRILOSEC) 20 MG delayed release capsule Take 20 mg by mouth Daily       Coenzyme Q10 (CO Q-10) 200 MG CAPS Take  by mouth daily.  Multiple Vitamin (MULTI-VITAMIN) TABS Take  by mouth daily.  ARGININE PO Take 500 mg by mouth daily 4 tab       No current facility-administered medications for this visit. Past Medical History:   Diagnosis Date    Ascending Aneurysm ([address] 02/2017    Ascending, 4.2 cm per pt    Brown's esophagus     CAD (coronary artery disease)     Cancer (HCC)     throat cancer, s/p radiation    Chronic back pain     Hepatitis C     as child    History of hyperbaric oxygen therapy     after bottom teeth removed after rdiation therapy    Hyperlipidemia     Hypertension     Left shoulder pain     nerve pain , ? 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    PONV (postoperative nausea and vomiting)     only after OHS    S/P [facility]G [facility]coronary artery bypass graft) 2002    [facility]    Thyroid disease     Vision loss of right eye     during hyperbaric chamber oxygen therapy     Vitreous opacities     right    Wears dentures     full upper, no bottom teeth and no use of dentures on bottom      Past Surgical History:   Procedure Laterality Date    CARDIAC CATHETERIZATION  8/2002, 4/2004    [facility]    COLONOSCOPY      CORONARY ANGIOPLASTY WITH STENT PLACEMENT  7/2002    [facility]    CORONARY ANGIOPLASTY WITH STENT PLACEMENT N/A 07/12/2019    [facility]    CORONARY ARTERY BYPASS GRAFT  2002    [facility]    DENTAL SURGERY      bottom teeth removal    HEMORRHOID SURGERY      KNEE SURGERY Right     LARYNGOSCOPY N/A 10/4/2017    LARYNGOSCOPY MICRO WITH BIOPSY performed by Colletta Gable, MD at [facility] 5/12/2021    LARYNGOSCOPY MICRO AND BIOPSY, WITH JET VENT performed by Colletta Gable, MD at [address] N/A 6/2/2021    (DR. HUNTER) SUSPENSION MICROLARYNGOSCOPY WITH RECONSTRUCTION AND ADVANCEMENT FLAP X2; ( FRANSISCA)  MICROLARYNGOSCOPY AND BX POSTERIOR RIGHT VOCAL CORD      JET VENTILATION AND CO2 LASER performed by Mary Beth Suresh MD at [address] [place]  2016    by Dr Kristan Samuels VITREOUS,SUBRET/CHOROID FLUID Right 2018    VITRECTOMY 22 GAUGE performed by Marcial Edwards MD at [address] Right 2018     VITRECTOMY 25 GAUGE (Right Eye)     Family History   Problem Relation Age of Onset    Heart Disease Father         MI    High Blood Pressure Father     Stroke Father     Cancer Maternal Grandmother     Cancer Maternal Grandfather     Diabetes Neg Hx      Social History     Tobacco Use    Smoking status: Former Smoker     Packs/day: 2.00     Years: 20.00     Pack years: 40.00     Quit date: 2001     Years since quittin.7    Smokeless tobacco: Never Used   Substance Use Topics    Alcohol use: No     Alcohol/week: 0.0 standard drinks       Subjective:       Review of Systems   Constitutional: Negative for activity change, appetite change, chills, diaphoresis, fatigue, fever and unexpected weight change. HENT: Negative for congestion, dental problem, ear discharge, ear pain, facial swelling, hearing loss, mouth sores, nosebleeds, postnasal drip, rhinorrhea, sinus pressure, sneezing, sore throat, tinnitus, trouble swallowing and voice change. Eyes: Negative for visual disturbance. Respiratory: Negative for apnea, cough, choking, chest tightness, shortness of breath, wheezing and stridor. Cardiovascular: Negative for chest pain, palpitations and leg swelling. Gastrointestinal: Negative for abdominal pain, diarrhea, nausea and vomiting. Endocrine: Negative for cold intolerance, heat intolerance, polydipsia and polyuria. Genitourinary: Negative for dysuria, enuresis and hematuria. Musculoskeletal: Negative for arthralgias, gait problem, neck pain and neck stiffness. Skin: Negative for color change and rash. Allergic/Immunologic: Negative for environmental allergies, food allergies and immunocompromised state. Neurological: Negative for dizziness, syncope, facial asymmetry, speech difficulty, light-headedness and headaches. Hematological: Negative for adenopathy. Does not bruise/bleed easily. Psychiatric/Behavioral: Negative for confusion and sleep disturbance. The patient is not nervous/anxious. Objective:     /70 (Site: Left Upper Arm, Position: Sitting)   Pulse 73   Temp 97.4 °F (36.3 °C) (Infrared)   Resp 12   Ht 5' 11\" (1.803 m)   Wt 185 lb 3.2 oz (84 kg)   BMI 25.83 kg/m²     Physical Exam    PROCEDURE: FIBEROPTIC LARYNGOSCOPY    A fiberoptic laryngoscopy was performed under topical anesthesia, after using Afrin and Lidocaine spray in the nasal fossa. The nasal fossa, nasopharynx, hypopharynx and larynx were carefully examined. Base of tongue was symmetrical. Epiglottis appeared normal and was not retrodisplaced. True vocal cords had normal mobility. There was no erythema. No mucosal lesions or masses were noted. No pooling in the pyriform sinuses. Biopsy site has not healed. There is less exposed cartilage. See images under media                  Data:  All of the past medical history, past surgical history, family history,social history, allergies and current medications were reviewed with the patient. Assessment & Plan   Diagnoses and all orders for this visit:     Diagnosis Orders   1. Primary squamous cell carcinoma of larynx (HCC)  [facility] LARYNGOSCOPY [facility]LEXIBLE DIAGNOSTIC   2. Effects, radiation, subsequent encounter     3. Hoarseness  [facility] LARYNGOSCOPY [facility] DIAGNOSTIC   4. Referred otalgia of right ear  [facility] LARYNGOSCOPY [facility]LEXIBLE DIAGNOSTIC       The findings were explained and his questions were answered. Options were discussed including do not clear throat, Mucinex DM 1 month follow up he agreed.       1 month follow up       Mark REILLY CMA ([facility]), am scribing for, and in the presence of Dr. Dawson Holley. Electronically signed by Andriy Gamboa CMA ([facility]) on 7/30/21 at 9:44 AM EDT. (Please note that portions of this note were completed with a voice recognition program. Efforts were made to edit the dictations butoccasionally words are mis-transcribed.)    I agree to the above documentation placed by my scribe. I have personally evaluated this patient. Additional findings are as noted. I reviewed the scribe's note and agree with the documented findings and plan of care. Any areas of disagreement are corrected. I agree with the chief complaint, past medical history, past surgical history, allergies, medications, social and family history as documented unless otherwise noted below.      Electronically signed by Fabian Werner MD on 8/25/2021 at 5:55 PM

## 2021-08-10 ENCOUNTER — TELEPHONE (OUTPATIENT)
Dept: ENT CLINIC | Age: 66
End: 2021-08-10

## 2021-08-10 NOTE — TELEPHONE ENCOUNTER
Patient's wife, Robel Maldonado, who is on HIPAA, called in stating patient is due for an EGD with his GI physician. Patient and his wife are asking if the EGD should be put on hold until patient is completely healed from his surgery with Dr Leatha Kothari on 06/02/21. Robel Maldonado said it is just a routine exam and it has been put off before due to other issues with no concern from the GI physician. Patient's wife informed that both Dr Leatha Kothari and Dr Jackie Buitrago are both out on vacation this week but I will check with Zak Sarah or [place] to see if they can advise on this. Told wife if they cannot advise it will not be until Monday. Patient's wife said that would be fine, there is no rush for an answer. Patient's wife verbalized understanding and thanked me.

## 2021-09-02 ENCOUNTER — OFFICE VISIT (OUTPATIENT)
Dept: ENT CLINIC | Age: 66
End: 2021-09-02
Payer: MEDICARE

## 2021-09-02 VITALS
HEART RATE: 98 BPM | HEIGHT: 71 IN | DIASTOLIC BLOOD PRESSURE: 60 MMHG | TEMPERATURE: 97.2 F | SYSTOLIC BLOOD PRESSURE: 122 MMHG | WEIGHT: 190.7 LBS | RESPIRATION RATE: 14 BRPM | BODY MASS INDEX: 26.7 KG/M2

## 2021-09-02 DIAGNOSIS — C32.9 PRIMARY SQUAMOUS CELL CARCINOMA OF LARYNX (HCC): Primary | ICD-10-CM

## 2021-09-02 DIAGNOSIS — T66.XXXD EFFECTS, RADIATION, SUBSEQUENT ENCOUNTER: ICD-10-CM

## 2021-09-02 DIAGNOSIS — R49.0 HOARSENESS: ICD-10-CM

## 2021-09-02 DIAGNOSIS — H92.01 REFERRED OTALGIA OF RIGHT EAR: ICD-10-CM

## 2021-09-02 PROCEDURE — 99213 OFFICE O/P EST LOW 20 MIN: CPT | Performed by: OTOLARYNGOLOGY

## 2021-09-02 PROCEDURE — G8427 DOCREV CUR MEDS BY ELIG CLIN: HCPCS | Performed by: OTOLARYNGOLOGY

## 2021-09-02 PROCEDURE — 31575 DIAGNOSTIC LARYNGOSCOPY: CPT | Performed by: OTOLARYNGOLOGY

## 2021-09-02 PROCEDURE — 1036F TOBACCO NON-USER: CPT | Performed by: OTOLARYNGOLOGY

## 2021-09-02 PROCEDURE — 4040F PNEUMOC VAC/ADMIN/RCVD: CPT | Performed by: OTOLARYNGOLOGY

## 2021-09-02 PROCEDURE — 3017F COLORECTAL CA SCREEN DOC REV: CPT | Performed by: OTOLARYNGOLOGY

## 2021-09-02 PROCEDURE — 1123F ACP DISCUSS/DSCN MKR DOCD: CPT | Performed by: OTOLARYNGOLOGY

## 2021-09-02 PROCEDURE — G8417 CALC BMI ABV UP PARAM F/U: HCPCS | Performed by: OTOLARYNGOLOGY

## 2021-09-02 NOTE — PROGRESS NOTES
[facility] [place] [facility]  [facility] EAR, NOSE AND THROAT   [place] [address]  Dept: [phone]  Dept Fax: [phone]  Loc: [phone]    Evelyne Kovacs is a 74 y.o. male who was referred byNo ref. provider found for:  Chief Complaint   Patient presents with    Follow-up     Patient is here for 1mo f/u throat   . HPI:     Evelyne Kovacs is a 74 y.o. male who presents today for 1 month follow  Squamous cell carcinoma. He is doing better with his voice, but he still gets hoarse if he talks too much. Shooting pain in ear has stopped. Cough is better. History:     No Known Allergies  Current Outpatient Medications   Medication Sig Dispense Refill    metoprolol succinate (TOPROL XL) 50 MG extended release tablet Take 0.5 tablets by mouth 2 times daily 10 tablet 0    zoster recombinant adjuvanted vaccine (SHINGRIX) 50 MCG/0.5ML SUSR injection Inject 0.5 mLs into the muscle See Admin Instructions 1 dose now and repeat in 2-6 months 0.5 mL 0    ticagrelor (BRILINTA) 90 MG TABS tablet Take 1 tablet by mouth 2 times daily 60 tablet 0    gabapentin (NEURONTIN) 300 MG capsule Take 300 mg by mouth daily.  ipratropium (ATROVENT) 0.06 % nasal spray 2 sprays by Each Nostril route 3 times daily as needed for Rhinitis      levothyroxine (SYNTHROID) 50 MCG tablet Take 1 tablet by mouth daily 90 tablet 1    atorvastatin (LIPITOR) 40 MG tablet Take 1 tablet by mouth daily 90 tablet 1    terazosin (HYTRIN) 5 MG capsule TAKE 1 CAPSULE NIGHTLY 90 capsule 1    nitroGLYCERIN (NITROSTAT) 0.4 MG SL tablet up to max of 3 total doses. If no relief after 1 dose, call 911. 25 tablet 3    diphenhydrAMINE-APAP, sleep, (TYLENOL PM EXTRA STRENGTH)  MG tablet Take 2 tablets by mouth nightly      omeprazole (PRILOSEC) 20 MG delayed release capsule Take 20 mg by mouth Daily       Coenzyme Q10 (CO Q-10) 200 MG CAPS Take  by mouth daily.         Multiple Vitamin (MULTI-VITAMIN) TABS Take  by mouth daily.  ARGININE PO Take 500 mg by mouth daily 4 tab       No current facility-administered medications for this visit. Past Medical History:   Diagnosis Date    Ascending Aneurysm ([address] 02/2017    Ascending, 4.2 cm per pt    Brwon's esophagus     CAD (coronary artery disease)     Cancer (HCC)     throat cancer, s/p radiation    Chronic back pain     Hepatitis C     as child    History of hyperbaric oxygen therapy     after bottom teeth removed after rdiation therapy    Hyperlipidemia     Hypertension     Left shoulder pain     nerve pain , ? 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    PONV (postoperative nausea and vomiting)     only after OHS    S/P CABG [facility]coronary artery bypass graft) 2002    [facility]    Thyroid disease     Vision loss of right eye     during hyperbaric chamber oxygen therapy     Vitreous opacities     right    Wears dentures     full upper, no bottom teeth and no use of dentures on bottom      Past Surgical History:   Procedure Laterality Date    CARDIAC CATHETERIZATION  8/2002, 4/2004    [facility]    COLONOSCOPY      CORONARY ANGIOPLASTY WITH STENT PLACEMENT  7/2002    [facility]    CORONARY ANGIOPLASTY WITH STENT PLACEMENT N/A 07/12/2019    [facility]    CORONARY ARTERY BYPASS GRAFT  2002    [facility]    DENTAL SURGERY      bottom teeth removal    HEMORRHOID SURGERY      KNEE SURGERY Right     LARYNGOSCOPY N/A 10/4/2017    LARYNGOSCOPY MICRO WITH BIOPSY performed by Norma Calvo MD at [address] [place] [address] 5/12/2021    LARYNGOSCOPY MICRO AND BIOPSY, WITH JET VENT performed by Norma Calvo MD at [address] N/A 6/2/2021    (DR. HUNTER) SUSPENSION MICROLARYNGOSCOPY WITH RECONSTRUCTION AND ADVANCEMENT FLAP X2; (DR. GONGORA)  MICROLARYNGOSCOPY AND BX POSTERIOR RIGHT VOCAL CORD      JET VENTILATION AND CO2 LASER performed by Esthela Montalvo MD at [address]  2016    by Dr Carmelina Rose VITREOUS,SUBRET/CHOROID FLUID Right 2018    VITRECTOMY 22 GAUGE performed by Ying Castro MD at [address] VITRECTOMY Right 2018     VITRECTOMY 25 GAUGE (Right Eye)     Family History   Problem Relation Age of Onset    Heart Disease Father         MI    High Blood Pressure Father     Stroke Father     Cancer Maternal Grandmother     Cancer Maternal Grandfather     Diabetes Neg Hx      Social History     Tobacco Use    Smoking status: Former Smoker     Packs/day: 2.00     Years: 20.00     Pack years: 40.00     Quit date: 2001     Years since quittin.7    Smokeless tobacco: Never Used   Substance Use Topics    Alcohol use: No     Alcohol/week: 0.0 standard drinks       Subjective:       Review of Systems    Objective:     /60 (Site: Left Upper Arm, Position: Sitting)   Pulse 98   Temp 97.2 °F (36.2 °C) (Infrared)   Resp 14   Ht 5' 11\" (1.803 m)   Wt 190 lb 11.2 oz (86.5 kg)   BMI 26.60 kg/m²     Physical Exam   Patient's voice is noticeably stronger. Is still slightly hoarse. PROCEDURE: High resolution flexible laryngoscopy with video    A fiberoptic laryngoscopy was performed under topical anesthesia, after using Afrin and Lidocaine spray in the nasal fossa. The nasal fossa, nasopharynx, hypopharynx and larynx were carefully examined. Base of tongue was symmetrical. Epiglottis appeared normal and was not retrodisplaced. True vocal cords had normal mobility. There was no erythema. Biopsy site has epithelialized. There is no exposed arytenoid cartilage. No mucosal lesions or masses were noted. No pooling in the pyriform sinuses. Data:  All of the past medical history, past surgical history, family history,social history, allergies and current medications were reviewed with the patient.      Assessment & Plan   Diagnoses and all orders for this visit: Diagnosis Orders   1. Primary squamous cell carcinoma of larynx (HCC)  ND LARYNGOSCOPY FLEXIBLE DIAGNOSTIC   2. Effects, radiation, subsequent encounter  ND LARYNGOSCOPY FLEXIBLE DIAGNOSTIC   3. Hoarseness  ND LARYNGOSCOPY FLEXIBLE DIAGNOSTIC   4. Referred otalgia of right ear  ND LARYNGOSCOPY FLEXIBLE DIAGNOSTIC    improved         The findings were explained and his questions were answered. It appears that he has significantly healed from his procedure and since the epithelium has grown over the biopsy site and his referred otalgia is gone, it is likely that he will just continue to improve over time without significant risk of infection. There was no sign of tumor. We will begin stretching out the interval between visits for surveillance. Two month follow up       ISpeedy CMA ([facility]), am scribing for, and in the presence of Dr. Aspen Gaytan. Electronically signed by Courtney Gupta CMA ([facility]) on 9/2/21 at 4:00 PM EDT. (Please note that portions of this note were completed with a voice recognition program. Efforts were made to edit the dictations butoccasionally words are mis-transcribed.)    I agree to the above documentation placed by my scribe. I have personally evaluated this patient. Additional findings are as noted. I reviewed the scribe's note and agree with the documented findings and plan of care. Any areas of disagreement are corrected. I agree with the chief complaint, past medical history, past surgical history, allergies, medications, social and family history as documented unless otherwise noted below.      Electronically signed by Danyell Denney MD on 9/2/2021 at 5:36 PM

## 2021-09-06 ENCOUNTER — HOSPITAL ENCOUNTER (EMERGENCY)
Age: 66
Discharge: HOME | End: 2021-09-06
Attending: FAMILY MEDICINE
Payer: MEDICARE

## 2021-09-06 VITALS
WEIGHT: 185 LBS | DIASTOLIC BLOOD PRESSURE: 65 MMHG | SYSTOLIC BLOOD PRESSURE: 123 MMHG | HEIGHT: 71 IN | HEART RATE: 53 BPM | TEMPERATURE: 97.8 F | BODY MASS INDEX: 25.9 KG/M2 | OXYGEN SATURATION: 98 % | RESPIRATION RATE: 16 BRPM

## 2021-09-06 DIAGNOSIS — T63.481A INSECT STINGS, ACCIDENTAL OR UNINTENTIONAL, INITIAL ENCOUNTER: Primary | ICD-10-CM

## 2021-09-06 PROCEDURE — 6370000000 HC RX 637 (ALT 250 FOR IP): Performed by: FAMILY MEDICINE

## 2021-09-06 PROCEDURE — 99283 EMERGENCY DEPT VISIT LOW MDM: CPT

## 2021-09-06 PROCEDURE — 6360000002 HC RX W HCPCS: Performed by: FAMILY MEDICINE

## 2021-09-06 PROCEDURE — 96372 THER/PROPH/DIAG INJ SC/IM: CPT

## 2021-09-06 RX ORDER — PREDNISONE 50 MG/1
50 TABLET ORAL DAILY
Qty: 5 TABLET | Refills: 0 | Status: SHIPPED | OUTPATIENT
Start: 2021-09-07 | End: 2021-09-12

## 2021-09-06 RX ORDER — DIPHENHYDRAMINE HYDROCHLORIDE 50 MG/ML
25 INJECTION INTRAMUSCULAR; INTRAVENOUS ONCE
Status: COMPLETED | OUTPATIENT
Start: 2021-09-06 | End: 2021-09-06

## 2021-09-06 RX ORDER — LORAZEPAM 1 MG/1
0.5 TABLET ORAL ONCE
Status: COMPLETED | OUTPATIENT
Start: 2021-09-06 | End: 2021-09-06

## 2021-09-06 RX ORDER — METHYLPREDNISOLONE SODIUM SUCCINATE 125 MG/2ML
125 INJECTION, POWDER, LYOPHILIZED, FOR SOLUTION INTRAMUSCULAR; INTRAVENOUS ONCE
Status: COMPLETED | OUTPATIENT
Start: 2021-09-06 | End: 2021-09-06

## 2021-09-06 RX ORDER — HYDROCODONE BITARTRATE AND ACETAMINOPHEN 5; 325 MG/1; MG/1
1 TABLET ORAL ONCE
Status: COMPLETED | OUTPATIENT
Start: 2021-09-06 | End: 2021-09-06

## 2021-09-06 RX ADMIN — LORAZEPAM 0.5 MG: 1 TABLET ORAL at 14:25

## 2021-09-06 RX ADMIN — METHYLPREDNISOLONE SODIUM SUCCINATE 125 MG: 125 INJECTION, POWDER, FOR SOLUTION INTRAMUSCULAR; INTRAVENOUS at 13:16

## 2021-09-06 RX ADMIN — HYDROCODONE BITARTRATE AND ACETAMINOPHEN 1 TABLET: 5; 325 TABLET ORAL at 14:24

## 2021-09-06 RX ADMIN — DIPHENHYDRAMINE HYDROCHLORIDE 25 MG: 50 INJECTION INTRAMUSCULAR; INTRAVENOUS at 13:15

## 2021-09-06 ASSESSMENT — PAIN SCALES - GENERAL: PAINLEVEL_OUTOF10: 3

## 2021-09-06 NOTE — ED NOTES
Pt. Presents ambulatory to ED after receiving multiple wasps stings approx. 30 min. Pta. Pt. Has multiple stings noted to arms, buttocks, back. Pt. Denies any dyspnea, c/o hoarseness also has h/o throat cancer.       Sebastian Rodriguez RN  09/06/21 1230

## 2021-09-06 NOTE — ED NOTES
Dr. Amador Wood at pt. Side discussing poc with pt. And spouse.       Traci Perez, ROBERT  09/06/21 [id number]

## 2021-09-06 NOTE — ED NOTES
AVS rev'd with pt. And copy given . Pulse regular. Extremities warm. Respirations regular and quiet. Mucous membranes pink & moist. Alert and oriented times 3. No nausea or vomiting. Range of motion within patient's limits. Skin pink, warm and dry. Calm and cooperative.      Bri Orellana RN  09/06/21 e [facility] [address]  09/06/21 2872

## 2021-09-06 NOTE — ED NOTES
Pt. Sitting quietly in chair, spouse at bedside. Denies any dyspnea.       Yudelka Duckworth RN  09/06/21 4389

## 2021-09-07 ASSESSMENT — ENCOUNTER SYMPTOMS
ABDOMINAL PAIN: 0
VOMITING: 0
DIARRHEA: 0
SHORTNESS OF BREATH: 0
WHEEZING: 0
SORE THROAT: 0
BACK PAIN: 0
NAUSEA: 0
COUGH: 0

## 2021-09-07 NOTE — ED PROVIDER NOTES
[address] [facility]          CHIEF COMPLAINT       Chief Complaint   Patient presents with    Insect Bite       Nurses Notes reviewed and I agree except as noted in the HPI. HISTORY OF PRESENT ILLNESS    Dilshad Hamm is a 74 y.o. male who presents for evaluation of multiple wasp stings. Patient lifted up a piece of wood and uncovered a wasp nest.  Patient states that he sustained multiple stings to his buttocks, arms, back, extremities. Event occurred approximately 30 minutes prior to arrival.  He took no medication prior to arrival.  Patient has no lip swelling, tongue swelling, shortness of breath, nausea, or abdominal pain. He has not had history of allergic reaction to wasp stings. REVIEW OF SYSTEMS     Review of Systems   Constitutional: Negative for activity change, appetite change, chills and fever. HENT: Negative for ear pain and sore throat. Respiratory: Negative for cough, shortness of breath and wheezing. Cardiovascular: Negative for chest pain and leg swelling. Gastrointestinal: Negative for abdominal pain, diarrhea, nausea and vomiting. Genitourinary: Negative for dysuria, flank pain and hematuria. Musculoskeletal: Negative for arthralgias, back pain, gait problem and neck pain. Skin: Positive for rash and wound. Neurological: Negative for weakness, light-headedness and headaches. Psychiatric/Behavioral: Negative for agitation and hallucinations. The patient is not nervous/anxious.         PAST MEDICAL HISTORY    has a past medical history of Ascending Aneurysm ([address] Brown's esophagus, CAD (coronary artery disease), Cancer ([address] Chronic back pain, Hepatitis C, History of hyperbaric oxygen therapy, Hyperlipidemia, Hypertension, Left shoulder pain, FOUZIA on CPAP, Osteoradionecrosis of mandible, PONV (postoperative nausea and vomiting), S/P CABG (coronary artery bypass graft), Thyroid disease, Vision loss of right eye, Vitreous opacities, and Wears dentures. SURGICAL HISTORY      has a past surgical history that includes Coronary artery bypass graft (2002); knee surgery (Right); Hemorrhoid surgery; Colonoscopy; Cardiac catheterization (8/2002, 4/2004); Coronary angioplasty with stent (7/2002); MICROLARYNGOSCOPY W BIOPSY (04/28/2016); laryngoscopy (N/A, 10/4/2017); Dental surgery; vitrectomy (Right, 08/27/2018); pr releas vitreous,subret/choroid fluid (Right, 8/27/2018); Coronary angioplasty with stent (N/A, 07/12/2019); laryngoscopy (N/A, 5/12/2021); and laryngoscopy (N/A, 6/2/2021). CURRENT MEDICATIONS       Discharge Medication List as of 9/6/2021  2:57 PM      CONTINUE these medications which have NOT CHANGED    Details   metoprolol succinate (TOPROL XL) 50 MG extended release tablet Take 0.5 tablets by mouth 2 times daily, Disp-10 tablet, R-0Phone In      zoster recombinant adjuvanted vaccine (SHINGRIX) 50 MCG/0.5ML SUSR injection Inject 0.5 mLs into the muscle See Admin Instructions 1 dose now and repeat in 2-6 months, Disp-0.5 mL, R-0Print      ticagrelor (BRILINTA) 90 MG TABS tablet Take 1 tablet by mouth 2 times daily, Disp-60 tablet, R-0NO PRINT      gabapentin (NEURONTIN) 300 MG capsule Take 300 mg by mouth daily. Historical Med      ipratropium (ATROVENT) 0.06 % nasal spray 2 sprays by Each Nostril route 3 times daily as needed for RhinitisHistorical Med      levothyroxine (SYNTHROID) 50 MCG tablet Take 1 tablet by mouth daily, Disp-90 tablet, R-1Normal      atorvastatin (LIPITOR) 40 MG tablet Take 1 tablet by mouth daily, Disp-90 tablet, R-1Normal      terazosin (HYTRIN) 5 MG capsule TAKE 1 CAPSULE NIGHTLY, Disp-90 capsule, R-1Normal      nitroGLYCERIN (NITROSTAT) 0.4 MG SL tablet up to max of 3 total doses.  If no relief after 1 dose, call 911., Disp-25 tablet, R-3Normal      diphenhydrAMINE-APAP, sleep, (TYLENOL PM EXTRA STRENGTH)  MG tablet Take 2 tablets by mouth nightlyHistorical Med      omeprazole (PRILOSEC) 20 MG delayed release capsule Take 20 mg by mouth Daily Historical Med      Coenzyme Q10 (CO Q-10) 200 MG CAPS Take  by mouth daily. Multiple Vitamin (MULTI-VITAMIN) TABS Take  by mouth daily. ARGININE PO Take 500 mg by mouth daily 4 tabHistorical Med             ALLERGIES     has No Known Allergies. FAMILY HISTORY     He indicated that his mother is alive. He indicated that his father is alive. He indicated that his maternal grandmother is . He indicated that his maternal grandfather is . He indicated that the status of his neg hx is unknown.   family history includes Cancer in his maternal grandfather and maternal grandmother; Heart Disease in his father; High Blood Pressure in his father; Stroke in his father. SOCIAL HISTORY      reports that he quit smoking about 19 years ago. He has a 40.00 pack-year smoking history. He has never used smokeless tobacco. He reports that he does not drink alcohol and does not use drugs. PHYSICAL EXAM     INITIAL VITALS:  height is 5' 11\" (1.803 m) and weight is 185 lb (83.9 kg). His temporal temperature is 97.8 °F (36.6 °C). His blood pressure is 123/65 and his pulse is 53. His respiration is 16 and oxygen saturation is 98%. Physical Exam  Vitals and nursing note reviewed. Constitutional:       General: He is not in acute distress. Appearance: He is not diaphoretic. HENT:      Head: Normocephalic and atraumatic. Cardiovascular:      Rate and Rhythm: Normal rate and regular rhythm. Heart sounds: Normal heart sounds. Pulmonary:      Effort: Pulmonary effort is normal.      Breath sounds: Normal breath sounds. Abdominal:      General: Bowel sounds are normal. There is no distension. Palpations: Abdomen is soft. Tenderness: There is no abdominal tenderness. Lymphadenopathy:      Cervical: No cervical adenopathy. Skin:     General: Skin is warm and dry.       Comments: Multiple stings scattered all over body with surrounding circular erythematous rashes and some urticaria. Lesions are most notable on the upper extremities, bilateral thighs, neck, back, and lower abdomen. Neurological:      General: No focal deficit present. Mental Status: He is alert and oriented to person, place, and time. Psychiatric:         Behavior: Behavior normal.      Comments: Anxious         DIFFERENTIAL DIAGNOSIS:   Local reaction to insect stings    DIAGNOSTIC RESULTS       LABS:   Labs Reviewed - No data to display    DEPARTMENT COURSE:   Vitals:    Vitals:    09/06/21 1307 09/06/21 1500   BP: 124/72 123/65   Pulse: 66 53   Resp: 18 16   Temp: 97.8 °F (36.6 °C)    TempSrc: Temporal    SpO2: 96% 98%   Weight: 185 lb (83.9 kg)    Height: 5' 11\" (1.803 m)        MDM:  Patient presents for evaluation of multiple diffuse wasp stings with local reaction. Patient received Solu-Medrol and Benadryl while in the department. Patient was observed without progression of reaction but instead decreased erythema and decreased urticaria noted. He is prescribed prednisone which she will start tomorrow. We discussed use of Benadryl in the mornings and his Tylenol PM at night. Ativan and Norco were given while in the department for treatment of patient's anxiety and pain associated with the event. Patient will follow for any symptom worsening or for evaluation of new concerning symptoms. CRITICAL CARE:   None    CONSULTS:  None    PROCEDURES:  None    FINAL IMPRESSION      1.  Insect stings, accidental or unintentional, initial encounter          DISPOSITION/PLAN   Discharge    PATIENT REFERRED TO:  Iraj Hedrick, [address]  [phone]    Schedule an appointment as soon as possible for a visit in 4 days  As needed, For wound re-check      DISCHARGEMEDICATIONS:  Discharge Medication List as of 9/6/2021  2:57 PM      START taking these medications    Details   predniSONE (DELTASONE) 50 MG tablet Take 1 tablet by mouth daily for 5 days, Disp-5 tablet, R-0Normal             (Please note that portions of this note were completedwith a voice recognition program.  Efforts were made to edit the dictations but occasionally words are mis-transcribed.)    MD Edna Nails MD  09/07/21 010

## 2021-09-10 ENCOUNTER — PATIENT MESSAGE (OUTPATIENT)
Dept: ENT CLINIC | Age: 66
End: 2021-09-10

## 2021-09-10 NOTE — TELEPHONE ENCOUNTER
I spoke with Dr. Xiomy Gee. He is fine with the patient proceeding with a scope by Dr Migel Diane. Everything was looking well-healed at his last visit. Dr. Gerry Franco would leave it up to Dr. Migel Diane to select the size scope that he uses. Dr. Xiomy Gee feels that it is possible to do the procedure with a normal adult size scope if necessary.

## 2021-09-10 NOTE — TELEPHONE ENCOUNTER
From: Lennox Pacheco  To: Deangelo Orona MD  Sent: 9/10/2021 5:07 AM EDT  Subject: Non-Urgent Medical Question    Dr. iMgel Diane at [facility] in [place] is requesting endoscopy again. Concerned if might be too early to have this procedure done due to the healing process of my recent throat cancer surgery? Need your professional advice what to tell him if too early yet or not. Once before I had to delay procedure due to my throat and when I was able to have it, you suggested to have him use a child size scope. ?? Time to make an appt there anyways in order to get prescription renewed. Checking with you before I call. Thank you.  Geovanna Hager

## 2021-10-05 ENCOUNTER — NURSE ONLY (OUTPATIENT)
Dept: LAB | Age: 66
End: 2021-10-05

## 2021-10-05 DIAGNOSIS — I10 ESSENTIAL HYPERTENSION: ICD-10-CM

## 2021-10-05 DIAGNOSIS — E78.2 MIXED HYPERLIPIDEMIA: ICD-10-CM

## 2021-10-05 DIAGNOSIS — Z95.1 HX OF CABG: ICD-10-CM

## 2021-10-05 LAB
ALBUMIN SERPL-MCNC: 4.2 G/DL (ref 3.5–5.1)
ALP BLD-CCNC: 83 U/L (ref 38–126)
ALT SERPL-CCNC: 15 U/L (ref 11–66)
ANION GAP SERPL CALCULATED.3IONS-SCNC: 11 MEQ/L (ref 8–16)
AST SERPL-CCNC: 14 U/L (ref 5–40)
BILIRUB SERPL-MCNC: 0.7 MG/DL (ref 0.3–1.2)
BILIRUBIN DIRECT: < 0.2 MG/DL (ref 0–0.3)
BUN BLDV-MCNC: 15 MG/DL (ref 7–22)
CALCIUM SERPL-MCNC: 9.1 MG/DL (ref 8.5–10.5)
CHLORIDE BLD-SCNC: 106 MEQ/L (ref 98–111)
CHOLESTEROL, TOTAL: 142 MG/DL (ref 100–199)
CO2: 26 MEQ/L (ref 23–33)
CREAT SERPL-MCNC: 0.8 MG/DL (ref 0.4–1.2)
GFR SERPL CREATININE-BSD FRML MDRD: > 90 ML/MIN/1.73M2
GLUCOSE BLD-MCNC: 90 MG/DL (ref 70–108)
HDLC SERPL-MCNC: 48 MG/DL
LDL CHOLESTEROL CALCULATED: 68 MG/DL
POTASSIUM SERPL-SCNC: 4.2 MEQ/L (ref 3.5–5.2)
SODIUM BLD-SCNC: 143 MEQ/L (ref 135–145)
TOTAL PROTEIN: 5.9 G/DL (ref 6.1–8)
TRIGL SERPL-MCNC: 130 MG/DL (ref 0–199)

## 2021-10-14 ENCOUNTER — OFFICE VISIT (OUTPATIENT)
Dept: INTERNAL MEDICINE CLINIC | Age: 66
End: 2021-10-14
Payer: MEDICARE

## 2021-10-14 VITALS
HEART RATE: 60 BPM | SYSTOLIC BLOOD PRESSURE: 110 MMHG | DIASTOLIC BLOOD PRESSURE: 64 MMHG | TEMPERATURE: 97.8 F | WEIGHT: 188.7 LBS | BODY MASS INDEX: 26.32 KG/M2

## 2021-10-14 DIAGNOSIS — I10 ESSENTIAL HYPERTENSION: ICD-10-CM

## 2021-10-14 PROCEDURE — G8417 CALC BMI ABV UP PARAM F/U: HCPCS | Performed by: INTERNAL MEDICINE

## 2021-10-14 PROCEDURE — 4040F PNEUMOC VAC/ADMIN/RCVD: CPT | Performed by: INTERNAL MEDICINE

## 2021-10-14 PROCEDURE — 1036F TOBACCO NON-USER: CPT | Performed by: INTERNAL MEDICINE

## 2021-10-14 PROCEDURE — G8484 FLU IMMUNIZE NO ADMIN: HCPCS | Performed by: INTERNAL MEDICINE

## 2021-10-14 PROCEDURE — 99213 OFFICE O/P EST LOW 20 MIN: CPT | Performed by: INTERNAL MEDICINE

## 2021-10-14 PROCEDURE — G8427 DOCREV CUR MEDS BY ELIG CLIN: HCPCS | Performed by: INTERNAL MEDICINE

## 2021-10-14 PROCEDURE — 1123F ACP DISCUSS/DSCN MKR DOCD: CPT | Performed by: INTERNAL MEDICINE

## 2021-10-14 PROCEDURE — 3017F COLORECTAL CA SCREEN DOC REV: CPT | Performed by: INTERNAL MEDICINE

## 2021-10-14 RX ORDER — LEVOTHYROXINE SODIUM 0.05 MG/1
50 TABLET ORAL DAILY
Qty: 90 TABLET | Refills: 1 | Status: SHIPPED | OUTPATIENT
Start: 2021-10-14 | End: 2022-03-23 | Stop reason: SDUPTHER

## 2021-10-14 RX ORDER — ATORVASTATIN CALCIUM 40 MG/1
40 TABLET, FILM COATED ORAL DAILY
Qty: 90 TABLET | Refills: 1 | Status: SHIPPED | OUTPATIENT
Start: 2021-10-14 | End: 2022-04-26 | Stop reason: SDUPTHER

## 2021-10-14 RX ORDER — TERAZOSIN 5 MG/1
CAPSULE ORAL
Qty: 90 CAPSULE | Refills: 1 | Status: SHIPPED | OUTPATIENT
Start: 2021-10-14 | End: 2022-04-26 | Stop reason: SDUPTHER

## 2021-10-14 RX ORDER — METOPROLOL SUCCINATE 50 MG/1
25 TABLET, EXTENDED RELEASE ORAL 2 TIMES DAILY
Qty: 10 TABLET | Refills: 0 | Status: SHIPPED | OUTPATIENT
Start: 2021-10-14 | End: 2022-04-26 | Stop reason: SDUPTHER

## 2021-10-14 SDOH — ECONOMIC STABILITY: FOOD INSECURITY: WITHIN THE PAST 12 MONTHS, THE FOOD YOU BOUGHT JUST DIDN'T LAST AND YOU DIDN'T HAVE MONEY TO GET MORE.: NEVER TRUE

## 2021-10-14 SDOH — ECONOMIC STABILITY: FOOD INSECURITY: WITHIN THE PAST 12 MONTHS, YOU WORRIED THAT YOUR FOOD WOULD RUN OUT BEFORE YOU GOT MONEY TO BUY MORE.: NEVER TRUE

## 2021-10-14 ASSESSMENT — SOCIAL DETERMINANTS OF HEALTH (SDOH): HOW HARD IS IT FOR YOU TO PAY FOR THE VERY BASICS LIKE FOOD, HOUSING, MEDICAL CARE, AND HEATING?: NOT VERY HARD

## 2021-10-14 NOTE — PROGRESS NOTES
[address] [facility]   [facility] [address]IL[address], [address]  Dept: [phone]  Dept Fax: [phone]    YOB: 1955    Hypertension and CABG/ 6 month visit     74 y.o. male   here for follow-up of above issues. He does have medical issues, but no CP or SOB, denies any recent fever or chillls. Had COVID back in October , was in [facility] . He completed radiation after he was diagnosed with laryngeal cancer. I recommend he gets COVID booster dose, as he got pfizer. He plans on getting his biopsy of vocal cords, as he is having hoarseness , hx of SCC followed by dr. Barrie Perez, they are going to set up  Biopsy soon. On further questioning he gets occasional chest wall discomfort, he is lifting upto 50 lbs per his spouse. He does have   Admit to mild chest wall pain, since CABG ~ 20 YRS AGO. Current Outpatient Medications   Medication Sig Dispense Refill    metoprolol succinate (TOPROL XL) 50 MG extended release tablet Take 0.5 tablets by mouth 2 times daily 10 tablet 0    ticagrelor (BRILINTA) 90 MG TABS tablet Take 1 tablet by mouth 2 times daily 60 tablet 0    gabapentin (NEURONTIN) 300 MG capsule Take 300 mg by mouth daily.  ipratropium (ATROVENT) 0.06 % nasal spray 2 sprays by Each Nostril route 3 times daily as needed for Rhinitis      levothyroxine (SYNTHROID) 50 MCG tablet Take 1 tablet by mouth daily 90 tablet 1    atorvastatin (LIPITOR) 40 MG tablet Take 1 tablet by mouth daily 90 tablet 1    terazosin (HYTRIN) 5 MG capsule TAKE 1 CAPSULE NIGHTLY 90 capsule 1    nitroGLYCERIN (NITROSTAT) 0.4 MG SL tablet up to max of 3 total doses. If no relief after 1 dose, call 911. 25 tablet 3    diphenhydrAMINE-APAP, sleep, (TYLENOL PM EXTRA STRENGTH)  MG tablet Take 2 tablets by mouth nightly      omeprazole (PRILOSEC) 20 MG delayed release capsule Take 20 mg by mouth Daily       Coenzyme Q10 (CO Q-10) 200 MG CAPS Take  by mouth daily.         Multiple Received Choice form at 1250  Agency/Facility Name: [facility]  Referral sent per Choice form @ 2658      Vitamin (MULTI-VITAMIN) TABS Take  by mouth daily.  ARGININE PO Take 500 mg by mouth daily 4 tab       No current facility-administered medications for this visit.        Past Medical History:   Diagnosis Date    Ascending Aneurysm ([address] 2017    Ascending, 4.2 cm per pt    Brown's esophagus     CAD (coronary artery disease)     Cancer (HCC)     throat cancer, s/p radiation    Chronic back pain     Hepatitis C     as child    History of hyperbaric oxygen therapy     after bottom teeth removed after rdiation therapy    Hyperlipidemia     Hypertension     Left shoulder pain     nerve pain , ? 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    PONV (postoperative nausea and vomiting)     only after OHS    S/P CABG (coronary artery bypass graft)     [facility]    Thyroid disease     Vision loss of right eye     during hyperbaric chamber oxygen therapy     Vitreous opacities     right    Wears dentures     full upper, no bottom teeth and no use of dentures on bottom       Social History     Socioeconomic History    Marital status:      Spouse name: Not on file    Number of children: Not on file    Years of education: Not on file    Highest education level: Not on file   Occupational History    Not on file   Tobacco Use    Smoking status: Former Smoker     Packs/day: 2.00     Years: 20.00     Pack years: 40.00     Quit date: 2001     Years since quittin.8    Smokeless tobacco: Never Used   Vaping Use    Vaping Use: Never used   Substance and Sexual Activity    Alcohol use: No     Alcohol/week: 0.0 standard drinks    Drug use: No    Sexual activity: Not on file   Other Topics Concern    Not on file   Social History Narrative    Not on file     Social Determinants of Health     Financial Resource Strain: Low Risk     Difficulty of Paying Living Expenses: Not very hard   Food Insecurity: No Food Insecurity    Worried About Running Out of Food in the Last Year: Never true    Ran Out of Food in the Last Year: Never true   Transportation Needs:     Lack of Transportation (Medical):  Lack of Transportation (Non-Medical):    Physical Activity:     Days of Exercise per Week:     Minutes of Exercise per Session:    Stress:     Feeling of Stress :    Social Connections:     Frequency of Communication with Friends and Family:     Frequency of Social Gatherings with Friends and Family:     Attends Yarsani Services:     Active Member of Clubs or Organizations:     Attends Club or Organization Meetings:     Marital Status:    Intimate Partner Violence:     Fear of Current or Ex-Partner:     Emotionally Abused:     Physically Abused:     Sexually Abused:        Family History   Problem Relation Age of Onset    Heart Disease Father         MI    High Blood Pressure Father     Stroke Father     Cancer Maternal Grandmother     Cancer Maternal Grandfather     Diabetes Neg Hx        No Known Allergies    Review of Systems     As above. /64 (Site: Left Upper Arm)   Pulse 60   Temp 97.8 °F (36.6 °C)   Wt 188 lb 11.2 oz (85.6 kg)   BMI 26.32 kg/m²      Physical Examination: General appearance -:[id number], well appearing, and in no distress  Mental status - alert and oriented    Neck - supple, no significant adenopathy  Chest - clear to auscultation, no wheezes, rales or rhonchi, symmetric air entry  Heart - normal rate, regular rhythm, normal S1, S2, no murmurs, rubs, clicks or gallops  Abdomen - soft, nontender, nondistended, no masses or organomegaly  no abdominal bruits. Non-obese Protuberant: No  Neurological - alert, oriented, normal speech, no focal findings or movement disorder noted, motor and sensory grossly normal bilaterally  Musculoskeletal - no joint tenderness, deformity or swelling  But left leg weakness.   Extremities - peripheral pulses normal, no pedal edema, no clubbing or cyanosis, Petra's sign negative bilaterally  Prosthesis: No  Skin - normal coloration and turgor, no rashes, no suspicious skin lesions noted      I have reviewed recent diagnostic testing including labs, EKG. Please see EKG for interpretation. Diagnosis Orders   1. Essential hypertension  terazosin (HYTRIN) 5 MG capsule       No orders of the defined types were placed in this encounter. Outpatient Encounter Medications as of 10/14/2021   Medication Sig Dispense Refill    metoprolol succinate (TOPROL XL) 50 MG extended release tablet Take 0.5 tablets by mouth 2 times daily 10 tablet 0    ticagrelor (BRILINTA) 90 MG TABS tablet Take 1 tablet by mouth 2 times daily 60 tablet 0    gabapentin (NEURONTIN) 300 MG capsule Take 300 mg by mouth daily.  ipratropium (ATROVENT) 0.06 % nasal spray 2 sprays by Each Nostril route 3 times daily as needed for Rhinitis      levothyroxine (SYNTHROID) 50 MCG tablet Take 1 tablet by mouth daily 90 tablet 1    atorvastatin (LIPITOR) 40 MG tablet Take 1 tablet by mouth daily 90 tablet 1    terazosin (HYTRIN) 5 MG capsule TAKE 1 CAPSULE NIGHTLY 90 capsule 1    nitroGLYCERIN (NITROSTAT) 0.4 MG SL tablet up to max of 3 total doses. If no relief after 1 dose, call 911. 25 tablet 3    diphenhydrAMINE-APAP, sleep, (TYLENOL PM EXTRA STRENGTH)  MG tablet Take 2 tablets by mouth nightly      omeprazole (PRILOSEC) 20 MG delayed release capsule Take 20 mg by mouth Daily       Coenzyme Q10 (CO Q-10) 200 MG CAPS Take  by mouth daily.  Multiple Vitamin (MULTI-VITAMIN) TABS Take  by mouth daily.  ARGININE PO Take 500 mg by mouth daily 4 tab      [DISCONTINUED] predniSONE (DELTASONE) 10 MG tablet 5 tabs for 1 day, 4 tabs for 1 day, 3 tabs for 1 day, 2 tabs for 1 day, 1 tab for 1 day (Patient not taking: Reported on 10/14/2021) 15 tablet 0     No facility-administered encounter medications on file as of 10/14/2021.         Controlled

## 2021-10-27 ENCOUNTER — OFFICE VISIT (OUTPATIENT)
Dept: CARDIOLOGY CLINIC | Age: 66
End: 2021-10-27
Payer: MEDICARE

## 2021-10-27 VITALS
WEIGHT: 192 LBS | SYSTOLIC BLOOD PRESSURE: 134 MMHG | HEART RATE: 64 BPM | BODY MASS INDEX: 26.88 KG/M2 | DIASTOLIC BLOOD PRESSURE: 72 MMHG | HEIGHT: 71 IN

## 2021-10-27 DIAGNOSIS — I25.10 CORONARY ARTERY DISEASE DUE TO LIPID RICH PLAQUE: Primary | ICD-10-CM

## 2021-10-27 DIAGNOSIS — I25.83 CORONARY ARTERY DISEASE DUE TO LIPID RICH PLAQUE: Primary | ICD-10-CM

## 2021-10-27 PROCEDURE — G8417 CALC BMI ABV UP PARAM F/U: HCPCS | Performed by: INTERNAL MEDICINE

## 2021-10-27 PROCEDURE — 99214 OFFICE O/P EST MOD 30 MIN: CPT | Performed by: INTERNAL MEDICINE

## 2021-10-27 PROCEDURE — 3017F COLORECTAL CA SCREEN DOC REV: CPT | Performed by: INTERNAL MEDICINE

## 2021-10-27 PROCEDURE — 1123F ACP DISCUSS/DSCN MKR DOCD: CPT | Performed by: INTERNAL MEDICINE

## 2021-10-27 PROCEDURE — G8484 FLU IMMUNIZE NO ADMIN: HCPCS | Performed by: INTERNAL MEDICINE

## 2021-10-27 PROCEDURE — 1036F TOBACCO NON-USER: CPT | Performed by: INTERNAL MEDICINE

## 2021-10-27 PROCEDURE — G8427 DOCREV CUR MEDS BY ELIG CLIN: HCPCS | Performed by: INTERNAL MEDICINE

## 2021-10-27 PROCEDURE — 4040F PNEUMOC VAC/ADMIN/RCVD: CPT | Performed by: INTERNAL MEDICINE

## 2021-10-27 RX ORDER — ACETAMINOPHEN 500 MG
1000 TABLET ORAL NIGHTLY
COMMUNITY
End: 2022-04-26

## 2021-10-27 RX ORDER — ASPIRIN 81 MG/1
81 TABLET ORAL DAILY
COMMUNITY

## 2021-10-27 NOTE — PROGRESS NOTES
Pt here for 8 month check up     Needs pre op clearance for EGD with Dr Marisol Poe December 20th 2021

## 2021-11-02 ENCOUNTER — OFFICE VISIT (OUTPATIENT)
Dept: ENT CLINIC | Age: 66
End: 2021-11-02
Payer: MEDICARE

## 2021-11-02 VITALS
HEART RATE: 57 BPM | TEMPERATURE: 97.1 F | DIASTOLIC BLOOD PRESSURE: 64 MMHG | BODY MASS INDEX: 26.92 KG/M2 | RESPIRATION RATE: 14 BRPM | WEIGHT: 193 LBS | OXYGEN SATURATION: 97 % | SYSTOLIC BLOOD PRESSURE: 124 MMHG

## 2021-11-02 DIAGNOSIS — G47.33 OSA ON CPAP: ICD-10-CM

## 2021-11-02 DIAGNOSIS — C32.9 PRIMARY SQUAMOUS CELL CARCINOMA OF LARYNX (HCC): ICD-10-CM

## 2021-11-02 DIAGNOSIS — R49.0 HOARSENESS: Primary | ICD-10-CM

## 2021-11-02 DIAGNOSIS — Z99.89 OSA ON CPAP: ICD-10-CM

## 2021-11-02 DIAGNOSIS — H92.01 REFERRED OTALGIA OF RIGHT EAR: ICD-10-CM

## 2021-11-02 DIAGNOSIS — T66.XXXD EFFECTS, RADIATION, SUBSEQUENT ENCOUNTER: ICD-10-CM

## 2021-11-02 PROCEDURE — 1123F ACP DISCUSS/DSCN MKR DOCD: CPT | Performed by: OTOLARYNGOLOGY

## 2021-11-02 PROCEDURE — 3017F COLORECTAL CA SCREEN DOC REV: CPT | Performed by: OTOLARYNGOLOGY

## 2021-11-02 PROCEDURE — 4040F PNEUMOC VAC/ADMIN/RCVD: CPT | Performed by: OTOLARYNGOLOGY

## 2021-11-02 PROCEDURE — G8484 FLU IMMUNIZE NO ADMIN: HCPCS | Performed by: OTOLARYNGOLOGY

## 2021-11-02 PROCEDURE — 99213 OFFICE O/P EST LOW 20 MIN: CPT | Performed by: OTOLARYNGOLOGY

## 2021-11-02 PROCEDURE — G8427 DOCREV CUR MEDS BY ELIG CLIN: HCPCS | Performed by: OTOLARYNGOLOGY

## 2021-11-02 PROCEDURE — 1036F TOBACCO NON-USER: CPT | Performed by: OTOLARYNGOLOGY

## 2021-11-02 PROCEDURE — G8417 CALC BMI ABV UP PARAM F/U: HCPCS | Performed by: OTOLARYNGOLOGY

## 2021-11-02 ASSESSMENT — ENCOUNTER SYMPTOMS
DIARRHEA: 0
NAUSEA: 0
VOMITING: 0
WHEEZING: 0
SINUS PRESSURE: 0
COLOR CHANGE: 0
RHINORRHEA: 0
SHORTNESS OF BREATH: 0
FACIAL SWELLING: 0
TROUBLE SWALLOWING: 0
STRIDOR: 0
CHOKING: 0
SORE THROAT: 0
VOICE CHANGE: 0
COUGH: 0
CHEST TIGHTNESS: 0
APNEA: 0
ABDOMINAL PAIN: 0

## 2021-11-02 NOTE — PROGRESS NOTES
[facility] [place] [facility]  [facility]  [address] [place], [place] [address]jazlyn Escalante [address]  Dept: [phone]  Dept Fax: [phone]  Loc: [phone]    Shaji Zambrano is a 74 y.o. male who was referred byNo ref. provider found for:  Chief Complaint   Patient presents with    Follow-up     Pt here for 2 mo f/u throat,hoarse,dry,jeannine through the night   . HPI:     Shaji Zambrano is a 74 y.o. male who presents today for interval follow-up on his squamous cell carcinoma of the larynx with most recent resection/biopsy right false vocal cord June, 2021. His voice is improving but after he uses it for about 15 minutes becomes very hoarse and he has to rest his voice for a while. There is no pain. 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 History:     No Known Allergies  Current Outpatient Medications   Medication Sig Dispense Refill    acetaminophen (TYLENOL) 500 MG tablet Take 1,000 mg by mouth nightly      aspirin 81 MG EC tablet Take 81 mg by mouth daily      metoprolol succinate (TOPROL XL) 50 MG extended release tablet Take 0.5 tablets by mouth 2 times daily 10 tablet 0    ticagrelor (BRILINTA) 90 MG TABS tablet Take 1 tablet by mouth 2 times daily 60 tablet 0    levothyroxine (SYNTHROID) 50 MCG tablet Take 1 tablet by mouth daily 90 tablet 1    atorvastatin (LIPITOR) 40 MG tablet Take 1 tablet by mouth daily 90 tablet 1    terazosin (HYTRIN) 5 MG capsule TAKE 1 CAPSULE NIGHTLY 90 capsule 1    gabapentin (NEURONTIN) 300 MG capsule Take 300 mg by mouth daily.  ipratropium (ATROVENT) 0.06 % nasal spray 2 sprays by Each Nostril route 3 times daily as needed for Rhinitis      nitroGLYCERIN (NITROSTAT) 0.4 MG SL tablet up to max of 3 total doses.  If no relief after 1 dose, call 911. 25 tablet 3    diphenhydrAMINE-APAP, sleep, (TYLENOL PM EXTRA STRENGTH)  MG tablet Take 2 tablets by mouth nightly      omeprazole (PRILOSEC) 20 MG delayed release capsule Take 20 mg by mouth Daily       Coenzyme Q10 (CO Q-10) 200 MG CAPS Take  by mouth daily.  Multiple Vitamin (MULTI-VITAMIN) TABS Take  by mouth daily.  ARGININE PO Take 500 mg by mouth daily 4 tab       No current facility-administered medications for this visit. Past Medical History:   Diagnosis Date    Ascending Aneurysm ([address] 02/2017    Ascending, 4.2 cm per pt    Brown's esophagus     CAD (coronary artery disease)     Cancer (HCC)     throat cancer, s/p radiation    Chronic back pain     Hepatitis C     as child    History of hyperbaric oxygen therapy     after bottom teeth removed after rdiation therapy    Hyperlipidemia     Hypertension     Left shoulder pain     nerve pain , ? 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    PONV (postoperative nausea and vomiting)     only after OHS    S/P CABG (coronary artery bypass graft) 2002    [facility]    Thyroid disease     Vision loss of right eye     during hyperbaric chamber oxygen therapy     Vitreous opacities     right    Wears dentures     full upper, no bottom teeth and no use of dentures on bottom      Past Surgical History:   Procedure Laterality Date    CARDIAC CATHETERIZATION  8/2002, 4/2004    [facility]    COLONOSCOPY      CORONARY ANGIOPLASTY WITH STENT PLACEMENT  7/2002    [facility]    CORONARY ANGIOPLASTY WITH STENT PLACEMENT N/A 07/12/2019    [facility]    CORONARY ARTERY BYPASS GRAFT  2002    [facility]    DENTAL SURGERY      bottom teeth removal    HEMORRHOID SURGERY      KNEE SURGERY Right     LARYNGOSCOPY N/A 10/4/2017    LARYNGOSCOPY MICRO WITH BIOPSY performed by Judith Goodell, MD at [address] [place] [address] 5/12/2021    LARYNGOSCOPY MICRO AND BIOPSY, WITH JET VENT performed by Judith Goodell, MD at [address] N/A 6/2/2021    (DR. HUNTER) SUSPENSION MICROLARYNGOSCOPY WITH RECONSTRUCTION AND ADVANCEMENT FLAP X2; (DR. GONGORA)  MICROLARYNGOSCOPY AND BX POSTERIOR RIGHT VOCAL CORD      JET VENTILATION AND CO2 LASER performed by Marta Erickson MD at [address] [place]  2016    by Dr Andriy Carl VITREOUS,SUBRET/CHOROID FLUID Right 2018    VITRECTOMY 22 GAUGE performed by Sonido Pendleton MD at [facility] [address] Right 2018     VITRECTOMY 25 GAUGE (Right Eye)     Family History   Problem Relation Age of Onset    Heart Disease Father         MI    High Blood Pressure Father     Stroke Father     Cancer Maternal Grandmother     Cancer Maternal Grandfather     Diabetes Neg Hx      Social History     Tobacco Use    Smoking status: Former Smoker     Packs/day: 2.00     Years: 20.00     Pack years: 40.00     Quit date: 2001     Years since quittin.9    Smokeless tobacco: Never Used   Substance Use Topics    Alcohol use: No     Alcohol/week: 0.0 standard drinks       Subjective:      Review of Systems   Constitutional: Negative for activity change, appetite change, chills, diaphoresis, fatigue, fever and unexpected weight change. HENT: Negative for congestion, dental problem, ear discharge, ear pain, facial swelling, hearing loss, mouth sores, nosebleeds, postnasal drip, rhinorrhea, sinus pressure, sneezing, sore throat, tinnitus, trouble swallowing and voice change. Eyes: Negative for visual disturbance. Respiratory: Negative for apnea, cough, choking, chest tightness, shortness of breath, wheezing and stridor. Cardiovascular: Negative for chest pain, palpitations and leg swelling. Gastrointestinal: Negative for abdominal pain, diarrhea, nausea and vomiting. Endocrine: Negative for cold intolerance, heat intolerance, polydipsia and polyuria. Genitourinary: Negative for dysuria, enuresis and hematuria. Musculoskeletal: Negative for arthralgias, gait problem, neck pain and neck stiffness.    Skin: Negative for color change and rash. Allergic/Immunologic: Negative for environmental allergies, food allergies and immunocompromised state. Neurological: Negative for dizziness, syncope, facial asymmetry, speech difficulty, light-headedness and headaches. Hematological: Negative for adenopathy. Does not bruise/bleed easily. Psychiatric/Behavioral: Negative for confusion and sleep disturbance. The patient is not nervous/anxious. Objective:   /64 (Site: Right Upper Arm, Position: Sitting)   Pulse 57   Temp 97.1 °F (36.2 °C) (Infrared)   Resp 14   Wt 193 lb (87.5 kg)   SpO2 97%   BMI 26.92 kg/m²     Physical Exam   Neck: No palpable adenopathy. Voice is slightly hoarse. He has not been speaking much today    HIGH RESOLUTION FLEXIBLE VIDEOLARYNGOSOCPY    A fiberoptic laryngoscopy was performed under topical anesthesia, after using Afrin and Lidocaine spray in the nasal fossa. The nasal fossa, nasopharynx, hypopharynx and larynx were carefully examined. Base of tongue was symmetrical. Epiglottis appeared normal and was not retrodisplaced. True vocal cords had normal mobility. Right false vocal cord biopsy site has epithelialized but there is some decreased volume to the right false cord especially in a little bit of the right true vocal cord. There was no erythema. No mucosal lesions or masses were noted. No pooling in the pyriform sinuses. Images are in media this date. Data:  All of the past medical history, past surgical history, family history,social history, allergies and current medications were reviewed with the patient. Assessment & Plan   Diagnoses and all orders for this visit:     Diagnosis Orders   1. Hoarseness     2. Primary squamous cell carcinoma of larynx (HCC)     3. Effects, radiation, subsequent encounter     4. Referred otalgia of right ear     5. FOUZIA on CPAP         The findings were explained and his questions were answered.   The slight asymmetry in mass due to the volume of tissue removed on the right likely explains some of his issues with tone being clear. The fact that his larynx was radiated and recently operated explains why after some use of his voice he becomes more hoarse. After many months of healing it would be reasonable to consider slight augmentation of the right true vocal cord and perhaps at the medial aspect of the right false vocal cord. We should wait until we see how much improvement he gets during at least the first year of his surveillance for his recent small cancer. He does have Brown's esophagus so he continues to have risk factors. Return in 6 weeks for similar evaluation and flexible high-resolution video laryngoscopy. Carisa Metzger. Argelia Strickland MD    **This report has been created using voice recognition software. It may contain minor errors which are inherent in voicerecognition technology. **

## 2021-12-14 ENCOUNTER — OFFICE VISIT (OUTPATIENT)
Dept: ENT CLINIC | Age: 66
End: 2021-12-14
Payer: MEDICARE

## 2021-12-14 VITALS
OXYGEN SATURATION: 97 % | SYSTOLIC BLOOD PRESSURE: 116 MMHG | RESPIRATION RATE: 12 BRPM | WEIGHT: 189.8 LBS | TEMPERATURE: 97.8 F | BODY MASS INDEX: 26.47 KG/M2 | DIASTOLIC BLOOD PRESSURE: 68 MMHG | HEART RATE: 58 BPM

## 2021-12-14 DIAGNOSIS — Z99.89 OSA ON CPAP: ICD-10-CM

## 2021-12-14 DIAGNOSIS — E03.4 HYPOTHYROIDISM DUE TO ACQUIRED ATROPHY OF THYROID: ICD-10-CM

## 2021-12-14 DIAGNOSIS — R49.0 HOARSENESS: Primary | ICD-10-CM

## 2021-12-14 DIAGNOSIS — C32.9 PRIMARY SQUAMOUS CELL CARCINOMA OF LARYNX (HCC): ICD-10-CM

## 2021-12-14 DIAGNOSIS — G47.33 OSA ON CPAP: ICD-10-CM

## 2021-12-14 DIAGNOSIS — T66.XXXD EFFECTS, RADIATION, SUBSEQUENT ENCOUNTER: ICD-10-CM

## 2021-12-14 DIAGNOSIS — K22.70 BARRETT'S ESOPHAGUS WITHOUT DYSPLASIA: ICD-10-CM

## 2021-12-14 PROCEDURE — 99213 OFFICE O/P EST LOW 20 MIN: CPT | Performed by: OTOLARYNGOLOGY

## 2021-12-14 PROCEDURE — 1123F ACP DISCUSS/DSCN MKR DOCD: CPT | Performed by: OTOLARYNGOLOGY

## 2021-12-14 PROCEDURE — 4040F PNEUMOC VAC/ADMIN/RCVD: CPT | Performed by: OTOLARYNGOLOGY

## 2021-12-14 PROCEDURE — G8484 FLU IMMUNIZE NO ADMIN: HCPCS | Performed by: OTOLARYNGOLOGY

## 2021-12-14 PROCEDURE — G8417 CALC BMI ABV UP PARAM F/U: HCPCS | Performed by: OTOLARYNGOLOGY

## 2021-12-14 PROCEDURE — 1036F TOBACCO NON-USER: CPT | Performed by: OTOLARYNGOLOGY

## 2021-12-14 PROCEDURE — 3017F COLORECTAL CA SCREEN DOC REV: CPT | Performed by: OTOLARYNGOLOGY

## 2021-12-14 PROCEDURE — G8427 DOCREV CUR MEDS BY ELIG CLIN: HCPCS | Performed by: OTOLARYNGOLOGY

## 2021-12-14 ASSESSMENT — ENCOUNTER SYMPTOMS
APNEA: 0
ABDOMINAL PAIN: 0
CHOKING: 0
FACIAL SWELLING: 0
STRIDOR: 0
COUGH: 0
SHORTNESS OF BREATH: 0
VOICE CHANGE: 0
NAUSEA: 0
WHEEZING: 0
CHEST TIGHTNESS: 0
DIARRHEA: 0
SORE THROAT: 0
COLOR CHANGE: 0
RHINORRHEA: 0
VOMITING: 0
TROUBLE SWALLOWING: 0
SINUS PRESSURE: 0

## 2021-12-14 NOTE — PROGRESS NOTES
[facility] [place] [facility]  [facility] EAR, NOSE AND THROAT  [address] [address]  Dept: [phone]  Dept Fax: [phone]  Loc: [phone]    Tomas Cuevas is a 74 y.o. male who was referred byNo ref. provider found for:  Chief Complaint   Patient presents with    Follow-up     Patient here for 6 week follow up for his primary squamous cell carcinoma of larynx. Marilu Remedies HPI:     Tomas Cuevas is a 74 y.o. male who presents today for interval follow-up on his squamous cell carcinoma of the larynx with most recent resection/biopsy right false vocal cord June, 2021. His voice is improving but after he uses it for about 15 minutes becomes very hoarse and he has to rest his voice for a while. There is no pain. 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 .    History:     No Known Allergies  Current Outpatient Medications   Medication Sig Dispense Refill    acetaminophen (TYLENOL) 500 MG tablet Take 1,000 mg by mouth nightly      aspirin 81 MG EC tablet Take 81 mg by mouth daily      metoprolol succinate (TOPROL XL) 50 MG extended release tablet Take 0.5 tablets by mouth 2 times daily 10 tablet 0    ticagrelor (BRILINTA) 90 MG TABS tablet Take 1 tablet by mouth 2 times daily 60 tablet 0    levothyroxine (SYNTHROID) 50 MCG tablet Take 1 tablet by mouth daily 90 tablet 1    atorvastatin (LIPITOR) 40 MG tablet Take 1 tablet by mouth daily 90 tablet 1    terazosin (HYTRIN) 5 MG capsule TAKE 1 CAPSULE NIGHTLY 90 capsule 1    gabapentin (NEURONTIN) 300 MG capsule Take 300 mg by mouth daily.  ipratropium (ATROVENT) 0.06 % nasal spray 2 sprays by Each Nostril route 3 times daily as needed for Rhinitis      nitroGLYCERIN (NITROSTAT) 0.4 MG SL tablet up to max of 3 total doses.  If no relief after 1 dose, call 911. 25 tablet 3    diphenhydrAMINE-APAP, sleep, (TYLENOL PM EXTRA STRENGTH)  MG tablet Take 2 tablets by mouth nightly  omeprazole (PRILOSEC) 20 MG delayed release capsule Take 20 mg by mouth Daily       Coenzyme Q10 (CO Q-10) 200 MG CAPS Take  by mouth daily.  Multiple Vitamin (MULTI-VITAMIN) TABS Take  by mouth daily.  ARGININE PO Take 500 mg by mouth daily 4 tab       No current facility-administered medications for this visit. Past Medical History:   Diagnosis Date    Ascending Aneurysm ([address] 02/2017    Ascending, 4.2 cm per pt    Brown's esophagus     CAD (coronary artery disease)     Cancer (HCC)     throat cancer, s/p radiation    Chronic back pain     Hepatitis C     as child    History of hyperbaric oxygen therapy     after bottom teeth removed after rdiation therapy    Hyperlipidemia     Hypertension     Left shoulder pain     nerve pain , ? 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    PONV (postoperative nausea and vomiting)     only after OHS    S[facility]G [facility]coronary [facility] graft) 2002    [facility]    Thyroid disease     Vision loss of right eye     during hyperbaric chamber oxygen therapy     Vitreous opacities     right    Wears dentures     full upper, no bottom teeth and no use of dentures on bottom      Past Surgical History:   Procedure Laterality Date    CARDIAC CATHETERIZATION  8/2002, 4/2004    [facility]    COLONOSCOPY      CORONARY ANGIOPLASTY WITH STENT PLACEMENT  7/2002    [facility]    CORONARY ANGIOPLASTY WITH STENT PLACEMENT N/A 07/12/2019    [facility]    CORONARY ARTERY BYPASS GRAFT  2002    [facility]    DENTAL SURGERY      bottom teeth removal    HEMORRHOID SURGERY      KNEE SURGERY Right     LARYNGOSCOPY N/A 10/4/2017    LARYNGOSCOPY MICRO WITH BIOPSY performed by Frank Hayes MD at [address] [place] [address] 5/12/2021    LARYNGOSCOPY MICRO AND BIOPSY, WITH JET VENT performed by Frank Hayes MD at [address] N/A 6/2/2021    (DR. HUNTER) SUSPENSION MICROLARYNGOSCOPY WITH RECONSTRUCTION AND ADVANCEMENT FLAP X2; (DR. GONGORA)  MICROLARYNGOSCOPY AND BX POSTERIOR RIGHT VOCAL CORD      JET VENTILATION AND CO2 LASER performed by Alvin Keith MD at [address] [place]  2016    by Dr Prudence Rose VITREOUS,SUBRET/CHOROID FLUID Right 2018    VITRECTOMY 22 GAUGE [facility] Right 2018     VITRECTOMY 25 GAUGE (Right Eye)     Family History   Problem Relation Age of Onset    Heart Disease Father         MI    High Blood Pressure Father     Stroke Father     Cancer Maternal Grandmother     Cancer Maternal Grandfather     Diabetes Neg Hx      Social History     Tobacco Use    Smoking status: Former Smoker     Packs/day: 2.00     Years: 20.00     Pack years: 40.00     Quit date: 2001     Years since quittin.0    Smokeless tobacco: Never Used   Substance Use Topics    Alcohol use: No     Alcohol/week: 0.0 standard drinks       Subjective:      Review of Systems   Constitutional: Negative for activity change, appetite change, chills, diaphoresis, fatigue, fever and unexpected weight change. HENT: Negative for congestion, dental problem, ear discharge, ear pain, facial swelling, hearing loss, mouth sores, nosebleeds, postnasal drip, rhinorrhea, sinus pressure, sneezing, sore throat, tinnitus, trouble swallowing and voice change. Eyes: Negative for visual disturbance. Respiratory: Negative for apnea, cough, choking, chest tightness, shortness of breath, wheezing and stridor. Cardiovascular: Negative for chest pain, palpitations and leg swelling. Gastrointestinal: Negative for abdominal pain, diarrhea, nausea and vomiting. Endocrine: Negative for cold intolerance, heat intolerance, polydipsia and polyuria. Genitourinary: Negative for dysuria, enuresis and hematuria. Musculoskeletal: Negative for arthralgias, gait problem, neck pain and neck stiffness. Skin: Negative for color change and rash. Allergic/Immunologic: Negative for environmental allergies, food allergies and immunocompromised state. Neurological: Negative for dizziness, syncope, facial asymmetry, speech difficulty, light-headedness and headaches. Hematological: Negative for adenopathy. Does not bruise/bleed easily. Psychiatric/Behavioral: Negative for confusion and sleep disturbance. The patient is not nervous/anxious. Objective:   /68 (Site: Right Upper Arm, Position: Sitting)   Pulse 58   Temp 97.8 °F (36.6 °C) (Infrared)   Resp 12   Wt 189 lb 12.8 oz (86.1 kg)   SpO2 97%   BMI 26.47 kg/m²     Physical Exam   Voice is still hoarse, seems to be improving. Neck: No palpable adenopathy    HIGH RESOLUTION FLEXIBLE VIDEOLARYNGOSOCPY     A fiberoptic laryngoscopy was performed under topical anesthesia, after using Afrin and Lidocaine spray in the nasal fossa. The nasal fossa, nasopharynx, hypopharynx and larynx were carefully examined. Base of tongue was symmetrical. Epiglottis appeared normal and was not retrodisplaced. True vocal cords had normal mobility. Right false vocal cord biopsy site has epithelialized but there is some decreased volume to the right false cord especially in a little bit of the right true vocal cord. There was no erythema. No mucosal lesions or masses were noted. No pooling in the pyriform sinuses. Images are in media this date. Data:  All of the past medical history, past surgical history, family history,social history, allergies and current medications were reviewed with the patient. Assessment & Plan   Diagnoses and all orders for this visit:     Diagnosis Orders   1. Hoarseness     2. Primary squamous cell carcinoma of larynx (HCC)     3. Effects, radiation, subsequent encounter     4. FOUZIA on CPAP     5. Hypothyroidism due to acquired atrophy of thyroid     6.  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 Images are not reproduced here because they are the same as last time, basically an they are located in her chart under media. He is progressing well with no sign of recurrent cancer. We will continue to follow him closely. He has Brown's esophagus       Jeremías Garay MD    **This report has been created using voice recognition software. It may contain minor errors which are inherent in voicerecognition technology. **

## 2022-01-21 ENCOUNTER — OFFICE VISIT (OUTPATIENT)
Dept: ENT CLINIC | Age: 67
End: 2022-01-21
Payer: MEDICARE

## 2022-01-21 VITALS
RESPIRATION RATE: 14 BRPM | TEMPERATURE: 97.2 F | HEART RATE: 58 BPM | BODY MASS INDEX: 26.21 KG/M2 | WEIGHT: 187.9 LBS | DIASTOLIC BLOOD PRESSURE: 68 MMHG | SYSTOLIC BLOOD PRESSURE: 138 MMHG | OXYGEN SATURATION: 99 %

## 2022-01-21 DIAGNOSIS — C32.1 MALIGNANT NEOPLASM OF FALSE VOCAL CORDS (HCC): ICD-10-CM

## 2022-01-21 DIAGNOSIS — C32.9 PRIMARY SQUAMOUS CELL CARCINOMA OF LARYNX (HCC): Primary | ICD-10-CM

## 2022-01-21 DIAGNOSIS — H61.23 BILATERAL IMPACTED CERUMEN: ICD-10-CM

## 2022-01-21 DIAGNOSIS — R49.0 HOARSENESS: ICD-10-CM

## 2022-01-21 PROCEDURE — 69210 REMOVE IMPACTED EAR WAX UNI: CPT | Performed by: OTOLARYNGOLOGY

## 2022-01-21 PROCEDURE — 1123F ACP DISCUSS/DSCN MKR DOCD: CPT | Performed by: OTOLARYNGOLOGY

## 2022-01-21 PROCEDURE — 4040F PNEUMOC VAC/ADMIN/RCVD: CPT | Performed by: OTOLARYNGOLOGY

## 2022-01-21 PROCEDURE — G8484 FLU IMMUNIZE NO ADMIN: HCPCS | Performed by: OTOLARYNGOLOGY

## 2022-01-21 PROCEDURE — G8427 DOCREV CUR MEDS BY ELIG CLIN: HCPCS | Performed by: OTOLARYNGOLOGY

## 2022-01-21 PROCEDURE — G8417 CALC BMI ABV UP PARAM F/U: HCPCS | Performed by: OTOLARYNGOLOGY

## 2022-01-21 PROCEDURE — 99213 OFFICE O/P EST LOW 20 MIN: CPT | Performed by: OTOLARYNGOLOGY

## 2022-01-21 PROCEDURE — 3017F COLORECTAL CA SCREEN DOC REV: CPT | Performed by: OTOLARYNGOLOGY

## 2022-01-21 PROCEDURE — 31575 DIAGNOSTIC LARYNGOSCOPY: CPT | Performed by: OTOLARYNGOLOGY

## 2022-01-21 PROCEDURE — 1036F TOBACCO NON-USER: CPT | Performed by: OTOLARYNGOLOGY

## 2022-01-21 ASSESSMENT — ENCOUNTER SYMPTOMS
SORE THROAT: 0
TROUBLE SWALLOWING: 0
ABDOMINAL PAIN: 0
WHEEZING: 0
COLOR CHANGE: 0
CHOKING: 0
VOICE CHANGE: 0
CHEST TIGHTNESS: 0
NAUSEA: 0
COUGH: 0
SHORTNESS OF BREATH: 0
FACIAL SWELLING: 0
SINUS PRESSURE: 0
DIARRHEA: 0
APNEA: 0
VOMITING: 0
RHINORRHEA: 0
STRIDOR: 0

## 2022-01-21 NOTE — PROGRESS NOTES
[facility] [place] [facility]  [facility] EAR, NOSE AND THROAT  [address] [place] [address] [address] [address]  Dept: [phone]  Dept Fax: [phone]  Loc: [phone]    Martin Glasgow is a 74 y.o. male who was referred byNo ref. provider found for:  Chief Complaint   Patient presents with    Follow-up     Pt is here for 1mo scope   . HPI:     Martin Glasgow is a 74 y.o. male who presents today for 1 month follow up tip chip scope for squamous cell carcinoma of larynx. He is doing well. States his voice goes in & out at times. He has issues with his ears. History:     No Known Allergies  Current Outpatient Medications   Medication Sig Dispense Refill    acetaminophen (TYLENOL) 500 MG tablet Take 1,000 mg by mouth nightly      aspirin 81 MG EC tablet Take 81 mg by mouth daily      metoprolol succinate (TOPROL XL) 50 MG extended release tablet Take 0.5 tablets by mouth 2 times daily 10 tablet 0    ticagrelor (BRILINTA) 90 MG TABS tablet Take 1 tablet by mouth 2 times daily 60 tablet 0    levothyroxine (SYNTHROID) 50 MCG tablet Take 1 tablet by mouth daily 90 tablet 1    atorvastatin (LIPITOR) 40 MG tablet Take 1 tablet by mouth daily 90 tablet 1    terazosin (HYTRIN) 5 MG capsule TAKE 1 CAPSULE NIGHTLY 90 capsule 1    gabapentin (NEURONTIN) 300 MG capsule Take 300 mg by mouth daily.  ipratropium (ATROVENT) 0.06 % nasal spray 2 sprays by Each Nostril route 3 times daily as needed for Rhinitis      nitroGLYCERIN (NITROSTAT) 0.4 MG SL tablet up to max of 3 total doses. If no relief after 1 dose, call 911. 25 tablet 3    diphenhydrAMINE-APAP, sleep, (TYLENOL PM EXTRA STRENGTH)  MG tablet Take 2 tablets by mouth nightly      omeprazole (PRILOSEC) 20 MG delayed release capsule Take 20 mg by mouth Daily       Coenzyme Q10 (CO Q-10) 200 MG CAPS Take  by mouth daily.  Multiple Vitamin (MULTI-VITAMIN) TABS Take  by mouth daily.         ARGININE PO Take 500 mg by mouth daily 4 tab       No current facility-administered medications for this visit. Past Medical History:   Diagnosis Date    Ascending Aneurysm ([address] 02/2017    Ascending, 4.2 cm per pt    Brown's esophagus     CAD (coronary artery disease)     Cancer (HCC)     throat cancer, s/p radiation    Chronic back pain     Hepatitis C     as child    History of hyperbaric oxygen therapy     after bottom teeth removed after rdiation therapy    Hyperlipidemia     Hypertension     Left shoulder pain     nerve pain , ? 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    PONV (postoperative nausea and vomiting)     only after OHS    S/P CABG [facility]coronary [facility] graft) 2002    [facility]    Thyroid disease     Vision loss of right eye     during hyperbaric chamber oxygen therapy     Vitreous opacities     right    Wears dentures     full upper, no bottom teeth and no use of dentures on bottom      Past Surgical History:   Procedure Laterality Date    CARDIAC CATHETERIZATION  8/2002, 4/2004    [facility]    COLONOSCOPY      CORONARY ANGIOPLASTY WITH STENT PLACEMENT  7/2002    [facility]    CORONARY ANGIOPLASTY WITH STENT PLACEMENT N/A 07/12/2019    [facility]    CORONARY ARTERY BYPASS GRAFT  2002    [facility]    DENTAL SURGERY      bottom teeth removal    HEMORRHOID SURGERY      KNEE SURGERY Right     LARYNGOSCOPY N/A 10/4/2017    LARYNGOSCOPY MICRO WITH BIOPSY [facility] by Sh[facility] Reddy [facility] [address] [facility] [address] 5/12/2021    LARYNGOSCOPY MICRO AND BIOPSY, WITH JET VENT performed by Shante Reddy MD at [address] N/A 6/2/2021    (DR. HUNTER) SUSPENSION MICROLARYNGOSCOPY WITH RECONSTRUCTION AND ADVANCEMENT FLAP X2; (DR. GONGORA)  MICROLARYNGOSCOPY AND BX POSTERIOR RIGHT VOCAL CORD      JET VENTILATION AND CO2 LASER performed by Farhan Nunez MD at [address] [place] 2016    by Dr Breann Bello VITREOUS,SUBRET/CHOROID FLUID Right 2018    VITRECTOMY 22 GAUGE performed by Jasvir Rivera MD at [address] [place] [address] VITRECTOMY Right 2018     VITRECTOMY 25 GAUGE (Right Eye)     Family History   Problem Relation Age of Onset    Heart Disease Father         MI    High Blood Pressure Father     Stroke Father     Cancer Maternal Grandmother     Cancer Maternal Grandfather     Diabetes Neg Hx      Social History     Tobacco Use    Smoking status: Former Smoker     Packs/day: 2.00     Years: 20.00     Pack years: 40.00     Quit date: 2001     Years since quittin.1    Smokeless tobacco: Never Used   Substance Use Topics    Alcohol use: No     Alcohol/week: 0.0 standard drinks       Subjective:       Review of Systems   Constitutional: Negative for activity change, appetite change, chills, diaphoresis, fatigue, fever and unexpected weight change. HENT: Negative for congestion, dental problem, ear discharge, ear pain, facial swelling, hearing loss, mouth sores, nosebleeds, postnasal drip, rhinorrhea, sinus pressure, sneezing, sore throat, tinnitus, trouble swallowing and voice change. Eyes: Negative for visual disturbance. Respiratory: Negative for apnea, cough, choking, chest tightness, shortness of breath, wheezing and stridor. Cardiovascular: Negative for chest pain, palpitations and leg swelling. Gastrointestinal: Negative for abdominal pain, diarrhea, nausea and vomiting. Endocrine: Negative for cold intolerance, heat intolerance, polydipsia and polyuria. Genitourinary: Negative for dysuria, enuresis and hematuria. Musculoskeletal: Negative for arthralgias, gait problem, neck pain and neck stiffness. Skin: Negative for color change and rash. Allergic/Immunologic: Negative for environmental allergies, food allergies and immunocompromised state.    Neurological: Negative for dizziness, syncope, facial asymmetry, speech difficulty, a tip chip scope. He agreed. 1 month follow up tip chip       I, Jose Weathers CMA ([facility]), am scribing for, and in the presence of Dr. Shante Reddy. Electronically signed by Cory Peña CMA ([facility]) on 1/21/22 at 9:38 AM EST. (Please note that portions of this note were completed with a voice recognition program. Efforts were made to edit the dictations butoccasionally words are mis-transcribed.)    I agree to the above documentation placed by my scribe. I have personally evaluated this patient. Additional findings are as noted. I reviewed the scribe's note and agree with the documented findings and plan of care. Any areas of disagreement are corrected. I agree with the chief complaint, past medical history, past surgical history, allergies, medications, social and family history as documented unless otherwise noted below.      Electronically signed by Nav Dorado MD on 2/5/2022 at 7:02 PM

## 2022-01-26 ENCOUNTER — HOSPITAL ENCOUNTER (OUTPATIENT)
Dept: RADIATION ONCOLOGY | Age: 67
Discharge: HOME | End: 2022-01-26
Payer: MEDICARE

## 2022-01-26 VITALS
BODY MASS INDEX: 26.25 KG/M2 | DIASTOLIC BLOOD PRESSURE: 69 MMHG | RESPIRATION RATE: 16 BRPM | OXYGEN SATURATION: 99 % | HEART RATE: 54 BPM | TEMPERATURE: 98 F | SYSTOLIC BLOOD PRESSURE: 146 MMHG | WEIGHT: 188.2 LBS

## 2022-01-26 PROCEDURE — 99213 OFFICE O/P EST LOW 20 MIN: CPT | Performed by: RADIOLOGY

## 2022-01-26 PROCEDURE — 99212 OFFICE O/P EST SF 10 MIN: CPT | Performed by: RADIOLOGY

## 2022-02-25 ENCOUNTER — OFFICE VISIT (OUTPATIENT)
Dept: ENT CLINIC | Age: 67
End: 2022-02-25
Payer: MEDICARE

## 2022-02-25 VITALS
HEART RATE: 60 BPM | RESPIRATION RATE: 16 BRPM | DIASTOLIC BLOOD PRESSURE: 78 MMHG | OXYGEN SATURATION: 98 % | HEIGHT: 71 IN | SYSTOLIC BLOOD PRESSURE: 142 MMHG | TEMPERATURE: 97.1 F | BODY MASS INDEX: 26.49 KG/M2 | WEIGHT: 189.2 LBS

## 2022-02-25 DIAGNOSIS — R49.0 HOARSENESS: ICD-10-CM

## 2022-02-25 DIAGNOSIS — T66.XXXD EFFECTS, RADIATION, SUBSEQUENT ENCOUNTER: ICD-10-CM

## 2022-02-25 DIAGNOSIS — C32.9 PRIMARY SQUAMOUS CELL CARCINOMA OF LARYNX (HCC): Primary | ICD-10-CM

## 2022-02-25 DIAGNOSIS — C32.1 MALIGNANT NEOPLASM OF FALSE VOCAL CORDS (HCC): ICD-10-CM

## 2022-02-25 DIAGNOSIS — K21.9 GASTROESOPHAGEAL REFLUX DISEASE WITHOUT ESOPHAGITIS: ICD-10-CM

## 2022-02-25 PROCEDURE — G8417 CALC BMI ABV UP PARAM F/U: HCPCS | Performed by: OTOLARYNGOLOGY

## 2022-02-25 PROCEDURE — 4040F PNEUMOC VAC/ADMIN/RCVD: CPT | Performed by: OTOLARYNGOLOGY

## 2022-02-25 PROCEDURE — G8427 DOCREV CUR MEDS BY ELIG CLIN: HCPCS | Performed by: OTOLARYNGOLOGY

## 2022-02-25 PROCEDURE — G8484 FLU IMMUNIZE NO ADMIN: HCPCS | Performed by: OTOLARYNGOLOGY

## 2022-02-25 PROCEDURE — 1123F ACP DISCUSS/DSCN MKR DOCD: CPT | Performed by: OTOLARYNGOLOGY

## 2022-02-25 PROCEDURE — 3017F COLORECTAL CA SCREEN DOC REV: CPT | Performed by: OTOLARYNGOLOGY

## 2022-02-25 PROCEDURE — 99213 OFFICE O/P EST LOW 20 MIN: CPT | Performed by: OTOLARYNGOLOGY

## 2022-02-25 PROCEDURE — 31575 DIAGNOSTIC LARYNGOSCOPY: CPT | Performed by: OTOLARYNGOLOGY

## 2022-02-25 PROCEDURE — 1036F TOBACCO NON-USER: CPT | Performed by: OTOLARYNGOLOGY

## 2022-02-25 ASSESSMENT — ENCOUNTER SYMPTOMS
DIARRHEA: 0
VOICE CHANGE: 0
CHOKING: 0
NAUSEA: 0
FACIAL SWELLING: 0
WHEEZING: 0
RHINORRHEA: 0
VOMITING: 0
SHORTNESS OF BREATH: 0
COLOR CHANGE: 0
ABDOMINAL PAIN: 0
CHEST TIGHTNESS: 0
STRIDOR: 0
SORE THROAT: 0
COUGH: 0
TROUBLE SWALLOWING: 0
APNEA: 0
SINUS PRESSURE: 0

## 2022-02-25 NOTE — PROGRESS NOTES
[facility] [place] [facility]  [facility] EAR, NOSE AND THROAT  [address] [address]  Dept: [phone]  Dept Fax: [phone]  Loc: [phone]    Sandip Baker is a 74 y.o. male who was referred byNo ref. provider found for:  Chief Complaint   Patient presents with    Follow-up     patient is here for 1 month f/u throat   . HPI:     Sandip Baker is a 74 y.o. male who presents today for 1 month follow up squamous cell carcinoma of larynx. Throat is dry, has trouble swallowing and has been hoarse lately. Dr. Kelly Ruth did his radiation. Mentioned that Kelly Ruth talked about possible immuno therapy in the future. He has flatter pillows and has a wedge pillow but thinks it's hard. History:     No Known Allergies  Current Outpatient Medications   Medication Sig Dispense Refill    acetaminophen (TYLENOL) 500 MG tablet Take 1,000 mg by mouth nightly      aspirin 81 MG EC tablet Take 81 mg by mouth daily      metoprolol succinate (TOPROL XL) 50 MG extended release tablet Take 0.5 tablets by mouth 2 times daily 10 tablet 0    ticagrelor (BRILINTA) 90 MG TABS tablet Take 1 tablet by mouth 2 times daily 60 tablet 0    levothyroxine (SYNTHROID) 50 MCG tablet Take 1 tablet by mouth daily 90 tablet 1    atorvastatin (LIPITOR) 40 MG tablet Take 1 tablet by mouth daily 90 tablet 1    terazosin (HYTRIN) 5 MG capsule TAKE 1 CAPSULE NIGHTLY 90 capsule 1    gabapentin (NEURONTIN) 300 MG capsule Take 300 mg by mouth daily.  ipratropium (ATROVENT) 0.06 % nasal spray 2 sprays by Each Nostril route 3 times daily as needed for Rhinitis      nitroGLYCERIN (NITROSTAT) 0.4 MG SL tablet up to max of 3 total doses.  If no relief after 1 dose, call 911. 25 tablet 3    diphenhydrAMINE-APAP, sleep, (TYLENOL PM EXTRA STRENGTH)  MG tablet Take 2 tablets by mouth nightly      omeprazole (PRILOSEC) 20 MG delayed release capsule Take 20 mg by mouth Daily       Coenzyme Q10 (CO Q-10) 200 MG CAPS Take  by mouth daily.  Multiple Vitamin (MULTI-VITAMIN) TABS Take  by mouth daily.  ARGININE PO Take 500 mg by mouth daily 4 tab       No current facility-administered medications for this visit. Past Medical History:   Diagnosis Date    Ascending Aneurysm ([address] 02/2017    Ascending, 4.2 cm per pt    Brown's esophagus     CAD (coronary artery disease)     Cancer (HCC)     throat cancer, s/p radiation    Chronic back pain     Hepatitis C     as child    History of hyperbaric oxygen therapy     after bottom teeth removed after rdiation therapy    Hyperlipidemia     Hypertension     Left shoulder pain     nerve pain , ? 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    PONV (postoperative nausea and vomiting)     only after OHS    S/P CABG (coronary artery bypass graft) 2002    [facility]    Thyroid disease     Vision loss of right eye     during hyperbaric chamber oxygen therapy     Vitreous opacities     right    Wears dentures     full upper, no bottom teeth and no use of dentures on bottom      Past Surgical History:   Procedure Laterality Date    CARDIAC CATHETERIZATION  8/2002, 4/2004    [facility]    COLONOSCOPY      CORONARY ANGIOPLASTY WITH STENT PLACEMENT  7/2002    [facility]    CORONARY ANGIOPLASTY WITH STENT PLACEMENT N/A 07/12/2019    [facility]    CORONARY ARTERY BYPASS GRAFT  2002    [facility]    DENTAL SURGERY      bottom teeth removal    HEMORRHOID SURGERY      KNEE SURGERY Right     LARYNGOSCOPY N/A 10/4/2017    LARYNGOSCOPY MICRO WITH BIOPSY performed by Alonso Bradley MD at [address] [place] [address] Ave [address] 5/12/2021    LARYNGOSCOPY MICRO AND BIOPSY, WITH JET VENT performed by Alonso Bradley MD at [address] N/A 6/2/2021    (DR. HUNTER) SUSPENSION MICROLARYNGOSCOPY WITH RECONSTRUCTION AND ADVANCEMENT FLAP X2; (DR. GONGORA)  MICROLARYNGOSCOPY AND BX POSTERIOR RIGHT VOCAL CORD      JET VENTILATION AND CO2 LASER performed by Dc Smith MD at [address] [place]  2016    by Dr Rachid Valdivia VITREOUS,SUBRET/CHOROID FLUID Right 2018    VITRECTOMY 22 GAUGE [facility] Right 2018     VITRECTOMY 25 GAUGE (Right Eye)     Family History   Problem Relation Age of Onset    Heart Disease Father         MI    High Blood Pressure Father     Stroke Father     Cancer Maternal Grandmother     Cancer Maternal Grandfather     Diabetes Neg Hx      Social History     Tobacco Use    Smoking status: Former Smoker     Packs/day: 2.00     Years: 20.00     Pack years: 40.00     Quit date: 2001     Years since quittin.2    Smokeless tobacco: Never Used   Substance Use Topics    Alcohol use: No     Alcohol/week: 0.0 standard drinks       Subjective:       Review of Systems   Constitutional: Negative for activity change, appetite change, chills, diaphoresis, fatigue, fever and unexpected weight change. HENT: Negative for congestion, dental problem, ear discharge, ear pain, facial swelling, hearing loss, mouth sores, nosebleeds, postnasal drip, rhinorrhea, sinus pressure, sneezing, sore throat, tinnitus, trouble swallowing and voice change. Eyes: Negative for visual disturbance. Respiratory: Negative for apnea, cough, choking, chest tightness, shortness of breath, wheezing and stridor. Cardiovascular: Negative for chest pain, palpitations and leg swelling. Gastrointestinal: Negative for abdominal pain, diarrhea, nausea and vomiting. Endocrine: Negative for cold intolerance, heat intolerance, polydipsia and polyuria. Genitourinary: Negative for dysuria, enuresis and hematuria. Musculoskeletal: Negative for arthralgias, gait problem, neck pain and neck stiffness. Skin: Negative for color change and rash.    Allergic/Immunologic: Negative for environmental allergies, food allergies and immunocompromised state. Neurological: Negative for dizziness, syncope, facial asymmetry, speech difficulty, light-headedness and headaches. Hematological: Negative for adenopathy. Does not bruise/bleed easily. Psychiatric/Behavioral: Negative for confusion and sleep disturbance. The patient is not nervous/anxious. Objective:     BP (!) 142/78 (Site: Left Upper Arm, Position: Sitting)   Pulse 60   Temp 97.1 °F (36.2 °C) (Infrared)   Resp 16   Ht 5' 11\" (1.803 m)   Wt 189 lb 3.2 oz (85.8 kg)   SpO2 98%   BMI 26.39 kg/m²     Physical Exam   Voice slightly hoarse  Neck no palpable adenopathy    HIGH RESOLUTION FLEXIBLE VIDEOLARYNGOSOCPY    A fiberoptic laryngoscopy was performed under topical anesthesia, after using Afrin and Lidocaine spray in the nasal fossa. The nasal fossa, nasopharynx, hypopharynx and larynx were carefully examined. Base of tongue was symmetrical. Epiglottis appeared normal and was not retrodisplaced. True vocal cords had normal mobility. There was no erythema. No mucosal lesions or masses were noted. No pooling in the pyriform sinuses. Mild erythema of the endolarynx. .        Data:  All of the past medical history, past surgical history, family history,social history, allergies and current medications were reviewed with the patient. Assessment & Plan   Diagnoses and all orders for this visit:     Diagnosis Orders   1. Primary squamous cell carcinoma of larynx (HCC)     2. 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 Hoarseness     4. Effects, radiation, subsequent encounter     5. Gastroesophageal reflux disease without esophagitis         The findings were explained and his questions were answered. Options were discussed including improving his GERD regimen: Do not fill up stomach for 3 hours before bedtime. Elevate the head of the bed, perhaps with a foam wedge. May take Prilosec 20 mg with supper.   1 month follow up. He agreed. I, Francisco Lenz CMA ([facility]), am scribing for, and in the presence of Dr. Jason Galloway. Electronically signed by Mili Mercado CMA ([facility]) on 2/25/22 at 12:07 PM EST. (Please note that portions of this note were completed with a voice recognition program. Efforts were made to edit the dictations butoccasionally words are mis-transcribed.)    I agree to the above documentation placed by my scribe. I have personally evaluated this patient. Additional findings are as noted. I reviewed the scribe's note and agree with the documented findings and plan of care. Any areas of disagreement are corrected. I agree with the chief complaint, past medical history, past surgical history, allergies, medications, social and family history as documented unless otherwise noted below.      Electronically signed by Lou Herrera MD on 3/20/2022 at 6:58 PM

## 2022-02-25 NOTE — PATIENT INSTRUCTIONS
Do not fill up stomach for 3 hours before bedtime. Elevate the head of the bed, perhaps with a foam wedge. May take Prilosec 20 mg with supper.

## 2022-03-23 RX ORDER — LEVOTHYROXINE SODIUM 0.05 MG/1
50 TABLET ORAL DAILY
Qty: 90 TABLET | Refills: 1 | Status: SHIPPED | OUTPATIENT
Start: 2022-03-23 | End: 2022-08-03 | Stop reason: SDUPTHER

## 2022-04-08 ENCOUNTER — OFFICE VISIT (OUTPATIENT)
Dept: ENT CLINIC | Age: 67
End: 2022-04-08
Payer: MEDICARE

## 2022-04-08 VITALS
OXYGEN SATURATION: 97 % | TEMPERATURE: 97.3 F | RESPIRATION RATE: 16 BRPM | BODY MASS INDEX: 26.5 KG/M2 | DIASTOLIC BLOOD PRESSURE: 78 MMHG | HEART RATE: 60 BPM | WEIGHT: 189.3 LBS | SYSTOLIC BLOOD PRESSURE: 124 MMHG | HEIGHT: 71 IN

## 2022-04-08 DIAGNOSIS — R49.0 HOARSENESS: ICD-10-CM

## 2022-04-08 DIAGNOSIS — C32.1 MALIGNANT NEOPLASM OF FALSE VOCAL CORDS (HCC): ICD-10-CM

## 2022-04-08 DIAGNOSIS — T66.XXXD EFFECTS, RADIATION, SUBSEQUENT ENCOUNTER: ICD-10-CM

## 2022-04-08 DIAGNOSIS — C32.9 SQUAMOUS CELL CARCINOMA OF LARYNX (HCC): Primary | ICD-10-CM

## 2022-04-08 PROCEDURE — 4040F PNEUMOC VAC/ADMIN/RCVD: CPT | Performed by: OTOLARYNGOLOGY

## 2022-04-08 PROCEDURE — 1036F TOBACCO NON-USER: CPT | Performed by: OTOLARYNGOLOGY

## 2022-04-08 PROCEDURE — G8427 DOCREV CUR MEDS BY ELIG CLIN: HCPCS | Performed by: OTOLARYNGOLOGY

## 2022-04-08 PROCEDURE — 3017F COLORECTAL CA SCREEN DOC REV: CPT | Performed by: OTOLARYNGOLOGY

## 2022-04-08 PROCEDURE — 99213 OFFICE O/P EST LOW 20 MIN: CPT | Performed by: OTOLARYNGOLOGY

## 2022-04-08 PROCEDURE — 31575 DIAGNOSTIC LARYNGOSCOPY: CPT | Performed by: OTOLARYNGOLOGY

## 2022-04-08 PROCEDURE — G8417 CALC BMI ABV UP PARAM F/U: HCPCS | Performed by: OTOLARYNGOLOGY

## 2022-04-08 PROCEDURE — 1123F ACP DISCUSS/DSCN MKR DOCD: CPT | Performed by: OTOLARYNGOLOGY

## 2022-04-08 ASSESSMENT — ENCOUNTER SYMPTOMS
SORE THROAT: 0
DIARRHEA: 0
CHEST TIGHTNESS: 0
COLOR CHANGE: 0
ABDOMINAL PAIN: 0
WHEEZING: 0
TROUBLE SWALLOWING: 0
VOICE CHANGE: 0
APNEA: 0
STRIDOR: 0
COUGH: 0
RHINORRHEA: 0
VOMITING: 0
FACIAL SWELLING: 0
SHORTNESS OF BREATH: 0
CHOKING: 0
SINUS PRESSURE: 0
NAUSEA: 0

## 2022-04-08 NOTE — PROGRESS NOTES
[facility] [place] [facility]  [facility] EAR, NOSE AND THROAT  [address] [place] [address] [address] [address]  Dept: [phone]  Dept Fax: [phone]  Loc: [phone]    Brittany Narvaez is a 74 y.o. male who was referred byNo ref. provider found for:  Chief Complaint   Patient presents with    Procedure     patient is her for throat scope   . HPI:     Brittany Narvaez is a 74 y.o. male who presents today for follow up scope for squamous cell carcinoma of larynx. Had a terrable cold and coughed a lot a few weeks ago, couldn't stop coughing. Voice is sounding better. History:     No Known Allergies  Current Outpatient Medications   Medication Sig Dispense Refill    levothyroxine (SYNTHROID) 50 MCG tablet Take 1 tablet by mouth daily 90 tablet 1    acetaminophen (TYLENOL) 500 MG tablet Take 1,000 mg by mouth nightly      aspirin 81 MG EC tablet Take 81 mg by mouth daily      metoprolol succinate (TOPROL XL) 50 MG extended release tablet Take 0.5 tablets by mouth 2 times daily 10 tablet 0    ticagrelor (BRILINTA) 90 MG TABS tablet Take 1 tablet by mouth 2 times daily 60 tablet 0    atorvastatin (LIPITOR) 40 MG tablet Take 1 tablet by mouth daily 90 tablet 1    terazosin (HYTRIN) 5 MG capsule TAKE 1 CAPSULE NIGHTLY 90 capsule 1    gabapentin (NEURONTIN) 300 MG capsule Take 300 mg by mouth daily.  ipratropium (ATROVENT) 0.06 % nasal spray 2 sprays by Each Nostril route 3 times daily as needed for Rhinitis      nitroGLYCERIN (NITROSTAT) 0.4 MG SL tablet up to max of 3 total doses. If no relief after 1 dose, call 911. 25 tablet 3    diphenhydrAMINE-APAP, sleep, (TYLENOL PM EXTRA STRENGTH)  MG tablet Take 2 tablets by mouth nightly      omeprazole (PRILOSEC) 20 MG delayed release capsule Take 20 mg by mouth Daily       Coenzyme Q10 (CO Q-10) 200 MG CAPS Take  by mouth daily.  Multiple Vitamin (MULTI-VITAMIN) TABS Take  by mouth daily.         ARGININE PO Take 500 mg by mouth daily 4 tab       No current facility-administered medications for this visit. Past Medical History:   Diagnosis Date    Ascending Aneurysm ([address] 02/2017    Ascending, 4.2 cm per pt    Brown's esophagus     CAD (coronary artery disease)     Cancer (HCC)     throat cancer, s/p radiation    Chronic back pain     Hepatitis C     as child    History of hyperbaric oxygen therapy     after bottom teeth removed after rdiation therapy    Hyperlipidemia     Hypertension     Left shoulder pain     nerve pain , ? 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    PONV (postoperative nausea and vomiting)     only after OHS    S/P CABG [facility]coronary artery bypass graft) 2002    [facility]    Thyroid disease     Vision loss of right eye     during hyperbaric chamber oxygen therapy     Vitreous opacities     right    Wears dentures     full upper, no bottom teeth and no use of dentures on bottom      Past Surgical History:   Procedure Laterality Date    CARDIAC CATHETERIZATION  8/2002, 4/2004    [facility]    COLONOSCOPY      CORONARY ANGIOPLASTY WITH STENT PLACEMENT  7/2002    [facility]    CORONARY ANGIOPLASTY WITH STENT PLACEMENT N/A 07/12/2019    [facility]    CORONARY ARTERY BYPASS GRAFT  2002    [facility]    DENTAL SURGERY      bottom teeth removal    HEMORRHOID SURGERY      KNEE SURGERY Right     LARYNGOSCOPY N/A 10/4/2017    LARYNGOSCOPY MICRO WITH BIOPSY performed by Aspen Gaytan MD at [facility] 5/12/2021    LARYNGOSCOPY MICRO AND BIOPSY, WITH JET VENT performed by Aspen Gaytan MD at [address] N/A 6/2/2021    (DR. HUNTER) SUSPENSION MICROLARYNGOSCOPY WITH RECONSTRUCTION AND ADVANCEMENT FLAP X2; (DR. GONGROA)  MICROLARYNGOSCOPY AND BX POSTERIOR RIGHT VOCAL CORD      JET VENTILATION AND CO2 LASER performed by Geoffrey Husain MD at [address] [place] 2016    by Dr Susannah Cabrera VITREOUS,SUBRET/CHOROID FLUID Right 2018    VITRECTOMY 22 GAUGE performed by Jessica Plata MD at [address] [place] [address] VITRECTOMY Right 2018     VITRECTOMY 25 GAUGE (Right Eye)     Family History   Problem Relation Age of Onset    Heart Disease Father         MI    High Blood Pressure Father     Stroke Father     Cancer Maternal Grandmother     Cancer Maternal Grandfather     Diabetes Neg Hx      Social History     Tobacco Use    Smoking status: Former Smoker     Packs/day: 2.00     Years: 20.00     Pack years: 40.00     Quit date: 2001     Years since quittin.3    Smokeless tobacco: Never Used   Substance Use Topics    Alcohol use: No     Alcohol/week: 0.0 standard drinks       Subjective:       Review of Systems   Constitutional: Negative for activity change, appetite change, chills, diaphoresis, fatigue, fever and unexpected weight change. HENT: Negative for congestion, dental problem, ear discharge, ear pain, facial swelling, hearing loss, mouth sores, nosebleeds, postnasal drip, rhinorrhea, sinus pressure, sneezing, sore throat, tinnitus, trouble swallowing and voice change. Eyes: Negative for visual disturbance. Respiratory: Negative for apnea, cough, choking, chest tightness, shortness of breath, wheezing and stridor. Cardiovascular: Negative for chest pain, palpitations and leg swelling. Gastrointestinal: Negative for abdominal pain, diarrhea, nausea and vomiting. Endocrine: Negative for cold intolerance, heat intolerance, polydipsia and polyuria. Genitourinary: Negative for dysuria, enuresis and hematuria. Musculoskeletal: Negative for arthralgias, gait problem, neck pain and neck stiffness. Skin: Negative for color change and rash. Allergic/Immunologic: Negative for environmental allergies, food allergies and immunocompromised state.    Neurological: Negative for dizziness, syncope, facial asymmetry, speech difficulty, light-headedness and headaches. Hematological: Negative for adenopathy. Does not bruise/bleed easily. Psychiatric/Behavioral: Negative for confusion and sleep disturbance. The patient is not nervous/anxious. Objective:     /78 (Site: Right Upper Arm, Position: Sitting)   Pulse 60   Temp 97.3 °F (36.3 °C) (Infrared)   Resp 16   Ht 5' 11\" (1.803 m)   Wt 189 lb 4.8 oz (85.9 kg)   SpO2 97%   BMI 26.40 kg/m²     Physical Exam   Voice: Slight hoarseness      HIGH RESOLUTION FLEXIBLE VIDEOLARYNGOSOCPY    A fiberoptic laryngoscopy was performed under topical anesthesia, after using Afrin and Lidocaine spray in the nasal fossa. The nasal fossa, nasopharynx, hypopharynx and larynx were carefully examined. Base of tongue was symmetrical. Epiglottis appeared normal and was not retrodisplaced. True vocal cords had normal mobility. There was no erythema. No mucosal lesions or masses were noted. No pooling in the pyriform sinuses. Most recently biopsied area is very gradually appearing more normal        Data:  All of the past medical history, past surgical history, family history,social history, allergies and current medications were reviewed with the patient. Assessment & Plan   Diagnoses and all orders for this visit:     Diagnosis Orders   1. Squamous cell carcinoma of larynx (HCC)     2. 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 Hoarseness     4. Effects, radiation, subsequent encounter         The findings were explained and his questions were answered. Options were discussed including 1 month follow up scope. He agreed. 1 month follow up scope       I, Reed Broderick CMA ([facility]), am scribing for, and in the presence of Dr. Tj Guerra. Electronically signed by Etelvina Ward CMA ([facility]) on 4/8/22 at 1:41 PM EDT.      (Please note that portions of this note were completed with a voice recognition program. Efforts were made to edit the dictations butoccasionally words are mis-transcribed.)    I agree to the above documentation placed by my scribe. I have personally evaluated this patient. Additional findings are as noted. I reviewed the scribe's note and agree with the documented findings and plan of care. Any areas of disagreement are corrected. I agree with the chief complaint, past medical history, past surgical history, allergies, medications, social and family history as documented unless otherwise noted below.      Electronically signed by Moreno Nunn MD on 4/30/2022 at 8:37 PM

## 2022-04-12 NOTE — RESULT ENCOUNTER NOTE
TSH is good Prednisone Counseling:  I discussed with the patient the risks of prolonged use of prednisone including but not limited to weight gain, insomnia, osteoporosis, mood changes, diabetes, susceptibility to infection, glaucoma and high blood pressure.  In cases where prednisone use is prolonged, patients should be monitored with blood pressure checks, serum glucose levels and an eye exam.  Additionally, the patient may need to be placed on GI prophylaxis, PCP prophylaxis, and calcium and vitamin D supplementation and/or a bisphosphonate.  The patient verbalized understanding of the proper use and the possible adverse effects of prednisone.  All of the patient's questions and concerns were addressed.

## 2022-04-26 ENCOUNTER — OFFICE VISIT (OUTPATIENT)
Dept: INTERNAL MEDICINE CLINIC | Age: 67
End: 2022-04-26
Payer: MEDICARE

## 2022-04-26 VITALS
SYSTOLIC BLOOD PRESSURE: 118 MMHG | DIASTOLIC BLOOD PRESSURE: 72 MMHG | HEART RATE: 64 BPM | TEMPERATURE: 98.2 F | BODY MASS INDEX: 26.5 KG/M2 | WEIGHT: 190 LBS

## 2022-04-26 DIAGNOSIS — E78.5 HYPERLIPIDEMIA, UNSPECIFIED HYPERLIPIDEMIA TYPE: Primary | ICD-10-CM

## 2022-04-26 DIAGNOSIS — E89.0 POSTOPERATIVE HYPOTHYROIDISM: ICD-10-CM

## 2022-04-26 DIAGNOSIS — I10 ESSENTIAL HYPERTENSION: ICD-10-CM

## 2022-04-26 PROCEDURE — 3017F COLORECTAL CA SCREEN DOC REV: CPT | Performed by: INTERNAL MEDICINE

## 2022-04-26 PROCEDURE — 99213 OFFICE O/P EST LOW 20 MIN: CPT | Performed by: INTERNAL MEDICINE

## 2022-04-26 PROCEDURE — 1123F ACP DISCUSS/DSCN MKR DOCD: CPT | Performed by: INTERNAL MEDICINE

## 2022-04-26 PROCEDURE — 4040F PNEUMOC VAC/ADMIN/RCVD: CPT | Performed by: INTERNAL MEDICINE

## 2022-04-26 PROCEDURE — 1036F TOBACCO NON-USER: CPT | Performed by: INTERNAL MEDICINE

## 2022-04-26 PROCEDURE — G8427 DOCREV CUR MEDS BY ELIG CLIN: HCPCS | Performed by: INTERNAL MEDICINE

## 2022-04-26 PROCEDURE — G8417 CALC BMI ABV UP PARAM F/U: HCPCS | Performed by: INTERNAL MEDICINE

## 2022-04-26 RX ORDER — TERAZOSIN 5 MG/1
CAPSULE ORAL
Qty: 90 CAPSULE | Refills: 1 | Status: SHIPPED | OUTPATIENT
Start: 2022-04-26 | End: 2022-08-29 | Stop reason: SDUPTHER

## 2022-04-26 RX ORDER — ATORVASTATIN CALCIUM 40 MG/1
40 TABLET, FILM COATED ORAL DAILY
Qty: 90 TABLET | Refills: 1 | Status: SHIPPED | OUTPATIENT
Start: 2022-04-26 | End: 2022-08-29 | Stop reason: SDUPTHER

## 2022-04-26 RX ORDER — METOPROLOL SUCCINATE 50 MG/1
25 TABLET, EXTENDED RELEASE ORAL 2 TIMES DAILY
Qty: 90 TABLET | Refills: 1 | Status: SHIPPED | OUTPATIENT
Start: 2022-04-26 | End: 2022-08-29 | Stop reason: SDUPTHER

## 2022-04-26 ASSESSMENT — PATIENT HEALTH QUESTIONNAIRE - PHQ9
SUM OF ALL RESPONSES TO PHQ9 QUESTIONS 1 & 2: 0
1. LITTLE INTEREST OR PLEASURE IN DOING THINGS: 0
SUM OF ALL RESPONSES TO PHQ QUESTIONS 1-9: 0
2. FEELING DOWN, DEPRESSED OR HOPELESS: 0
SUM OF ALL RESPONSES TO PHQ QUESTIONS 1-9: 0

## 2022-04-26 NOTE — PROGRESS NOTES
[address] [facility]   [address]  [place], [address]  Dept: [phone]  Dept Fax: [phone]    YOB: 1955    Hypertension and CABG / 11 month visit     74 y.o. male   here for follow-up of above issues. He does have medical issues, but no CP or SOB, denies any recent fever or chillls. Had COVID in the past, and had mild URI symptoms about 4 weeks ago. He completed radiation after he was diagnosed with laryngeal cancer. I recommend he gets COVID booster dose, as he got pfizer. He plans on getting his biopsy of vocal cords, as he is having hoarseness , hx of SCC followed by dr. Lauren Onofre, they are going to set up  Biopsy soon. On further questioning he gets occasional chest wall discomfort, he is lifting upto 50 lbs per his spouse. He does have   Admit to mild chest wall pain, since CABG ~ 20 YRS AGO. No fever or chills. Current Outpatient Medications   Medication Sig Dispense Refill    ticagrelor (BRILINTA) 90 MG TABS tablet Take 1 tablet by mouth 2 times daily 60 tablet 0    atorvastatin (LIPITOR) 40 MG tablet Take 1 tablet by mouth daily 90 tablet 1    terazosin (HYTRIN) 5 MG capsule TAKE 1 CAPSULE NIGHTLY 90 capsule 1    metoprolol succinate (TOPROL XL) 50 MG extended release tablet Take 0.5 tablets by mouth 2 times daily 90 tablet 1    levothyroxine (SYNTHROID) 50 MCG tablet Take 1 tablet by mouth daily 90 tablet 1    aspirin 81 MG EC tablet Take 81 mg by mouth daily      gabapentin (NEURONTIN) 300 MG capsule Take 300 mg by mouth daily.  ipratropium (ATROVENT) 0.06 % nasal spray 2 sprays by Each Nostril route 3 times daily as needed for Rhinitis      nitroGLYCERIN (NITROSTAT) 0.4 MG SL tablet up to max of 3 total doses.  If no relief after 1 dose, call 911. 25 tablet 3    diphenhydrAMINE-APAP, sleep, (TYLENOL PM EXTRA STRENGTH)  MG tablet Take 2 tablets by mouth nightly      omeprazole (PRILOSEC) 20 MG delayed release capsule Take 20 mg by mouth Daily       Coenzyme Q10 (CO Q-10) 200 MG CAPS Take  by mouth daily.  Multiple Vitamin (MULTI-VITAMIN) TABS Take  by mouth daily.  ARGININE PO Take 500 mg by mouth daily 4 tab       No current facility-administered medications for this visit.        Past Medical History:   Diagnosis Date    Ascending Aneurysm ([address] 2017    Ascending, 4.2 cm per pt    Brown's esophagus     CAD (coronary artery disease)     Cancer (HCC)     throat cancer, s/p radiation    Chronic back pain     Hepatitis C     as child    History of hyperbaric oxygen therapy     after bottom teeth removed after rdiation therapy    Hyperlipidemia     Hypertension     Left shoulder pain     nerve pain , ? 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    PONV (postoperative nausea and vomiting)     only after OHS    S/P CABG (coronary artery bypass graft)     [facility]    Thyroid disease     Vision loss of right eye     during hyperbaric chamber oxygen therapy     Vitreous opacities     right    Wears dentures     full upper, no bottom teeth and no use of dentures on bottom       Social History     Socioeconomic History    Marital status:      Spouse name: Not on file    Number of children: Not on file    Years of education: Not on file    Highest education level: Not on file   Occupational History    Not on file   Tobacco Use    Smoking status: Former Smoker     Packs/day: 2.00     Years: 20.00     Pack years: 40.00     Quit date: 2001     Years since quittin.4    Smokeless tobacco: Never Used   Vaping Use    Vaping Use: Never used   Substance and Sexual Activity    Alcohol use: No     Alcohol/week: 0.0 standard drinks    Drug use: No    Sexual activity: Not on file   Other Topics Concern    Not on file   Social History Narrative    Not on file     Social Determinants of Health Financial Resource Strain: Low Risk     Difficulty of Paying Living Expenses: Not very hard   Food Insecurity: No Food Insecurity    Worried About Running Out of Food in the Last Year: Never true    Polo of Food in the Last Year: Never true   Transportation Needs:     Lack of Transportation (Medical): Not on file    Lack of Transportation (Non-Medical): Not on file   Physical Activity:     Days of Exercise per Week: Not on file    Minutes of Exercise per Session: Not on file   Stress:     Feeling of Stress : Not on file   Social Connections:     Frequency of Communication with Friends and Family: Not on file    Frequency of Social Gatherings with Friends and Family: Not on file    Attends Yazidi Services: Not on file    Active Member of [address] [place] [address] [employer] or Organizations: Not on file    Attends Club or Organization Meetings: Not on file    Marital Status: Not on file   Intimate Partner Violence:     Fear of Current or Ex-Partner: Not on file    Emotionally Abused: Not on file    Physically Abused: Not on file    Sexually Abused: Not on file   Housing Stability:     Unable to Pay for Housing in the Last Year: Not on file    Number of [place] in the Last Year: Not on file    Unstable Housing in the Last Year: Not on file       Family History   Problem Relation Age of Onset    Heart Disease Father         MI    High Blood Pressure Father     Stroke Father     Cancer Maternal Grandmother     Cancer Maternal Grandfather     Diabetes Neg Hx        No Known Allergies    Review of Systems     As above.      /72 (Site: Left Upper Arm)   Pulse 64   Temp 98.2 °F (36.8 °C)   Wt 190 lb (86.2 kg)   BMI 26.50 kg/m²      Physical Examination: General appearance -:[id number], well appearing, and in no distress  Mental status - alert and oriented    Neck - supple, no significant adenopathy  Chest - clear to auscultation, no wheezes, rales or rhonchi, symmetric air entry  Heart - normal rate, regular rhythm, normal S1, S2, no murmurs, rubs, clicks or gallops  Abdomen - soft, nontender, nondistended, no masses or organomegaly  no abdominal bruits. Non-obese Protuberant: No  Neurological - alert, oriented, normal speech, no focal findings or movement disorder noted, motor and sensory grossly normal bilaterally  Musculoskeletal - no joint tenderness, deformity or swelling  But left leg weakness. Extremities - peripheral pulses normal, no pedal edema, no clubbing or cyanosis, Petra's sign negative bilaterally  Prosthesis: No  Skin - normal coloration and turgor, no rashes, no suspicious skin lesions noted      I have reviewed recent diagnostic testing including labs,     Diagnosis Orders   1. Hyperlipidemia, unspecified hyperlipidemia type  Lipid Panel    Comprehensive Metabolic Panel   2. Essential hypertension  terazosin (HYTRIN) 5 MG capsule    Lipid Panel    Comprehensive Metabolic Panel   3.  Postoperative hypothyroidism  T4, Free    TSH with Reflex       Orders Placed This Encounter   Procedures    Lipid Panel     Check 7-10 days pre office visit     Standing Status:   Future     Standing Expiration Date:   7/26/2022     Order Specific Question:   Is Patient Fasting?/# of Hours     Answer:   yes     Comments:   12 hours    Comprehensive Metabolic Panel     Standing Status:   Future     Standing Expiration Date:   7/26/2022    T4, Free     Standing Status:   Future     Standing Expiration Date:   7/26/2022    TSH with Reflex     Standing Status:   Future     Standing Expiration Date:   7/26/2022       Outpatient Encounter Medications as of 4/26/2022   Medication Sig Dispense Refill    ticagrelor (BRILINTA) 90 MG TABS tablet Take 1 tablet by mouth 2 times daily 60 tablet 0    atorvastatin (LIPITOR) 40 MG tablet Take 1 tablet by mouth daily 90 tablet 1    terazosin (HYTRIN) 5 MG capsule TAKE 1 CAPSULE NIGHTLY 90 capsule 1    metoprolol succinate (TOPROL XL) 50 MG extended release tablet Take 0.5 tablets by mouth 2 times daily 90 tablet 1    levothyroxine (SYNTHROID) 50 MCG tablet Take 1 tablet by mouth daily 90 tablet 1    aspirin 81 MG EC tablet Take 81 mg by mouth daily      gabapentin (NEURONTIN) 300 MG capsule Take 300 mg by mouth daily.  ipratropium (ATROVENT) 0.06 % nasal spray 2 sprays by Each Nostril route 3 times daily as needed for Rhinitis      nitroGLYCERIN (NITROSTAT) 0.4 MG SL tablet up to max of 3 total doses. If no relief after 1 dose, call 911. 25 tablet 3    diphenhydrAMINE-APAP, sleep, (TYLENOL PM EXTRA STRENGTH)  MG tablet Take 2 tablets by mouth nightly      omeprazole (PRILOSEC) 20 MG delayed release capsule Take 20 mg by mouth Daily       Coenzyme Q10 (CO Q-10) 200 MG CAPS Take  by mouth daily.  Multiple Vitamin (MULTI-VITAMIN) TABS Take  by mouth daily.  ARGININE PO Take 500 mg by mouth daily 4 tab      [DISCONTINUED] acetaminophen (TYLENOL) 500 MG tablet Take 1,000 mg by mouth nightly      [DISCONTINUED] metoprolol succinate (TOPROL XL) 50 MG extended release tablet Take 0.5 tablets by mouth 2 times daily 10 tablet 0    [DISCONTINUED] ticagrelor (BRILINTA) 90 MG TABS tablet Take 1 tablet by mouth 2 times daily 60 tablet 0    [DISCONTINUED] atorvastatin (LIPITOR) 40 MG tablet Take 1 tablet by mouth daily 90 tablet 1    [DISCONTINUED] terazosin (HYTRIN) 5 MG capsule TAKE 1 CAPSULE NIGHTLY 90 capsule 1     No facility-administered encounter medications on file as of 4/26/2022. Controlled Substances Monitoring:  Yes     Diagnosis Orders   1. Hyperlipidemia, unspecified hyperlipidemia type  Lipid Panel    Comprehensive Metabolic Panel   2. Essential hypertension  terazosin (HYTRIN) 5 MG capsule    Lipid Panel    Comprehensive Metabolic Panel   3. Postoperative hypothyroidism  T4, Free    TSH with Reflex             Plan:    1. Dyslipidemia  On lipitor obtain lipids soon     2.   Hypertension is stable on the current regimen which includes Toprol 50 mg BID and  Hytrin 5 mg daily        3. Previous CABG currently stable on current pharmacotherapy,  Being followed up by Dr. Nikita Bower from cardiology. ekg  done  Today NSR with sinus rozina, with non specific T changes due to  atyp CP and upcoming surgery / Biopsy on throat. 4. Hx of laryngeal cancer - had recent surgery by dr. Thu Cuevas. 5. Hypothyroidism - developed post Radiation Rx, will recheck TSH and T4  Soon, as he is on synthroid 50 mcg daily am .     Blood work soon       Reevaluate in 6 months, sooner if needed        Signed by: Colleen Mendieta M.D.     [facility]  [address], Critical access hospital [address], [address]    Tel  [phone]   Fax [phone]

## 2022-05-09 ENCOUNTER — NURSE ONLY (OUTPATIENT)
Dept: LAB | Age: 67
End: 2022-05-09

## 2022-05-09 DIAGNOSIS — E78.5 HYPERLIPIDEMIA, UNSPECIFIED HYPERLIPIDEMIA TYPE: ICD-10-CM

## 2022-05-09 DIAGNOSIS — E89.0 POSTOPERATIVE HYPOTHYROIDISM: ICD-10-CM

## 2022-05-09 DIAGNOSIS — I10 ESSENTIAL HYPERTENSION: ICD-10-CM

## 2022-05-09 LAB
ALBUMIN SERPL-MCNC: 4.8 G/DL (ref 3.5–5.1)
ALP BLD-CCNC: 102 U/L (ref 38–126)
ALT SERPL-CCNC: 19 U/L (ref 11–66)
ANION GAP SERPL CALCULATED.3IONS-SCNC: 12 MEQ/L (ref 8–16)
AST SERPL-CCNC: 20 U/L (ref 5–40)
BILIRUB SERPL-MCNC: 0.7 MG/DL (ref 0.3–1.2)
BUN BLDV-MCNC: 18 MG/DL (ref 7–22)
CALCIUM SERPL-MCNC: 9.4 MG/DL (ref 8.5–10.5)
CHLORIDE BLD-SCNC: 107 MEQ/L (ref 98–111)
CHOLESTEROL, TOTAL: 154 MG/DL (ref 100–199)
CO2: 21 MEQ/L (ref 23–33)
CREAT SERPL-MCNC: 0.8 MG/DL (ref 0.4–1.2)
GFR SERPL CREATININE-BSD FRML MDRD: > 90 ML/MIN/1.73M2
GLUCOSE BLD-MCNC: 99 MG/DL (ref 70–108)
HDLC SERPL-MCNC: 48 MG/DL
LDL CHOLESTEROL CALCULATED: 82 MG/DL
POTASSIUM SERPL-SCNC: 4.5 MEQ/L (ref 3.5–5.2)
SODIUM BLD-SCNC: 140 MEQ/L (ref 135–145)
T4 FREE: 1.49 NG/DL (ref 0.93–1.76)
TOTAL PROTEIN: 6.4 G/DL (ref 6.1–8)
TRIGL SERPL-MCNC: 119 MG/DL (ref 0–199)
TSH SERPL DL<=0.05 MIU/L-ACNC: 1.95 UIU/ML (ref 0.4–4.2)

## 2022-05-20 ENCOUNTER — HOSPITAL ENCOUNTER (OUTPATIENT)
Dept: INTERVENTIONAL RADIOLOGY/VASCULAR | Age: 67
Discharge: HOME | End: 2022-05-20

## 2022-05-20 ENCOUNTER — OFFICE VISIT (OUTPATIENT)
Dept: ENT CLINIC | Age: 67
End: 2022-05-20
Payer: MEDICARE

## 2022-05-20 VITALS
BODY MASS INDEX: 26.74 KG/M2 | SYSTOLIC BLOOD PRESSURE: 132 MMHG | OXYGEN SATURATION: 98 % | WEIGHT: 191 LBS | DIASTOLIC BLOOD PRESSURE: 72 MMHG | TEMPERATURE: 97.9 F | HEIGHT: 71 IN | HEART RATE: 63 BPM

## 2022-05-20 DIAGNOSIS — C32.1 MALIGNANT NEOPLASM OF FALSE VOCAL CORDS (HCC): ICD-10-CM

## 2022-05-20 DIAGNOSIS — T66.XXXD EFFECTS, RADIATION, SUBSEQUENT ENCOUNTER: ICD-10-CM

## 2022-05-20 DIAGNOSIS — Z13.6 ENCOUNTER FOR SCREENING FOR VASCULAR DISEASE: ICD-10-CM

## 2022-05-20 DIAGNOSIS — C32.9 SQUAMOUS CELL CARCINOMA OF LARYNX (HCC): Primary | ICD-10-CM

## 2022-05-20 DIAGNOSIS — K21.9 GASTROESOPHAGEAL REFLUX DISEASE WITHOUT ESOPHAGITIS: ICD-10-CM

## 2022-05-20 PROCEDURE — 1036F TOBACCO NON-USER: CPT | Performed by: OTOLARYNGOLOGY

## 2022-05-20 PROCEDURE — G8417 CALC BMI ABV UP PARAM F/U: HCPCS | Performed by: OTOLARYNGOLOGY

## 2022-05-20 PROCEDURE — 31575 DIAGNOSTIC LARYNGOSCOPY: CPT | Performed by: OTOLARYNGOLOGY

## 2022-05-20 PROCEDURE — 3017F COLORECTAL CA SCREEN DOC REV: CPT | Performed by: OTOLARYNGOLOGY

## 2022-05-20 PROCEDURE — 1123F ACP DISCUSS/DSCN MKR DOCD: CPT | Performed by: OTOLARYNGOLOGY

## 2022-05-20 PROCEDURE — 9900000021 US VASCULAR SCREENING

## 2022-05-20 PROCEDURE — G8427 DOCREV CUR MEDS BY ELIG CLIN: HCPCS | Performed by: OTOLARYNGOLOGY

## 2022-05-20 PROCEDURE — 99213 OFFICE O/P EST LOW 20 MIN: CPT | Performed by: OTOLARYNGOLOGY

## 2022-06-19 ASSESSMENT — ENCOUNTER SYMPTOMS
DIARRHEA: 0
COUGH: 0
STRIDOR: 0
SHORTNESS OF BREATH: 0
WHEEZING: 0
SORE THROAT: 0
SINUS PRESSURE: 0
VOMITING: 0
FACIAL SWELLING: 0
ABDOMINAL PAIN: 0
VOICE CHANGE: 0
CHOKING: 0
COLOR CHANGE: 0
RHINORRHEA: 0
APNEA: 0
TROUBLE SWALLOWING: 0
NAUSEA: 0
CHEST TIGHTNESS: 0

## 2022-06-20 NOTE — PROGRESS NOTES
[facility] [place] [facility]  [facility] EAR, NOSE AND THROAT  [address] [address]  Dept: [phone]  Dept Fax: [phone]  Loc: [phone]    Андрей Brooks is a 74 y.o. male who was referred byNo ref. provider found for:  Chief Complaint   Patient presents with    Follow-up     Patient is here for f/u. Still has dry throat and lack of taste from radiation,    . HPI:     Андрей Brooks is a 74 y.o. male who presents today for follow up scope for squamous cell carcinoma of larynx. Head cold is gone. History:     No Known Allergies  Current Outpatient Medications   Medication Sig Dispense Refill    ticagrelor (BRILINTA) 90 MG TABS tablet Take 1 tablet by mouth 2 times daily 180 tablet 1    atorvastatin (LIPITOR) 40 MG tablet Take 1 tablet by mouth daily 90 tablet 1    terazosin (HYTRIN) 5 MG capsule TAKE 1 CAPSULE NIGHTLY 90 capsule 1    metoprolol succinate (TOPROL XL) 50 MG extended release tablet Take 0.5 tablets by mouth 2 times daily 90 tablet 1    levothyroxine (SYNTHROID) 50 MCG tablet Take 1 tablet by mouth daily 90 tablet 1    aspirin 81 MG EC tablet Take 81 mg by mouth daily      gabapentin (NEURONTIN) 300 MG capsule Take 300 mg by mouth daily.  ipratropium (ATROVENT) 0.06 % nasal spray 2 sprays by Each Nostril route 3 times daily as needed for Rhinitis      nitroGLYCERIN (NITROSTAT) 0.4 MG SL tablet up to max of 3 total doses. If no relief after 1 dose, call 911. 25 tablet 3    diphenhydrAMINE-APAP, sleep, (TYLENOL PM EXTRA STRENGTH)  MG tablet Take 2 tablets by mouth nightly      omeprazole (PRILOSEC) 20 MG delayed release capsule Take 20 mg by mouth Daily       Coenzyme Q10 (CO Q-10) 200 MG CAPS Take  by mouth daily.  Multiple Vitamin (MULTI-VITAMIN) TABS Take  by mouth daily.  ARGININE PO Take 500 mg by mouth daily 4 tab       No current facility-administered medications for this visit.      Past Medical History:   Diagnosis Date    Ascending Aneurysm ([facility] [address] 02/2017    Ascending, 4.2 cm per pt    Brown's esophagus     CAD (coronary artery disease)     Cancer (HCC)     throat cancer, s/p radiation    Chronic back pain     Hepatitis C     as child    History of hyperbaric oxygen therapy     after bottom teeth removed after rdiation therapy    Hyperlipidemia     Hypertension     Left shoulder pain     nerve pain , ? 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    PONV (postoperative nausea and vomiting)     only after OHS    S/P [facility]G [facility]coronary artery bypass graft) 2002    [facility]    Thyroid disease     Vision loss of right eye     during hyperbaric chamber oxygen therapy     Vitreous opacities     right    Wears dentures     full upper, no bottom teeth and no use of dentures on bottom      Past Surgical History:   Procedure Laterality Date    CARDIAC CATHETERIZATION  8/2002, 4/2004    [facility]    COLONOSCOPY      CORONARY ANGIOPLASTY WITH STENT PLACEMENT  7/2002    [facility]    CORONARY ANGIOPLASTY WITH STENT PLACEMENT N/A 07/12/2019    [facility]    CORONARY ARTERY BYPASS GRAFT  2002    [facility]    DENTAL SURGERY      bottom teeth removal    HEMORRHOID SURGERY      KNEE SURGERY Right     LARYNGOSCOPY N/A 10/4/2017    LARYNGOSCOPY MICRO WITH BIOPSY performed by Norma Calvo MD at [address] 5/12/2021    LARYNGOSCOPY MICRO AND BIOPSY, WITH JET VENT performed by Norma Calvo MD at [address] N/A 6/2/2021    (DR. HUNTER) SUSPENSION MICROLARYNGOSCOPY WITH RECONSTRUCTION AND ADVANCEMENT FLAP X2; (DR. GONGORA)  MICROLARYNGOSCOPY AND BX POSTERIOR RIGHT VOCAL CORD      JET VENTILATION AND CO2 LASER performed by Harrison Jacobson MD at [address] [place]  04/28/2016    by Dr Nay Lawrence VITREOUS,SUBRET/CHOROID FLUID Right 8/27/2018    VITRECTOMY 25 GAUGE performed by Miranda Vallejo MD at [address] Right 2018     VITRECTOMY 25 GAUGE (Right Eye)     Family History   Problem Relation Age of Onset    Heart Disease Father         MI    High Blood Pressure Father     Stroke Father     Cancer Maternal Grandmother     Cancer Maternal Grandfather     Diabetes Neg Hx      Social History     Tobacco Use    Smoking status: Former Smoker     Packs/day: 2.00     Years: 20.00     Pack years: 40.00     Quit date: 2001     Years since quittin.5    Smokeless tobacco: Never Used   Substance Use Topics    Alcohol use: No     Alcohol/week: 0.0 standard drinks       Subjective:       Review of Systems   Constitutional: Negative for activity change, appetite change, chills, diaphoresis, fatigue, fever and unexpected weight change. HENT: Negative for congestion, dental problem, ear discharge, ear pain, facial swelling, hearing loss, mouth sores, nosebleeds, postnasal drip, rhinorrhea, sinus pressure, sneezing, sore throat, tinnitus, trouble swallowing and voice change. Eyes: Negative for visual disturbance. Respiratory: Negative for apnea, cough, choking, chest tightness, shortness of breath, wheezing and stridor. Cardiovascular: Negative for chest pain, palpitations and leg swelling. Gastrointestinal: Negative for abdominal pain, diarrhea, nausea and vomiting. Endocrine: Negative for cold intolerance, heat intolerance, polydipsia and polyuria. Genitourinary: Negative for dysuria, enuresis and hematuria. Musculoskeletal: Negative for arthralgias, gait problem, neck pain and neck stiffness. Skin: Negative for color change and rash. Allergic/Immunologic: Negative for environmental allergies, food allergies and immunocompromised state. Neurological: Negative for dizziness, syncope, facial asymmetry, speech difficulty, light-headedness and headaches. Hematological: Negative for adenopathy. Does not bruise/bleed easily. Psychiatric/Behavioral: Negative for confusion and sleep disturbance. The patient is not nervous/anxious. Objective:     /72 (Site: Right Upper Arm, Position: Sitting)   Pulse 63   Temp 97.9 °F (36.6 °C) (Infrared)   Ht 5' 11\" (1.803 m)   Wt 191 lb (86.6 kg)   SpO2 98%   BMI 26.64 kg/m²     Physical Exam   Voice: Slight hoarseness      HIGH RESOLUTION FLEXIBLE VIDEOLARYNGOSOCPY    A fiberoptic laryngoscopy was performed under topical anesthesia, after using Afrin and Lidocaine spray in the nasal fossa. The nasal fossa, nasopharynx, hypopharynx and larynx were carefully examined. Base of tongue was symmetrical. Epiglottis appeared normal and was not retrodisplaced. True vocal cords had normal mobility. There was no erythema. No mucosal lesions or masses were noted. No pooling in the pyriform sinuses. Most recently biopsied area is very gradually appearing more normal        Data:  All of the past medical history, past surgical history, family history,social history, allergies and current medications were reviewed with the patient. Assessment & Plan   Diagnoses and all orders for this visit:     Diagnosis Orders   1. Squamous cell carcinoma of larynx (HCC)  AK LARYNGOSCOPY FLEXIBLE DIAGNOSTIC   2. 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 Effects, radiation, subsequent encounter  AK LARYNGOSCOPY FLEXIBLE DIAGNOSTIC   4. Gastroesophageal reflux disease without esophagitis  AK LARYNGOSCOPY FLEXIBLE DIAGNOSTIC       The findings were explained and his questions were answered. Options were discussed including 1 month follow up scope. He agreed. 1 month follow up scope       I, Evangelist Millan CMA ([facility]), am scribing for, and in the presence of Dr. Lamont Fairchild. Electronically signed by JESÚS Mendoza) on 4/8/22 at 1:41 PM EDT.      (Please note that portions of this note were completed with a voice recognition program. Efforts were made to edit the dictations butoccasionally words are mis-transcribed.)    I agree to the above documentation placed by my scribe. I have personally evaluated this patient. Additional findings are as noted. I reviewed the scribe's note and agree with the documented findings and plan of care. Any areas of disagreement are corrected. I agree with the chief complaint, past medical history, past surgical history, allergies, medications, social and family history as documented unless otherwise noted below.      Electronically signed by Julia Mays MD on 6/19/2022 at 8:29 PM

## 2022-06-24 ENCOUNTER — OFFICE VISIT (OUTPATIENT)
Dept: ENT CLINIC | Age: 67
End: 2022-06-24
Payer: MEDICARE

## 2022-06-24 ENCOUNTER — PREP FOR PROCEDURE (OUTPATIENT)
Dept: ENT CLINIC | Age: 67
End: 2022-06-24

## 2022-06-24 ENCOUNTER — TELEPHONE (OUTPATIENT)
Dept: CARDIOLOGY CLINIC | Age: 67
End: 2022-06-24

## 2022-06-24 VITALS
OXYGEN SATURATION: 98 % | TEMPERATURE: 98.2 F | DIASTOLIC BLOOD PRESSURE: 68 MMHG | BODY MASS INDEX: 26.23 KG/M2 | WEIGHT: 187.4 LBS | SYSTOLIC BLOOD PRESSURE: 128 MMHG | HEART RATE: 59 BPM | HEIGHT: 71 IN

## 2022-06-24 DIAGNOSIS — C32.1 MALIGNANT NEOPLASM OF FALSE VOCAL CORDS (HCC): ICD-10-CM

## 2022-06-24 DIAGNOSIS — K22.719 BARRETT'S ESOPHAGUS WITH DYSPLASIA: ICD-10-CM

## 2022-06-24 DIAGNOSIS — J38.7 LARYNGEAL MASS: Primary | ICD-10-CM

## 2022-06-24 DIAGNOSIS — R49.0 HOARSENESS: ICD-10-CM

## 2022-06-24 DIAGNOSIS — Z01.818 PRE-OP TESTING: Primary | ICD-10-CM

## 2022-06-24 DIAGNOSIS — C32.9 PRIMARY SQUAMOUS CELL CARCINOMA OF LARYNX (HCC): ICD-10-CM

## 2022-06-24 DIAGNOSIS — T66.XXXD EFFECTS, RADIATION, SUBSEQUENT ENCOUNTER: ICD-10-CM

## 2022-06-24 PROCEDURE — 1123F ACP DISCUSS/DSCN MKR DOCD: CPT | Performed by: OTOLARYNGOLOGY

## 2022-06-24 PROCEDURE — 99214 OFFICE O/P EST MOD 30 MIN: CPT | Performed by: OTOLARYNGOLOGY

## 2022-06-24 PROCEDURE — 3017F COLORECTAL CA SCREEN DOC REV: CPT | Performed by: OTOLARYNGOLOGY

## 2022-06-24 PROCEDURE — G8427 DOCREV CUR MEDS BY ELIG CLIN: HCPCS | Performed by: OTOLARYNGOLOGY

## 2022-06-24 PROCEDURE — G8417 CALC BMI ABV UP PARAM F/U: HCPCS | Performed by: OTOLARYNGOLOGY

## 2022-06-24 PROCEDURE — 1036F TOBACCO NON-USER: CPT | Performed by: OTOLARYNGOLOGY

## 2022-06-24 PROCEDURE — 31575 DIAGNOSTIC LARYNGOSCOPY: CPT | Performed by: OTOLARYNGOLOGY

## 2022-06-24 ASSESSMENT — ENCOUNTER SYMPTOMS
CHEST TIGHTNESS: 0
RHINORRHEA: 0
COUGH: 0
COLOR CHANGE: 0
VOMITING: 0
TROUBLE SWALLOWING: 0
FACIAL SWELLING: 0
CHOKING: 0
STRIDOR: 0
APNEA: 0
DIARRHEA: 0
SINUS PRESSURE: 0
WHEEZING: 0
NAUSEA: 0
SHORTNESS OF BREATH: 0
VOICE CHANGE: 0
ABDOMINAL PAIN: 0
SORE THROAT: 0

## 2022-06-24 NOTE — PROGRESS NOTES
[facility] [place] [facility]  [facility] EAR, NOSE AND THROAT  [address] [place] [address] [place] [address]  Dept: [phone]  Dept Fax: [phone]  Loc: [phone]    Anushka Romero is a 74 y.o. male who was referred byNo ref. provider found for:  Chief Complaint   Patient presents with    Follow-up     Patient here for 1 month follow up for scope. [place] [address] HPI:     Anushka Romero is a 74 y.o. male who presents today for follow up scope. He's doing well. History:     No Known Allergies  Current Outpatient Medications   Medication Sig Dispense Refill    ticagrelor (BRILINTA) 90 MG TABS tablet Take 1 tablet by mouth 2 times daily 180 tablet 1    atorvastatin (LIPITOR) 40 MG tablet Take 1 tablet by mouth daily 90 tablet 1    terazosin (HYTRIN) 5 MG capsule TAKE 1 CAPSULE NIGHTLY 90 capsule 1    metoprolol succinate (TOPROL XL) 50 MG extended release tablet Take 0.5 tablets by mouth 2 times daily 90 tablet 1    levothyroxine (SYNTHROID) 50 MCG tablet Take 1 tablet by mouth daily 90 tablet 1    aspirin 81 MG EC tablet Take 81 mg by mouth daily      gabapentin (NEURONTIN) 300 MG capsule Take 300 mg by mouth daily.  ipratropium (ATROVENT) 0.06 % nasal spray 2 sprays by Each Nostril route 3 times daily as needed for Rhinitis      nitroGLYCERIN (NITROSTAT) 0.4 MG SL tablet up to max of 3 total doses. If no relief after 1 dose, call 911. 25 tablet 3    diphenhydrAMINE-APAP, sleep, (TYLENOL PM EXTRA STRENGTH)  MG tablet Take 2 tablets by mouth nightly      omeprazole (PRILOSEC) 20 MG delayed release capsule Take 20 mg by mouth Daily       Coenzyme Q10 (CO Q-10) 200 MG CAPS Take  by mouth daily.  Multiple Vitamin (MULTI-VITAMIN) TABS Take  by mouth daily.  ARGININE PO Take 500 mg by mouth daily 4 tab       No current facility-administered medications for this visit.      Past Medical History:   Diagnosis Date    Ascending Aneurysm ([facility] [address] 02/2017 Ascending, 4.2 cm per pt    Brown's esophagus     CAD (coronary artery disease)     Cancer (HCC)     throat cancer, s/p radiation    Chronic back pain     Hepatitis C     as child    History of hyperbaric oxygen therapy     after bottom teeth removed after rdiation therapy    Hyperlipidemia     Hypertension     Left shoulder pain     nerve pain , ? 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    PONV (postoperative nausea and vomiting)     only after OHS    S/P CABG [facility]coronary artery bypass graft) 2002    [facility]    Thyroid disease     Vision loss of right eye     during hyperbaric chamber oxygen therapy     Vitreous opacities     right    Wears dentures     full upper, no bottom teeth and no use of dentures on bottom      Past Surgical History:   Procedure Laterality Date    CARDIAC CATHETERIZATION  8/2002, 4/2004    [facility]    COLONOSCOPY      CORONARY ANGIOPLASTY WITH STENT PLACEMENT  7/2002    [facility]    CORONARY ANGIOPLASTY WITH STENT PLACEMENT N/A 07/12/2019    [facility]    CORONARY ARTERY BYPASS GRAFT  2002    [facility]    DENTAL SURGERY      bottom teeth removal    HEMORRHOID SURGERY      KNEE SURGERY Right     LARYNGOSCOPY N/A 10/4/2017    LARYNGOSCOPY MICRO WITH BIOPSY performed by Janis Bashir MD at [address] 5/12/2021    LARYNGOSCOPY MICRO AND BIOPSY, WITH JET VENT performed by Janis Bashir MD at [address] N/A 6/2/2021    (DR. HUNTER) SUSPENSION MICROLARYNGOSCOPY WITH RECONSTRUCTION AND ADVANCEMENT FLAP X2; (DR. GONGORA)  MICROLARYNGOSCOPY AND BX POSTERIOR RIGHT VOCAL CORD      JET VENTILATION AND CO2 LASER performed by Gianni Smith MD at [address] [place]  04/28/2016    by Dr Ekaterina West VITREOUS,SUBRET/CHOROID FLUID Right 8/27/2018    VITRECTOMY 25 GAUGE performed by Aminta Gallo MD at [facility] [address] Right 2018     VITRECTOMY 25 GAUGE (Right Eye)     Family History   Problem Relation Age of Onset    Heart Disease Father         MI    High Blood Pressure Father     Stroke Father     Cancer Maternal Grandmother     Cancer Maternal Grandfather     Diabetes Neg Hx      Social History     Tobacco Use    Smoking status: Former Smoker     Packs/day: 2.00     Years: 20.00     Pack years: 40.00     Quit date: 2001     Years since quittin.5    Smokeless tobacco: Never Used   Substance Use Topics    Alcohol use: No     Alcohol/week: 0.0 standard drinks       Subjective:       Review of Systems   Constitutional: Negative for activity change, appetite change, chills, diaphoresis, fatigue, fever and unexpected weight change. HENT: Negative for congestion, dental problem, ear discharge, ear pain, facial swelling, hearing loss, mouth sores, nosebleeds, postnasal drip, rhinorrhea, sinus pressure, sneezing, sore throat, tinnitus, trouble swallowing and voice change. Eyes: Negative for visual disturbance. Respiratory: Negative for apnea, cough, choking, chest tightness, shortness of breath, wheezing and stridor. Cardiovascular: Negative for chest pain, palpitations and leg swelling. Gastrointestinal: Negative for abdominal pain, diarrhea, nausea and vomiting. Endocrine: Negative for cold intolerance, heat intolerance, polydipsia and polyuria. Genitourinary: Negative for dysuria, enuresis and hematuria. Musculoskeletal: Negative for arthralgias, gait problem, neck pain and neck stiffness. Skin: Negative for color change and rash. Allergic/Immunologic: Negative for environmental allergies, food allergies and immunocompromised state. Neurological: Negative for dizziness, syncope, facial asymmetry, speech difficulty, light-headedness and headaches. Hematological: Negative for adenopathy. Does not bruise/bleed easily. Psychiatric/Behavioral: Negative for confusion and sleep disturbance. LARYNGOSCOPY,DIRCT,OP,BIOPSY   5. Hoarseness  [place] LARYNGOSCOPY FLEXIBLE DIAGNOSTIC    TN LARYNGOSCOPY,DIRCT,OP,BIOPSY       The findings were explained and his questions were answered. There is a raised small granular whitish mass possibly coming from the right ventricle or surface of the right true vocal cord, middle third. Options were discussed including direct laryngoscopy with biopsy. He agreed. Planned surgical procedure:  Suspension laryngoscopy with endoscopic excisional biopsy of mass of the larynx, right side, CO2 laser and jet vent available. Primary code is direct microlaryngoscopy and excision of laryngeal mass. This is very similar to his last procedure,, but less extensive. Benefits and risks are discussed, along with alternatives, and their questions were answered. No guarantees were made. They request we proceed. I, Verona Lao CMA ([facility]), am scribing for, and in the presence of Dr. Lakeshia Colunga. Electronically signed by Marybeth Sheikh CMA ([facility]) on 6/24/22 at 2:14 PM EDT. (Please note that portions of this note were completed with a voice recognition program. Efforts were made to edit the dictations butoccasionally words are mis-transcribed.)    I agree to the above documentation placed by my scribe. I have personally evaluated this patient. Additional findings are as noted. I reviewed the scribe's note and agree with the documented findings and plan of care. Any areas of disagreement are corrected. I agree with the chief complaint, past medical history, past surgical history, allergies, medications, social and family history as documented unless otherwise noted below.      Electronically signed by Stefania Monteiro MD on 6/26/2022 at 10:17 AM

## 2022-06-24 NOTE — TELEPHONE ENCOUNTER
Patient is being scheduled for a procedure with Dr. Varghese Pratt and we are requesting clearance from Dr. Khurram Bernabe. DOS: 07/27/2022  Procedure: Suspension direct laryngoscopy with biopsy of laryngeal mass of right side. Meds to hold:  Brilinta, Terazosin, ASA, CoQ10 x 1 week prior to surgery. Please advise. Thank you!

## 2022-06-27 NOTE — TELEPHONE ENCOUNTER
Pre op Risk Assessment    Procedure Suspension direct laryngoscopy with biopsy of laryngeal mass of right side.   Physician Grayson Heath  Date of surgery/procedure 7/27/22    Last OV 10/27/21  Last Stress 7/3/19  Last Echo 7/27/12  Last Cath 7/12/19  Last Stent 7/12/19  Is patient on blood thinners ASA 81, Brilinta 90  Hold Meds/how many days 7 days

## 2022-07-07 ENCOUNTER — NURSE ONLY (OUTPATIENT)
Dept: LAB | Age: 67
End: 2022-07-07

## 2022-07-07 ENCOUNTER — HOSPITAL ENCOUNTER (OUTPATIENT)
Age: 67
Discharge: HOME | End: 2022-07-07
Payer: MEDICARE

## 2022-07-07 DIAGNOSIS — Z01.818 PRE-OP TESTING: ICD-10-CM

## 2022-07-07 LAB
BASOPHILS # BLD: 0.5 %
BASOPHILS ABSOLUTE: 0 THOU/MM3 (ref 0–0.1)
EKG ATRIAL RATE: 50 BPM
EKG P AXIS: 16 DEGREES
EKG P-R INTERVAL: 144 MS
EKG Q-T INTERVAL: 416 MS
EKG QRS DURATION: 72 MS
EKG QTC CALCULATION (BAZETT): 379 MS
EKG R AXIS: 39 DEGREES
EKG T AXIS: 78 DEGREES
EKG VENTRICULAR RATE: 50 BPM
EOSINOPHIL # BLD: 1.2 %
EOSINOPHILS ABSOLUTE: 0.1 THOU/MM3 (ref 0–0.4)
ERYTHROCYTE [DISTWIDTH] IN BLOOD BY AUTOMATED COUNT: 13 % (ref 11.5–14.5)
ERYTHROCYTE [DISTWIDTH] IN BLOOD BY AUTOMATED COUNT: 45.4 FL (ref 35–45)
HCT VFR BLD CALC: 45.7 % (ref 42–52)
HEMOGLOBIN: 14.8 GM/DL (ref 14–18)
IMMATURE GRANS (ABS): 0.03 THOU/MM3 (ref 0–0.07)
IMMATURE GRANULOCYTES: 0.5 %
LYMPHOCYTES # BLD: 11.9 %
LYMPHOCYTES ABSOLUTE: 0.8 THOU/MM3 (ref 1–4.8)
MCH RBC QN AUTO: 30.8 PG (ref 26–33)
MCHC RBC AUTO-ENTMCNC: 32.4 GM/DL (ref 32.2–35.5)
MCV RBC AUTO: 95 FL (ref 80–94)
MONOCYTES # BLD: 9.1 %
MONOCYTES ABSOLUTE: 0.6 THOU/MM3 (ref 0.4–1.3)
NUCLEATED RED BLOOD CELLS: 0 /100 WBC
PLATELET # BLD: 207 THOU/MM3 (ref 130–400)
PMV BLD AUTO: 10.3 FL (ref 9.4–12.4)
RBC # BLD: 4.81 MILL/MM3 (ref 4.7–6.1)
SEG NEUTROPHILS: 76.8 %
SEGMENTED NEUTROPHILS ABSOLUTE COUNT: 5 THOU/MM3 (ref 1.8–7.7)
WBC # BLD: 6.5 THOU/MM3 (ref 4.8–10.8)

## 2022-07-07 PROCEDURE — 93010 ELECTROCARDIOGRAM REPORT: CPT | Performed by: INTERNAL MEDICINE

## 2022-07-07 PROCEDURE — 93005 ELECTROCARDIOGRAM TRACING: CPT | Performed by: OTOLARYNGOLOGY

## 2022-07-14 ENCOUNTER — OFFICE VISIT (OUTPATIENT)
Dept: ENT CLINIC | Age: 67
End: 2022-07-14
Payer: MEDICARE

## 2022-07-14 VITALS
WEIGHT: 187.5 LBS | TEMPERATURE: 97.5 F | DIASTOLIC BLOOD PRESSURE: 68 MMHG | OXYGEN SATURATION: 97 % | SYSTOLIC BLOOD PRESSURE: 128 MMHG | HEART RATE: 56 BPM | BODY MASS INDEX: 26.25 KG/M2 | HEIGHT: 71 IN | RESPIRATION RATE: 12 BRPM

## 2022-07-14 DIAGNOSIS — R49.0 HOARSENESS: ICD-10-CM

## 2022-07-14 DIAGNOSIS — C32.1 MALIGNANT NEOPLASM OF FALSE VOCAL CORDS (HCC): ICD-10-CM

## 2022-07-14 DIAGNOSIS — C32.9 SQUAMOUS CELL CARCINOMA OF LARYNX (HCC): ICD-10-CM

## 2022-07-14 DIAGNOSIS — K22.719 BARRETT'S ESOPHAGUS WITH DYSPLASIA: ICD-10-CM

## 2022-07-14 DIAGNOSIS — J38.7 LARYNGEAL MASS: Primary | ICD-10-CM

## 2022-07-14 DIAGNOSIS — T66.XXXD EFFECTS, RADIATION, SUBSEQUENT ENCOUNTER: ICD-10-CM

## 2022-07-14 DIAGNOSIS — C32.9 PRIMARY SQUAMOUS CELL CARCINOMA OF LARYNX (HCC): ICD-10-CM

## 2022-07-14 PROCEDURE — G8417 CALC BMI ABV UP PARAM F/U: HCPCS | Performed by: OTOLARYNGOLOGY

## 2022-07-14 PROCEDURE — 3017F COLORECTAL CA SCREEN DOC REV: CPT | Performed by: OTOLARYNGOLOGY

## 2022-07-14 PROCEDURE — G8427 DOCREV CUR MEDS BY ELIG CLIN: HCPCS | Performed by: OTOLARYNGOLOGY

## 2022-07-14 PROCEDURE — 1036F TOBACCO NON-USER: CPT | Performed by: OTOLARYNGOLOGY

## 2022-07-14 PROCEDURE — 1123F ACP DISCUSS/DSCN MKR DOCD: CPT | Performed by: OTOLARYNGOLOGY

## 2022-07-14 PROCEDURE — 31575 DIAGNOSTIC LARYNGOSCOPY: CPT | Performed by: OTOLARYNGOLOGY

## 2022-07-14 PROCEDURE — 99212 OFFICE O/P EST SF 10 MIN: CPT | Performed by: OTOLARYNGOLOGY

## 2022-07-14 ASSESSMENT — ENCOUNTER SYMPTOMS
WHEEZING: 0
VOICE CHANGE: 0
SHORTNESS OF BREATH: 0
RHINORRHEA: 0
COUGH: 0
VOMITING: 0
STRIDOR: 0
DIARRHEA: 0
TROUBLE SWALLOWING: 0
COLOR CHANGE: 0
NAUSEA: 0
SORE THROAT: 0
SINUS PRESSURE: 0
APNEA: 0
CHEST TIGHTNESS: 0
FACIAL SWELLING: 0
ABDOMINAL PAIN: 0
CHOKING: 0

## 2022-07-14 NOTE — PROGRESS NOTES
[facility] [place] [facility]  [facility] [place] EAR, NOSE AND THROAT  [address] [place] [address] [place] [id number]  Dept: [phone]  Dept Fax: [phone]  Loc: [phone]    Bianca Cooley is a 74 y.o. male who was referred byNo ref. provider found for:  Chief Complaint   Patient presents with    Follow-up     Patient is here for a preop appt for laryngoscopy w/ biopsy 07/27. Humphrey Hernandez HPI:     Bianca Cooley is a 74 y.o. male who presents today for pre-op laryngoscopy with biopsy 7/27/22. He's ready to have surgery. He's going on vacation and will be driving. He will need to drive for 2 hours and stop to walk to make sure he doesn't get clots. He will be off the Brilinta when he is on his way home from vacation. His voice was getting better but now it  is getting really hoarse. History:     No Known Allergies  Current Outpatient Medications   Medication Sig Dispense Refill    ticagrelor (BRILINTA) 90 MG TABS tablet Take 1 tablet by mouth 2 times daily 180 tablet 1    atorvastatin (LIPITOR) 40 MG tablet Take 1 tablet by mouth daily (Patient taking differently: Take 40 mg by mouth nightly) 90 tablet 1    terazosin (HYTRIN) 5 MG capsule TAKE 1 CAPSULE NIGHTLY 90 capsule 1    metoprolol succinate (TOPROL XL) 50 MG extended release tablet Take 0.5 tablets by mouth 2 times daily 90 tablet 1    levothyroxine (SYNTHROID) 50 MCG tablet Take 1 tablet by mouth daily 90 tablet 1    aspirin 81 MG EC tablet Take 81 mg by mouth daily      gabapentin (NEURONTIN) 300 MG capsule Take 300 mg by mouth daily. ipratropium (ATROVENT) 0.06 % nasal spray 2 sprays by Each Nostril route 3 times daily as needed for Rhinitis      nitroGLYCERIN (NITROSTAT) 0.4 MG SL tablet up to max of 3 total doses.  If no relief after 1 dose, call 911. 25 tablet 3    diphenhydrAMINE-APAP, sleep, (TYLENOL PM EXTRA STRENGTH)  MG tablet Take 2 tablets by mouth nightly      omeprazole (PRILOSEC) 20 MG delayed release WITH RECONSTRUCTION AND ADVANCEMENT FLAP X2; (DR. GONGORA)  MICROLARYNGOSCOPY AND BX POSTERIOR RIGHT VOCAL CORD      JET VENTILATION AND CO2 LASER performed by Logan Bae MD at [address] [place] [address] [address]  2016    by Dr Joe Kearns VITREOUS,SUBRET/CHOROID FLUID Right 2018    VITRECTOMY 22 GAUGE performed by Bev Roberts MD at [place] [address] Right 2018     VITRECTOMY 22 GAUGE (Right Eye)     Family History   Problem Relation Age of Onset    Heart Disease Father         MI    High Blood Pressure Father     Stroke Father     Cancer Maternal Grandmother     Cancer Maternal Grandfather     Diabetes Neg Hx      Social History     Tobacco Use    Smoking status: Former     Packs/day: 2.00     Years: 20.00     Pack years: 40.00     Types: Cigarettes     Quit date: 2001     Years since quittin.6    Smokeless tobacco: Never   Substance Use Topics    Alcohol use: No     Alcohol/week: 0.0 standard drinks       Subjective:       Review of Systems   Constitutional:  Negative for activity change, appetite change, chills, diaphoresis, fatigue, fever and unexpected weight change. HENT:  Negative for congestion, dental problem, ear discharge, ear pain, facial swelling, hearing loss, mouth sores, nosebleeds, postnasal drip, rhinorrhea, sinus pressure, sneezing, sore throat, tinnitus, trouble swallowing and voice change. Eyes:  Negative for visual disturbance. Respiratory:  Negative for apnea, cough, choking, chest tightness, shortness of breath, wheezing and stridor. Cardiovascular:  Negative for chest pain, palpitations and leg swelling. Gastrointestinal:  Negative for abdominal pain, diarrhea, nausea and vomiting. Endocrine: Negative for cold intolerance, heat intolerance, polydipsia and polyuria. Genitourinary:  Negative for dysuria, enuresis and hematuria. Musculoskeletal:  Negative for arthralgias, gait problem, neck pain and neck stiffness.    Skin: Negative for color change and rash. Allergic/Immunologic: Negative for environmental allergies, food allergies and immunocompromised state. Neurological:  Negative for dizziness, syncope, facial asymmetry, speech difficulty, light-headedness and headaches. Hematological:  Negative for adenopathy. Does not bruise/bleed easily. Psychiatric/Behavioral:  Negative for confusion and sleep disturbance. The patient is not nervous/anxious. Objective:     /68 (Site: Right Upper Arm, Position: Sitting)   Pulse 56   Temp 97.5 °F (36.4 °C) (Infrared)   Resp 12   Ht 5' 11\" (1.803 m)   Wt 187 lb 8 oz (85 kg)   SpO2 97%   BMI 26.15 kg/m²     Physical Exam  Vitals and nursing note reviewed. Constitutional:       Appearance: He is well-developed. HENT:      Head: Normocephalic and atraumatic. No laceration. Salivary Glands: Right salivary gland is not diffusely enlarged or tender. Left salivary gland is not diffusely enlarged or tender. Comments:        Right Ear: Hearing, tympanic membrane, ear canal and external ear normal. No drainage or swelling. No middle ear effusion. Tympanic membrane is not perforated or erythematous. Left Ear: Hearing, tympanic membrane, ear canal and external ear normal. No drainage or swelling. No middle ear effusion. Tympanic membrane is not perforated or erythematous. Nose: Nose normal. No septal deviation, mucosal edema or rhinorrhea. Mouth/Throat:      Mouth: Mucous membranes are moist. Mucous membranes are not pale and not dry. No oral lesions. Tongue: No lesions. Pharynx: Oropharynx is clear. Uvula midline. No oropharyngeal exudate or posterior oropharyngeal erythema. Comments: LIps: lips normal     Mallampati 1  Base of tongue: symmetric  Mirror exam deferred due to gag reflex. Eyes:      Extraocular Movements: Extraocular movements intact. Comments: Conjugate gaze   Neck:      Thyroid: No thyroid mass or thyromegaly. Trachea: Phonation normal. No tracheal deviation. Comments:     Cardiovascular:      Rate and Rhythm: Normal rate and regular rhythm. Heart sounds: No murmur heard. Pulmonary:      Effort: Pulmonary effort is normal. No retractions. Breath sounds: Normal breath sounds. No stridor. Chest:      Chest wall: There is no dullness to percussion. Musculoskeletal:      Cervical back: Normal range of motion and neck supple. Lymphadenopathy:      Cervical: No cervical adenopathy. Neurological:      Mental Status: He is alert and oriented to person, place, and time. Cranial Nerves: Cranial nerves are intact. Cranial nerve deficit: VIIth N function intact bilat. Psychiatric:         Mood and Affect: Mood and affect normal.         Behavior: Behavior is cooperative. HIGH RESOLUTION FLEXIBLE VIDEOLARYNGOSOCPY    A fiberoptic laryngoscopy was performed under topical anesthesia, after using Afrin and Lidocaine spray in the nasal fossa. The nasal fossa, nasopharynx, hypopharynx and larynx were carefully examined. Base of tongue was symmetrical. Epiglottis appeared normal and was not retrodisplaced. True vocal cords had normal mobility. There was no erythema. No mucosal lesions or masses were noted. No pooling in the pyriform sinuses. Data:  All of the past medical history, past surgical history, family history,social history, allergies and current medications were reviewed with the patient. Assessment & Plan   Diagnoses and all orders for this visit:     Diagnosis Orders   1. Laryngeal mass, right ventricle  MS LARYNGOSCOPY FLEXIBLE DIAGNOSTIC      2. Primary squamous cell carcinoma of larynx (HCC)  MS LARYNGOSCOPY FLEXIBLE DIAGNOSTIC      3. 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 Effects, radiation, subsequent encounter        5. Hoarseness  MS LARYNGOSCOPY FLEXIBLE DIAGNOSTIC      6.  Brown's esophagus with dysplasia  MS LARYNGOSCOPY FLEXIBLE

## 2022-07-19 NOTE — PROGRESS NOTES
PAT call attempted, patient unavailable, left message to please call us back at your earliest convenience; [phone]

## 2022-07-19 NOTE — PROGRESS NOTES
PAT call attempted, patient unavailable, left message with wife to please call us back at your earliest convenience; [phone].  She stated they were on vacation and would call back on Monday

## 2022-07-20 ENCOUNTER — PREP FOR PROCEDURE (OUTPATIENT)
Dept: ENT CLINIC | Age: 67
End: 2022-07-20

## 2022-07-20 DIAGNOSIS — M27.2 OSTEORADIONECROSIS OF MANDIBLE: ICD-10-CM

## 2022-07-20 DIAGNOSIS — D38.0: ICD-10-CM

## 2022-07-20 DIAGNOSIS — T66.XXXS LATE EFFECT OF RADIATION: ICD-10-CM

## 2022-07-20 DIAGNOSIS — G47.33 OSA ON CPAP: ICD-10-CM
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
## 2022-07-20 DIAGNOSIS — C32.8 SQUAMOUS CELL CARCINOMA OF OVERLAPPING SITES OF LARYNX (HCC): ICD-10-CM

## 2022-07-20 DIAGNOSIS — Z99.89 OSA ON CPAP: ICD-10-CM

## 2022-07-20 DIAGNOSIS — J38.7 LARYNGEAL MASS: Primary | ICD-10-CM

## 2022-07-25 NOTE — PROGRESS NOTES
Follow all instructions given by your physician    NPO after midnight   Sips of water am of surgery with allowed medications  Bring insurance info and 's license  Wear comfortable clean clothing  No jewelry or contact lenses to be worn day of surgery  No glue on dentures morning of surgery;you will be asked to remove them for surgery. Case for glasses. Shower night before and morning of surgery with a liquid antibacterial soap, dry with fresh clean towel; no lotions, creams or powder. Clean sheets and pillow case on bed night before surgery  Bring medications in original bottles     needed at discharge and someone over 18 to stay with you for 24 hours overnight (surgery may be cancelled if you don't have this)  [facility] to [facility] on 2nd floor  If you would become ill prior to surgery, please call the surgeon  May have a visitor with you, we request that you limit to 2 visitors in pre-op area  Please bring and wear mask    Call [phone] for any questions  Covid questionnaire Complete; Patient negative for symptoms or exposure. See documentation.

## 2022-07-27 ENCOUNTER — ANESTHESIA (OUTPATIENT)
Dept: OPERATING ROOM | Age: 67
End: 2022-07-27
Payer: MEDICARE

## 2022-07-27 ENCOUNTER — ANESTHESIA EVENT (OUTPATIENT)
Dept: OPERATING ROOM | Age: 67
End: 2022-07-27
Payer: MEDICARE

## 2022-07-27 ENCOUNTER — HOSPITAL ENCOUNTER (OUTPATIENT)
Age: 67
Setting detail: OUTPATIENT SURGERY
Discharge: HOME | End: 2022-07-27
Attending: OTOLARYNGOLOGY | Admitting: OTOLARYNGOLOGY
Payer: MEDICARE

## 2022-07-27 VITALS
BODY MASS INDEX: 25.98 KG/M2 | HEIGHT: 71 IN | SYSTOLIC BLOOD PRESSURE: 171 MMHG | OXYGEN SATURATION: 95 % | HEART RATE: 64 BPM | WEIGHT: 185.6 LBS | DIASTOLIC BLOOD PRESSURE: 78 MMHG | RESPIRATION RATE: 20 BRPM | TEMPERATURE: 98.5 F

## 2022-07-27 DIAGNOSIS — J38.7 LARYNGEAL MASS: ICD-10-CM

## 2022-07-27 DIAGNOSIS — C32.9 SQUAMOUS CELL CARCINOMA OF LARYNX (HCC): ICD-10-CM

## 2022-07-27 DIAGNOSIS — R49.0 HOARSENESS: ICD-10-CM

## 2022-07-27 LAB — POTASSIUM REFLEX MAGNESIUM: 4.1 MEQ/L (ref 3.5–5.2)

## 2022-07-27 PROCEDURE — 2500000003 HC RX 250 WO HCPCS: Performed by: NURSE ANESTHETIST, CERTIFIED REGISTERED

## 2022-07-27 PROCEDURE — 7100000000 HC PACU RECOVERY - FIRST 15 MIN: Performed by: OTOLARYNGOLOGY

## 2022-07-27 PROCEDURE — 88305 TISSUE EXAM BY PATHOLOGIST: CPT

## 2022-07-27 PROCEDURE — 3700000000 HC ANESTHESIA ATTENDED CARE: Performed by: OTOLARYNGOLOGY

## 2022-07-27 PROCEDURE — 2580000003 HC RX 258: Performed by: OTOLARYNGOLOGY

## 2022-07-27 PROCEDURE — 3700000001 HC ADD 15 MINUTES (ANESTHESIA): Performed by: OTOLARYNGOLOGY

## 2022-07-27 PROCEDURE — 84132 ASSAY OF SERUM POTASSIUM: CPT

## 2022-07-27 PROCEDURE — 7100000001 HC PACU RECOVERY - ADDTL 15 MIN: Performed by: OTOLARYNGOLOGY

## 2022-07-27 PROCEDURE — 2709999900 HC NON-CHARGEABLE SUPPLY: Performed by: OTOLARYNGOLOGY

## 2022-07-27 PROCEDURE — 6360000002 HC RX W HCPCS: Performed by: NURSE ANESTHETIST, CERTIFIED REGISTERED

## 2022-07-27 PROCEDURE — 88342 IMHCHEM/IMCYTCHM 1ST ANTB: CPT

## 2022-07-27 PROCEDURE — 7100000010 HC PHASE II RECOVERY - FIRST 15 MIN: Performed by: OTOLARYNGOLOGY

## 2022-07-27 PROCEDURE — 6360000002 HC RX W HCPCS: Performed by: OTOLARYNGOLOGY

## 2022-07-27 PROCEDURE — 3600000014 HC SURGERY LEVEL 4 ADDTL 15MIN: Performed by: OTOLARYNGOLOGY

## 2022-07-27 PROCEDURE — 6370000000 HC RX 637 (ALT 250 FOR IP): Performed by: NURSE ANESTHETIST, CERTIFIED REGISTERED

## 2022-07-27 PROCEDURE — 3600000004 HC SURGERY LEVEL 4 BASE: Performed by: OTOLARYNGOLOGY

## 2022-07-27 PROCEDURE — 7100000011 HC PHASE II RECOVERY - ADDTL 15 MIN: Performed by: OTOLARYNGOLOGY

## 2022-07-27 PROCEDURE — 36415 COLL VENOUS BLD VENIPUNCTURE: CPT

## 2022-07-27 PROCEDURE — 31536 LARYNGOSCOPY W/BX & OP SCOPE: CPT | Performed by: OTOLARYNGOLOGY

## 2022-07-27 RX ORDER — FENTANYL CITRATE 50 UG/ML
INJECTION, SOLUTION INTRAMUSCULAR; INTRAVENOUS PRN
Status: DISCONTINUED | OUTPATIENT
Start: 2022-07-27 | End: 2022-07-27 | Stop reason: SDUPTHER

## 2022-07-27 RX ORDER — LABETALOL 20 MG/4 ML (5 MG/ML) INTRAVENOUS SYRINGE
10
Status: DISCONTINUED | OUTPATIENT
Start: 2022-07-27 | End: 2022-07-27 | Stop reason: HOSPADM

## 2022-07-27 RX ORDER — PROPOFOL 10 MG/ML
INJECTION, EMULSION INTRAVENOUS PRN
Status: DISCONTINUED | OUTPATIENT
Start: 2022-07-27 | End: 2022-07-27 | Stop reason: SDUPTHER

## 2022-07-27 RX ORDER — SUCCINYLCHOLINE CHLORIDE 20 MG/ML
INJECTION INTRAMUSCULAR; INTRAVENOUS PRN
Status: DISCONTINUED | OUTPATIENT
Start: 2022-07-27 | End: 2022-07-27 | Stop reason: SDUPTHER

## 2022-07-27 RX ORDER — CIPROFLOXACIN 2 MG/ML
400 INJECTION, SOLUTION INTRAVENOUS ONCE
Status: COMPLETED | OUTPATIENT
Start: 2022-07-27 | End: 2022-07-27

## 2022-07-27 RX ORDER — SODIUM CHLORIDE 0.9 % (FLUSH) 0.9 %
5-40 SYRINGE (ML) INJECTION EVERY 12 HOURS SCHEDULED
Status: DISCONTINUED | OUTPATIENT
Start: 2022-07-27 | End: 2022-07-27 | Stop reason: HOSPADM

## 2022-07-27 RX ORDER — ONDANSETRON 2 MG/ML
INJECTION INTRAMUSCULAR; INTRAVENOUS PRN
Status: DISCONTINUED | OUTPATIENT
Start: 2022-07-27 | End: 2022-07-27 | Stop reason: SDUPTHER

## 2022-07-27 RX ORDER — LIDOCAINE HYDROCHLORIDE 40 MG/ML
SOLUTION TOPICAL PRN
Status: DISCONTINUED | OUTPATIENT
Start: 2022-07-27 | End: 2022-07-27 | Stop reason: SDUPTHER

## 2022-07-27 RX ORDER — HYDROCODONE BITARTRATE AND ACETAMINOPHEN 7.5; 325 MG/1; MG/1
1 TABLET ORAL EVERY 6 HOURS PRN
Qty: 20 TABLET | Refills: 0 | Status: CANCELLED | OUTPATIENT
Start: 2022-07-27 | End: 2022-08-01

## 2022-07-27 RX ORDER — DEXAMETHASONE SODIUM PHOSPHATE 10 MG/ML
INJECTION, EMULSION INTRAMUSCULAR; INTRAVENOUS PRN
Status: DISCONTINUED | OUTPATIENT
Start: 2022-07-27 | End: 2022-07-27 | Stop reason: SDUPTHER

## 2022-07-27 RX ORDER — FENTANYL CITRATE 50 UG/ML
50 INJECTION, SOLUTION INTRAMUSCULAR; INTRAVENOUS EVERY 5 MIN PRN
Status: DISCONTINUED | OUTPATIENT
Start: 2022-07-27 | End: 2022-07-27 | Stop reason: HOSPADM

## 2022-07-27 RX ORDER — EPHEDRINE SULFATE/0.9% NACL/PF 50 MG/5 ML
SYRINGE (ML) INTRAVENOUS PRN
Status: DISCONTINUED | OUTPATIENT
Start: 2022-07-27 | End: 2022-07-27 | Stop reason: SDUPTHER

## 2022-07-27 RX ORDER — SODIUM CHLORIDE 0.9 % (FLUSH) 0.9 %
5-40 SYRINGE (ML) INJECTION PRN
Status: CANCELLED | OUTPATIENT
Start: 2022-07-27

## 2022-07-27 RX ORDER — ROCURONIUM BROMIDE 10 MG/ML
INJECTION, SOLUTION INTRAVENOUS PRN
Status: DISCONTINUED | OUTPATIENT
Start: 2022-07-27 | End: 2022-07-27 | Stop reason: SDUPTHER

## 2022-07-27 RX ORDER — SODIUM CHLORIDE 0.9 % (FLUSH) 0.9 %
5-40 SYRINGE (ML) INJECTION PRN
Status: DISCONTINUED | OUTPATIENT
Start: 2022-07-27 | End: 2022-07-27 | Stop reason: HOSPADM

## 2022-07-27 RX ORDER — SODIUM CHLORIDE 9 MG/ML
INJECTION, SOLUTION INTRAVENOUS PRN
Status: DISCONTINUED | OUTPATIENT
Start: 2022-07-27 | End: 2022-07-27 | Stop reason: HOSPADM

## 2022-07-27 RX ORDER — SODIUM CHLORIDE 9 MG/ML
INJECTION, SOLUTION INTRAVENOUS PRN
Status: CANCELLED | OUTPATIENT
Start: 2022-07-27

## 2022-07-27 RX ORDER — LIDOCAINE HYDROCHLORIDE 20 MG/ML
INJECTION, SOLUTION INTRAVENOUS PRN
Status: DISCONTINUED | OUTPATIENT
Start: 2022-07-27 | End: 2022-07-27 | Stop reason: SDUPTHER

## 2022-07-27 RX ORDER — MORPHINE SULFATE 2 MG/ML
2 INJECTION, SOLUTION INTRAMUSCULAR; INTRAVENOUS EVERY 5 MIN PRN
Status: DISCONTINUED | OUTPATIENT
Start: 2022-07-27 | End: 2022-07-27 | Stop reason: HOSPADM

## 2022-07-27 RX ORDER — SODIUM CHLORIDE 0.9 % (FLUSH) 0.9 %
5-40 SYRINGE (ML) INJECTION EVERY 12 HOURS SCHEDULED
Status: CANCELLED | OUTPATIENT
Start: 2022-07-27

## 2022-07-27 RX ADMIN — Medication 10 MG: at 16:29

## 2022-07-27 RX ADMIN — LIDOCAINE HYDROCHLORIDE 2 ML: 40 SOLUTION TOPICAL at 16:34

## 2022-07-27 RX ADMIN — DEXAMETHASONE SODIUM PHOSPHATE 10 MG: 10 INJECTION, EMULSION INTRAMUSCULAR; INTRAVENOUS at 16:05

## 2022-07-27 RX ADMIN — CIPROFLOXACIN 400 MG: 2 INJECTION, SOLUTION INTRAVENOUS at 16:02

## 2022-07-27 RX ADMIN — Medication 10 MG: at 16:27

## 2022-07-27 RX ADMIN — PROPOFOL 50 MG: 10 INJECTION, EMULSION INTRAVENOUS at 16:02

## 2022-07-27 RX ADMIN — SUGAMMADEX 200 MG: 100 INJECTION, SOLUTION INTRAVENOUS at 16:37

## 2022-07-27 RX ADMIN — ROCURONIUM BROMIDE 30 MG: 10 INJECTION INTRAVENOUS at 16:06

## 2022-07-27 RX ADMIN — PROPOFOL 150 MG: 10 INJECTION, EMULSION INTRAVENOUS at 15:57

## 2022-07-27 RX ADMIN — Medication 150 MG: at 15:57

## 2022-07-27 RX ADMIN — ONDANSETRON 4 MG: 2 INJECTION INTRAMUSCULAR; INTRAVENOUS at 16:33

## 2022-07-27 RX ADMIN — LIDOCAINE HYDROCHLORIDE 2 ML: 40 SOLUTION TOPICAL at 16:29

## 2022-07-27 RX ADMIN — LIDOCAINE HYDROCHLORIDE 100 MG: 20 INJECTION, SOLUTION INTRAVENOUS at 15:57

## 2022-07-27 RX ADMIN — SODIUM CHLORIDE: 9 INJECTION, SOLUTION INTRAVENOUS at 10:40

## 2022-07-27 RX ADMIN — FENTANYL CITRATE 100 MCG: 50 INJECTION, SOLUTION INTRAMUSCULAR; INTRAVENOUS at 15:54

## 2022-07-27 ASSESSMENT — PAIN SCALES - GENERAL
PAINLEVEL_OUTOF10: 0

## 2022-07-27 ASSESSMENT — PAIN - FUNCTIONAL ASSESSMENT: PAIN_FUNCTIONAL_ASSESSMENT: 0-10

## 2022-07-27 NOTE — ANESTHESIA PRE PROCEDURE
Department of Anesthesiology  Preprocedure Note       Name:  Myke Brian   Age:  74 y.o.  :  1955                                          MRN:  [id number]         Date:  2022      Surgeon: Jayden Marcelo):  Dre Haque MD    Procedure: Procedure(s):  SUSPENSION DIRECT LARYNGOSCOPY WITH EXCISIONAL BIOPSY OF RIGHT SIDED LARYNGEAL MASS WITH JET VENT    Medications prior to admission:   Prior to Admission medications    Medication Sig Start Date End Date Taking? Authorizing Provider   ciclopirox (PENLAC) 8 % solution Apply topically Toenail fungus 22  Yes Historical Provider, MD   ticagrelor (BRILINTA) 90 MG TABS tablet Take 1 tablet by mouth 2 times daily 22   Damaris Norton MD   atorvastatin (LIPITOR) 40 MG tablet Take 1 tablet by mouth daily  Patient taking differently: Take 40 mg by mouth nightly 22   Damaris Norton MD   terazosin (HYTRIN) 5 MG capsule TAKE 1 CAPSULE NIGHTLY 22   Baldev George MD   metoprolol succinate (TOPROL XL) 50 MG extended release tablet Take 0.5 tablets by mouth 2 times daily 22   Damaris Norton MD   levothyroxine (SYNTHROID) 50 MCG tablet Take 1 tablet by mouth daily 3/23/22   Damaris Norton MD   aspirin 81 MG EC tablet Take 81 mg by mouth daily    Historical Provider, MD   gabapentin (NEURONTIN) 300 MG capsule Take 300 mg by mouth daily. Historical Provider, MD   ipratropium (ATROVENT) 0.06 % nasal spray 2 sprays by Each Nostril route 3 times daily as needed for Rhinitis    Historical Provider, MD   nitroGLYCERIN (NITROSTAT) 0.4 MG SL tablet up to max of 3 total doses. If no relief after 1 dose, call 911. 21   Damaris Norton MD   diphenhydrAMINE-APAP, sleep, (TYLENOL PM EXTRA STRENGTH)  MG tablet Take 2 tablets by mouth nightly    Historical Provider, MD   omeprazole (PRILOSEC) 20 MG delayed release capsule Take 20 mg by mouth Daily     Historical Provider, MD   Coenzyme Q10 (CO Q-10) 200 MG CAPS Take  by mouth daily.       Historical Date    Ascending Aneurysm ([address] 02/2017    Ascending, 4.2 cm per pt    Brown's esophagus     CAD (coronary artery disease)     Cancer (HCC)     throat cancer, s/p radiation    Chronic back pain     Hepatitis C     as child    History of hyperbaric oxygen therapy     after bottom teeth removed after rdiation therapy    Hyperlipidemia     Hypertension     Left shoulder pain     nerve pain , ? 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    PONV (postoperative nausea and vomiting)     only after OHS    S/P CABG [facility]coronary artery bypass graft) 2002    [facility]    Thyroid disease     Vision loss of right eye     during hyperbaric chamber oxygen therapy     Vitreous opacities     right    Wears dentures     full upper, no bottom teeth and no use of dentures on bottom       Past Surgical History:        Procedure Laterality Date    CARDIAC CATHETERIZATION  8/2002, 4/2004    [facility]    COLONOSCOPY      CORONARY ANGIOPLASTY WITH STENT PLACEMENT  7/2002    [facility]    CORONARY ANGIOPLASTY WITH STENT PLACEMENT N/A 07/12/2019    MID LAD    CORONARY ARTERY BYPASS GRAFT  2002    [facility]    DENTAL SURGERY      bottom teeth removal    HEMORRHOID SURGERY      KNEE SURGERY Right     LARYNGOSCOPY N/A 10/4/2017    LARYNGOSCOPY MICRO WITH BIOPSY performed by Evan Wang MD at [address] 5/12/2021    LARYNGOSCOPY MICRO AND BIOPSY, WITH JET VENT performed by Evan Wang MD at [address] N/A 6/2/2021    (DR. HUNTER) SUSPENSION MICROLARYNGOSCOPY WITH RECONSTRUCTION AND ADVANCEMENT FLAP X2; (DR. GONGORA)  MICROLARYNGOSCOPY AND BX POSTERIOR RIGHT VOCAL CORD      JET VENTILATION AND CO2 LASER performed by Remy Goss MD at [address]  04/28/2016    by Dr Brian Flores VITREOUS,SUBRET/CHOROID FLUID Right 8/27/2018    VITRECTOMY 25 GAUGE performed by [facility] Taylor Odom MD at [address] [place] [address] Drive VITRECTOMY Right 2018     VITRECTOMY 25 GAUGE (Right Eye)       Social History:    Social History     Tobacco Use    Smoking status: Former     Packs/day: 2.00     Years: 20.00     Pack years: 40.00     Types: Cigarettes     Quit date: 2001     Years since quittin.6    Smokeless tobacco: Never   Substance Use Topics    Alcohol use: No     Alcohol/week: 0.0 standard drinks                                Counseling given: Not Answered      Vital Signs (Current):   Vitals:    22 1433 22 0943 22 0946   BP:  (!) 186/91    Pulse:  59    Resp:  16    Temp:  97.1 °F (36.2 °C)    TempSrc:  Temporal    SpO2:  97%    Weight: 185 lb (83.9 kg)  185 lb 9.6 oz (84.2 kg)   Height: 5' 10\" (1.778 m)  5' 11\" (1.803 m)                                              BP Readings from Last 3 Encounters:   22 (!) 186/91   22 128/68   22 128/68       NPO Status: Time of last liquid consumption:                         Time of last solid consumption:                         Date of last liquid consumption: 22                        Date of last solid food consumption: 22    BMI:   Wt Readings from Last 3 Encounters:   22 185 lb 9.6 oz (84.2 kg)   22 187 lb 8 oz (85 kg)   22 187 lb 6.4 oz (85 kg)     Body mass index is 25.89 kg/m².     CBC:   Lab Results   Component Value Date/Time    WBC 6.5 2022 10:42 AM    RBC 4.81 2022 10:42 AM    RBC 4.86 2011 09:07 AM    HGB 14.8 2022 10:42 AM    HCT 45.7 2022 10:42 AM    MCV 95.0 2022 10:42 AM    RDW 12.6 2017 09:16 AM     2022 10:42 AM       CMP:   Lab Results   Component Value Date/Time     2022 08:32 AM    K 4.1 2022 10:26 AM     2022 08:32 AM    CO2 21 2022 08:32 AM    BUN 18 2022 08:32 AM    CREATININE 0.8 2022 08:32 AM    LABGLOM >90 2022 08:32 AM    GLUCOSE 99 2022 08:32 AM    GLUCOSE 95 12/01/2011 09:07 AM    PROT 6.4 05/09/2022 08:32 AM    CALCIUM 9.4 05/09/2022 08:32 AM    BILITOT 0.7 05/09/2022 08:32 AM    ALKPHOS 102 05/09/2022 08:32 AM    AST 20 05/09/2022 08:32 AM    ALT 19 05/09/2022 08:32 AM       POC Tests: No results for input(s): POCGLU, POCNA, POCK, POCCL, POCBUN, POCHEMO, POCHCT in the last 72 hours. Coags:   Lab Results   Component Value Date/Time    INR 0.97 07/12/2019 08:15 AM    APTT 30.9 07/12/2019 08:15 AM       HCG (If Applicable): No results found for: PREGTESTUR, PREGSERUM, HCG, HCGQUANT     ABGs: No results found for: PHART, PO2ART, WCY7XMY, KED6KXR, BEART, N5SCYNRL     Type & Screen (If Applicable):  Lab Results   Component Value Date    LABRH NEG 07/12/2019       Drug/Infectious Status (If Applicable):  Lab Results   Component Value Date/Time    HEPCAB Negative 01/15/2021 08:50 AM       COVID-19 Screening (If Applicable):   Lab Results   Component Value Date/Time    COVID19 NOT DETECTED 05/08/2021 09:05 AM           Anesthesia Evaluation    Airway: Mallampati: II  TM distance: >3 FB   Neck ROM: full  Mouth opening: > = 3 FB   Dental:          Pulmonary:   (+) sleep apnea:  decreased breath sounds                            Cardiovascular:    (+) hypertension:, CAD:, CABG/stent:,         Rhythm: regular                      Neuro/Psych:               GI/Hepatic/Renal:   (+) hepatitis:, liver disease:,           Endo/Other:    (+) hypothyroidism::., .                 Abdominal:             Vascular: Other Findings:           Anesthesia Plan      general     ASA 4     (HISTORY OF  CAD, S/P CABG, PCI STENTS 2 YEARS AGO  PRESERVED LV FUNCTION  S/P RADIATION THERAPY TO NECK)  Induction: intravenous. MIPS: Postoperative opioids intended and Postoperative trial extubation. Anesthetic plan and risks discussed with patient and spouse. Plan discussed with CRNA.                     Gayle Nixon MD   7/27/2022

## 2022-07-27 NOTE — PROGRESS NOTES
Pt has met discharge criteria and states he is ready for discharge to home. IV removed, gauze and tape applied. Dressed in own clothes and personal belongings gathered. Discharge instructions given to pt and family; pt and family verbalized understanding of discharge instructions, prescriptions and follow up appointments. Pt transported to discharge lobby by [facility] staff.

## 2022-07-27 NOTE — PROGRESS NOTES
Pt returned to [facility] room 12. Vitals and assessment as charted. 0.9 infusing, @650ml to count from PACU. Pt has sherbert and water. Family at the bedside. Pt and family verbalized understanding of discharge criteria and call light use. Call light in reach.

## 2022-07-27 NOTE — PROGRESS NOTES
1645: Pt arrives to pacu, awakens to verbal stimuli. Pt on 1LNC, respirations easy and unlabored. Pt denies pain. VSS  1700: Pt provided ice chips, tolerated well  1715: Pt meets criteria for discharge from pacu, transported to hospitals in stable condition. VSS  1719: Pt transported to hospitals in stable condition.

## 2022-07-27 NOTE — PROGRESS NOTES
Dr Guanakito Bazzi notified the discharge still needs to be completed, he stated he will be doing  it shortly.

## 2022-07-27 NOTE — BRIEF OP NOTE
Brief Postoperative Note      Patient: Samule Velasco  YOB: 1955  MRN: [id number]    Date of Procedure: 7/27/2022    Pre-Op Diagnosis: Laryngeal mass [J38.7]  Squamous cell carcinoma of larynx ([address] [C32.9]  Hoarseness [R49.0]    Post-Op Diagnosis: Same       Procedure(s):  SUSPENSION DIRECT MICROLARYNGOSCOPY WITH EXCISIONAL BIOPSY OF RIGHT SIDED LARYNGEAL MASS    Surgeon(s):  Janusz Diez MD    Assistant:  * No surgical staff found *    Anesthesia: General    Estimated Blood Loss (mL): Minimal    Complications: None    Specimens:   ID Type Source Tests Collected by Time Destination   A : Right Laryngeal Ventricle Tissue Larynx SURGICAL PATHOLOGY Janusz Diez MD 7/27/2022 1623        Implants:  * No implants in log *      Drains: * No LDAs found *    Findings: The mass visible on laryngoscopy in the office appears to be granulation tissue arising from the right ventricle. The ventricle itself appears to be very shallow possibly scarred down which may be having an effect on vibratory characteristics and excursion of the right true vocal cord.     Electronically signed by Rosemarie Murillo MD on 7/27/2022 at 5:10 PM

## 2022-07-27 NOTE — DISCHARGE INSTRUCTIONS
Rest voice. May whisper    Do not clear throat    Resume normal activity and diet as tolerated     Do not restart brilinta until tomorrow evening. May take an aspirin tomorrow morning    Call me directly if you need a narcotic pain med after this procedure.

## 2022-07-27 NOTE — H&P
[facility] [place] [facility]  [facility], [facility]SE AND THRO[facility]  [address] [place] [address] [place] [address]  Dept: [phone]  Dept Fax: [phone]  Loc: [phone]    Manuel Jon is a 74 y.o. male who was referred byNo ref. provider found for:  Chief Complaint   Patient presents with    Follow-up     Patient is here for a preop appt for laryngoscopy w/ biopsy 07/27. Mable Hayes HPI:     Manuel Jon is a 74 y.o. male who presents today for pre-op laryngoscopy with biopsy 7/27/22. He's ready to have surgery. He's going on vacation and will be driving. He will need to drive for 2 hours and stop to walk to make sure he doesn't get clots. He will be off the Brilinta when he is on his way home from vacation. His voice was getting better but now it  is getting really hoarse. History:     No Known Allergies  Current Outpatient Medications   Medication Sig Dispense Refill    ticagrelor (BRILINTA) 90 MG TABS tablet Take 1 tablet by mouth 2 times daily 180 tablet 1    atorvastatin (LIPITOR) 40 MG tablet Take 1 tablet by mouth daily (Patient taking differently: Take 40 mg by mouth nightly) 90 tablet 1    terazosin (HYTRIN) 5 MG capsule TAKE 1 CAPSULE NIGHTLY 90 capsule 1    metoprolol succinate (TOPROL XL) 50 MG extended release tablet Take 0.5 tablets by mouth 2 times daily 90 tablet 1    levothyroxine (SYNTHROID) 50 MCG tablet Take 1 tablet by mouth daily 90 tablet 1    aspirin 81 MG EC tablet Take 81 mg by mouth daily      gabapentin (NEURONTIN) 300 MG capsule Take 300 mg by mouth daily. ipratropium (ATROVENT) 0.06 % nasal spray 2 sprays by Each Nostril route 3 times daily as needed for Rhinitis      nitroGLYCERIN (NITROSTAT) 0.4 MG SL tablet up to max of 3 total doses.  If no relief after 1 dose, call 911. 25 tablet 3    diphenhydrAMINE-APAP, sleep, (TYLENOL PM EXTRA STRENGTH)  MG tablet Take 2 tablets by mouth nightly      omeprazole (PRILOSEC) 20 MG delayed release capsule Take 20 mg by mouth Daily       Coenzyme Q10 (CO Q-10) 200 MG CAPS Take  by mouth daily. Multiple Vitamin (MULTI-VITAMIN) TABS Take  by mouth daily. ARGININE PO Take 500 mg by mouth daily 4 tab      ciclopirox (PENLAC) 8 % solution Apply topically Toenail fungus       No current facility-administered medications for this visit. Past Medical History:   Diagnosis Date    Ascending Aneurysm ([address] 02/2017    Ascending, 4.2 cm per pt    Brown's esophagus     CAD (coronary artery disease)     Cancer (HCC)     throat cancer, s/p radiation    Chronic back pain     Hepatitis C     as child    History of hyperbaric oxygen therapy     after bottom teeth removed after rdiation therapy    Hyperlipidemia     Hypertension     Left shoulder pain     nerve pain , ? 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    PONV (postoperative nausea and vomiting)     only after OHS    S/P CABG (coronary artery bypass graft) 2002    [facility]    Thyroid disease     Vision loss of right eye     during hyperbaric chamber oxygen therapy     Vitreous opacities     right    Wears dentures     full upper, no bottom teeth and no use of dentures on bottom      Past Surgical History:   Procedure Laterality Date    CARDIAC CATHETERIZATION  8/2002, 4/2004    [facility]    COLONOSCOPY      CORONARY ANGIOPLASTY WITH STENT PLACEMENT  7/2002    [facility]    CORONARY ANGIOPLASTY WITH STENT PLACEMENT N/A 07/12/2019    MID LAD    CORONARY ARTERY BYPASS GRAFT  2002    [facility]    DENTAL SURGERY      bottom teeth removal    HEMORRHOID SURGERY      KNEE SURGERY Right     LARYNGOSCOPY N/A 10/4/2017    LARYNGOSCOPY MICRO WITH BIOPSY performed by Bard Robert MD at [address] 5/12/2021    LARYNGOSCOPY MICRO AND BIOPSY, WITH JET VENT performed by Bard Robert MD at [address]  N/A 6/2/2021    (DR. HUNTER) SUSPENSION MICROLARYNGOSCOPY WITH RECONSTRUCTION AND ADVANCEMENT FLAP X2; (DR. GONGORA)  MICROLARYNGOSCOPY AND BX POSTERIOR RIGHT VOCAL CORD      JET VENTILATION AND CO2 LASER performed by Dede Brown MD at [address] [place] [address] [address]  2016    by Dr Alicia Cardoza VITREOUS,SUBRET/CHOROID FLUID Right 2018    VITRECTOMY 22 GAUGE performed by Christiano Mckenzie MD at [place] [address] Right 2018     VITRECTOMY 22 GAUGE (Right Eye)     Family History   Problem Relation Age of Onset    Heart Disease Father         MI    High Blood Pressure Father     Stroke Father     Cancer Maternal Grandmother     Cancer Maternal Grandfather     Diabetes Neg Hx      Social History     Tobacco Use    Smoking status: Former     Packs/day: 2.00     Years: 20.00     Pack years: 40.00     Types: Cigarettes     Quit date: 2001     Years since quittin.6    Smokeless tobacco: Never   Substance Use Topics    Alcohol use: No     Alcohol/week: 0.0 standard drinks       Subjective:       Review of Systems   Constitutional:  Negative for activity change, appetite change, chills, diaphoresis, fatigue, fever and unexpected weight change. HENT:  Negative for congestion, dental problem, ear discharge, ear pain, facial swelling, hearing loss, mouth sores, nosebleeds, postnasal drip, rhinorrhea, sinus pressure, sneezing, sore throat, tinnitus, trouble swallowing and voice change. Eyes:  Negative for visual disturbance. Respiratory:  Negative for apnea, cough, choking, chest tightness, shortness of breath, wheezing and stridor. Cardiovascular:  Negative for chest pain, palpitations and leg swelling. Gastrointestinal:  Negative for abdominal pain, diarrhea, nausea and vomiting. Endocrine: Negative for cold intolerance, heat intolerance, polydipsia and polyuria. Genitourinary:  Negative for dysuria, enuresis and hematuria. Musculoskeletal:  Negative for arthralgias, gait problem, neck pain and neck stiffness.    Skin: Negative for color change and rash. Allergic/Immunologic: Negative for environmental allergies, food allergies and immunocompromised state. Neurological:  Negative for dizziness, syncope, facial asymmetry, speech difficulty, light-headedness and headaches. Hematological:  Negative for adenopathy. Does not bruise/bleed easily. Psychiatric/Behavioral:  Negative for confusion and sleep disturbance. The patient is not nervous/anxious. Objective:     /68 (Site: Right Upper Arm, Position: Sitting)   Pulse 56   Temp 97.5 °F (36.4 °C) (Infrared)   Resp 12   Ht 5' 11\" (1.803 m)   Wt 187 lb 8 oz (85 kg)   SpO2 97%   BMI 26.15 kg/m²     Physical Exam  Vitals and nursing note reviewed. Constitutional:       Appearance: He is well-developed. HENT:      Head: Normocephalic and atraumatic. No laceration. Salivary Glands: Right salivary gland is not diffusely enlarged or tender. Left salivary gland is not diffusely enlarged or tender. Comments:        Right Ear: Hearing, tympanic membrane, ear canal and external ear normal. No drainage or swelling. No middle ear effusion. Tympanic membrane is not perforated or erythematous. Left Ear: Hearing, tympanic membrane, ear canal and external ear normal. No drainage or swelling. No middle ear effusion. Tympanic membrane is not perforated or erythematous. Nose: Nose normal. No septal deviation, mucosal edema or rhinorrhea. Mouth/Throat:      Mouth: Mucous membranes are moist. Mucous membranes are not pale and not dry. No oral lesions. Tongue: No lesions. Pharynx: Oropharynx is clear. Uvula midline. No oropharyngeal exudate or posterior oropharyngeal erythema. Comments: LIps: lips normal     Mallampati 1  Base of tongue: symmetric  Mirror exam deferred due to gag reflex. Eyes:      Extraocular Movements: Extraocular movements intact. Comments: Conjugate gaze   Neck:      Thyroid: No thyroid mass or thyromegaly. Trachea: Phonation normal. No tracheal deviation. Comments:     Cardiovascular:      Rate and Rhythm: Normal rate and regular rhythm. Heart sounds: No murmur heard. Pulmonary:      Effort: Pulmonary effort is normal. No retractions. Breath sounds: Normal breath sounds. No stridor. Chest:      Chest wall: There is no dullness to percussion. Musculoskeletal:      Cervical back: Normal range of motion and neck supple. Lymphadenopathy:      Cervical: No cervical adenopathy. Neurological:      Mental Status: He is alert and oriented to person, place, and time. Cranial Nerves: Cranial nerves are intact. Cranial nerve deficit: VIIth N function intact bilat. Psychiatric:         Mood and Affect: Mood and affect normal.         Behavior: Behavior is cooperative. HIGH RESOLUTION FLEXIBLE VIDEOLARYNGOSOCPY    A fiberoptic laryngoscopy was performed under topical anesthesia, after using Afrin and Lidocaine spray in the nasal fossa. The nasal fossa, nasopharynx, hypopharynx and larynx were carefully examined. Base of tongue was symmetrical. Epiglottis appeared normal and was not retrodisplaced. True vocal cords had normal mobility. There was no erythema. No mucosal lesions or masses were noted. No pooling in the pyriform sinuses. Data:  All of the past medical history, past surgical history, family history,social history, allergies and current medications were reviewed with the patient. Assessment & Plan   Diagnoses and all orders for this visit:     Diagnosis Orders   1. Laryngeal mass, right ventricle  [facility] LARYNGOSCOPY FLEXIBLE DIAGNOSTIC      2. Primary squamous cell carcinoma of larynx (HCC)  [facility] LARYNGOSCOPY FLEXIBLE DIAGNOSTIC      3. 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 Effects, radiation, subsequent encounter        5. Hoarseness  [facility] LARYNGOSCOPY FLEXIBLE DIAGNOSTIC      6.  Brown's esophagus with dysplasia  [facility] LARYNGOSCOPY FLEXIBLE DIAGNOSTIC      7. Squamous cell carcinoma of larynx (HCC)  [facility] LARYNGOSCOPY FLEXIBLE DIAGNOSTIC          The findings were explained and his questions were answered. Todays visit was to confirm persistence of the lesion and see if it were margin. It has enlarged. Furl of his voice is deteriorated. We will plan to go ahead with the suspension microlaryngoscopy with excisional biopsy of lesion right ventricle using CO2 laser and jet ventilation. He agreed. Benefits and risks are discussed, along with alternatives, and their questions were answered. No guarantees were made. They request we proceed. IPhilippe CMA ([facility]), am scribing for, and in the presence of Dr. Judith Goodell. Electronically signed by Yuliet Barrera CMA ([facility]) on 7/14/22 at 10:11 AM EDT. (Please note that portions of this note were completed with a voice recognition program. Efforts were made to edit the dictations butoccasionally words are mis-transcribed.)    I agree to the above documentation placed by my scribe. I have personally evaluated this patient. Additional findings are as noted. I reviewed the scribe's note and agree with the documented findings and plan of care. Any areas of disagreement are corrected. I agree with the chief complaint, past medical history, past surgical history, allergies, medications, social and family history as documented unless otherwise noted below.      Electronically signed by Wilma Osgood, MD on 7/27/2022 at 1:48 AM

## 2022-07-27 NOTE — PROGRESS NOTES
Pt admitted to [facility] room 12 and oriented to unit. SCD sleeves applied. Nares swabbed. Pt verbalized permission for first name, last initial and physicians name on white board. SDS board and discharge criteria explained, pt and family verbalized understanding. Pt denies thoughts of harming self or others. Call light in reach. Wife at the bedside.

## 2022-07-27 NOTE — ANESTHESIA POSTPROCEDURE EVALUATION
Department of Anesthesiology  Postprocedure Note    Patient: Manuel Jon  MRN: [id number]  YOB: 1955  Date of evaluation: 7/27/2022      Procedure Summary     Date: 07/27/22 Room / Location: [address] [place] [address]    Anesthesia Start: 3721 Anesthesia Stop: 5506    Procedure: SUSPENSION DIRECT LARYNGOSCOPY WITH EXCISIONAL BIOPSY OF RIGHT SIDED LARYNGEAL MASS (Throat) Diagnosis:       Laryngeal mass      Squamous cell carcinoma of larynx (HCC)      Hoarseness      (Laryngeal mass [J38.7])      (Squamous cell carcinoma of larynx (HCC) [C32.9])      (Hoarseness [R49.0])    Surgeons: Frank Hayes MD Responsible Provider: Berenice Hale MD    Anesthesia Type: general ASA Status: 4          Anesthesia Type: No value filed.     Maggie Phase I: Maggie Score: 10    Maggie Phase II: Maggie Score: 10      Anesthesia Post Evaluation    Patient location during evaluation: PACU  Patient participation: complete - patient participated  Level of consciousness: awake  Airway patency: patent  Nausea & Vomiting: no nausea  Complications: no  Cardiovascular status: hemodynamically stable  Respiratory status: acceptable  Hydration status: stable

## 2022-07-28 NOTE — OP NOTE
[address] [place] [address]                                OPERATIVE REPORT    PATIENT NAME: Hayden Herrera                      :        1955  MED REC NO:   [id number]                           ROOM:  ACCOUNT NO:   [de-identified]                           ADMIT DATE: 2022  PROVIDER:     Luisito Gilliam. Kimberlee Victoria M.D.    Michael Cook:  2022    SURGEON:  Luisito Gilliam. Kimberlee Victoria MD    ANESTHESIA:  Generalized endotracheal.    PREOPERATIVE DIAGNOSES:  Laryngeal mass, squamous cell carcinoma of the  larynx, hoarseness. POSTOPERATIVE DIAGNOSES:  Laryngeal mass, squamous cell carcinoma of the  larynx, hoarseness. HISTORY AND OPERATIVE FINDINGS:  This 68-year-old male with history of  carcinoma of the false vocal cord on the right side treated with full  course radiation therapy. This lesion was more anterior. He then  developed a small squamous cell carcinoma on the right posterior false  vocal cord which was excised in the last year. He has developed  recently worsening hoarseness. There was a lesion protruding from the  right ventricle noted on a resolution flexible laryngoscopy. This was  followed for a month and was noted to become more prominent. Excisional  biopsy was planned possibly with CO2 laser. DESCRIPTION OF PROCEDURE:  After adequate level of general endotracheal  anesthesia had been obtained with a small laser-resistant endotracheal  tube, the Kailyn laryngoscope was introduced in the hypopharynx. All  areas were examined. The epiglottis was elevated forward and the  laryngoscope was fixed in place with the arm. A 30-degree telescope was  used to obtain high-resolution photographs of the endolarynx,  particularly the lesion protruding from the right ventricle. This was  smooth on palpation and appeared soft.   The lesion was excised with cup  forceps, since it appeared to likely be granulation tissue from the  wound previously at the last biopsy. It appeared that the ventricle was relatively collapsed. Detailed photo  shows lack of depth of right laryngeal ventricle. Tissue was sent for  permanent section in formalin. Hemostasis was supplemented with topical epinephrine on pledgets. There  was almost no bleeding at all. The endolarynx was sprayed with 4%  lidocaine using an LTA. The patient was then awakened, extubated and  taken to Recovery in satisfactory condition. There were no  complications. Tolerated the procedure well. Jose C Morgan M.D.    D: 07/27/2022 20:21:49       T: 07/27/2022 20:24:29     [email or web address]  Job#: [id number]     Doc#: [id number]    CC:  Keon Chatman M.D.

## 2022-07-29 ENCOUNTER — OFFICE VISIT (OUTPATIENT)
Dept: ENT CLINIC | Age: 67
End: 2022-07-29
Payer: MEDICARE

## 2022-07-29 VITALS
SYSTOLIC BLOOD PRESSURE: 126 MMHG | HEIGHT: 71 IN | WEIGHT: 186.7 LBS | TEMPERATURE: 97.8 F | DIASTOLIC BLOOD PRESSURE: 74 MMHG | RESPIRATION RATE: 16 BRPM | BODY MASS INDEX: 26.14 KG/M2 | OXYGEN SATURATION: 97 % | HEART RATE: 57 BPM

## 2022-07-29 DIAGNOSIS — C32.9 SQUAMOUS CELL CARCINOMA OF LARYNX (HCC): ICD-10-CM

## 2022-07-29 DIAGNOSIS — C32.1 MALIGNANT NEOPLASM OF SUPRAGLOTTIS (HCC): Primary | ICD-10-CM

## 2022-07-29 PROCEDURE — G8417 CALC BMI ABV UP PARAM F/U: HCPCS | Performed by: OTOLARYNGOLOGY

## 2022-07-29 PROCEDURE — 1123F ACP DISCUSS/DSCN MKR DOCD: CPT | Performed by: OTOLARYNGOLOGY

## 2022-07-29 PROCEDURE — 3017F COLORECTAL CA SCREEN DOC REV: CPT | Performed by: OTOLARYNGOLOGY

## 2022-07-29 PROCEDURE — G8427 DOCREV CUR MEDS BY ELIG CLIN: HCPCS | Performed by: OTOLARYNGOLOGY

## 2022-07-29 PROCEDURE — 1036F TOBACCO NON-USER: CPT | Performed by: OTOLARYNGOLOGY

## 2022-07-29 PROCEDURE — 99213 OFFICE O/P EST LOW 20 MIN: CPT | Performed by: OTOLARYNGOLOGY

## 2022-07-29 ASSESSMENT — ENCOUNTER SYMPTOMS
NAUSEA: 0
RHINORRHEA: 0
ABDOMINAL PAIN: 0
COLOR CHANGE: 0
WHEEZING: 0
DIARRHEA: 0
SINUS PRESSURE: 0
SHORTNESS OF BREATH: 0
VOICE CHANGE: 0
COUGH: 0
VOMITING: 0
TROUBLE SWALLOWING: 0
FACIAL SWELLING: 0
CHOKING: 0
APNEA: 0
SORE THROAT: 0
STRIDOR: 0
CHEST TIGHTNESS: 0

## 2022-07-29 NOTE — PROGRESS NOTES
by mouth nightly      omeprazole (PRILOSEC) 20 MG delayed release capsule Take 20 mg by mouth Daily       Coenzyme Q10 (CO Q-10) 200 MG CAPS Take  by mouth daily. Multiple Vitamin (MULTI-VITAMIN) TABS Take  by mouth daily. ARGININE PO Take 500 mg by mouth daily 4 tab       No current facility-administered medications for this visit. Past Medical History:   Diagnosis Date    Ascending Aneurysm ([address] 02/2017    Ascending, 4.2 cm per pt    Brown's esophagus     CAD (coronary artery disease)     Cancer (HCC)     throat cancer, s/p radiation    Chronic back pain     Hepatitis C     as child    History of hyperbaric oxygen therapy     after bottom teeth removed after rdiation therapy    Hyperlipidemia     Hypertension     Left shoulder pain     nerve pain , ? 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    PONV (postoperative nausea and vomiting)     only after OHS    S/P CABG (coronary artery bypass graft) 2002    [facility]    Thyroid disease     Vision loss of right eye     during hyperbaric chamber oxygen therapy     Vitreous opacities     right    Wears dentures     full upper, no bottom teeth and no use of dentures on bottom      Past Surgical History:   Procedure Laterality Date    CARDIAC CATHETERIZATION  8/2002, 4/2004    [facility]    COLONOSCOPY      CORONARY ANGIOPLASTY WITH STENT PLACEMENT  7/2002    [facility]    CORONARY ANGIOPLASTY WITH STENT PLACEMENT N/A 07/12/2019    MID LAD    CORONARY ARTERY BYPASS GRAFT  2002    [facility]    DENTAL SURGERY      bottom teeth removal    HEMORRHOID SURGERY      KNEE SURGERY Right     LARYNGOSCOPY N/A 10/4/2017    LARYNGOSCOPY MICRO WITH BIOPSY performed by Darian Thompson MD at [address] 5/12/2021    LARYNGOSCOPY MICRO AND BIOPSY, WITH JET VENT performed by Darian Thompson MD at [address]  N/A 6/2/2021    (DR. HUNTER) SUSPENSION MICROLARYNGOSCOPY Genitourinary:  Negative for dysuria, enuresis and hematuria. Musculoskeletal:  Negative for arthralgias, gait problem, neck pain and neck stiffness. Skin:  Negative for color change and rash. Allergic/Immunologic: Negative for environmental allergies, food allergies and immunocompromised state. Neurological:  Negative for dizziness, syncope, facial asymmetry, speech difficulty, light-headedness and headaches. Hematological:  Negative for adenopathy. Does not bruise/bleed easily. Psychiatric/Behavioral:  Negative for confusion and sleep disturbance. The patient is not nervous/anxious. Objective:   /74 (Site: Left Upper Arm, Position: Sitting)   Pulse 57   Temp 97.8 °F (36.6 °C) (Infrared)   Resp 16   Ht 5' 11\" (1.803 m)   Wt 186 lb 11.2 oz (84.7 kg)   SpO2 97%   BMI 26.04 kg/m²     Physical Exam  Mild hoarseness    Data:  All of the past medical history, past surgical history, family history,social history, allergies and current medications were reviewed with the patient. Assessment & Plan   Diagnoses and all orders for this visit:     Diagnosis Orders   1. Malignant neoplasm of supraglottis (HCC)   PET CT SKULL BASE TO MID THIGH      2. Squamous cell carcinoma of larynx (HCC)  PET CT SKULL BASE TO MID THIGH    Right false vocal cord, ventricle          The findings were explained and his questions were answered. I explained that this could either be a recurrence of the primary cancer which is removed or of another primary. All 3 of which are located in the same general area. Need a PET/CT for staging. The possibility exists that the laryngeal ventricle has more extensive carcinoma than is visible endoscopically. Myron Wolfe. Stephanie Siddiqui MD    **This report has been created using voice recognition software. It may contain minor errors which are inherent in voicerecognition technology. **

## 2022-08-03 RX ORDER — LEVOTHYROXINE SODIUM 0.05 MG/1
50 TABLET ORAL DAILY
Qty: 90 TABLET | Refills: 0 | Status: SHIPPED | OUTPATIENT
Start: 2022-08-03 | End: 2022-08-29 | Stop reason: SDUPTHER

## 2022-08-11 ENCOUNTER — HOSPITAL ENCOUNTER (OUTPATIENT)
Dept: PET IMAGING | Age: 67
Discharge: HOME | End: 2022-08-11
Payer: MEDICARE

## 2022-08-11 DIAGNOSIS — C32.1 MALIGNANT NEOPLASM OF SUPRAGLOTTIS (HCC): ICD-10-CM

## 2022-08-11 DIAGNOSIS — C32.9 SQUAMOUS CELL CARCINOMA OF LARYNX (HCC): ICD-10-CM

## 2022-08-11 PROCEDURE — 3430000000 HC RX DIAGNOSTIC RADIOPHARMACEUTICAL: Performed by: OTOLARYNGOLOGY

## 2022-08-11 PROCEDURE — A9552 F18 FDG: HCPCS | Performed by: OTOLARYNGOLOGY

## 2022-08-11 PROCEDURE — 78815 PET IMAGE W/CT SKULL-THIGH: CPT

## 2022-08-11 RX ORDER — FLUDEOXYGLUCOSE F 18 200 MCI/ML
14 INJECTION, SOLUTION INTRAVENOUS
Status: COMPLETED | OUTPATIENT
Start: 2022-08-11 | End: 2022-08-11

## 2022-08-11 RX ADMIN — FLUDEOXYGLUCOSE F 18 14 MILLICURIE: 200 INJECTION, SOLUTION INTRAVENOUS at 10:34

## 2022-08-16 ENCOUNTER — NURSE ONLY (OUTPATIENT)
Dept: LAB | Age: 67
End: 2022-08-16

## 2022-08-16 ENCOUNTER — OFFICE VISIT (OUTPATIENT)
Dept: ENT CLINIC | Age: 67
End: 2022-08-16
Payer: MEDICARE

## 2022-08-16 ENCOUNTER — TELEPHONE (OUTPATIENT)
Dept: CARDIOLOGY CLINIC | Age: 67
End: 2022-08-16

## 2022-08-16 ENCOUNTER — PREP FOR PROCEDURE (OUTPATIENT)
Dept: ENT CLINIC | Age: 67
End: 2022-08-16

## 2022-08-16 VITALS
DIASTOLIC BLOOD PRESSURE: 70 MMHG | HEART RATE: 60 BPM | SYSTOLIC BLOOD PRESSURE: 128 MMHG | TEMPERATURE: 97 F | RESPIRATION RATE: 14 BRPM | HEIGHT: 71 IN | BODY MASS INDEX: 26.04 KG/M2 | WEIGHT: 186 LBS

## 2022-08-16 DIAGNOSIS — G47.33 OSA ON CPAP: ICD-10-CM

## 2022-08-16 DIAGNOSIS — C32.9 PRIMARY SQUAMOUS CELL CARCINOMA OF LARYNX (HCC): ICD-10-CM

## 2022-08-16 DIAGNOSIS — Z01.818 PRE-OP TESTING: Primary | ICD-10-CM

## 2022-08-16 DIAGNOSIS — Z99.89 OSA ON CPAP: ICD-10-CM

## 2022-08-16 DIAGNOSIS — C32.9 SQUAMOUS CELL CARCINOMA OF LARYNX (HCC): Primary | ICD-10-CM

## 2022-08-16 DIAGNOSIS — K22.719 BARRETT'S ESOPHAGUS WITH DYSPLASIA: ICD-10-CM

## 2022-08-16 DIAGNOSIS — C32.1 MALIGNANT NEOPLASM OF FALSE VOCAL CORDS (HCC): ICD-10-CM

## 2022-08-16 DIAGNOSIS — T66.XXXD EFFECTS, RADIATION, SUBSEQUENT ENCOUNTER: ICD-10-CM

## 2022-08-16 DIAGNOSIS — Z01.818 PRE-OP TESTING: ICD-10-CM

## 2022-08-16 LAB
ALBUMIN SERPL-MCNC: 4.5 G/DL (ref 3.5–5.1)
ALP BLD-CCNC: 98 U/L (ref 38–126)
ALT SERPL-CCNC: 14 U/L (ref 11–66)
ANION GAP SERPL CALCULATED.3IONS-SCNC: 9 MEQ/L (ref 8–16)
AST SERPL-CCNC: 14 U/L (ref 5–40)
BILIRUB SERPL-MCNC: 0.6 MG/DL (ref 0.3–1.2)
BUN BLDV-MCNC: 15 MG/DL (ref 7–22)
CALCIUM SERPL-MCNC: 10 MG/DL (ref 8.5–10.5)
CHLORIDE BLD-SCNC: 105 MEQ/L (ref 98–111)
CO2: 28 MEQ/L (ref 23–33)
CREAT SERPL-MCNC: 0.9 MG/DL (ref 0.4–1.2)
GFR SERPL CREATININE-BSD FRML MDRD: 84 ML/MIN/1.73M2
GLUCOSE BLD-MCNC: 84 MG/DL (ref 70–108)
POTASSIUM SERPL-SCNC: 4.3 MEQ/L (ref 3.5–5.2)
SODIUM BLD-SCNC: 142 MEQ/L (ref 135–145)
TOTAL PROTEIN: 6.2 G/DL (ref 6.1–8)

## 2022-08-16 PROCEDURE — G8417 CALC BMI ABV UP PARAM F/U: HCPCS | Performed by: OTOLARYNGOLOGY

## 2022-08-16 PROCEDURE — 3017F COLORECTAL CA SCREEN DOC REV: CPT | Performed by: OTOLARYNGOLOGY

## 2022-08-16 PROCEDURE — 99214 OFFICE O/P EST MOD 30 MIN: CPT | Performed by: OTOLARYNGOLOGY

## 2022-08-16 PROCEDURE — 1123F ACP DISCUSS/DSCN MKR DOCD: CPT | Performed by: OTOLARYNGOLOGY

## 2022-08-16 PROCEDURE — G8427 DOCREV CUR MEDS BY ELIG CLIN: HCPCS | Performed by: OTOLARYNGOLOGY

## 2022-08-16 PROCEDURE — 1036F TOBACCO NON-USER: CPT | Performed by: OTOLARYNGOLOGY

## 2022-08-16 NOTE — TELEPHONE ENCOUNTER
Patient is being scheduled for a procedure with Dr Duane Mae  and we are requesting clearance from Dr. Joe Watkins. DOS: 08/22/2022  Procedure: microlaryngoscopy with biopsy, suspension laryngoscopy with Jet Ventilation and C02 Laser, re-excision for margins. Meds to hold: Brilinta, ASA, CoQ10, Multivitamin, Terazosin starting tomorrow, 8/17 would be 5 days. (Patient was cleared on 6/24 by Dr. Fritz Lunch is a follow up procedure to remove cancerous margins)    Please advise.

## 2022-08-16 NOTE — PROGRESS NOTES
[facility] [place] [facility]  [facility] EAR, NOSE AND THROAT  [address] [place] [address] [place] [address]  Dept: [phone]  Dept Fax: [phone]  Loc: [phone]    Cherie Russ is a 74 y.o. male who was referred byNo ref. provider found for:  Chief Complaint   Patient presents with    Follow-up     Patient is here for a follow up after PET scan    . HPI:     Cherie Russ is a 74 y.o. male who presents today for ***. History:     No Known Allergies  Current Outpatient Medications   Medication Sig Dispense Refill    levothyroxine (SYNTHROID) 50 MCG tablet Take 1 tablet by mouth in the morning. 90 tablet 0    ciclopirox (PENLAC) 8 % solution Apply topically Toenail fungus      ticagrelor (BRILINTA) 90 MG TABS tablet Take 1 tablet by mouth 2 times daily 180 tablet 1    atorvastatin (LIPITOR) 40 MG tablet Take 1 tablet by mouth daily (Patient taking differently: Take 40 mg by mouth nightly) 90 tablet 1    terazosin (HYTRIN) 5 MG capsule TAKE 1 CAPSULE NIGHTLY 90 capsule 1    metoprolol succinate (TOPROL XL) 50 MG extended release tablet Take 0.5 tablets by mouth 2 times daily 90 tablet 1    aspirin 81 MG EC tablet Take 81 mg by mouth daily      gabapentin (NEURONTIN) 300 MG capsule Take 300 mg by mouth daily. ipratropium (ATROVENT) 0.06 % nasal spray 2 sprays by Each Nostril route 3 times daily as needed for Rhinitis      nitroGLYCERIN (NITROSTAT) 0.4 MG SL tablet up to max of 3 total doses. If no relief after 1 dose, call 911. 25 tablet 3    diphenhydrAMINE-APAP, sleep, (TYLENOL PM EXTRA STRENGTH)  MG tablet Take 2 tablets by mouth nightly      omeprazole (PRILOSEC) 20 MG delayed release capsule Take 20 mg by mouth Daily       Coenzyme Q10 (CO Q-10) 200 MG CAPS Take  by mouth daily. Multiple Vitamin (MULTI-VITAMIN) TABS Take  by mouth daily. ARGININE PO Take 500 mg by mouth daily 4 tab       No current facility-administered medications for this visit. Past Medical History:   Diagnosis Date    Ascending Aneurysm ([address] 02/2017    Ascending, 4.2 cm per pt    Brown's esophagus     CAD (coronary artery disease)     Cancer (HCC)     throat cancer, s/p radiation    Chronic back pain     Hepatitis C     as child    History of hyperbaric oxygen therapy     after bottom teeth removed after rdiation therapy    Hyperlipidemia     Hypertension     Left shoulder pain     nerve pain , ? 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    PONV (postoperative nausea and vomiting)     only after OHS    S/P CABG (coronary artery bypass graft) 2002    [facility]    Thyroid disease     Vision loss of right eye     during hyperbaric chamber oxygen therapy     Vitreous opacities     right    Wears dentures     full upper, no bottom teeth and no use of dentures on bottom      Past Surgical History:   Procedure Laterality Date    CARDIAC CATHETERIZATION  8/2002, 4/2004    [facility]    COLONOSCOPY      CORONARY ANGIOPLASTY WITH STENT PLACEMENT  7/2002    [facility]    CORONARY ANGIOPLASTY WITH STENT PLACEMENT N/A 07/12/2019    MID LAD    CORONARY ARTERY BYPASS GRAFT  2002    [facility]    DENTAL SURGERY      bottom teeth removal    HEMORRHOID SURGERY      KNEE SURGERY Right     LARYNGOSCOPY N/A 10/4/2017    LARYNGOSCOPY MICRO WITH BIOPSY performed by Leland Quintero MD at [address] 5/12/2021    LARYNGOSCOPY MICRO AND BIOPSY, WITH JET VENT performed by Leland Quintero MD at [address] N/A 6/2/2021    (DR. HUNTER) SUSPENSION MICROLARYNGOSCOPY WITH RECONSTRUCTION AND ADVANCEMENT FLAP X2; (DR. GONGORA)  MICROLARYNGOSCOPY AND BX POSTERIOR RIGHT VOCAL CORD      JET VENTILATION AND CO2 LASER performed by Rocky Cardoza MD at [address] N/A 7/27/2022    SUSPENSION DIRECT LARYNGOSCOPY WITH EXCISIONAL BIOPSY OF RIGHT SIDED LARYNGEAL MASS performed by Leland Quintero MD at [facility] MICROLARYNGOSCOPY W BIOPSY  2016    by Dr Alicia Cardoza VITREOUS,SUBRET/CHOROID FLUID Right 2018    VITRECTOMY 22 GAUGE performed by Christiano Mckenzie MD at [place] [address] Right 2018     VITRECTOMY 22 GAUGE (Right Eye)     Family History   Problem Relation Age of Onset    Heart Disease Father         MI    High Blood Pressure Father     Stroke Father     Cancer Maternal Grandmother     Cancer Maternal Grandfather     Diabetes Neg Hx      Social History     Tobacco Use    Smoking status: Former     Packs/day: 2.00     Years: 20.00     Pack years: 40.00     Types: Cigarettes     Quit date: 2001     Years since quittin.7    Smokeless tobacco: Never   Substance Use Topics    Alcohol use: No     Alcohol/week: 0.0 standard drinks       Subjective:      Review of Systems    Objective:   /70 (Site: Left Upper Arm, Position: Sitting)   Pulse 60   Temp 97 °F (36.1 °C) (Infrared)   Resp 14   Ht 5' 11\" (1.803 m)   Wt 186 lb (84.4 kg)   BMI 25.94 kg/m²     Physical Exam    Data:  All of the past medical history, past surgical history, family history,social history, allergies and current medications were reviewed with the patient. Assessment & Plan   Diagnoses and all orders for this visit:     Diagnosis Orders   1. Squamous cell carcinoma of larynx (HCC)  CT LARYNGOSCOPY,DIRCT,OP SCOPE,BIOPSY      2. Effects, radiation, subsequent encounter  CT LARYNGOSCOPY,DIRCT,OP SCOPE,BIOPSY      3. Malignant neoplasm of false vocal cords ([address]  CT LARYNGOSCOPY,DIRCT,OP SCOPE,BIOPSY      4. FOUZIA on CPAP  CT LARYNGOSCOPY,DIRCT,OP SCOPE,BIOPSY      5. Brown's esophagus with dysplasia  CT LARYNGOSCOPY,DIRCT,OP SCOPE,BIOPSY          The findings were explained and his questions were answered. No follow-ups on file. Komal Tena. Facundo Martínez MD    **This report has been created using voice recognition software.  It may contain minor errors which are inherent in voicerecognition technology. **

## 2022-08-16 NOTE — TELEPHONE ENCOUNTER
We will be proceeding with case regardless due to dx. Patient was cleared within the last 3 months and he is aware which medications he needs to hold. I called and lvm to inform him just in case he had concerns.

## 2022-08-17 NOTE — PROGRESS NOTES
PAT call attempted, patient unavailable, left message to please call us back at your earliest convenience; [phone]

## 2022-08-18 NOTE — PROGRESS NOTES
Follow all instructions given by your physician    NPO after midnight   Sips of water am of surgery with allowed medications  Bring insurance info and 's license  Wear comfortable clean, loose fitting clothing  No jewelry or contact lenses to be worn day of surgery  No glue on dentures morning of surgery;you will be asked to remove them for surgery. Case for glasses. Shower night before and morning of surgery with a liquid antibacterial soap, dry with fresh clean towel; no lotions, creams or powder. Clean sheets and pillow case on bed night before surgery  Bring medications in original bottles    Patient cannot use CPAP machine at this time   needed at discharge and someone over 18 to stay with you for 24 hours overnight (surgery may be cancelled if you don't have this)  Report to [facility] on 2nd floor  If you would become ill prior to surgery, please call the surgeon  May have a visitor with you, we request that you limit to 2 visitors in pre-op area  Please bring and wear mask    Call [phone] for any questions  Covid questionnaire Complete; Patient negative for symptoms or exposure. See documentation.

## 2022-08-22 ENCOUNTER — ANESTHESIA (OUTPATIENT)
Dept: OPERATING ROOM | Age: 67
End: 2022-08-22
Payer: MEDICARE

## 2022-08-22 ENCOUNTER — ANESTHESIA EVENT (OUTPATIENT)
Dept: OPERATING ROOM | Age: 67
End: 2022-08-22
Payer: MEDICARE

## 2022-08-22 ENCOUNTER — HOSPITAL ENCOUNTER (OUTPATIENT)
Age: 67
Setting detail: OUTPATIENT SURGERY
Discharge: HOME | End: 2022-08-22
Attending: OTOLARYNGOLOGY | Admitting: OTOLARYNGOLOGY
Payer: MEDICARE

## 2022-08-22 ENCOUNTER — APPOINTMENT (OUTPATIENT)
Dept: GENERAL RADIOLOGY | Age: 67
End: 2022-08-22
Attending: OTOLARYNGOLOGY
Payer: MEDICARE

## 2022-08-22 VITALS
BODY MASS INDEX: 25.93 KG/M2 | SYSTOLIC BLOOD PRESSURE: 145 MMHG | RESPIRATION RATE: 16 BRPM | HEIGHT: 71 IN | HEART RATE: 66 BPM | DIASTOLIC BLOOD PRESSURE: 74 MMHG | TEMPERATURE: 97.6 F | OXYGEN SATURATION: 97 % | WEIGHT: 185.2 LBS

## 2022-08-22 DIAGNOSIS — K22.719 BARRETT'S ESOPHAGUS WITH DYSPLASIA: ICD-10-CM

## 2022-08-22 DIAGNOSIS — C32.9 CARCINOMA LARYNX (HCC): ICD-10-CM

## 2022-08-22 DIAGNOSIS — C32.1 MALIGNANT NEOPLASM OF FALSE VOCAL CORDS (HCC): ICD-10-CM

## 2022-08-22 PROBLEM — D02.0 CARCINOMA IN SITU OF LARYNX: Status: ACTIVE | Noted: 2021-05-12

## 2022-08-22 LAB
EKG ATRIAL RATE: 70 BPM
EKG P AXIS: 48 DEGREES
EKG P-R INTERVAL: 158 MS
EKG Q-T INTERVAL: 398 MS
EKG QRS DURATION: 82 MS
EKG QTC CALCULATION (BAZETT): 429 MS
EKG R AXIS: 23 DEGREES
EKG T AXIS: 65 DEGREES
EKG VENTRICULAR RATE: 70 BPM

## 2022-08-22 PROCEDURE — 3700000000 HC ANESTHESIA ATTENDED CARE: Performed by: OTOLARYNGOLOGY

## 2022-08-22 PROCEDURE — 2580000003 HC RX 258: Performed by: NURSE ANESTHETIST, CERTIFIED REGISTERED

## 2022-08-22 PROCEDURE — 6360000002 HC RX W HCPCS

## 2022-08-22 PROCEDURE — 7100000001 HC PACU RECOVERY - ADDTL 15 MIN: Performed by: OTOLARYNGOLOGY

## 2022-08-22 PROCEDURE — 6360000002 HC RX W HCPCS: Performed by: ANESTHESIOLOGY

## 2022-08-22 PROCEDURE — 7100000011 HC PHASE II RECOVERY - ADDTL 15 MIN: Performed by: OTOLARYNGOLOGY

## 2022-08-22 PROCEDURE — 93010 ELECTROCARDIOGRAM REPORT: CPT | Performed by: INTERNAL MEDICINE

## 2022-08-22 PROCEDURE — 7100000010 HC PHASE II RECOVERY - FIRST 15 MIN: Performed by: OTOLARYNGOLOGY

## 2022-08-22 PROCEDURE — 93005 ELECTROCARDIOGRAM TRACING: CPT | Performed by: ANESTHESIOLOGY

## 2022-08-22 PROCEDURE — 7100000000 HC PACU RECOVERY - FIRST 15 MIN: Performed by: OTOLARYNGOLOGY

## 2022-08-22 PROCEDURE — 88305 TISSUE EXAM BY PATHOLOGIST: CPT

## 2022-08-22 PROCEDURE — 2709999900 HC NON-CHARGEABLE SUPPLY: Performed by: OTOLARYNGOLOGY

## 2022-08-22 PROCEDURE — 3600000004 HC SURGERY LEVEL 4 BASE: Performed by: OTOLARYNGOLOGY

## 2022-08-22 PROCEDURE — 31536 LARYNGOSCOPY W/BX & OP SCOPE: CPT | Performed by: OTOLARYNGOLOGY

## 2022-08-22 PROCEDURE — 3600000014 HC SURGERY LEVEL 4 ADDTL 15MIN: Performed by: OTOLARYNGOLOGY

## 2022-08-22 PROCEDURE — 3700000001 HC ADD 15 MINUTES (ANESTHESIA): Performed by: OTOLARYNGOLOGY

## 2022-08-22 PROCEDURE — 6370000000 HC RX 637 (ALT 250 FOR IP): Performed by: OTOLARYNGOLOGY

## 2022-08-22 PROCEDURE — 2500000003 HC RX 250 WO HCPCS: Performed by: NURSE ANESTHETIST, CERTIFIED REGISTERED

## 2022-08-22 PROCEDURE — 6360000002 HC RX W HCPCS: Performed by: NURSE ANESTHETIST, CERTIFIED REGISTERED

## 2022-08-22 PROCEDURE — 71045 X-RAY EXAM CHEST 1 VIEW: CPT

## 2022-08-22 RX ORDER — ONDANSETRON 2 MG/ML
4 INJECTION INTRAMUSCULAR; INTRAVENOUS
Status: DISCONTINUED | OUTPATIENT
Start: 2022-08-22 | End: 2022-08-22 | Stop reason: HOSPADM

## 2022-08-22 RX ORDER — SODIUM CHLORIDE 9 MG/ML
INJECTION, SOLUTION INTRAVENOUS CONTINUOUS PRN
Status: DISCONTINUED | OUTPATIENT
Start: 2022-08-22 | End: 2022-08-22 | Stop reason: SDUPTHER

## 2022-08-22 RX ORDER — MORPHINE SULFATE 2 MG/ML
2 INJECTION, SOLUTION INTRAMUSCULAR; INTRAVENOUS ONCE
Status: COMPLETED | OUTPATIENT
Start: 2022-08-22 | End: 2022-08-22

## 2022-08-22 RX ORDER — LORAZEPAM 2 MG/ML
0.5 INJECTION INTRAMUSCULAR
Status: DISCONTINUED | OUTPATIENT
Start: 2022-08-22 | End: 2022-08-22 | Stop reason: HOSPADM

## 2022-08-22 RX ORDER — DEXAMETHASONE SODIUM PHOSPHATE 10 MG/ML
INJECTION, EMULSION INTRAMUSCULAR; INTRAVENOUS PRN
Status: DISCONTINUED | OUTPATIENT
Start: 2022-08-22 | End: 2022-08-22 | Stop reason: SDUPTHER

## 2022-08-22 RX ORDER — FENTANYL CITRATE 50 UG/ML
25 INJECTION, SOLUTION INTRAMUSCULAR; INTRAVENOUS EVERY 5 MIN PRN
Status: DISCONTINUED | OUTPATIENT
Start: 2022-08-22 | End: 2022-08-22 | Stop reason: HOSPADM

## 2022-08-22 RX ORDER — SODIUM CHLORIDE 9 MG/ML
INJECTION, SOLUTION INTRAVENOUS PRN
Status: DISCONTINUED | OUTPATIENT
Start: 2022-08-22 | End: 2022-08-22 | Stop reason: HOSPADM

## 2022-08-22 RX ORDER — FENTANYL CITRATE 50 UG/ML
50 INJECTION, SOLUTION INTRAMUSCULAR; INTRAVENOUS EVERY 5 MIN PRN
Status: DISCONTINUED | OUTPATIENT
Start: 2022-08-22 | End: 2022-08-22 | Stop reason: HOSPADM

## 2022-08-22 RX ORDER — HYDRALAZINE HYDROCHLORIDE 20 MG/ML
INJECTION INTRAMUSCULAR; INTRAVENOUS PRN
Status: DISCONTINUED | OUTPATIENT
Start: 2022-08-22 | End: 2022-08-22 | Stop reason: SDUPTHER

## 2022-08-22 RX ORDER — MORPHINE SULFATE 2 MG/ML
INJECTION, SOLUTION INTRAMUSCULAR; INTRAVENOUS
Status: COMPLETED
Start: 2022-08-22 | End: 2022-08-22

## 2022-08-22 RX ORDER — LABETALOL 20 MG/4 ML (5 MG/ML) INTRAVENOUS SYRINGE
10
Status: DISCONTINUED | OUTPATIENT
Start: 2022-08-22 | End: 2022-08-22 | Stop reason: RX

## 2022-08-22 RX ORDER — OXYCODONE HYDROCHLORIDE 5 MG/1
5 TABLET ORAL PRN
Status: DISCONTINUED | OUTPATIENT
Start: 2022-08-22 | End: 2022-08-22 | Stop reason: HOSPADM

## 2022-08-22 RX ORDER — DIPHENHYDRAMINE HYDROCHLORIDE 50 MG/ML
12.5 INJECTION INTRAMUSCULAR; INTRAVENOUS
Status: DISCONTINUED | OUTPATIENT
Start: 2022-08-22 | End: 2022-08-22 | Stop reason: HOSPADM

## 2022-08-22 RX ORDER — IPRATROPIUM BROMIDE AND ALBUTEROL SULFATE 2.5; .5 MG/3ML; MG/3ML
1 SOLUTION RESPIRATORY (INHALATION)
Status: DISCONTINUED | OUTPATIENT
Start: 2022-08-22 | End: 2022-08-22 | Stop reason: HOSPADM

## 2022-08-22 RX ORDER — SODIUM CHLORIDE 9 MG/ML
INJECTION, SOLUTION INTRAVENOUS PRN
Status: CANCELLED | OUTPATIENT
Start: 2022-08-22

## 2022-08-22 RX ORDER — SODIUM CHLORIDE 0.9 % (FLUSH) 0.9 %
5-40 SYRINGE (ML) INJECTION EVERY 12 HOURS SCHEDULED
Status: DISCONTINUED | OUTPATIENT
Start: 2022-08-22 | End: 2022-08-22 | Stop reason: HOSPADM

## 2022-08-22 RX ORDER — ACETAMINOPHEN 325 MG/1
650 TABLET ORAL ONCE
Status: COMPLETED | OUTPATIENT
Start: 2022-08-22 | End: 2022-08-22

## 2022-08-22 RX ORDER — CIPROFLOXACIN 2 MG/ML
INJECTION, SOLUTION INTRAVENOUS PRN
Status: DISCONTINUED | OUTPATIENT
Start: 2022-08-22 | End: 2022-08-22 | Stop reason: SDUPTHER

## 2022-08-22 RX ORDER — FENTANYL CITRATE 50 UG/ML
INJECTION, SOLUTION INTRAMUSCULAR; INTRAVENOUS
Status: COMPLETED
Start: 2022-08-22 | End: 2022-08-22

## 2022-08-22 RX ORDER — SODIUM CHLORIDE 0.9 % (FLUSH) 0.9 %
5-40 SYRINGE (ML) INJECTION PRN
Status: DISCONTINUED | OUTPATIENT
Start: 2022-08-22 | End: 2022-08-22 | Stop reason: HOSPADM

## 2022-08-22 RX ORDER — PROPOFOL 10 MG/ML
INJECTION, EMULSION INTRAVENOUS PRN
Status: DISCONTINUED | OUTPATIENT
Start: 2022-08-22 | End: 2022-08-22 | Stop reason: SDUPTHER

## 2022-08-22 RX ORDER — FENTANYL CITRATE 50 UG/ML
INJECTION, SOLUTION INTRAMUSCULAR; INTRAVENOUS PRN
Status: DISCONTINUED | OUTPATIENT
Start: 2022-08-22 | End: 2022-08-22 | Stop reason: SDUPTHER

## 2022-08-22 RX ORDER — DROPERIDOL 2.5 MG/ML
0.62 INJECTION, SOLUTION INTRAMUSCULAR; INTRAVENOUS
Status: DISCONTINUED | OUTPATIENT
Start: 2022-08-22 | End: 2022-08-22 | Stop reason: HOSPADM

## 2022-08-22 RX ORDER — SODIUM CHLORIDE 0.9 % (FLUSH) 0.9 %
5-40 SYRINGE (ML) INJECTION PRN
Status: CANCELLED | OUTPATIENT
Start: 2022-08-22

## 2022-08-22 RX ORDER — SODIUM CHLORIDE FOR INHALATION 0.9 %
3 VIAL, NEBULIZER (ML) INHALATION EVERY 4 HOURS PRN
Status: DISCONTINUED | OUTPATIENT
Start: 2022-08-22 | End: 2022-08-22 | Stop reason: HOSPADM

## 2022-08-22 RX ORDER — ACETAMINOPHEN 325 MG/1
650 TABLET ORAL ONCE
Status: DISCONTINUED | OUTPATIENT
Start: 2022-08-22 | End: 2022-08-22 | Stop reason: HOSPADM

## 2022-08-22 RX ORDER — SODIUM CHLORIDE 0.9 % (FLUSH) 0.9 %
5-40 SYRINGE (ML) INJECTION EVERY 12 HOURS SCHEDULED
Status: CANCELLED | OUTPATIENT
Start: 2022-08-22

## 2022-08-22 RX ORDER — SODIUM CHLORIDE FOR INHALATION 0.9 %
VIAL, NEBULIZER (ML) INHALATION
Status: DISCONTINUED
Start: 2022-08-22 | End: 2022-08-22 | Stop reason: HOSPADM

## 2022-08-22 RX ORDER — OXYCODONE HYDROCHLORIDE 5 MG/1
10 TABLET ORAL PRN
Status: DISCONTINUED | OUTPATIENT
Start: 2022-08-22 | End: 2022-08-22 | Stop reason: HOSPADM

## 2022-08-22 RX ORDER — ROCURONIUM BROMIDE 10 MG/ML
INJECTION, SOLUTION INTRAVENOUS PRN
Status: DISCONTINUED | OUTPATIENT
Start: 2022-08-22 | End: 2022-08-22 | Stop reason: SDUPTHER

## 2022-08-22 RX ADMIN — ROCURONIUM BROMIDE 20 MG: 10 INJECTION, SOLUTION INTRAVENOUS at 14:30

## 2022-08-22 RX ADMIN — FENTANYL CITRATE 100 MCG: 50 INJECTION, SOLUTION INTRAMUSCULAR; INTRAVENOUS at 13:29

## 2022-08-22 RX ADMIN — FENTANYL CITRATE 50 MCG: 50 INJECTION, SOLUTION INTRAMUSCULAR; INTRAVENOUS at 15:17

## 2022-08-22 RX ADMIN — HYDRALAZINE HYDROCHLORIDE 5 MG: 20 INJECTION INTRAMUSCULAR; INTRAVENOUS at 14:51

## 2022-08-22 RX ADMIN — ROCURONIUM BROMIDE 10 MG: 10 INJECTION, SOLUTION INTRAVENOUS at 14:21

## 2022-08-22 RX ADMIN — SODIUM CHLORIDE: 9 INJECTION, SOLUTION INTRAVENOUS at 15:07

## 2022-08-22 RX ADMIN — ROCURONIUM BROMIDE 50 MG: 10 INJECTION, SOLUTION INTRAVENOUS at 13:29

## 2022-08-22 RX ADMIN — HYDRALAZINE HYDROCHLORIDE 5 MG: 20 INJECTION INTRAMUSCULAR; INTRAVENOUS at 14:40

## 2022-08-22 RX ADMIN — CIPROFLOXACIN 400 MG: 2 INJECTION, SOLUTION INTRAVENOUS at 13:40

## 2022-08-22 RX ADMIN — FENTANYL CITRATE 50 MCG: 50 INJECTION, SOLUTION INTRAMUSCULAR; INTRAVENOUS at 14:27

## 2022-08-22 RX ADMIN — MORPHINE SULFATE 2 MG: 2 INJECTION, SOLUTION INTRAMUSCULAR; INTRAVENOUS at 15:48

## 2022-08-22 RX ADMIN — SODIUM CHLORIDE: 9 INJECTION, SOLUTION INTRAVENOUS at 13:25

## 2022-08-22 RX ADMIN — PROPOFOL 125 MCG/KG/MIN: 10 INJECTION, EMULSION INTRAVENOUS at 13:53

## 2022-08-22 RX ADMIN — FENTANYL CITRATE 50 MCG: 50 INJECTION, SOLUTION INTRAMUSCULAR; INTRAVENOUS at 14:16

## 2022-08-22 RX ADMIN — PROPOFOL 50 MG: 10 INJECTION, EMULSION INTRAVENOUS at 14:28

## 2022-08-22 RX ADMIN — DEXAMETHASONE SODIUM PHOSPHATE 10 MG: 10 INJECTION, EMULSION INTRAMUSCULAR; INTRAVENOUS at 13:38

## 2022-08-22 RX ADMIN — SUGAMMADEX 200 MG: 100 INJECTION, SOLUTION INTRAVENOUS at 15:00

## 2022-08-22 RX ADMIN — PROPOFOL 170 MG: 10 INJECTION, EMULSION INTRAVENOUS at 13:29

## 2022-08-22 RX ADMIN — FENTANYL CITRATE 50 MCG: 50 INJECTION, SOLUTION INTRAMUSCULAR; INTRAVENOUS at 15:25

## 2022-08-22 RX ADMIN — PROPOFOL 30 MG: 10 INJECTION, EMULSION INTRAVENOUS at 13:35

## 2022-08-22 RX ADMIN — ACETAMINOPHEN 650 MG: 325 TABLET ORAL at 17:11

## 2022-08-22 ASSESSMENT — ENCOUNTER SYMPTOMS
SHORTNESS OF BREATH: 0
CHEST TIGHTNESS: 0
DIARRHEA: 0
WHEEZING: 0
SORE THROAT: 0
COUGH: 0
RHINORRHEA: 0
ABDOMINAL PAIN: 0
TROUBLE SWALLOWING: 0
VOICE CHANGE: 0
CHOKING: 0
COLOR CHANGE: 0
STRIDOR: 0
VOMITING: 0
SINUS PRESSURE: 0
APNEA: 0
FACIAL SWELLING: 0
NAUSEA: 0

## 2022-08-22 ASSESSMENT — PAIN DESCRIPTION - LOCATION
LOCATION: THROAT
LOCATION: THROAT
LOCATION: CHEST
LOCATION: THROAT

## 2022-08-22 ASSESSMENT — PAIN DESCRIPTION - DESCRIPTORS
DESCRIPTORS: SORE
DESCRIPTORS: SORE
DESCRIPTORS: DISCOMFORT
DESCRIPTORS: PRESSURE

## 2022-08-22 ASSESSMENT — PAIN DESCRIPTION - ONSET
ONSET: ON-GOING
ONSET: ON-GOING

## 2022-08-22 ASSESSMENT — PAIN DESCRIPTION - PAIN TYPE
TYPE: SURGICAL PAIN
TYPE: SURGICAL PAIN

## 2022-08-22 ASSESSMENT — PAIN - FUNCTIONAL ASSESSMENT
PAIN_FUNCTIONAL_ASSESSMENT: PREVENTS OR INTERFERES SOME ACTIVE ACTIVITIES AND ADLS
PAIN_FUNCTIONAL_ASSESSMENT: ACTIVITIES ARE NOT PREVENTED

## 2022-08-22 ASSESSMENT — PAIN DESCRIPTION - FREQUENCY
FREQUENCY: CONTINUOUS
FREQUENCY: CONTINUOUS

## 2022-08-22 ASSESSMENT — PAIN DESCRIPTION - ORIENTATION
ORIENTATION: MID

## 2022-08-22 ASSESSMENT — PAIN SCALES - GENERAL
PAINLEVEL_OUTOF10: 7
PAINLEVEL_OUTOF10: 7
PAINLEVEL_OUTOF10: 4
PAINLEVEL_OUTOF10: 7
PAINLEVEL_OUTOF10: 4
PAINLEVEL_OUTOF10: 0
PAINLEVEL_OUTOF10: 3

## 2022-08-22 NOTE — BRIEF OP NOTE
Brief Postoperative Note      Patient: Araceli Cordero  YOB: 1955  MRN: [id number]    Date of Procedure: 8/22/2022    Pre-Op Diagnosis: Carcinoma larynx ([address] [C32.9]  Malignant neoplasm of false vocal cords ([address] [C32.1]  Brown's esophagus with dysplasia [K22.779]    Post-Op Diagnosis: Same       Procedure(s): MICROLARYNGOSCOPY WITH BIOPSY, SUSPENSION LARYNGOSCOPY WITH JET VENTILATION AND C02 LASER, RE-EXCISION FOR MARGINS    Surgeon(s):  Sandra Trujillo MD    Assistant:  * No surgical staff found *    Anesthesia: General    Estimated Blood Loss (mL): Minimal    Complications: None    Specimens:   ID Type Source Tests Collected by Time Destination   A : RIGHT POSTERIOR FALSE CORD ADJACENT TO TRUE VOCAL Tissue Larynx SURGICAL PATHOLOGY Sandra Trujillo MD 8/22/2022 1447    B : RIGHT POSTERIOR FALSE CORD DEEPER MARGIN Tissue Larynx SURGICAL PATHOLOGY Sandra Trujillo MD 8/22/2022 1450    C : ANTERIOR INFERIOR PORTION OF LESION Tissue Larynx SURGICAL PATHOLOGY Sandra Trujillo MD 8/22/2022 1452        Implants:  * No implants in log *      Drains: * No LDAs found *    Findings: Slightly granular area left posterior false vocal cord just above the true vocal cord and on the superior surface. This tissue was removed very carefully. 3 separate areas were sent separately for tissue diagnosis. No invasive tumor was encountered. Patient prior biopsy showed carcinoma in situ.     Electronically signed by Elaine Olmedo MD on 8/22/2022 at 4:33 PM

## 2022-08-22 NOTE — DISCHARGE INSTRUCTIONS
If Tylenol is inadequate for pain relief, may use narcotic pain medicine from recent biopsies. .  Resume aspirin in 1 day if no bleeding. Resume Brilinta and tamsulosin in 3 days if no bleeding.     Rest voice      Tylenol 650mg given at 5:11pm

## 2022-08-22 NOTE — PROGRESS NOTES
PT. COMPLAINED CHEST PAIN  IN PACU. TREATED WITH RACEMIC EPINEPHRINE NEBULIZER AND MORPHINE. DID 12 LEAD EKG , NO ACUTE CHANGES   PT. HEMODYNAMICALLY STABLE. CHEST PAIN RESOLVED. ADVISED TO COME TO ER IN CASE OF CHEST PAIN SHORTNESS OF BREATH OR IF NOT FEELING WELL.

## 2022-08-22 NOTE — PROGRESS NOTES
Patient admitted to [facility] room 17 with wife at bedside. Bed in low position side rails up call light in reach. Patient denies questions at this time.

## 2022-08-22 NOTE — PROGRESS NOTES
Pt returned to [facility] room 17. Vitals and assessment as charted. 0.9 infusing, @200ml to count from PACU. Pt has ice chips. Family at the bedside. Pt and family verbalized understanding of discharge criteria and call light use. Call light in reach.

## 2022-08-22 NOTE — PROGRESS NOTES
1510 patient in to PACU. Respirations easy and unlabored. Patient rating pain 3/10, but tolerable. 1517 Pain rated 7/10. Fentanyl given. Respirations easy and unlabored. 1525 Pain rated 7/10. 50 mg fentanyl given     1532 Pain rated 5/10 but tolerable. Ice chips offered and given. Tolerated well. [id number] Patient complaining of chest pressure. Dr Lennox Ruder notified. Morphine 2mg and Recepinephrine Inhalation ordered. 1545 Patient given 2mg Morphine. Patient refused breathing treatment. 18 Dr Lennox Ruder at bedside. Orders for EKG placed. Patient still complaining of chest pressure. Rating it 5/10     1558 Dr Lennox Ruder at bedside to evaluate patient. Ekg at bedside. 1600 EKG completed, Dr Lennox Ruder notified. No new orders at this time. Patient states chest pressure is better after Morphine. EKG showing no signs of change since prior EKG. Therefore, patient condition has changed and no Recepinephrine needed at this time. Chest patient respirations even and unlabored. No accessory muscles used at this time, patient oxygenating at 96% on room air.     1607 Chest pressure improving per patient. Patient meets criteria for discharge from PACU. Okay to transfer to [facility] per Dr Lennox Ruder. 1610 Patient transferred to [facility] in stable condition. Report given to Delisa Devlin.

## 2022-08-22 NOTE — H&P
[facility] [place] [facility]  [facility] EAR, NOSE AND THROAT  [address] [place] [address]  Dept: [phone]  Dept Fax: [phone]  Loc: [phone]    Roman Akers is a 74 y.o. male who was referred byNo ref. provider found for:  Chief Complaint   Patient presents with    Follow-up     Patient is here for a preop appt for laryngoscopy w/ biopsy 07/27. Shaji Stover HPI:     Roman Akers is a 74 y.o. male who presents today for pre-op laryngoscopy with biopsy. Recent biopsy showed squamous cell carcinoma. It was superficial and was referred to his in situ however this was a large 4 to 5 mm raised area which look like granulation tissue. Right false vocal cord is very firm. Plan a more definitive excision with deeper margins. History:     No Known Allergies  Current Outpatient Medications   Medication Sig Dispense Refill    ticagrelor (BRILINTA) 90 MG TABS tablet Take 1 tablet by mouth 2 times daily 180 tablet 1    atorvastatin (LIPITOR) 40 MG tablet Take 1 tablet by mouth daily (Patient taking differently: Take 40 mg by mouth nightly) 90 tablet 1    terazosin (HYTRIN) 5 MG capsule TAKE 1 CAPSULE NIGHTLY 90 capsule 1    metoprolol succinate (TOPROL XL) 50 MG extended release tablet Take 0.5 tablets by mouth 2 times daily 90 tablet 1    levothyroxine (SYNTHROID) 50 MCG tablet Take 1 tablet by mouth daily 90 tablet 1    aspirin 81 MG EC tablet Take 81 mg by mouth daily      gabapentin (NEURONTIN) 300 MG capsule Take 300 mg by mouth daily. ipratropium (ATROVENT) 0.06 % nasal spray 2 sprays by Each Nostril route 3 times daily as needed for Rhinitis      nitroGLYCERIN (NITROSTAT) 0.4 MG SL tablet up to max of 3 total doses.  If no relief after 1 dose, call 911. 25 tablet 3    diphenhydrAMINE-APAP, sleep, (TYLENOL PM EXTRA STRENGTH)  MG tablet Take 2 tablets by mouth nightly      omeprazole (PRILOSEC) 20 MG delayed release capsule Take 20 mg by mouth Daily Coenzyme Q10 (CO Q-10) 200 MG CAPS Take  by mouth daily. Multiple Vitamin (MULTI-VITAMIN) TABS Take  by mouth daily. ARGININE PO Take 500 mg by mouth daily 4 tab      ciclopirox (PENLAC) 8 % solution Apply topically Toenail fungus       No current facility-administered medications for this visit. Past Medical History:   Diagnosis Date    Ascending Aneurysm ([address] 02/2017    Ascending, 4.2 cm per pt    Brown's esophagus     CAD (coronary artery disease)     Cancer (HCC)     throat cancer, s/p radiation    Chronic back pain     Hepatitis C     as child    History of hyperbaric oxygen therapy     after bottom teeth removed after rdiation therapy    Hyperlipidemia     Hypertension     Left shoulder pain     nerve pain , ? 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    PONV (postoperative nausea and vomiting)     only after OHS    S/P CABG (coronary artery bypass graft) 2002    [facility]    Thyroid disease     Vision loss of right eye     during hyperbaric chamber oxygen therapy     Vitreous opacities     right    Wears dentures     full upper, no bottom teeth and no use of dentures on bottom      Past Surgical History:   Procedure Laterality Date    CARDIAC CATHETERIZATION  8/2002, 4/2004    [facility]    COLONOSCOPY      CORONARY ANGIOPLASTY WITH STENT PLACEMENT  7/2002    [facility]    CORONARY ANGIOPLASTY WITH STENT PLACEMENT N/A 07/12/2019    MID LAD    CORONARY ARTERY BYPASS GRAFT  2002    [facility]    DENTAL SURGERY      bottom teeth removal    HEMORRHOID SURGERY      KNEE SURGERY Right     LARYNGOSCOPY N/A 10/4/2017    LARYNGOSCOPY MICRO WITH BIOPSY performed by Evan Wang MD at [address] 5/12/2021    LARYNGOSCOPY MICRO AND BIOPSY, WITH JET VENT performed by Evan Wang MD at [address]  N/A 6/2/2021    (DR. HUNTER) SUSPENSION MICROLARYNGOSCOPY WITH RECONSTRUCTION AND ADVANCEMENT Allergic/Immunologic: Negative for environmental allergies, food allergies and immunocompromised state. Neurological:  Negative for dizziness, syncope, facial asymmetry, speech difficulty, light-headedness and headaches. Hematological:  Negative for adenopathy. Does not bruise/bleed easily. Psychiatric/Behavioral:  Negative for confusion and sleep disturbance. The patient is not nervous/anxious. Objective:     /68 (Site: Right Upper Arm, Position: Sitting)   Pulse 56   Temp 97.5 °F (36.4 °C) (Infrared)   Resp 12   Ht 5' 11\" (1.803 m)   Wt 187 lb 8 oz (85 kg)   SpO2 97%   BMI 26.15 kg/m²     Physical Exam  Vitals and nursing note reviewed. Constitutional:       Appearance: He is well-developed. HENT:      Head: Normocephalic and atraumatic. No laceration. Salivary Glands: Right salivary gland is not diffusely enlarged or tender. Left salivary gland is not diffusely enlarged or tender. Comments:        Right Ear: Hearing, tympanic membrane, ear canal and external ear normal. No drainage or swelling. No middle ear effusion. Tympanic membrane is not perforated or erythematous. Left Ear: Hearing, tympanic membrane, ear canal and external ear normal. No drainage or swelling. No middle ear effusion. Tympanic membrane is not perforated or erythematous. Nose: Nose normal. No septal deviation, mucosal edema or rhinorrhea. Mouth/Throat:      Mouth: Mucous membranes are moist. Mucous membranes are not pale and not dry. No oral lesions. Tongue: No lesions. Pharynx: Oropharynx is clear. Uvula midline. No oropharyngeal exudate or posterior oropharyngeal erythema. Comments: LIps: lips normal     Mallampati 1  Base of tongue: symmetric  Mirror exam deferred due to gag reflex. Eyes:      Extraocular Movements: Extraocular movements intact. Comments: Conjugate gaze   Neck:      Thyroid: No thyroid mass or thyromegaly.       Trachea: Phonation normal. No tracheal deviation. Comments:     Cardiovascular:      Rate and Rhythm: Normal rate and regular rhythm. Heart sounds: No murmur heard. Pulmonary:      Effort: Pulmonary effort is normal. No retractions. Breath sounds: Normal breath sounds. No stridor. Chest:      Chest wall: There is no dullness to percussion. Musculoskeletal:      Cervical back: Normal range of motion and neck supple. Lymphadenopathy:      Cervical: No cervical adenopathy. Neurological:      Mental Status: He is alert and oriented to person, place, and time. Cranial Nerves: Cranial nerves are intact. Cranial nerve deficit: VIIth N function intact bilat. Psychiatric:         Mood and Affect: Mood and affect normal.         Behavior: Behavior is cooperative. HIGH RESOLUTION FLEXIBLE VIDEOLARYNGOSOCPY    A fiberoptic laryngoscopy was performed under topical anesthesia, after using Afrin and Lidocaine spray in the nasal fossa. The nasal fossa, nasopharynx, hypopharynx and larynx were carefully examined. Base of tongue was symmetrical. Epiglottis appeared normal and was not retrodisplaced. True vocal cords had normal mobility. There was no erythema. No mucosal lesions or masses were noted. No pooling in the pyriform sinuses. Data:  All of the past medical history, past surgical history, family history,social history, allergies and current medications were reviewed with the patient. Assessment & Plan   Diagnoses and all orders for this visit:     Diagnosis Orders   1. 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 Primary squamous cell carcinoma of larynx (HCC)  MD LARYNGOSCOPY FLEXIBLE DIAGNOSTIC      3. 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 Effects, radiation, subsequent encounter        5. Hoarseness  MD LARYNGOSCOPY FLEXIBLE DIAGNOSTIC      6.  Brown's esophagus with dysplasia  MD LARYNGOSCOPY FLEXIBLE DIAGNOSTIC      7. Squamous cell carcinoma of larynx (HCC)  AK LARYNGOSCOPY FLEXIBLE DIAGNOSTIC          The findings were explained and his questions were answered. Plan is for suspension microlaryngoscopy with excisional biopsy of lesion right ventricle using CO2 laser and jet ventilation. He agreed. Benefits and risks are discussed, along with alternatives, and their questions were answered. No guarantees were made. They request we proceed. I, Stepan Almanza CMA ([facility]), am scribing for, and in the presence of Dr. Carla Morales. Electronically signed by Devon Salguero CMA ([facility]) on 7/14/22 at 10:11 AM EDT. (Please note that portions of this note were completed with a voice recognition program. Efforts were made to edit the dictations butoccasionally words are mis-transcribed.)    I agree to the above documentation placed by my scribe. I have personally evaluated this patient. Additional findings are as noted. I reviewed the scribe's note and agree with the documented findings and plan of care. Any areas of disagreement are corrected. I agree with the chief complaint, past medical history, past surgical history, allergies, medications, social and family history as documented unless otherwise noted below.      Electronically signed by Mariela Ruffin MD on 7/27/2022 at 1:48 AM

## 2022-08-22 NOTE — ANESTHESIA POSTPROCEDURE EVALUATION
Department of Anesthesiology  Postprocedure Note    Patient: Rehan Mcgowan  MRN: [id number]  YOB: 1955  Date of evaluation: 8/22/2022      Procedure Summary     Date: 08/22/22 Room / Location: [address]    Anesthesia Start: 4727 Anesthesia Stop: 6263    Procedure: MICROLARYNGOSCOPY WITH BIOPSY, SUSPENSION LARYNGOSCOPY WITH JET VENTILATION AND C02 LASER, RE-EXCISION FOR MARGINS (Mouth) Diagnosis:       Carcinoma larynx ([address]      Malignant neoplasm of false vocal cords ([address]      Brown's esophagus with dysplasia      (Carcinoma larynx ([address] [C32.9])      (Malignant neoplasm of false vocal cords ([address] [C32.1])      (Brown's esophagus with dysplasia [K22.719])    Surgeons: Esther Moreira MD Responsible Provider: Thomas Hannah DO    Anesthesia Type: general ASA Status: 3          Anesthesia Type: No value filed.     Maggie Phase I: Maggie Score: 9    Maggie Phase II:        Anesthesia Post Evaluation    Patient location during evaluation: PACU  Patient participation: complete - patient participated  Level of consciousness: awake  Airway patency: patent  Nausea & Vomiting: no vomiting and no nausea  Complications: no  Cardiovascular status: hemodynamically stable  Respiratory status: acceptable and nasal cannula  Hydration status: stable

## 2022-08-22 NOTE — PROGRESS NOTES
[facility] [place] [facility] [place] [facility]  [facility] EAR, NOSE AND THROAT  [address] [place] [address] [place] [address]  Dept: [phone]  Dept Fax: [phone]  Loc: [phone]    Nadya Pinedo is a 69 y.o. male who was referred byNo ref. provider found for:  Chief Complaint   Patient presents with    Follow-up     Patient is here for a follow up after PET scan    . HPI:     Nadya Pinedo is a 69 y.o. male who presents today for review of the PET/CT and decision regarding proceeding with further surgical resection of the prior biopsy, for additional margins. Prior biopsy showed carcinoma in situ. It did not appear to be invasive. CT does show some increased activity in the region of the prior biopsy which currently ulcerated. Right side       left side    History:     No Known Allergies  Current Outpatient Medications   Medication Sig Dispense Refill    ciclopirox (PENLAC) 8 % solution Apply topically Toenail fungus      aspirin 81 MG EC tablet Take 81 mg by mouth daily      gabapentin (NEURONTIN) 300 MG capsule Take 300 mg by mouth daily. ipratropium (ATROVENT) 0.06 % nasal spray 2 sprays by Each Nostril route 3 times daily as needed for Rhinitis      nitroGLYCERIN (NITROSTAT) 0.4 MG SL tablet up to max of 3 total doses. If no relief after 1 dose, call 911. 25 tablet 3    diphenhydrAMINE-APAP, sleep, (TYLENOL PM EXTRA STRENGTH)  MG tablet Take 2 tablets by mouth nightly      omeprazole (PRILOSEC) 20 MG delayed release capsule Take 20 mg by mouth Daily       Coenzyme Q10 (CO Q-10) 200 MG CAPS Take  by mouth daily. Multiple Vitamin (MULTI-VITAMIN) TABS Take  by mouth daily.         ARGININE PO Take 500 mg by mouth daily 4 tab      atorvastatin (LIPITOR) 40 MG tablet Take 1 tablet by mouth nightly 90 tablet 1    terazosin (HYTRIN) 5 MG capsule TAKE 1 CAPSULE NIGHTLY 90 capsule 1    metoprolol succinate (TOPROL XL) 50 MG extended release tablet Take 0.5 tablets by mouth 2 times daily 90 tablet 1    ticagrelor (BRILINTA) 90 MG TABS tablet Take 1 tablet by mouth 2 times daily 180 tablet 1    levothyroxine (SYNTHROID) 50 MCG tablet Take 1 tablet by mouth daily 90 tablet 1     No current facility-administered medications for this visit. Past Medical History:   Diagnosis Date    Ascending Aneurysm ([address] 02/2017    Ascending, 4.2 cm per pt    Brown's esophagus     CAD (coronary artery disease)     Cancer (HCC)     throat cancer, s/p radiation    Chronic back pain     Hepatitis C     as child    History of hyperbaric oxygen therapy     after bottom teeth removed after rdiation therapy    Hyperlipidemia     Hypertension     Left shoulder pain     nerve pain , ? 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    PONV (postoperative nausea and vomiting)     only after OHS    S/P CABG (coronary artery bypass graft) 2002    Psychiatric    Thyroid disease     Vision loss of right eye     during hyperbaric chamber oxygen therapy     Vitreous opacities     right    Wears dentures     full upper, no bottom teeth and no use of dentures on bottom      Past Surgical History:   Procedure Laterality Date    CARDIAC CATHETERIZATION  8/2002, 4/2004    Psychiatric    COLONOSCOPY      CORONARY ANGIOPLASTY WITH STENT PLACEMENT  7/2002    Psychiatric    CORONARY ANGIOPLASTY WITH STENT PLACEMENT N/A 07/12/2019    MID LAD    CORONARY ARTERY BYPASS GRAFT  2002    Psychiatric    DENTAL SURGERY      bottom teeth removal    HEMORRHOID SURGERY      KNEE SURGERY Right     LARYNGOSCOPY N/A 10/4/2017    LARYNGOSCOPY MICRO WITH BIOPSY performed by Amrita Daugherty MD at [address] 5/12/2021    LARYNGOSCOPY MICRO AND BIOPSY, WITH JET VENT performed by Amrita Daugherty MD at [address]  N/A 6/2/2021    (DR. HUNTER) SUSPENSION MICROLARYNGOSCOPY WITH RECONSTRUCTION AND ADVANCEMENT FLAP X2; (DR. GONGORA)  MICROLARYNGOSCOPY AND BX POSTERIOR RIGHT VOCAL CORD      JET VENTILATION AND CO2 LASER performed by Ozzie Babcock MD at [address]  N/A 2022    SUSPENSION DIRECT LARYNGOSCOPY WITH EXCISIONAL BIOPSY OF RIGHT SIDED LARYNGEAL MASS performed by Frankie Knapp MD at [address]  N/A 2022    MICROLARYNGOSCOPY WITH BIOPSY, SUSPENSION LARYNGOSCOPY WITH JET VENTILATION AND C02 LASER, RE-EXCISION FOR MARGINS performed by Frankie Knapp MD at [address]  2016    by Dr Joe Gray VITREOUS,SUBRET/CHOROID FLUID Right 2018    VITRECTOMY 22 GAUGE performed by Zahraa Bhatti MD at [address] Right 2018     VITRECTOMY 22 GAUGE (Right Eye)     Family History   Problem Relation Age of Onset    Heart Disease Father         MI    High Blood Pressure Father     Stroke Father     Cancer Maternal Grandmother     Cancer Maternal Grandfather     Diabetes Neg Hx      Social History     Tobacco Use    Smoking status: Former     Packs/day: 2.00     Years: 20.00     Pack years: 40.00     Types: Cigarettes     Quit date: 2001     Years since quittin.7    Smokeless tobacco: Never   Substance Use Topics    Alcohol use: No     Alcohol/week: 0.0 standard drinks       Subjective:      Review of Systems   Constitutional:  Negative for activity change, appetite change, chills, diaphoresis, fatigue, fever and unexpected weight change. HENT:  Negative for congestion, dental problem, ear discharge, ear pain, facial swelling, hearing loss, mouth sores, nosebleeds, postnasal drip, rhinorrhea, sinus pressure, sneezing, sore throat, tinnitus, trouble swallowing and voice change. Eyes:  Negative for visual disturbance. Respiratory:  Negative for apnea, cough, choking, chest tightness, shortness of breath, wheezing and stridor. Cardiovascular:  Negative for chest pain, palpitations and leg swelling.    Gastrointestinal:  Negative for abdominal pain, diarrhea, nausea and vomiting. Endocrine: Negative for cold intolerance, heat intolerance, polydipsia and polyuria. Genitourinary:  Negative for dysuria, enuresis and hematuria. Musculoskeletal:  Negative for arthralgias, gait problem, neck pain and neck stiffness. Skin:  Negative for color change and rash. Allergic/Immunologic: Negative for environmental allergies, food allergies and immunocompromised state. Neurological:  Negative for dizziness, syncope, facial asymmetry, speech difficulty, light-headedness and headaches. Hematological:  Negative for adenopathy. Does not bruise/bleed easily. Psychiatric/Behavioral:  Negative for confusion and sleep disturbance. The patient is not nervous/anxious. Objective:   /70 (Site: Left Upper Arm, Position: Sitting)   Pulse 60   Temp 97 °F (36.1 °C) (Infrared)   Resp 14   Ht 5' 11\" (1.803 m)   Wt 186 lb (84.4 kg)   BMI 25.94 kg/m²     Physical Exam patient's hoarseness is a little better after the recent biopsy. Data:  All of the past medical history, past surgical history, family history,social history, allergies and current medications were reviewed with the patient. Assessment & Plan   Diagnoses and all orders for this visit:     Diagnosis Orders   1. Squamous cell carcinoma of larynx (HCC)  NV LARYNGOSCOPY,DIRCT,OP SCOPE,BIOPSY      2. Effects, radiation, subsequent encounter  NV LARYNGOSCOPY,DIRCT,OP SCOPE,BIOPSY      3. Malignant neoplasm of false vocal cords ([address]  NV LARYNGOSCOPY,DIRCT,OP SCOPE,BIOPSY      4. FOUZIA on CPAP  NV LARYNGOSCOPY,DIRCT,OP SCOPE,BIOPSY      5. Brown's esophagus with dysplasia  NV LARYNGOSCOPY,DIRCT,OP SCOPE,BIOPSY          The findings were explained and his questions were answered. We discussed the options and decided to proceed with a reexcision. Kelsey Finley. Daphney Lynch MD    **This report has been created using voice recognition software.  It may contain minor errors which are inherent in voicerecognition technology. **

## 2022-08-22 NOTE — ANESTHESIA PRE PROCEDURE
Department of Anesthesiology  Preprocedure Note       Name:  Ramya Dietz   Age:  74 y.o.  :  1955                                          MRN:  [id number]         Date:  2022      Surgeon: Sasha Whaley):  Inedr Moreira MD    Procedure: Procedure(s): MICROLARYNGOSCOPY WITH BIOPSY, SUSPENSION LARYNGOSCOPY WITH JET VENTILATION AND C02 LASER, RE-EXCISION FOR MARGINS    Medications prior to admission:   Prior to Admission medications    Medication Sig Start Date End Date Taking? Authorizing Provider   levothyroxine (SYNTHROID) 50 MCG tablet Take 1 tablet by mouth in the morning. 8/3/22   Baldev George MD   ciclopirox (PENLAC) 8 % solution Apply topically Toenail fungus 22   Historical Provider, MD   ticagrelor (BRILINTA) 90 MG TABS tablet Take 1 tablet by mouth 2 times daily 22   Peggy Bojorquez MD   atorvastatin (LIPITOR) 40 MG tablet Take 1 tablet by mouth daily  Patient taking differently: Take 40 mg by mouth nightly 22   Peggy Bojorquez MD   terazosin (HYTRIN) 5 MG capsule TAKE 1 CAPSULE NIGHTLY 22   Baldev George MD   metoprolol succinate (TOPROL XL) 50 MG extended release tablet Take 0.5 tablets by mouth 2 times daily 22   Peggy Bojorquez MD   aspirin 81 MG EC tablet Take 81 mg by mouth daily    Historical Provider, MD   gabapentin (NEURONTIN) 300 MG capsule Take 300 mg by mouth daily. Historical Provider, MD   ipratropium (ATROVENT) 0.06 % nasal spray 2 sprays by Each Nostril route 3 times daily as needed for Rhinitis    Historical Provider, MD   nitroGLYCERIN (NITROSTAT) 0.4 MG SL tablet up to max of 3 total doses.  If no relief after 1 dose, call 911. 21   Peggy Bojorquez MD   diphenhydrAMINE-APAP, sleep, (TYLENOL PM EXTRA STRENGTH)  MG tablet Take 2 tablets by mouth nightly    Historical Provider, MD   omeprazole (PRILOSEC) 20 MG delayed release capsule Take 20 mg by mouth Daily     Historical Provider, MD   Coenzyme Q10 (CO Q-10) 200 MG CAPS Take  by mouth daily.      Historical Provider, MD   Multiple Vitamin (MULTI-VITAMIN) TABS Take  by mouth daily. Historical Provider, MD   ARGININE PO Take 500 mg by mouth daily 4 tab    Historical Provider, MD       Current medications:    No current facility-administered medications for this encounter. 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 XXXD    Squamous cell carcinoma of overlapping sites of larynx (HCC) C32.8    Postoperative hypothyroidism E89.0    Laryngeal mass, right J38.7    Brown's esophagus K22.70       Past Medical History:        Diagnosis Date    [address]ending A[address] [address] 02/2017    Ascending, 4.2 cm per pt    Brown's esophagus     CAD (coronary artery disease)     Cancer (HCC)     throat cancer, s/p radiation    Chronic back pain     Hepatitis C     as child    History of hyperbaric oxygen therapy     after bottom teeth removed after rdiation therapy    Hyperlipidemia     Hypertension     Left shoulder pain     nerve pain , ? secondary to radiation, has 2001     Years since quittin.7    Smokeless tobacco: Never   Substance Use Topics    Alcohol use: No     Alcohol/week: 0.0 standard drinks                                Counseling given: Not Answered      Vital Signs (Current):   Vitals:    22 1152 22 1042   BP:  (!) 177/89   Pulse:  55   Resp:  16   Temp:  97.3 °F (36.3 °C)   TempSrc:  Temporal   SpO2:  99%   Weight: 186 lb (84.4 kg) 185 lb 3.2 oz (84 kg)   Height: 5' 11\" (1.803 m) 5' 11\" (1.803 m)                                              BP Readings from Last 3 Encounters:   22 (!) 177/89   22 128/70   22 126/74       NPO Status: Time of last liquid consumption: 1800                        Time of last solid consumption: 1800                        Date of last liquid consumption: 22                        Date of last solid food consumption: 22    BMI:   Wt Readings from Last 3 Encounters:   22 185 lb 3.2 oz (84 kg)   22 186 lb (84.4 kg)   22 186 lb 11.2 oz (84.7 kg)     Body mass index is 25.83 kg/m².     CBC:   Lab Results   Component Value Date/Time    WBC 6.5 2022 10:42 AM    RBC 4.81 2022 10:42 AM    RBC 4.86 2011 09:07 AM    HGB 14.8 2022 10:42 AM    HCT 45.7 2022 10:42 AM    MCV 95.0 2022 10:42 AM    RDW 12.6 2017 09:16 AM     2022 10:42 AM       CMP:   Lab Results   Component Value Date/Time     2022 10:00 AM    K 4.3 2022 10:00 AM    K 4.1 2022 10:26 AM     2022 10:00 AM    CO2 28 2022 10:00 AM    BUN 15 2022 10:00 AM    CREATININE 0.9 2022 10:00 AM    LABGLOM 84 2022 10:00 AM    GLUCOSE 84 2022 10:00 AM    GLUCOSE 95 2011 09:07 AM    PROT 6.2 2022 10:00 AM    CALCIUM 10.0 2022 10:00 AM    BILITOT 0.6 2022 10:00 AM    ALKPHOS 98 2022 10:00 AM    AST 14 2022 10:00 AM    ALT 14 2022 10:00 AM       POC Tests: No results for input(s): POCGLU, POCNA, POCK, POCCL, POCBUN, POCHEMO, POCHCT in the last 72 hours. Coags:   Lab Results   Component Value Date/Time    INR 0.97 07/12/2019 08:15 AM    APTT 30.9 07/12/2019 08:15 AM       HCG (If Applicable): No results found for: PREGTESTUR, PREGSERUM, HCG, HCGQUANT     ABGs: No results found for: PHART, PO2ART, FAW8KZS, ZYA6MXL, BEART, L0JJQSTQ     Type & Screen (If Applicable):  Lab Results   Component Value Date    LABRH NEG 07/12/2019       Drug/Infectious Status (If Applicable):  Lab Results   Component Value Date/Time    HEPCAB Negative 01/15/2021 08:50 AM       COVID-19 Screening (If Applicable):   Lab Results   Component Value Date/Time    COVID19 NOT DETECTED 05/08/2021 09:05 AM           Anesthesia Evaluation  Patient summary reviewed and Nursing notes reviewed history of anesthetic complications:   Airway: Mallampati: II          Dental:          Pulmonary: breath sounds clear to auscultation  (+) sleep apnea:                             Cardiovascular:    (+) hypertension:, CAD:, CABG/stent:,       ECG reviewed  Rhythm: regular  Rate: normal  Echocardiogram reviewed                  Neuro/Psych:               GI/Hepatic/Renal:   (+) hepatitis: C,           Endo/Other:    (+) hypothyroidism::., .                 Abdominal:             Vascular: Other Findings:           Anesthesia Plan      general     ASA 3       Induction: intravenous. MIPS: Postoperative opioids intended and Prophylactic antiemetics administered. Anesthetic plan and risks discussed with patient and spouse. Plan discussed with CRNA.                     Rosendo [place] DO Salvador   8/22/2022

## 2022-08-23 NOTE — OP NOTE
[address] [place] [address]                                OPERATIVE REPORT    PATIENT NAME: Cynthia Briceno                      :        1955  MED REC NO:   [id number]                           ROOM:  ACCOUNT NO:   [de-identified]                           ADMIT DATE: 2022  PROVIDER:     Gaston Garcia. PITO Hector Starrin2022    OPERATION:  Microlaryngoscopy and biopsy, with suspension laryngoscopy,  jet ventilation, and CO2 laser (re-excision for margins), right false  vocal cord. SURGEON:  Gaston Garcia. Edda Hernandez MD    ANESTHESIA:  General endotracheal.    PREOPERATIVE DIAGNOSES:  Carcinoma in situ, right false vocal cord,  prior carcinoma of the larynx, Brown's esophagus with dysplasia. POSTOPERATIVE DIAGNOSES:  Carcinoma in situ, right false vocal cord,  prior carcinoma of the larynx, Brown's esophagus with dysplasia. HISTORY AND OPERATIVE FINDINGS:  This 73-year-old male recently had a  biopsy of the raised lesion that was thought to be granulation tissue on  the right side of the supraglottic larynx, in the posterior third, just  above the true vocal cord. Pathologist indicated that this was  carcinoma in situ. Underlying tissues were noted at that time to be  very firm. PET CT was obtained and was reported as showing a focus of  increased signal in the area of the biopsy. The bulk of false vocal  cords did not appear to have increased uptake. FINDINGS AT SURGERY:  See photos. Raised appearing mucosal covered  granular soft tissue prominences were noted in the region of the prior  biopsy. The ventricle appeared to be scarred down and collapsed. There  did not appear to be any invasive malignancy.     ESTIMATED BLOOD LOSS:  Very minimal.    PROCEDURE:  After adequate level of general endotracheal anesthesia had  been obtained with a laser resistant tube, the Kailyn laryngoscope was  introduced into the hypopharynx and positioned. There was no access to  the area needed for the biopsy. Ventilation was then initiated and the  endotracheal tube was withdrawn. Microscope was brought into alignment and with the CO2 laser and 2 xie  super pulse, an incision was made in the anterior aspect of the presumed  lesion. We subsequently were used to grasp the lesion. It appeared  very shallow and soft. The excision at that point apparently was not  going to be facilitated with the use of CO2 laser. Microscope was set aside and using a 30-degree Urology telescope, video  endoscopic visualization, and cup forceps were used to remove the soft  tissue in the area and biopsied to see if there is any residual  malignancy. The area was extended back over the vocal process. Three  separate areas were biopsied and appropriately labeled. They were also  contiguous. When the biopsies were completed, there was no apparent need to do a  huge resection of the false vocal cord. This did not appear to be an invasive malignancy. The jet cannula was withdrawn and a #7 endotracheal tube placed. Laryngoscope was removed. The patient was then awakened, extubated, and taken to  recovery room in satisfactory condition. There were no complications. He tolerated the procedure well. Krissy Murillo M.D.    D: 08/22/2022 18:28:29       T: 08/22/2022 22:53:20     [email or web address]  Job#: [id number]     Doc#: [id number]    CC:  Reynaldo Yun. MD Isabel Ding.  Kerry Styels M.D.

## 2022-08-26 ENCOUNTER — OFFICE VISIT (OUTPATIENT)
Dept: ENT CLINIC | Age: 67
End: 2022-08-26
Payer: MEDICARE

## 2022-08-26 ENCOUNTER — PREP FOR PROCEDURE (OUTPATIENT)
Dept: ENT CLINIC | Age: 67
End: 2022-08-26

## 2022-08-26 ENCOUNTER — HOSPITAL ENCOUNTER (OUTPATIENT)
Age: 67
Discharge: HOME | End: 2022-08-26
Payer: MEDICARE

## 2022-08-26 VITALS
SYSTOLIC BLOOD PRESSURE: 124 MMHG | WEIGHT: 186.8 LBS | OXYGEN SATURATION: 99 % | HEART RATE: 54 BPM | HEIGHT: 71 IN | BODY MASS INDEX: 26.15 KG/M2 | RESPIRATION RATE: 14 BRPM | DIASTOLIC BLOOD PRESSURE: 72 MMHG | TEMPERATURE: 97.7 F

## 2022-08-26 DIAGNOSIS — K22.719 BARRETT'S ESOPHAGUS WITH DYSPLASIA: ICD-10-CM

## 2022-08-26 DIAGNOSIS — C32.9 CARCINOMA LARYNX (HCC): Primary | ICD-10-CM

## 2022-08-26 DIAGNOSIS — R49.0 HOARSENESS: ICD-10-CM

## 2022-08-26 DIAGNOSIS — D02.0 CARCINOMA IN SITU OF LARYNX: Primary | ICD-10-CM

## 2022-08-26 DIAGNOSIS — T66.XXXD EFFECTS, RADIATION, SUBSEQUENT ENCOUNTER: ICD-10-CM

## 2022-08-26 DIAGNOSIS — C32.9 PRIMARY SQUAMOUS CELL CARCINOMA OF LARYNX (HCC): ICD-10-CM

## 2022-08-26 LAB — POTASSIUM REFLEX MAGNESIUM: 4.1 MEQ/L (ref 3.5–5.2)

## 2022-08-26 PROCEDURE — 36415 COLL VENOUS BLD VENIPUNCTURE: CPT

## 2022-08-26 PROCEDURE — 84132 ASSAY OF SERUM POTASSIUM: CPT

## 2022-08-26 PROCEDURE — 1123F ACP DISCUSS/DSCN MKR DOCD: CPT | Performed by: OTOLARYNGOLOGY

## 2022-08-26 PROCEDURE — G8427 DOCREV CUR MEDS BY ELIG CLIN: HCPCS | Performed by: OTOLARYNGOLOGY

## 2022-08-26 PROCEDURE — 99212 OFFICE O/P EST SF 10 MIN: CPT | Performed by: OTOLARYNGOLOGY

## 2022-08-26 PROCEDURE — 3017F COLORECTAL CA SCREEN DOC REV: CPT | Performed by: OTOLARYNGOLOGY

## 2022-08-26 PROCEDURE — 1036F TOBACCO NON-USER: CPT | Performed by: OTOLARYNGOLOGY

## 2022-08-26 PROCEDURE — G8417 CALC BMI ABV UP PARAM F/U: HCPCS | Performed by: OTOLARYNGOLOGY

## 2022-08-26 ASSESSMENT — ENCOUNTER SYMPTOMS
FACIAL SWELLING: 0
SINUS PRESSURE: 0
VOMITING: 0
RHINORRHEA: 0
COLOR CHANGE: 0
COUGH: 0
APNEA: 0
SORE THROAT: 0
STRIDOR: 0
ABDOMINAL PAIN: 0
DIARRHEA: 0
TROUBLE SWALLOWING: 0
VOICE CHANGE: 0
CHOKING: 0
WHEEZING: 0
CHEST TIGHTNESS: 0
NAUSEA: 0
SHORTNESS OF BREATH: 0

## 2022-08-26 NOTE — PROGRESS NOTES
times daily 180 tablet 1    levothyroxine (SYNTHROID) 50 MCG tablet Take 1 tablet by mouth daily 90 tablet 1     No current facility-administered medications for this visit. Past Medical History:   Diagnosis Date    Ascending Aneurysm ([address] 02/2017    Ascending, 4.2 cm per pt    Brown's esophagus     CAD (coronary artery disease)     Cancer (HCC)     throat cancer, s/p radiation    Chronic back pain     Hepatitis C     as child    History of hyperbaric oxygen therapy     after bottom teeth removed after rdiation therapy    Hyperlipidemia     Hypertension     Left shoulder pain     nerve pain , ? 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    PONV (postoperative nausea and vomiting)     only after OHS    S/P CABG (coronary artery bypass graft) 2002    [facility]    Thyroid disease     Vision loss of right eye     during hyperbaric chamber oxygen therapy     Vitreous opacities     right    Wears dentures     full upper, no bottom teeth and no use of dentures on bottom      Past Surgical History:   Procedure Laterality Date    CARDIAC CATHETERIZATION  8/2002, 4/2004    [facility]    COLONOSCOPY      CORONARY ANGIOPLASTY WITH STENT PLACEMENT  7/2002    [facility]    CORONARY ANGIOPLASTY WITH STENT PLACEMENT N/A 07/12/2019    MID LAD    CORONARY ARTERY BYPASS GRAFT  2002    [facility]    DENTAL SURGERY      bottom teeth removal    HEMORRHOID SURGERY      KNEE SURGERY Right     LARYNGOSCOPY N/A 10/4/2017    LARYNGOSCOPY MICRO WITH BIOPSY performed by Radha Foss MD at [address] 5/12/2021    LARYNGOSCOPY MICRO AND BIOPSY, WITH JET VENT performed by Radha Foss MD at [address] N/A 6/2/2021    (DR. HUNTER) SUSPENSION MICROLARYNGOSCOPY WITH RECONSTRUCTION AND ADVANCEMENT FLAP X2; (DR. GONGORA)  MICROLARYNGOSCOPY AND BX POSTERIOR RIGHT VOCAL CORD      JET VENTILATION AND CO2 LASER performed by Driss Owusu, MD at [address]  N/A 2022    SUSPENSION DIRECT LARYNGOSCOPY WITH EXCISIONAL BIOPSY OF RIGHT SIDED LARYNGEAL MASS performed by Colton Schmidt MD at [address]  N/A 2022    MICROLARYNGOSCOPY WITH BIOPSY, SUSPENSION LARYNGOSCOPY WITH JET VENTILATION AND C02 LASER, RE-EXCISION FOR MARGINS performed by Colton Schmidt MD at [address] [place] [address]  2016    by Dr William Still VITREOUS,SUBRET/CHOROID FLUID Right 2018    VITRECTOMY 22 GAUGE performed by Cindy Miller MD at [place] [address] Right 2018     VITRECTOMY 22 GAUGE (Right Eye)     Family History   Problem Relation Age of Onset    Heart Disease Father         MI    High Blood Pressure Father     Stroke Father     Cancer Maternal Grandmother     Cancer Maternal Grandfather     Diabetes Neg Hx      Social History     Tobacco Use    Smoking status: Former     Packs/day: 2.00     Years: 20.00     Pack years: 40.00     Types: Cigarettes     Quit date: 2001     Years since quittin.8    Smokeless tobacco: Never   Substance Use Topics    Alcohol use: No     Alcohol/week: 0.0 standard drinks       Subjective:      Review of Systems   Constitutional:  Negative for activity change, appetite change, chills, diaphoresis, fatigue, fever and unexpected weight change. HENT:  Negative for congestion, dental problem, ear discharge, ear pain, facial swelling, hearing loss, mouth sores, nosebleeds, postnasal drip, rhinorrhea, sinus pressure, sneezing, sore throat, tinnitus, trouble swallowing and voice change. Eyes:  Negative for visual disturbance. Respiratory:  Negative for apnea, cough, choking, chest tightness, shortness of breath, wheezing and stridor. Cardiovascular:  Negative for chest pain, palpitations and leg swelling. Gastrointestinal:  Negative for abdominal pain, diarrhea, nausea and vomiting.    Endocrine: Negative for cold intolerance, heat intolerance, polydipsia and

## 2022-08-29 ENCOUNTER — OFFICE VISIT (OUTPATIENT)
Dept: INTERNAL MEDICINE CLINIC | Age: 67
End: 2022-08-29
Payer: MEDICARE

## 2022-08-29 VITALS
SYSTOLIC BLOOD PRESSURE: 116 MMHG | WEIGHT: 178 LBS | HEART RATE: 64 BPM | TEMPERATURE: 97.4 F | DIASTOLIC BLOOD PRESSURE: 82 MMHG | BODY MASS INDEX: 24.83 KG/M2

## 2022-08-29 DIAGNOSIS — I10 ESSENTIAL HYPERTENSION: Primary | ICD-10-CM

## 2022-08-29 DIAGNOSIS — E78.00 PURE HYPERCHOLESTEROLEMIA: ICD-10-CM

## 2022-08-29 PROCEDURE — G8427 DOCREV CUR MEDS BY ELIG CLIN: HCPCS | Performed by: INTERNAL MEDICINE

## 2022-08-29 PROCEDURE — 1036F TOBACCO NON-USER: CPT | Performed by: INTERNAL MEDICINE

## 2022-08-29 PROCEDURE — G8420 CALC BMI NORM PARAMETERS: HCPCS | Performed by: INTERNAL MEDICINE

## 2022-08-29 PROCEDURE — 1123F ACP DISCUSS/DSCN MKR DOCD: CPT | Performed by: INTERNAL MEDICINE

## 2022-08-29 PROCEDURE — 3017F COLORECTAL CA SCREEN DOC REV: CPT | Performed by: INTERNAL MEDICINE

## 2022-08-29 PROCEDURE — 99213 OFFICE O/P EST LOW 20 MIN: CPT | Performed by: INTERNAL MEDICINE

## 2022-08-29 RX ORDER — ATORVASTATIN CALCIUM 40 MG/1
40 TABLET, FILM COATED ORAL NIGHTLY
Qty: 90 TABLET | Refills: 1 | Status: SHIPPED | OUTPATIENT
Start: 2022-08-29

## 2022-08-29 RX ORDER — LEVOTHYROXINE SODIUM 0.05 MG/1
50 TABLET ORAL DAILY
Qty: 90 TABLET | Refills: 1 | Status: SHIPPED | OUTPATIENT
Start: 2022-08-29

## 2022-08-29 RX ORDER — METOPROLOL SUCCINATE 50 MG/1
25 TABLET, EXTENDED RELEASE ORAL 2 TIMES DAILY
Qty: 90 TABLET | Refills: 1 | Status: SHIPPED | OUTPATIENT
Start: 2022-08-29

## 2022-08-29 RX ORDER — TERAZOSIN 5 MG/1
CAPSULE ORAL
Qty: 90 CAPSULE | Refills: 1 | Status: SHIPPED | OUTPATIENT
Start: 2022-08-29

## 2022-08-29 NOTE — PROGRESS NOTES
[address] [facility]   [address]  [facility], [address]  Dept: [phone]  Dept Fax: [phone]    YOB: 1955    Hypertension and CABG / 11 month visit     74 y.o. male   here for follow-up of above issues. He does have medical issues, but no CP or SOB, denies any recent fever or chillls. Had COVID in the past, and had mild URI symptoms about 4 weeks ago. He completed radiation after he was diagnosed with laryngeal cancer. I recommend he gets COVID booster dose, as he got pfizer. He plans on getting his biopsy of vocal cords, as he is having hoarseness , hx of SCC followed by dr. Rhoda Lorenzo, had recent surgery . On further questioning he gets occasional chest wall discomfort, he is lifting upto 50 lbs per his spouse. He does have CABG , going back to ~ 20 yrs. Current Outpatient Medications   Medication Sig Dispense Refill    levothyroxine (SYNTHROID) 50 MCG tablet Take 1 tablet by mouth in the morning. 90 tablet 0    ciclopirox (PENLAC) 8 % solution Apply topically Toenail fungus      ticagrelor (BRILINTA) 90 MG TABS tablet Take 1 tablet by mouth 2 times daily 180 tablet 1    atorvastatin (LIPITOR) 40 MG tablet Take 1 tablet by mouth daily (Patient taking differently: Take 40 mg by mouth nightly) 90 tablet 1    terazosin (HYTRIN) 5 MG capsule TAKE 1 CAPSULE NIGHTLY 90 capsule 1    metoprolol succinate (TOPROL XL) 50 MG extended release tablet Take 0.5 tablets by mouth 2 times daily 90 tablet 1    aspirin 81 MG EC tablet Take 81 mg by mouth daily      gabapentin (NEURONTIN) 300 MG capsule Take 300 mg by mouth daily. ipratropium (ATROVENT) 0.06 % nasal spray 2 sprays by Each Nostril route 3 times daily as needed for Rhinitis      nitroGLYCERIN (NITROSTAT) 0.4 MG SL tablet up to max of 3 total doses.  If no relief after 1 dose, call 911. 25 tablet 3    diphenhydrAMINE-APAP, sleep, (TYLENOL PM EXTRA STRENGTH)  MG tablet Take 2 tablets by mouth nightly      omeprazole (PRILOSEC) 20 MG delayed release capsule Take 20 mg by mouth Daily       Coenzyme Q10 (CO Q-10) 200 MG CAPS Take  by mouth daily. Multiple Vitamin (MULTI-VITAMIN) TABS Take  by mouth daily. ARGININE PO Take 500 mg by mouth daily 4 tab       No current facility-administered medications for this visit.        Past Medical History:   Diagnosis Date    Ascending Aneurysm ([address] 2017    Ascending, 4.2 cm per pt    Brown's esophagus     CAD (coronary artery disease)     Cancer (HCC)     throat cancer, s/p radiation    Chronic back pain     Hepatitis C     as child    History of hyperbaric oxygen therapy     after bottom teeth removed after rdiation therapy    Hyperlipidemia     Hypertension     Left shoulder pain     nerve pain , ? 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    PONV (postoperative nausea and vomiting)     only after OHS    S/P CABG (coronary artery bypass graft)     [facility]    Thyroid disease     Vision loss of right eye     during hyperbaric chamber oxygen therapy     Vitreous opacities     right    Wears dentures     full upper, no bottom teeth and no use of dentures on bottom       Social History     Socioeconomic History    Marital status:      Spouse name: Not on file    Number of children: Not on file    Years of education: Not on file    Highest education level: Not on file   Occupational History    Not on file   Tobacco Use    Smoking status: Former     Packs/day: 2.00     Years: 20.00     Pack years: 40.00     Types: Cigarettes     Quit date: 2001     Years since quittin.7    Smokeless tobacco: Never   Vaping Use    Vaping Use: Never used   Substance and Sexual Activity    Alcohol use: No     Alcohol/week: 0.0 standard drinks    Drug use: No    Sexual activity: Not on file   Other Topics Concern    Not on file   Social History Narrative    Not on file     Social Determinants of Health     Financial Resource Strain: Low Risk     Difficulty of Paying Living Expenses: Not very hard   Food Insecurity: No Food Insecurity    Worried About Running Out of Food in the Last Year: Never true    Ran Out of Food in the Last Year: Never true   Transportation Needs: Not on file   Physical Activity: Not on file   Stress: Not on file   Social Connections: Not on file   Intimate Partner Violence: Not on file   Housing Stability: Not on file       Family History   Problem Relation Age of Onset    Heart Disease Father         MI    High Blood Pressure Father     Stroke Father     Cancer Maternal Grandmother     Cancer Maternal Grandfather     Diabetes Neg Hx        No Known Allergies    Review of Systems     As above. /82 (Site: Left Upper Arm)   Pulse 64   Temp 97.4 °F (36.3 °C)   Wt 178 lb (80.7 kg)   BMI 24.83 kg/m²      Physical Examination: General appearance -:[id number]\"[id number], well appearing, and in no distress  Mental status - alert and oriented    Neck - supple, no significant adenopathy  Chest - clear to auscultation, no wheezes, rales or rhonchi, symmetric air entry  Heart - normal rate, regular rhythm, normal S1, S2, no murmurs, rubs, clicks or gallops  Abdomen - soft, nontender, nondistended, no masses or organomegaly  no abdominal bruits. Non-obese Protuberant: No  Neurological - alert, oriented, normal speech, no focal findings or movement disorder noted, motor and sensory grossly normal bilaterally  Musculoskeletal - no joint tenderness, deformity or swelling  But left leg weakness. Extremities - peripheral pulses normal, no pedal edema, no clubbing or cyanosis, Petra's sign negative bilaterally  Prosthesis: No  Skin - normal coloration and turgor, no rashes, no suspicious skin lesions noted      I have reviewed recent diagnostic testing including labs,     Diagnosis Orders   1. Pure hypercholesterolemia        2.  Essential hypertension            No orders of the defined types were placed in this encounter. Outpatient Encounter Medications as of 8/29/2022   Medication Sig Dispense Refill    levothyroxine (SYNTHROID) 50 MCG tablet Take 1 tablet by mouth in the morning. 90 tablet 0    ciclopirox (PENLAC) 8 % solution Apply topically Toenail fungus      ticagrelor (BRILINTA) 90 MG TABS tablet Take 1 tablet by mouth 2 times daily 180 tablet 1    atorvastatin (LIPITOR) 40 MG tablet Take 1 tablet by mouth daily (Patient taking differently: Take 40 mg by mouth nightly) 90 tablet 1    terazosin (HYTRIN) 5 MG capsule TAKE 1 CAPSULE NIGHTLY 90 capsule 1    metoprolol succinate (TOPROL XL) 50 MG extended release tablet Take 0.5 tablets by mouth 2 times daily 90 tablet 1    aspirin 81 MG EC tablet Take 81 mg by mouth daily      gabapentin (NEURONTIN) 300 MG capsule Take 300 mg by mouth daily. ipratropium (ATROVENT) 0.06 % nasal spray 2 sprays by Each Nostril route 3 times daily as needed for Rhinitis      nitroGLYCERIN (NITROSTAT) 0.4 MG SL tablet up to max of 3 total doses. If no relief after 1 dose, call 911. 25 tablet 3    diphenhydrAMINE-APAP, sleep, (TYLENOL PM EXTRA STRENGTH)  MG tablet Take 2 tablets by mouth nightly      omeprazole (PRILOSEC) 20 MG delayed release capsule Take 20 mg by mouth Daily       Coenzyme Q10 (CO Q-10) 200 MG CAPS Take  by mouth daily. Multiple Vitamin (MULTI-VITAMIN) TABS Take  by mouth daily. ARGININE PO Take 500 mg by mouth daily 4 tab       No facility-administered encounter medications on file as of 8/29/2022. Controlled Substances Monitoring:  Yes     Diagnosis Orders   1. Pure hypercholesterolemia        2. Essential hypertension                  Plan:    1. Dyslipidemia  On lipitor , tolerating it well and recent lipids are opitmal     2. Hypertension is stable on the current regimen which includes Toprol 1/2 tab 50 mg BID and  Hytrin 5 mg daily        3.   Previous CABG currently stable on current pharmacotherapy,  Being followed up by Dr. Lurdes Echavarria from cardiology. Last EKG noted  NSR with sinus rozina, with non specific T changes    4. Hx of laryngeal cancer - had recent surgery by dr. Barrie Perez, and he   Does follow up with him closely. 5. Hypothyroidism - developed post Radiation Rx, will recheck TSH and T4  Soon, as he is on synthroid 50 mcg daily am .     Blood work soon       Reevaluate in 6 months, sooner if needed        Signed by: Alessandra Vaz M.D.     [address] [facility]  [address] [place] [address], [facility] MENDOZA [address].OT[address], [employer] [facility]    Tel  [phone]   Fax [phone]

## 2022-10-25 ENCOUNTER — OFFICE VISIT (OUTPATIENT)
Dept: ENT CLINIC | Age: 67
End: 2022-10-25
Payer: MEDICARE

## 2022-10-25 VITALS
RESPIRATION RATE: 20 BRPM | OXYGEN SATURATION: 95 % | SYSTOLIC BLOOD PRESSURE: 118 MMHG | HEIGHT: 71 IN | BODY MASS INDEX: 26.25 KG/M2 | WEIGHT: 187.5 LBS | DIASTOLIC BLOOD PRESSURE: 80 MMHG | TEMPERATURE: 98.1 F | HEART RATE: 61 BPM

## 2022-10-25 DIAGNOSIS — D02.0 CARCINOMA IN SITU OF LARYNX: Primary | ICD-10-CM

## 2022-10-25 DIAGNOSIS — C32.9 PRIMARY SQUAMOUS CELL CARCINOMA OF LARYNX (HCC): ICD-10-CM

## 2022-10-25 PROCEDURE — 1036F TOBACCO NON-USER: CPT | Performed by: OTOLARYNGOLOGY

## 2022-10-25 PROCEDURE — 3017F COLORECTAL CA SCREEN DOC REV: CPT | Performed by: OTOLARYNGOLOGY

## 2022-10-25 PROCEDURE — 99212 OFFICE O/P EST SF 10 MIN: CPT | Performed by: OTOLARYNGOLOGY

## 2022-10-25 PROCEDURE — 3074F SYST BP LT 130 MM HG: CPT | Performed by: OTOLARYNGOLOGY

## 2022-10-25 PROCEDURE — 31575 DIAGNOSTIC LARYNGOSCOPY: CPT | Performed by: OTOLARYNGOLOGY

## 2022-10-25 PROCEDURE — 1123F ACP DISCUSS/DSCN MKR DOCD: CPT | Performed by: OTOLARYNGOLOGY

## 2022-10-25 PROCEDURE — 3078F DIAST BP <80 MM HG: CPT | Performed by: OTOLARYNGOLOGY

## 2022-10-25 PROCEDURE — G8427 DOCREV CUR MEDS BY ELIG CLIN: HCPCS | Performed by: OTOLARYNGOLOGY

## 2022-10-25 PROCEDURE — G8417 CALC BMI ABV UP PARAM F/U: HCPCS | Performed by: OTOLARYNGOLOGY

## 2022-10-25 PROCEDURE — G8484 FLU IMMUNIZE NO ADMIN: HCPCS | Performed by: OTOLARYNGOLOGY

## 2022-11-02 ENCOUNTER — OFFICE VISIT (OUTPATIENT)
Dept: CARDIOLOGY CLINIC | Age: 67
End: 2022-11-02
Payer: MEDICARE

## 2022-11-02 VITALS
WEIGHT: 188 LBS | HEART RATE: 61 BPM | SYSTOLIC BLOOD PRESSURE: 110 MMHG | HEIGHT: 71 IN | DIASTOLIC BLOOD PRESSURE: 72 MMHG | OXYGEN SATURATION: 98 % | BODY MASS INDEX: 26.32 KG/M2

## 2022-11-02 DIAGNOSIS — I25.10 CORONARY ARTERY DISEASE DUE TO LIPID RICH PLAQUE: Primary | ICD-10-CM

## 2022-11-02 DIAGNOSIS — I25.83 CORONARY ARTERY DISEASE DUE TO LIPID RICH PLAQUE: Primary | ICD-10-CM

## 2022-11-02 PROCEDURE — 3074F SYST BP LT 130 MM HG: CPT | Performed by: INTERNAL MEDICINE

## 2022-11-02 PROCEDURE — 1036F TOBACCO NON-USER: CPT | Performed by: INTERNAL MEDICINE

## 2022-11-02 PROCEDURE — G8484 FLU IMMUNIZE NO ADMIN: HCPCS | Performed by: INTERNAL MEDICINE

## 2022-11-02 PROCEDURE — G8417 CALC BMI ABV UP PARAM F/U: HCPCS | Performed by: INTERNAL MEDICINE

## 2022-11-02 PROCEDURE — 3017F COLORECTAL CA SCREEN DOC REV: CPT | Performed by: INTERNAL MEDICINE

## 2022-11-02 PROCEDURE — 1123F ACP DISCUSS/DSCN MKR DOCD: CPT | Performed by: INTERNAL MEDICINE

## 2022-11-02 PROCEDURE — 3078F DIAST BP <80 MM HG: CPT | Performed by: INTERNAL MEDICINE

## 2022-11-02 PROCEDURE — G8427 DOCREV CUR MEDS BY ELIG CLIN: HCPCS | Performed by: INTERNAL MEDICINE

## 2022-11-02 PROCEDURE — 99214 OFFICE O/P EST MOD 30 MIN: CPT | Performed by: INTERNAL MEDICINE

## 2022-11-02 NOTE — PROGRESS NOTES
Pt here for 1 yr check up     Pt continues with fatigue ,     Pt concerned would like to have injections and hold brilinta not set up yet

## 2022-11-02 NOTE — PROGRESS NOTES
[address] [place] [address],[place] [address] [address] [place] [address]  Dept: [phone]  Dept Fax: [phone]  Loc: [phone]    Visit Date: 11/2/2022    Mr. Lonnie Shelley is a 69 y.o. male  who presented for:  Chief Complaint   Patient presents with    Check-Up    Coronary Artery Disease   CAD; follow up    HPI:   70 yo M c hx of CAD s/p CABG (VG-RCA, VG-OM; LIMA to LAD (occluded)); s/p PCI, FOUZIA, HTN, HLD, thoracic anuerysm (4.1cm, follows CT[facility]gery at [facility]) is here for a follow up. Denies any chest pain, sob, palpitations, lightheadedness, dizziness, orthopnea, PND or pedal edema. Has a lot of back pain and wants something to be done. Also has hip pain. Had throat surgery for cancer. Current Outpatient Medications:     atorvastatin (LIPITOR) 40 MG tablet, Take 1 tablet by mouth nightly, Disp: 90 tablet, Rfl: 1    terazosin (HYTRIN) 5 MG capsule, TAKE 1 CAPSULE NIGHTLY, Disp: 90 capsule, Rfl: 1    metoprolol succinate (TOPROL XL) 50 MG extended release tablet, Take 0.5 tablets by mouth 2 times daily, Disp: 90 tablet, Rfl: 1    ticagrelor (BRILINTA) 90 MG TABS tablet, Take 1 tablet by mouth 2 times daily, Disp: 180 tablet, Rfl: 1    levothyroxine (SYNTHROID) 50 MCG tablet, Take 1 tablet by mouth daily, Disp: 90 tablet, Rfl: 1    ciclopirox (PENLAC) 8 % solution, Apply topically Toenail fungus, Disp: , Rfl:     aspirin 81 MG EC tablet, Take 81 mg by mouth daily, Disp: , Rfl:     gabapentin (NEURONTIN) 300 MG capsule, Take 300 mg by mouth daily. , Disp: , Rfl:     ipratropium (ATROVENT) 0.06 % nasal spray, 2 sprays by Each Nostril route 3 times daily as needed for Rhinitis, Disp: , Rfl:     nitroGLYCERIN (NITROSTAT) 0.4 MG SL tablet, up to max of 3 total doses.  If no relief after 1 dose, call 911., Disp: 25 tablet, Rfl: 3    diphenhydrAMINE-APAP, sleep, (TYLENOL PM EXTRA STRENGTH)  MG tablet, Take 2 tablets by mouth nightly, Disp: , Rfl:     omeprazole (PRILOSEC) 20 MG delayed release capsule, Take 20 mg by mouth Daily , Disp: , Rfl:     Coenzyme Q10 (CO Q-10) 200 MG CAPS, Take  by mouth daily. , Disp: , Rfl:     Multiple Vitamin (MULTI-VITAMIN) TABS, Take  by mouth daily. , Disp: , Rfl:     ARGININE PO, Take 500 mg by mouth daily 4 tab, Disp: , Rfl:     Past Medical History  Jaziel Boothe  has a past medical history of Ascending Aneurysm ([facility] [address] [address] Brown's esophagus, CAD (coronary artery disease), Cancer ([facility] Chronic back pain, Hepatitis C, History of hyperbaric oxygen therapy, Hyperlipidemia, Hypertension, Left shoulder pain, FOUZIA on CPAP, Osteoradionecrosis of mandible, PONV (postoperative nausea and vomiting), S/P CABG (coronary artery bypass graft), Thyroid disease, Vision loss of right eye, Vitreous opacities, and Wears dentures. Social History  Jaziel Boothe  reports that he quit smoking about 20 years ago. His smoking use included cigarettes. He has a 40.00 pack-year smoking history. He has never used smokeless tobacco. He reports that he does not drink alcohol and does not use drugs. Family History  Jazielglory Boothe family history includes Cancer in his maternal grandfather and maternal grandmother; Heart Disease in his father; High Blood Pressure in his father; Stroke in his father. Past Surgical History   Past Surgical History:   Procedure Laterality Date    CARDIAC CATHETERIZATION  8/2002, 4/2004    [facility]    COLONOSCOPY      CORONARY ANGIOPLASTY WITH STENT PLACEMENT  7/2002    [facility]    CORONARY ANGIOPLASTY WITH STENT PLACEMENT N/A 07/12/2019    [facility]    CORONARY ARTERY BYPASS GRAFT  2002    [facility]    DENTAL SURGERY      bottom teeth removal    HEMORRHOID SURGERY      KNEE SURGERY Right     LARYNGOSCOPY N/A 10/4/2017    LARYNGOSCOPY MICRO WITH BIOPSY performed by Man Cruz MD at [place] 5/12/2021    LARYNGOSCOPY MICRO AND BIOPSY, WITH JET VENT performed by Man Cruz MD at [facility] [address] N/A 6/2/2021    (DR. HUNTER) SUSPENSION MICROLARYNGOSCOPY WITH RECONSTRUCTION AND ADVANCEMENT FLAP X2; (DR. GONGORA)  MICROLARYNGOSCOPY AND BX POSTERIOR RIGHT VOCAL CORD      JET VENTILATION AND CO2 LASER performed by Nadya Banks MD at [address]  N/A 7/27/2022    SUSPENSION DIRECT LARYNGOSCOPY WITH EXCISIONAL BIOPSY OF RIGHT SIDED LARYNGEAL MASS performed by Susi Hanson MD at [address]  N/A 8/22/2022    MICROLARYNGOSCOPY WITH BIOPSY, SUSPENSION LARYNGOSCOPY WITH JET VENTILATION AND C02 LASER, RE-EXCISION FOR MARGINS performed by Susi Hanson MD at [address] [place] [address]  04/28/2016    by Dr Thalia Pires VITREOUS,SUBRET/CHOROID FLUID Right 8/27/2018    VITRECTOMY 25 GAUGE performed by Aleah Meyer MD at [place] [address] Right 08/27/2018     VITRECTOMY 25 GAUGE (Right Eye)       Subjective:     REVIEW OF SYSTEMS  Constitutional: denies sweats, chills and fever  HENT: denies  congestion, sinus pressure, sneezing and sore throat. Eyes: denies  pain, discharge, redness and itching. Respiratory: denies apnea, cough  Gastrointestinal: denies blood in stool, constipation, diarrhea   Endocrine: denies cold intolerance, heat intolerance, polydipsia. Genitourinary: denies dysuria, enuresis, flank pain and hematuria. Musculoskeletal: denies arthralgias, joint swelling and neck pain. Neurological: denies numbness and headaches. Psychiatric/Behavioral: denies agitation, confusion, decreased concentration and dysphoric mood    All others reviewed and are negative. Objective:     /72   Pulse 61   Ht 5' 11\" (1.803 m)   Wt 188 lb (85.3 kg)   SpO2 98%   BMI 26.22 kg/m²     Wt Readings from Last 3 Encounters:   11/02/22 188 lb (85.3 kg)   10/25/22 187 lb 8 oz (85 kg)   08/29/22 178 lb (80.7 kg)     BP Readings from Last 3 Encounters:   11/02/22 110/72   10/25/22 118/80   08/29/22 116/82       PHYSICAL EXAM  Constitutional: Oriented to person, place, and time.  Appears well-developed and well-nourished. HENT:   Head: Normocephalic and atraumatic. Eyes: EOM are normal. Pupils are equal, round, and reactive to light. Neck: Normal range of motion. Neck supple. No JVD present. Cardiovascular: Normal rate , normal heart sounds and intact distal pulses. Pulmonary/Chest: Effort normal and breath sounds normal. No respiratory distress. No wheezes. No rales. Abdominal: Soft. Bowel sounds are normal. No distension. There is no tenderness. Musculoskeletal: Normal range of motion. No edema. Neurological: Alert and oriented to person, place, and time. No cranial nerve deficit. Coordination normal.   Skin: Skin is warm and dry. Psychiatric: Normal mood and affect.        Lab Results   Component Value Date/Time    CKTOTAL 60 06/30/2015 08:40 AM       Lab Results   Component Value Date/Time    WBC 6.5 07/07/2022 10:42 AM    RBC 4.81 07/07/2022 10:42 AM    RBC 4.86 12/01/2011 09:07 AM    HGB 14.8 07/07/2022 10:42 AM    HCT 45.7 07/07/2022 10:42 AM    MCV 95.0 07/07/2022 10:42 AM    MCH 30.8 07/07/2022 10:42 AM    MCHC 32.4 07/07/2022 10:42 AM    RDW 12.6 12/01/2017 09:16 AM     07/07/2022 10:42 AM    MPV 10.3 07/07/2022 10:42 AM       Lab Results   Component Value Date/Time     08/16/2022 10:00 AM    K 4.1 08/26/2022 03:03 PM     08/16/2022 10:00 AM    CO2 28 08/16/2022 10:00 AM    BUN 15 08/16/2022 10:00 AM    LABALBU 4.5 08/16/2022 10:00 AM    LABALBU 4.3 12/01/2011 09:07 AM    CREATININE 0.9 08/16/2022 10:00 AM    CALCIUM 10.0 08/16/2022 10:00 AM    LABGLOM 84 08/16/2022 10:00 AM    GLUCOSE 84 08/16/2022 10:00 AM    GLUCOSE 95 12/01/2011 09:07 AM       Lab Results   Component Value Date/Time    ALKPHOS 98 08/16/2022 10:00 AM    ALT 14 08/16/2022 10:00 AM    AST 14 08/16/2022 10:00 AM    PROT 6.2 08/16/2022 10:00 AM    BILITOT 0.6 08/16/2022 10:00 AM    BILIDIR <0.2 10/05/2021 08:55 AM    LABALBU 4.5 08/16/2022 10:00 AM    LABALBU 4.3 12/01/2011 09:07 AM       No results found for: MG    Lab Results   Component Value Date    INR 0.97 2019         Lab Results   Component Value Date/Time    LABA1C 5.3 2021 09:04 AM       Lab Results   Component Value Date/Time    TRIG 119 2022 08:32 AM    HDL 48 2022 08:32 AM    LDLCALC 82 2022 08:32 AM    LDLDIRECT 89.07 2018 08:49 AM       Lab Results   Component Value Date/Time    TSH 1.950 2022 08:32 AM         Testing Reviewed:      I haveindividually reviewed the below cardiac tests    EKG:    ECHO:   Results for orders placed during the hospital encounter of 12   Echocardiogram complete 2D with doppler with color    [address] . [facility]  [address] [facility], [address]  Phone: [phone]  Fax: [phone]    Transthoracic Echocardiogram  2D, M-mode, Doppler, and Color Doppler    Name: Alan Veloz  : 1955  MR #: [id number]  Study date: 2012  Account #: [de-identified]  Ht-Wt-BSA: 70 in- 184.6 lb- 2.04 mÂ²    Reading Physician:  Yogi Mariano DO  Technologist:  Susan Sher, GENE,RVT,RDMS  Referring Physician:  MD Marybeth Hopper for study: hyperlipidemia, CAD, CABG, chest pain    HISTORY: PRIOR HISTORY: CAD, CABG, HTN, hyperlipidemia, ex smoker, chest pain  /PROCEDURE: The procedure was performed in outpatient. This was a routine study. Diastolic blood pressure was 95 mmHg, at the start of the study. Image quality  was adequate. LEFT VENTRICLE: Size was normal. Systolic function was normal. Ejection  fraction was estimated in the range of 55 % to 65 %. There were no regional  wall motion abnormalities. Wall thickness was normal. DOPPLER: Doppler  parameters were consistent with abnormal left ventricular relaxation (grade 1  diastolic dysfunction). AORTIC VALVE: The valve was possibly bicuspid. Leaflets exhibited normal  thickness, calcification, and normal cuspal separation. DOPPLER: Transaortic  velocity was within the normal range.  There was no evidence for stenosis. There  was trivial regurgitation. AORTA: The root exhibited mild dilatation. MITRAL VALVE: Valve structure was normal. There was normal leaflet separation. DOPPLER: The transmitral velocity was within the normal range. There was no  evidence for stenosis. There was trivial regurgitation. LEFT ATRIUM: Size was normal.    RIGHT VENTRICLE: The ventricle was mildly to moderately dilated. Systolic  function was normal. Wall thickness was normal.    PULMONIC VALVE: Leaflets exhibited normal thickness, no calcification, and  normal cuspal separation. DOPPLER: The transpulmonic velocity was within the  normal range. There was trivial regurgitation. PULMONARY ARTERY: The size was normal. DOPPLER: Systolic pressure was within  the normal range. TRICUSPID VALVE: The valve structure was normal. There was normal leaflet  separation. DOPPLER: The transtricuspid velocity was within the normal range. There was no evidence for stenosis. There was trivial regurgitation. RIGHT ATRIUM: Size was normal.    SYSTEMIC VEINS: IVC: The inferior vena cava was normal in size. PERICARDIUM: There was no pericardial effusion. The pericardium was normal in  appearance. SYSTEM MEASUREMENT TABLES    2D mode  LA Dimension (2D): 3.3 cm  FS (2D-Cubed): 30 %  FS (2D-Teich): 30 %  IVSd (2D): 1 cm  IVSd; Mean chosen (2D): 1 cm  LVIDd (2D): 5.4 cm  LVIDs (2D): 3.8 cm  LVOT Area (2D): 3.1 cm2  LVPWd (2D): 1 cm  RVIDd (2D): 3.2 cm    M mode  AoR Diam (MM): 3.3 cm  AV Cusp Sep (MM): 2.5 cm  MV D-E Exc: 1.6 cm  MV EF [place] (MM): 9.4 cm/s    Unspecified Scan Mode  VTI; Antegrade Flow: 20.9 cm  Vmax; Antegrade Flow: 1 m/s  LVOT Diam: 2 cm  LVOT Vmax: 82.8 cm/s  MV Peak A Stephen; Mean: 53.8 cm/s  MV Peak E Stephen; Antegrade Flow: 65.2 cm/s  Vmax; Antegrade Flow: 74.5 cm/s  TV Peak A Stephen: 41 cm/s  TV Peak E Stephen;  Antegrade Flow: 52.8 cm/s    SUMMARY:    Left ventricle:  Size was normal.  Systolic function was normal. Ejection fraction was estimated in the range of  55 % to 65 %. There were no regional wall motion abnormalities. Doppler parameters were consistent with abnormal left ventricular relaxation  (grade 1 diastolic dysfunction). Right ventricle: The ventricle was mildly to moderately dilated. Aortic valve: The valve was possibly bicuspid. Leaflets exhibited normal thickness,  calcification, and normal cuspal separation. Aorta, systemic arteries: The root exhibited mild dilatation. Prepared and signed by    Megan Juan DO  Signed 27-Jul-2012 17:43:35         STRESS:    CATH:    Assessment/Plan       Diagnosis Orders   1. Coronary artery disease due to lipid rich plaque            CAD PCI of LAD after FFR in 7/2019  CAD s/p CABG (LIMA to LAD, VG to OM and VG RCA) , s/p PCI  Bradycardia   Thoracic anuerysm 4.1 cm  HTN  HLD     No cardiac symptoms  Had issues with back and wants to undergo evaluation and possible surgery  On Aspirin, Brilinta, statin   Brilinta can be on hold for 5 days prior to any back surgery  No bleeding issues  LDL is 68   BP controlled  Patietn placed himself back on metoprolol after being taken off due to bradycardia and fatigue  The patient is asked to make an attempt to improve diet and exercise patterns to aid in medical management of this problem. Advised more plant based nutrition/meditarrean diet   Advised patient to call office or seek immediate medical attention if there is any new onset of  any chest pain, sob, palpitations, lightheadedness, dizziness, orthopnea, PND or pedal edema. All medication side effects were discussed in details. Thank youfor allowing me to participate in the care of this patient. Please do not hesitate to contact me for any further questions. Return in about 9 months (around 8/2/2023), or if symptoms worsen or fail to improve, for Review testing, Regular follow up.        Electronically signed by Jose Alfredo Preciado MD [facility]  11/2/2022 at 11:07 AM

## 2022-11-21 NOTE — PROGRESS NOTES
This patient is seen for cancer surveillance. 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 He had Brown's esophagus. His voice is gradually improving since last biopsy. Physical exam  He has a slightly breathy hoarse voice but it does sound stronger than the last visit    [address] VIDEOLARYNGOSOCPY    A fiberoptic laryngoscopy was performed under topical anesthesia, after using Afrin and Lidocaine spray in the nasal fossa. The nasal fossa, nasopharynx, hypopharynx and larynx were carefully examined. Base of tongue was symmetrical. Epiglottis appeared normal and was not retrodisplaced. True vocal cords had normal mobility. There was no erythema. No mucosal lesions or masses were noted. No pooling in the pyriform sinuses. No evidence of recurrence or other new primary. Diagnosis Orders   1. Carcinoma in situ of larynx  HI LARYNGOSCOPY FLEXIBLE DIAGNOSTIC      2.  Primary squamous cell carcinoma of larynx ([facility] [address]  HI LARYNGOSCOPY FLEXIBLE DIAGNOSTIC           Return visit 2 months

## 2022-11-30 DIAGNOSIS — I10 ESSENTIAL HYPERTENSION: ICD-10-CM

## 2022-12-01 RX ORDER — TERAZOSIN 5 MG/1
CAPSULE ORAL
Qty: 90 CAPSULE | Refills: 1 | Status: SHIPPED | OUTPATIENT
Start: 2022-12-01

## 2022-12-15 ENCOUNTER — OFFICE VISIT (OUTPATIENT)
Dept: ENT CLINIC | Age: 67
End: 2022-12-15

## 2022-12-15 VITALS
RESPIRATION RATE: 20 BRPM | DIASTOLIC BLOOD PRESSURE: 70 MMHG | SYSTOLIC BLOOD PRESSURE: 138 MMHG | HEIGHT: 71 IN | OXYGEN SATURATION: 96 % | TEMPERATURE: 97.1 F | HEART RATE: 56 BPM | WEIGHT: 184.8 LBS | BODY MASS INDEX: 25.87 KG/M2

## 2022-12-15 DIAGNOSIS — D02.0 CARCINOMA IN SITU OF LARYNX: ICD-10-CM

## 2022-12-15 DIAGNOSIS — R49.0 HOARSENESS: ICD-10-CM

## 2022-12-15 DIAGNOSIS — C32.9 PRIMARY SQUAMOUS CELL CARCINOMA OF LARYNX (HCC): Primary | ICD-10-CM

## 2022-12-15 DIAGNOSIS — K22.719 BARRETT'S ESOPHAGUS WITH DYSPLASIA: ICD-10-CM

## 2022-12-15 DIAGNOSIS — C32.9 CARCINOMA LARYNX (HCC): ICD-10-CM

## 2022-12-15 DIAGNOSIS — T66.XXXD EFFECTS, RADIATION, SUBSEQUENT ENCOUNTER: ICD-10-CM

## 2022-12-15 NOTE — PROGRESS NOTES
[facility] [place] [facility]  [facility] EAR, NOSE AND THROAT  [address] [place] [address] [place] [address]  Dept: [phone]  Dept Fax: [phone]  Loc: [phone]    Ty Gomez is a 69 y.o. male who was referred byNo ref. provider found for:  Chief Complaint   Patient presents with    Follow-up     Patient is here for f/u. Patient states that everything is still the same. Idalmis Ruffchas HPI:     Ty Gomez is a 69 y.o. male who presents today for follow up. Patient states everything is still the same. Voice has not improved. 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 Gets more when talking and he used to sing. History:     No Known Allergies  Current Outpatient Medications   Medication Sig Dispense Refill    terazosin (HYTRIN) 5 MG capsule TAKE 1 CAPSULE NIGHTLY 90 capsule 1    atorvastatin (LIPITOR) 40 MG tablet Take 1 tablet by mouth nightly 90 tablet 1    metoprolol succinate (TOPROL XL) 50 MG extended release tablet Take 0.5 tablets by mouth 2 times daily 90 tablet 1    ticagrelor (BRILINTA) 90 MG TABS tablet Take 1 tablet by mouth 2 times daily 180 tablet 1    levothyroxine (SYNTHROID) 50 MCG tablet Take 1 tablet by mouth daily 90 tablet 1    ciclopirox (PENLAC) 8 % solution Apply topically Toenail fungus      aspirin 81 MG EC tablet Take 81 mg by mouth daily      gabapentin (NEURONTIN) 300 MG capsule Take 300 mg by mouth daily. ipratropium (ATROVENT) 0.06 % nasal spray 2 sprays by Each Nostril route 3 times daily as needed for Rhinitis      nitroGLYCERIN (NITROSTAT) 0.4 MG SL tablet up to max of 3 total doses. If no relief after 1 dose, call 911. 25 tablet 3    diphenhydrAMINE-APAP, sleep, (TYLENOL PM EXTRA STRENGTH)  MG tablet Take 2 tablets by mouth nightly      omeprazole (PRILOSEC) 20 MG delayed release capsule Take 20 mg by mouth Daily       Coenzyme Q10 (CO Q-10) 200 MG CAPS Take  by mouth daily.         ARGININE PO Take 500 mg by mouth daily 4 tab      Multiple Vitamin (MULTI-VITAMIN) TABS Take  by mouth daily. No current facility-administered medications for this visit. Past Medical History:   Diagnosis Date    Ascending Aneurysm ([address] 02/2017    Ascending, 4.2 cm per pt    Brown's esophagus     CAD (coronary artery disease)     Cancer (HCC)     throat cancer, s/p radiation    Chronic back pain     Hepatitis C     as child    History of hyperbaric oxygen therapy     after bottom teeth removed after rdiation therapy    Hyperlipidemia     Hypertension     Left shoulder pain     nerve pain , ? 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    PONV (postoperative nausea and vomiting)     only after OHS    S/P CABG (coronary artery bypass graft) 2002    [facility]    Thyroid disease     Vision loss of right eye     during hyperbaric chamber oxygen therapy     Vitreous opacities     right    Wears dentures     full upper, no bottom teeth and no use of dentures on bottom      Past Surgical History:   Procedure Laterality Date    CARDIAC CATHETERIZATION  8/2002, 4/2004    [facility]    COLONOSCOPY      CORONARY ANGIOPLASTY WITH STENT PLACEMENT  7/2002    [facility]    CORONARY ANGIOPLASTY WITH STENT PLACEMENT N/A 07/12/2019    MID LAD    CORONARY ARTERY BYPASS GRAFT  2002    [facility]    DENTAL SURGERY      bottom teeth removal    HEMORRHOID SURGERY      KNEE SURGERY Right     LARYNGOSCOPY N/A 10/4/2017    LARYNGOSCOPY MICRO WITH BIOPSY performed by Graeme Jim MD at [address] 5/12/2021    LARYNGOSCOPY MICRO AND BIOPSY, WITH JET VENT performed by Graeme Jim MD at [address] N/A 6/2/2021    (DR. HUNTER) SUSPENSION MICROLARYNGOSCOPY WITH RECONSTRUCTION AND ADVANCEMENT FLAP X2; (DR. GONGORA)  MICROLARYNGOSCOPY AND BX POSTERIOR RIGHT VOCAL CORD      JET VENTILATION AND CO2 LASER performed by Anjali Sullivan MD at [address]  N/A 2022    SUSPENSION DIRECT LARYNGOSCOPY WITH EXCISIONAL BIOPSY OF RIGHT SIDED LARYNGEAL MASS performed by Christi Kate MD at [address] N/A 2022    MICROLARYNGOSCOPY WITH BIOPSY, SUSPENSION LARYNGOSCOPY WITH JET VENTILATION AND C02 LASER, RE-EXCISION FOR MARGINS performed by Christi Kate MD at [address] [place] [address]  2016    by Dr Cristel Colorado VITREOUS,SUBRET/CHOROID FLUID Right 2018    VITRECTOMY 22 GAUGE performed by Demetri Osman MD at [place] [address] Right 2018     VITRECTOMY 25 GAUGE (Right Eye)     Family History   Problem Relation Age of Onset    Heart Disease Father         MI    High Blood Pressure Father     Stroke Father     Cancer Maternal Grandmother     Cancer Maternal Grandfather     Diabetes Neg Hx      Social History     Tobacco Use    Smoking status: Former     Packs/day: 2.00     Years: 20.00     Pack years: 40.00     Types: Cigarettes     Quit date: 2001     Years since quittin.0    Smokeless tobacco: Never   Substance Use Topics    Alcohol use: No     Alcohol/week: 0.0 standard drinks       Subjective:       Review of Systems    Objective:     /70   Pulse 56   Temp 97.1 °F (36.2 °C) (Infrared)   Resp 20   Ht 5' 11\" (1.803 m)   Wt 184 lb 12.8 oz (83.8 kg)   SpO2 96%   BMI 25.77 kg/m²     Physical Exam  Still has gravelly voice, perhaps slightly worse than last time. PROCEDURE: FIBEROPTIC LARYNGOSCOPY    A fiberoptic laryngoscopy was performed under topical anesthesia, after using Afrin and Lidocaine spray in the nasal fossa. The nasal fossa, nasopharynx, hypopharynx and larynx were carefully examined. Base of tongue was symmetrical. Epiglottis appeared normal and was not retrodisplaced. True vocal cords had normal mobility. There was no erythema. No mucosal lesions or masses were noted. No pooling in the pyriform sinuses. There was mild vocal cord edema. Relative volume of the anterior left false cord greater than the right. There was no sign of recurrence in the right hemilarynx    I put together a series of similar views of the larynx extending back for a 1-1/2 years      Media Information  Document Information    Photographic Image:  Clinical Photo   ENT 7/30/21 Laryngoscopy    07/30/2021   Attached To: Office Visit on 7/30/21 with Driss Calhoun MD       From 7/14/2022:      Current imaging:      Current imaging        Data:  All of the past medical history, past surgical history, family history,social history, allergies and current medications were reviewed with the patient. Assessment & Plan   Diagnoses and all orders for this visit:     Diagnosis Orders   1. Primary squamous cell carcinoma of larynx (HCC)        2. Carcinoma in situ of larynx        3. Carcinoma larynx ([address]        4. Effects, radiation, subsequent encounter        5. Brown's esophagus with dysplasia        6. Hoarseness            The findings were explained and his questions were answered. Comparison of the documentation from 5 months ago today shows very little change. There is a rounded bulge in the anterior false vocal cord on the left side. This could be a reflection of how much tissue has been removed on the right false vocal cords or biopsies. Any more apparent enlargement, we will get a thin-section CT scan of the larynx, and consider a LxBx, or perhaps a PET/CT first.    1 month follow up. I, Thong Cee CMA ([facility]), am scribing for, and in the presence of Dr. Driss Calhoun. Electronically signed by Janette Singer CMA ([facility]) on 12/15/22 at 10:52 AM EST. (Please note that portions of this note were completed with a voice recognition program. Efforts were made to edit the dictations butoccasionally words are mis-transcribed.)    I agree to the above documentation placed by my scribe. I have personally evaluated this patient.  Additional findings are as noted.  I reviewed the scribe's note and agree with the documented findings and plan of care. Any areas of disagreement are corrected. I agree with the chief complaint, past medical history, past surgical history, allergies, medications, social and family history as documented unless otherwise noted below.      Electronically signed by Aishwarya Moeller MD on 1/14/2023 at 2:37 PM

## 2023-01-17 ENCOUNTER — OFFICE VISIT (OUTPATIENT)
Dept: ENT CLINIC | Age: 68
End: 2023-01-17
Payer: MEDICARE

## 2023-01-17 VITALS
TEMPERATURE: 97.3 F | BODY MASS INDEX: 25.68 KG/M2 | OXYGEN SATURATION: 97 % | HEART RATE: 58 BPM | HEIGHT: 71 IN | DIASTOLIC BLOOD PRESSURE: 64 MMHG | WEIGHT: 183.4 LBS | RESPIRATION RATE: 14 BRPM | SYSTOLIC BLOOD PRESSURE: 122 MMHG

## 2023-01-17 DIAGNOSIS — J38.7 LARYNGEAL MASS: Primary | ICD-10-CM

## 2023-01-17 DIAGNOSIS — T66.XXXD EFFECTS, RADIATION, SUBSEQUENT ENCOUNTER: ICD-10-CM

## 2023-01-17 DIAGNOSIS — C32.9 PRIMARY SQUAMOUS CELL CARCINOMA OF LARYNX (HCC): ICD-10-CM

## 2023-01-17 DIAGNOSIS — R49.0 HOARSENESS: ICD-10-CM

## 2023-01-17 PROCEDURE — 3074F SYST BP LT 130 MM HG: CPT | Performed by: OTOLARYNGOLOGY

## 2023-01-17 PROCEDURE — 3078F DIAST BP <80 MM HG: CPT | Performed by: OTOLARYNGOLOGY

## 2023-01-17 PROCEDURE — 1036F TOBACCO NON-USER: CPT | Performed by: OTOLARYNGOLOGY

## 2023-01-17 PROCEDURE — 3017F COLORECTAL CA SCREEN DOC REV: CPT | Performed by: OTOLARYNGOLOGY

## 2023-01-17 PROCEDURE — 1123F ACP DISCUSS/DSCN MKR DOCD: CPT | Performed by: OTOLARYNGOLOGY

## 2023-01-17 PROCEDURE — G8417 CALC BMI ABV UP PARAM F/U: HCPCS | Performed by: OTOLARYNGOLOGY

## 2023-01-17 PROCEDURE — 99214 OFFICE O/P EST MOD 30 MIN: CPT | Performed by: OTOLARYNGOLOGY

## 2023-01-17 PROCEDURE — 31575 DIAGNOSTIC LARYNGOSCOPY: CPT | Performed by: OTOLARYNGOLOGY

## 2023-01-17 PROCEDURE — G8484 FLU IMMUNIZE NO ADMIN: HCPCS | Performed by: OTOLARYNGOLOGY

## 2023-01-17 PROCEDURE — G8427 DOCREV CUR MEDS BY ELIG CLIN: HCPCS | Performed by: OTOLARYNGOLOGY

## 2023-01-17 ASSESSMENT — ENCOUNTER SYMPTOMS
TROUBLE SWALLOWING: 0
APNEA: 0
ABDOMINAL PAIN: 0
FACIAL SWELLING: 0
NAUSEA: 0
CHEST TIGHTNESS: 0
COLOR CHANGE: 0
VOICE CHANGE: 0
STRIDOR: 0
RHINORRHEA: 0
DIARRHEA: 0
SHORTNESS OF BREATH: 0
WHEEZING: 0
VOMITING: 0
CHOKING: 0
COUGH: 0
SINUS PRESSURE: 0
SORE THROAT: 0

## 2023-01-17 NOTE — PROGRESS NOTES
[facility] [place] [facility]  [facility] EAR, NOSE AND THROAT  [address] [place] [address] [place] [address]  Dept: [phone]  Dept Fax: [phone]  Loc: [phone]    Adonis Mesa is a 69 y.o. male who was referred byNo ref. provider found for:  Chief Complaint   Patient presents with    Follow-up     Patient is here for 1 mo f/u w tip chip     . HPI:     Adonis Mesa is a 69 y.o. male who presents today for surveillance for his laryngeal CA. His voice is slightly worse. He is still able to work but his voice goes up fairly quickly. He is a salesman. History:     No Known Allergies  Current Outpatient Medications   Medication Sig Dispense Refill    terazosin (HYTRIN) 5 MG capsule TAKE 1 CAPSULE NIGHTLY 90 capsule 1    atorvastatin (LIPITOR) 40 MG tablet Take 1 tablet by mouth nightly 90 tablet 1    metoprolol succinate (TOPROL XL) 50 MG extended release tablet Take 0.5 tablets by mouth 2 times daily 90 tablet 1    ticagrelor (BRILINTA) 90 MG TABS tablet Take 1 tablet by mouth 2 times daily 180 tablet 1    levothyroxine (SYNTHROID) 50 MCG tablet Take 1 tablet by mouth daily 90 tablet 1    ciclopirox (PENLAC) 8 % solution Apply topically Toenail fungus      aspirin 81 MG EC tablet Take 81 mg by mouth daily      gabapentin (NEURONTIN) 300 MG capsule Take 300 mg by mouth daily. ipratropium (ATROVENT) 0.06 % nasal spray 2 sprays by Each Nostril route 3 times daily as needed for Rhinitis      nitroGLYCERIN (NITROSTAT) 0.4 MG SL tablet up to max of 3 total doses. If no relief after 1 dose, call 911. 25 tablet 3    diphenhydrAMINE-APAP, sleep, (TYLENOL PM EXTRA STRENGTH)  MG tablet Take 2 tablets by mouth nightly      omeprazole (PRILOSEC) 20 MG delayed release capsule Take 20 mg by mouth Daily       Coenzyme Q10 (CO Q-10) 200 MG CAPS Take  by mouth daily. Multiple Vitamin (MULTI-VITAMIN) TABS Take  by mouth daily.         ARGININE PO Take 500 mg by mouth daily 4 tab       No current facility-administered medications for this visit. Past Medical History:   Diagnosis Date    Ascending Aneurysm ([address] 02/2017    Ascending, 4.2 cm per pt    Brown's esophagus     CAD (coronary artery disease)     Cancer (HCC)     throat cancer, s/p radiation    Chronic back pain     Hepatitis C     as child    History of hyperbaric oxygen therapy     after bottom teeth removed after rdiation therapy    Hyperlipidemia     Hypertension     Left shoulder pain     nerve pain , ? 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    PONV (postoperative nausea and vomiting)     only after OHS    S/P CABG (coronary artery bypass graft) 2002    [facility]    Thyroid disease     Vision loss of right eye     during hyperbaric chamber oxygen therapy     Vitreous opacities     right    Wears dentures     full upper, no bottom teeth and no use of dentures on bottom      Past Surgical History:   Procedure Laterality Date    CARDIAC CATHETERIZATION  8/2002, 4/2004    [facility]    COLONOSCOPY      CORONARY ANGIOPLASTY WITH STENT PLACEMENT  7/2002    [facility]    CORONARY ANGIOPLASTY WITH STENT PLACEMENT N/A 07/12/2019    MID LAD    CORONARY ARTERY BYPASS GRAFT  2002    [facility]    DENTAL SURGERY      bottom teeth removal    HEMORRHOID SURGERY      KNEE SURGERY Right     LARYNGOSCOPY N/A 10/4/2017    LARYNGOSCOPY MICRO WITH BIOPSY performed by Jean-Pierre Gold MD at [address] [facility] 5/12/2021    LARYNGOSCOPY MICRO AND BIOPSY, WITH JET VENT performed by Jean-Pierre Gold MD at [address] N/A 6/2/2021    (DR. HUNTER) SUSPENSION MICROLARYNGOSCOPY WITH RECONSTRUCTION AND ADVANCEMENT FLAP X2; (DR. GONGORA)  MICROLARYNGOSCOPY AND BX POSTERIOR RIGHT VOCAL CORD      JET VENTILATION AND CO2 LASER performed by Mary Alejandre MD at [address] N/A 7/27/2022    SUSPENSION DIRECT LARYNGOSCOPY WITH EXCISIONAL BIOPSY OF RIGHT SIDED LARYNGEAL MASS performed by Corinna Jimenez MD at [address] N/A 2022    MICROLARYNGOSCOPY WITH BIOPSY, SUSPENSION LARYNGOSCOPY WITH JET VENTILATION AND C02 LASER, RE-EXCISION FOR MARGINS performed by Coirnna Jimenez MD at [address]  2016    by Dr Maxim Paredes SUBRETINAL/CHOROIDAL Right 2018    VITRECTOMY 22 GAUGE performed by Brayan Mckeon MD at [place] [address] Right 2018     VITRECTOMY 22 GAUGE (Right Eye)     Family History   Problem Relation Age of Onset    Heart Disease Father         MI    High Blood Pressure Father     Stroke Father     Cancer Maternal Grandmother     Cancer Maternal Grandfather     Diabetes Neg Hx      Social History     Tobacco Use    Smoking status: Former     Packs/day: 2.00     Years: 20.00     Pack years: 40.00     Types: Cigarettes     Quit date: 2001     Years since quittin.1    Smokeless tobacco: Never   Substance Use Topics    Alcohol use: No     Alcohol/week: 0.0 standard drinks       Subjective:      Review of Systems   Constitutional:  Negative for activity change, appetite change, chills, diaphoresis, fatigue, fever and unexpected weight change. HENT:  Negative for congestion, dental problem, ear discharge, ear pain, facial swelling, hearing loss, mouth sores, nosebleeds, postnasal drip, rhinorrhea, sinus pressure, sneezing, sore throat, tinnitus, trouble swallowing and voice change. Eyes:  Negative for visual disturbance. Respiratory:  Negative for apnea, cough, choking, chest tightness, shortness of breath, wheezing and stridor. Cardiovascular:  Negative for chest pain, palpitations and leg swelling. Gastrointestinal:  Negative for abdominal pain, diarrhea, nausea and vomiting. Endocrine: Negative for cold intolerance, heat intolerance, polydipsia and polyuria. Genitourinary:  Negative for dysuria, enuresis and hematuria.    Musculoskeletal: Negative for arthralgias, gait problem, neck pain and neck stiffness. Skin:  Negative for color change and rash. Allergic/Immunologic: Negative for environmental allergies, food allergies and immunocompromised state. Neurological:  Negative for dizziness, syncope, facial asymmetry, speech difficulty, light-headedness and headaches. Hematological:  Negative for adenopathy. Does not bruise/bleed easily. Psychiatric/Behavioral:  Negative for confusion and sleep disturbance. The patient is not nervous/anxious. Objective:   /64 (Site: Left Upper Arm, Position: Sitting)   Pulse 58   Temp 97.3 °F (36.3 °C) (Infrared)   Resp 14   Ht 5' 11\" (1.803 m)   Wt 183 lb 6.4 oz (83.2 kg)   SpO2 97%   BMI 25.58 kg/m²     Physical Exam  His voice is moderately hoarse. No stridor    HIGH RESOLUTION FLEXIBLE VIDEOLARYNGOSOCPY    A fiberoptic laryngoscopy was performed under topical anesthesia, after using Afrin and Lidocaine spray in the nasal fossa. The nasal fossa, nasopharynx, hypopharynx and larynx were carefully examined. Base of tongue was symmetrical. Epiglottis appeared normal and was not retrodisplaced. True vocal cords had normal mobility. There was no erythema. No pooling in the pyriform sinuses. Smooth mucosal covered bulge present in the anterior left true vocal fold. No obvious sign of recurrence in the right true vocal cord. Current images:            7/14/2022 image          Data:  All of the past medical history, past surgical history, family history,social history, allergies and current medications were reviewed with the patient. Assessment & Plan   Diagnoses and all orders for this visit:     Diagnosis Orders   1. Laryngeal mass, submucosal, left anterior false vocal cord  Creatinine    CT SOFT TISSUE NECK W CONTRAST    MO LARYNGOSCOPY FLEXIBLE DIAGNOSTIC      2.  Primary squamous cell carcinoma of larynx (HCC)  Creatinine    CT SOFT TISSUE NECK W CONTRAST    MO LARYNGOSCOPY FLEXIBLE DIAGNOSTIC      3. Effects, radiation, subsequent encounter  Creatinine    CT SOFT TISSUE NECK W CONTRAST    CO LARYNGOSCOPY FLEXIBLE DIAGNOSTIC      4. Hoarseness  CT SOFT TISSUE NECK W CONTRAST    CO LARYNGOSCOPY FLEXIBLE DIAGNOSTIC          The findings were explained and his questions were answered. Reviewing the photos a few months of his larynx it seems that the bulge in the left anterior false vocal cord might be getting bigger. CT scan with thin sections through the larynx is requested. Based on those findings we might need to get a PET/CT. ADDENDUM:  CT was obtained properly for FNA for this note. The image is included below showing a soft tissue density deep in the anterior left false vocal cord. I will discuss this with my laryngologist colleague but it would seem reasonable to proceed with a PET/CT as well, given his propensity for growing squamous cell carcinomas in his larynx. I reviewed the old PET/CT from 7/22 and I did not see any increased uptake in this region. From 7/22          Return in 1 month       Amy. Braulio Knapp MD    **This report has been created using voice recognition software. It may contain minor errors which are inherent in voicerecognition technology. **

## 2023-01-19 ENCOUNTER — TELEPHONE (OUTPATIENT)
Dept: ENT CLINIC | Age: 68
End: 2023-01-19

## 2023-01-19 NOTE — TELEPHONE ENCOUNTER
Radiology called asking about the CT scan that was scheduled. CT facial bone with contrast with IGS protocol. The diagnosis was submucosal bulge  inferior left true vocal cord. They stated the CT of the facial wouldn't show that area needed. Please advise. We can all them back at [phone].

## 2023-01-19 NOTE — TELEPHONE ENCOUNTER
Per Dr. Hood [place] CT facial was order accidental he wants patient to have a CT neck with contrast. And to have thin sections at the larynx. Order placed.

## 2023-01-20 ENCOUNTER — HOSPITAL ENCOUNTER (OUTPATIENT)
Age: 68
Discharge: HOME | End: 2023-01-20
Payer: MEDICARE

## 2023-01-20 ENCOUNTER — HOSPITAL ENCOUNTER (OUTPATIENT)
Dept: CT IMAGING | Age: 68
Discharge: HOME | End: 2023-01-20
Payer: MEDICARE

## 2023-01-20 DIAGNOSIS — R49.0 HOARSENESS: ICD-10-CM

## 2023-01-20 DIAGNOSIS — T66.XXXD EFFECTS, RADIATION, SUBSEQUENT ENCOUNTER: ICD-10-CM

## 2023-01-20 DIAGNOSIS — C32.9 PRIMARY SQUAMOUS CELL CARCINOMA OF LARYNX (HCC): ICD-10-CM

## 2023-01-20 DIAGNOSIS — J38.7 LARYNGEAL MASS: ICD-10-CM

## 2023-01-20 LAB
CREAT BLD-MCNC: 0.8 MG/DL (ref 0.5–1.2)
GFR SERPL CREATININE-BSD FRML MDRD: > 60 ML/MIN/1.73M2

## 2023-01-20 PROCEDURE — 6360000004 HC RX CONTRAST MEDICATION: Performed by: OTOLARYNGOLOGY

## 2023-01-20 PROCEDURE — 70491 CT SOFT TISSUE NECK W/DYE: CPT

## 2023-01-20 PROCEDURE — 82565 ASSAY OF CREATININE: CPT

## 2023-01-20 RX ADMIN — IOPAMIDOL 100 ML: 755 INJECTION, SOLUTION INTRAVENOUS at 10:16

## 2023-02-05 DIAGNOSIS — C32.0 MALIGNANT NEOPLASM OF GLOTTIS (HCC): ICD-10-CM

## 2023-02-05 DIAGNOSIS — C32.9 SQUAMOUS CELL CARCINOMA OF LARYNX (HCC): Primary | ICD-10-CM

## 2023-02-06 NOTE — PROGRESS NOTES
Dr. Colby Alvarado reviewed Mr. Renae Favors chart and video. He recommended a PET/CT. I reviewed his last PET/CT and there was no increased activity in the anterior left false vocal cord. Indication for the scan is to be sure we do not miss anything. Just being careful. I would like to see the patient back in the office a couple days after the scan.

## 2023-02-06 NOTE — PROGRESS NOTES
Called patient and spoke with patient wife. She verbalized understanding. And patient is scheduled back in the office on 2/15/23.    The only time patient isn't available is 2/15/23 at 8:30 am he has an appointment with GI.

## 2023-02-13 ENCOUNTER — HOSPITAL ENCOUNTER (OUTPATIENT)
Dept: PET IMAGING | Age: 68
Discharge: HOME | End: 2023-02-13
Payer: MEDICARE

## 2023-02-13 DIAGNOSIS — C32.9 SQUAMOUS CELL CARCINOMA OF LARYNX (HCC): ICD-10-CM

## 2023-02-13 DIAGNOSIS — C32.0 MALIGNANT NEOPLASM OF GLOTTIS (HCC): ICD-10-CM

## 2023-02-13 PROCEDURE — A9552 F18 FDG: HCPCS | Performed by: OTOLARYNGOLOGY

## 2023-02-13 PROCEDURE — 3430000000 HC RX DIAGNOSTIC RADIOPHARMACEUTICAL: Performed by: OTOLARYNGOLOGY

## 2023-02-13 PROCEDURE — G1010 CDSM STANSON: HCPCS

## 2023-02-13 RX ORDER — FLUDEOXYGLUCOSE F 18 200 MCI/ML
13 INJECTION, SOLUTION INTRAVENOUS
Status: COMPLETED | OUTPATIENT
Start: 2023-02-13 | End: 2023-02-13

## 2023-02-13 RX ADMIN — FLUDEOXYGLUCOSE F 18 13 MILLICURIE: 200 INJECTION, SOLUTION INTRAVENOUS at 08:44

## 2023-02-13 NOTE — PLAN OF CARE
Problem: Physical Regulation:  Goal: Tolerate HBO therapy and barotrauma will be prevented  Tolerate HBO therapy and barotrauma will be prevented  Outcome: Completed Date Met: 03/12/18  No s/s of seizure noted. No s/s of barotrauma noted. Pt demonstrated equalization of ears during pressurization. Comments: Care plan reviewed with patient. Patient verbalizes understanding of the plan of care and contribute to goal setting. Dapsone Counseling: I discussed with the patient the risks of dapsone including but not limited to hemolytic anemia, agranulocytosis, rashes, methemoglobinemia, kidney failure, peripheral neuropathy, headaches, GI upset, and liver toxicity.  Patients who start dapsone require monitoring including baseline LFTs and weekly CBCs for the first month, then every month thereafter.  The patient verbalized understanding of the proper use and possible adverse effects of dapsone.  All of the patient's questions and concerns were addressed.

## 2023-02-15 ENCOUNTER — TELEPHONE (OUTPATIENT)
Dept: CARDIOLOGY CLINIC | Age: 68
End: 2023-02-15

## 2023-02-15 NOTE — TELEPHONE ENCOUNTER
Pre op Risk Assessment    Procedure EGD w/Dil  Physician Dr Lashawn Bae  Date of surgery/procedure TBD    Last OV 1/2/22  Last Stress 7/3/19  Last Echo 7/27/12  Last Cath 7/12/19  Last Stent 7/12/19  Is patient on blood thinners Brilinta & ASA  Hold Meds/how many days 5    Fax: [phone]

## 2023-02-15 NOTE — TELEPHONE ENCOUNTER
Form in Dr. Matthew harris for signature Medicare Annual Wellness Visit    Heriberto Enriquez is here for Medicare AWV (Pt here today for htn f/u)    Assessment & Plan   Mason Rothman was seen today for medicare awv. Diagnoses and all orders for this visit:    Medicare annual wellness visit, subsequent   dermatitis: breast    P: temovate cream for rash, use cotton T shirts. Recommendations for Preventive Services Due: see orders and patient instructions/AVS.  Recommended screening schedule for the next 5-10 years is provided to the patient in written form: see Patient Instructions/AVS.     Return for Medicare Annual Wellness Visit in 1 year. Reviewed and updated this visit by clinical staff:  Tobacco  Allergies  Meds  Problems  Med Hx  Surg Hx  Soc Hx  Fam Hx        Subjective    Rash under breasts has tried desitin, vaseline, nystatin cream made it worse  Has been occurring for over a year. [place]. She wants to try THC/CBD gummies for her joint pains  Anxiety causes her to get SOB    The following acute and/or chronic problems were also addressed today:  HTN    Patient's complete Health Risk Assessment and screening values have been reviewed and are found in Flowsheets. The following problems were reviewed today and where indicated follow up appointments were made and/or referrals ordered.     Positive Risk Factor Screenings with Interventions:             General Health and ACP:  General  In general, how would you say your health is?: Very Good  In the past 7 days, have you experienced any of the following: New or Increased Pain, New or Increased Fatigue, Loneliness, Social Isolation, Stress or Anger?: No  Do you get the social and emotional support that you need?: Yes  Do you have a Living Will?: (!) No    Advance Directives     Power of  Living Will ACP-Advance Directive ACP-Power of     Not on File Not on File Not on File Not on File      General Health Risk Interventions:  First designate: [place]  2nd : Smita Valdez Vikas    · discusssed Living will    Health Habits/Nutrition:     Physical Activity: Insufficiently Active    Days of Exercise per Week: 2 days    Minutes of Exercise per Session: 10 min     Have you lost any weight without trying in the past 3 months?: No    Body mass index: (!) 39.54    Have you seen the dentist within the past year?: Yes      Health Habits/Nutrition Interventions:  · healthy diet   · Gets veggies in.           Objective    Physical Exam  Vitals and nursing note reviewed. Constitutional:       General: She is not in acute distress. Appearance: She is well-developed. She is not ill-appearing. HENT:      Head: Normocephalic and atraumatic. Right Ear: External ear normal.      Left Ear: External ear normal.   Eyes:      General: No scleral icterus. Right eye: No discharge. Left eye: No discharge. Conjunctiva/sclera: Conjunctivae normal.   Neck:      Thyroid: No thyromegaly. Trachea: No tracheal deviation. Cardiovascular:      Rate and Rhythm: Normal rate and regular rhythm. Heart sounds: Normal heart sounds. Pulmonary:      Effort: Pulmonary effort is normal. No respiratory distress. Breath sounds: Normal breath sounds. No wheezing. Musculoskeletal:      Right lower leg: No edema. Left lower leg: No edema. Comments: Severe OA changes in knees   Lymphadenopathy:      Cervical: No cervical adenopathy. Skin:     General: Skin is warm. Findings: Rash present. Comments: Red dry rash, under left breast and slightly under right   Neurological:      Mental Status: She is alert and oriented to person, place, and time. Psychiatric:         Mood and Affect: Mood normal.         Behavior: Behavior normal.         Thought Content:  Thought content normal.                   Allergies   Allergen Reactions    Codeine     Erythromycin     Flexeril [Cyclobenzaprine]     Morphine     Motrin [Ibuprofen]     Nsaids     Prednisone     Statins Nausea Only and Other (See Comments)     Upset stomach    Sulfa Antibiotics     Sulfasalazine     Tolmetin      Prior to Visit Medications    Medication Sig Taking? Authorizing Provider   omeprazole (PRILOSEC) 20 MG delayed release capsule Take 1 capsule by mouth every morning (before breakfast) Yes Lazarus Barefoot, MD   spironolactone (ALDACTONE) 50 MG tablet Take 1 tablet by mouth daily Yes Lazarus Barefoot, MD   Handicap Placard MISC by Does not apply route For 5 years  Expires  7/16/2025  Unable to walk 200 feet. Yes Lazarus Barefoot, MD   POTASSIUM PO Take by mouth daily as needed  Yes Historical Provider, MD   aspirin 325 MG tablet Take 325 mg by mouth as needed for Pain Yes Historical Provider, MD   Cholecalciferol (VITAMIN D) 1000 UNITS TABS Take 1 tablet by mouth daily 1-2 tabs of 1000 unit tablets daily. Yes Lazarus Barefoot, MD   Multiple Vitamins-Minerals (EYE VITAMINS PO) Take by mouth Yes Historical Provider, MD   nystatin (MYCOSTATIN) 004454 UNIT/GM cream Apply topically 2 times daily.   Patient not taking: Reported on 7/20/2021  Lazarus Barefoot, MD       [facility] (Including outside providers/suppliers regularly involved in providing care):   Patient Care Team:  Lazarus Barefoot, MD as PCP - General (Family Medicine)  Lazarus Barefoot, MD as PCP - RE[facility]ABIL[facility] Empaneled Provider

## 2023-02-16 ENCOUNTER — TELEPHONE (OUTPATIENT)
Dept: CARDIOLOGY CLINIC | Age: 68
End: 2023-02-16

## 2023-02-16 ENCOUNTER — OFFICE VISIT (OUTPATIENT)
Dept: ENT CLINIC | Age: 68
End: 2023-02-16
Payer: MEDICARE

## 2023-02-16 VITALS
OXYGEN SATURATION: 98 % | SYSTOLIC BLOOD PRESSURE: 128 MMHG | RESPIRATION RATE: 16 BRPM | HEIGHT: 71 IN | BODY MASS INDEX: 25.49 KG/M2 | WEIGHT: 182.1 LBS | HEART RATE: 58 BPM | DIASTOLIC BLOOD PRESSURE: 68 MMHG | TEMPERATURE: 97.9 F

## 2023-02-16 DIAGNOSIS — J38.7 LARYNGEAL MASS: ICD-10-CM

## 2023-02-16 DIAGNOSIS — R49.0 HOARSENESS: ICD-10-CM

## 2023-02-16 DIAGNOSIS — C32.9 SQUAMOUS CELL CARCINOMA OF LARYNX (HCC): ICD-10-CM

## 2023-02-16 DIAGNOSIS — C32.9 PRIMARY SQUAMOUS CELL CARCINOMA OF LARYNX (HCC): Primary | ICD-10-CM

## 2023-02-16 DIAGNOSIS — K22.719 BARRETT'S ESOPHAGUS WITH DYSPLASIA: ICD-10-CM

## 2023-02-16 DIAGNOSIS — Z01.818 PREOP TESTING: Primary | ICD-10-CM

## 2023-02-16 DIAGNOSIS — T66.XXXD EFFECTS, RADIATION, SUBSEQUENT ENCOUNTER: ICD-10-CM

## 2023-02-16 DIAGNOSIS — D02.0 CARCINOMA IN SITU OF LARYNX: ICD-10-CM

## 2023-02-16 DIAGNOSIS — C32.9 CARCINOMA LARYNX (HCC): ICD-10-CM

## 2023-02-16 DIAGNOSIS — C32.1 MALIGNANT NEOPLASM OF SUPRAGLOTTIS (HCC): ICD-10-CM

## 2023-02-16 DIAGNOSIS — C32.1 MALIGNANT NEOPLASM OF FALSE VOCAL CORDS (HCC): ICD-10-CM

## 2023-02-16 PROCEDURE — 99214 OFFICE O/P EST MOD 30 MIN: CPT | Performed by: OTOLARYNGOLOGY

## 2023-02-16 PROCEDURE — 3017F COLORECTAL CA SCREEN DOC REV: CPT | Performed by: OTOLARYNGOLOGY

## 2023-02-16 PROCEDURE — G8427 DOCREV CUR MEDS BY ELIG CLIN: HCPCS | Performed by: OTOLARYNGOLOGY

## 2023-02-16 PROCEDURE — 1036F TOBACCO NON-USER: CPT | Performed by: OTOLARYNGOLOGY

## 2023-02-16 PROCEDURE — 31575 DIAGNOSTIC LARYNGOSCOPY: CPT | Performed by: OTOLARYNGOLOGY

## 2023-02-16 PROCEDURE — 3078F DIAST BP <80 MM HG: CPT | Performed by: OTOLARYNGOLOGY

## 2023-02-16 PROCEDURE — 3074F SYST BP LT 130 MM HG: CPT | Performed by: OTOLARYNGOLOGY

## 2023-02-16 PROCEDURE — 1123F ACP DISCUSS/DSCN MKR DOCD: CPT | Performed by: OTOLARYNGOLOGY

## 2023-02-16 PROCEDURE — G8417 CALC BMI ABV UP PARAM F/U: HCPCS | Performed by: OTOLARYNGOLOGY

## 2023-02-16 PROCEDURE — G8484 FLU IMMUNIZE NO ADMIN: HCPCS | Performed by: OTOLARYNGOLOGY

## 2023-02-16 ASSESSMENT — ENCOUNTER SYMPTOMS
VOICE CHANGE: 0
COLOR CHANGE: 0
SHORTNESS OF BREATH: 0
ABDOMINAL PAIN: 0
COUGH: 0
RHINORRHEA: 0
TROUBLE SWALLOWING: 0
WHEEZING: 0
VOMITING: 0
SINUS PRESSURE: 0
SORE THROAT: 0
DIARRHEA: 0
CHOKING: 0
FACIAL SWELLING: 0
CHEST TIGHTNESS: 0
NAUSEA: 0
APNEA: 0
STRIDOR: 0

## 2023-02-16 NOTE — TELEPHONE ENCOUNTER
Patient is being scheduled for a procedure with Dr Shady Wheatley and we are requesting clearance from Dr. Moe Correa. DOS: 04/03/2023  Procedure: microlaryngoscopy and biopsy  Meds to hold: Multivitamin, Fish Oil, Terazosin and Brilinta x 1 week. Please advise. Thank you!

## 2023-02-16 NOTE — PROGRESS NOTES
[facility] [place] [facility]  [facility] EAR, NOSE AND THROAT  [address] [place] [address] [place] [address]  Dept: [phone]  Dept Fax: [phone]  Loc: [phone]    Fang Tinajero is a 69 y.o. male who was referred byNo ref. provider found for:  Chief Complaint   Patient presents with    Follow-up     Patient here for 1 month follow up for chip tip scope. Patient also had a CT scan and PET scan completed. Results   . HPI:     Fang Tinajero is a 69 y.o. male who presents today for follow up 1 month for tip chip. Patient also had a CT scan and PET scan completed. Scans reviewed with patient. States voice is getting better, he'll talk on phone for 20 minutes then he will start to lose voice. His last biopsy he got really sick at home, woke up with chest pain. He was given morphine. He was scheduled to have an EGD, was having a little more heart burn than usual.  They upped his dose of Omeprazole. When he is sitting up, his shoulder goes to sleep. He hasn't used a wedge to sleep. States he will choke. At times he lays on his back 15-20 minutes because his shoulder falls asleep so bad. He was seeing someone for it and was getting shots for pain but then didn't get anymore since he's taking Brilinta. He has a pinched nerve in neck. Has an aneurysm in his heart. He's seeing Dr. Mandeep Perez at [facility] for it. CT Scan:     1. There is fullness in the larynx on the left side at the level of the false vocal cords. There is medial deviation of the underlying fat pad. Please correlate clinically. 2. Straightening of the normal cervical lordosis. 3. Small retention cyst or polyp in the right maxillary sinus. 4. There is bilateral carotid artery calcification. 5. Otherwise negative CT scan of the neck. **This report has been created using voice recognition software. It may contain minor errors which are inherent in voice recognition technology. ** Final report electronically signed by DR Galindo Bourne on 1/21/2023       PET CT Scan:  1. Focal activity without a discrete CT correlate at the right vocal cord corresponding to known malignancy. 2. No FDG avid metastatic disease. Final report electronically signed by Dr. Verona Phan on 2/13/2023                History:     No Known Allergies  Current Outpatient Medications   Medication Sig Dispense Refill    terazosin (HYTRIN) 5 MG capsule TAKE 1 CAPSULE NIGHTLY 90 capsule 1    atorvastatin (LIPITOR) 40 MG tablet Take 1 tablet by mouth nightly 90 tablet 1    metoprolol succinate (TOPROL XL) 50 MG extended release tablet Take 0.5 tablets by mouth 2 times daily 90 tablet 1    ticagrelor (BRILINTA) 90 MG TABS tablet Take 1 tablet by mouth 2 times daily 180 tablet 1    levothyroxine (SYNTHROID) 50 MCG tablet Take 1 tablet by mouth daily 90 tablet 1    ciclopirox (PENLAC) 8 % solution Apply topically Toenail fungus      aspirin 81 MG EC tablet Take 81 mg by mouth daily      gabapentin (NEURONTIN) 300 MG capsule Take 300 mg by mouth daily. ipratropium (ATROVENT) 0.06 % nasal spray 2 sprays by Each Nostril route 3 times daily as needed for Rhinitis      nitroGLYCERIN (NITROSTAT) 0.4 MG SL tablet up to max of 3 total doses. If no relief after 1 dose, call 911. 25 tablet 3    diphenhydrAMINE-APAP, sleep, (TYLENOL PM EXTRA STRENGTH)  MG tablet Take 2 tablets by mouth nightly      omeprazole (PRILOSEC) 20 MG delayed release capsule Take 20 mg by mouth Daily       Coenzyme Q10 (CO Q-10) 200 MG CAPS Take  by mouth daily. Multiple Vitamin (MULTI-VITAMIN) TABS Take  by mouth daily. ARGININE PO Take 500 mg by mouth daily 4 tab       No current facility-administered medications for this visit.      Past Medical History:   Diagnosis Date    Ascending Aneurysm ([facility] [address] 02/2017    Ascending, 4.2 cm per pt    Brown's esophagus     CAD (coronary artery disease)     Cancer (HCC)     throat cancer, s/p radiation    Chronic back pain     Hepatitis C     as child    History of hyperbaric oxygen therapy     after bottom teeth removed after rdiation therapy    Hyperlipidemia     Hypertension     Left shoulder pain     nerve pain , ? 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    PONV (postoperative nausea and vomiting)     only after OHS    S/P CABG (coronary artery bypass graft) 2002    [facility]    Thyroid disease     Vision loss of right eye     during hyperbaric chamber oxygen therapy     Vitreous opacities     right    Wears dentures     full upper, no bottom teeth and no use of dentures on bottom      Past Surgical History:   Procedure Laterality Date    CARDIAC CATHETERIZATION  8/2002, 4/2004    [facility]    COLONOSCOPY      CORONARY ANGIOPLASTY WITH STENT PLACEMENT  7/2002    [facility]    CORONARY ANGIOPLASTY WITH STENT PLACEMENT N/A 07/12/2019    MID LAD    CORONARY ARTERY BYPASS GRAFT  2002    [facility]    DENTAL SURGERY      bottom teeth removal    HEMORRHOID SURGERY      KNEE SURGERY Right     LARYNGOSCOPY N/A 10/4/2017    LARYNGOSCOPY MICRO WITH BIOPSY performed by Harsh Perez MD at [address] 5/12/2021    LARYNGOSCOPY MICRO AND BIOPSY, WITH JET VENT performed by Harsh Perez MD at [address] N/A 6/2/2021    (DR. HUNTER) SUSPENSION MICROLARYNGOSCOPY WITH RECONSTRUCTION AND ADVANCEMENT FLAP X2; (DR. GONGORA)  MICROLARYNGOSCOPY AND BX POSTERIOR RIGHT VOCAL CORD      JET VENTILATION AND CO2 LASER performed by Adam Willingham MD at [address] N/A 7/27/2022    SUSPENSION DIRECT LARYNGOSCOPY WITH EXCISIONAL BIOPSY OF RIGHT SIDED LARYNGEAL MASS performed by Harsh Perez MD at [address] N/A 8/22/2022    MICROLARYNGOSCOPY WITH BIOPSY, SUSPENSION LARYNGOSCOPY WITH JET VENTILATION AND C02 LASER, RE-EXCISION FOR MARGINS performed by Harsh Perez MD at [facility] MICROLARYNGOSCOPY W BIOPSY  2016    by Dr Abby Horan SUBRETINAL/CHOROIDAL Right 2018    VITRECTOMY 25 GAUGE performed by Marylou Haas MD at [place] [address] Right 2018     VITRECTOMY 22 GAUGE (Right Eye)     Family History   Problem Relation Age of Onset    Heart Disease Father         MI    High Blood Pressure Father     Stroke Father     Cancer Maternal Grandmother     Cancer Maternal Grandfather     Diabetes Neg Hx      Social History     Tobacco Use    Smoking status: Former     Packs/day: 2.00     Years: 20.00     Pack years: 40.00     Types: Cigarettes     Quit date: 2001     Years since quittin.2    Smokeless tobacco: Never   Substance Use Topics    Alcohol use: No     Alcohol/week: 0.0 standard drinks       Subjective:       Review of Systems   Constitutional:  Negative for activity change, appetite change, chills, diaphoresis, fatigue, fever and unexpected weight change. HENT:  Negative for congestion, dental problem, ear discharge, ear pain, facial swelling, hearing loss, mouth sores, nosebleeds, postnasal drip, rhinorrhea, sinus pressure, sneezing, sore throat, tinnitus, trouble swallowing and voice change. Eyes:  Negative for visual disturbance. Respiratory:  Negative for apnea, cough, choking, chest tightness, shortness of breath, wheezing and stridor. Cardiovascular:  Negative for chest pain, palpitations and leg swelling. Gastrointestinal:  Negative for abdominal pain, diarrhea, nausea and vomiting. Endocrine: Negative for cold intolerance, heat intolerance, polydipsia and polyuria. Genitourinary:  Negative for dysuria, enuresis and hematuria. Musculoskeletal:  Negative for arthralgias, gait problem, neck pain and neck stiffness. Skin:  Negative for color change and rash. Allergic/Immunologic: Negative for environmental allergies, food allergies and immunocompromised state.    Neurological:  Negative for dizziness, syncope, facial asymmetry, speech difficulty, light-headedness and headaches. Hematological:  Negative for adenopathy. Does not bruise/bleed easily. Psychiatric/Behavioral:  Negative for confusion and sleep disturbance. The patient is not nervous/anxious. Objective:     /68 (Site: Left Upper Arm, Position: Sitting)   Pulse 58   Temp 97.9 °F (36.6 °C) (Infrared)   Resp 16   Ht 5' 11\" (1.803 m)   Wt 182 lb 1.6 oz (82.6 kg)   SpO2 98%   BMI 25.40 kg/m²     Physical Exam  Vitals and nursing note reviewed. Constitutional:       Appearance: He is well-developed. HENT:      Head: Normocephalic and atraumatic. No laceration. Salivary Glands: Right salivary gland is not diffusely enlarged or tender. Left salivary gland is not diffusely enlarged or tender. Comments:        Right Ear: Hearing, tympanic membrane, ear canal and external ear normal. No drainage or swelling. No middle ear effusion. Tympanic membrane is not perforated or erythematous. Left Ear: Hearing, tympanic membrane, ear canal and external ear normal. No drainage or swelling. No middle ear effusion. Tympanic membrane is not perforated or erythematous. Nose: Nose normal. No septal deviation, mucosal edema or rhinorrhea. Mouth/Throat:      Mouth: Mucous membranes are moist. Mucous membranes are not pale and not dry. No oral lesions. Tongue: No lesions. Pharynx: Oropharynx is clear. Uvula midline. No oropharyngeal exudate or posterior oropharyngeal erythema. Comments: LIps: lips normal     Mallampati 1  Base of tongue: symmetric  Mirror exam deferred due to gag reflex. Eyes:      Extraocular Movements: Extraocular movements intact. Comments: Conjugate gaze   Neck:      Thyroid: No thyroid mass or thyromegaly. Trachea: Phonation normal. No tracheal deviation. Comments:     Cardiovascular:      Rate and Rhythm: Normal rate and regular rhythm.       Heart sounds: No murmur heard.  Pulmonary:      Effort: Pulmonary effort is normal. No retractions. Breath sounds: Normal breath sounds. No stridor. Chest:      Chest wall: There is no dullness to percussion. Musculoskeletal:      Cervical back: Normal range of motion and neck supple. Lymphadenopathy:      Cervical: No cervical adenopathy. Neurological:      Mental Status: He is alert and oriented to person, place, and time. Cranial Nerves: Cranial nerve deficit: VIIth N function intact bilat. Psychiatric:         Mood and Affect: Mood and affect normal.         Behavior: Behavior is cooperative. HIGH RESOLUTION FLEXIBLE VIDEOLARYNGOSOCPY    A fiberoptic laryngoscopy was performed under topical anesthesia, after using Afrin and Lidocaine spray in the nasal fossa. The nasal fossa, nasopharynx, hypopharynx and larynx were carefully examined. Base of tongue was symmetrical. Epiglottis appeared normal and was not retrodisplaced. True vocal cords had normal mobility. There was no erythema. Rounded bulging subcutaneous swelling left anterior true vocal cord crossing midline above the anterior commissure. No pooling in the pyriform sinuses. Data:  All of the past medical history, past surgical history, family history,social history, allergies and current medications were reviewed with the patient. Assessment & Plan   Diagnoses and all orders for this visit:     Diagnosis Orders   1. Primary squamous cell carcinoma of larynx (HCC)  [place] LARYNGOSCOPY FLEXIBLE DIAGNOSTIC    MICROLARYNGOSCOPY W BIOPSY      2. Laryngeal mass, submucosal, left anterior false vocal cord  [place] LARYNGOSCOPY FLEXIBLE DIAGNOSTIC    MICROLARYNGOSCOPY W BIOPSY      3. Effects, radiation, subsequent encounter  [place] LARYNGOSCOPY FLEXIBLE DIAGNOSTIC    MICROLARYNGOSCOPY W BIOPSY      4. Hoarseness  [place] LARYNGOSCOPY FLEXIBLE DIAGNOSTIC    MICROLARYNGOSCOPY W BIOPSY      5. Carcinoma in situ of larynx        6. Carcinoma larynx ([address]        7. Brown's esophagus with dysplasia  [place] LARYNGOSCOPY FLEXIBLE DIAGNOSTIC    MICROLARYNGOSCOPY W BIOPSY      8. Squamous cell carcinoma of larynx (HCC)        9. 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 Malignant neoplasm of supraglottis ([address]             The findings were explained and his questions were answered. Options were discussed including microlangoscopy with biopsy this lesion described above and obviously present in the photograph from the video laryngoscopy    Benefits and risks are discussed he requests we proceed. Ivonne Tena CMA ([facility]), am scribing for, and in the presence of Dr. Jose Martinez. Electronically signed by Bigg Parrish CMA ([facility]) on 2/16/23 at 11:08 AM EST. (Please note that portions of this note were completed with a voice recognition program. Efforts were made to edit the dictations butoccasionally words are mis-transcribed.)    I agree to the above documentation placed by my scribe. I have personally evaluated this patient. Additional findings are as noted. I reviewed the scribe's note and agree with the documented findings and plan of care. Any areas of disagreement are corrected. I agree with the chief complaint, past medical history, past surgical history, allergies, medications, social and family history as documented unless otherwise noted below.      Electronically signed by Felicia Guardado MD on 2/27/2023 at 10:03 AM

## 2023-02-21 ENCOUNTER — PREP FOR PROCEDURE (OUTPATIENT)
Dept: ENT CLINIC | Age: 68
End: 2023-02-21

## 2023-02-21 ENCOUNTER — HOSPITAL ENCOUNTER (OUTPATIENT)
Age: 68
Discharge: HOME | End: 2023-02-21
Payer: MEDICARE

## 2023-02-21 DIAGNOSIS — T66.XXXS LATE EFFECT OF RADIATION: Primary | ICD-10-CM

## 2023-02-21 DIAGNOSIS — J38.7 LARYNGEAL MASS: ICD-10-CM

## 2023-02-21 DIAGNOSIS — Z01.818 PREOP TESTING: ICD-10-CM

## 2023-02-21 LAB
ALBUMIN SERPL BCG-MCNC: 4.2 G/DL (ref 3.5–5.1)
ALP SERPL-CCNC: 85 U/L (ref 38–126)
ALT SERPL W/O P-5'-P-CCNC: 16 U/L (ref 11–66)
ANION GAP SERPL CALC-SCNC: 10 MEQ/L (ref 8–16)
AST SERPL-CCNC: 17 U/L (ref 5–40)
BASOPHILS ABSOLUTE: 0 THOU/MM3 (ref 0–0.1)
BASOPHILS NFR BLD AUTO: 0.4 %
BILIRUB SERPL-MCNC: 0.5 MG/DL (ref 0.3–1.2)
BUN SERPL-MCNC: 16 MG/DL (ref 7–22)
CALCIUM SERPL-MCNC: 9.2 MG/DL (ref 8.5–10.5)
CHLORIDE SERPL-SCNC: 107 MEQ/L (ref 98–111)
CO2 SERPL-SCNC: 26 MEQ/L (ref 23–33)
CREAT SERPL-MCNC: 0.7 MG/DL (ref 0.4–1.2)
DEPRECATED RDW RBC AUTO: 48 FL (ref 35–45)
EOSINOPHIL NFR BLD AUTO: 1 %
EOSINOPHILS ABSOLUTE: 0.1 THOU/MM3 (ref 0–0.4)
ERYTHROCYTE [DISTWIDTH] IN BLOOD BY AUTOMATED COUNT: 13.5 % (ref 11.5–14.5)
GFR SERPL CREATININE-BSD FRML MDRD: > 60 ML/MIN/1.73M2
GLUCOSE SERPL-MCNC: 87 MG/DL (ref 70–108)
HCT VFR BLD AUTO: 45.3 % (ref 42–52)
HGB BLD-MCNC: 14.8 GM/DL (ref 14–18)
IMM GRANULOCYTES # BLD AUTO: 0.03 THOU/MM3 (ref 0–0.07)
IMM GRANULOCYTES NFR BLD AUTO: 0.4 %
LYMPHOCYTES ABSOLUTE: 0.8 THOU/MM3 (ref 1–4.8)
LYMPHOCYTES NFR BLD AUTO: 12.4 %
MCH RBC QN AUTO: 31.2 PG (ref 26–33)
MCHC RBC AUTO-ENTMCNC: 32.7 GM/DL (ref 32.2–35.5)
MCV RBC AUTO: 95.6 FL (ref 80–94)
MONOCYTES ABSOLUTE: 0.6 THOU/MM3 (ref 0.4–1.3)
MONOCYTES NFR BLD AUTO: 9.5 %
NEUTROPHILS NFR BLD AUTO: 76.3 %
NRBC BLD AUTO-RTO: 0 /100 WBC
PLATELET # BLD AUTO: 207 THOU/MM3 (ref 130–400)
PMV BLD AUTO: 10.1 FL (ref 9.4–12.4)
POTASSIUM SERPL-SCNC: 4.4 MEQ/L (ref 3.5–5.2)
PROT SERPL-MCNC: 6.3 G/DL (ref 6.1–8)
RBC # BLD AUTO: 4.74 MILL/MM3 (ref 4.7–6.1)
SEGMENTED NEUTROPHILS ABSOLUTE COUNT: 5.2 THOU/MM3 (ref 1.8–7.7)
SODIUM SERPL-SCNC: 143 MEQ/L (ref 135–145)
WBC # BLD AUTO: 6.8 THOU/MM3 (ref 4.8–10.8)

## 2023-02-21 PROCEDURE — 36415 COLL VENOUS BLD VENIPUNCTURE: CPT

## 2023-02-21 PROCEDURE — 80053 COMPREHEN METABOLIC PANEL: CPT

## 2023-02-21 PROCEDURE — 85025 COMPLETE CBC W/AUTO DIFF WBC: CPT

## 2023-02-21 NOTE — TELEPHONE ENCOUNTER
Shruti at ENT called asking if case is clear as they would like to move the surgery date to Monday 2/27/23. They would need to know by today if cleared. Please advise.

## 2023-02-22 RX ORDER — OMEPRAZOLE 40 MG/1
1 CAPSULE, DELAYED RELEASE ORAL EVERY EVENING
COMMUNITY
Start: 2023-02-15

## 2023-02-22 NOTE — PROGRESS NOTES
Follow all instructions given by your physician  NPO after midnight   Sips of water am of surgery with allowed medications  Bring insurance info and 's license  Wear comfortable clean, loose fitting clothing  No jewelry or contact lenses to be worn day of surgery  Case for glasses. Shower night before and morning of surgery with a liquid antibacterial soap, dry with fresh clean towel; no lotions, creams or powder. Clean sheets and pillow case on bed night before surgery  Bring medications in original bottles    Please refer to the SSI-Surgical Site Infection Flyer you hopefully received in the mail-together we can prevent infections; signs and symptoms reviewed. When discharged be sure you understand how to care for your wound and that you have the phone # to call if you have any concerns or questions about your wound.  needed at discharge and someone over 18 to stay with you for 24 hours overnight (surgery may be cancelled if you don't have this)  Report to SDS on 2nd floor  If you would become ill prior to surgery, please call the surgeon  May have a visitor with you, we request that you limit to 2 visitors in pre-op area  Masks are recommended but not required, new masks at entrance desk  Call [phone] for any questions    Covid questionnaire Complete; Patient negative for symptoms or exposure. See documentation.

## 2023-02-27 ENCOUNTER — HOSPITAL ENCOUNTER (OUTPATIENT)
Age: 68
Setting detail: OUTPATIENT SURGERY
Discharge: HOME | End: 2023-02-27
Attending: OTOLARYNGOLOGY | Admitting: OTOLARYNGOLOGY
Payer: MEDICARE

## 2023-02-27 ENCOUNTER — ANESTHESIA (OUTPATIENT)
Dept: OPERATING ROOM | Age: 68
End: 2023-02-27
Payer: MEDICARE

## 2023-02-27 ENCOUNTER — ANESTHESIA EVENT (OUTPATIENT)
Dept: OPERATING ROOM | Age: 68
End: 2023-02-27
Payer: MEDICARE

## 2023-02-27 VITALS
BODY MASS INDEX: 25.34 KG/M2 | SYSTOLIC BLOOD PRESSURE: 144 MMHG | TEMPERATURE: 98 F | WEIGHT: 181 LBS | RESPIRATION RATE: 16 BRPM | HEART RATE: 61 BPM | HEIGHT: 71 IN | DIASTOLIC BLOOD PRESSURE: 77 MMHG | OXYGEN SATURATION: 99 %

## 2023-02-27 DIAGNOSIS — C32.1 MALIGNANT NEOPLASM OF FALSE VOCAL CORDS (HCC): ICD-10-CM

## 2023-02-27 DIAGNOSIS — K22.719 BARRETT'S ESOPHAGUS WITH DYSPLASIA: ICD-10-CM

## 2023-02-27 DIAGNOSIS — R49.0 HOARSENESS: ICD-10-CM

## 2023-02-27 DIAGNOSIS — C32.9 PRIMARY SQUAMOUS CELL CARCINOMA OF LARYNX (HCC): ICD-10-CM

## 2023-02-27 DIAGNOSIS — J38.7 LARYNGEAL MASS: ICD-10-CM

## 2023-02-27 DIAGNOSIS — T66.XXXA RADIATION INJURY, INITIAL ENCOUNTER: ICD-10-CM

## 2023-02-27 LAB — POTASSIUM SERPL-SCNC: 4.5 MEQ/L (ref 3.5–5.2)

## 2023-02-27 PROCEDURE — 31536 LARYNGOSCOPY W/BX & OP SCOPE: CPT | Performed by: OTOLARYNGOLOGY

## 2023-02-27 PROCEDURE — 84132 ASSAY OF SERUM POTASSIUM: CPT

## 2023-02-27 PROCEDURE — 2500000003 HC RX 250 WO HCPCS: Performed by: NURSE ANESTHETIST, CERTIFIED REGISTERED

## 2023-02-27 PROCEDURE — 7100000011 HC PHASE II RECOVERY - ADDTL 15 MIN: Performed by: OTOLARYNGOLOGY

## 2023-02-27 PROCEDURE — 2709999900 HC NON-CHARGEABLE SUPPLY: Performed by: OTOLARYNGOLOGY

## 2023-02-27 PROCEDURE — 36415 COLL VENOUS BLD VENIPUNCTURE: CPT

## 2023-02-27 PROCEDURE — 88305 TISSUE EXAM BY PATHOLOGIST: CPT

## 2023-02-27 PROCEDURE — 6360000002 HC RX W HCPCS: Performed by: OTOLARYNGOLOGY

## 2023-02-27 PROCEDURE — 3600000004 HC SURGERY LEVEL 4 BASE: Performed by: OTOLARYNGOLOGY

## 2023-02-27 PROCEDURE — 2580000003 HC RX 258: Performed by: OTOLARYNGOLOGY

## 2023-02-27 PROCEDURE — 7100000000 HC PACU RECOVERY - FIRST 15 MIN: Performed by: OTOLARYNGOLOGY

## 2023-02-27 PROCEDURE — 6360000002 HC RX W HCPCS: Performed by: NURSE ANESTHETIST, CERTIFIED REGISTERED

## 2023-02-27 PROCEDURE — 3700000001 HC ADD 15 MINUTES (ANESTHESIA): Performed by: OTOLARYNGOLOGY

## 2023-02-27 PROCEDURE — 7100000001 HC PACU RECOVERY - ADDTL 15 MIN: Performed by: OTOLARYNGOLOGY

## 2023-02-27 PROCEDURE — 3700000000 HC ANESTHESIA ATTENDED CARE: Performed by: OTOLARYNGOLOGY

## 2023-02-27 PROCEDURE — 6370000000 HC RX 637 (ALT 250 FOR IP)

## 2023-02-27 PROCEDURE — 3600000014 HC SURGERY LEVEL 4 ADDTL 15MIN: Performed by: OTOLARYNGOLOGY

## 2023-02-27 PROCEDURE — 2580000003 HC RX 258: Performed by: NURSE ANESTHETIST, CERTIFIED REGISTERED

## 2023-02-27 PROCEDURE — 7100000010 HC PHASE II RECOVERY - FIRST 15 MIN: Performed by: OTOLARYNGOLOGY

## 2023-02-27 RX ORDER — SODIUM CHLORIDE 0.9 % (FLUSH) 0.9 %
5-40 SYRINGE (ML) INJECTION PRN
Status: CANCELLED | OUTPATIENT
Start: 2023-02-27

## 2023-02-27 RX ORDER — LIDOCAINE HYDROCHLORIDE 20 MG/ML
INJECTION, SOLUTION INTRAVENOUS PRN
Status: DISCONTINUED | OUTPATIENT
Start: 2023-02-27 | End: 2023-02-27 | Stop reason: SDUPTHER

## 2023-02-27 RX ORDER — CEFTRIAXONE 1 G/1
INJECTION, POWDER, FOR SOLUTION INTRAMUSCULAR; INTRAVENOUS PRN
Status: DISCONTINUED | OUTPATIENT
Start: 2023-02-27 | End: 2023-02-27 | Stop reason: SDUPTHER

## 2023-02-27 RX ORDER — FENTANYL CITRATE 50 UG/ML
50 INJECTION, SOLUTION INTRAMUSCULAR; INTRAVENOUS EVERY 5 MIN PRN
Status: CANCELLED | OUTPATIENT
Start: 2023-02-27

## 2023-02-27 RX ORDER — SCOLOPAMINE TRANSDERMAL SYSTEM 1 MG/1
1 PATCH, EXTENDED RELEASE TRANSDERMAL
Status: DISCONTINUED | OUTPATIENT
Start: 2023-02-27 | End: 2023-02-27 | Stop reason: HOSPADM

## 2023-02-27 RX ORDER — SODIUM CHLORIDE 9 MG/ML
INJECTION, SOLUTION INTRAVENOUS PRN
Status: CANCELLED | OUTPATIENT
Start: 2023-02-27

## 2023-02-27 RX ORDER — MEPERIDINE HYDROCHLORIDE 25 MG/ML
12.5 INJECTION INTRAMUSCULAR; INTRAVENOUS; SUBCUTANEOUS EVERY 5 MIN PRN
Status: CANCELLED | OUTPATIENT
Start: 2023-02-27

## 2023-02-27 RX ORDER — SCOLOPAMINE TRANSDERMAL SYSTEM 1 MG/1
PATCH, EXTENDED RELEASE TRANSDERMAL
Status: DISCONTINUED
Start: 2023-02-27 | End: 2023-02-27 | Stop reason: HOSPADM

## 2023-02-27 RX ORDER — SODIUM CHLORIDE 9 MG/ML
INJECTION, SOLUTION INTRAVENOUS PRN
Status: DISCONTINUED | OUTPATIENT
Start: 2023-02-27 | End: 2023-02-27 | Stop reason: HOSPADM

## 2023-02-27 RX ORDER — PROPOFOL 10 MG/ML
INJECTION, EMULSION INTRAVENOUS PRN
Status: DISCONTINUED | OUTPATIENT
Start: 2023-02-27 | End: 2023-02-27 | Stop reason: SDUPTHER

## 2023-02-27 RX ORDER — ONDANSETRON 2 MG/ML
INJECTION INTRAMUSCULAR; INTRAVENOUS PRN
Status: DISCONTINUED | OUTPATIENT
Start: 2023-02-27 | End: 2023-02-27 | Stop reason: SDUPTHER

## 2023-02-27 RX ORDER — SODIUM CHLORIDE 0.9 % (FLUSH) 0.9 %
5-40 SYRINGE (ML) INJECTION EVERY 12 HOURS SCHEDULED
Status: CANCELLED | OUTPATIENT
Start: 2023-02-27

## 2023-02-27 RX ORDER — EPINEPHRINE 1 MG/ML
INJECTION, SOLUTION, CONCENTRATE INTRAVENOUS PRN
Status: DISCONTINUED | OUTPATIENT
Start: 2023-02-27 | End: 2023-02-27 | Stop reason: ALTCHOICE

## 2023-02-27 RX ORDER — ROCURONIUM BROMIDE 10 MG/ML
INJECTION, SOLUTION INTRAVENOUS PRN
Status: DISCONTINUED | OUTPATIENT
Start: 2023-02-27 | End: 2023-02-27 | Stop reason: SDUPTHER

## 2023-02-27 RX ORDER — SODIUM CHLORIDE 0.9 % (FLUSH) 0.9 %
5-40 SYRINGE (ML) INJECTION EVERY 12 HOURS SCHEDULED
Status: DISCONTINUED | OUTPATIENT
Start: 2023-02-27 | End: 2023-02-27 | Stop reason: HOSPADM

## 2023-02-27 RX ORDER — SODIUM CHLORIDE 9 MG/ML
INJECTION, SOLUTION INTRAVENOUS CONTINUOUS PRN
Status: DISCONTINUED | OUTPATIENT
Start: 2023-02-27 | End: 2023-02-27 | Stop reason: SDUPTHER

## 2023-02-27 RX ORDER — DEXAMETHASONE SODIUM PHOSPHATE 10 MG/ML
INJECTION, EMULSION INTRAMUSCULAR; INTRAVENOUS PRN
Status: DISCONTINUED | OUTPATIENT
Start: 2023-02-27 | End: 2023-02-27 | Stop reason: SDUPTHER

## 2023-02-27 RX ORDER — LABETALOL HYDROCHLORIDE 5 MG/ML
INJECTION, SOLUTION INTRAVENOUS PRN
Status: DISCONTINUED | OUTPATIENT
Start: 2023-02-27 | End: 2023-02-27 | Stop reason: SDUPTHER

## 2023-02-27 RX ORDER — ONDANSETRON 2 MG/ML
4 INJECTION INTRAMUSCULAR; INTRAVENOUS
Status: CANCELLED | OUTPATIENT
Start: 2023-02-27 | End: 2023-02-28

## 2023-02-27 RX ORDER — FENTANYL CITRATE 50 UG/ML
INJECTION, SOLUTION INTRAMUSCULAR; INTRAVENOUS PRN
Status: DISCONTINUED | OUTPATIENT
Start: 2023-02-27 | End: 2023-02-27 | Stop reason: SDUPTHER

## 2023-02-27 RX ORDER — SUCCINYLCHOLINE/SOD CL,ISO/PF 200MG/10ML
SYRINGE (ML) INTRAVENOUS PRN
Status: DISCONTINUED | OUTPATIENT
Start: 2023-02-27 | End: 2023-02-27 | Stop reason: SDUPTHER

## 2023-02-27 RX ORDER — EPHEDRINE SULFATE/0.9% NACL/PF 50 MG/5 ML
SYRINGE (ML) INTRAVENOUS PRN
Status: DISCONTINUED | OUTPATIENT
Start: 2023-02-27 | End: 2023-02-27 | Stop reason: SDUPTHER

## 2023-02-27 RX ORDER — SODIUM CHLORIDE 0.9 % (FLUSH) 0.9 %
5-40 SYRINGE (ML) INJECTION PRN
Status: DISCONTINUED | OUTPATIENT
Start: 2023-02-27 | End: 2023-02-27 | Stop reason: HOSPADM

## 2023-02-27 RX ADMIN — SUGAMMADEX 200 MG: 100 INJECTION, SOLUTION INTRAVENOUS at 14:20

## 2023-02-27 RX ADMIN — DEXAMETHASONE SODIUM PHOSPHATE 5 MG: 10 INJECTION, EMULSION INTRAMUSCULAR; INTRAVENOUS at 14:19

## 2023-02-27 RX ADMIN — ROCURONIUM BROMIDE 5 MG: 10 INJECTION INTRAVENOUS at 13:31

## 2023-02-27 RX ADMIN — PROPOFOL 200 MG: 10 INJECTION, EMULSION INTRAVENOUS at 13:31

## 2023-02-27 RX ADMIN — ROCURONIUM BROMIDE 35 MG: 10 INJECTION INTRAVENOUS at 13:42

## 2023-02-27 RX ADMIN — Medication 10 MG: at 13:51

## 2023-02-27 RX ADMIN — LIDOCAINE HYDROCHLORIDE 100 MG: 20 INJECTION, SOLUTION INTRAVENOUS at 13:31

## 2023-02-27 RX ADMIN — SODIUM CHLORIDE: 9 INJECTION, SOLUTION INTRAVENOUS at 13:24

## 2023-02-27 RX ADMIN — FENTANYL CITRATE 50 MCG: 50 INJECTION, SOLUTION INTRAMUSCULAR; INTRAVENOUS at 13:28

## 2023-02-27 RX ADMIN — SODIUM CHLORIDE: 9 INJECTION, SOLUTION INTRAVENOUS at 12:38

## 2023-02-27 RX ADMIN — ONDANSETRON 4 MG: 2 INJECTION INTRAMUSCULAR; INTRAVENOUS at 14:18

## 2023-02-27 RX ADMIN — FENTANYL CITRATE 50 MCG: 50 INJECTION, SOLUTION INTRAMUSCULAR; INTRAVENOUS at 13:53

## 2023-02-27 RX ADMIN — Medication 120 MG: at 13:31

## 2023-02-27 RX ADMIN — CEFTRIAXONE SODIUM 1 G: 1 INJECTION, POWDER, FOR SOLUTION INTRAMUSCULAR; INTRAVENOUS at 13:47

## 2023-02-27 RX ADMIN — Medication 5 MG: at 13:59

## 2023-02-27 ASSESSMENT — PAIN SCALES - GENERAL: PAINLEVEL_OUTOF10: 0

## 2023-02-27 ASSESSMENT — PAIN - FUNCTIONAL ASSESSMENT: PAIN_FUNCTIONAL_ASSESSMENT: 0-10

## 2023-02-27 NOTE — BRIEF OP NOTE
Brief Postoperative Note      Patient: Emili Quiñones  YOB: 1955  MRN: [id number]    Date of Procedure: 2/27/2023    Pre-Op Diagnosis: Laryngeal mass, left anterior false cord  Primary squamous cell carcinoma of larynx ([address] [C32.9]  Radiation injury, initial encounter [T66. 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 There was a tiny bit of granular tissue at the opening to the right ventricle, middle third, which I sent. These pieces were extremely small. Procedure(s): MICROLARNGOSCOPY WITH BIOPSY    Surgeon(s):  Dee Peter MD    Assistant:  * No surgical staff found *    Anesthesia: General    Estimated Blood Loss (mL): Minimal    Complications: None    Specimens:   ID Type Source Tests Collected by Time Destination   A : RIGHT FALSE VOCAL CORDS  Tissue Mouth SURGICAL PATHOLOGY Dee Peter MD 2/27/2023 1407        Implants:  * No implants in log *      Drains: * No LDAs found *    Findings: Amazingly, the bulge in the mucosa of the left anterior false vocal cord had been there for over a year at least, was not impressive at all with the laryngoscope in place. The difference appeared to be the right anterior false vocal cord had been biopsied. See photos. Given his prior chemoradiation, the mucosa on the left anterior false vocal cord was not entered. This is an asymptomatic area anyway. There was a little bit of granular tissue at the opening to the ventricle on the right side, middle third. This was sampled but was very small amount of tissue. This tissue was sent in formalin.     Electronically signed by Mariano Parker MD on 2/27/2023 at 2:42 PM

## 2023-02-27 NOTE — H&P
[facility] [place] [facility]  [facility] EAR, NOSE AND THROAT   [place] [address]  Dept: [phone]  Dept Fax: [phone]  Loc: [phone]    Faustina Martinez is a 69 y.o. male who was referred byNo ref. provider found for:  Chief Complaint   Patient presents with    Follow-up     Patient here for 1 month follow up for chip tip scope. Patient also had a CT scan and PET scan completed. Results   . HPI:     Faustina Martinez is a 69 y.o. male who presents today for follow up 1 month for tip chip. Patient also had a CT scan and PET scan completed. Scans reviewed with patient. States voice is getting better, he'll talk on phone for 20 minutes then he will start to lose voice. His last biopsy he got really sick at home, woke up with chest pain. He was given morphine. He was scheduled to have an EGD, was having a little more heart burn than usual.  They upped his dose of Omeprazole. When he is sitting up, his shoulder goes to sleep. He hasn't used a wedge to sleep. States he will choke. At times he lays on his back 15-20 minutes because his shoulder falls asleep so bad. He was seeing someone for it and was getting shots for pain but then didn't get anymore since he's taking Brilinta. He has a pinched nerve in neck. Has an aneurysm in his heart. He's seeing Dr. Stephanie Lizama at [facility] for it. CT Scan:     1. There is fullness in the larynx on the left side at the level of the false vocal cords. There is medial deviation of the underlying fat pad. Please correlate clinically. 2. Straightening of the normal cervical lordosis. 3. Small retention cyst or polyp in the right maxillary sinus. 4. There is bilateral carotid artery calcification. 5. Otherwise negative CT scan of the neck. **This report has been created using voice recognition software. It may contain minor errors which are inherent in voice recognition technology. ** Final report electronically signed by DR Makenna Posada on 1/21/2023       PET CT Scan:  1. Focal activity without a discrete CT correlate at the right vocal cord corresponding to known malignancy. 2. No FDG avid metastatic disease. Final report electronically signed by Dr. Abhijeet Molina on 2/13/2023                History:     No Known Allergies  Current Outpatient Medications   Medication Sig Dispense Refill    terazosin (HYTRIN) 5 MG capsule TAKE 1 CAPSULE NIGHTLY 90 capsule 1    atorvastatin (LIPITOR) 40 MG tablet Take 1 tablet by mouth nightly 90 tablet 1    metoprolol succinate (TOPROL XL) 50 MG extended release tablet Take 0.5 tablets by mouth 2 times daily 90 tablet 1    ticagrelor (BRILINTA) 90 MG TABS tablet Take 1 tablet by mouth 2 times daily 180 tablet 1    levothyroxine (SYNTHROID) 50 MCG tablet Take 1 tablet by mouth daily 90 tablet 1    ciclopirox (PENLAC) 8 % solution Apply topically Toenail fungus      aspirin 81 MG EC tablet Take 81 mg by mouth daily      gabapentin (NEURONTIN) 300 MG capsule Take 300 mg by mouth daily. ipratropium (ATROVENT) 0.06 % nasal spray 2 sprays by Each Nostril route 3 times daily as needed for Rhinitis      nitroGLYCERIN (NITROSTAT) 0.4 MG SL tablet up to max of 3 total doses. If no relief after 1 dose, call 911. 25 tablet 3    diphenhydrAMINE-APAP, sleep, (TYLENOL PM EXTRA STRENGTH)  MG tablet Take 2 tablets by mouth nightly      omeprazole (PRILOSEC) 20 MG delayed release capsule Take 20 mg by mouth Daily       Coenzyme Q10 (CO Q-10) 200 MG CAPS Take  by mouth daily. Multiple Vitamin (MULTI-VITAMIN) TABS Take  by mouth daily. ARGININE PO Take 500 mg by mouth daily 4 tab       No current facility-administered medications for this visit.      Past Medical History:   Diagnosis Date    Ascending Aneurysm ([facility] [address] 02/2017    Ascending, 4.2 cm per pt    Brown's esophagus     CAD (coronary artery disease)     Cancer (HCC)     throat cancer, s/p radiation    Chronic back pain     Hepatitis C     as child    History of hyperbaric oxygen therapy     after bottom teeth removed after rdiation therapy    Hyperlipidemia     Hypertension     Left shoulder pain     nerve pain , ? 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    PONV (postoperative nausea and vomiting)     only after OHS    S/P CABG (coronary artery bypass graft) 2002    [facility]    Thyroid disease     Vision loss of right eye     during hyperbaric chamber oxygen therapy     Vitreous opacities     right    Wears dentures     full upper, no bottom teeth and no use of dentures on bottom      Past Surgical History:   Procedure Laterality Date    CARDIAC CATHETERIZATION  8/2002, 4/2004    [facility]    COLONOSCOPY      CORONARY ANGIOPLASTY WITH STENT PLACEMENT  7/2002    [facility]    CORONARY ANGIOPLASTY WITH STENT PLACEMENT N/A 07/12/2019    MID LAD    CORONARY ARTERY BYPASS GRAFT  2002    [facility]    DENTAL SURGERY      bottom teeth removal    HEMORRHOID SURGERY      KNEE SURGERY Right     LARYNGOSCOPY N/A 10/4/2017    LARYNGOSCOPY MICRO WITH BIOPSY performed by Felisha Cage MD at [facility] 5/12/2021    LARYNGOSCOPY MICRO AND BIOPSY, WITH JET VENT performed by Felisha Cage MD at [address] N/A 6/2/2021    (DR. HUNTER) SUSPENSION MICROLARYNGOSCOPY WITH RECONSTRUCTION AND ADVANCEMENT FLAP X2; (DR. GONGORA)  MICROLARYNGOSCOPY AND BX POSTERIOR RIGHT VOCAL CORD      JET VENTILATION AND CO2 LASER performed by Guerrero Maya MD at [address] N/A 7/27/2022    SUSPENSION DIRECT LARYNGOSCOPY WITH EXCISIONAL BIOPSY OF RIGHT SIDED LARYNGEAL MASS performed by Felisha Cage MD at [address] N/A 8/22/2022    MICROLARYNGOSCOPY WITH BIOPSY, SUSPENSION LARYNGOSCOPY WITH JET VENTILATION AND C02 LASER, RE-EXCISION FOR MARGINS performed by Felisha Cage MD at [place] [facility] MICROLARYNGOSCOPY W BIOPSY  2016    by Dr Eyal Zarco SUBRETINAL/CHOROIDAL Right 2018    VITRECTOMY 25 GAUGE performed by Marcelle Arriaga MD at [place] [address] Right 2018     VITRECTOMY 22 GAUGE (Right Eye)     Family History   Problem Relation Age of Onset    Heart Disease Father         MI    High Blood Pressure Father     Stroke Father     Cancer Maternal Grandmother     Cancer Maternal Grandfather     Diabetes Neg Hx      Social History     Tobacco Use    Smoking status: Former     Packs/day: 2.00     Years: 20.00     Pack years: 40.00     Types: Cigarettes     Quit date: 2001     Years since quittin.2    Smokeless tobacco: Never   Substance Use Topics    Alcohol use: No     Alcohol/week: 0.0 standard drinks       Subjective:       Review of Systems   Constitutional:  Negative for activity change, appetite change, chills, diaphoresis, fatigue, fever and unexpected weight change. HENT:  Negative for congestion, dental problem, ear discharge, ear pain, facial swelling, hearing loss, mouth sores, nosebleeds, postnasal drip, rhinorrhea, sinus pressure, sneezing, sore throat, tinnitus, trouble swallowing and voice change. Eyes:  Negative for visual disturbance. Respiratory:  Negative for apnea, cough, choking, chest tightness, shortness of breath, wheezing and stridor. Cardiovascular:  Negative for chest pain, palpitations and leg swelling. Gastrointestinal:  Negative for abdominal pain, diarrhea, nausea and vomiting. Endocrine: Negative for cold intolerance, heat intolerance, polydipsia and polyuria. Genitourinary:  Negative for dysuria, enuresis and hematuria. Musculoskeletal:  Negative for arthralgias, gait problem, neck pain and neck stiffness. Skin:  Negative for color change and rash. Allergic/Immunologic: Negative for environmental allergies, food allergies and immunocompromised state.    Neurological:  Negative for dizziness, syncope, facial asymmetry, speech difficulty, light-headedness and headaches. Hematological:  Negative for adenopathy. Does not bruise/bleed easily. Psychiatric/Behavioral:  Negative for confusion and sleep disturbance. The patient is not nervous/anxious. Objective:     /68 (Site: Left Upper Arm, Position: Sitting)   Pulse 58   Temp 97.9 °F (36.6 °C) (Infrared)   Resp 16   Ht 5' 11\" (1.803 m)   Wt 182 lb 1.6 oz (82.6 kg)   SpO2 98%   BMI 25.40 kg/m²     Physical Exam  Vitals and nursing note reviewed. Constitutional:       Appearance: He is well-developed. HENT:      Head: Normocephalic and atraumatic. No laceration. Salivary Glands: Right salivary gland is not diffusely enlarged or tender. Left salivary gland is not diffusely enlarged or tender. Comments:        Right Ear: Hearing, tympanic membrane, ear canal and external ear normal. No drainage or swelling. No middle ear effusion. Tympanic membrane is not perforated or erythematous. Left Ear: Hearing, tympanic membrane, ear canal and external ear normal. No drainage or swelling. No middle ear effusion. Tympanic membrane is not perforated or erythematous. Nose: Nose normal. No septal deviation, mucosal edema or rhinorrhea. Mouth/Throat:      Mouth: Mucous membranes are moist. Mucous membranes are not pale and not dry. No oral lesions. Tongue: No lesions. Pharynx: Oropharynx is clear. Uvula midline. No oropharyngeal exudate or posterior oropharyngeal erythema. Comments: LIps: lips normal     Mallampati 1  Base of tongue: symmetric  Mirror exam deferred due to gag reflex. Eyes:      Extraocular Movements: Extraocular movements intact. Comments: Conjugate gaze   Neck:      Thyroid: No thyroid mass or thyromegaly. Trachea: Phonation normal. No tracheal deviation. Comments:     Cardiovascular:      Rate and Rhythm: Normal rate and regular rhythm.       Heart sounds: No murmur heard.  Pulmonary:      Effort: Pulmonary effort is normal. No retractions. Breath sounds: Normal breath sounds. No stridor. Chest:      Chest wall: There is no dullness to percussion. Musculoskeletal:      Cervical back: Normal range of motion and neck supple. Lymphadenopathy:      Cervical: No cervical adenopathy. Neurological:      Mental Status: He is alert and oriented to person, place, and time. Cranial Nerves: Cranial nerve deficit: VIIth N function intact bilat. Psychiatric:         Mood and Affect: Mood and affect normal.         Behavior: Behavior is cooperative. HIGH RESOLUTION FLEXIBLE VIDEOLARYNGOSOCPY    A fiberoptic laryngoscopy was performed under topical anesthesia, after using Afrin and Lidocaine spray in the nasal fossa. The nasal fossa, nasopharynx, hypopharynx and larynx were carefully examined. Base of tongue was symmetrical. Epiglottis appeared normal and was not retrodisplaced. True vocal cords had normal mobility. There was no erythema. Rounded bulging subcutaneous swelling left anterior true vocal cord crossing midline above the anterior commissure. No pooling in the pyriform sinuses. Data:  All of the past medical history, past surgical history, family history,social history, allergies and current medications were reviewed with the patient. Assessment & Plan   Diagnoses and all orders for this visit:     Diagnosis Orders   1. Primary squamous cell carcinoma of larynx (HCC)  SD LARYNGOSCOPY FLEXIBLE DIAGNOSTIC    MICROLARYNGOSCOPY W BIOPSY      2. Laryngeal mass, submucosal, left anterior false vocal cord  SD LARYNGOSCOPY FLEXIBLE DIAGNOSTIC    MICROLARYNGOSCOPY W BIOPSY      3. Effects, radiation, subsequent encounter  SD LARYNGOSCOPY FLEXIBLE DIAGNOSTIC    MICROLARYNGOSCOPY W BIOPSY      4. Hoarseness  SD LARYNGOSCOPY FLEXIBLE DIAGNOSTIC    MICROLARYNGOSCOPY W BIOPSY      5. Carcinoma in situ of larynx        6. Carcinoma larynx ([address]        7. Brown's esophagus with dysplasia  RI LARYNGOSCOPY FLEXIBLE DIAGNOSTIC    MICROLARYNGOSCOPY W BIOPSY      8. Squamous cell carcinoma of larynx (HCC)        9. 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 Malignant neoplasm of supraglottis ([address]             The findings were explained and his questions were answered. Options were discussed including microlangoscopy with biopsy this lesion described above and obviously present in the photograph from the video laryngoscopy    Benefits and risks are discussed he requests we proceed. Antoine Flaherty CMA ([facility]), am scribing for, and in the presence of Dr. Lisandro Crawford. Electronically signed by Chris Ramsey CMA ([facility]) on 2/16/23 at 11:08 AM EST. (Please note that portions of this note were completed with a voice recognition program. Efforts were made to edit the dictations butoccasionally words are mis-transcribed.)    I agree to the above documentation placed by my scribe. I have personally evaluated this patient. Additional findings are as noted. I reviewed the scribe's note and agree with the documented findings and plan of care. Any areas of disagreement are corrected. I agree with the chief complaint, past medical history, past surgical history, allergies, medications, social and family history as documented unless otherwise noted below.      Electronically signed by Gómez Cerda MD on 2/27/2023 at 10:03 AM

## 2023-02-27 NOTE — ANESTHESIA POSTPROCEDURE EVALUATION
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 XXXA])      (Hoarseness [R49.0])      (Brown's esophagus with dysplasia [K22.719])      (Malignant neoplasm of false vocal cords ([address] [C32.1])    Surgeons: Lisandro Crawford MD Responsible Provider: Sandi Connell DO    Anesthesia Type: General ASA Status: 3          Anesthesia Type: General    Maggie Phase I: Maggie Score: 10    Maggie Phase II:        Anesthesia Post Evaluation    Patient location during evaluation: PACU  Patient participation: complete - patient participated  Level of consciousness: awake and alert  Pain score: 2  Airway patency: patent  Nausea & Vomiting: no nausea and no vomiting  Complications: no  Cardiovascular status: hemodynamically stable and blood pressure returned to baseline  Respiratory status: spontaneous ventilation, room air and acceptable  Hydration status: stable

## 2023-02-27 NOTE — PROGRESS NOTES
1425: pt arrives to pacu. Pt on room air. Pt responds to verbal stimulation. VSS. Respirations unlabored. Pt denies pain   1435: pt sitting up eating ice chips   1443: pt sitting up in bed. Pt denies pain   1455: pt meets discharge criteria from pacu.  Pt transported to [facility]

## 2023-02-27 NOTE — DISCHARGE INSTRUCTIONS
Rest voice.  May whisper IF PAINFUL    Do not clear throat    Resume normal activity and diet as tolerated     Call if need more meds for pain

## 2023-02-27 NOTE — ANESTHESIA PRE PROCEDURE
Department of Anesthesiology  Preprocedure Note       Name:  Magdi    Age:  69 y.o.  :  1955                                          MRN:  [id number]         Date:  2023      Surgeon: Darrick Ford):  Kun Oakley MD    Procedure: Procedure(s):  Microlaryngoscopy with Biopsy    Medications prior to admission:   Prior to Admission medications    Medication Sig Start Date End Date Taking? Authorizing Provider   omeprazole (PRILOSEC) 40 MG delayed release capsule Take 1 capsule by mouth every evening 2/15/23  Yes Historical Provider, MD   terazosin (HYTRIN) 5 MG capsule TAKE 1 CAPSULE NIGHTLY 22   Baldev George MD   atorvastatin (LIPITOR) 40 MG tablet Take 1 tablet by mouth nightly 22   Baldev George MD   metoprolol succinate (TOPROL XL) 50 MG extended release tablet Take 0.5 tablets by mouth 2 times daily 22   Michelle Elmore MD   ticagrelor (BRILINTA) 90 MG TABS tablet Take 1 tablet by mouth 2 times daily 22   Michelle Elmore MD   levothyroxine (SYNTHROID) 50 MCG tablet Take 1 tablet by mouth daily 22   Baldev George MD   ciclopirox (PENLAC) 8 % solution Apply topically Toenail fungus 22   Historical Provider, MD   aspirin 81 MG EC tablet Take 81 mg by mouth daily    Historical Provider, MD   gabapentin (NEURONTIN) 300 MG capsule Take 300 mg by mouth daily. Historical Provider, MD   ipratropium (ATROVENT) 0.06 % nasal spray 2 sprays by Each Nostril route 3 times daily as needed for Rhinitis    Historical Provider, MD   nitroGLYCERIN (NITROSTAT) 0.4 MG SL tablet up to max of 3 total doses. If no relief after 1 dose, call 911. 21   Michelle Elmore MD   diphenhydrAMINE-APAP, sleep, (TYLENOL PM EXTRA STRENGTH)  MG tablet Take 2 tablets by mouth nightly    Historical Provider, MD   Coenzyme Q10 (CO Q-10) 200 MG CAPS Take  by mouth daily. Historical Provider, MD   Multiple Vitamin (MULTI-VITAMIN) TABS Take  by mouth daily.       Historical Provider, MD ARGININE PO Take 500 mg by mouth daily 4 tab    Historical Provider, MD       Current medications:    Current Facility-Administered Medications   Medication Dose Route Frequency Provider Last Rate Last Admin    sodium chloride flush 0.9 % injection 5-40 mL  5-40 mL IntraVENous 2 times per day Olivia Felix MD        sodium chloride flush 0.9 % injection 5-40 mL  5-40 mL IntraVENous PRN Olivia Felix MD        0.9 % sodium chloride infusion   IntraVENous PRN Olivia Felix MD 20 mL/hr at 02/27/23 1238 New Bag at 02/27/23 1238    scopolamine (TRANSDERM-SCOP) transdermal patch 1 patch  1 patch TransDERmal Q72H Jaz Draper, DO   1 patch at 02/27/23 1246       Allergies:  No Known Allergies    Problem List:    Patient Active Problem List   Diagnosis Code    Hypertension I10    Hyperlipidemia E78.5    ASCVD (arteriosclerotic cardiovascular disease) I25.10    S/P CABG (coronary artery bypass graft) Z95.1    Chronic back pain M54.9, G89.29    Chronic total occlusion of coronary artery I25.82    Asymmetrical sensorineural hearing loss H90.3    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 XXXD    Squamous cell carcinoma of overlapping sites of larynx (HCC) C32.8    Postoperative hypothyroidism E89.0    Laryngeal mass, right J38.7    Brown's esophagus K22.70       Past Medical History:        Diagnosis Date    Ascending Aneurysm ([facility] [address] 02/2017    Ascending, 4.2 cm per pt    Brown's esophagus     CAD (coronary artery disease)     Cancer (HCC)     throat cancer, s/p radiation    Chronic back pain     Hepatitis C     as child    History of hyperbaric oxygen therapy     after bottom teeth removed after rdiation therapy    Hyperlipidemia     Hypertension     Left shoulder pain     nerve pain , ? 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    PONV (postoperative nausea and vomiting)     only after [facility]    S/P CABG (coronary artery bypass graft) 2002    [facility]    Thyroid disease     Vision loss of right eye     during hyperbaric chamber oxygen therapy     Vitreous opacities     right    Wears dentures     full upper, no bottom teeth and no use of dentures on bottom       Past Surgical History:        Procedure Laterality Date    CARDIAC CATHETERIZATION  8/2002, 4/2004    [facility]    COLONOSCOPY      CORONARY ANGIOPLASTY WITH STENT PLACEMENT  7/2002    [facility]    CORONARY ANGIOPLASTY WITH STENT PLACEMENT N/A 07/12/2019    [facility]    CORONARY ARTERY BYPASS GRAFT  2002    [facility]    DENTAL SURGERY      bottom teeth removal    HEMORRHOID SURGERY      KNEE SURGERY Right     LARYNGOSCOPY N/A 10/4/2017    LARYNGOSCOPY MICRO WITH BIOPSY performed by Chaya Burkitt, MD at [address] [place] [address] E 5/12/2021    LARYNGOSCOPY MICRO AND BIOPSY, WITH JET VENT performed by Chaya Burkitt, MD at [address] N/A 6/2/2021    (DR. HUNTER) SUSPENSION MICROLARYNGOSCOPY WITH RECONSTRUCTION AND ADVANCEMENT FLAP X2; (DR. GONGORA)  MICROLARYNGOSCOPY AND BX POSTERIOR RIGHT VOCAL CORD      JET VENTILATION AND CO2 LASER performed by Sandra Clarke MD at [address] [place] [address] E 7/27/2022    SUSPENSION DIRECT LARYNGOSCOPY WITH EXCISIONAL BIOPSY OF RIGHT SIDED LARYNGEAL MASS performed by Chaya Burkitt, MD at [address] [place] [address] E 8/22/2022    MICROLARYNGOSCOPY WITH BIOPSY, SUSPENSION LARYNGOSCOPY WITH JET VENTILATION AND C02 LASER, RE-EXCISION FOR MARGINS performed by Richa Perrin MD at [address]  2016    by Dr Quintero Seal Right 2018    VITRECTOMY 25 GAUGE performed by Jericho Paredes MD at [address] VITRECTOMY Right 2018     VITRECTOMY 25 GAUGE (Right Eye)       Social History:    Social History     Tobacco Use    Smoking status: Former     Packs/day: 2.00     Years: 20.00     Pack years: 40.00     Types: Cigarettes     Quit date: 2001     Years since quittin.2    Smokeless tobacco: Never   Substance Use Topics    Alcohol use: No     Alcohol/week: 0.0 standard drinks                                Counseling given: Not Answered      Vital Signs (Current):   Vitals:    23 1212 23 1159 23 1203   BP:  (!) 182/84    Pulse:  61    Resp:  18    Temp:  97.1 °F (36.2 °C)    SpO2:  98%    Weight: 182 lb (82.6 kg)  181 lb (82.1 kg)   Height: 5' 11\" (1.803 m)  5' 11\" (1.803 m)                                              BP Readings from Last 3 Encounters:   23 (!) 182/84   23 128/68   23 122/64       NPO Status: Time of last liquid consumption: 0800 (sip with meds)                        Time of last solid consumption: 1900                        Date of last liquid consumption: 23                        Date of last solid food consumption: 23    BMI:   Wt Readings from Last 3 Encounters:   23 181 lb (82.1 kg)   23 182 lb 1.6 oz (82.6 kg)   23 183 lb 6.4 oz (83.2 kg)     Body mass index is 25.24 kg/m².     CBC:   Lab Results   Component Value Date/Time    WBC 6.8 2023 11:48 AM    RBC 4.74 2023 11:48 AM    RBC 4.86 2011 09:07 AM    HGB 14.8 2023 11:48 AM    HCT 45.3 2023 11:48 AM    MCV 95.6 2023 11:48 AM    RDW 12.6 2017 09:16 AM     2023 11:48 AM       CMP:   Lab Results Component Value Date/Time     02/21/2023 11:48 AM    K 4.5 02/27/2023 12:03 PM     02/21/2023 11:48 AM    CO2 26 02/21/2023 11:48 AM    BUN 16 02/21/2023 11:48 AM    CREATININE 0.7 02/21/2023 11:48 AM    LABGLOM >60 02/21/2023 11:48 AM    GLUCOSE 87 02/21/2023 11:48 AM    GLUCOSE 95 12/01/2011 09:07 AM    PROT 6.3 02/21/2023 11:48 AM    CALCIUM 9.2 02/21/2023 11:48 AM    BILITOT 0.5 02/21/2023 11:48 AM    ALKPHOS 85 02/21/2023 11:48 AM    AST 17 02/21/2023 11:48 AM    ALT 16 02/21/2023 11:48 AM       POC Tests: No results for input(s): POCGLU, POCNA, POCK, POCCL, POCBUN, POCHEMO, POCHCT in the last 72 hours. Coags:   Lab Results   Component Value Date/Time    INR 0.97 07/12/2019 08:15 AM    APTT 30.9 07/12/2019 08:15 AM       HCG (If Applicable): No results found for: PREGTESTUR, PREGSERUM, HCG, HCGQUANT     ABGs: No results found for: PHART, PO2ART, NPA4QKL, VTZ7OHD, BEART, E4UIGHEB     Type & Screen (If Applicable):  Lab Results   Component Value Date    LABRH NEG 07/12/2019       Drug/Infectious Status (If Applicable):  Lab Results   Component Value Date/Time    HEPCAB Negative 01/15/2021 08:50 AM       COVID-19 Screening (If Applicable):   Lab Results   Component Value Date/Time    COVID19 NOT DETECTED 05/08/2021 09:05 AM           Anesthesia Evaluation  Patient summary reviewed and Nursing notes reviewed   history of anesthetic complications: PONV.   Airway: Mallampati: II  TM distance: >3 FB   Neck ROM: full  Mouth opening: > = 3 FB   Dental:          Pulmonary:normal exam  breath sounds clear to auscultation  (+) sleep apnea: on CPAP,                             Cardiovascular:  Exercise tolerance: good (>4 METS),   (+) hypertension:, CAD: obstructive and no interval change, CABG/stent: no interval change,       ECG reviewed                        Neuro/Psych:   Negative Neuro/Psych ROS              GI/Hepatic/Renal:   (+) hepatitis: C, liver disease:,           Endo/Other:    (+) hypothyroidism::., .                 Abdominal:             Vascular: negative vascular ROS. Other Findings:           Anesthesia Plan      general     ASA 3       Induction: intravenous. MIPS: Postoperative opioids intended and Prophylactic antiemetics administered. Anesthetic plan and risks discussed with patient and spouse. Plan discussed with CRNA.                     Jane Tee, DO   2/27/2023

## 2023-02-28 NOTE — OP NOTE
Operative Note      Patient: Consuelo Zaragoza  YOB: 1955  MRN: [id number]    Date of Procedure: 2/27/2023    Pre-Op Diagnosis: Primary squamous cell carcinoma of larynx ([address] [C32.9]  Laryngeal mass [J38.7]  Radiation injury, initial encounter [T66. 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) [C32.1]    Post-Op Diagnosis: Same and laryngeal mass in question was not found       Procedure(s):  DIRECT MICROLARYNGOSCOPY WITH BIOPSY    Surgeon(s):  Jose Martinez MD    Assistant:   * No surgical staff found *    Anesthesia: General    Estimated Blood Loss (mL): 1 or 2 mL    Complications: None    Specimens:   ID Type Source Tests Collected by Time Destination   A : RIGHT FALSE VOCAL CORDS  Tissue Mouth SURGICAL PATHOLOGY Jose Martinez MD 2/27/2023 1407        Implants:  * No implants in log *      Drains: * No LDAs found *    Findings:    Amazingly, the bulge in the mucosa of the left anterior false vocal cord had been there for over a year at least, was not impressive at all with the laryngoscope in place. The difference appeared to be the right anterior false vocal cord had been biopsied. See photos. Given his prior chemoradiation, the mucosa on the left anterior false vocal cord was not entered. This is an asymptomatic area anyway. Risks and morbidity far outweighed the benefits. There was a little bit of granular tissue at the opening to the ventricle on the right side, middle third. This was sampled but was very small amount of tissue. This tissue was sent in formalin. Detailed Description of Procedure:     After an adequate level of general endotracheal anesthesia had been obtained with a #5 endotracheal tube, the patient was draped in usual fashion for laryngoscopy. Small shoulder roll was used. Patient had limited extension because of prior spine surgery. Great care was taken not to hyperextend his neck.   He had no maxillary teeth. With a moist 4 x 4 on the upper gingiva, a Kailyn laryngoscope was introduced into the hypopharynx. Base of tongue piriform sinuses and posterior wall were checked. The view of the anterior larynx there was best served with the tip of the scope in the vallecula. This was fixed in place. 30 degree telescope was used to take photographs before any biopsies were taken. If when compared to the photography from the high-resolution video laryngoscopy in the [employer] camera with a rigid telescope in the OR, there is a huge difference in the apparent fullness of the left anterior false vocal cord. This area was carefully palpated as was the entire endolaryngeal area. There did not appear to be any masses or lesions that were worthy of biopsy, except for a little bit of irregular tissue at the opening of what had been the right laryngeal ventricle. This was a very small amount tissue and was sampled and sent in formalin. Entire rest of the endolarynx was soft and supple with no breaks in the mucosa or suspicion of underlying neoplasm. Topical epinephrine was applied to the small area that was instrumented on the right side. There was almost no bleeding. An LTA was used to apply for topical lidocaine to the endolarynx and the excess was suctioned away. The stomach was suctioned with a #18 Trin[place] nasogastric tube. The tube passed easily and a small amount of slightly bile-tinged fluid was withdrawn. Scope and the pad on the upper lip were withdrawn. An oral airway was placed and the patient was returned to the care of the nurse anesthetist.  He was awakened and went recovery room in satisfactory condition there were no complications and he tolerated the procedure well.       Electronically signed by Marylou Mensah MD on 2/27/2023 at 10:27 PM

## 2023-03-02 ENCOUNTER — OFFICE VISIT (OUTPATIENT)
Dept: ENT CLINIC | Age: 68
End: 2023-03-02
Payer: MEDICARE

## 2023-03-02 VITALS
HEART RATE: 58 BPM | RESPIRATION RATE: 20 BRPM | OXYGEN SATURATION: 97 % | HEIGHT: 71 IN | TEMPERATURE: 97.9 F | BODY MASS INDEX: 26.17 KG/M2 | SYSTOLIC BLOOD PRESSURE: 122 MMHG | WEIGHT: 186.9 LBS | DIASTOLIC BLOOD PRESSURE: 68 MMHG

## 2023-03-02 DIAGNOSIS — E03.4 HYPOTHYROIDISM DUE TO ACQUIRED ATROPHY OF THYROID: ICD-10-CM

## 2023-03-02 DIAGNOSIS — D02.0 CARCINOMA IN SITU OF LARYNX: Primary | ICD-10-CM

## 2023-03-02 DIAGNOSIS — T66.XXXS LATE EFFECT OF RADIATION: ICD-10-CM

## 2023-03-02 DIAGNOSIS — K22.70 BARRETT'S ESOPHAGUS WITHOUT DYSPLASIA: ICD-10-CM

## 2023-03-02 DIAGNOSIS — C32.9 PRIMARY SQUAMOUS CELL CARCINOMA OF LARYNX (HCC): ICD-10-CM

## 2023-03-02 PROCEDURE — 3074F SYST BP LT 130 MM HG: CPT | Performed by: OTOLARYNGOLOGY

## 2023-03-02 PROCEDURE — 3078F DIAST BP <80 MM HG: CPT | Performed by: OTOLARYNGOLOGY

## 2023-03-02 PROCEDURE — G8484 FLU IMMUNIZE NO ADMIN: HCPCS | Performed by: OTOLARYNGOLOGY

## 2023-03-02 PROCEDURE — 3017F COLORECTAL CA SCREEN DOC REV: CPT | Performed by: OTOLARYNGOLOGY

## 2023-03-02 PROCEDURE — 1036F TOBACCO NON-USER: CPT | Performed by: OTOLARYNGOLOGY

## 2023-03-02 PROCEDURE — G8417 CALC BMI ABV UP PARAM F/U: HCPCS | Performed by: OTOLARYNGOLOGY

## 2023-03-02 PROCEDURE — G8427 DOCREV CUR MEDS BY ELIG CLIN: HCPCS | Performed by: OTOLARYNGOLOGY

## 2023-03-02 PROCEDURE — 1123F ACP DISCUSS/DSCN MKR DOCD: CPT | Performed by: OTOLARYNGOLOGY

## 2023-03-02 PROCEDURE — 99212 OFFICE O/P EST SF 10 MIN: CPT | Performed by: OTOLARYNGOLOGY

## 2023-03-20 NOTE — PROGRESS NOTES
Patient presents for review of his pathology. Tissue in the region of the right ventricle posteriorly that was sent for specimen was reported as squamous cell carcinoma in situ. All of the abnormal tissue had been removed. Clearly has field cancerization. The anterior \"mass\" proved really not to be anything more than just soft tissue of the left false vocal cord, contrasting with the heavily biopsied right anterior false vocal cord. See op note. 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 Plan this is a radiated field and we will recheck him in 1 month. If the abnormal tissue reappears we would certainly need to do a more made major ablation of the right false cord and ventricle. We would do this with the CO2 laser. Return visit 1 month for high resolution video laryngoscopy.

## 2023-04-03 NOTE — TELEPHONE ENCOUNTER
Patients wife calling for refill. Patient only has 2 weeks left. Pharmacy confirmed.      Last ordered 8/29/22, disp #90, refill 1    Date of last visit 8/29/22  Date of next visit 4/24

## 2023-04-04 RX ORDER — ATORVASTATIN CALCIUM 40 MG/1
40 TABLET, FILM COATED ORAL NIGHTLY
Qty: 90 TABLET | Refills: 1 | Status: SHIPPED | OUTPATIENT
Start: 2023-04-04

## 2023-04-06 ENCOUNTER — OFFICE VISIT (OUTPATIENT)
Dept: ENT CLINIC | Age: 68
End: 2023-04-06
Payer: MEDICARE

## 2023-04-06 VITALS
HEART RATE: 72 BPM | DIASTOLIC BLOOD PRESSURE: 70 MMHG | WEIGHT: 183.9 LBS | TEMPERATURE: 97.5 F | RESPIRATION RATE: 20 BRPM | HEIGHT: 71 IN | OXYGEN SATURATION: 96 % | SYSTOLIC BLOOD PRESSURE: 130 MMHG | BODY MASS INDEX: 25.75 KG/M2

## 2023-04-06 DIAGNOSIS — H91.93 BILATERAL HEARING LOSS, UNSPECIFIED HEARING LOSS TYPE: ICD-10-CM

## 2023-04-06 DIAGNOSIS — C32.9 PRIMARY SQUAMOUS CELL CARCINOMA OF LARYNX (HCC): Primary | ICD-10-CM

## 2023-04-06 DIAGNOSIS — C32.9 CARCINOMA LARYNX (HCC): ICD-10-CM

## 2023-04-06 DIAGNOSIS — D02.0 CARCINOMA IN SITU OF LARYNX: ICD-10-CM

## 2023-04-06 DIAGNOSIS — J38.3 DYSPLASIA OF VOCAL CORD: ICD-10-CM

## 2023-04-06 DIAGNOSIS — J38.7 LARYNGEAL MASS: ICD-10-CM

## 2023-04-06 DIAGNOSIS — T66.XXXD EFFECTS, RADIATION, SUBSEQUENT ENCOUNTER: ICD-10-CM

## 2023-04-06 DIAGNOSIS — K22.70 BARRETT'S ESOPHAGUS WITHOUT DYSPLASIA: ICD-10-CM

## 2023-04-06 DIAGNOSIS — H61.23 BILATERAL IMPACTED CERUMEN: ICD-10-CM

## 2023-04-06 DIAGNOSIS — E03.4 HYPOTHYROIDISM DUE TO ACQUIRED ATROPHY OF THYROID: ICD-10-CM

## 2023-04-06 DIAGNOSIS — K22.719 BARRETT'S ESOPHAGUS WITH DYSPLASIA: ICD-10-CM

## 2023-04-06 DIAGNOSIS — T66.XXXS LATE EFFECT OF RADIATION: ICD-10-CM

## 2023-04-06 DIAGNOSIS — R49.0 HOARSENESS: ICD-10-CM

## 2023-04-06 PROCEDURE — 31575 DIAGNOSTIC LARYNGOSCOPY: CPT | Performed by: OTOLARYNGOLOGY

## 2023-04-06 PROCEDURE — 99214 OFFICE O/P EST MOD 30 MIN: CPT | Performed by: OTOLARYNGOLOGY

## 2023-04-06 PROCEDURE — 69210 REMOVE IMPACTED EAR WAX UNI: CPT | Performed by: OTOLARYNGOLOGY

## 2023-04-06 PROCEDURE — 3074F SYST BP LT 130 MM HG: CPT | Performed by: OTOLARYNGOLOGY

## 2023-04-06 PROCEDURE — G8417 CALC BMI ABV UP PARAM F/U: HCPCS | Performed by: OTOLARYNGOLOGY

## 2023-04-06 PROCEDURE — 1036F TOBACCO NON-USER: CPT | Performed by: OTOLARYNGOLOGY

## 2023-04-06 PROCEDURE — 1123F ACP DISCUSS/DSCN MKR DOCD: CPT | Performed by: OTOLARYNGOLOGY

## 2023-04-06 PROCEDURE — G8427 DOCREV CUR MEDS BY ELIG CLIN: HCPCS | Performed by: OTOLARYNGOLOGY

## 2023-04-06 PROCEDURE — 3017F COLORECTAL CA SCREEN DOC REV: CPT | Performed by: OTOLARYNGOLOGY

## 2023-04-06 PROCEDURE — 3078F DIAST BP <80 MM HG: CPT | Performed by: OTOLARYNGOLOGY

## 2023-04-06 ASSESSMENT — ENCOUNTER SYMPTOMS
CHOKING: 0
COLOR CHANGE: 0
TROUBLE SWALLOWING: 0
STRIDOR: 0
ABDOMINAL PAIN: 0
SINUS PRESSURE: 0
CHEST TIGHTNESS: 0
APNEA: 0
FACIAL SWELLING: 0
COUGH: 0
WHEEZING: 0
SHORTNESS OF BREATH: 0
NAUSEA: 0
VOICE CHANGE: 0
VOMITING: 0
RHINORRHEA: 0
DIARRHEA: 0
SORE THROAT: 0

## 2023-05-05 RX ORDER — METOPROLOL SUCCINATE 50 MG/1
25 TABLET, EXTENDED RELEASE ORAL 2 TIMES DAILY
Qty: 90 TABLET | Refills: 1 | Status: SHIPPED | OUTPATIENT
Start: 2023-05-05

## 2023-05-05 NOTE — TELEPHONE ENCOUNTER
Patient of Dr. Maylin Valle   Wife called requesting refill on metoprolol.      Last visit 8/29/22  Date of next visit 6/5

## 2023-05-08 DIAGNOSIS — I10 ESSENTIAL HYPERTENSION: ICD-10-CM

## 2023-05-08 NOTE — TELEPHONE ENCOUNTER
Patient of Dr. Butler Patron    Date of last visit 8/29/22  Date of next visit 6/5    Patients last appointment was canceled due to Dr. Butler Patron being unavailable.

## 2023-05-10 RX ORDER — TERAZOSIN 5 MG/1
CAPSULE ORAL
Qty: 90 CAPSULE | Refills: 0 | Status: SHIPPED | OUTPATIENT
Start: 2023-05-10

## 2023-05-10 RX ORDER — LEVOTHYROXINE SODIUM 0.05 MG/1
50 TABLET ORAL DAILY
Qty: 90 TABLET | Refills: 0 | Status: SHIPPED | OUTPATIENT
Start: 2023-05-10

## 2023-06-05 ENCOUNTER — OFFICE VISIT (OUTPATIENT)
Dept: INTERNAL MEDICINE CLINIC | Age: 68
End: 2023-06-05
Payer: MEDICARE

## 2023-06-05 VITALS
SYSTOLIC BLOOD PRESSURE: 120 MMHG | OXYGEN SATURATION: 100 % | DIASTOLIC BLOOD PRESSURE: 60 MMHG | WEIGHT: 183.8 LBS | TEMPERATURE: 97.1 F | HEART RATE: 64 BPM | RESPIRATION RATE: 18 BRPM | BODY MASS INDEX: 25.63 KG/M2

## 2023-06-05 DIAGNOSIS — E78.5 HYPERLIPIDEMIA, UNSPECIFIED HYPERLIPIDEMIA TYPE: ICD-10-CM

## 2023-06-05 DIAGNOSIS — M25.551 RIGHT HIP PAIN: Primary | ICD-10-CM

## 2023-06-05 DIAGNOSIS — I10 ESSENTIAL HYPERTENSION: ICD-10-CM

## 2023-06-05 PROCEDURE — 3017F COLORECTAL CA SCREEN DOC REV: CPT | Performed by: INTERNAL MEDICINE

## 2023-06-05 PROCEDURE — 3074F SYST BP LT 130 MM HG: CPT | Performed by: INTERNAL MEDICINE

## 2023-06-05 PROCEDURE — 1036F TOBACCO NON-USER: CPT | Performed by: INTERNAL MEDICINE

## 2023-06-05 PROCEDURE — 3078F DIAST BP <80 MM HG: CPT | Performed by: INTERNAL MEDICINE

## 2023-06-05 PROCEDURE — G8417 CALC BMI ABV UP PARAM F/U: HCPCS | Performed by: INTERNAL MEDICINE

## 2023-06-05 PROCEDURE — 99214 OFFICE O/P EST MOD 30 MIN: CPT | Performed by: INTERNAL MEDICINE

## 2023-06-05 PROCEDURE — 1123F ACP DISCUSS/DSCN MKR DOCD: CPT | Performed by: INTERNAL MEDICINE

## 2023-06-05 PROCEDURE — G8427 DOCREV CUR MEDS BY ELIG CLIN: HCPCS | Performed by: INTERNAL MEDICINE

## 2023-06-05 RX ORDER — GABAPENTIN 300 MG/1
300 CAPSULE ORAL 2 TIMES DAILY
Qty: 60 CAPSULE | Refills: 2 | Status: SHIPPED | OUTPATIENT
Start: 2023-06-05 | End: 2023-08-04

## 2023-06-05 SDOH — ECONOMIC STABILITY: FOOD INSECURITY: WITHIN THE PAST 12 MONTHS, YOU WORRIED THAT YOUR FOOD WOULD RUN OUT BEFORE YOU GOT MONEY TO BUY MORE.: NEVER TRUE

## 2023-06-05 SDOH — ECONOMIC STABILITY: INCOME INSECURITY: HOW HARD IS IT FOR YOU TO PAY FOR THE VERY BASICS LIKE FOOD, HOUSING, MEDICAL CARE, AND HEATING?: NOT HARD AT ALL

## 2023-06-05 SDOH — ECONOMIC STABILITY: HOUSING INSECURITY
IN THE LAST 12 MONTHS, WAS THERE A TIME WHEN YOU DID NOT HAVE A STEADY PLACE TO SLEEP OR SLEPT IN A SHELTER (INCLUDING NOW)?: NO

## 2023-06-05 SDOH — ECONOMIC STABILITY: FOOD INSECURITY: WITHIN THE PAST 12 MONTHS, THE FOOD YOU BOUGHT JUST DIDN'T LAST AND YOU DIDN'T HAVE MONEY TO GET MORE.: NEVER TRUE

## 2023-06-05 NOTE — PROGRESS NOTES
Alcohol use: No     Alcohol/week: 0.0 standard drinks    Drug use: No    Sexual activity: Not on file   Other Topics Concern    Not on file   Social History Narrative    Not on file     Social Determinants of Health     Financial Resource Strain: Low Risk     Difficulty of Paying Living Expenses: Not hard at all   Food Insecurity: No Food Insecurity    Worried About [address] in the Last Year: Never true    [address] in the Last Year: Never true   Transportation Needs: Unknown    Lack of Transportation (Medical): Not on file    Lack of Transportation (Non-Medical): No   Physical Activity: Not on file   Stress: Not on file   Social Connections: Not on file   Intimate Partner Violence: Not on file   Housing Stability: Unknown    Unable to Pay for Housing in the Last Year: Not on file    Number of Places Lived in the Last Year: Not on file    Unstable Housing in the Last Year: No       Family History   Problem Relation Age of Onset    Heart Disease Father         MI    High Blood Pressure Father     Stroke Father     Cancer Maternal Grandmother     Cancer Maternal Grandfather     Diabetes Neg Hx        No Known Allergies    Review of Systems     As above. /60 (Site: Left Upper Arm, Position: Sitting, Cuff Size: Medium Adult)   Pulse 64   Temp 97.1 °F (36.2 °C) (Temporal)   Resp 18   Wt 183 lb 12.8 oz (83.4 kg)   SpO2 100%   BMI 25.63 kg/m²      Physical Examination: General appearance -:[id number]\"RFPMG, well appearing, and in no distress  Mental status - alert and oriented    Neck - supple, no significant adenopathy  Chest - clear to auscultation, no wheezes, rales or rhonchi, symmetric air entry  Heart - normal rate, regular rhythm, normal S1, S2, no murmurs, rubs, clicks or gallops  Abdomen - soft, nontender, nondistended, no masses or organomegaly  no abdominal bruits.   Non-obese Protuberant: No  Neurological - alert, oriented, normal speech, no focal findings or movement disorder noted,

## 2023-06-06 ENCOUNTER — HOSPITAL ENCOUNTER (OUTPATIENT)
Dept: GENERAL RADIOLOGY | Age: 68
Discharge: HOME | End: 2023-06-06
Attending: INTERNAL MEDICINE
Payer: MEDICARE

## 2023-06-06 ENCOUNTER — HOSPITAL ENCOUNTER (OUTPATIENT)
Age: 68
Discharge: HOME | End: 2023-06-06
Payer: MEDICARE

## 2023-06-06 DIAGNOSIS — M25.551 RIGHT HIP PAIN: ICD-10-CM

## 2023-06-06 PROCEDURE — 73502 X-RAY EXAM HIP UNI 2-3 VIEWS: CPT

## 2023-06-07 ENCOUNTER — TELEPHONE (OUTPATIENT)
Dept: INTERNAL MEDICINE CLINIC | Age: 68
End: 2023-06-07

## 2023-06-07 NOTE — TELEPHONE ENCOUNTER
----- Message from Jayce Nolasco MD sent at 6/7/2023  8:43 AM EDT -----    X rays reveal mild arthritis of the hips and spine.   We have referred him to the pain clinic, on the recent office visit due to   Ongoing back pain, so we will proceed with that plan    Thanks    Electronically signed by Jayce Nolasco MD on 6/7/2023 at 8:44 AM          ----- Message -----  From: [facility] Incoming Radiant Results From [employer]  Sent: 6/6/2023  11:57 AM EDT  To: Jayce Nolasco MD

## 2023-06-19 ENCOUNTER — OFFICE VISIT (OUTPATIENT)
Dept: PHYSICAL MEDICINE AND REHAB | Age: 68
End: 2023-06-19
Payer: MEDICARE

## 2023-06-19 VITALS
SYSTOLIC BLOOD PRESSURE: 138 MMHG | WEIGHT: 183.8 LBS | DIASTOLIC BLOOD PRESSURE: 68 MMHG | HEIGHT: 71 IN | BODY MASS INDEX: 25.73 KG/M2

## 2023-06-19 DIAGNOSIS — M53.3 SACROILIAC JOINT DYSFUNCTION OF RIGHT SIDE: Primary | ICD-10-CM

## 2023-06-19 DIAGNOSIS — G89.4 CHRONIC PAIN SYNDROME: ICD-10-CM

## 2023-06-19 DIAGNOSIS — M47.816 LUMBAR SPONDYLOSIS: ICD-10-CM

## 2023-06-19 PROCEDURE — 3017F COLORECTAL CA SCREEN DOC REV: CPT | Performed by: NURSE PRACTITIONER

## 2023-06-19 PROCEDURE — 99204 OFFICE O/P NEW MOD 45 MIN: CPT | Performed by: NURSE PRACTITIONER

## 2023-06-19 PROCEDURE — G8427 DOCREV CUR MEDS BY ELIG CLIN: HCPCS | Performed by: NURSE PRACTITIONER

## 2023-06-19 PROCEDURE — G8417 CALC BMI ABV UP PARAM F/U: HCPCS | Performed by: NURSE PRACTITIONER

## 2023-06-19 PROCEDURE — 1036F TOBACCO NON-USER: CPT | Performed by: NURSE PRACTITIONER

## 2023-06-19 PROCEDURE — 3078F DIAST BP <80 MM HG: CPT | Performed by: NURSE PRACTITIONER

## 2023-06-19 PROCEDURE — 1123F ACP DISCUSS/DSCN MKR DOCD: CPT | Performed by: NURSE PRACTITIONER

## 2023-06-19 PROCEDURE — 3075F SYST BP GE 130 - 139MM HG: CPT | Performed by: NURSE PRACTITIONER

## 2023-06-19 NOTE — PROGRESS NOTES
ROM WNL. · Thorax: No paraspinal tenderness bilaterally. No scoliosis or kyphosis. · Lumbar Spine: ROM WNL. Lumbar paraspinals non-tender to palpation bilaterally. SLR neg bilaterally. VIKASH positive right. GAENSLEN positive right. Positive facet loading, right. Right SI joint deana to palpation. Bilateral greater trochanters non-tender to palpation. Neurological: Cranial nerves II-XII grossly intact. · Gait - Antalgic gait. Ambulates without assistive device. · Motor: 5/5 muscle strength in bilateral hip flexion, knee flexion, knee extension, ankle dorsiflexion, and ankle plantar flexion   · Sensory: LT sensation intact in lower limbs, decreased sensation right medial calf (prior surgery)   · Reflexes: 2+ symmetrical in bilateral achilles, 2+ bilateral patellar, negative ankle clonus, downgoing babinski   Skin: No rashes or lesions present   Psychological: Cooperative, no exaggerated pain behaviors         Assessment:    Diagnosis Orders   1. Sacroiliac joint dysfunction of right side  CHG FLUOR NEEDLE/CATH SPINE/PARASPINAL DX/THER ADDON    IL INJECT SI JOINT ARTHRGRPHY&/ANES/STEROID W/MOMO      2. Lumbar spondylosis        3. Chronic pain syndrome              Teagan Long is a 69 y.o.male presenting to the pain clinic for evaluation of right hip pain. His history and physical are consistent with right SI joint dysfunction. I have set him up for a right SI joint injection with Dr. Kyle Maldonado. We discussed he also has right lumbar facet mediated pain that may need addressed in the future. We also discussed exercises and stretching to assist with his right SI joint. I can provide specific exercises after completion of the injection as he states stretching and exercises seem to flare him up at this point. We discussed pairing injections with those exercises. Plan: The following treatment recommendations and plan were discussed in detail with Teagan Long.     Imaging:   I have reviewed patients

## 2023-06-20 ENCOUNTER — OFFICE VISIT (OUTPATIENT)
Dept: ENT CLINIC | Age: 68
End: 2023-06-20

## 2023-06-20 VITALS
DIASTOLIC BLOOD PRESSURE: 74 MMHG | RESPIRATION RATE: 20 BRPM | HEART RATE: 55 BPM | OXYGEN SATURATION: 97 % | SYSTOLIC BLOOD PRESSURE: 136 MMHG | TEMPERATURE: 98 F | WEIGHT: 180.2 LBS | BODY MASS INDEX: 25.23 KG/M2 | HEIGHT: 71 IN

## 2023-06-20 DIAGNOSIS — T66.XXXS LATE EFFECT OF RADIATION: ICD-10-CM

## 2023-06-20 DIAGNOSIS — K22.719 BARRETT'S ESOPHAGUS WITH DYSPLASIA: ICD-10-CM

## 2023-06-20 DIAGNOSIS — E03.4 HYPOTHYROIDISM DUE TO ACQUIRED ATROPHY OF THYROID: ICD-10-CM

## 2023-06-20 DIAGNOSIS — C32.9 PRIMARY SQUAMOUS CELL CARCINOMA OF LARYNX (HCC): Primary | ICD-10-CM

## 2023-06-20 DIAGNOSIS — D02.0 CARCINOMA IN SITU OF LARYNX: ICD-10-CM

## 2023-06-20 DIAGNOSIS — R49.0 HOARSENESS: ICD-10-CM

## 2023-06-20 DIAGNOSIS — K22.70 BARRETT'S ESOPHAGUS WITHOUT DYSPLASIA: ICD-10-CM

## 2023-06-20 ASSESSMENT — ENCOUNTER SYMPTOMS
CHEST TIGHTNESS: 0
TROUBLE SWALLOWING: 0
RHINORRHEA: 0
COUGH: 0
STRIDOR: 0
VOMITING: 0
SINUS PRESSURE: 0
SHORTNESS OF BREATH: 0
ABDOMINAL PAIN: 0
SORE THROAT: 0
NAUSEA: 0
DIARRHEA: 0
COLOR CHANGE: 0
WHEEZING: 0
VOICE CHANGE: 0
CHOKING: 0
APNEA: 0
FACIAL SWELLING: 0

## 2023-06-21 NOTE — H&P
Today, patient presents for planned right sacroiliac joint injection. This note is reflective of the patient's previous visit for evaluation. We will proceed with today's planned procedure. Since patient's last visit for evaluation, there have been no interval changes in medical history. Patient has no new numbness, weakness, or focal neurological deficit since evaluation. Patient has no contraindications to injection (no anticoagulation or recent antibiotic intake for active infections), and has a  present or is able to drive themselves (as discussed and cleared by physician). Allergies to latex, contrast dye, and steroid medications have been confirmed with the patient prior to the procedure. NPO necessity has been assessed and accepted based on procedure complexity. The risks and benefits of the procedure have been explained including but are not limited to infection, bleeding, paralysis, immediate post procedure weakness, and dizziness; the patient acknowledges understanding and desires to proceed with the procedure. Patient has signed consent for same procedure as discussed in previous clinic encounter. All other questions and concerns were addressed at bedside. See procedure note for full details. Post procedure Instructions: The patient was advised not to drive during the day of the procedure and not to engage in any significant decision making (unless otherwise states by physician). The patient was also advised to be cautious with walking/activity for 24 hours following today's visit and asked not to engage in over-exertion (unless otherwise states by physician). After this time, it is ok to resume pre-procedure level of activity. Patient advised to apply ice to site of injection in situations of pain and discomfort. Patient advised to not submerge site of injection during bath or pool activities for approximately 24 hours post-procedure.  Patient attested to understanding post procedure

## 2023-06-21 NOTE — DISCHARGE INSTRUCTIONS
Post procedure Instructions:    No driving or making significant decisions for the remainder of the day. Be cautions with walking and activity for 24 hours, do not over exert yourself. Ok to resume pre-procedure activity level today. Apply ice to site of injection site if you have pain or discomfort. Do not submerge sit of injection during bath or pool activities for 48 hours post-procedure. Resume blood thinning medications in 24 hours. Call office [phone] if you have:  Temperature greater than 100.4  Persistent nausea and vomiting  Severe uncontrolled pain  Redness, tenderness, or signs of infection (pain, swelling, redness, odor or green/yellow discharge around the site)  Difficulty breathing, headache or visual disturbances  Hives  Persistent dizziness or light-headedness  Extreme fatigue  Any other questions or concerns you may have after discharge    In an emergency, call 911 or go to an Emergency Department at a nearby hospital    Surgical Site Infections      How can we work together to prevent Surgical Site Infections? We would like to thank you for choosing [facility] for your Surgical Care. Below you will find helpful information on how we can work together to prevent Surgical Site Infections. What is a Surgical Site Infection (SSI)? A surgical site infection is an infection that occurs after surgery in the part of the body where the surgery took place. Most patients who have surgery do not develop an infection. However, infections develop in about 1 to 3 out of every 100 patients who have surgery. Some of the common symptoms of a surgical site infection are:  Redness and pain around the area where you had surgery  Drainage of cloudy fluid from your surgical wound  Fever    Can SSIs be treated? Yes. Most surgical site infections can be treated with antibiotics. The antibiotic given to you depends on the bacteria (germs) causing the infection.  Sometimes patients with

## 2023-06-22 ENCOUNTER — APPOINTMENT (OUTPATIENT)
Dept: GENERAL RADIOLOGY | Age: 68
End: 2023-06-22
Attending: ANESTHESIOLOGY
Payer: MEDICARE

## 2023-06-22 ENCOUNTER — HOSPITAL ENCOUNTER (OUTPATIENT)
Age: 68
Setting detail: OUTPATIENT SURGERY
Discharge: HOME | End: 2023-06-22
Attending: ANESTHESIOLOGY | Admitting: ANESTHESIOLOGY
Payer: MEDICARE

## 2023-06-22 VITALS
HEIGHT: 71 IN | DIASTOLIC BLOOD PRESSURE: 80 MMHG | BODY MASS INDEX: 25.2 KG/M2 | RESPIRATION RATE: 16 BRPM | SYSTOLIC BLOOD PRESSURE: 156 MMHG | TEMPERATURE: 97 F | OXYGEN SATURATION: 98 % | WEIGHT: 180 LBS | HEART RATE: 56 BPM

## 2023-06-22 PROCEDURE — 6360000002 HC RX W HCPCS: Performed by: ANESTHESIOLOGY

## 2023-06-22 PROCEDURE — 3209999900 FLUORO FOR SURGICAL PROCEDURES

## 2023-06-22 PROCEDURE — 3600000054 HC PAIN LEVEL 3 BASE: Performed by: ANESTHESIOLOGY

## 2023-06-22 PROCEDURE — 6360000004 HC RX CONTRAST MEDICATION: Performed by: ANESTHESIOLOGY

## 2023-06-22 PROCEDURE — 7100000010 HC PHASE II RECOVERY - FIRST 15 MIN: Performed by: ANESTHESIOLOGY

## 2023-06-22 PROCEDURE — 2709999900 HC NON-CHARGEABLE SUPPLY: Performed by: ANESTHESIOLOGY

## 2023-06-22 PROCEDURE — 27096 INJECT SACROILIAC JOINT: CPT | Performed by: ANESTHESIOLOGY

## 2023-06-22 PROCEDURE — 2500000003 HC RX 250 WO HCPCS: Performed by: ANESTHESIOLOGY

## 2023-06-22 RX ORDER — METHYLPREDNISOLONE ACETATE 40 MG/ML
INJECTION, SUSPENSION INTRA-ARTICULAR; INTRALESIONAL; INTRAMUSCULAR; SOFT TISSUE PRN
Status: DISCONTINUED | OUTPATIENT
Start: 2023-06-22 | End: 2023-06-22 | Stop reason: ALTCHOICE

## 2023-06-22 RX ORDER — LIDOCAINE HYDROCHLORIDE 10 MG/ML
INJECTION, SOLUTION EPIDURAL; INFILTRATION; INTRACAUDAL; PERINEURAL PRN
Status: DISCONTINUED | OUTPATIENT
Start: 2023-06-22 | End: 2023-06-22 | Stop reason: ALTCHOICE

## 2023-06-22 RX ORDER — BUPIVACAINE HYDROCHLORIDE 2.5 MG/ML
INJECTION, SOLUTION EPIDURAL; INFILTRATION; INTRACAUDAL PRN
Status: DISCONTINUED | OUTPATIENT
Start: 2023-06-22 | End: 2023-06-22 | Stop reason: ALTCHOICE

## 2023-06-22 ASSESSMENT — PAIN - FUNCTIONAL ASSESSMENT: PAIN_FUNCTIONAL_ASSESSMENT: 0-10

## 2023-06-22 NOTE — PROGRESS NOTES
1145: Patient to phase 2 recovery room via cart. Patient is awake and alert. Report received from surgical RN, Alyssa Landaverde. Patient's vitals obtained, see charting. 1147: Patient is denying pain and nausea at this time. Patient is also denying wanting anything to eat/drink at this time. 1150: Patient's blood pressure rechecked- 170/75.   1153: BP is 156/80.   1154: Patient stating he understood his discharge instructions given in pre op. Patient stating he is ready to leave- patient dressing at this time. 1156: Patient ambulated to the [facility] and discharged home in stable condition with his wife.

## 2023-06-22 NOTE — PROCEDURES
Pre-operative Diagnosis:  SI joint pain     Post-operative Diagnosis:  SI joint pain     Procedure: RIGHT SI joint injection     Procedure Description:  After having signed the informed consent, the patient was placed in the prone position. The patient's back was prepped with chloraprep solution, and draped in a sterile fashion. A total of 2 ml of 1% lidocaine were used to anesthetize the skin and underlying tissues. Under fluoroscopic guidance a single 22-gauge, 3.5 inch spinal needle was advanced to lie within the inferior pole of the RIGHT sacroiliac joint. There were no paresthesias or heme aspiration. Needle placement was confirmed in the AP view. After negative aspiration, 0.5 ml of Omnipaque 300 contrast was injected with appropriate spread observed. A total of 4 ml of 0.5% bupivicaine mixed with 40 mg depo-medrol were injected into the sacroiliac joint. The needle was withdrawn without any complications. The patient tolerated the procedure well, was transported to the recovery room and observed for 15 minutes and discharged in an ambulatory fashion. No immediate reported complications.     Procedural Complications: None  Estimated Blood Loss: 0 mL    Jason Camp DO  Interventional Pain Management/PM&R   [facility] and [address] [facility]

## 2023-07-20 ENCOUNTER — OFFICE VISIT (OUTPATIENT)
Dept: PHYSICAL MEDICINE AND REHAB | Age: 68
End: 2023-07-20
Payer: MEDICARE

## 2023-07-20 VITALS
SYSTOLIC BLOOD PRESSURE: 116 MMHG | BODY MASS INDEX: 25.2 KG/M2 | DIASTOLIC BLOOD PRESSURE: 64 MMHG | WEIGHT: 180 LBS | HEIGHT: 71 IN

## 2023-07-20 DIAGNOSIS — M47.816 LUMBAR SPONDYLOSIS: ICD-10-CM

## 2023-07-20 DIAGNOSIS — G89.4 CHRONIC PAIN SYNDROME: ICD-10-CM

## 2023-07-20 DIAGNOSIS — M53.3 SACROILIAC JOINT DYSFUNCTION OF RIGHT SIDE: Primary | ICD-10-CM

## 2023-07-20 PROCEDURE — G8417 CALC BMI ABV UP PARAM F/U: HCPCS | Performed by: NURSE PRACTITIONER

## 2023-07-20 PROCEDURE — 99214 OFFICE O/P EST MOD 30 MIN: CPT | Performed by: NURSE PRACTITIONER

## 2023-07-20 PROCEDURE — 1036F TOBACCO NON-USER: CPT | Performed by: NURSE PRACTITIONER

## 2023-07-20 PROCEDURE — 1123F ACP DISCUSS/DSCN MKR DOCD: CPT | Performed by: NURSE PRACTITIONER

## 2023-07-20 PROCEDURE — 3078F DIAST BP <80 MM HG: CPT | Performed by: NURSE PRACTITIONER

## 2023-07-20 PROCEDURE — 3017F COLORECTAL CA SCREEN DOC REV: CPT | Performed by: NURSE PRACTITIONER

## 2023-07-20 PROCEDURE — G8427 DOCREV CUR MEDS BY ELIG CLIN: HCPCS | Performed by: NURSE PRACTITIONER

## 2023-07-20 PROCEDURE — 3074F SYST BP LT 130 MM HG: CPT | Performed by: NURSE PRACTITIONER

## 2023-07-20 NOTE — PROGRESS NOTES
Chronic Pain/PM&R Clinic Note     Encounter Date: 7/20/23    Subjective:   Chief Complaint:   Chief Complaint   Patient presents with    Follow-up       History of Present Illness:   Loree Mendieta is a 69 y.o. male seen in the clinic initially on 06/19/2023 upon request from Anusha Barber MD  for his history of right hip pain. Patient states he developed right hip pain about 2 years ago without any inciting event. He has put it on the back burner because he has been dealing with laryngeal cancer for the past 6 years. He states he just had a surgery with Dr. Frank Villareal 3 months ago and has a follow up with him coming up with another potential scope. He had radiation 6 years ago but not since and he never had chemo. He states his right hip feels aching and will getting stabbing in nature if aggravated. Pain is aggravated by working in the woods picking up sticks and with activities involving bending. He also struggles with walking long distance. He denies any leg weakness, any history of falls. He denies any radiating leg pain. He states the pain will occasionally wake him at night. He rolls from side to side as he has right hip pain and feels better laying on his left but but has cervical radiculopathy and his left arm falls asleep laying on his left. He has completed PT for his neck but not for his low back or hips. He used to see Dr. Cristobal Ortega for his left arm pain and has had injections which are effective but they again were put on hold due to cancer. 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 He does work in his mothers woods and likes to golf but has been limited by his pain. He denies any saddle anesthesia or bowel/bladder incontinence. Of note he has had a 5 vessel bypass 20 years ago and several stents placed, his most recent being about two years ago. He is currently on Brilinta from Dr. Lorelee Prader.      Today, 07/20/2023, patient presents for planned follow up on chronic

## 2023-08-14 DIAGNOSIS — I10 ESSENTIAL HYPERTENSION: ICD-10-CM

## 2023-08-15 DIAGNOSIS — E89.0 POSTOPERATIVE HYPOTHYROIDISM: Primary | ICD-10-CM

## 2023-08-15 RX ORDER — TERAZOSIN 5 MG/1
CAPSULE ORAL
Qty: 90 CAPSULE | Refills: 0 | Status: SHIPPED | OUTPATIENT
Start: 2023-08-15

## 2023-08-15 RX ORDER — LEVOTHYROXINE SODIUM 0.05 MG/1
TABLET ORAL
Qty: 90 TABLET | Refills: 0 | Status: SHIPPED | OUTPATIENT
Start: 2023-08-15

## 2023-08-15 NOTE — TELEPHONE ENCOUNTER
Please let patient know that I ordered TSH to be done with next set of labs in 11/2023. No TSH since 5/2022.

## 2023-08-17 ENCOUNTER — OFFICE VISIT (OUTPATIENT)
Dept: PHYSICAL MEDICINE AND REHAB | Age: 68
End: 2023-08-17

## 2023-08-17 VITALS
DIASTOLIC BLOOD PRESSURE: 60 MMHG | HEIGHT: 71 IN | SYSTOLIC BLOOD PRESSURE: 114 MMHG | WEIGHT: 180 LBS | BODY MASS INDEX: 25.2 KG/M2

## 2023-08-17 DIAGNOSIS — G89.4 CHRONIC PAIN SYNDROME: ICD-10-CM

## 2023-08-17 DIAGNOSIS — M47.816 LUMBAR SPONDYLOSIS: ICD-10-CM

## 2023-08-17 DIAGNOSIS — M53.3 SACROILIAC JOINT DYSFUNCTION OF RIGHT SIDE: Primary | ICD-10-CM

## 2023-08-17 NOTE — PROGRESS NOTES
Functionality Assessment/Goals Worksheet     On a scale of 0 (Does not Interfere) to 10 (Completely Interferes)     1. Which number describes how during the past week pain has interfered with           the following:  A. General Activity:  2  B. Mood: 2  C. Walking Ability:  3  D. Normal Work (Includes both work outside the home and housework):  4  E. Relations with Other People:   0  F. Sleep:   4  G. Enjoyment of Life:   0    2. Patient Prefers to Take their Pain Medications:     [x]  On a regular basis   []  Only when necessary    []  Does not take pain medications    3. What are the Patient's Goals/Expectations for Visiting Pain Management?      []  Learn about my pain    []  Receive Medication   []  Physical Therapy     []  Treat Depression   [x]  Receive Injections    []  Treat Sleep   []  Deal with Anxiety and Stress   []  Treat Opoid Dependence/Addiction   []  Other:
Medications:   Tylenol PM - helps sleep   Gabapentin 300 mg BID - 1 month ago. Ineffective     Historical Treatment Medications:   None     Imaging:  Right hip and pelvic xray (06/06/2023)  PROCEDURE: XR HIP 2-3 VW W PELVIS RIGHT     CLINICAL INFORMATION: Right hip pain     COMPARISON: No prior available. TECHNIQUE: A single AP view of the pelvis was obtained in addition to AP and frog-leg views of the right hip. FINDINGS: The hip joints, sacroiliac joints, and bones of the pelvis are intact. Normal abduction of right hip is demonstrated. There is mild degenerative urination of both acetabula. There is also minimal hypertrophic spurring of the left femoral head. Mild degenerative changes are present in the lower lumbar spine. IMPRESSION:  Mild degenerative changes both hips and lower lumbar spine. **This report has been created using voice recognition software. It may contain minor errors which are inherent in voice recognition technology. **     Final report electronically signed by Dr. Denice Harris on 6/6/2023 11:55 AM           Exam Ended: 06/06/23 11:06 EDT Last Resulted: 06/06/23 11:55 EDT                Past Medical History:   Diagnosis Date    Ascending Aneurysm ([address]) 02/2017    Ascending, 4.2 cm per pt    Brown's esophagus     CAD (coronary artery disease)     Cancer (HCC)     throat cancer, s/p radiation    Chronic back pain     Hepatitis C     as child    History of hyperbaric oxygen therapy     after bottom teeth removed after rdiation therapy    Hyperlipidemia     Hypertension     Left shoulder pain     nerve pain , ? 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    PONV (postoperative nausea and vomiting)     only after OHS    S/P CABG (coronary artery bypass graft) 2002    [facility]    Thyroid disease     Vision loss of right eye     during hyperbaric chamber oxygen therapy     Vitreous

## 2023-08-23 NOTE — H&P
Today, patient presents for planned  right advanced sacroiliac joint block. This note is reflective of the patient's previous visit for evaluation. We will proceed with today's planned procedure. Since patient's last visit for evaluation, there have been no interval changes in medical history. Patient has no new numbness, weakness, or focal neurological deficit since evaluation. Patient has no contraindications to injection (no anticoagulation or recent antibiotic intake for active infections), and has a  present or is able to drive themselves (as discussed and cleared by physician). Allergies to latex, contrast dye, and steroid medications have been confirmed with the patient prior to the procedure. NPO necessity has been assessed and accepted based on procedure complexity. The risks and benefits of the procedure have been explained including but are not limited to infection, bleeding, paralysis, immediate post procedure weakness, and dizziness; the patient acknowledges understanding and desires to proceed with the procedure. Patient has signed consent for same procedure as discussed in previous clinic encounter. All other questions and concerns were addressed at bedside. See procedure note for full details. Post procedure Instructions: The patient was advised not to drive during the day of the procedure and not to engage in any significant decision making (unless otherwise states by physician). The patient was also advised to be cautious with walking/activity for 24 hours following today's visit and asked not to engage in over-exertion (unless otherwise states by physician). After this time, it is ok to resume pre-procedure level of activity. Patient advised to apply ice to site of injection in situations of pain and discomfort. Patient advised to not submerge site of injection during bath or pool activities for approximately 24 hours post-procedure.  Patient attested to understanding post

## 2023-08-24 ENCOUNTER — HOSPITAL ENCOUNTER (OUTPATIENT)
Age: 68
Setting detail: OUTPATIENT SURGERY
Discharge: HOME | End: 2023-08-24
Attending: ANESTHESIOLOGY | Admitting: ANESTHESIOLOGY
Payer: MEDICARE

## 2023-08-24 ENCOUNTER — APPOINTMENT (OUTPATIENT)
Dept: GENERAL RADIOLOGY | Age: 68
End: 2023-08-24
Attending: ANESTHESIOLOGY
Payer: MEDICARE

## 2023-08-24 VITALS
TEMPERATURE: 96.8 F | BODY MASS INDEX: 24.98 KG/M2 | SYSTOLIC BLOOD PRESSURE: 143 MMHG | OXYGEN SATURATION: 96 % | DIASTOLIC BLOOD PRESSURE: 70 MMHG | HEART RATE: 58 BPM | WEIGHT: 178.4 LBS | HEIGHT: 71 IN | RESPIRATION RATE: 16 BRPM

## 2023-08-24 PROCEDURE — 7100000010 HC PHASE II RECOVERY - FIRST 15 MIN: Performed by: ANESTHESIOLOGY

## 2023-08-24 PROCEDURE — 99152 MOD SED SAME PHYS/QHP 5/>YRS: CPT | Performed by: ANESTHESIOLOGY

## 2023-08-24 PROCEDURE — 7100000011 HC PHASE II RECOVERY - ADDTL 15 MIN: Performed by: ANESTHESIOLOGY

## 2023-08-24 PROCEDURE — 3600000054 HC PAIN LEVEL 3 BASE: Performed by: ANESTHESIOLOGY

## 2023-08-24 PROCEDURE — 2709999900 HC NON-CHARGEABLE SUPPLY: Performed by: ANESTHESIOLOGY

## 2023-08-24 PROCEDURE — 6360000002 HC RX W HCPCS: Performed by: ANESTHESIOLOGY

## 2023-08-24 PROCEDURE — 2500000003 HC RX 250 WO HCPCS: Performed by: ANESTHESIOLOGY

## 2023-08-24 RX ORDER — FENTANYL CITRATE 50 UG/ML
INJECTION, SOLUTION INTRAMUSCULAR; INTRAVENOUS PRN
Status: DISCONTINUED | OUTPATIENT
Start: 2023-08-24 | End: 2023-08-24 | Stop reason: ALTCHOICE

## 2023-08-24 RX ORDER — LIDOCAINE HYDROCHLORIDE 10 MG/ML
INJECTION, SOLUTION EPIDURAL; INFILTRATION; INTRACAUDAL; PERINEURAL PRN
Status: DISCONTINUED | OUTPATIENT
Start: 2023-08-24 | End: 2023-08-24 | Stop reason: ALTCHOICE

## 2023-08-24 RX ORDER — METHYLPREDNISOLONE ACETATE 80 MG/ML
INJECTION, SUSPENSION INTRA-ARTICULAR; INTRALESIONAL; INTRAMUSCULAR; SOFT TISSUE PRN
Status: DISCONTINUED | OUTPATIENT
Start: 2023-08-24 | End: 2023-08-24 | Stop reason: ALTCHOICE

## 2023-08-24 RX ORDER — MIDAZOLAM HYDROCHLORIDE 1 MG/ML
INJECTION INTRAMUSCULAR; INTRAVENOUS PRN
Status: DISCONTINUED | OUTPATIENT
Start: 2023-08-24 | End: 2023-08-24 | Stop reason: ALTCHOICE

## 2023-08-24 RX ORDER — BUPIVACAINE HYDROCHLORIDE 2.5 MG/ML
INJECTION, SOLUTION EPIDURAL; INFILTRATION; INTRACAUDAL PRN
Status: DISCONTINUED | OUTPATIENT
Start: 2023-08-24 | End: 2023-08-24 | Stop reason: ALTCHOICE

## 2023-08-24 ASSESSMENT — PAIN SCALES - GENERAL: PAINLEVEL_OUTOF10: 0

## 2023-08-24 ASSESSMENT — PAIN - FUNCTIONAL ASSESSMENT: PAIN_FUNCTIONAL_ASSESSMENT: NONE - DENIES PAIN

## 2023-08-24 NOTE — PROCEDURES
Pre-operative Diagnosis:  SI joint pain     Post-operative Diagnosis:  SI joint pain     Procedure: RIGHT SI joint block     Procedure Description:  After having signed the informed consent, the patient was placed in the prone position. The patient's low back and buttocks was prepped with chloraprep solution, and draped in a sterile fashion. A total of 1 ml of 1% lidocaine was used to anesthetize the skin and underlying tissues at each level. Next, 3.5 inch 22 g spinal needles were advanced to the anatomic location of the L5 primary dorsal ramus at the junction of the superior articular process and sacral ala utilizing intermittent fluoroscopy, as well as the S1, S2, and S3 lateral branch nerves at the lateral border of the S1, S2, and S3 posterior/dorsal foramen. Then, a 1 cc mixture of 80 mg depo-medrol and 0.25% bupivacaine was injected at each site. The needles were removed without any complications. The patient tolerated the procedure well, was transported to the recovery room and observed for 15 minutes and discharged in an ambulatory fashion. No immediate reported complications.      Procedural Complications: None  Estimated Blood Loss: 0 mL           IV sedation was used during the procedure:  - Moderate intravenous conscious sedation was supervised by Dr. Rosalinda Zapata  - The patient was independently monitored by a Registered Nurse assigned to the procedure room  - Monitoring included automated blood pressure, continuous EKG, and continuous pulse oximetry  - The detailed conscious record is permanently stored in the [address]  - The following is the conscious sedation record:  Start Time: 12:04  End Time : 12:19  Duration: 15 minutes   Medications Administered: 1 mg Versed, 50 mcg Fentanyl        Jason Camp DO  Interventional Pain Management/PM&R   [address]

## 2023-08-24 NOTE — POST SEDATION
[facility]  Sedation/Analgesia Post Sedation Record    Pt Name: Pratima Stark  MRN: [id number]  YOB: 1955  Procedure Performed By: Jim Franklin DO  Primary Care Physician: Marcelo Garcia MD    POST-PROCEDURE    Physicians/Assistants: Jim Franklin DO  Procedure Performed: See Procedure Note   Sedation/Anesthesia: Versed and Fentanyl (See procedure note for amount and duration)  Estimated Blood Loss:     0  ml  Specimens Removed: None        Complications: None           Jason Scales DO  Electronically signed 8/24/2023 at 7:43 PM

## 2023-08-24 NOTE — PROGRESS NOTES
1209 Pt to phase 2 recovery per cart. Pt awake and alert. Denies pain. No bleeding noted at injection site. Wife at bedside. 1215 Pt taking offered snack. 1225 Pt dressing on cart with wife in  room . Pt denies complaints. 200 Pt discharged ambulatory with staff to car with . Pt alert and stable. Gait steady. Denies complaints.

## 2023-08-24 NOTE — PRE SEDATION
[address]  Pre-Sedation/Analgesia History & Physical    Pt Name: Gabrielle Tanner  MRN: [id number]  YOB: 1955  Provider Performing Procedure: Shaji Piper DO   Primary Care Physician: Ama Basilio MD      MEDICAL HISTORY       has a past medical history of Ascending Aneurysm ([address]), Brown's esophagus, CAD (coronary artery disease), Cancer ([address]), Chronic back pain, Hepatitis C, History of hyperbaric oxygen therapy, Hyperlipidemia, Hypertension, Left shoulder pain, FOUZIA on CPAP, Osteoradionecrosis of mandible, PONV (postoperative nausea and vomiting), S/P CABG (coronary artery bypass graft), Thyroid disease, Vision loss of right eye, Vitreous opacities, and Wears dentures. SURGICAL HISTORY   has a past surgical history that includes Coronary artery bypass graft (2002); knee surgery (Right); Hemorrhoid surgery; Colonoscopy; Cardiac catheterization (8/2002, 4/2004); Coronary angioplasty with stent (7/2002); MICROLARYNGOSCOPY W BIOPSY (04/28/2016); laryngoscopy (N/A, 10/4/2017); Dental surgery; vitrectomy (Right, 08/27/2018); pr aspiration/release vitreous subretinal/choroidal (Right, 8/27/2018); Coronary angioplasty with stent (N/A, 07/12/2019); laryngoscopy (N/A, 5/12/2021); laryngoscopy (N/A, 6/2/2021); laryngoscopy (N/A, 7/27/2022); laryngoscopy (N/A, 8/22/2022); laryngoscopy (N/A, 2/27/2023); Back Injection (Right, 6/22/2023); and Nerve Block (Right, 8/24/2023). ALLERGIES   Allergies as of 08/17/2023    (No Known Allergies)       MEDICATIONS   Prior to Admission medications    Medication Sig Start Date End Date Taking? Authorizing Provider   terazosin (HYTRIN) 5 MG capsule TAKE 1 CAPSULE NIGHTLY 8/15/23   Anamaria Foster MD   levothyroxine (SYNTHROID) 50 MCG tablet TAKE 1 TABLET DAILY 8/15/23   Anamaria Foster MD   gabapentin (NEURONTIN) 300 MG capsule Take 1 capsule by mouth 2 times daily for 60 days.  6/5/23 8/24/23  Baldev George MD   ticagrelor (BRILINTA) 90 MG TABS assessed. 3. The patient is an appropriate candidate to undergo the planned procedure sedation and anesthesia. (Refer to nursing sedation/analgesia documentation record)  4. Formulation and discussion of sedation/procedure plan, risks, and expectations with patient and/or responsible adult completed. 5. Patient examined immediately prior to the procedure.  (Refer to nursing sedation/analgesia documentation record)    Tor Zavaleta DO  Electronically signed 8/24/2023 at 7:42 PM

## 2023-08-29 ENCOUNTER — OFFICE VISIT (OUTPATIENT)
Dept: ENT CLINIC | Age: 68
End: 2023-08-29
Payer: MEDICARE

## 2023-08-29 VITALS
HEART RATE: 59 BPM | TEMPERATURE: 98.2 F | SYSTOLIC BLOOD PRESSURE: 130 MMHG | DIASTOLIC BLOOD PRESSURE: 82 MMHG | BODY MASS INDEX: 24.79 KG/M2 | HEIGHT: 71 IN | RESPIRATION RATE: 18 BRPM | OXYGEN SATURATION: 98 % | WEIGHT: 177.1 LBS

## 2023-08-29 DIAGNOSIS — K22.719 BARRETT'S ESOPHAGUS WITH DYSPLASIA: ICD-10-CM

## 2023-08-29 DIAGNOSIS — C32.9 PRIMARY SQUAMOUS CELL CARCINOMA OF LARYNX (HCC): Primary | ICD-10-CM

## 2023-08-29 DIAGNOSIS — T66.XXXS LATE EFFECT OF RADIATION: ICD-10-CM

## 2023-08-29 DIAGNOSIS — E03.4 HYPOTHYROIDISM DUE TO ACQUIRED ATROPHY OF THYROID: ICD-10-CM

## 2023-08-29 DIAGNOSIS — D02.0 CARCINOMA IN SITU OF LARYNX: ICD-10-CM

## 2023-08-29 PROCEDURE — G8420 CALC BMI NORM PARAMETERS: HCPCS | Performed by: OTOLARYNGOLOGY

## 2023-08-29 PROCEDURE — 3017F COLORECTAL CA SCREEN DOC REV: CPT | Performed by: OTOLARYNGOLOGY

## 2023-08-29 PROCEDURE — 3074F SYST BP LT 130 MM HG: CPT | Performed by: OTOLARYNGOLOGY

## 2023-08-29 PROCEDURE — 3078F DIAST BP <80 MM HG: CPT | Performed by: OTOLARYNGOLOGY

## 2023-08-29 PROCEDURE — 1123F ACP DISCUSS/DSCN MKR DOCD: CPT | Performed by: OTOLARYNGOLOGY

## 2023-08-29 PROCEDURE — G8427 DOCREV CUR MEDS BY ELIG CLIN: HCPCS | Performed by: OTOLARYNGOLOGY

## 2023-08-29 PROCEDURE — 31575 DIAGNOSTIC LARYNGOSCOPY: CPT | Performed by: OTOLARYNGOLOGY

## 2023-08-29 PROCEDURE — 99213 OFFICE O/P EST LOW 20 MIN: CPT | Performed by: OTOLARYNGOLOGY

## 2023-08-29 PROCEDURE — 1036F TOBACCO NON-USER: CPT | Performed by: OTOLARYNGOLOGY

## 2023-08-29 ASSESSMENT — ENCOUNTER SYMPTOMS
VOICE CHANGE: 0
COLOR CHANGE: 0
ABDOMINAL PAIN: 0
STRIDOR: 0
DIARRHEA: 0
VOMITING: 0
WHEEZING: 0
CHOKING: 0
TROUBLE SWALLOWING: 0
FACIAL SWELLING: 0
SORE THROAT: 0
NAUSEA: 0
SINUS PRESSURE: 0
CHEST TIGHTNESS: 0
RHINORRHEA: 0
APNEA: 0
COUGH: 0
SHORTNESS OF BREATH: 0

## 2023-08-29 NOTE — PROGRESS NOTES
[facility] [place] [facility]  [facility], NOSE AND THRO[facility]  [address] [place] [address]  Dept: [phone]  Dept Fax: [phone]  Loc: [phone]    Aparna Li is a 69 y.o. male who was referred by No ref. provider found for:  Chief Complaint   Patient presents with    Follow-up     Patient here for a 2 month f/u scope. Patient stated things have been going okay, no questions or concerns. Apolonio Fothergill HPI:     Aparna Li is a 69 y.o. male who presents today for  follow-up on his laryngeal carcinoma prior carcinoma in situ. He reports that his hoarseness has been better. He has no pain. No hemoptysis. 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 .. History:     No Known Allergies  Current Outpatient Medications   Medication Sig Dispense Refill    terazosin (HYTRIN) 5 MG capsule TAKE 1 CAPSULE NIGHTLY 90 capsule 0    levothyroxine (SYNTHROID) 50 MCG tablet TAKE 1 TABLET DAILY 90 tablet 0    gabapentin (NEURONTIN) 300 MG capsule Take 1 capsule by mouth 2 times daily for 60 days. 60 capsule 2    ticagrelor (BRILINTA) 90 MG TABS tablet Take 1 tablet by mouth 2 times daily 180 tablet 1    metoprolol succinate (TOPROL XL) 50 MG extended release tablet Take 0.5 tablets by mouth 2 times daily 90 tablet 1    atorvastatin (LIPITOR) 40 MG tablet Take 1 tablet by mouth nightly 90 tablet 1    omeprazole (PRILOSEC) 40 MG delayed release capsule Take 1 capsule by mouth every evening      ciclopirox (PENLAC) 8 % solution Apply topically Toenail fungus      aspirin 81 MG EC tablet Take 1 tablet by mouth daily      ipratropium (ATROVENT) 0.06 % nasal spray 2 sprays by Each Nostril route 3 times daily as needed for Rhinitis      nitroGLYCERIN (NITROSTAT) 0.4 MG SL tablet up to max of 3 total doses.  If no relief after 1 dose, call 911. 25 tablet 3    diphenhydrAMINE-APAP, sleep, (TYLENOL PM EXTRA STRENGTH)  MG tablet Take 2 tablets by mouth

## 2023-09-14 ENCOUNTER — PREP FOR PROCEDURE (OUTPATIENT)
Dept: ENT CLINIC | Age: 68
End: 2023-09-14

## 2023-09-14 ENCOUNTER — OFFICE VISIT (OUTPATIENT)
Dept: ENT CLINIC | Age: 68
End: 2023-09-14
Payer: MEDICARE

## 2023-09-14 ENCOUNTER — TELEPHONE (OUTPATIENT)
Dept: CARDIOLOGY CLINIC | Age: 68
End: 2023-09-14

## 2023-09-14 VITALS
WEIGHT: 178.3 LBS | SYSTOLIC BLOOD PRESSURE: 140 MMHG | DIASTOLIC BLOOD PRESSURE: 80 MMHG | HEART RATE: 56 BPM | RESPIRATION RATE: 16 BRPM | OXYGEN SATURATION: 98 % | BODY MASS INDEX: 24.96 KG/M2 | TEMPERATURE: 97.3 F | HEIGHT: 71 IN

## 2023-09-14 DIAGNOSIS — T66.XXXS LATE EFFECT OF RADIATION: ICD-10-CM

## 2023-09-14 DIAGNOSIS — C32.9 PRIMARY SQUAMOUS CELL CARCINOMA OF LARYNX (HCC): Primary | ICD-10-CM

## 2023-09-14 DIAGNOSIS — D02.0 CARCINOMA IN SITU OF LARYNX: ICD-10-CM

## 2023-09-14 DIAGNOSIS — J38.7 LEUKOPLAKIA OF LARYNX: ICD-10-CM

## 2023-09-14 DIAGNOSIS — K22.719 BARRETT'S ESOPHAGUS WITH DYSPLASIA: ICD-10-CM

## 2023-09-14 DIAGNOSIS — R49.0 HOARSENESS: ICD-10-CM

## 2023-09-14 DIAGNOSIS — Z01.818 PREOP TESTING: Primary | ICD-10-CM

## 2023-09-14 DIAGNOSIS — E03.4 HYPOTHYROIDISM DUE TO ACQUIRED ATROPHY OF THYROID: ICD-10-CM

## 2023-09-14 PROCEDURE — 3074F SYST BP LT 130 MM HG: CPT | Performed by: OTOLARYNGOLOGY

## 2023-09-14 PROCEDURE — 99214 OFFICE O/P EST MOD 30 MIN: CPT | Performed by: OTOLARYNGOLOGY

## 2023-09-14 PROCEDURE — 1036F TOBACCO NON-USER: CPT | Performed by: OTOLARYNGOLOGY

## 2023-09-14 PROCEDURE — G8420 CALC BMI NORM PARAMETERS: HCPCS | Performed by: OTOLARYNGOLOGY

## 2023-09-14 PROCEDURE — G8427 DOCREV CUR MEDS BY ELIG CLIN: HCPCS | Performed by: OTOLARYNGOLOGY

## 2023-09-14 PROCEDURE — 3017F COLORECTAL CA SCREEN DOC REV: CPT | Performed by: OTOLARYNGOLOGY

## 2023-09-14 PROCEDURE — 3078F DIAST BP <80 MM HG: CPT | Performed by: OTOLARYNGOLOGY

## 2023-09-14 PROCEDURE — 1123F ACP DISCUSS/DSCN MKR DOCD: CPT | Performed by: OTOLARYNGOLOGY

## 2023-09-14 ASSESSMENT — ENCOUNTER SYMPTOMS
VOMITING: 0
STRIDOR: 0
DIARRHEA: 0
SORE THROAT: 0
FACIAL SWELLING: 0
TROUBLE SWALLOWING: 0
RHINORRHEA: 0
CHOKING: 0
CHEST TIGHTNESS: 0
SHORTNESS OF BREATH: 0
COUGH: 0
SINUS PRESSURE: 0
ABDOMINAL PAIN: 0
VOICE CHANGE: 0
WHEEZING: 0
APNEA: 0
COLOR CHANGE: 0
NAUSEA: 0

## 2023-09-14 NOTE — TELEPHONE ENCOUNTER
Pre op Risk Assessment    Procedure: microlaryngoscopy with biopsy  Physician Dr. Nav Carr   Date of surgery/procedure 10/04/2023    Last OV 11/02/2022  Last Stress 07/2019  Last Echo 2012  Last Cath 2019  Last Stent   Is patient on blood thinners: Aspirin, Brilinta, coq10  Hold Meds/how many days ?

## 2023-09-14 NOTE — PROGRESS NOTES
the last endoscopy. .  This looks very superficial.  I have removed several carcinoma in situ lesions on this patient's larynx. Data:  All of the past medical history, past surgical history, family history,social history, allergies   and current medications were reviewed with the patient. Assessment & Plan   Diagnoses and all orders for this visit:     Diagnosis Orders   1. Primary squamous cell carcinoma of larynx (HCC)  AR LARYNGOSCOPY FLEXIBLE W/BIOPSY(IES)      2. Carcinoma in situ of larynx  AR LARYNGOSCOPY FLEXIBLE W/BIOPSY(IES)      3. Late effect of radiation  AR LARYNGOSCOPY FLEXIBLE W/BIOPSY(IES)      4. Hypothyroidism due to acquired atrophy of thyroid  AR LARYNGOSCOPY FLEXIBLE W/BIOPSY(IES)      5. Brown's esophagus with dysplasia  AR LARYNGOSCOPY FLEXIBLE W/BIOPSY(IES)      6. Hoarseness  AR LARYNGOSCOPY FLEXIBLE W/BIOPSY(IES)      7. Leukoplakia of larynx  AR LARYNGOSCOPY FLEXIBLE W/BIOPSY(IES)    Right false vocal cord          The findings were explained and his questions were answered. Patient has had several procedures on his larynx for such indications and understands what is involved extremely well. He requests we proceed. Leila Dietz. Salud Davies MD    **This report has been created using voice recognition software. It may contain minor errors which are inherent in voicerecognition technology. **

## 2023-09-14 NOTE — TELEPHONE ENCOUNTER
Patient is being scheduled for a procedure with Dr Wilma Mishra and we are requesting clearance from Dr. Madeleine Chen. DOS: 10/04/2023  Procedure:microlaryngoscopy with biopsy  Meds to hold: ASA, CoQ10, Multi vitamin, Terazosin, Brilinta X 1 week. Please advise. Thank you!

## 2023-09-15 ENCOUNTER — HOSPITAL ENCOUNTER (OUTPATIENT)
Age: 68
Discharge: HOME | End: 2023-09-15
Payer: MEDICARE

## 2023-09-15 DIAGNOSIS — Z01.818 PREOP TESTING: ICD-10-CM

## 2023-09-15 LAB
ALBUMIN SERPL BCG-MCNC: 4.2 G/DL (ref 3.5–5.1)
ALP SERPL-CCNC: 87 U/L (ref 38–126)
ALT SERPL W/O P-5'-P-CCNC: 16 U/L (ref 11–66)
ANION GAP SERPL CALC-SCNC: 9 MEQ/L (ref 8–16)
AST SERPL-CCNC: 16 U/L (ref 5–40)
BASOPHILS ABSOLUTE: 0 THOU/MM3 (ref 0–0.1)
BASOPHILS NFR BLD AUTO: 0.5 %
BILIRUB SERPL-MCNC: 0.7 MG/DL (ref 0.3–1.2)
BUN SERPL-MCNC: 12 MG/DL (ref 7–22)
CALCIUM SERPL-MCNC: 9.4 MG/DL (ref 8.5–10.5)
CHLORIDE SERPL-SCNC: 107 MEQ/L (ref 98–111)
CO2 SERPL-SCNC: 27 MEQ/L (ref 23–33)
CREAT SERPL-MCNC: 0.9 MG/DL (ref 0.4–1.2)
DEPRECATED RDW RBC AUTO: 47.8 FL (ref 35–45)
EKG ATRIAL RATE: 50 BPM
EKG P AXIS: -21 DEGREES
EKG P-R INTERVAL: 122 MS
EKG Q-T INTERVAL: 418 MS
EKG QRS DURATION: 76 MS
EKG QTC CALCULATION (BAZETT): 381 MS
EKG R AXIS: 34 DEGREES
EKG T AXIS: 55 DEGREES
EKG VENTRICULAR RATE: 50 BPM
EOSINOPHIL NFR BLD AUTO: 1.1 %
EOSINOPHILS ABSOLUTE: 0.1 THOU/MM3 (ref 0–0.4)
ERYTHROCYTE [DISTWIDTH] IN BLOOD BY AUTOMATED COUNT: 13.4 % (ref 11.5–14.5)
GFR SERPL CREATININE-BSD FRML MDRD: > 60 ML/MIN/1.73M2
GLUCOSE SERPL-MCNC: 99 MG/DL (ref 70–108)
HCT VFR BLD AUTO: 48.1 % (ref 42–52)
HGB BLD-MCNC: 15.5 GM/DL (ref 14–18)
IMM GRANULOCYTES # BLD AUTO: 0.08 THOU/MM3 (ref 0–0.07)
IMM GRANULOCYTES NFR BLD AUTO: 1.3 %
LYMPHOCYTES ABSOLUTE: 0.6 THOU/MM3 (ref 1–4.8)
LYMPHOCYTES NFR BLD AUTO: 10.2 %
MCH RBC QN AUTO: 31.3 PG (ref 26–33)
MCHC RBC AUTO-ENTMCNC: 32.2 GM/DL (ref 32.2–35.5)
MCV RBC AUTO: 97 FL (ref 80–94)
MONOCYTES ABSOLUTE: 0.5 THOU/MM3 (ref 0.4–1.3)
MONOCYTES NFR BLD AUTO: 8.3 %
NEUTROPHILS NFR BLD AUTO: 78.6 %
NRBC BLD AUTO-RTO: 0 /100 WBC
PLATELET # BLD AUTO: 205 THOU/MM3 (ref 130–400)
PMV BLD AUTO: 10 FL (ref 9.4–12.4)
POTASSIUM SERPL-SCNC: 4.3 MEQ/L (ref 3.5–5.2)
PROT SERPL-MCNC: 6.1 G/DL (ref 6.1–8)
RBC # BLD AUTO: 4.96 MILL/MM3 (ref 4.7–6.1)
SEGMENTED NEUTROPHILS ABSOLUTE COUNT: 4.9 THOU/MM3 (ref 1.8–7.7)
SODIUM SERPL-SCNC: 143 MEQ/L (ref 135–145)
WBC # BLD AUTO: 6.2 THOU/MM3 (ref 4.8–10.8)

## 2023-09-15 PROCEDURE — 93010 ELECTROCARDIOGRAM REPORT: CPT | Performed by: INTERNAL MEDICINE

## 2023-09-15 PROCEDURE — 36415 COLL VENOUS BLD VENIPUNCTURE: CPT

## 2023-09-15 PROCEDURE — 85025 COMPLETE CBC W/AUTO DIFF WBC: CPT

## 2023-09-15 PROCEDURE — 93005 ELECTROCARDIOGRAM TRACING: CPT | Performed by: OTOLARYNGOLOGY

## 2023-09-15 PROCEDURE — 80053 COMPREHEN METABOLIC PANEL: CPT

## 2023-09-20 LAB
EKG ATRIAL RATE: 50 BPM
EKG P AXIS: -21 DEGREES
EKG P-R INTERVAL: 122 MS
EKG Q-T INTERVAL: 418 MS
EKG QRS DURATION: 76 MS
EKG QTC CALCULATION (BAZETT): 381 MS
EKG R AXIS: 34 DEGREES
EKG T AXIS: 55 DEGREES
EKG VENTRICULAR RATE: 50 BPM

## 2023-09-25 NOTE — PROGRESS NOTES
atorvastatin 40mg, 80 mg and rosuvastatin 20 mg, 40 mg)  Moderate risk statin lowers LDL-C 30% to 50%( simvastatin 20-40mg, atorvastatin 10, 20 mg, rosuvastatin 5, 10 mg, pravastatin 40, 80 mg, lovastatin 40 mg, fluvastatinXL 80 mg, fluvastatin 40 mg bid, pitavastatin 2-4 mg)  Patient is currently on atorvastatin 20 mg by mouth daily  s  I reviewed patient's medications and possible interactions and side effects. I refilled those that were needed. I told the patient. Surgery may benefit him. Sounds to me like he may have a radiculopathy from a herniated disk  He is being follow-up for a thoracic aortic aneurysm.   February of this year the aneurysm was 4.2 cm  I told him we willrepeat the CAT scan in 6 months  Follow-up 6 months
No indicators present

## 2023-09-26 PROBLEM — J38.7 LEUKOPLAKIA OF LARYNX: Status: ACTIVE | Noted: 2023-09-26

## 2023-09-27 ENCOUNTER — PREP FOR PROCEDURE (OUTPATIENT)
Dept: ENT CLINIC | Age: 68
End: 2023-09-27

## 2023-09-27 ENCOUNTER — OFFICE VISIT (OUTPATIENT)
Dept: PHYSICAL MEDICINE AND REHAB | Age: 68
End: 2023-09-27
Payer: MEDICARE

## 2023-09-27 VITALS
BODY MASS INDEX: 24.92 KG/M2 | HEIGHT: 71 IN | DIASTOLIC BLOOD PRESSURE: 64 MMHG | WEIGHT: 178 LBS | SYSTOLIC BLOOD PRESSURE: 122 MMHG

## 2023-09-27 DIAGNOSIS — M53.3 SACROILIAC JOINT DYSFUNCTION OF RIGHT SIDE: ICD-10-CM

## 2023-09-27 DIAGNOSIS — G89.4 CHRONIC PAIN SYNDROME: ICD-10-CM

## 2023-09-27 DIAGNOSIS — T66.XXXD EFFECTS, RADIATION, SUBSEQUENT ENCOUNTER: ICD-10-CM

## 2023-09-27 DIAGNOSIS — M47.816 LUMBAR SPONDYLOSIS: Primary | ICD-10-CM

## 2023-09-27 DIAGNOSIS — C32.1 MALIGNANT NEOPLASM OF FALSE VOCAL CORDS (HCC): Primary | ICD-10-CM

## 2023-09-27 PROCEDURE — 3074F SYST BP LT 130 MM HG: CPT | Performed by: NURSE PRACTITIONER

## 2023-09-27 PROCEDURE — 99213 OFFICE O/P EST LOW 20 MIN: CPT | Performed by: NURSE PRACTITIONER

## 2023-09-27 PROCEDURE — 3078F DIAST BP <80 MM HG: CPT | Performed by: NURSE PRACTITIONER

## 2023-09-27 PROCEDURE — G8427 DOCREV CUR MEDS BY ELIG CLIN: HCPCS | Performed by: NURSE PRACTITIONER

## 2023-09-27 PROCEDURE — 3017F COLORECTAL CA SCREEN DOC REV: CPT | Performed by: NURSE PRACTITIONER

## 2023-09-27 PROCEDURE — G8420 CALC BMI NORM PARAMETERS: HCPCS | Performed by: NURSE PRACTITIONER

## 2023-09-27 PROCEDURE — 1123F ACP DISCUSS/DSCN MKR DOCD: CPT | Performed by: NURSE PRACTITIONER

## 2023-09-27 PROCEDURE — 1036F TOBACCO NON-USER: CPT | Performed by: NURSE PRACTITIONER

## 2023-09-27 NOTE — PROGRESS NOTES
Follow all instructions given by your physician  NPO after midnight  Sips of water am of surgery with allowed medications  Bring insurance info and 's license  Wear clean comfortable , loose-fitting clothing  No jewelry or contact lenses to be worn day of surgery  No glue on dentures morning of surgery; you will be asked to remove them for surgery. Case for glasses. Shower the night before and the morning of surgery with a liquid antibacterial soap, dry with new fresh clean towel after each shower, no lotions, creams or powder. Clean sheets and pillowcase on bed night before surgery  Bring medications in original bottles  Bring CPAP/BIPAP machine if you have one ( you may be charged if one is needed in recovery room )    Our pharmacy has a Meds to [facility] program where they will deliver any new prescriptions you may have to your room before you leave. Our Pharmacy will clear it through your insurance; for example (same co pay). This enables you to take your new RX as soon as you need when you get home and avoids stop/wait delays on the way home. Please have a form of payment with you and have someone designated as your Pharmacy contact with their phone # as you may not feel well or still be under the influence of anesthesia. Please refer to the SSI-Surgical Site Infection Flyer you hopefully received in the mail-together we can prevent infections; signs and symptoms reviewed. When discharged be sure you understand how to care for your wound and that you have the Dr./office phone # to call if you have any concerns or questions about your wound.      needed at discharge and someone over 18 to stay with you for 24 hours overnight (surgery may be cancelled if you don't have this)  Report to [facility] on 2nd floor  If you would become ill prior to surgery, please call the surgeon  May have a visitor with you, we request that you limit to 2 visitors in pre-op area  Masks are recommended but not required, new masks at entrance desk  Call [phone] for any questions

## 2023-09-27 NOTE — PROGRESS NOTES
PAT Call Date: 9/27  Surgery Date: 10/4    Surgeon: Oanh Lorenzo   Surgery: microlaryngoscopy w/biopsy    Is patient from a nursing home? No   Any Isolation Precautions? No   Any Pacemaker or ICD? No If YES, has it been checked recently and where? Has the rep been notified? No     On Snapboard?  No  2/27/23 LAST PROCEDURE WITH POTTER   Hard Copy on Chart  In EPIC Pending/Notes   Consent -   Within 30 days; signed, dated & timed by patient and physician     [x] On Arrival     [] Blood    Additional Consent Needs:     H&P - Within 30 days  9/14  [] Physician To Do     [] H&P Update - If H&P is older then 24 hours    Clearance -  Medical, Cardiac, Pulmonary, etc.   9/15 NALLU- ASA BRILINTA 7D   Orders - Signed and Dated    Copy Sent to Pharm []    [x] Physician To Do    Labs - Within 3 months   9/15  [x] CBC -OK   [x] CMP-OK   [x] GFR-OK   [] INR    [] PTT    [] Urine    [] Liver Enzymes    [] Kidney Function    [] MRSA Nasal   [] MSSA      Others:    Radiology Studies-   Within 1 year      1/20  [] Chest X-Ray   [] MRI    [x] CT NECK   EKG -   Within 1 year, unless hx of HTN  9/15 CLEARED   Cardiac Workup -   Stress Test, Echo, Cath within 18 months    [] Cath                                [] Stress Test                      [] Echo    [] Holter Monitor    [] KETAN

## 2023-10-03 RX ORDER — SODIUM CHLORIDE 0.9 % (FLUSH) 0.9 %
5-40 SYRINGE (ML) INJECTION PRN
Status: CANCELLED | OUTPATIENT
Start: 2023-10-03

## 2023-10-03 RX ORDER — SODIUM CHLORIDE 0.9 % (FLUSH) 0.9 %
5-40 SYRINGE (ML) INJECTION EVERY 12 HOURS SCHEDULED
Status: CANCELLED | OUTPATIENT
Start: 2023-10-03

## 2023-10-03 RX ORDER — SODIUM CHLORIDE 9 MG/ML
INJECTION, SOLUTION INTRAVENOUS PRN
Status: CANCELLED | OUTPATIENT
Start: 2023-10-03

## 2023-10-04 ENCOUNTER — HOSPITAL ENCOUNTER (OUTPATIENT)
Age: 68
Setting detail: OUTPATIENT SURGERY
Discharge: HOME | End: 2023-10-04
Attending: OTOLARYNGOLOGY | Admitting: OTOLARYNGOLOGY
Payer: MEDICARE

## 2023-10-04 ENCOUNTER — ANESTHESIA EVENT (OUTPATIENT)
Dept: OPERATING ROOM | Age: 68
End: 2023-10-04
Payer: MEDICARE

## 2023-10-04 ENCOUNTER — ANESTHESIA (OUTPATIENT)
Dept: OPERATING ROOM | Age: 68
End: 2023-10-04
Payer: MEDICARE

## 2023-10-04 VITALS
TEMPERATURE: 97 F | WEIGHT: 175.8 LBS | HEART RATE: 56 BPM | DIASTOLIC BLOOD PRESSURE: 74 MMHG | SYSTOLIC BLOOD PRESSURE: 156 MMHG | BODY MASS INDEX: 24.61 KG/M2 | RESPIRATION RATE: 20 BRPM | OXYGEN SATURATION: 98 % | HEIGHT: 71 IN

## 2023-10-04 DIAGNOSIS — C32.9 SQUAMOUS CELL CARCINOMA OF LARYNX (HCC): ICD-10-CM

## 2023-10-04 PROCEDURE — 2500000003 HC RX 250 WO HCPCS: Performed by: NURSE ANESTHETIST, CERTIFIED REGISTERED

## 2023-10-04 PROCEDURE — 31536 LARYNGOSCOPY W/BX & OP SCOPE: CPT | Performed by: OTOLARYNGOLOGY

## 2023-10-04 PROCEDURE — 3700000001 HC ADD 15 MINUTES (ANESTHESIA): Performed by: OTOLARYNGOLOGY

## 2023-10-04 PROCEDURE — 3600000014 HC SURGERY LEVEL 4 ADDTL 15MIN: Performed by: OTOLARYNGOLOGY

## 2023-10-04 PROCEDURE — 6360000002 HC RX W HCPCS: Performed by: NURSE ANESTHETIST, CERTIFIED REGISTERED

## 2023-10-04 PROCEDURE — 88305 TISSUE EXAM BY PATHOLOGIST: CPT

## 2023-10-04 PROCEDURE — 7100000010 HC PHASE II RECOVERY - FIRST 15 MIN: Performed by: OTOLARYNGOLOGY

## 2023-10-04 PROCEDURE — 2709999900 HC NON-CHARGEABLE SUPPLY: Performed by: OTOLARYNGOLOGY

## 2023-10-04 PROCEDURE — 2580000003 HC RX 258: Performed by: OTOLARYNGOLOGY

## 2023-10-04 PROCEDURE — 7100000011 HC PHASE II RECOVERY - ADDTL 15 MIN: Performed by: OTOLARYNGOLOGY

## 2023-10-04 PROCEDURE — 3600000004 HC SURGERY LEVEL 4 BASE: Performed by: OTOLARYNGOLOGY

## 2023-10-04 PROCEDURE — 7100000001 HC PACU RECOVERY - ADDTL 15 MIN: Performed by: OTOLARYNGOLOGY

## 2023-10-04 PROCEDURE — 3700000000 HC ANESTHESIA ATTENDED CARE: Performed by: OTOLARYNGOLOGY

## 2023-10-04 PROCEDURE — 7100000000 HC PACU RECOVERY - FIRST 15 MIN: Performed by: OTOLARYNGOLOGY

## 2023-10-04 RX ORDER — PROPOFOL 10 MG/ML
INJECTION, EMULSION INTRAVENOUS PRN
Status: DISCONTINUED | OUTPATIENT
Start: 2023-10-04 | End: 2023-10-04 | Stop reason: SDUPTHER

## 2023-10-04 RX ORDER — MORPHINE SULFATE 2 MG/ML
2 INJECTION, SOLUTION INTRAMUSCULAR; INTRAVENOUS EVERY 5 MIN PRN
Status: DISCONTINUED | OUTPATIENT
Start: 2023-10-04 | End: 2023-10-04 | Stop reason: HOSPADM

## 2023-10-04 RX ORDER — ONDANSETRON 2 MG/ML
INJECTION INTRAMUSCULAR; INTRAVENOUS PRN
Status: DISCONTINUED | OUTPATIENT
Start: 2023-10-04 | End: 2023-10-04 | Stop reason: SDUPTHER

## 2023-10-04 RX ORDER — EPHEDRINE SULFATE/0.9% NACL/PF 50 MG/5 ML
SYRINGE (ML) INTRAVENOUS PRN
Status: DISCONTINUED | OUTPATIENT
Start: 2023-10-04 | End: 2023-10-04 | Stop reason: SDUPTHER

## 2023-10-04 RX ORDER — SODIUM CHLORIDE 0.9 % (FLUSH) 0.9 %
5-40 SYRINGE (ML) INJECTION PRN
Status: DISCONTINUED | OUTPATIENT
Start: 2023-10-04 | End: 2023-10-04 | Stop reason: HOSPADM

## 2023-10-04 RX ORDER — ROCURONIUM BROMIDE 10 MG/ML
INJECTION, SOLUTION INTRAVENOUS PRN
Status: DISCONTINUED | OUTPATIENT
Start: 2023-10-04 | End: 2023-10-04 | Stop reason: SDUPTHER

## 2023-10-04 RX ORDER — FENTANYL CITRATE 50 UG/ML
50 INJECTION, SOLUTION INTRAMUSCULAR; INTRAVENOUS EVERY 5 MIN PRN
Status: DISCONTINUED | OUTPATIENT
Start: 2023-10-04 | End: 2023-10-04 | Stop reason: HOSPADM

## 2023-10-04 RX ORDER — SODIUM CHLORIDE 9 MG/ML
INJECTION, SOLUTION INTRAVENOUS PRN
Status: DISCONTINUED | OUTPATIENT
Start: 2023-10-04 | End: 2023-10-04 | Stop reason: HOSPADM

## 2023-10-04 RX ORDER — SUCCINYLCHOLINE/SOD CL,ISO/PF 200MG/10ML
SYRINGE (ML) INTRAVENOUS PRN
Status: DISCONTINUED | OUTPATIENT
Start: 2023-10-04 | End: 2023-10-04 | Stop reason: SDUPTHER

## 2023-10-04 RX ORDER — LIDOCAINE HCL/PF 100 MG/5ML
SYRINGE (ML) INJECTION PRN
Status: DISCONTINUED | OUTPATIENT
Start: 2023-10-04 | End: 2023-10-04 | Stop reason: SDUPTHER

## 2023-10-04 RX ORDER — SODIUM CHLORIDE 0.9 % (FLUSH) 0.9 %
5-40 SYRINGE (ML) INJECTION EVERY 12 HOURS SCHEDULED
Status: DISCONTINUED | OUTPATIENT
Start: 2023-10-04 | End: 2023-10-04 | Stop reason: HOSPADM

## 2023-10-04 RX ORDER — FENTANYL CITRATE 50 UG/ML
INJECTION, SOLUTION INTRAMUSCULAR; INTRAVENOUS PRN
Status: DISCONTINUED | OUTPATIENT
Start: 2023-10-04 | End: 2023-10-04 | Stop reason: SDUPTHER

## 2023-10-04 RX ORDER — DEXAMETHASONE SODIUM PHOSPHATE 4 MG/ML
INJECTION, SOLUTION INTRA-ARTICULAR; INTRALESIONAL; INTRAMUSCULAR; INTRAVENOUS; SOFT TISSUE PRN
Status: DISCONTINUED | OUTPATIENT
Start: 2023-10-04 | End: 2023-10-04 | Stop reason: SDUPTHER

## 2023-10-04 RX ORDER — LABETALOL HYDROCHLORIDE 5 MG/ML
10 INJECTION INTRAVENOUS
Status: DISCONTINUED | OUTPATIENT
Start: 2023-10-04 | End: 2023-10-04 | Stop reason: HOSPADM

## 2023-10-04 RX ADMIN — SODIUM CHLORIDE: 9 INJECTION, SOLUTION INTRAVENOUS at 09:18

## 2023-10-04 RX ADMIN — FENTANYL CITRATE 100 MCG: 50 INJECTION, SOLUTION INTRAMUSCULAR; INTRAVENOUS at 13:38

## 2023-10-04 RX ADMIN — Medication 10 MG: at 14:23

## 2023-10-04 RX ADMIN — SUGAMMADEX 200 MG: 100 INJECTION, SOLUTION INTRAVENOUS at 14:37

## 2023-10-04 RX ADMIN — Medication 100 MG: at 13:38

## 2023-10-04 RX ADMIN — DEXAMETHASONE SODIUM PHOSPHATE 10 MG: 4 INJECTION, SOLUTION INTRAMUSCULAR; INTRAVENOUS at 14:14

## 2023-10-04 RX ADMIN — ONDANSETRON 4 MG: 2 INJECTION INTRAMUSCULAR; INTRAVENOUS at 14:25

## 2023-10-04 RX ADMIN — FENTANYL CITRATE 50 MCG: 50 INJECTION, SOLUTION INTRAMUSCULAR; INTRAVENOUS at 14:06

## 2023-10-04 RX ADMIN — PROPOFOL 200 MG: 10 INJECTION, EMULSION INTRAVENOUS at 13:38

## 2023-10-04 RX ADMIN — Medication 10 MG: at 14:21

## 2023-10-04 RX ADMIN — ROCURONIUM BROMIDE 30 MG: 10 INJECTION INTRAVENOUS at 13:48

## 2023-10-04 ASSESSMENT — PAIN - FUNCTIONAL ASSESSMENT: PAIN_FUNCTIONAL_ASSESSMENT: 0-10

## 2023-10-04 NOTE — PROGRESS NOTES
Pt has met discharge criteria and states he is ready for discharge to home. IV removed, gauze and tape applied. Dressed in own clothes and personal belongings gathered. Discharge instructions (with opioid medication education information) given to pt and family; pt and family verbalized understanding of discharge instructions, prescriptions and follow up appointments. Pt offered transportation downstairs but refused and decided to walk with his wife.

## 2023-10-04 NOTE — H&P
[facility] [place] [facility]  [facility] EAR, NOSE AND THROAT  [address]  Dept: [phone]  Dept Fax: [phone]  Loc: [phone]    Tiny Cranker is a 77 y.o. male who was referred by No ref. provider found for:  Chief Complaint   Patient presents with    Follow-up     Patient here for f/u and scope. Funmi Avila HPI:     Tiny Cranker is a 77 y.o. male who presents today for  follow-up on his laryngeal carcinoma prior carcinoma in situ. He reports that his hoarseness has been better. He has no pain. No hemoptysis. 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 ... History:     No Known Allergies  Current Outpatient Medications   Medication Sig Dispense Refill    terazosin (HYTRIN) 5 MG capsule TAKE 1 CAPSULE NIGHTLY 90 capsule 0    levothyroxine (SYNTHROID) 50 MCG tablet TAKE 1 TABLET DAILY 90 tablet 0    ticagrelor (BRILINTA) 90 MG TABS tablet Take 1 tablet by mouth 2 times daily 180 tablet 1    metoprolol succinate (TOPROL XL) 50 MG extended release tablet Take 0.5 tablets by mouth 2 times daily 90 tablet 1    atorvastatin (LIPITOR) 40 MG tablet Take 1 tablet by mouth nightly 90 tablet 1    omeprazole (PRILOSEC) 40 MG delayed release capsule Take 1 capsule by mouth every evening      ciclopirox (PENLAC) 8 % solution Apply topically Toenail fungus      aspirin 81 MG EC tablet Take 1 tablet by mouth daily      ipratropium (ATROVENT) 0.06 % nasal spray 2 sprays by Each Nostril route 3 times daily as needed for Rhinitis      nitroGLYCERIN (NITROSTAT) 0.4 MG SL tablet up to max of 3 total doses. If no relief after 1 dose, call 911. 25 tablet 3    diphenhydrAMINE-APAP, sleep, (TYLENOL PM EXTRA STRENGTH)  MG tablet Take 2 tablets by mouth nightly      Coenzyme Q10 (CO Q-10) 200 MG CAPS Take  by mouth daily. Multiple Vitamin (MULTI-VITAMIN) TABS Take  by mouth daily.         ARGININE PO Take 1,800 mg by mouth daily 3 the last endoscopy. .  This looks very superficial.  I have removed several carcinoma in situ lesions on this patient's larynx. Data:  All of the past medical history, past surgical history, family history,social history, allergies   and current medications were reviewed with the patient. Assessment & Plan   Diagnoses and all orders for this visit:     Diagnosis Orders   1. Primary squamous cell carcinoma of larynx (HCC)  ME LARYNGOSCOPY FLEXIBLE W/BIOPSY(IES)      2. Carcinoma in situ of larynx  ME LARYNGOSCOPY FLEXIBLE W/BIOPSY(IES)      3. Late effect of radiation  ME LARYNGOSCOPY FLEXIBLE W/BIOPSY(IES)      4. Hypothyroidism due to acquired atrophy of thyroid  ME LARYNGOSCOPY FLEXIBLE W/BIOPSY(IES)      5. Brown's esophagus with dysplasia  ME LARYNGOSCOPY FLEXIBLE W/BIOPSY(IES)      6. Hoarseness  ME LARYNGOSCOPY FLEXIBLE W/BIOPSY(IES)      7. Leukoplakia of larynx  ME LARYNGOSCOPY FLEXIBLE W/BIOPSY(IES)    Right false vocal cord          The findings were explained and his questions were answered. Patient has had several procedures on his larynx for such indications and understands what is involved extremely well. He requests we proceed. Marina Henry. Iglesia Gonzalez MD    **This report has been created using voice recognition software. It may contain minor errors which are inherent in voicerecognition technology. **

## 2023-10-04 NOTE — ANESTHESIA POSTPROCEDURE EVALUATION
Department of Anesthesiology  Postprocedure Note    Patient: Petra Saunders  MRN: [id number]  YOB: 1955  Date of evaluation: 10/4/2023      Procedure Summary     Date: 10/04/23 Room / Location: [address]    Anesthesia Start: 2166 Anesthesia Stop: 0248    Procedure: MicroLaryngoscopy with Biopsy (Mouth) Diagnosis:       Squamous cell carcinoma of larynx ([address])      (Squamous cell carcinoma of larynx ([address]) [C32.9])    Surgeons: Corbin Baird MD Responsible Provider: Krista Bedolla MD    Anesthesia Type: general ASA Status: 3          Anesthesia Type: No value filed.     Maggie Phase I: Maggie Score: 10    Maggie Phase II:        Anesthesia Post Evaluation    Patient location during evaluation: PACU  Patient participation: complete - patient participated  Level of consciousness: awake  Airway patency: patent  Nausea & Vomiting: no vomiting and no nausea  Complications: no  Cardiovascular status: hemodynamically stable  Respiratory status: acceptable  Hydration status: stable  Pain management: adequate

## 2023-10-04 NOTE — PROGRESS NOTES
Patient oriented to Same Day department and admitted to Same Day Surgery room 17. Patient verbalized approval for first name, last initial with physician name on unit whiteboard. Plan of care reviewed with patient. Patient room whiteboard filled out and discussed with patient and responsible adult. Patient and responsible adult offered Same Day Welcome Packet to review. Call light in reach. Bed in lowest position, locked, with one bed rail up. SCDs and warming blanket in place. Appropriate arm bands on patient. Bathroom offered. All questions and concerns of patient addressed. Meds to Beds:   Patient informed of [facility] program during admission.  Patient is agreeable to program.   Contact information for the pharmacy and the [facility] program:   Name: lynne   Relationship to patient:spouse/significant other   Phone number: [phone]

## 2023-10-04 NOTE — PROGRESS NOTES
1446 - pt arrives to pacu, respirations unlabored on room air, pt denies pain, VSS    1500 - pt denies pain, pt resting comfortably    1516 - pt meets criteria for discharge from pacu at this time, pt transported to [facility] in stable condition

## 2023-10-04 NOTE — PROGRESS NOTES
Pt returned to [facility] room 17. Vitals and assessment as charted. 0.9 infusing, @350ml to count from PACU. Pt has ice cream and water. Family at the bedside. Pt and family verbalized understanding of discharge criteria and call light use. Call light in reach.

## 2023-10-04 NOTE — PROGRESS NOTES
Spoke with Dr. Thelma Alcala on the phone about patient's medication restart date. Dr. Thelma Alcala stated to have the patient restart aspirin in two days on 10/6/23, restart Terazosin tomorrow 10//23, and Brilinta Saturday 10/7/23. Instructions applied to discharge paperwork.

## 2023-10-05 NOTE — OP NOTE
Operative Note      Patient: Kenneth Dallas  YOB: 1955  MRN: [id number]    Date of Procedure: 10/4/2023       Pre-Op Diagnosis Codes:     * Squamous cell carcinoma of larynx ([address]) [C32.9]     * Leukoplakia of supraglottic larynx     Post-Op Diagnosis: Same       Procedure(s):  MicroLaryngoscopy with Biopsy     Surgeon(s):  Josefina Conway MD     Assistant:  * No surgical staff found *     Anesthesia: General     Estimated Blood Loss (mL): Minimal     Complications: None     Specimens:   ID Type Source Tests Collected by Time Destination   A : RIGHT VENTRICLE Tissue Vocal Cord SURGICAL PATHOLOGY Josefina Conway MD 10/4/2023 1428     B : RIGHT ANTERIOR FALSE VOCAL CORD Tissue Vocal Cord SURGICAL PATHOLOGY Josefina Conway MD 10/4/2023 1428           Implants:  * No implants in log *      Drains: * No LDAs found *     Findings: The white frosty ridge of leukoplakia on the right false vocal cord was not present. This was documented by video photography. Perhaps it was simply wiped off with introduction of the endotracheal tube. At any rate the only slightly suspicious areas were in the shallow right laryngeal ventricle. There was a slight whitish firm bulge of the right anterior false vocal cord that was mucosal covered. Both of these areas were biopsied. No obvious carcinoma was encountered. The procedure was unremarkable otherwise. Detailed description of procedure:  After adequate level of general trach anesthesia had been obtained with a small endotracheal tube, patient was draped in usual manner for laryngoscopy. With moist 4 x 4 protecting his upper gingiva, a 15 cm Kailyn right scope was introduced into the hypopharynx. Various areas were checked and no abnormalities were noted. Laryngoscope was then introduced under the epiglottis with exposure of the endolarynx. Photos are taken. The whitish ridge of leukoplakia on the false vocal cord was no longer present.   Biopsies were taken

## 2023-10-10 ENCOUNTER — TELEPHONE (OUTPATIENT)
Dept: CARDIOLOGY CLINIC | Age: 68
End: 2023-10-10

## 2023-10-10 ENCOUNTER — OFFICE VISIT (OUTPATIENT)
Dept: ENT CLINIC | Age: 68
End: 2023-10-10
Payer: MEDICARE

## 2023-10-10 VITALS
TEMPERATURE: 97.7 F | HEART RATE: 60 BPM | WEIGHT: 180.4 LBS | DIASTOLIC BLOOD PRESSURE: 62 MMHG | RESPIRATION RATE: 20 BRPM | OXYGEN SATURATION: 96 % | BODY MASS INDEX: 25.26 KG/M2 | SYSTOLIC BLOOD PRESSURE: 116 MMHG | HEIGHT: 71 IN

## 2023-10-10 DIAGNOSIS — K22.719 BARRETT'S ESOPHAGUS WITH DYSPLASIA: ICD-10-CM

## 2023-10-10 DIAGNOSIS — C32.9 PRIMARY SQUAMOUS CELL CARCINOMA OF LARYNX (HCC): ICD-10-CM

## 2023-10-10 DIAGNOSIS — C32.1 MALIGNANT NEOPLASM OF FALSE VOCAL CORDS (HCC): Primary | ICD-10-CM

## 2023-10-10 DIAGNOSIS — T66.XXXD EFFECTS, RADIATION, SUBSEQUENT ENCOUNTER: ICD-10-CM

## 2023-10-10 DIAGNOSIS — D02.0 CARCINOMA IN SITU OF LARYNX: ICD-10-CM

## 2023-10-10 DIAGNOSIS — R49.0 HOARSENESS: ICD-10-CM

## 2023-10-10 PROCEDURE — 3078F DIAST BP <80 MM HG: CPT | Performed by: OTOLARYNGOLOGY

## 2023-10-10 PROCEDURE — G8484 FLU IMMUNIZE NO ADMIN: HCPCS | Performed by: OTOLARYNGOLOGY

## 2023-10-10 PROCEDURE — 99214 OFFICE O/P EST MOD 30 MIN: CPT | Performed by: OTOLARYNGOLOGY

## 2023-10-10 PROCEDURE — G8427 DOCREV CUR MEDS BY ELIG CLIN: HCPCS | Performed by: OTOLARYNGOLOGY

## 2023-10-10 PROCEDURE — 3074F SYST BP LT 130 MM HG: CPT | Performed by: OTOLARYNGOLOGY

## 2023-10-10 PROCEDURE — G8417 CALC BMI ABV UP PARAM F/U: HCPCS | Performed by: OTOLARYNGOLOGY

## 2023-10-10 PROCEDURE — 1036F TOBACCO NON-USER: CPT | Performed by: OTOLARYNGOLOGY

## 2023-10-10 PROCEDURE — 1124F ACP DISCUSS-NO DSCNMKR DOCD: CPT | Performed by: OTOLARYNGOLOGY

## 2023-10-10 PROCEDURE — 3017F COLORECTAL CA SCREEN DOC REV: CPT | Performed by: OTOLARYNGOLOGY

## 2023-10-10 NOTE — TELEPHONE ENCOUNTER
Patient is being scheduled for a procedure with Dr Jasvri Baldwin and we are requesting clearance from Dr. Cristy Bravo. DOS: 10/20/2023  Procedure: partial supraglottic laryngectomy, right side. Meds to hold: ASA, Brilinta, Terazosin, CoQ10, Mulitvitamin x 1 week. Patient was cleared recently by Dr. Cristy Bravo for microlaryngoscopy with biopsy. We can use that same clearance and instruction if ok by Dr. Cristy Bravo. Please advise. Thank you!

## 2023-10-10 NOTE — PROGRESS NOTES
[facility] [place] [facility]  [facility] EAR, NOSE [facility] THROAT  [address]   Dept: [phone]  Dept Fax: [phone]  Loc: [phone]    Vero Atkinson is a 77 y.o. male who was referred by No ref. provider found for:  Chief Complaint   Patient presents with    Post-Op Check     Patient is here for biopsy results 10/04. Patient states he has been doing well. Enrrique Musa HPI:     Vero Atkinson is a 77 y.o. male who presents today for discussion of his biopsy results. These are positive for invasive squamous cell carcinoma and carcinoma in situ. Enrrique Musa History:     No Known Allergies  Current Outpatient Medications   Medication Sig Dispense Refill    terazosin (HYTRIN) 5 MG capsule TAKE 1 CAPSULE NIGHTLY 90 capsule 0    levothyroxine (SYNTHROID) 50 MCG tablet TAKE 1 TABLET DAILY 90 tablet 0    ticagrelor (BRILINTA) 90 MG TABS tablet Take 1 tablet by mouth 2 times daily 180 tablet 1    metoprolol succinate (TOPROL XL) 50 MG extended release tablet Take 0.5 tablets by mouth 2 times daily 90 tablet 1    atorvastatin (LIPITOR) 40 MG tablet Take 1 tablet by mouth nightly 90 tablet 1    omeprazole (PRILOSEC) 40 MG delayed release capsule Take 1 capsule by mouth every evening      ciclopirox (PENLAC) 8 % solution Apply topically Toenail fungus      aspirin 81 MG EC tablet Take 1 tablet by mouth daily      ipratropium (ATROVENT) 0.06 % nasal spray 2 sprays by Each Nostril route 3 times daily as needed for Rhinitis      nitroGLYCERIN (NITROSTAT) 0.4 MG SL tablet up to max of 3 total doses. If no relief after 1 dose, call 911. 25 tablet 3    diphenhydrAMINE-APAP, sleep, (TYLENOL PM EXTRA STRENGTH)  MG tablet Take 2 tablets by mouth nightly      Coenzyme Q10 (CO Q-10) 200 MG CAPS Take  by mouth daily. Multiple Vitamin (MULTI-VITAMIN) TABS Take  by mouth daily.         ARGININE PO Take 1,800 mg by mouth daily 3 tab      gabapentin (NEURONTIN) 300 MG capsule Take 1

## 2023-10-13 ENCOUNTER — PREP FOR PROCEDURE (OUTPATIENT)
Dept: ENT CLINIC | Age: 68
End: 2023-10-13

## 2023-10-13 ENCOUNTER — HOSPITAL ENCOUNTER (OUTPATIENT)
Dept: CT IMAGING | Age: 68
Discharge: HOME | End: 2023-10-13
Attending: OTOLARYNGOLOGY
Payer: MEDICARE

## 2023-10-13 DIAGNOSIS — C32.1: ICD-10-CM

## 2023-10-13 DIAGNOSIS — C32.9 PRIMARY SQUAMOUS CELL CARCINOMA OF LARYNX (HCC): ICD-10-CM

## 2023-10-13 DIAGNOSIS — D02.0 CARCINOMA IN SITU OF LARYNX: ICD-10-CM

## 2023-10-13 DIAGNOSIS — C32.1 MALIGNANT NEOPLASM OF FALSE VOCAL CORDS (HCC): ICD-10-CM

## 2023-10-13 DIAGNOSIS — T66.XXXD EFFECTS, RADIATION, SUBSEQUENT ENCOUNTER: ICD-10-CM

## 2023-10-13 DIAGNOSIS — C32.9 PRIMARY SQUAMOUS CELL CARCINOMA OF LARYNX (HCC): Primary | ICD-10-CM

## 2023-10-13 DIAGNOSIS — G47.33 OSA ON CPAP: ICD-10-CM

## 2023-10-13 DIAGNOSIS — C32.8 SQUAMOUS CELL CARCINOMA OF OVERLAPPING SITES OF LARYNX (HCC): ICD-10-CM

## 2023-10-13 LAB
CREAT BLD-MCNC: 0.9 MG/DL (ref 0.5–1.2)
GFR SERPL CREATININE-BSD FRML MDRD: > 60 ML/MIN/1.73M2

## 2023-10-13 PROCEDURE — 6360000004 HC RX CONTRAST MEDICATION: Performed by: OTOLARYNGOLOGY

## 2023-10-13 PROCEDURE — 70491 CT SOFT TISSUE NECK W/DYE: CPT

## 2023-10-13 PROCEDURE — 82565 ASSAY OF CREATININE: CPT

## 2023-10-13 RX ADMIN — IOPAMIDOL 80 ML: 755 INJECTION, SOLUTION INTRAVENOUS at 08:08

## 2023-10-15 DIAGNOSIS — I10 ESSENTIAL HYPERTENSION: ICD-10-CM

## 2023-10-16 DIAGNOSIS — I10 ESSENTIAL HYPERTENSION: ICD-10-CM

## 2023-10-16 RX ORDER — LEVOTHYROXINE SODIUM 0.05 MG/1
TABLET ORAL
Qty: 90 TABLET | Refills: 0 | Status: ON HOLD | OUTPATIENT
Start: 2023-10-16

## 2023-10-16 RX ORDER — TERAZOSIN 5 MG/1
5 CAPSULE ORAL NIGHTLY
Qty: 90 CAPSULE | Refills: 0 | OUTPATIENT
Start: 2023-10-16

## 2023-10-16 RX ORDER — ATORVASTATIN CALCIUM 40 MG/1
40 TABLET, FILM COATED ORAL
Qty: 90 TABLET | Refills: 1 | Status: ON HOLD | OUTPATIENT
Start: 2023-10-16

## 2023-10-16 RX ORDER — ATORVASTATIN CALCIUM 40 MG/1
40 TABLET, FILM COATED ORAL NIGHTLY
Qty: 90 TABLET | Refills: 1 | OUTPATIENT
Start: 2023-10-16

## 2023-10-16 RX ORDER — TERAZOSIN 5 MG/1
CAPSULE ORAL
Qty: 90 CAPSULE | Refills: 0 | Status: ON HOLD | OUTPATIENT
Start: 2023-10-16

## 2023-10-16 RX ORDER — LEVOTHYROXINE SODIUM 0.05 MG/1
50 TABLET ORAL DAILY
Qty: 90 TABLET | Refills: 0 | OUTPATIENT
Start: 2023-10-16

## 2023-10-16 RX ORDER — METOPROLOL SUCCINATE 50 MG/1
25 TABLET, EXTENDED RELEASE ORAL 2 TIMES DAILY
Qty: 90 TABLET | Refills: 1 | Status: ON HOLD | OUTPATIENT
Start: 2023-10-16

## 2023-10-17 ENCOUNTER — OFFICE VISIT (OUTPATIENT)
Dept: ENT CLINIC | Age: 68
End: 2023-10-17
Payer: MEDICARE

## 2023-10-17 VITALS
SYSTOLIC BLOOD PRESSURE: 130 MMHG | RESPIRATION RATE: 12 BRPM | HEART RATE: 52 BPM | BODY MASS INDEX: 25.24 KG/M2 | OXYGEN SATURATION: 98 % | DIASTOLIC BLOOD PRESSURE: 76 MMHG | TEMPERATURE: 98 F | WEIGHT: 180.3 LBS | HEIGHT: 71 IN

## 2023-10-17 DIAGNOSIS — C32.1 MALIGNANT NEOPLASM OF FALSE VOCAL CORDS (HCC): Primary | ICD-10-CM

## 2023-10-17 DIAGNOSIS — D02.0 CARCINOMA IN SITU OF LARYNX: ICD-10-CM

## 2023-10-17 DIAGNOSIS — C32.1: ICD-10-CM

## 2023-10-17 PROCEDURE — 1123F ACP DISCUSS/DSCN MKR DOCD: CPT | Performed by: OTOLARYNGOLOGY

## 2023-10-17 PROCEDURE — G8427 DOCREV CUR MEDS BY ELIG CLIN: HCPCS | Performed by: OTOLARYNGOLOGY

## 2023-10-17 PROCEDURE — 1036F TOBACCO NON-USER: CPT | Performed by: OTOLARYNGOLOGY

## 2023-10-17 PROCEDURE — G8417 CALC BMI ABV UP PARAM F/U: HCPCS | Performed by: OTOLARYNGOLOGY

## 2023-10-17 PROCEDURE — G8484 FLU IMMUNIZE NO ADMIN: HCPCS | Performed by: OTOLARYNGOLOGY

## 2023-10-17 PROCEDURE — 3078F DIAST BP <80 MM HG: CPT | Performed by: OTOLARYNGOLOGY

## 2023-10-17 PROCEDURE — 99215 OFFICE O/P EST HI 40 MIN: CPT | Performed by: OTOLARYNGOLOGY

## 2023-10-17 PROCEDURE — 3074F SYST BP LT 130 MM HG: CPT | Performed by: OTOLARYNGOLOGY

## 2023-10-17 PROCEDURE — 3017F COLORECTAL CA SCREEN DOC REV: CPT | Performed by: OTOLARYNGOLOGY

## 2023-10-17 ASSESSMENT — ENCOUNTER SYMPTOMS
WHEEZING: 0
DIARRHEA: 0
COLOR CHANGE: 0
TROUBLE SWALLOWING: 0
STRIDOR: 0
SHORTNESS OF BREATH: 0
ABDOMINAL PAIN: 0
APNEA: 0
RHINORRHEA: 0
NAUSEA: 0
VOMITING: 0
FACIAL SWELLING: 0
SINUS PRESSURE: 0
CHEST TIGHTNESS: 0
COUGH: 0
SORE THROAT: 0
VOICE CHANGE: 0
CHOKING: 0

## 2023-10-17 NOTE — PROGRESS NOTES
[facility] [place] [facility]  [facility] EAR[facility] NOSE [facility] TH[facility]  [address]  Dept: [phone]  Dept Fax: [phone]  Loc: [phone]    Tiny Cranker is a 77 y.o. male who was referred by No ref. provider found for:  Chief Complaint   Patient presents with    Results    Follow-up     Patient here for results of his CT scan. Funmi Avila HPI:     Tiny Cranker is a 77 y.o. male who presents today for follow-up on his recent laryngoscopy and biopsy. This showed persistent squamous cell carcinoma in the right ventricle and false vocal cord. This has been a consistent pattern of regrowth regrowth of cancer on this false vocal cord for literally years. He had previously had full course radiation therapy for an anterior false vocal cord primary. He is a former smoker and he also has Brown's esophagus. FINAL DIAGNOSIS:   A. Vocal cord, right ventricle, biopsy:             Squamous cell carcinoma, see microscopic description. B.  Vocal cord, right anterior false, biopsy:             Squamous carcinoma in situ. History:     No Known Allergies  Current Outpatient Medications   Medication Sig Dispense Refill    metoprolol succinate (TOPROL XL) 50 MG extended release tablet Take 0.5 tablets by mouth 2 times daily 90 tablet 1    levothyroxine (SYNTHROID) 50 MCG tablet TAKE 1 TABLET DAILY 90 tablet 0    atorvastatin (LIPITOR) 40 MG tablet TAKE 1 TABLET NIGHTLY 90 tablet 1    terazosin (HYTRIN) 5 MG capsule TAKE 1 CAPSULE NIGHTLY 90 capsule 0    gabapentin (NEURONTIN) 300 MG capsule Take 1 capsule by mouth 2 times daily for 60 days.  (Patient taking differently: Take 1 capsule by mouth as needed.) 60 capsule 2    ticagrelor (BRILINTA) 90 MG TABS tablet Take 1 tablet by mouth 2 times daily 180 tablet 1    omeprazole (PRILOSEC) 40 MG delayed release capsule Take 1 capsule by mouth every evening      ciclopirox (PENLAC) 8 % solution Apply topically Toenail

## 2023-10-18 ENCOUNTER — ANESTHESIA EVENT (OUTPATIENT)
Dept: OPERATING ROOM | Age: 68
End: 2023-10-18
Payer: MEDICARE

## 2023-10-19 PROBLEM — C32.1: Status: ACTIVE | Noted: 2023-10-19

## 2023-10-19 RX ORDER — SODIUM CHLORIDE 9 MG/ML
INJECTION, SOLUTION INTRAVENOUS PRN
Status: CANCELLED | OUTPATIENT
Start: 2023-10-19

## 2023-10-19 RX ORDER — SODIUM CHLORIDE 0.9 % (FLUSH) 0.9 %
5-40 SYRINGE (ML) INJECTION PRN
Status: CANCELLED | OUTPATIENT
Start: 2023-10-19

## 2023-10-19 RX ORDER — SODIUM CHLORIDE 0.9 % (FLUSH) 0.9 %
5-40 SYRINGE (ML) INJECTION EVERY 12 HOURS SCHEDULED
Status: CANCELLED | OUTPATIENT
Start: 2023-10-19

## 2023-10-20 ENCOUNTER — ANESTHESIA (OUTPATIENT)
Dept: OPERATING ROOM | Age: 68
End: 2023-10-20
Payer: MEDICARE

## 2023-10-20 ENCOUNTER — HOSPITAL ENCOUNTER (INPATIENT)
Age: 68
LOS: 3 days | Discharge: HOME | DRG: 145 | End: 2023-10-23
Attending: OTOLARYNGOLOGY | Admitting: OTOLARYNGOLOGY
Payer: MEDICARE

## 2023-10-20 ENCOUNTER — APPOINTMENT (OUTPATIENT)
Dept: GENERAL RADIOLOGY | Age: 68
DRG: 145 | End: 2023-10-20
Attending: OTOLARYNGOLOGY
Payer: MEDICARE

## 2023-10-20 DIAGNOSIS — C32.1 MALIGNANT NEOPLASM OF FALSE VOCAL CORDS (HCC): ICD-10-CM

## 2023-10-20 DIAGNOSIS — D02.0 CARCINOMA IN SITU OF LARYNX: ICD-10-CM

## 2023-10-20 DIAGNOSIS — C32.1: Primary | ICD-10-CM

## 2023-10-20 LAB
MRSA DNA SPEC QL NAA+PROBE: NEGATIVE
POTASSIUM SERPL-SCNC: 3.8 MEQ/L (ref 3.5–5.2)

## 2023-10-20 PROCEDURE — 3600000013 HC SURGERY LEVEL 3 ADDTL 15MIN: Performed by: OTOLARYNGOLOGY

## 2023-10-20 PROCEDURE — 7100000001 HC PACU RECOVERY - ADDTL 15 MIN: Performed by: OTOLARYNGOLOGY

## 2023-10-20 PROCEDURE — 2580000003 HC RX 258: Performed by: OTOLARYNGOLOGY

## 2023-10-20 PROCEDURE — 2060000000 HC ICU INTERMEDIATE R&B

## 2023-10-20 PROCEDURE — 87641 MR-STAPH DNA AMP PROBE: CPT

## 2023-10-20 PROCEDURE — 71045 X-RAY EXAM CHEST 1 VIEW: CPT

## 2023-10-20 PROCEDURE — 0CBT8ZX EXCISION OF RIGHT VOCAL CORD, VIA NATURAL OR ARTIFICIAL OPENING ENDOSCOPIC, DIAGNOSTIC: ICD-10-PCS | Performed by: OTOLARYNGOLOGY

## 2023-10-20 PROCEDURE — 36415 COLL VENOUS BLD VENIPUNCTURE: CPT

## 2023-10-20 PROCEDURE — 3600000003 HC SURGERY LEVEL 3 BASE: Performed by: OTOLARYNGOLOGY

## 2023-10-20 PROCEDURE — 7100000000 HC PACU RECOVERY - FIRST 15 MIN: Performed by: OTOLARYNGOLOGY

## 2023-10-20 PROCEDURE — 3700000001 HC ADD 15 MINUTES (ANESTHESIA): Performed by: OTOLARYNGOLOGY

## 2023-10-20 PROCEDURE — 84132 ASSAY OF SERUM POTASSIUM: CPT

## 2023-10-20 PROCEDURE — 0CBS8ZZ EXCISION OF LARYNX, VIA NATURAL OR ARTIFICIAL OPENING ENDOSCOPIC: ICD-10-PCS | Performed by: OTOLARYNGOLOGY

## 2023-10-20 PROCEDURE — 6370000000 HC RX 637 (ALT 250 FOR IP): Performed by: NURSE PRACTITIONER

## 2023-10-20 PROCEDURE — 31382 PARTIAL REMOVAL OF LARYNX: CPT | Performed by: OTOLARYNGOLOGY

## 2023-10-20 PROCEDURE — 2709999900 HC NON-CHARGEABLE SUPPLY: Performed by: OTOLARYNGOLOGY

## 2023-10-20 PROCEDURE — 3700000000 HC ANESTHESIA ATTENDED CARE: Performed by: OTOLARYNGOLOGY

## 2023-10-20 PROCEDURE — 6360000002 HC RX W HCPCS: Performed by: NURSE PRACTITIONER

## 2023-10-20 PROCEDURE — 2500000003 HC RX 250 WO HCPCS: Performed by: NURSE ANESTHETIST, CERTIFIED REGISTERED

## 2023-10-20 PROCEDURE — 6360000002 HC RX W HCPCS: Performed by: NURSE ANESTHETIST, CERTIFIED REGISTERED

## 2023-10-20 PROCEDURE — 6370000000 HC RX 637 (ALT 250 FOR IP): Performed by: NURSE ANESTHETIST, CERTIFIED REGISTERED

## 2023-10-20 PROCEDURE — 88305 TISSUE EXAM BY PATHOLOGIST: CPT

## 2023-10-20 PROCEDURE — 88307 TISSUE EXAM BY PATHOLOGIST: CPT

## 2023-10-20 PROCEDURE — 87070 CULTURE OTHR SPECIMN AEROBIC: CPT

## 2023-10-20 PROCEDURE — 6360000002 HC RX W HCPCS: Performed by: OTOLARYNGOLOGY

## 2023-10-20 PROCEDURE — 2580000003 HC RX 258: Performed by: NURSE PRACTITIONER

## 2023-10-20 RX ORDER — LIDOCAINE HYDROCHLORIDE 20 MG/ML
INJECTION, SOLUTION INTRAVENOUS PRN
Status: DISCONTINUED | OUTPATIENT
Start: 2023-10-20 | End: 2023-10-20 | Stop reason: SDUPTHER

## 2023-10-20 RX ORDER — UBIDECARENONE 75 MG
200 CAPSULE ORAL DAILY
Status: DISCONTINUED | OUTPATIENT
Start: 2023-10-20 | End: 2023-10-20 | Stop reason: CLARIF

## 2023-10-20 RX ORDER — OMEPRAZOLE 20 MG/1
40 CAPSULE, DELAYED RELEASE ORAL EVERY EVENING
Status: DISCONTINUED | OUTPATIENT
Start: 2023-10-20 | End: 2023-10-20 | Stop reason: CLARIF

## 2023-10-20 RX ORDER — MULTIVITAMIN WITH IRON
1 TABLET ORAL DAILY
Status: DISCONTINUED | OUTPATIENT
Start: 2023-10-20 | End: 2023-10-23 | Stop reason: HOSPADM

## 2023-10-20 RX ORDER — DOXAZOSIN MESYLATE 4 MG/1
4 TABLET ORAL DAILY
Status: DISCONTINUED | OUTPATIENT
Start: 2023-10-20 | End: 2023-10-23 | Stop reason: HOSPADM

## 2023-10-20 RX ORDER — POLYETHYLENE GLYCOL 3350 17 G/17G
17 POWDER, FOR SOLUTION ORAL DAILY PRN
Status: DISCONTINUED | OUTPATIENT
Start: 2023-10-20 | End: 2023-10-23 | Stop reason: HOSPADM

## 2023-10-20 RX ORDER — ONDANSETRON 2 MG/ML
4 INJECTION INTRAMUSCULAR; INTRAVENOUS EVERY 6 HOURS PRN
Status: DISCONTINUED | OUTPATIENT
Start: 2023-10-20 | End: 2023-10-23 | Stop reason: HOSPADM

## 2023-10-20 RX ORDER — PIPERACILLIN SODIUM, TAZOBACTAM SODIUM 3; .375 G/15ML; G/15ML
INJECTION, POWDER, LYOPHILIZED, FOR SOLUTION INTRAVENOUS PRN
Status: DISCONTINUED | OUTPATIENT
Start: 2023-10-20 | End: 2023-10-20 | Stop reason: SDUPTHER

## 2023-10-20 RX ORDER — PROPOFOL 10 MG/ML
INJECTION, EMULSION INTRAVENOUS CONTINUOUS PRN
Status: DISCONTINUED | OUTPATIENT
Start: 2023-10-20 | End: 2023-10-20 | Stop reason: SDUPTHER

## 2023-10-20 RX ORDER — PROPOFOL 10 MG/ML
INJECTION, EMULSION INTRAVENOUS PRN
Status: DISCONTINUED | OUTPATIENT
Start: 2023-10-20 | End: 2023-10-20 | Stop reason: SDUPTHER

## 2023-10-20 RX ORDER — EPINEPHRINE IN SOD CHLOR,ISO 1 MG/10 ML
SYRINGE (ML) INTRAVENOUS PRN
Status: DISCONTINUED | OUTPATIENT
Start: 2023-10-20 | End: 2023-10-20 | Stop reason: ALTCHOICE

## 2023-10-20 RX ORDER — SODIUM CHLORIDE 0.9 % (FLUSH) 0.9 %
5-40 SYRINGE (ML) INJECTION EVERY 12 HOURS SCHEDULED
Status: DISCONTINUED | OUTPATIENT
Start: 2023-10-20 | End: 2023-10-23 | Stop reason: HOSPADM

## 2023-10-20 RX ORDER — HYDRALAZINE HYDROCHLORIDE 20 MG/ML
10 INJECTION INTRAMUSCULAR; INTRAVENOUS
Status: DISCONTINUED | OUTPATIENT
Start: 2023-10-20 | End: 2023-10-20

## 2023-10-20 RX ORDER — SODIUM CHLORIDE 0.9 % (FLUSH) 0.9 %
5-40 SYRINGE (ML) INJECTION PRN
Status: DISCONTINUED | OUTPATIENT
Start: 2023-10-20 | End: 2023-10-20 | Stop reason: HOSPADM

## 2023-10-20 RX ORDER — SODIUM CHLORIDE 9 MG/ML
INJECTION, SOLUTION INTRAVENOUS CONTINUOUS
Status: DISCONTINUED | OUTPATIENT
Start: 2023-10-20 | End: 2023-10-23 | Stop reason: HOSPADM

## 2023-10-20 RX ORDER — ACETAMINOPHEN 325 MG/1
650 TABLET ORAL EVERY 4 HOURS PRN
Status: DISCONTINUED | OUTPATIENT
Start: 2023-10-20 | End: 2023-10-23 | Stop reason: HOSPADM

## 2023-10-20 RX ORDER — LEVOTHYROXINE SODIUM 0.05 MG/1
50 TABLET ORAL DAILY
Status: DISCONTINUED | OUTPATIENT
Start: 2023-10-20 | End: 2023-10-23 | Stop reason: HOSPADM

## 2023-10-20 RX ORDER — FENTANYL CITRATE 50 UG/ML
INJECTION, SOLUTION INTRAMUSCULAR; INTRAVENOUS PRN
Status: DISCONTINUED | OUTPATIENT
Start: 2023-10-20 | End: 2023-10-20 | Stop reason: SDUPTHER

## 2023-10-20 RX ORDER — ATORVASTATIN CALCIUM 40 MG/1
40 TABLET, FILM COATED ORAL NIGHTLY
Status: DISCONTINUED | OUTPATIENT
Start: 2023-10-20 | End: 2023-10-23 | Stop reason: HOSPADM

## 2023-10-20 RX ORDER — LIDOCAINE HYDROCHLORIDE 40 MG/ML
SOLUTION TOPICAL PRN
Status: DISCONTINUED | OUTPATIENT
Start: 2023-10-20 | End: 2023-10-20 | Stop reason: SDUPTHER

## 2023-10-20 RX ORDER — DEXAMETHASONE SODIUM PHOSPHATE 10 MG/ML
INJECTION, EMULSION INTRAMUSCULAR; INTRAVENOUS PRN
Status: DISCONTINUED | OUTPATIENT
Start: 2023-10-20 | End: 2023-10-20 | Stop reason: SDUPTHER

## 2023-10-20 RX ORDER — HYDROCODONE BITARTRATE AND ACETAMINOPHEN 5; 325 MG/1; MG/1
2 TABLET ORAL EVERY 4 HOURS PRN
Status: DISCONTINUED | OUTPATIENT
Start: 2023-10-20 | End: 2023-10-21

## 2023-10-20 RX ORDER — ARGININE 500 MG
1800 TABLET ORAL DAILY
Status: DISCONTINUED | OUTPATIENT
Start: 2023-10-20 | End: 2023-10-20 | Stop reason: CLARIF

## 2023-10-20 RX ORDER — PANTOPRAZOLE SODIUM 40 MG/1
40 TABLET, DELAYED RELEASE ORAL
Status: DISCONTINUED | OUTPATIENT
Start: 2023-10-21 | End: 2023-10-23 | Stop reason: HOSPADM

## 2023-10-20 RX ORDER — LABETALOL HYDROCHLORIDE 5 MG/ML
10 INJECTION INTRAVENOUS
Status: DISCONTINUED | OUTPATIENT
Start: 2023-10-20 | End: 2023-10-20

## 2023-10-20 RX ORDER — SODIUM CHLORIDE 0.9 % (FLUSH) 0.9 %
5-40 SYRINGE (ML) INJECTION EVERY 12 HOURS SCHEDULED
Status: DISCONTINUED | OUTPATIENT
Start: 2023-10-20 | End: 2023-10-20 | Stop reason: HOSPADM

## 2023-10-20 RX ORDER — GABAPENTIN 300 MG/1
300 CAPSULE ORAL 2 TIMES DAILY PRN
Status: DISCONTINUED | OUTPATIENT
Start: 2023-10-20 | End: 2023-10-23 | Stop reason: HOSPADM

## 2023-10-20 RX ORDER — SODIUM CHLORIDE 0.9 % (FLUSH) 0.9 %
5-40 SYRINGE (ML) INJECTION PRN
Status: DISCONTINUED | OUTPATIENT
Start: 2023-10-20 | End: 2023-10-23 | Stop reason: HOSPADM

## 2023-10-20 RX ORDER — SODIUM CHLORIDE 0.9 % (FLUSH) 0.9 %
5-40 SYRINGE (ML) INJECTION PRN
Status: DISCONTINUED | OUTPATIENT
Start: 2023-10-20 | End: 2023-10-20

## 2023-10-20 RX ORDER — ROCURONIUM BROMIDE 10 MG/ML
INJECTION, SOLUTION INTRAVENOUS PRN
Status: DISCONTINUED | OUTPATIENT
Start: 2023-10-20 | End: 2023-10-20 | Stop reason: SDUPTHER

## 2023-10-20 RX ORDER — ONDANSETRON 4 MG/1
4 TABLET, ORALLY DISINTEGRATING ORAL EVERY 8 HOURS PRN
Status: DISCONTINUED | OUTPATIENT
Start: 2023-10-20 | End: 2023-10-23 | Stop reason: HOSPADM

## 2023-10-20 RX ORDER — MIDAZOLAM HYDROCHLORIDE 1 MG/ML
INJECTION INTRAMUSCULAR; INTRAVENOUS PRN
Status: DISCONTINUED | OUTPATIENT
Start: 2023-10-20 | End: 2023-10-20 | Stop reason: SDUPTHER

## 2023-10-20 RX ORDER — SODIUM CHLORIDE 0.9 % (FLUSH) 0.9 %
5-40 SYRINGE (ML) INJECTION EVERY 12 HOURS SCHEDULED
Status: DISCONTINUED | OUTPATIENT
Start: 2023-10-20 | End: 2023-10-20

## 2023-10-20 RX ORDER — ASPIRIN 81 MG/1
81 TABLET ORAL DAILY
Status: DISCONTINUED | OUTPATIENT
Start: 2023-10-21 | End: 2023-10-23 | Stop reason: HOSPADM

## 2023-10-20 RX ORDER — IPRATROPIUM BROMIDE 42 UG/1
2 SPRAY, METERED NASAL 3 TIMES DAILY PRN
Status: DISCONTINUED | OUTPATIENT
Start: 2023-10-20 | End: 2023-10-20 | Stop reason: ALTCHOICE

## 2023-10-20 RX ORDER — HYDROCODONE BITARTRATE AND ACETAMINOPHEN 5; 325 MG/1; MG/1
1 TABLET ORAL EVERY 4 HOURS PRN
Status: DISCONTINUED | OUTPATIENT
Start: 2023-10-20 | End: 2023-10-21

## 2023-10-20 RX ORDER — SODIUM CHLORIDE 9 MG/ML
INJECTION, SOLUTION INTRAVENOUS PRN
Status: DISCONTINUED | OUTPATIENT
Start: 2023-10-20 | End: 2023-10-20 | Stop reason: HOSPADM

## 2023-10-20 RX ORDER — METOPROLOL SUCCINATE 25 MG/1
25 TABLET, EXTENDED RELEASE ORAL 2 TIMES DAILY
Status: DISCONTINUED | OUTPATIENT
Start: 2023-10-20 | End: 2023-10-23 | Stop reason: HOSPADM

## 2023-10-20 RX ORDER — EPHEDRINE SULFATE/0.9% NACL/PF 50 MG/5 ML
SYRINGE (ML) INTRAVENOUS PRN
Status: DISCONTINUED | OUTPATIENT
Start: 2023-10-20 | End: 2023-10-20 | Stop reason: SDUPTHER

## 2023-10-20 RX ORDER — ONDANSETRON 2 MG/ML
INJECTION INTRAMUSCULAR; INTRAVENOUS PRN
Status: DISCONTINUED | OUTPATIENT
Start: 2023-10-20 | End: 2023-10-20 | Stop reason: SDUPTHER

## 2023-10-20 RX ORDER — ONDANSETRON 2 MG/ML
4 INJECTION INTRAMUSCULAR; INTRAVENOUS
Status: DISCONTINUED | OUTPATIENT
Start: 2023-10-20 | End: 2023-10-20

## 2023-10-20 RX ORDER — TERAZOSIN 5 MG/1
5 CAPSULE ORAL NIGHTLY
Status: DISCONTINUED | OUTPATIENT
Start: 2023-10-20 | End: 2023-10-20 | Stop reason: CLARIF

## 2023-10-20 RX ORDER — SODIUM CHLORIDE 9 MG/ML
INJECTION, SOLUTION INTRAVENOUS PRN
Status: DISCONTINUED | OUTPATIENT
Start: 2023-10-20 | End: 2023-10-20

## 2023-10-20 RX ORDER — SODIUM CHLORIDE 9 MG/ML
INJECTION, SOLUTION INTRAVENOUS PRN
Status: DISCONTINUED | OUTPATIENT
Start: 2023-10-20 | End: 2023-10-23 | Stop reason: HOSPADM

## 2023-10-20 RX ADMIN — SODIUM CHLORIDE 50 ML/HR: 9 INJECTION, SOLUTION INTRAVENOUS at 06:50

## 2023-10-20 RX ADMIN — FENTANYL CITRATE 100 MCG: 50 INJECTION, SOLUTION INTRAMUSCULAR; INTRAVENOUS at 07:51

## 2023-10-20 RX ADMIN — SUGAMMADEX 200 MG: 100 INJECTION, SOLUTION INTRAVENOUS at 10:05

## 2023-10-20 RX ADMIN — Medication 15 MG: at 08:02

## 2023-10-20 RX ADMIN — SODIUM CHLORIDE, PRESERVATIVE FREE 10 ML: 5 INJECTION INTRAVENOUS at 20:47

## 2023-10-20 RX ADMIN — LIDOCAINE HYDROCHLORIDE 4 ML: 40 SOLUTION TOPICAL at 10:13

## 2023-10-20 RX ADMIN — HYDROCODONE BITARTRATE AND ACETAMINOPHEN 1 TABLET: 5; 325 TABLET ORAL at 22:02

## 2023-10-20 RX ADMIN — ATORVASTATIN CALCIUM 40 MG: 40 TABLET, FILM COATED ORAL at 20:45

## 2023-10-20 RX ADMIN — ROCURONIUM BROMIDE 50 MG: 10 INJECTION INTRAVENOUS at 07:51

## 2023-10-20 RX ADMIN — HYDROCODONE BITARTRATE AND ACETAMINOPHEN 1 TABLET: 5; 325 TABLET ORAL at 13:39

## 2023-10-20 RX ADMIN — DEXAMETHASONE SODIUM PHOSPHATE 4 MG: 10 INJECTION, EMULSION INTRAMUSCULAR; INTRAVENOUS at 08:14

## 2023-10-20 RX ADMIN — ACETAMINOPHEN 650 MG: 325 TABLET ORAL at 12:28

## 2023-10-20 RX ADMIN — SODIUM CHLORIDE: 9 INJECTION, SOLUTION INTRAVENOUS at 12:13

## 2023-10-20 RX ADMIN — PROPOFOL 140 MG: 10 INJECTION, EMULSION INTRAVENOUS at 07:51

## 2023-10-20 RX ADMIN — HYDROCODONE BITARTRATE AND ACETAMINOPHEN 1 TABLET: 5; 325 TABLET ORAL at 17:54

## 2023-10-20 RX ADMIN — PIPERACILLIN AND TAZOBACTAM 3375 MG: 3; .375 INJECTION, POWDER, LYOPHILIZED, FOR SOLUTION INTRAVENOUS at 20:52

## 2023-10-20 RX ADMIN — PIPERACILLIN SODIUM,TAZOBACTAM SODIUM 3.38 G: 3; .375 INJECTION, POWDER, FOR SOLUTION INTRAVENOUS at 08:08

## 2023-10-20 RX ADMIN — METOPROLOL SUCCINATE 25 MG: 25 TABLET, EXTENDED RELEASE ORAL at 20:45

## 2023-10-20 RX ADMIN — ONDANSETRON 4 MG: 2 INJECTION INTRAMUSCULAR; INTRAVENOUS at 10:05

## 2023-10-20 RX ADMIN — ROCURONIUM BROMIDE 50 MG: 10 INJECTION INTRAVENOUS at 08:28

## 2023-10-20 RX ADMIN — MIDAZOLAM 1 MG: 1 INJECTION INTRAMUSCULAR; INTRAVENOUS at 07:46

## 2023-10-20 RX ADMIN — PROPOFOL 50 MCG/KG/MIN: 10 INJECTION, EMULSION INTRAVENOUS at 08:05

## 2023-10-20 RX ADMIN — LIDOCAINE HYDROCHLORIDE 60 MG: 20 INJECTION, SOLUTION INTRAVENOUS at 07:51

## 2023-10-20 RX ADMIN — Medication 15 MG: at 08:58

## 2023-10-20 ASSESSMENT — PAIN DESCRIPTION - FREQUENCY
FREQUENCY: CONTINUOUS
FREQUENCY: CONTINUOUS

## 2023-10-20 ASSESSMENT — PAIN DESCRIPTION - ORIENTATION
ORIENTATION: MID

## 2023-10-20 ASSESSMENT — PAIN SCALES - GENERAL
PAINLEVEL_OUTOF10: 4
PAINLEVEL_OUTOF10: 3
PAINLEVEL_OUTOF10: 4
PAINLEVEL_OUTOF10: 4
PAINLEVEL_OUTOF10: 3
PAINLEVEL_OUTOF10: 4
PAINLEVEL_OUTOF10: 4
PAINLEVEL_OUTOF10: 2

## 2023-10-20 ASSESSMENT — PAIN DESCRIPTION - DESCRIPTORS
DESCRIPTORS: SORE
DESCRIPTORS: ACHING
DESCRIPTORS: ACHING;DULL
DESCRIPTORS: ACHING

## 2023-10-20 ASSESSMENT — PAIN DESCRIPTION - LOCATION
LOCATION: THROAT

## 2023-10-20 ASSESSMENT — PAIN - FUNCTIONAL ASSESSMENT
PAIN_FUNCTIONAL_ASSESSMENT: ACTIVITIES ARE NOT PREVENTED
PAIN_FUNCTIONAL_ASSESSMENT: 0-10
PAIN_FUNCTIONAL_ASSESSMENT: ACTIVITIES ARE NOT PREVENTED

## 2023-10-20 ASSESSMENT — PAIN DESCRIPTION - ONSET
ONSET: ON-GOING
ONSET: ON-GOING

## 2023-10-20 ASSESSMENT — PAIN DESCRIPTION - PAIN TYPE
TYPE: SURGICAL PAIN
TYPE: SURGICAL PAIN

## 2023-10-20 NOTE — INTERVAL H&P NOTE
Pt Name: Jt King  MRN: [id number]  YOB: 1955  Date of evaluation: 10/20/2023    I have examined the patient and reviewed the H&P/Consult and there are no changes to the patient or plans.          Electronically signed by Rolan Steve MD on 10/20/2023 at 7:39 AM

## 2023-10-20 NOTE — ANESTHESIA POSTPROCEDURE EVALUATION
Department of Anesthesiology  Postprocedure Note    Patient: Arthur Wilhelm  MRN: [id number]  YOB: 1955  Date of evaluation: 10/20/2023      Procedure Summary     Date: 10/20/23 Room / Location: 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     Maggie Phase I: Maggie Score: 9    Maggie Phase II:        Anesthesia Post Evaluation    Patient location during evaluation: PACU  Patient participation: complete - patient participated  Level of consciousness: awake and alert  Airway patency: patent  Nausea & Vomiting: no nausea  Complications: no  Cardiovascular status: blood pressure returned to baseline and hemodynamically stable  Respiratory status: acceptable and spontaneous ventilation  Hydration status: euvolemic  Pain management: adequate

## 2023-10-21 PROCEDURE — 99024 POSTOP FOLLOW-UP VISIT: CPT | Performed by: PHYSICIAN ASSISTANT

## 2023-10-21 PROCEDURE — 2700000000 HC OXYGEN THERAPY PER DAY

## 2023-10-21 PROCEDURE — 6360000002 HC RX W HCPCS: Performed by: NURSE PRACTITIONER

## 2023-10-21 PROCEDURE — 6370000000 HC RX 637 (ALT 250 FOR IP)

## 2023-10-21 PROCEDURE — 2060000000 HC ICU INTERMEDIATE R&B

## 2023-10-21 PROCEDURE — 2580000003 HC RX 258: Performed by: NURSE PRACTITIONER

## 2023-10-21 PROCEDURE — 6370000000 HC RX 637 (ALT 250 FOR IP): Performed by: NURSE PRACTITIONER

## 2023-10-21 PROCEDURE — 6370000000 HC RX 637 (ALT 250 FOR IP): Performed by: PHYSICIAN ASSISTANT

## 2023-10-21 RX ADMIN — PIPERACILLIN AND TAZOBACTAM 3375 MG: 3; .375 INJECTION, POWDER, LYOPHILIZED, FOR SOLUTION INTRAVENOUS at 05:12

## 2023-10-21 RX ADMIN — HYDROCODONE BITARTRATE AND ACETAMINOPHEN 10 ML: 5; 217 SOLUTION ORAL at 17:53

## 2023-10-21 RX ADMIN — HYDROCODONE BITARTRATE AND ACETAMINOPHEN 15 ML: 5; 217 SOLUTION ORAL at 22:03

## 2023-10-21 RX ADMIN — Medication 1 TABLET: at 08:35

## 2023-10-21 RX ADMIN — DOXAZOSIN 4 MG: 4 TABLET ORAL at 08:35

## 2023-10-21 RX ADMIN — LEVOTHYROXINE SODIUM 50 MCG: 0.05 TABLET ORAL at 08:35

## 2023-10-21 RX ADMIN — PIPERACILLIN AND TAZOBACTAM 3375 MG: 3; .375 INJECTION, POWDER, LYOPHILIZED, FOR SOLUTION INTRAVENOUS at 13:07

## 2023-10-21 RX ADMIN — HYDROCODONE BITARTRATE AND ACETAMINOPHEN 1 TABLET: 5; 325 TABLET ORAL at 08:40

## 2023-10-21 RX ADMIN — HYDROCODONE BITARTRATE AND ACETAMINOPHEN 10 ML: 5; 217 SOLUTION ORAL at 13:05

## 2023-10-21 RX ADMIN — PIPERACILLIN AND TAZOBACTAM 3375 MG: 3; .375 INJECTION, POWDER, LYOPHILIZED, FOR SOLUTION INTRAVENOUS at 22:03

## 2023-10-21 RX ADMIN — HYDROCODONE BITARTRATE AND ACETAMINOPHEN 1 TABLET: 5; 325 TABLET ORAL at 03:39

## 2023-10-21 RX ADMIN — METOPROLOL SUCCINATE 25 MG: 25 TABLET, EXTENDED RELEASE ORAL at 08:35

## 2023-10-21 RX ADMIN — SODIUM CHLORIDE, PRESERVATIVE FREE 10 ML: 5 INJECTION INTRAVENOUS at 20:34

## 2023-10-21 RX ADMIN — SODIUM CHLORIDE: 9 INJECTION, SOLUTION INTRAVENOUS at 05:11

## 2023-10-21 RX ADMIN — ATORVASTATIN CALCIUM 40 MG: 40 TABLET, FILM COATED ORAL at 20:33

## 2023-10-21 RX ADMIN — POLYETHYLENE GLYCOL 3350 17 G: 17 POWDER, FOR SOLUTION ORAL at 20:33

## 2023-10-21 RX ADMIN — METOPROLOL SUCCINATE 25 MG: 25 TABLET, EXTENDED RELEASE ORAL at 20:33

## 2023-10-21 RX ADMIN — PANTOPRAZOLE SODIUM 40 MG: 40 TABLET, DELAYED RELEASE ORAL at 05:15

## 2023-10-21 RX ADMIN — ASPIRIN 81 MG: 81 TABLET, COATED ORAL at 08:35

## 2023-10-21 ASSESSMENT — PAIN - FUNCTIONAL ASSESSMENT: PAIN_FUNCTIONAL_ASSESSMENT: ACTIVITIES ARE NOT PREVENTED

## 2023-10-21 ASSESSMENT — PAIN DESCRIPTION - PAIN TYPE: TYPE: SURGICAL PAIN

## 2023-10-21 ASSESSMENT — PAIN SCALES - GENERAL
PAINLEVEL_OUTOF10: 8
PAINLEVEL_OUTOF10: 4
PAINLEVEL_OUTOF10: 4
PAINLEVEL_OUTOF10: 0
PAINLEVEL_OUTOF10: 2

## 2023-10-21 ASSESSMENT — PAIN DESCRIPTION - LOCATION
LOCATION: THROAT

## 2023-10-21 ASSESSMENT — PAIN DESCRIPTION - ORIENTATION: ORIENTATION: INNER;ANTERIOR

## 2023-10-21 ASSESSMENT — PAIN DESCRIPTION - DESCRIPTORS: DESCRIPTORS: SORE

## 2023-10-21 NOTE — FLOWSHEET NOTE
10/21/23 1304   Safe Environment   Safety Measures Bed in low position;Call light within reach; Side rails X 2;Nurse at bedside;Visitors at bedside  (Virtual Nurse Safety Round Complete)     Call placed to patients room, patient responds to audio, permitted video. Patient alert and oriented. Nurse at bedside passing medications. Patient denied any voiced conscerns/complaints at this time. Patient educated on utilizig call light. Call light within reach, no signs or symtpoms of distress noted.

## 2023-10-22 LAB — BACTERIA SPEC AEROBE CULT: NORMAL

## 2023-10-22 PROCEDURE — 6360000002 HC RX W HCPCS: Performed by: PHYSICIAN ASSISTANT

## 2023-10-22 PROCEDURE — 2060000000 HC ICU INTERMEDIATE R&B

## 2023-10-22 PROCEDURE — 6370000000 HC RX 637 (ALT 250 FOR IP): Performed by: NURSE PRACTITIONER

## 2023-10-22 PROCEDURE — 2700000000 HC OXYGEN THERAPY PER DAY

## 2023-10-22 PROCEDURE — 31536 LARYNGOSCOPY W/BX & OP SCOPE: CPT | Performed by: OTOLARYNGOLOGY

## 2023-10-22 PROCEDURE — 6360000002 HC RX W HCPCS: Performed by: NURSE PRACTITIONER

## 2023-10-22 PROCEDURE — 6370000000 HC RX 637 (ALT 250 FOR IP)

## 2023-10-22 PROCEDURE — 99024 POSTOP FOLLOW-UP VISIT: CPT | Performed by: PHYSICIAN ASSISTANT

## 2023-10-22 PROCEDURE — 94761 N-INVAS EAR/PLS OXIMETRY MLT: CPT

## 2023-10-22 PROCEDURE — 6370000000 HC RX 637 (ALT 250 FOR IP): Performed by: PHYSICIAN ASSISTANT

## 2023-10-22 PROCEDURE — 2580000003 HC RX 258: Performed by: NURSE PRACTITIONER

## 2023-10-22 RX ORDER — ENOXAPARIN SODIUM 100 MG/ML
1 INJECTION SUBCUTANEOUS 2 TIMES DAILY
Status: DISCONTINUED | OUTPATIENT
Start: 2023-10-22 | End: 2023-10-23 | Stop reason: HOSPADM

## 2023-10-22 RX ADMIN — HYDROCODONE BITARTRATE AND ACETAMINOPHEN 15 ML: 5; 217 SOLUTION ORAL at 15:12

## 2023-10-22 RX ADMIN — ENOXAPARIN SODIUM 80 MG: 100 INJECTION SUBCUTANEOUS at 20:03

## 2023-10-22 RX ADMIN — HYDROCODONE BITARTRATE AND ACETAMINOPHEN 10 ML: 5; 217 SOLUTION ORAL at 05:55

## 2023-10-22 RX ADMIN — ASPIRIN 81 MG: 81 TABLET, COATED ORAL at 08:45

## 2023-10-22 RX ADMIN — PIPERACILLIN AND TAZOBACTAM 3375 MG: 3; .375 INJECTION, POWDER, LYOPHILIZED, FOR SOLUTION INTRAVENOUS at 13:44

## 2023-10-22 RX ADMIN — PIPERACILLIN AND TAZOBACTAM 3375 MG: 3; .375 INJECTION, POWDER, LYOPHILIZED, FOR SOLUTION INTRAVENOUS at 05:58

## 2023-10-22 RX ADMIN — ENOXAPARIN SODIUM 80 MG: 100 INJECTION SUBCUTANEOUS at 13:44

## 2023-10-22 RX ADMIN — ONDANSETRON 4 MG: 2 INJECTION INTRAMUSCULAR; INTRAVENOUS at 10:19

## 2023-10-22 RX ADMIN — LEVOTHYROXINE SODIUM 50 MCG: 0.05 TABLET ORAL at 08:45

## 2023-10-22 RX ADMIN — PANTOPRAZOLE SODIUM 40 MG: 40 TABLET, DELAYED RELEASE ORAL at 05:55

## 2023-10-22 RX ADMIN — PIPERACILLIN AND TAZOBACTAM 3375 MG: 3; .375 INJECTION, POWDER, LYOPHILIZED, FOR SOLUTION INTRAVENOUS at 21:34

## 2023-10-22 RX ADMIN — HYDROCODONE BITARTRATE AND ACETAMINOPHEN 10 ML: 5; 217 SOLUTION ORAL at 20:02

## 2023-10-22 RX ADMIN — HYDROCODONE BITARTRATE AND ACETAMINOPHEN 15 ML: 5; 217 SOLUTION ORAL at 10:19

## 2023-10-22 RX ADMIN — Medication 1 TABLET: at 08:45

## 2023-10-22 RX ADMIN — ATORVASTATIN CALCIUM 40 MG: 40 TABLET, FILM COATED ORAL at 20:04

## 2023-10-22 RX ADMIN — DOXAZOSIN 4 MG: 4 TABLET ORAL at 08:45

## 2023-10-22 ASSESSMENT — PAIN DESCRIPTION - LOCATION
LOCATION: THROAT

## 2023-10-22 ASSESSMENT — PAIN DESCRIPTION - ORIENTATION
ORIENTATION: INNER
ORIENTATION: INNER

## 2023-10-22 ASSESSMENT — PAIN SCALES - GENERAL
PAINLEVEL_OUTOF10: 4
PAINLEVEL_OUTOF10: 6
PAINLEVEL_OUTOF10: 7
PAINLEVEL_OUTOF10: 7
PAINLEVEL_OUTOF10: 6
PAINLEVEL_OUTOF10: 3

## 2023-10-22 ASSESSMENT — PAIN DESCRIPTION - DESCRIPTORS
DESCRIPTORS: ACHING
DESCRIPTORS: ACHING

## 2023-10-22 ASSESSMENT — PAIN DESCRIPTION - PAIN TYPE
TYPE: SURGICAL PAIN
TYPE: SURGICAL PAIN

## 2023-10-22 NOTE — BRIEF OP NOTE
Brief Postoperative Note      Patient: Loree Mendieta  YOB: 1955  MRN: [id number]    Date of Procedure: 10/20/2023    Pre-Op Diagnosis Codes:     * Malignant neoplasm of false vocal cords ([address]) [C32.1]     * Carcinoma in situ of larynx [D02.0]    Post-Op Diagnosis: Same       Procedure(s):  Suspension laryngoscopy, placement of jet ventilation, performed multiple biopsies from the margin of resection, and managing airway postresection (in conjunction with the lateral vertical partial laryngectomy performed by Dr. Mervin Quiles)    Surgeon(s):  MD James Randolph MD  Assistant:  First Assistant: Dany Ramos RN    Anesthesia: General    Estimated Blood Loss (mL): Minimal    Complications: None    Specimens:   ID Type Source Tests Collected by Time Destination   A : anterior commisure of the false cords anterior margin of resection  Tissue Vocal Cord SURGICAL PATHOLOGY Audi Holbrook MD 10/20/2023 7155    B : INFERIOR MARGIN MIDDLE THIRD VOCAL CORD AT THE VENTRICLE  Tissue Vocal Cord SURGICAL PATHOLOGY Audi Holbrook MD 10/20/2023 0945    C : DEEP MARGIN RIGHT FALSE VOCAL CORD CENTER  Tissue Vocal Cord SURGICAL PATHOLOGY Audi Holbrook MD 10/20/2023 6648    D : PARTIAL SUPRGLOTTECTOMY 1 CLIP ANTERIOR COMMISURE, 2 CLIP POSTERIOR MARGIN ARYTENOID, 3 CLIP SUPERIOR MARGIN AE FOLD Tissue Mouth SURGICAL PATHOLOGY Audi Holbrook MD 10/20/2023 8011    E : RIGHT TRUE VOCAL CORD ANTERIOR THIRD LATERAL MARGIN  OF TRUE VOCAL CORD  Tissue Vocal Cord SURGICAL PATHOLOGY Audi Holbrook MD 10/20/2023 8484        Implants:  * No implants in log *      Drains: * No LDAs found *    Findings: This patient has had multiple laryngoscopies and biopsies for squamous cell carcinoma and squamous cell carcinoma in situ of the right false vocal cord over the last 7 years.   There was a recent biopsy which showed both squamous cell carcinoma and carcinoma in situ from the left ventricle area as well as
Tracey Weber MD 10/20/2023 0213     E : RIGHT TRUE VOCAL CORD ANTERIOR THIRD LATERAL MARGIN  OF TRUE VOCAL CORD  Tissue Vocal Cord SURGICAL PATHOLOGY Sarabjit Villareal MD 10/20/2023 7113           Implants:  * No implants in log *      Drains: * No LDAs found *     Findings: This patient has had multiple laryngoscopies and biopsies for squamous cell carcinoma and squamous cell carcinoma in situ of the right false vocal cord over the last 7 years. There was a recent biopsy which showed both squamous cell carcinoma and carcinoma in situ from the left ventricle area as well as the anterior false cord. Please correlate these findings with Dr. Jackquelyn Scheuermann operative dictation. I performed the initial laryngeal suspension laryngoscopy, placement of the jet ventilation, and after the resection obtained multiple biopsies from different sites at the margins of resection. I also reintubated the patient at the end of the procedure, managing the airway before returning the patient's care to the anesthesia team.    Implants:  * No implants in log *      Drains: * No LDAs found *    Findings: right false vocal cord resected. Grossly clear margins.  Additional tissue sent from several areas to confirm margins, and main specimen oriented for pathologist.      Electronically signed by Tran Knight MD on 10/20/2023 at 10:37 AM

## 2023-10-22 NOTE — FLOWSHEET NOTE
10/22/23 1448   Safe Environment   Safety Measures Bed in low position;Call light within reach; Fall prevention (comment); Family at bedside;Side rails X 2;Standard Safety Measures  (Virtual nurse safety round completed.)     Patient is awake and  alert and answers questions. No distress noted. Inquired if patient had needs for addressing pain, potty, positioning, access to personal things and any other personal needs. No needs at this time.

## 2023-10-22 NOTE — OP NOTE
Operative Note      Patient: Aparna Garcias  YOB: 1955  MRN: [id number]    Date of Procedure: 10/20/2023    Pre-Op Diagnosis Codes:     * Malignant neoplasm of false vocal cords ([address]) [C32.1]     * Carcinoma in situ of larynx [D02.0]    Post-Op Diagnosis: Same       Procedure(s):  Laterovertical Partial Laryngectomy    Surgeon(s):  MD Khagn Maldonado MD    Assistant:   First Assistant: Zbigniew Mott RN    Anesthesia: General    Estimated Blood Loss (mL): less than 50     Complications: None    Specimens:   ID Type Source Tests Collected by Time Destination   A : anterior commisure of the false cords anterior margin of resection  Tissue Vocal Cord SURGICAL PATHOLOGY Khang Mo MD 10/20/2023 6183    B : INFERIOR MARGIN MIDDLE THIRD VOCAL CORD AT THE VENTRICLE  Tissue Vocal Cord SURGICAL PATHOLOGY Khang Mo MD 10/20/2023 0945    C : DEEP MARGIN RIGHT FALSE VOCAL CORD CENTER  Tissue Vocal Cord SURGICAL PATHOLOGY Khang Mo MD 10/20/2023 8314    D : PARTIAL SUPRGLOTTECTOMY 1 CLIP ANTERIOR COMMISURE, 2 CLIP POSTERIOR MARGIN ARYTENOID, 3 CLIP SUPERIOR MARGIN AE FOLD Tissue Mouth SURGICAL PATHOLOGY Khang Mo MD 10/20/2023 3823    E : RIGHT TRUE VOCAL CORD ANTERIOR THIRD LATERAL MARGIN  OF TRUE VOCAL CORD  Tissue Vocal Cord SURGICAL PATHOLOGY Khang Mo MD 10/20/2023 1731        Implants:  * No implants in log *      Drains: * No LDAs found *    Findings: 1. Extensive postradiation morphologic changes to the endolarynx  2. Areas along the anterior false cord and AE fold mucosa with corrugated epithelium consistent with recurrent superficial spreading squamous cell carcinoma  3.   Similar areas along the posterior aspect of the true fold coming down onto the ventricular floor and up to the posterior fold concerning for diffuse spread of malignant and premalignant tissue    Initial AE fold incision    Superior to inferior dissection with partial false fold
entire area of resection was easily visualized through the microscope. Dr. Jennifer Bailey proceeded with the resection, which he dictated separately. Given the rather subtle appearance of the positive biopsy sites in the past and the prior full course radiation therapy, it seemed appropriate to perform some additional biopsies to assure margins. This is partly because the width of the cut from the laser would add 2 to 3 mm of margin. Biopsies were taken as noted above. These were sent directly to pathology for permanent sectioning. Suction cautery was used as needed for hemostasis throughout the procedure. I also applied some topical epinephrine to the raw areas using cottonoid pledgets. There was no bleeding. The endolarynx was then sprayed with an LTA. With the laryngoscope still in place I passed a #7 standard endotracheal tube between the vocal cords and jet ventilation was stopped. Jet ventilation cannula was removed. The endoscope was withdrawn over the endotracheal tube and cuff inflated. Tube was then adjusted so that it was 1 to 2 cm below the true vocal cords. Normal ventilation parameters were noted by the nurse anesthetist including CO2 return, good tidal volumes, and maintenance of normal O2 saturations. The patient was then returned to the anesthesia team.  He was awakened extubated and taken to recovery room in satisfactory condition. He will be admitted at least overnight for observation for his airway and any potential bleeding. He was covered with both antibiotics and steroids perioperatively. My photographic documentation will be scanned in under media.       Electronically signed by Michael Martinez MD on 10/22/2023 at 1:05 PM

## 2023-10-22 NOTE — FLOWSHEET NOTE
10/22/23 1145   Safe Environment   Safety Measures Bed in low position;Call light within reach; Fall prevention (comment); Family at bedside;Side rails X 2;Standard Safety Measures  (Virtual nurse safety round completed.)     Patient is awake and  alert and answers questions. No distress noted. Inquired if patient had needs for addressing pain, potty, positioning, access to personal things and any other personal needs. No needs at this time. Referred patient to page 13 of the handbook for fall prevention review. Educated on call light use. Patient voices understanding on how and when  to use the call light. Call light in reach.

## 2023-10-23 VITALS
RESPIRATION RATE: 16 BRPM | HEIGHT: 71 IN | OXYGEN SATURATION: 97 % | HEART RATE: 62 BPM | SYSTOLIC BLOOD PRESSURE: 118 MMHG | DIASTOLIC BLOOD PRESSURE: 69 MMHG | TEMPERATURE: 97.7 F | WEIGHT: 179.68 LBS | BODY MASS INDEX: 25.15 KG/M2

## 2023-10-23 PROCEDURE — 6360000002 HC RX W HCPCS: Performed by: PHYSICIAN ASSISTANT

## 2023-10-23 PROCEDURE — 99024 POSTOP FOLLOW-UP VISIT: CPT | Performed by: PHYSICIAN ASSISTANT

## 2023-10-23 PROCEDURE — 6370000000 HC RX 637 (ALT 250 FOR IP)

## 2023-10-23 PROCEDURE — 2580000003 HC RX 258: Performed by: NURSE PRACTITIONER

## 2023-10-23 PROCEDURE — 6360000002 HC RX W HCPCS: Performed by: NURSE PRACTITIONER

## 2023-10-23 PROCEDURE — 6370000000 HC RX 637 (ALT 250 FOR IP): Performed by: PHYSICIAN ASSISTANT

## 2023-10-23 PROCEDURE — 6370000000 HC RX 637 (ALT 250 FOR IP): Performed by: NURSE PRACTITIONER

## 2023-10-23 RX ORDER — AMOXICILLIN AND CLAVULANATE POTASSIUM 250; 62.5 MG/5ML; MG/5ML
500 POWDER, FOR SUSPENSION ORAL 2 TIMES DAILY
Qty: 100 ML | Refills: 0 | Status: SHIPPED | OUTPATIENT
Start: 2023-10-23 | End: 2023-10-28

## 2023-10-23 RX ORDER — ACETAMINOPHEN 160 MG/5ML
500 SUSPENSION ORAL EVERY 6 HOURS PRN
Qty: 355 ML | Refills: 1 | Status: SHIPPED | OUTPATIENT
Start: 2023-10-23

## 2023-10-23 RX ORDER — BENZOCAINE AND MENTHOL, UNSPECIFIED FORM 15; 2.3 MG/1; MG/1
1 LOZENGE ORAL
Qty: 16 LOZENGE | Refills: 1 | Status: SHIPPED | OUTPATIENT
Start: 2023-10-23

## 2023-10-23 RX ORDER — ENOXAPARIN SODIUM 100 MG/ML
1 INJECTION SUBCUTANEOUS 2 TIMES DAILY
Qty: 22.4 ML | Refills: 0 | Status: SHIPPED | OUTPATIENT
Start: 2023-10-23 | End: 2023-11-06

## 2023-10-23 RX ADMIN — SODIUM CHLORIDE, PRESERVATIVE FREE 10 ML: 5 INJECTION INTRAVENOUS at 08:37

## 2023-10-23 RX ADMIN — PIPERACILLIN AND TAZOBACTAM 3375 MG: 3; .375 INJECTION, POWDER, LYOPHILIZED, FOR SOLUTION INTRAVENOUS at 05:31

## 2023-10-23 RX ADMIN — ENOXAPARIN SODIUM 80 MG: 100 INJECTION SUBCUTANEOUS at 08:36

## 2023-10-23 RX ADMIN — HYDROCODONE BITARTRATE AND ACETAMINOPHEN 10 ML: 5; 217 SOLUTION ORAL at 00:01

## 2023-10-23 RX ADMIN — HYDROCODONE BITARTRATE AND ACETAMINOPHEN 10 ML: 5; 217 SOLUTION ORAL at 08:36

## 2023-10-23 RX ADMIN — LEVOTHYROXINE SODIUM 50 MCG: 0.05 TABLET ORAL at 08:36

## 2023-10-23 RX ADMIN — Medication 1 TABLET: at 08:36

## 2023-10-23 RX ADMIN — PANTOPRAZOLE SODIUM 40 MG: 40 TABLET, DELAYED RELEASE ORAL at 05:33

## 2023-10-23 RX ADMIN — DOXAZOSIN 4 MG: 4 TABLET ORAL at 08:37

## 2023-10-23 RX ADMIN — PIPERACILLIN AND TAZOBACTAM 3375 MG: 3; .375 INJECTION, POWDER, LYOPHILIZED, FOR SOLUTION INTRAVENOUS at 12:50

## 2023-10-23 RX ADMIN — ASPIRIN 81 MG: 81 TABLET, COATED ORAL at 08:36

## 2023-10-23 ASSESSMENT — PAIN SCALES - GENERAL
PAINLEVEL_OUTOF10: 5
PAINLEVEL_OUTOF10: 6
PAINLEVEL_OUTOF10: 2
PAINLEVEL_OUTOF10: 0
PAINLEVEL_OUTOF10: 5

## 2023-10-23 ASSESSMENT — PAIN DESCRIPTION - DESCRIPTORS: DESCRIPTORS: ACHING;DISCOMFORT

## 2023-10-23 ASSESSMENT — PAIN DESCRIPTION - LOCATION
LOCATION: THROAT
LOCATION: THROAT

## 2023-10-23 ASSESSMENT — PAIN DESCRIPTION - PAIN TYPE: TYPE: SURGICAL PAIN;ACUTE PAIN

## 2023-10-23 ASSESSMENT — PAIN DESCRIPTION - ORIENTATION: ORIENTATION: INNER

## 2023-10-23 ASSESSMENT — PAIN DESCRIPTION - FREQUENCY: FREQUENCY: CONTINUOUS

## 2023-10-23 ASSESSMENT — PAIN DESCRIPTION - ONSET: ONSET: ON-GOING

## 2023-10-23 ASSESSMENT — PAIN - FUNCTIONAL ASSESSMENT: PAIN_FUNCTIONAL_ASSESSMENT: ACTIVITIES ARE NOT PREVENTED

## 2023-10-23 NOTE — FLOWSHEET NOTE
10/23/23 4240   Safe Environment   Safety Measures Standard Safety Measures;Call light within reach; Bed in low position  (virtual safety round)     Pt resting in bed eating lunch. Alert and oriented. Reminded to utilize call light when needed.

## 2023-10-23 NOTE — DISCHARGE INSTRUCTIONS
-You can take tylenol (15.62mL) or hydrocodone-acetaminophen (10mL) every 6 hours as needed for pain. Do not take both at the same time. Each 6 hour increment, you should pick one or the other depending on your pain level  -Cepacol lozenge every 3 hours as needed for pain. This helps with numbing the throat  -Lovenox prescribed at discharge. This is a blood thinner that you will use instead of your Brilinta. You will resume your Brilinta after the 2 weeks of Lovenox are completed.  Hold Lovenox/Brilinta for bleeding or spitting up blood  -Call the ENT office if you are having any worsening symptoms or other concerns in the meantime

## 2023-10-23 NOTE — CARE COORDINATION
CM Note  Client needs Brandiuer oral suction/machine r/t inability to manage own secretions    Collaborated w Chris Faulkner, RN, routed note to Attending  Electronically signed by Charles Bentley RN on 10/20/2023 at 3:29 PM
Addressed: No issues identified.      Radha Salguero RN  Case Management Department

## 2023-10-23 NOTE — PLAN OF CARE
Problem: Discharge Planning  Goal: Discharge to home or other facility with appropriate resources  Outcome: Adequate for Discharge     Problem: Pain  Goal: Verbalizes/displays adequate comfort level or baseline comfort level  10/23/2023 1541 by Elinor Boyd RN  Outcome: Adequate for Discharge  10/23/2023 1037 by Elinor Boyd RN  Outcome: Progressing  Flowsheets  Taken 10/23/2023 1037  Verbalizes/displays adequate comfort level or baseline comfort level:   Encourage patient to monitor pain and request assistance   Assess pain using appropriate pain scale   Administer analgesics based on type and severity of pain and evaluate response  Taken 10/23/2023 0815  Verbalizes/displays adequate comfort level or baseline comfort level:   Encourage patient to monitor pain and request assistance   Assess pain using appropriate pain scale   Administer analgesics based on type and severity of pain and evaluate response     Problem: ABCDS Injury Assessment  Goal: Absence of physical injury  Outcome: Adequate for Discharge     Problem: Safety - Adult  Goal: Free from fall injury  10/23/2023 1541 by Elinor Boyd RN  Outcome: Adequate for Discharge  10/23/2023 1037 by Elinor Boyd RN  Outcome: Progressing     Problem: Skin/Tissue Integrity  Goal: Absence of new skin breakdown  Description: 1. Monitor for areas of redness and/or skin breakdown  2. Assess vascular access sites hourly  3. Every 4-6 hours minimum:  Change oxygen saturation probe site  4. Every 4-6 hours:  If on nasal continuous positive airway pressure, respiratory therapy assess nares and determine need for appliance change or resting period.   Outcome: Adequate for Discharge     Problem: Skin/Tissue Integrity - Adult  Goal: Incisions, wounds, or drain sites healing without S/S of infection  Outcome: Adequate for Discharge     Problem: Respiratory - Adult  Goal: Achieves optimal ventilation and oxygenation  Outcome: Adequate for Discharge     Problem: Cardiovascular -
Problem: Discharge Planning  Goal: Discharge to home or other facility with appropriate resources  Outcome: Progressing  Flowsheets (Taken 10/22/2023 0504)  Discharge to home or other facility with appropriate resources:   Identify barriers to discharge with patient and caregiver   Arrange for needed discharge resources and transportation as appropriate     Problem: Pain  Goal: Verbalizes/displays adequate comfort level or baseline comfort level  Outcome: Progressing  Flowsheets (Taken 10/22/2023 0504)  Verbalizes/displays adequate comfort level or baseline comfort level:   Encourage patient to monitor pain and request assistance   Assess pain using appropriate pain scale     Problem: ABCDS Injury Assessment  Goal: Absence of physical injury  Outcome: Progressing  Flowsheets (Taken 10/22/2023 0504)  Absence of Physical Injury: Implement safety measures based on patient assessment     Problem: Safety - Adult  Goal: Free from fall injury  Outcome: Progressing  Flowsheets (Taken 10/22/2023 0504)  Free From Fall Injury:   Instruct family/caregiver on patient safety   Based on caregiver fall risk screen, instruct family/caregiver to ask for assistance with transferring infant if caregiver noted to have fall risk factors     Problem: Skin/Tissue Integrity  Goal: Absence of new skin breakdown  Description: 1. Monitor for areas of redness and/or skin breakdown  2. Assess vascular access sites hourly  3. Every 4-6 hours minimum:  Change oxygen saturation probe site  4. Every 4-6 hours:  If on nasal continuous positive airway pressure, respiratory therapy assess nares and determine need for appliance change or resting period.   Outcome: Progressing     Problem: Skin/Tissue Integrity - Adult  Goal: Incisions, wounds, or drain sites healing without S/S of infection  Outcome: Progressing  Flowsheets (Taken 10/22/2023 0504)  Incisions, Wounds, or Drain Sites Healing Without Sign and Symptoms of Infection: ADMISSION and
Problem: Pain  Goal: Verbalizes/displays adequate comfort level or baseline comfort level  10/23/2023 1037 by Eulalio Severance, RN  Outcome: Progressing  Flowsheets  Taken 10/23/2023 1037  Verbalizes/displays adequate comfort level or baseline comfort level:   Encourage patient to monitor pain and request assistance   Assess pain using appropriate pain scale   Administer analgesics based on type and severity of pain and evaluate response  Taken 10/23/2023 0815  Verbalizes/displays adequate comfort level or baseline comfort level:   Encourage patient to monitor pain and request assistance   Assess pain using appropriate pain scale   Administer analgesics based on type and severity of pain and evaluate response  Problem: Safety - Adult  Goal: Free from fall injury  10/23/2023 1037 by Eulalio Severance, RN  Outcome: Progressing  Free From Fall Injury: Instruct family/caregiver on patient safety
and assess patient's chronic conditions and comorbid symptoms for stability, deterioration, or improvement

## 2023-10-23 NOTE — FLOWSHEET NOTE
10/23/23 1031   Safe Environment   Safety Measures Other (comment)  (Safety round)     VN called into patients room and introduced myself and role. Patient answered and permitted video. Video activated. Patient resting comfortably in bed. Patient voiced no needs or concerns at this time. Call light within reach.

## 2023-10-24 ENCOUNTER — CARE COORDINATION (OUTPATIENT)
Dept: CASE MANAGEMENT | Age: 68
End: 2023-10-24

## 2023-10-24 DIAGNOSIS — D02.0 CARCINOMA IN SITU OF LARYNX: Primary | ICD-10-CM

## 2023-10-24 PROCEDURE — 1111F DSCHRG MED/CURRENT MED MERGE: CPT | Performed by: FAMILY MEDICINE

## 2023-10-24 NOTE — CARE COORDINATION
Care Transitions Initial Follow Up Call    Call within 2 business days of discharge: Yes    Patient Current Location:  Home: [address] [place] [id number]    Care Transition Nurse contacted the  pt's spouse Charanjit Hernandez  by telephone to perform post hospital discharge assessment. Verified name and  with  spouse  as identifiers. Provided introduction to self, and explanation of the Care Transition Nurse role. Patient: Carolynn Diaz Patient : 1955   MRN: <[id number]>  Reason for Admission: Carcinoma of larynx, s/p Laterovertical Partial Laryngectomy  Discharge Date: 10/23/23 RARS: Readmission Risk Score: 7.6      Last Discharge Facility       Date Complaint Diagnosis Description Type Department Provider    10/20/23  Carcinoma of ventricular fold of larynx ([address]) . .. Admission (Discharged) Holly Harding MD            Was this an external facility discharge? No Discharge Facility: [address] [place] [address] to be reviewed by the provider   Additional needs identified to be addressed with provider: Yes  Pt had \"a little nausea off and on\". Has not had any nausea this morning. Requesting nausea medication to have on hand just in case pt has nausea. Method of communication with provider: chart routing. Contacted pt for initial care transition follow up. Spoke with pt's spouse Charanjit Hernandez who answered the phone. She states Shawn Palm seems to be doing pretty good. Reports he is resting better since returning home. Continues to have pain. Taking Tylenol and hydrocodone-acetaminophen liquid but only using one or the other as instructed. Also encouraged to use Cepacol lozenges to help with pain. Pt has used the yankauer oral suction a few times. She denies pt having any bleeding or spitting up blood. States he's had blood tinged mucous in the hospital but has been improving.   She also reports Shawn Palm having \"a little nausea off and on\" since being in the hospital.  He denies having

## 2023-10-25 PROBLEM — Z95.1 STATUS POST CORONARY ARTERY BYPASS GRAFT: Status: ACTIVE | Noted: 2023-10-25

## 2023-10-26 ENCOUNTER — CARE COORDINATION (OUTPATIENT)
Dept: CASE MANAGEMENT | Age: 68
End: 2023-10-26

## 2023-10-26 ENCOUNTER — OFFICE VISIT (OUTPATIENT)
Dept: ENT CLINIC | Age: 68
End: 2023-10-26

## 2023-10-26 VITALS
WEIGHT: 177.6 LBS | TEMPERATURE: 97.9 F | RESPIRATION RATE: 20 BRPM | HEART RATE: 64 BPM | HEIGHT: 71 IN | BODY MASS INDEX: 24.86 KG/M2 | SYSTOLIC BLOOD PRESSURE: 172 MMHG | DIASTOLIC BLOOD PRESSURE: 70 MMHG | OXYGEN SATURATION: 96 %

## 2023-10-26 DIAGNOSIS — C32.9 PRIMARY SQUAMOUS CELL CARCINOMA OF LARYNX (HCC): ICD-10-CM

## 2023-10-26 DIAGNOSIS — D02.0 CARCINOMA IN SITU OF LARYNX: Primary | ICD-10-CM

## 2023-10-26 DIAGNOSIS — Z90.02: ICD-10-CM

## 2023-10-26 PROCEDURE — 99024 POSTOP FOLLOW-UP VISIT: CPT | Performed by: OTOLARYNGOLOGY

## 2023-10-26 ASSESSMENT — ENCOUNTER SYMPTOMS
CHOKING: 0
SINUS PRESSURE: 0
TROUBLE SWALLOWING: 0
APNEA: 0
ABDOMINAL PAIN: 0
CHEST TIGHTNESS: 0
FACIAL SWELLING: 0
DIARRHEA: 0
RHINORRHEA: 0
STRIDOR: 0
VOICE CHANGE: 0
WHEEZING: 0
SHORTNESS OF BREATH: 0
COUGH: 0
SORE THROAT: 0
COLOR CHANGE: 0
NAUSEA: 0
VOMITING: 0

## 2023-10-26 NOTE — CARE COORDINATION
MD [facility]   11/16/2023 11:00 AM Cyndee Gama MD [facility]   12/14/2023  1:15 PM Kiran Golden MD [facility]   12/21/2023  9:00 AM MD [facility] [place] [facility]   2/20/2024  9:00 AM Cyndee Gama MD N [facility]       Patients top risk factors for readmission: medical condition-carcinoma of larynx, s/p laterovertical partial larngectomy and polypharmacy  Interventions to address risk factors: Scheduled appointment with Specialist-office will be calling to schedule another appt next week    Offered patient enrollment in the Remote Patient Monitoring (RPM) program for in-home monitoring: NA-provider does not participate in RPM     Care Transitions Subsequent and Final Call    Subsequent and Final Calls  Do you have any ongoing symptoms?: Yes  Patient-reported symptoms: Pain  Have your medications changed?: No  Do you have any questions related to your medications?: No  Do you currently have any active services?: No  Do you have any needs or concerns that I can assist you with?: No  Care Transitions Interventions                          Other Interventions:             Care Transition Nurse provided contact information for future needs. Plan for follow-up call in 5-7 days based on severity of symptoms and risk factors.   Plan for next call: symptom management-any new or worsening symptoms    Renetta Oliveira RN

## 2023-10-26 NOTE — PROGRESS NOTES
ELSA) SUSPENSION MICROLARYNGOSCOPY WITH RECONSTRUCTION AND ADVANCEMENT FLAP X2; (DR. GONGORA)  MICROLARYNGOSCOPY AND BX POSTERIOR RIGHT VOCAL CORD      JET VENTILATION AND CO2 LASER performed by Glen Bolanos MD at [address] [place] [address] N/A 2022    SUSPENSION DIRECT LARYNGOSCOPY WITH EXCISIONAL BIOPSY OF RIGHT SIDED LARYNGEAL MASS performed by Carolyn Dye MD at [address] [place] [address] N/A 2022    MICROLARYNGOSCOPY WITH BIOPSY, SUSPENSION LARYNGOSCOPY WITH JET VENTILATION AND C02 LASER, RE-EXCISION FOR MARGINS performed by Carolyn Dye MD at [address] [place] [address] N/A 2023    MICROLARYNGOSCOPY WITH BIOPSY performed by Carolyn Dye MD at [address] [place] [address] N/A 10/4/2023    MicroLaryngoscopy with Biopsy performed by Carolyn Dye MD at [facility]  2016    by Dr Jeannette Estrada Right 2023    right advanced sacroiliac joint block performed by Vertis Rubinstein, DO at [address] [facility] SUBRETINAL/CHOROIDAL Right 2018    VITRECTOMY 25 GAUGE performed by Nadir Tony MD at [address] [place] [address] Right 2018     VITRECTOMY 25 GAUGE (Right Eye)     Family History   Problem Relation Age of Onset    Heart Disease Father         MI    High Blood Pressure Father     Stroke Father     Cancer Maternal Grandmother     Cancer Maternal Grandfather     Diabetes Neg Hx      Social History     Tobacco Use    Smoking status: Former     Packs/day: 2.00     Years: 20.00     Additional pack years: 0.00     Total pack years: 40.00     Types: Cigarettes     Quit date: 2001     Years since quittin.9     Passive exposure: Past    Smokeless tobacco: Never   Substance Use Topics    Alcohol use: No     Alcohol/week: 0.0 standard drinks of alcohol       Subjective:      Review of Systems   Constitutional:  Negative for activity change, appetite change, chills, diaphoresis, fatigue, fever and

## 2023-10-31 ENCOUNTER — TELEPHONE (OUTPATIENT)
Dept: ENT CLINIC | Age: 68
End: 2023-10-31

## 2023-10-31 NOTE — TELEPHONE ENCOUNTER
Message from [facility] that patient spouse Jose M had called to discuss surgical plan. I checked with Dr Thelma Alcala and he advised he still has to speak with Dr Abhijeet Mayorga and hopes to do so today. I called Jose M back and informed her of above and she voiced understanding.

## 2023-11-01 ENCOUNTER — TELEPHONE (OUTPATIENT)
Dept: ENT CLINIC | Age: 68
End: 2023-11-01

## 2023-11-01 DIAGNOSIS — Z95.1 S/P CABG (CORONARY ARTERY BYPASS GRAFT): Primary | ICD-10-CM

## 2023-11-01 RX ORDER — ENOXAPARIN SODIUM 100 MG/ML
1 INJECTION SUBCUTANEOUS 2 TIMES DAILY
Qty: 11.2 ML | Refills: 0 | Status: ON HOLD | OUTPATIENT
Start: 2023-11-19 | End: 2023-11-10 | Stop reason: HOSPADM

## 2023-11-01 RX ORDER — ENOXAPARIN SODIUM 100 MG/ML
80 INJECTION SUBCUTANEOUS 2 TIMES DAILY
Status: DISCONTINUED | OUTPATIENT
Start: 2023-11-20 | End: 2023-11-01

## 2023-11-01 RX ORDER — ENOXAPARIN SODIUM 100 MG/ML
80 INJECTION SUBCUTANEOUS 2 TIMES DAILY
Status: DISCONTINUED | OUTPATIENT
Start: 2023-11-19 | End: 2023-11-01

## 2023-11-01 NOTE — TELEPHONE ENCOUNTER
I was able to speak to Dr Don Melendez. He would like Donnia Males to go back on his Brilinta then follow usual surgery instructions. Stop Brilinta 1 week prior to surgery and go back on the Lovenox injections. (Stopping the Lovenox the night prior to surgery). Patient will be out of Lovenox. Please send script to pharmacy. Thank you!

## 2023-11-01 NOTE — TELEPHONE ENCOUNTER
Rx for Lovenox sent to the pharmacy. They need to make sure to read the instructions on the prescription, it will guide when to stop Brilinta and when to start the Lovenox. My first two Rx were signed as clinic performed, but this is not the case. I discontinued these ordered and sent actual order to [facility]. Instructions:   Stop your Brilinta on 11/19/23. Start your first dose of Lovenox the evening of 11/19/23 (only one dose on 11/19/23). Then use the Lovenox twice daily. You should receive the last dose of Lovenox the morning of 11/26/23. He should not receive an evening dose of Lovenox on 11/26/23 in anticipation of surgery the next day (11/27/23)   Do not use the Brilinta and Lovenox together.  Do not resume Brilinta until given OK by Dr Edenilson Montes

## 2023-11-01 NOTE — TELEPHONE ENCOUNTER
Ed Alexander is scheduled for a Laryngoscopy with 3 primary areas to resect in region of vertical partial hemilaryngectomy and associated spot biopsies on Monday 11/27/23. Chelly Hernadez (spouse) is asking questions about the Lovenox he was sent home from the hospital on. He has 2 more days of Lovenox. They are asking if he should go back on his Brilinta and hold it again the week prior to surgery -or-  should he continue taking Lovenox through the next surgery? If so, he is out of the Lovenox. Please advise.

## 2023-11-02 ENCOUNTER — CARE COORDINATION (OUTPATIENT)
Dept: CASE MANAGEMENT | Age: 68
End: 2023-11-02

## 2023-11-02 NOTE — CARE COORDINATION
Care Transitions Follow Up Call    Patient Current Location:  Home: [address] [place] [id number]    Care Transition Nurse contacted the  Serenity Welch, pt's spouse  by telephone to follow up after admission on 10/20/23. Verified name and  with  spouse  as identifiers. Patient: Arthur Wilhelm  Patient : 1955   MRN: [id number]  Reason for Admission:  Carcinoma of larynx, s/p Laterovertical Partial Laryngectomy  Discharge Date: 10/23/23 RARS: Readmission Risk Score: 7.6      Needs to be reviewed by the provider   Additional needs identified to be addressed with provider: No  none             Method of communication with provider: none. Contacted pt for care transition follow up. Spoke with pt's spouse Serenity Welch. She states that Karen Pratt is doing pretty good. Reports that he had an episode of \"more blood tinged sputum\". States today is better. Denies having any blood clots. Usually notices blood tinged sputum when having a cough. Reports having soreness from coughing. Advised to monitor and contact provider if noticing an increase in blood, cough and soreness worsens. They will continue to monitor. Pt is eating soft foods and liquids. Tolerating oral intake so far. No c/o nausea/vomiting. He is no longer having to take his capsules with applesauce. We again discussed when to contact provider. No questions or concerns at this time.       Addressed changes since last contact:  none    Follow Up  Future Appointments   Date Time Provider [address] [place] [address]   2023  9:00 AM [facility]   2023 11:00 AM Jamison [facility]   2023  1:15 PM Dawson George MD [facility]   2023  9:00 AM Jamison Murphy [facility]   2024  9:00 AM Jamison Murphy MD N [facility] Transition Nurse reviewed medical action plan and red flags with  spouse  and discussed any barriers to care and/or understanding

## 2023-11-06 ENCOUNTER — TELEPHONE (OUTPATIENT)
Dept: ENT CLINIC | Age: 68
End: 2023-11-06

## 2023-11-06 NOTE — TELEPHONE ENCOUNTER
Patient's wife called in stating patient had some coughing and bleeding over the weekend. Patient had to suction blood out quite a few times. Wife said every time patient coughed they had to suction out blood. She did call Dr Mamie Knott yesterday. Dr Mamie Knott advised patient to stop the lovenox and only have small sips of water, no food in case patient needs to be taken to the OR to be cauterized to stop the bleeding. Dr Mamie Knott also instructed wife to call us this morning to possibly have patient seen today if the bleeding continued overnight. Tiana Tovar, patient's wife, who is on HIPAA, said it has slowed down and patient only needed to suction out blood one time during the night. She did also state there was one pea sized hard clot he coughed up. Went and spoke to Mili [facility] [place]. She happened to be on the phone with Dr Mamie Knott. She asked him what he would like patient to do. He said since the bleeding has slowed down he doesn't need seen today but he would like to see the patient tomorrow when he is in the office. Dr Mamie Knott also said patient could eat and drink a little and take a baby aspirin. Dr Mamie Knott asked that I call the patient this afternoon to check on his progress. Informed Lauritaant La of all that Dr Mamie Knott said. Appointment scheduled for 8:00 am tomorrow morning. Patient verbalized understanding and thanked me.

## 2023-11-06 NOTE — TELEPHONE ENCOUNTER
Called to check on patient again this afternoon. Patient's wife states he has been doing much better. Not as much coughing and only one more time with only a tinge of red in his mucus that he coughed up. Told wife we would see her in the morning for his appointment. Patient verbalized understanding and thanked me.

## 2023-11-07 ENCOUNTER — HOSPITAL ENCOUNTER (INPATIENT)
Age: 68
LOS: 3 days | Discharge: HOME | End: 2023-11-10
Attending: OTOLARYNGOLOGY | Admitting: OTOLARYNGOLOGY
Payer: MEDICARE

## 2023-11-07 ENCOUNTER — OFFICE VISIT (OUTPATIENT)
Dept: ENT CLINIC | Age: 68
End: 2023-11-07
Payer: MEDICARE

## 2023-11-07 VITALS
TEMPERATURE: 97.1 F | HEART RATE: 56 BPM | DIASTOLIC BLOOD PRESSURE: 70 MMHG | WEIGHT: 174.2 LBS | BODY MASS INDEX: 24.39 KG/M2 | SYSTOLIC BLOOD PRESSURE: 120 MMHG | OXYGEN SATURATION: 98 % | HEIGHT: 71 IN

## 2023-11-07 DIAGNOSIS — C32.9 PRIMARY SQUAMOUS CELL CARCINOMA OF LARYNX (HCC): ICD-10-CM

## 2023-11-07 DIAGNOSIS — Z90.02: Primary | ICD-10-CM

## 2023-11-07 DIAGNOSIS — G89.18 ACUTE POST-OPERATIVE PAIN: Primary | ICD-10-CM

## 2023-11-07 DIAGNOSIS — R04.1 HEMORRHAGE FROM THROAT: ICD-10-CM

## 2023-11-07 DIAGNOSIS — C32.1 MALIGNANT NEOPLASM OF FALSE VOCAL CORDS (HCC): ICD-10-CM

## 2023-11-07 DIAGNOSIS — R04.1: ICD-10-CM

## 2023-11-07 PROBLEM — E03.9 ACQUIRED HYPOTHYROIDISM: Status: ACTIVE | Noted: 2022-04-26

## 2023-11-07 LAB
ALBUMIN SERPL BCG-MCNC: 4.2 G/DL (ref 3.5–5.1)
ALP SERPL-CCNC: 179 U/L (ref 38–126)
ALT SERPL W/O P-5'-P-CCNC: 92 U/L (ref 11–66)
ANION GAP SERPL CALC-SCNC: 14 MEQ/L (ref 8–16)
APTT PPP: 34.2 SECONDS (ref 22–38)
AST SERPL-CCNC: 34 U/L (ref 5–40)
BASOPHILS ABSOLUTE: 0 THOU/MM3 (ref 0–0.1)
BASOPHILS NFR BLD AUTO: 0.4 %
BILIRUB CONJ SERPL-MCNC: < 0.2 MG/DL (ref 0–0.3)
BILIRUB SERPL-MCNC: 0.4 MG/DL (ref 0.3–1.2)
BUN SERPL-MCNC: 10 MG/DL (ref 7–22)
CALCIUM SERPL-MCNC: 9.7 MG/DL (ref 8.5–10.5)
CHLORIDE SERPL-SCNC: 104 MEQ/L (ref 98–111)
CO2 SERPL-SCNC: 24 MEQ/L (ref 23–33)
CREAT SERPL-MCNC: 0.8 MG/DL (ref 0.4–1.2)
DEPRECATED RDW RBC AUTO: 47 FL (ref 35–45)
EOSINOPHIL NFR BLD AUTO: 0.1 %
EOSINOPHILS ABSOLUTE: 0 THOU/MM3 (ref 0–0.4)
ERYTHROCYTE [DISTWIDTH] IN BLOOD BY AUTOMATED COUNT: 12.8 % (ref 11.5–14.5)
GFR SERPL CREATININE-BSD FRML MDRD: > 60 ML/MIN/1.73M2
GLUCOSE SERPL-MCNC: 112 MG/DL (ref 70–108)
HAV IGM SER QL: NEGATIVE
HBV CORE IGM SERPL QL IA: NEGATIVE
HBV SURFACE AG SERPL QL IA: NEGATIVE
HCT VFR BLD AUTO: 46.9 % (ref 42–52)
HCV IGG SERPL QL IA: NEGATIVE
HGB BLD-MCNC: 14.9 GM/DL (ref 14–18)
IMM GRANULOCYTES # BLD AUTO: 0.05 THOU/MM3 (ref 0–0.07)
IMM GRANULOCYTES NFR BLD AUTO: 0.7 %
INR PPP: 1.01 (ref 0.85–1.13)
LYMPHOCYTES ABSOLUTE: 0.6 THOU/MM3 (ref 1–4.8)
LYMPHOCYTES NFR BLD AUTO: 7.6 %
MCH RBC QN AUTO: 31.4 PG (ref 26–33)
MCHC RBC AUTO-ENTMCNC: 31.8 GM/DL (ref 32.2–35.5)
MCV RBC AUTO: 98.9 FL (ref 80–94)
MONOCYTES ABSOLUTE: 0.5 THOU/MM3 (ref 0.4–1.3)
MONOCYTES NFR BLD AUTO: 6.8 %
MRSA DNA SPEC QL NAA+PROBE: NEGATIVE
NEUTROPHILS NFR BLD AUTO: 84.4 %
NRBC BLD AUTO-RTO: 0 /100 WBC
PLATELET # BLD AUTO: 241 THOU/MM3 (ref 130–400)
PMV BLD AUTO: 10.2 FL (ref 9.4–12.4)
POTASSIUM SERPL-SCNC: 4.4 MEQ/L (ref 3.5–5.2)
PROT SERPL-MCNC: 6.7 G/DL (ref 6.1–8)
RBC # BLD AUTO: 4.74 MILL/MM3 (ref 4.7–6.1)
SEGMENTED NEUTROPHILS ABSOLUTE COUNT: 6.4 THOU/MM3 (ref 1.8–7.7)
SODIUM SERPL-SCNC: 142 MEQ/L (ref 135–145)
TSH SERPL DL<=0.005 MIU/L-ACNC: 0.85 UIU/ML (ref 0.4–4.2)
WBC # BLD AUTO: 7.6 THOU/MM3 (ref 4.8–10.8)

## 2023-11-07 PROCEDURE — 6360000002 HC RX W HCPCS: Performed by: REGISTERED NURSE

## 2023-11-07 PROCEDURE — 2580000003 HC RX 258: Performed by: REGISTERED NURSE

## 2023-11-07 PROCEDURE — 84443 ASSAY THYROID STIM HORMONE: CPT

## 2023-11-07 PROCEDURE — 36415 COLL VENOUS BLD VENIPUNCTURE: CPT

## 2023-11-07 PROCEDURE — 3078F DIAST BP <80 MM HG: CPT | Performed by: OTOLARYNGOLOGY

## 2023-11-07 PROCEDURE — 1111F DSCHRG MED/CURRENT MED MERGE: CPT | Performed by: OTOLARYNGOLOGY

## 2023-11-07 PROCEDURE — 3074F SYST BP LT 130 MM HG: CPT | Performed by: OTOLARYNGOLOGY

## 2023-11-07 PROCEDURE — 1036F TOBACCO NON-USER: CPT | Performed by: OTOLARYNGOLOGY

## 2023-11-07 PROCEDURE — 85025 COMPLETE CBC W/AUTO DIFF WBC: CPT

## 2023-11-07 PROCEDURE — 85610 PROTHROMBIN TIME: CPT

## 2023-11-07 PROCEDURE — 87522 HEPATITIS C REVRS TRNSCRPJ: CPT

## 2023-11-07 PROCEDURE — 1124F ACP DISCUSS-NO DSCNMKR DOCD: CPT | Performed by: OTOLARYNGOLOGY

## 2023-11-07 PROCEDURE — 87641 MR-STAPH DNA AMP PROBE: CPT

## 2023-11-07 PROCEDURE — 87070 CULTURE OTHR SPECIMN AEROBIC: CPT

## 2023-11-07 PROCEDURE — G8420 CALC BMI NORM PARAMETERS: HCPCS | Performed by: OTOLARYNGOLOGY

## 2023-11-07 PROCEDURE — G8427 DOCREV CUR MEDS BY ELIG CLIN: HCPCS | Performed by: OTOLARYNGOLOGY

## 2023-11-07 PROCEDURE — 80074 ACUTE HEPATITIS PANEL: CPT

## 2023-11-07 PROCEDURE — 31575 DIAGNOSTIC LARYNGOSCOPY: CPT | Performed by: OTOLARYNGOLOGY

## 2023-11-07 PROCEDURE — 80053 COMPREHEN METABOLIC PANEL: CPT

## 2023-11-07 PROCEDURE — 6370000000 HC RX 637 (ALT 250 FOR IP)

## 2023-11-07 PROCEDURE — 3017F COLORECTAL CA SCREEN DOC REV: CPT | Performed by: OTOLARYNGOLOGY

## 2023-11-07 PROCEDURE — 99215 OFFICE O/P EST HI 40 MIN: CPT | Performed by: OTOLARYNGOLOGY

## 2023-11-07 PROCEDURE — 85730 THROMBOPLASTIN TIME PARTIAL: CPT

## 2023-11-07 PROCEDURE — G8484 FLU IMMUNIZE NO ADMIN: HCPCS | Performed by: OTOLARYNGOLOGY

## 2023-11-07 PROCEDURE — 2060000000 HC ICU INTERMEDIATE R&B

## 2023-11-07 PROCEDURE — 99223 1ST HOSP IP/OBS HIGH 75: CPT | Performed by: HOSPITALIST

## 2023-11-07 RX ORDER — LEVOTHYROXINE SODIUM 0.05 MG/1
50 TABLET ORAL
Status: DISCONTINUED | OUTPATIENT
Start: 2023-11-08 | End: 2023-11-10 | Stop reason: HOSPADM

## 2023-11-07 RX ORDER — SODIUM CHLORIDE 0.9 % (FLUSH) 0.9 %
5-40 SYRINGE (ML) INJECTION PRN
Status: DISCONTINUED | OUTPATIENT
Start: 2023-11-07 | End: 2023-11-10 | Stop reason: HOSPADM

## 2023-11-07 RX ORDER — DOXAZOSIN MESYLATE 4 MG/1
4 TABLET ORAL
Status: DISCONTINUED | OUTPATIENT
Start: 2023-11-07 | End: 2023-11-07

## 2023-11-07 RX ORDER — ONDANSETRON 2 MG/ML
4 INJECTION INTRAMUSCULAR; INTRAVENOUS EVERY 6 HOURS PRN
Status: DISCONTINUED | OUTPATIENT
Start: 2023-11-07 | End: 2023-11-10 | Stop reason: HOSPADM

## 2023-11-07 RX ORDER — ATORVASTATIN CALCIUM 40 MG/1
40 TABLET, FILM COATED ORAL
Status: DISCONTINUED | OUTPATIENT
Start: 2023-11-07 | End: 2023-11-10 | Stop reason: HOSPADM

## 2023-11-07 RX ORDER — ONDANSETRON 4 MG/1
4 TABLET, ORALLY DISINTEGRATING ORAL EVERY 8 HOURS PRN
Status: DISCONTINUED | OUTPATIENT
Start: 2023-11-07 | End: 2023-11-10 | Stop reason: HOSPADM

## 2023-11-07 RX ORDER — SODIUM CHLORIDE 9 MG/ML
INJECTION, SOLUTION INTRAVENOUS CONTINUOUS
Status: DISCONTINUED | OUTPATIENT
Start: 2023-11-07 | End: 2023-11-10

## 2023-11-07 RX ORDER — METOPROLOL SUCCINATE 25 MG/1
25 TABLET, EXTENDED RELEASE ORAL 2 TIMES DAILY
Status: DISCONTINUED | OUTPATIENT
Start: 2023-11-07 | End: 2023-11-10 | Stop reason: HOSPADM

## 2023-11-07 RX ORDER — HYDRALAZINE HYDROCHLORIDE 20 MG/ML
5 INJECTION INTRAMUSCULAR; INTRAVENOUS EVERY 6 HOURS PRN
Status: DISCONTINUED | OUTPATIENT
Start: 2023-11-07 | End: 2023-11-10 | Stop reason: HOSPADM

## 2023-11-07 RX ORDER — GABAPENTIN 300 MG/1
300 CAPSULE ORAL DAILY PRN
Status: DISCONTINUED | OUTPATIENT
Start: 2023-11-07 | End: 2023-11-10 | Stop reason: HOSPADM

## 2023-11-07 RX ORDER — SODIUM CHLORIDE 9 MG/ML
INJECTION, SOLUTION INTRAVENOUS PRN
Status: DISCONTINUED | OUTPATIENT
Start: 2023-11-07 | End: 2023-11-10 | Stop reason: HOSPADM

## 2023-11-07 RX ORDER — SODIUM CHLORIDE 0.9 % (FLUSH) 0.9 %
5-40 SYRINGE (ML) INJECTION EVERY 12 HOURS SCHEDULED
Status: DISCONTINUED | OUTPATIENT
Start: 2023-11-07 | End: 2023-11-10 | Stop reason: HOSPADM

## 2023-11-07 RX ORDER — ASPIRIN 81 MG/1
81 TABLET ORAL DAILY
Status: DISCONTINUED | OUTPATIENT
Start: 2023-11-07 | End: 2023-11-08

## 2023-11-07 RX ORDER — POLYETHYLENE GLYCOL 3350 17 G/17G
17 POWDER, FOR SOLUTION ORAL DAILY PRN
Status: DISCONTINUED | OUTPATIENT
Start: 2023-11-07 | End: 2023-11-10 | Stop reason: HOSPADM

## 2023-11-07 RX ORDER — PANTOPRAZOLE SODIUM 40 MG/1
40 TABLET, DELAYED RELEASE ORAL
Status: DISCONTINUED | OUTPATIENT
Start: 2023-11-07 | End: 2023-11-10 | Stop reason: HOSPADM

## 2023-11-07 RX ADMIN — ATORVASTATIN CALCIUM 40 MG: 80 TABLET, FILM COATED ORAL at 20:58

## 2023-11-07 RX ADMIN — PANTOPRAZOLE SODIUM 40 MG: 40 TABLET, DELAYED RELEASE ORAL at 13:40

## 2023-11-07 RX ADMIN — SODIUM CHLORIDE, PRESERVATIVE FREE 10 ML: 5 INJECTION INTRAVENOUS at 13:25

## 2023-11-07 RX ADMIN — HYDROMORPHONE HYDROCHLORIDE 0.25 MG: 1 INJECTION, SOLUTION INTRAMUSCULAR; INTRAVENOUS; SUBCUTANEOUS at 18:05

## 2023-11-07 RX ADMIN — METOPROLOL SUCCINATE 25 MG: 25 TABLET, FILM COATED, EXTENDED RELEASE ORAL at 20:58

## 2023-11-07 RX ADMIN — SODIUM CHLORIDE: 9 INJECTION, SOLUTION INTRAVENOUS at 13:25

## 2023-11-07 RX ADMIN — HYDROMORPHONE HYDROCHLORIDE 0.25 MG: 1 INJECTION, SOLUTION INTRAMUSCULAR; INTRAVENOUS; SUBCUTANEOUS at 22:07

## 2023-11-07 ASSESSMENT — ENCOUNTER SYMPTOMS
COLOR CHANGE: 0
CHEST TIGHTNESS: 0
VOICE CHANGE: 0
VOMITING: 0
SORE THROAT: 0
APNEA: 0
ABDOMINAL PAIN: 0
STRIDOR: 0
TROUBLE SWALLOWING: 0
SINUS PRESSURE: 0
FACIAL SWELLING: 0
WHEEZING: 0
NAUSEA: 0
CHOKING: 0
RHINORRHEA: 0
COUGH: 0
SHORTNESS OF BREATH: 0
DIARRHEA: 0

## 2023-11-07 ASSESSMENT — PAIN SCALES - GENERAL
PAINLEVEL_OUTOF10: 5
PAINLEVEL_OUTOF10: 0
PAINLEVEL_OUTOF10: 3
PAINLEVEL_OUTOF10: 3
PAINLEVEL_OUTOF10: 4
PAINLEVEL_OUTOF10: 0

## 2023-11-07 ASSESSMENT — PAIN DESCRIPTION - FREQUENCY: FREQUENCY: CONTINUOUS

## 2023-11-07 ASSESSMENT — PAIN DESCRIPTION - PAIN TYPE
TYPE: ACUTE PAIN
TYPE: ACUTE PAIN

## 2023-11-07 ASSESSMENT — PAIN DESCRIPTION - DESCRIPTORS
DESCRIPTORS: SORE
DESCRIPTORS: SORE
DESCRIPTORS: DISCOMFORT;ACHING

## 2023-11-07 ASSESSMENT — PAIN DESCRIPTION - ORIENTATION
ORIENTATION: MID
ORIENTATION: INNER;MID
ORIENTATION: MID

## 2023-11-07 ASSESSMENT — PAIN - FUNCTIONAL ASSESSMENT
PAIN_FUNCTIONAL_ASSESSMENT: ACTIVITIES ARE NOT PREVENTED

## 2023-11-07 ASSESSMENT — PAIN DESCRIPTION - LOCATION
LOCATION: THROAT

## 2023-11-07 ASSESSMENT — PAIN DESCRIPTION - ONSET: ONSET: ON-GOING

## 2023-11-07 NOTE — CONSULTS
metoprolol succinate (TOPROL XL) 50 MG extended release tablet Take 0.5 tablets by mouth 2 times daily 10/16/23   Baldev George MD   levothyroxine (SYNTHROID) 50 MCG tablet TAKE 1 TABLET DAILY 10/16/23   Baldev George MD   atorvastatin (LIPITOR) 40 MG tablet TAKE 1 TABLET NIGHTLY 10/16/23   Baldev George MD   terazosin (HYTRIN) 5 MG capsule TAKE 1 CAPSULE NIGHTLY 10/16/23   Javier Bruce MD   gabapentin (NEURONTIN) 300 MG capsule Take 1 capsule by mouth 2 times daily for 60 days. Patient taking differently: Take 1 capsule by mouth as needed (pain). 6/5/23 11/7/23  Tong George MD   ticagrelor (BRILINTA) 90 MG TABS tablet Take 1 tablet by mouth 2 times daily 6/5/23   Javier Bruce MD   omeprazole (PRILOSEC) 40 MG delayed release capsule Take 1 capsule by mouth every evening 2/15/23   Lona Dumont MD   aspirin 81 MG EC tablet Take 1 tablet by mouth daily    Lona Dumont MD   nitroGLYCERIN (NITROSTAT) 0.4 MG SL tablet up to max of 3 total doses. If no relief after 1 dose, call 911. 1/12/21   Javier Bruce MD   diphenhydrAMINE-APAP, sleep, (TYLENOL PM EXTRA STRENGTH)  MG tablet Take 2 tablets by mouth nightly    Lona Dumont MD   Coenzyme Q10 (CO Q-10) 200 MG CAPS Take 1 capsule by mouth daily    Lona Dumont MD   Multiple Vitamin (MULTI-VITAMIN) TABS Take 1 tablet by mouth daily    Lona Dumont MD   ARGININE PO Take 1,800 mg by mouth daily 3 tab    Lona Dumont MD       Allergies:  Morphine    Social History:      The patient currently lives at home. TOBACCO:   reports that he quit smoking about 21 years ago. His smoking use included cigarettes. He has a 40.00 pack-year smoking history. He has been exposed to tobacco smoke. He has never used smokeless tobacco.  ETOH:   reports no history of alcohol use. Family History:     Reviewed in detail and negative for DM, CAD, Cancer, CVA.  Positive as follows:        Problem Relation Age of exam, and patient is stable at this time with no anemia noted on CBC.  - Management per ENT primary.  - Recommend monitoring Hgb/Hct Q12H    CAD: S/P CABG 2002. Is on aspirin, plavix, statin, and beta blocker. Patient denies any cardiac symptoms at this time. No recent cardiac caths with stent placement observed on record. - Continue home aspirin, plavix, statin, beta blocker. - No further workup indicated at this time. Hypothyroidism: Noted in history. Patient does not have any signs or symptoms suggestive of active hypothyroidism.  - TSH ordered. - Continue home synthroid. Primary HTN: Noted in history. - Continue home metoprolol with hold parameters. - PRN IV Hydralazine placed for SBP > 170    HLD: Noted in history. - Continue home statin. GERD: Noted in history. No related symptoms reported. - Continue home protonix. Other:  - Patient is currently NPO per ENT primary. Oral medications listed above can be restarted once ENT have given their approval.    We will wait for lab workup to be completed. If no significant abnormalities are noted and need to be addressed, Hospitalist team will sign off. Thank you for the consultation.     Electronically signed by Larry Maddox DO on 11/7/2023 at 12:39 PM

## 2023-11-07 NOTE — H&P
[facility] [place] [facility]  [facility] EAR, NOSE AND THROAT  [address]  Dept: [phone]  Dept Fax: [phone]  Loc: [phone]    Franky Brown is a 77 y.o. male who was referred by No ref. provider found for:  Chief Complaint   Patient presents with    Post-Op Check     Patient here for post op exam, coughing up blood. Patient states he is only having a tinge of blood now. Patient is also feeling very weak. Kati Bond HPI:     Franky Brown is a 77 y.o. male who presents today for postoperative bleeding from his throat. He has been having slow bleeding ever since surgery. He was apparently started on Lovenox immediately after his partial supraglottic laryngectomy on October 20. He has been coughing up blood ever since. He cannot seem to stop coughing. He also stopped his narcotics very early and has been taking 4000 mg Tylenol daily for the last 2 weeks. I called him at home 2 days ago and found out about the bleeding and had him stop the Lovenox. I had him take an 81 mg ASA yesterday morning and this morning. He did much better on the bleeding yesterday and I had him come into the office today for endoscopy to see what the surgical site looked like. See below. He has been feeling very weak the last several days. PO intake has been suboptimal.    History: Allergies   Allergen Reactions    Morphine Nausea And Vomiting     No current facility-administered medications for this visit. No current outpatient medications on file.      Facility-Administered Medications Ordered in Other Visits   Medication Dose Route Frequency Provider Last Rate Last Admin    sodium chloride flush 0.9 % injection 5-40 mL  5-40 mL IntraVENous 2 times per day Fabiana Pyle APRN - CNP   10 mL at 11/07/23 1325    sodium chloride flush 0.9 % injection 5-40 mL  5-40 mL IntraVENous PRN Fabiana Pyle APRN - CNP        0.9 % sodium chloride infusion IntraVENous PRN Fabiana Pyle, APRN - CNP        polyethylene glycol (GLYCOLAX) packet 17 g  17 g Oral Daily PRN Fabiana Pyle, APRN - CNP        ondansetron (ZOFRAN-ODT) disintegrating tablet 4 mg  4 mg Oral Q8H PRN Fabiana Pyle, APRN - CNP        Or    ondansetron (ZOFRAN) injection 4 mg  4 mg IntraVENous Q6H PRN Fabiana Pyle, APRN - CNP        0.9 % sodium chloride infusion   IntraVENous Continuous Fabiana Pyle, APRN - CNP 75 mL/hr at 11/07/23 1325 New Bag at 11/07/23 1325    HYDROmorphone (DILAUDID) injection 0.25 mg  0.25 mg IntraVENous Q4H PRN Fabiana Pyle, APRN - CNP   0.25 mg at 11/07/23 1805    Or    HYDROmorphone (DILAUDID) injection 0.5 mg  0.5 mg IntraVENous Q4H PRN Fabiana Pyle, APRN - CNP        [Held by provider] aspirin EC tablet 81 mg  81 mg Oral Daily Chikis Lark, DO        atorvastatin (LIPITOR) tablet 40 mg  40 mg Oral QHS Chikis Lark, DO        gabapentin (NEURONTIN) capsule 300 mg  300 mg Oral Daily PRN Chikis Lark, DO        [START ON 11/8/2023] levothyroxine (SYNTHROID) tablet 50 mcg  50 mcg Oral QAM AC Chikis Lark, DO        metoprolol succinate (TOPROL XL) extended release tablet 25 mg  25 mg Oral BID Chikis Lark, DO        pantoprazole (PROTONIX) tablet 40 mg  40 mg Oral QAM AC Chikis Lark, DO   40 mg at 11/07/23 1340    doxazosin (CARDURA) tablet 4 mg  4 mg Oral QHS Chikis Lark, DO        hydrALAZINE (APRESOLINE) injection 5 mg  5 mg IntraVENous Q6H PRN Chikis Lark, DO         Past Medical History:   Diagnosis Date    Ascending Aneurysm ([address]) 02/2017    Ascending, 4.2 cm per pt    Brown's esophagus     CAD (coronary artery disease)     Cancer (HCC)     throat cancer, s/p radiation    Chronic back pain     Hepatitis C     as child    History of hyperbaric oxygen therapy     after bottom teeth removed after rdiation therapy    Hyperlipidemia     Hypertension     Left shoulder pain     nerve pain , ? secondary to radiation, has had \" numbing shot \"    FOUZIA on CPAP     Osteoradionecrosis

## 2023-11-07 NOTE — FLOWSHEET NOTE
11/07/23 1301   Safe Environment   Safety Measures Call light within reach; Family at bedside;Standard Safety Measures  (virtual nurse safety round complete)     VN called into patients room and introduced myself and role. Patient answered and permitted video. Video activated. Pt resting in chair awake and alert. No signs of distress noted. Patient voiced no needs or concerns at this time.  VN educated pt on utilizing call light if condition changes

## 2023-11-07 NOTE — PROGRESS NOTES
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     Vitreous opacities     right    Wears dentures     full upper, no bottom teeth and no use of dentures on bottom      Past Surgical History:   Procedure Laterality Date    BACK INJECTION Right 6/22/2023    right sacroiliac joint injection performed by Xavier Yo DO at [address]  8/2002, 4/2004    [facility]    COLONOSCOPY      CORONARY ANGIOPLASTY WITH STENT PLACEMENT  7/2002    [facility]    CORONARY ANGIOPLASTY WITH STENT PLACEMENT N/A 07/12/2019    MID LAD    CORONARY ARTERY BYPASS GRAFT  2002    [facility]    DENTAL SURGERY      bottom teeth removal    HEMORRHOID SURGERY      KNEE SURGERY Right     LARYNGECTOMY N/A 10/20/2023    Laterovertical Partial Laryngectomy performed by Ace Catalan MD at [address] 10/4/2017    LARYNGOSCOPY MICRO WITH BIOPSY performed by Ace Catalan MD at [address] 5/12/2021    LARYNGOSCOPY MICRO AND BIOPSY, WITH JET VENT performed by Ace Catalan MD at [address] [place] [address] N/A 6/2/2021    (DR. HUNTER) SUSPENSION MICROLARYNGOSCOPY WITH RECONSTRUCTION AND ADVANCEMENT FLAP X2; (DR. GONGORA)  MICROLARYNGOSCOPY AND BX POSTERIOR RIGHT VOCAL CORD      JET VENTILATION AND CO2 LASER performed by Tita Jackson MD at [address] [place] [address] N/A 7/27/2022    SUSPENSION DIRECT LARYNGOSCOPY WITH EXCISIONAL BIOPSY OF RIGHT SIDED LARYNGEAL MASS performed by Ace Catalan MD at [address] [place] [address] N/A 8/22/2022    MICROLARYNGOSCOPY WITH BIOPSY, SUSPENSION LARYNGOSCOPY WITH JET VENTILATION AND C02 LASER, RE-EXCISION FOR MARGINS performed by Ace Catalan MD at [address] [place] [address] N/A 2/27/2023    MICROLARYNGOSCOPY WITH BIOPSY performed by Annalisa Ponce

## 2023-11-07 NOTE — FLOWSHEET NOTE
11/07/23 1124   Safe Environment   Safety Measures Call light within reach; Family at bedside;Standard Safety Measures  (virtual nurse safety round complete)     VN called into patients room and introduced myself and role. Patient answered and permitted video. Video activated. Pt resting in bed awake and alert. No signs of distress noted. Patient voiced no needs or concerns at this time. VN educated pt on utilizing call light if condition changes.  Admission completed with pt and family at bedside

## 2023-11-08 ENCOUNTER — ANESTHESIA (OUTPATIENT)
Dept: OPERATING ROOM | Age: 68
End: 2023-11-08
Payer: MEDICARE

## 2023-11-08 ENCOUNTER — ANESTHESIA EVENT (OUTPATIENT)
Dept: OPERATING ROOM | Age: 68
End: 2023-11-08
Payer: MEDICARE

## 2023-11-08 PROBLEM — Z85.819 HISTORY OF THROAT CANCER: Status: ACTIVE | Noted: 2023-11-08

## 2023-11-08 PROBLEM — R04.1 BLEEDING FROM THROAT: Status: ACTIVE | Noted: 2023-11-08

## 2023-11-08 PROBLEM — D62 ABLA (ACUTE BLOOD LOSS ANEMIA): Status: ACTIVE | Noted: 2023-11-08

## 2023-11-08 LAB
ALBUMIN SERPL BCG-MCNC: 3.5 G/DL (ref 3.5–5.1)
ALP SERPL-CCNC: 142 U/L (ref 38–126)
ALT SERPL W/O P-5'-P-CCNC: 69 U/L (ref 11–66)
AST SERPL-CCNC: 24 U/L (ref 5–40)
BILIRUB CONJ SERPL-MCNC: < 0.2 MG/DL (ref 0–0.3)
BILIRUB SERPL-MCNC: 0.4 MG/DL (ref 0.3–1.2)
DEPRECATED RDW RBC AUTO: 46.3 FL (ref 35–45)
ERYTHROCYTE [DISTWIDTH] IN BLOOD BY AUTOMATED COUNT: 12.9 % (ref 11.5–14.5)
HCT VFR BLD AUTO: 38.9 % (ref 42–52)
HGB BLD-MCNC: 12.5 GM/DL (ref 14–18)
HIV 1+2 AB+HIV1 P24 AG SERPL QL IA: NONREACTIVE
MCH RBC QN AUTO: 31 PG (ref 26–33)
MCHC RBC AUTO-ENTMCNC: 32.1 GM/DL (ref 32.2–35.5)
MCV RBC AUTO: 96.5 FL (ref 80–94)
PLATELET # BLD AUTO: 227 THOU/MM3 (ref 130–400)
PMV BLD AUTO: 10.2 FL (ref 9.4–12.4)
PROT SERPL-MCNC: 5.5 G/DL (ref 6.1–8)
RBC # BLD AUTO: 4.03 MILL/MM3 (ref 4.7–6.1)
WBC # BLD AUTO: 5.6 THOU/MM3 (ref 4.8–10.8)

## 2023-11-08 PROCEDURE — 2500000003 HC RX 250 WO HCPCS: Performed by: ANESTHESIOLOGY

## 2023-11-08 PROCEDURE — 6370000000 HC RX 637 (ALT 250 FOR IP): Performed by: OTOLARYNGOLOGY

## 2023-11-08 PROCEDURE — 2060000000 HC ICU INTERMEDIATE R&B

## 2023-11-08 PROCEDURE — 0CBS8ZZ EXCISION OF LARYNX, VIA NATURAL OR ARTIFICIAL OPENING ENDOSCOPIC: ICD-10-PCS | Performed by: OTOLARYNGOLOGY

## 2023-11-08 PROCEDURE — 6370000000 HC RX 637 (ALT 250 FOR IP)

## 2023-11-08 PROCEDURE — 3600000014 HC SURGERY LEVEL 4 ADDTL 15MIN: Performed by: OTOLARYNGOLOGY

## 2023-11-08 PROCEDURE — 3600000004 HC SURGERY LEVEL 4 BASE: Performed by: OTOLARYNGOLOGY

## 2023-11-08 PROCEDURE — 7100000001 HC PACU RECOVERY - ADDTL 15 MIN: Performed by: OTOLARYNGOLOGY

## 2023-11-08 PROCEDURE — 2500000003 HC RX 250 WO HCPCS: Performed by: REGISTERED NURSE

## 2023-11-08 PROCEDURE — 6360000002 HC RX W HCPCS: Performed by: ANESTHESIOLOGY

## 2023-11-08 PROCEDURE — 6360000002 HC RX W HCPCS: Performed by: OTOLARYNGOLOGY

## 2023-11-08 PROCEDURE — 7100000000 HC PACU RECOVERY - FIRST 15 MIN: Performed by: OTOLARYNGOLOGY

## 2023-11-08 PROCEDURE — 0CBT8ZZ EXCISION OF RIGHT VOCAL CORD, VIA NATURAL OR ARTIFICIAL OPENING ENDOSCOPIC: ICD-10-PCS | Performed by: OTOLARYNGOLOGY

## 2023-11-08 PROCEDURE — 2500000003 HC RX 250 WO HCPCS

## 2023-11-08 PROCEDURE — 0CBR8ZZ EXCISION OF EPIGLOTTIS, VIA NATURAL OR ARTIFICIAL OPENING ENDOSCOPIC: ICD-10-PCS | Performed by: OTOLARYNGOLOGY

## 2023-11-08 PROCEDURE — 3700000000 HC ANESTHESIA ATTENDED CARE: Performed by: OTOLARYNGOLOGY

## 2023-11-08 PROCEDURE — 2500000003 HC RX 250 WO HCPCS: Performed by: NURSE ANESTHETIST, CERTIFIED REGISTERED

## 2023-11-08 PROCEDURE — 99232 SBSQ HOSP IP/OBS MODERATE 35: CPT | Performed by: INTERNAL MEDICINE

## 2023-11-08 PROCEDURE — 6360000002 HC RX W HCPCS: Performed by: NURSE ANESTHETIST, CERTIFIED REGISTERED

## 2023-11-08 PROCEDURE — 3700000001 HC ADD 15 MINUTES (ANESTHESIA): Performed by: OTOLARYNGOLOGY

## 2023-11-08 PROCEDURE — 6360000002 HC RX W HCPCS: Performed by: REGISTERED NURSE

## 2023-11-08 PROCEDURE — 85027 COMPLETE CBC AUTOMATED: CPT

## 2023-11-08 PROCEDURE — 87389 HIV-1 AG W/HIV-1&-2 AB AG IA: CPT

## 2023-11-08 PROCEDURE — 2580000003 HC RX 258: Performed by: OTOLARYNGOLOGY

## 2023-11-08 PROCEDURE — 6360000002 HC RX W HCPCS

## 2023-11-08 PROCEDURE — 36415 COLL VENOUS BLD VENIPUNCTURE: CPT

## 2023-11-08 PROCEDURE — 093K8ZZ CONTROL BLEEDING IN NASAL MUCOSA AND SOFT TISSUE, VIA NATURAL OR ARTIFICIAL OPENING ENDOSCOPIC: ICD-10-PCS | Performed by: OTOLARYNGOLOGY

## 2023-11-08 PROCEDURE — 2709999900 HC NON-CHARGEABLE SUPPLY: Performed by: OTOLARYNGOLOGY

## 2023-11-08 PROCEDURE — 88305 TISSUE EXAM BY PATHOLOGIST: CPT

## 2023-11-08 PROCEDURE — 80076 HEPATIC FUNCTION PANEL: CPT

## 2023-11-08 RX ORDER — DROPERIDOL 2.5 MG/ML
INJECTION, SOLUTION INTRAMUSCULAR; INTRAVENOUS
Status: COMPLETED
Start: 2023-11-08 | End: 2023-11-08

## 2023-11-08 RX ORDER — SODIUM CHLORIDE 0.9 % (FLUSH) 0.9 %
5-40 SYRINGE (ML) INJECTION EVERY 12 HOURS SCHEDULED
Status: DISCONTINUED | OUTPATIENT
Start: 2023-11-08 | End: 2023-11-08 | Stop reason: HOSPADM

## 2023-11-08 RX ORDER — LABETALOL HYDROCHLORIDE 5 MG/ML
10 INJECTION INTRAVENOUS
Status: DISCONTINUED | OUTPATIENT
Start: 2023-11-08 | End: 2023-11-08 | Stop reason: HOSPADM

## 2023-11-08 RX ORDER — SODIUM CHLORIDE 0.9 % (FLUSH) 0.9 %
5-40 SYRINGE (ML) INJECTION EVERY 12 HOURS SCHEDULED
Status: DISCONTINUED | OUTPATIENT
Start: 2023-11-08 | End: 2023-11-10 | Stop reason: HOSPADM

## 2023-11-08 RX ORDER — FENTANYL CITRATE 50 UG/ML
INJECTION, SOLUTION INTRAMUSCULAR; INTRAVENOUS
Status: COMPLETED
Start: 2023-11-08 | End: 2023-11-08

## 2023-11-08 RX ORDER — CIPROFLOXACIN 2 MG/ML
INJECTION, SOLUTION INTRAVENOUS PRN
Status: DISCONTINUED | OUTPATIENT
Start: 2023-11-08 | End: 2023-11-08 | Stop reason: SDUPTHER

## 2023-11-08 RX ORDER — PROPOFOL 10 MG/ML
INJECTION, EMULSION INTRAVENOUS PRN
Status: DISCONTINUED | OUTPATIENT
Start: 2023-11-08 | End: 2023-11-08 | Stop reason: SDUPTHER

## 2023-11-08 RX ORDER — HYDRALAZINE HYDROCHLORIDE 20 MG/ML
10 INJECTION INTRAMUSCULAR; INTRAVENOUS
Status: DISCONTINUED | OUTPATIENT
Start: 2023-11-08 | End: 2023-11-08 | Stop reason: HOSPADM

## 2023-11-08 RX ORDER — FENTANYL CITRATE 50 UG/ML
50 INJECTION, SOLUTION INTRAMUSCULAR; INTRAVENOUS EVERY 5 MIN PRN
Status: COMPLETED | OUTPATIENT
Start: 2023-11-08 | End: 2023-11-08

## 2023-11-08 RX ORDER — GLYCOPYRROLATE 0.2 MG/ML
INJECTION INTRAMUSCULAR; INTRAVENOUS PRN
Status: DISCONTINUED | OUTPATIENT
Start: 2023-11-08 | End: 2023-11-08 | Stop reason: SDUPTHER

## 2023-11-08 RX ORDER — SODIUM CHLORIDE 0.9 % (FLUSH) 0.9 %
5-40 SYRINGE (ML) INJECTION PRN
Status: DISCONTINUED | OUTPATIENT
Start: 2023-11-08 | End: 2023-11-10 | Stop reason: HOSPADM

## 2023-11-08 RX ORDER — DROPERIDOL 2.5 MG/ML
0.62 INJECTION, SOLUTION INTRAMUSCULAR; INTRAVENOUS ONCE
Status: COMPLETED | OUTPATIENT
Start: 2023-11-08 | End: 2023-11-08

## 2023-11-08 RX ORDER — SODIUM CHLORIDE 0.9 % (FLUSH) 0.9 %
5-40 SYRINGE (ML) INJECTION PRN
Status: DISCONTINUED | OUTPATIENT
Start: 2023-11-08 | End: 2023-11-08 | Stop reason: HOSPADM

## 2023-11-08 RX ORDER — FENTANYL CITRATE 50 UG/ML
INJECTION, SOLUTION INTRAMUSCULAR; INTRAVENOUS PRN
Status: DISCONTINUED | OUTPATIENT
Start: 2023-11-08 | End: 2023-11-08 | Stop reason: SDUPTHER

## 2023-11-08 RX ORDER — ROCURONIUM BROMIDE 10 MG/ML
INJECTION, SOLUTION INTRAVENOUS PRN
Status: DISCONTINUED | OUTPATIENT
Start: 2023-11-08 | End: 2023-11-08 | Stop reason: SDUPTHER

## 2023-11-08 RX ORDER — ONDANSETRON 2 MG/ML
4 INJECTION INTRAMUSCULAR; INTRAVENOUS
Status: DISCONTINUED | OUTPATIENT
Start: 2023-11-08 | End: 2023-11-08 | Stop reason: HOSPADM

## 2023-11-08 RX ORDER — TRANEXAMIC ACID 10 MG/ML
1000 INJECTION, SOLUTION INTRAVENOUS ONCE
Status: COMPLETED | OUTPATIENT
Start: 2023-11-08 | End: 2023-11-08

## 2023-11-08 RX ORDER — SODIUM CHLORIDE 9 MG/ML
INJECTION, SOLUTION INTRAVENOUS PRN
Status: DISCONTINUED | OUTPATIENT
Start: 2023-11-08 | End: 2023-11-08 | Stop reason: HOSPADM

## 2023-11-08 RX ADMIN — METOPROLOL SUCCINATE 25 MG: 25 TABLET, FILM COATED, EXTENDED RELEASE ORAL at 07:52

## 2023-11-08 RX ADMIN — ROCURONIUM BROMIDE 20 MG: 10 INJECTION INTRAVENOUS at 19:00

## 2023-11-08 RX ADMIN — TRANEXAMIC ACID 1000 MG: 10 INJECTION, SOLUTION INTRAVENOUS at 06:20

## 2023-11-08 RX ADMIN — CIPROFLOXACIN 400 MG: 400 INJECTION, SOLUTION INTRAVENOUS at 17:30

## 2023-11-08 RX ADMIN — HYDROMORPHONE HYDROCHLORIDE 0.5 MG: 1 INJECTION, SOLUTION INTRAMUSCULAR; INTRAVENOUS; SUBCUTANEOUS at 19:44

## 2023-11-08 RX ADMIN — HYDRALAZINE HYDROCHLORIDE 5 MG: 20 INJECTION, SOLUTION INTRAMUSCULAR; INTRAVENOUS at 21:54

## 2023-11-08 RX ADMIN — METOPROLOL SUCCINATE 25 MG: 25 TABLET, FILM COATED, EXTENDED RELEASE ORAL at 20:33

## 2023-11-08 RX ADMIN — FENTANYL CITRATE 50 MCG: 50 INJECTION, SOLUTION INTRAMUSCULAR; INTRAVENOUS at 19:57

## 2023-11-08 RX ADMIN — ATORVASTATIN CALCIUM 40 MG: 80 TABLET, FILM COATED ORAL at 20:33

## 2023-11-08 RX ADMIN — HYDROMORPHONE HYDROCHLORIDE 0.5 MG: 1 INJECTION, SOLUTION INTRAMUSCULAR; INTRAVENOUS; SUBCUTANEOUS at 19:49

## 2023-11-08 RX ADMIN — ONDANSETRON 4 MG: 2 INJECTION INTRAMUSCULAR; INTRAVENOUS at 06:39

## 2023-11-08 RX ADMIN — GLYCOPYRROLATE 0.2 MG: 0.2 INJECTION INTRAMUSCULAR; INTRAVENOUS at 17:35

## 2023-11-08 RX ADMIN — LEVOTHYROXINE SODIUM 50 MCG: 0.05 TABLET ORAL at 05:57

## 2023-11-08 RX ADMIN — DROPERIDOL 0.62 MG: 2.5 INJECTION, SOLUTION INTRAMUSCULAR; INTRAVENOUS at 19:49

## 2023-11-08 RX ADMIN — HYDROMORPHONE HYDROCHLORIDE 0.5 MG: 1 INJECTION, SOLUTION INTRAMUSCULAR; INTRAVENOUS; SUBCUTANEOUS at 21:58

## 2023-11-08 RX ADMIN — HYDROCODONE BITARTRATE AND ACETAMINOPHEN 10 ML: 5; 217 SOLUTION ORAL at 20:34

## 2023-11-08 RX ADMIN — ROCURONIUM BROMIDE 20 MG: 10 INJECTION INTRAVENOUS at 17:43

## 2023-11-08 RX ADMIN — PROPOFOL 100 MG: 10 INJECTION, EMULSION INTRAVENOUS at 17:12

## 2023-11-08 RX ADMIN — SUGAMMADEX 200 MG: 100 INJECTION, SOLUTION INTRAVENOUS at 19:15

## 2023-11-08 RX ADMIN — FENTANYL CITRATE 100 MCG: 50 INJECTION, SOLUTION INTRAMUSCULAR; INTRAVENOUS at 17:12

## 2023-11-08 RX ADMIN — SODIUM CHLORIDE: 9 INJECTION, SOLUTION INTRAVENOUS at 23:13

## 2023-11-08 RX ADMIN — PROPOFOL 100 MCG/KG/MIN: 10 INJECTION, EMULSION INTRAVENOUS at 17:22

## 2023-11-08 RX ADMIN — ROCURONIUM BROMIDE 20 MG: 10 INJECTION INTRAVENOUS at 18:22

## 2023-11-08 RX ADMIN — HYDROMORPHONE HYDROCHLORIDE 0.25 MG: 1 INJECTION, SOLUTION INTRAMUSCULAR; INTRAVENOUS; SUBCUTANEOUS at 06:40

## 2023-11-08 RX ADMIN — ROCURONIUM BROMIDE 50 MG: 10 INJECTION INTRAVENOUS at 17:12

## 2023-11-08 RX ADMIN — PANTOPRAZOLE SODIUM 40 MG: 40 TABLET, DELAYED RELEASE ORAL at 05:57

## 2023-11-08 RX ADMIN — FENTANYL CITRATE 50 MCG: 50 INJECTION, SOLUTION INTRAMUSCULAR; INTRAVENOUS at 20:02

## 2023-11-08 RX ADMIN — ROCURONIUM BROMIDE 10 MG: 10 INJECTION INTRAVENOUS at 18:45

## 2023-11-08 ASSESSMENT — PAIN SCALES - GENERAL
PAINLEVEL_OUTOF10: 4
PAINLEVEL_OUTOF10: 10
PAINLEVEL_OUTOF10: 10
PAINLEVEL_OUTOF10: 3
PAINLEVEL_OUTOF10: 10
PAINLEVEL_OUTOF10: 3
PAINLEVEL_OUTOF10: 4
PAINLEVEL_OUTOF10: 2
PAINLEVEL_OUTOF10: 10
PAINLEVEL_OUTOF10: 5

## 2023-11-08 ASSESSMENT — PAIN DESCRIPTION - DESCRIPTORS
DESCRIPTORS: ACHING;BURNING
DESCRIPTORS: BURNING
DESCRIPTORS: SORE
DESCRIPTORS: SORE

## 2023-11-08 ASSESSMENT — PAIN DESCRIPTION - PAIN TYPE: TYPE: ACUTE PAIN

## 2023-11-08 ASSESSMENT — PAIN DESCRIPTION - ORIENTATION
ORIENTATION: INNER
ORIENTATION: INNER
ORIENTATION: MID
ORIENTATION: MID

## 2023-11-08 ASSESSMENT — PAIN - FUNCTIONAL ASSESSMENT
PAIN_FUNCTIONAL_ASSESSMENT: ACTIVITIES ARE NOT PREVENTED
PAIN_FUNCTIONAL_ASSESSMENT: ACTIVITIES ARE NOT PREVENTED
PAIN_FUNCTIONAL_ASSESSMENT: PREVENTS OR INTERFERES SOME ACTIVE ACTIVITIES AND ADLS
PAIN_FUNCTIONAL_ASSESSMENT: ACTIVITIES ARE NOT PREVENTED

## 2023-11-08 ASSESSMENT — PAIN DESCRIPTION - FREQUENCY: FREQUENCY: CONTINUOUS

## 2023-11-08 ASSESSMENT — PAIN DESCRIPTION - LOCATION
LOCATION: THROAT

## 2023-11-08 ASSESSMENT — PAIN DESCRIPTION - ONSET: ONSET: ON-GOING

## 2023-11-08 NOTE — FLOWSHEET NOTE
11/08/23 1032   Safe Environment   Safety Measures Call light within reach; Family at bedside;Standard Safety Measures  (virtual nurse safety round complete)     VN called into patients room and introduced myself and role. Patient answered and permitted video. Video activated. Pt resting in chair awake and alert. No signs of distress noted. Patient voiced no needs or concerns at this time. VN educated pt on utilizing call light if condition changes.  Family at bedside

## 2023-11-08 NOTE — CARE COORDINATION
Case Management Assessment  Initial Evaluation    Date/Time of Evaluation: 11/8/2023 10:19 AM  Assessment Completed by: Fernie Ang RN    If patient is discharged prior to next notation, then this note serves as note for discharge by case management. Patient Name: Ramin Tubbs                   YOB: 1955  Diagnosis: Throat hemorrhage [R04.1]                   Date / Time: 11/7/2023 10:19 AM  Location: [address] [place] [address]     Patient Admission Status: Inpatient   Readmission Risk Low 0-14, Mod 15-19), High > 20: Readmission Risk Score: 14.8    Current PCP: Rhett Goodell, MD  PCP verified by CM? Yes    Chart Reviewed: Yes      History Provided by: Patient, Medical Record  Patient Orientation: Alert and Oriented    Patient Cognition:      Hospitalization in the last 30 days (Readmission):  Yes    If yes, Readmission Assessment in CM Navigator will be completed. Advance Directives:      Code Status: Full Code   Patient's Primary Decision Maker is: Legal Next of Kin    Primary Decision MakerPaco Cuevas Spouse - [phone]    Secondary Decision Maker: Duran Llamass Child - [phone]    Secondary Decision Maker: Gena Lei Child - [phone]    Discharge Planning:    Patient lives with: Spouse/Significant Other Type of Home: House  Primary Care Giver: Self  Patient Support Systems include: Spouse/Significant Other   Current Financial resources: Medicare  Current community resources: None  Current services prior to admission: Other (Comment) (suction)            Current DME:              Type of Home Care services:  None    ADLS  Prior functional level: Independent in ADLs/IADLs  Current functional level: Independent in ADLs/IADLs    Family can provide assistance at DC: Yes  Would you like Case Management to discuss the discharge plan with any other family members/significant others, and if so, who?  No  Plans to Return to Present Housing: Yes  Other Identified Issues/Barriers to

## 2023-11-08 NOTE — ANESTHESIA PRE PROCEDURE
Department of Anesthesiology  Preprocedure Note       Name:  Aparna Garcias   Age:  77 y.o.  :  1955                                          MRN:  [id number]         Date:  2023      Surgeon: Anisa Guerrier):  Khang Mo MD    Procedure: Procedure(s):  Laryngoscopy Control of Bleed    Medications prior to admission:   Prior to Admission medications    Medication Sig Start Date End Date Taking? Authorizing Provider   HYDROcodone-acetaminophen 2.5-108 mg/5 mL solution Take 10 mLs by mouth every 6 hours as needed for Pain (for 3 days). Provider, MD Lona   enoxaparin (LOVENOX) 80 MG/0.8ML Inject 0.8 mLs into the skin 2 times daily for 7 days Stop your Brilinta on 23. Start your first dose of Lovenox the evening of 23 (only one dose on 23). Then use the Lovenox twice daily until you run out. You should receive the last dose of Lovenox the morning of 23. He should not receive an evening dose of Lovenox on 23 in anticipation of surgery the next day (23). Do not use the Brilinta and Lovenox together. Do not resume Brilinta until given OK by Dr Velez Severe  Patient not taking: Reported on 23  NO Howe   Benzocaine-Menthol (CEPACOL) 15-2.3 MG LOZG Take 1 lozenge by mouth every 3 hours as needed (Throat pain) 10/23/23   NO Howe   metoprolol succinate (TOPROL XL) 50 MG extended release tablet Take 0.5 tablets by mouth 2 times daily 10/16/23   Baldev George MD   levothyroxine (SYNTHROID) 50 MCG tablet TAKE 1 TABLET DAILY 10/16/23   Baldev George MD   atorvastatin (LIPITOR) 40 MG tablet TAKE 1 TABLET NIGHTLY 10/16/23   Clair Mccloud MD   terazosin (HYTRIN) 5 MG capsule TAKE 1 CAPSULE NIGHTLY  Patient taking differently: Take 1 capsule by mouth nightly TAKE 1 CAPSULE NIGHTLY 10/16/23   Clair Mccloud MD   gabapentin (NEURONTIN) 300 MG capsule Take 1 capsule by mouth 2 times daily for 60 days.   Patient taking

## 2023-11-08 NOTE — PROGRESS NOTES
Resident Consult Progress Note (Hospitalist)    Name: Cooper Sapp, male, : 1955, MRN: [id number]      Date of Admission: 2023  Date of Service: Pt seen/examined on 23      ASSESSMENT/PLAN:    Hemoptysis 2/2 Hx of malignancy false vocal cords: S/p bilateral vertical partial laryngectomy 10/20/2023. Patient continues to have recurrent hemoptysis, was seen at ENT office on  and was recommended to be admitted. Clot is also noted. Patient was given intravenous TXA. - On IV fluids 75 mL/hour per ENT.  - N.p.o.: Plan for surgical procedure at 2 PM.    - ASA and Brilinta held. - Daily CBC monitoring. Hemoglobin stable at this time. Acute mild transaminitis 2/2 likely Tylenol usage (Improving): On arrival LFT: , ALT 92, AST 34. Pt states he uses Tylenol Extra strength  q4H due to pain, he does not want to use Norco.  He also has history of hepatitis 60+ years ago. Denies significant alcohol usage. Hepatitis panel negative. - HIV panel pending.  - We will trend CP every 48 hours  - Recommended minimizing usage of Tylenol and using it as directed. - Currently on Dilaudid PRN by ENT, managing. Chronic:  CAD s/p CABG : On ASA, Brilinta, statin and BB. Hypothyroidism: TSH wnl. Continue Synthroid  HTN: On Lopressor. PRN hydralazine ordered. Mildly elevated BP can consider adding second agent if it remains persistently elevated. HLD: On statin, continue. GERD: Continue PPI therapy. BPH: On terazosin, continue. Thank you, Jonas Blackman MD, for the consultation. Hospitalist service will continue to follow along.       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

## 2023-11-08 NOTE — FLOWSHEET NOTE
11/08/23 1303   Safe Environment   Safety Measures Call light within reach; Family at bedside;Side rails X 2;Standard Safety Measures  (virtual nurse safety round complete)     VN called into patients room and introduced myself and role. Patient answered and permitted video. Video activated. Pt resting in bed awake and alert. No signs of distress noted. Patient voiced no needs or concerns at this time.  VN educated pt on utilizing call light if condition changes

## 2023-11-08 NOTE — CARE COORDINATION
11/08/23 0924   Readmission Assessment   Number of Days since last admission? 8-30 days   Previous Disposition Home with Family   Who is being Interviewed Patient;Caregiver   What was the patient's/caregiver's perception as to why they think they needed to return back to the hospital? Other (Comment)  (bleeding)   Did you visit your Primary Care Physician after you left the hospital, before you returned this time? No   Why weren't you able to visit your PCP? Other (Comment)  (readmitted too soon)   Did you see a specialist, such as Cardiac, Pulmonary, Orthopedic Physician, etc. after you left the hospital? Yes   Who advised the patient to return to the hospital? Physician   Does the patient report anything that got in the way of taking their medications? No   In our efforts to provide the best possible care to you and others like you, can you think of anything that we could have done to help you after you left the hospital the first time, so that you might not have needed to return so soon?  Other (Comment)  (no)

## 2023-11-08 NOTE — PROGRESS NOTES
Pharmacy Medication History Note      List of current medications patient is taking is complete. Source of information: Patient and family interview    Changes made to medication list:  Medications removed (include reason, ex. therapy complete or physician discontinued):  Removed acetaminophen 160 mg/5mL: does not take per patient    Medications added/doses adjusted:  Adjusted arginine directions  Adjusted terazosin: added route  Added hydrocodone-acetaminophen 7.5-325/15 mL: still has some left at home. Prescribed on discharge 10/23    Other notes (ex. Recent course of antibiotics, Coumadin dosing):  Denies use of other OTC or herbal medications. Enoxaparin prescribed post op in October but had not yet started, this is irwin-procedural prior to upcoming surgery.       Allergies reviewed      Electronically signed by Shaneka Calderón on 11/8/2023 at 2:57 PM

## 2023-11-09 PROBLEM — E44.0 MODERATE MALNUTRITION (HCC): Status: ACTIVE | Noted: 2023-11-09

## 2023-11-09 PROBLEM — C32.9 SQUAMOUS CELL CARCINOMA OF LARYNX (HCC): Status: ACTIVE | Noted: 2023-11-09

## 2023-11-09 LAB
ALBUMIN SERPL BCG-MCNC: 3.4 G/DL (ref 3.5–5.1)
ALP SERPL-CCNC: 129 U/L (ref 38–126)
ALT SERPL W/O P-5'-P-CCNC: 53 U/L (ref 11–66)
ANION GAP SERPL CALC-SCNC: 8 MEQ/L (ref 8–16)
AST SERPL-CCNC: 17 U/L (ref 5–40)
BACTERIA SPEC AEROBE CULT: NO GROWTH
BILIRUB SERPL-MCNC: 0.2 MG/DL (ref 0.3–1.2)
BUN SERPL-MCNC: 7 MG/DL (ref 7–22)
CALCIUM SERPL-MCNC: 8.5 MG/DL (ref 8.5–10.5)
CHLORIDE SERPL-SCNC: 109 MEQ/L (ref 98–111)
CO2 SERPL-SCNC: 25 MEQ/L (ref 23–33)
CREAT SERPL-MCNC: 0.8 MG/DL (ref 0.4–1.2)
DEPRECATED RDW RBC AUTO: 46.5 FL (ref 35–45)
ERYTHROCYTE [DISTWIDTH] IN BLOOD BY AUTOMATED COUNT: 13 % (ref 11.5–14.5)
GFR SERPL CREATININE-BSD FRML MDRD: > 60 ML/MIN/1.73M2
GLUCOSE SERPL-MCNC: 101 MG/DL (ref 70–108)
HCT VFR BLD AUTO: 39.7 % (ref 42–52)
HGB BLD-MCNC: 12.8 GM/DL (ref 14–18)
MCH RBC QN AUTO: 31.1 PG (ref 26–33)
MCHC RBC AUTO-ENTMCNC: 32.2 GM/DL (ref 32.2–35.5)
MCV RBC AUTO: 96.6 FL (ref 80–94)
PLATELET # BLD AUTO: 220 THOU/MM3 (ref 130–400)
PMV BLD AUTO: 10.1 FL (ref 9.4–12.4)
POTASSIUM SERPL-SCNC: 3.9 MEQ/L (ref 3.5–5.2)
PROT SERPL-MCNC: 5.2 G/DL (ref 6.1–8)
RBC # BLD AUTO: 4.11 MILL/MM3 (ref 4.7–6.1)
SODIUM SERPL-SCNC: 142 MEQ/L (ref 135–145)
WBC # BLD AUTO: 9.4 THOU/MM3 (ref 4.8–10.8)

## 2023-11-09 PROCEDURE — 36415 COLL VENOUS BLD VENIPUNCTURE: CPT

## 2023-11-09 PROCEDURE — 6370000000 HC RX 637 (ALT 250 FOR IP): Performed by: OTOLARYNGOLOGY

## 2023-11-09 PROCEDURE — 2580000003 HC RX 258: Performed by: OTOLARYNGOLOGY

## 2023-11-09 PROCEDURE — 85027 COMPLETE CBC AUTOMATED: CPT

## 2023-11-09 PROCEDURE — 99232 SBSQ HOSP IP/OBS MODERATE 35: CPT | Performed by: INTERNAL MEDICINE

## 2023-11-09 PROCEDURE — 31380 PARTIAL REMOVAL OF LARYNX: CPT | Performed by: OTOLARYNGOLOGY

## 2023-11-09 PROCEDURE — 6360000002 HC RX W HCPCS: Performed by: OTOLARYNGOLOGY

## 2023-11-09 PROCEDURE — 80053 COMPREHEN METABOLIC PANEL: CPT

## 2023-11-09 PROCEDURE — 99024 POSTOP FOLLOW-UP VISIT: CPT | Performed by: REGISTERED NURSE

## 2023-11-09 PROCEDURE — 2060000000 HC ICU INTERMEDIATE R&B

## 2023-11-09 RX ADMIN — HYDRALAZINE HYDROCHLORIDE 5 MG: 20 INJECTION, SOLUTION INTRAMUSCULAR; INTRAVENOUS at 11:22

## 2023-11-09 RX ADMIN — HYDROMORPHONE HYDROCHLORIDE 0.25 MG: 1 INJECTION, SOLUTION INTRAMUSCULAR; INTRAVENOUS; SUBCUTANEOUS at 13:39

## 2023-11-09 RX ADMIN — HYDROCODONE BITARTRATE AND ACETAMINOPHEN 10 ML: 5; 217 SOLUTION ORAL at 05:58

## 2023-11-09 RX ADMIN — LEVOTHYROXINE SODIUM 50 MCG: 0.05 TABLET ORAL at 06:00

## 2023-11-09 RX ADMIN — HYDROMORPHONE HYDROCHLORIDE 0.5 MG: 1 INJECTION, SOLUTION INTRAMUSCULAR; INTRAVENOUS; SUBCUTANEOUS at 18:42

## 2023-11-09 RX ADMIN — SODIUM CHLORIDE, PRESERVATIVE FREE 10 ML: 5 INJECTION INTRAVENOUS at 08:12

## 2023-11-09 RX ADMIN — SODIUM CHLORIDE: 9 INJECTION, SOLUTION INTRAVENOUS at 13:38

## 2023-11-09 RX ADMIN — ONDANSETRON 4 MG: 2 INJECTION INTRAMUSCULAR; INTRAVENOUS at 08:34

## 2023-11-09 RX ADMIN — HYDROCODONE BITARTRATE AND ACETAMINOPHEN 10 ML: 5; 217 SOLUTION ORAL at 01:18

## 2023-11-09 RX ADMIN — ATORVASTATIN CALCIUM 40 MG: 80 TABLET, FILM COATED ORAL at 20:28

## 2023-11-09 RX ADMIN — HYDROCODONE BITARTRATE AND ACETAMINOPHEN 10 ML: 5; 217 SOLUTION ORAL at 20:26

## 2023-11-09 RX ADMIN — SODIUM CHLORIDE, PRESERVATIVE FREE 10 ML: 5 INJECTION INTRAVENOUS at 20:33

## 2023-11-09 RX ADMIN — HYDROCODONE BITARTRATE AND ACETAMINOPHEN 10 ML: 5; 217 SOLUTION ORAL at 12:34

## 2023-11-09 RX ADMIN — ONDANSETRON 4 MG: 2 INJECTION INTRAMUSCULAR; INTRAVENOUS at 20:26

## 2023-11-09 RX ADMIN — POLYETHYLENE GLYCOL 3350 17 G: 17 POWDER, FOR SOLUTION ORAL at 08:31

## 2023-11-09 RX ADMIN — HYDROMORPHONE HYDROCHLORIDE 0.25 MG: 1 INJECTION, SOLUTION INTRAMUSCULAR; INTRAVENOUS; SUBCUTANEOUS at 08:07

## 2023-11-09 RX ADMIN — PANTOPRAZOLE SODIUM 40 MG: 40 TABLET, DELAYED RELEASE ORAL at 06:02

## 2023-11-09 RX ADMIN — METOPROLOL SUCCINATE 25 MG: 25 TABLET, FILM COATED, EXTENDED RELEASE ORAL at 20:26

## 2023-11-09 RX ADMIN — METOPROLOL SUCCINATE 25 MG: 25 TABLET, FILM COATED, EXTENDED RELEASE ORAL at 08:06

## 2023-11-09 ASSESSMENT — PAIN DESCRIPTION - LOCATION
LOCATION: THROAT
LOCATION: NECK
LOCATION: THROAT

## 2023-11-09 ASSESSMENT — PAIN DESCRIPTION - DESCRIPTORS
DESCRIPTORS: ACHING;BURNING
DESCRIPTORS: ACHING
DESCRIPTORS: ACHING;DISCOMFORT
DESCRIPTORS: ACHING;DISCOMFORT
DESCRIPTORS: ACHING

## 2023-11-09 ASSESSMENT — PAIN SCALES - GENERAL
PAINLEVEL_OUTOF10: 1
PAINLEVEL_OUTOF10: 3
PAINLEVEL_OUTOF10: 7
PAINLEVEL_OUTOF10: 6
PAINLEVEL_OUTOF10: 3
PAINLEVEL_OUTOF10: 5
PAINLEVEL_OUTOF10: 6
PAINLEVEL_OUTOF10: 4
PAINLEVEL_OUTOF10: 5
PAINLEVEL_OUTOF10: 6
PAINLEVEL_OUTOF10: 4
PAINLEVEL_OUTOF10: 2
PAINLEVEL_OUTOF10: 3
PAINLEVEL_OUTOF10: 3

## 2023-11-09 ASSESSMENT — PAIN DESCRIPTION - ORIENTATION
ORIENTATION: MID
ORIENTATION: INNER
ORIENTATION: MID
ORIENTATION: INNER
ORIENTATION: MID
ORIENTATION: MID
ORIENTATION: MID;INNER

## 2023-11-09 ASSESSMENT — PAIN DESCRIPTION - PAIN TYPE
TYPE: SURGICAL PAIN

## 2023-11-09 ASSESSMENT — PAIN DESCRIPTION - ONSET
ONSET: ON-GOING

## 2023-11-09 ASSESSMENT — PAIN - FUNCTIONAL ASSESSMENT

## 2023-11-09 ASSESSMENT — PAIN DESCRIPTION - FREQUENCY
FREQUENCY: CONTINUOUS

## 2023-11-09 NOTE — PROGRESS NOTES
[facility]--. [id number] [facility] PROGRESS NOTE      Patient: Petra Saunders  Room #: 8D-57/719-Z            YOB: 1955  Age: 77 y.o. Gender: male            Admit Date & Time: 11/7/2023 10:19 AM    Assessment:    The patient was met today in his room with his spouse present. The patient welcomed a visit but due to throat pain the patient's wife responded where ever able. The patient shared his cares and concerns. Interventions: The  engaged in listening to the patient and inspiring looking forward to future developments. Outcomes: The patient shared being encouraged and in better spirits post visit. Plan: 1. Spiritual care will continue to follow the patient according to [facility]'s spiritual care SOP.        Electronically signed by Rosalva Birch on 11/9/2023 at 2:42 PM.  [facility]  [phone]     11/09/23 1438   Encounter Summary   Encounter Overview/Reason  Initial Encounter   Service Provided For: Patient;Significant other;Patient and family together   Referral/Consult From: [place] [address] Family members   Last Encounter  11/09/23   Complexity of Encounter High   Begin Time 1315   End Time  1330   Total Time Calculated 15 min   Spiritual/Emotional needs   Type Spiritual Support   Assessment/Intervention/Outcome   Assessment Complicated grieving   Intervention Empowerment   Outcome Encouraged

## 2023-11-09 NOTE — PROGRESS NOTES
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 Clot is also noted. Patient was given intravenous TXA. : Substantially organized clot in the upper AE fold. Surgical pathology was taken. - On IV fluids 75 mL/hour per ENT. - On Norco liquid and Dilaudid for pain. - ASA and Brilinta held per ENT.   - Daily CBC monitoring. Hemoglobin stable at this time     Acute mild transaminitis 2/2 likely Tylenol usage (Improving): On arrival LFT: , ALT 92, AST 34. Pt states he uses Tylenol Extra strength  q4H due to pain, he does not want to use Norco.  He also has history of hepatitis 60+ years ago. Denies significant alcohol usage. Hepatitis panel negative. HIV panel negative. - Hep panel pending.   - We will trend CMP every 48 hours  - Recommended minimizing usage of Tylenol and using it as directed. - Currently on Dilaudid PRN and [place] solution by ENT, managing. Chronic:  CAD s/p CABG : On ASA, Brilinta, statin and BB. Hypothyroidism: TSH wnl. Continue Synthroid  HTN: On Lopressor. PRN hydralazine ordered. Mildly elevated BP can consider adding second agent if it remains persistently elevated. HLD: On statin, continue. GERD: Continue PPI therapy. BPH: On terazosin, continue. Thank you, Sudhir Cordon MD, for the consultation. Hospitalist service will continue to follow along.       Electronically signed by Corinna Sevilla MD on 2023 at 7:46 AM      ===================================================================      Chief Complaint:  Hemoptysis     Hospital

## 2023-11-09 NOTE — FLOWSHEET NOTE
11/09/23 0039   Safe Environment   Safety Measures Bed in low position;Call light within reach; Family at bedside;Visitors at bedside  (Virtual Nurse Safety Rounds)     Call placed to patients room, patient responds to audio, permitted video. Patient alert and oriented. Patient denied any voiced concerns/complaints at this time. Family/Visitors at bedside. Patient educated on utilizing call light. Call light within reach, no signs or symptoms of distress noted.

## 2023-11-09 NOTE — PROGRESS NOTES
1927: pt arrives to pacu, respirations unlabored on room air. Pt responding to commands. Denies pain. 1935: pt resting, resps easy. Denies pain. 1944: pt states pain 10/10, medicated with .5mg dilaudid    1949: pain unchanged, medicated with .5mg dilaudid. pt complains of nausea, medicated with . 625mg droperidol. 1957: pt complains of pain 10/10, medicated with 50 mcg fentanyl    2002: pain unchanged, medicated with 50 mcg fentanyl. 2010: report called to [facility] on 4k 2022: pt meets criteria for discharge from pacu at this time.  pt transported to [facility] in stable condition

## 2023-11-09 NOTE — PROGRESS NOTES
Comprehensive Nutrition Assessment    Type and Reason for Visit:  Initial, Positive Nutrition Screen (weight loss and poor po intake and appetite)    Nutrition Recommendations/Plan:   Continue to advance diet as medically appropriate and encourage PO intake at best efforts. ONS ordered: ensure enlive TID (no strawberry)  Monitoring all nutrition aspects and will provide further recommendations as necessary and appropriate. Malnutrition Assessment:  Malnutrition Status: Moderate malnutrition (11/09/23 1321)    Context:  Chronic Illness     Findings of the 6 clinical characteristics of malnutrition:  Energy Intake:  75% or less estimated energy requirements for 1 month or longer (variable intake since 10/20/23)  Weight Loss:   (~7% loss over 5 months, 4% less than 1 month)     Body Fat Loss:  Mild body fat loss Triceps   Muscle Mass Loss:  Mild muscle mass loss Temples (temporalis), Clavicles (pectoralis & deltoids), Thigh (quadraceps), Calf (gastrocnemius), Scapula (trapezius)  Fluid Accumulation:  No significant fluid accumulation     Strength:  Not Performed    Nutrition Assessment:     Pt. moderately malnourished AEB criteria as listed above. At risk for further nutrition compromise r/t admit with post-op bleeding~s/p partial supraglottic laryngectomy 10/20/23, s/p larynoscopy to control bleed 11/8,  and underlying medical condition (PMH: Brown's esophagus, CAD, hepatitis C, HLD, HTN, CABG 2002, wears dentures). Nutrition Related Findings:    Pt. Report/Treatments/Miscellaneous: pt seen at bedside today~ reports doing well, throat is still sore. Accepting of ONS~ however not a fan of strawberry. Pt reports that prior to admit wife had bought some supplements for him to start drinking but has not started this at home.  Fat and muscle wasting noted on exam.   GI Status: last BM 11/7  Pertinent Labs: Na 142, K 3.9, BUN 7, creatinine 0.8, glucose 101, hgb 12.8  Pertinent Meds: lipitor, protonix, zofran, glycolax      Wound Type: None       Current Nutrition Intake & Therapies:    Average Meal Intake:  (limited documentation)  Average Supplements Intake:  (to start)  ADULT DIET; Full Liquid  ADULT ORAL NUTRITION SUPPLEMENT; Breakfast, Lunch, Dinner; Standard High Calorie/High Protein Oral Supplement    Anthropometric Measures:  Height: 180.3 cm (5' 11\")  Ideal Body Weight (IBW): 172 lbs (78 kg)    Admission Body Weight: 76.6 kg (168 lb 14.4 oz) (11/7: standing weight)  Current Body Weight: 78.3 kg (172 lb 9.6 oz) (11/8: standing weight no edema),   IBW.     Current BMI (kg/m2): 24.1  Usual Body Weight:  (per pt -185 lbs. 6/22/23: 180 lbs (standing), 10/4/23: 175 lbs 13 oz (standing))                       BMI Categories: Normal Weight (BMI 22.0 to 24.9) age over 72    Estimated Daily Nutrient Needs:  Energy Requirements Based On: Kcal/kg  Weight Used for Energy Requirements: Other (Comment) (11/8: 78.3 kg)  Energy (kcal/day): 4487-0542 kcals/day (25-30 kcals/kg)  Weight Used for Protein Requirements: Other (Comment) (11/8: 78.3 kg)  Protein (g/day): 78-94 g/day (1-1.2 g/kg ABW)       Nutrition Diagnosis:   Moderate malnutrition, In context of chronic, non-illness related related to inadequate protein-energy intake as evidenced by Criteria as identified in malnutrition assessment    Nutrition Interventions:   Food and/or Nutrient Delivery: Continue Current Diet, Start Oral Nutrition Supplement  Nutrition Education/Counseling:  (Encouraged PO intake at best efforts as tolerated, use of ONS)  Coordination of Nutrition Care: Continue to monitor while inpatient       Goals:  Previous Goal Met: Progressing toward Goal(s)  Goals: PO intake 75% or greater, by next RD assessment       Nutrition Monitoring and Evaluation:      Food/Nutrient Intake Outcomes: Diet Advancement/Tolerance, Food and Nutrient Intake, Supplement Intake  Physical Signs/Symptoms Outcomes: Biochemical Data, Chewing or Swallowing, GI

## 2023-11-09 NOTE — FLOWSHEET NOTE
11/09/23 1105   Safe Environment   Safety Measures Standard Safety Measures;Call light within reach; Bed in low position  (virtual safety round)     Pt responds to voice and permits camera. Pt resting in bed with call light in reach. Pt alert and oriented. Reminded to utilize call light when needed. Reminded of fall and safety precautions. No voiced questions or concerns.

## 2023-11-09 NOTE — ANESTHESIA POSTPROCEDURE EVALUATION
Department of Anesthesiology  Postprocedure Note    Patient: Kenneth Dallas  MRN: [id number]  YOB: 1955  Date of evaluation: 11/8/2023      Procedure Summary     Date: 11/08/23 Room / Location: [address]    Anesthesia Start: 1708 Anesthesia Stop: 1928    Procedure: Laryngoscopy Control of Bleed (Mouth) Diagnosis:       Throat hemorrhage      (Throat hemorrhage [R04.1])    Surgeons: Josefina Conway MD Responsible Provider: Brittany Kearns MD    Anesthesia Type: general ASA Status: 3          Anesthesia Type: No value filed.     Maggie Phase I: Maggie Score: 10    Maggie Phase II:        Anesthesia Post Evaluation    Patient location during evaluation: PACU  Patient participation: complete - patient participated  Level of consciousness: awake and alert  Airway patency: patent  Nausea & Vomiting: no nausea  Complications: no  Cardiovascular status: blood pressure returned to baseline and hemodynamically stable  Respiratory status: acceptable and spontaneous ventilation  Hydration status: euvolemic  Pain management: adequate

## 2023-11-09 NOTE — BRIEF OP NOTE
Brief Postoperative Note      Patient: Kym Young  YOB: 1955  MRN: [id number]    Date of Procedure: 11/8/2023    Pre-Op Diagnosis Codes: * Throat hemorrhage [R04.1]     *  Carcinoma of ventricular fold, right      Post-Op Diagnosis: Same       Procedure(s):  Laryngoscopy Control of Bleed    Surgeon(s):  MD Kemar Fiore MD    Assistant:  * No surgical staff found *    Anesthesia: General    Estimated Blood Loss (mL): less than 50     Complications: None    Specimens:   ID Type Source Tests Collected by Time Destination   A : Right posterior true vocal cord ventricular sulcus and residual false vocal cord Tissue Larynx SURGICAL PATHOLOGY Kemar Lara MD 11/8/2023 1813    B : Right ae fold organized clot and sorrounding tissues,Not for margin Tissue Larynx SURGICAL PATHOLOGY Tonie Matthews MD 11/8/2023 1837    C : Anterior right vanessa larynx for deep margin only. Clip at anterior commissure false fold confluence. Tissue Larynx SURGICAL PATHOLOGY Tonie Matthews MD 11/8/2023 1845        Implants:  * No implants in log *      Drains: * No LDAs found *    Findings: There was a substantially organized clot in the upper AE fold. There were suspicious areas of possible recurrence in the resection bed from surgery from October 20. There is a horizontal vertical partial laryngectomy basically resecting most of the right false vocal cord. This procedure consisted of another layer of tissue basically removing any remaining portion of the false cord down to the level of the free margin of the true vocal cord. Multiple specimens were obtained and sent for surgical pathology.   He tolerated the procedure well      Electronically signed by Jahaira Benites MD on 11/8/2023 at 7:34 PM

## 2023-11-10 VITALS
TEMPERATURE: 98.2 F | RESPIRATION RATE: 18 BRPM | SYSTOLIC BLOOD PRESSURE: 165 MMHG | WEIGHT: 172.6 LBS | HEART RATE: 65 BPM | DIASTOLIC BLOOD PRESSURE: 80 MMHG | HEIGHT: 71 IN | OXYGEN SATURATION: 95 % | BODY MASS INDEX: 24.16 KG/M2

## 2023-11-10 PROBLEM — G89.18 ACUTE POST-OPERATIVE PAIN: Status: ACTIVE | Noted: 2023-11-10

## 2023-11-10 LAB
DEPRECATED RDW RBC AUTO: 45.5 FL (ref 35–45)
ERYTHROCYTE [DISTWIDTH] IN BLOOD BY AUTOMATED COUNT: 12.8 % (ref 11.5–14.5)
HCT VFR BLD AUTO: 36.8 % (ref 42–52)
HCV RNA SERPL NAA+PROBE-ACNC: NOT DETECTED IU/ML
HCV RNA SERPL NAA+PROBE-LOG IU: NOT DETECTED LOG IU/ML
HCV RNA SERPL QL NAA+PROBE: NOT DETECTED
HGB BLD-MCNC: 11.9 GM/DL (ref 14–18)
MCH RBC QN AUTO: 31.2 PG (ref 26–33)
MCHC RBC AUTO-ENTMCNC: 32.3 GM/DL (ref 32.2–35.5)
MCV RBC AUTO: 96.6 FL (ref 80–94)
PLATELET # BLD AUTO: 210 THOU/MM3 (ref 130–400)
PMV BLD AUTO: 10.1 FL (ref 9.4–12.4)
RBC # BLD AUTO: 3.81 MILL/MM3 (ref 4.7–6.1)
WBC # BLD AUTO: 5.7 THOU/MM3 (ref 4.8–10.8)

## 2023-11-10 PROCEDURE — 6370000000 HC RX 637 (ALT 250 FOR IP): Performed by: OTOLARYNGOLOGY

## 2023-11-10 PROCEDURE — 6360000002 HC RX W HCPCS: Performed by: OTOLARYNGOLOGY

## 2023-11-10 PROCEDURE — 42962 CONTROL THROAT BLEEDING: CPT | Performed by: OTOLARYNGOLOGY

## 2023-11-10 PROCEDURE — 36415 COLL VENOUS BLD VENIPUNCTURE: CPT

## 2023-11-10 PROCEDURE — 85027 COMPLETE CBC AUTOMATED: CPT

## 2023-11-10 PROCEDURE — 2580000003 HC RX 258: Performed by: OTOLARYNGOLOGY

## 2023-11-10 PROCEDURE — 99024 POSTOP FOLLOW-UP VISIT: CPT | Performed by: REGISTERED NURSE

## 2023-11-10 PROCEDURE — 99231 SBSQ HOSP IP/OBS SF/LOW 25: CPT | Performed by: INTERNAL MEDICINE

## 2023-11-10 RX ORDER — ASPIRIN 81 MG/1
81 TABLET ORAL DAILY
Qty: 30 TABLET | Refills: 3 | Status: SHIPPED
Start: 2023-11-11

## 2023-11-10 RX ORDER — ONDANSETRON 4 MG/1
4 TABLET, ORALLY DISINTEGRATING ORAL 3 TIMES DAILY PRN
Qty: 21 TABLET | Refills: 0 | Status: SHIPPED | OUTPATIENT
Start: 2023-11-10

## 2023-11-10 RX ORDER — DOCUSATE SODIUM 100 MG/1
100 CAPSULE, LIQUID FILLED ORAL DAILY PRN
Status: DISCONTINUED | OUTPATIENT
Start: 2023-11-10 | End: 2023-11-10 | Stop reason: HOSPADM

## 2023-11-10 RX ADMIN — SODIUM CHLORIDE: 9 INJECTION, SOLUTION INTRAVENOUS at 00:19

## 2023-11-10 RX ADMIN — ONDANSETRON 4 MG: 2 INJECTION INTRAMUSCULAR; INTRAVENOUS at 04:06

## 2023-11-10 RX ADMIN — METOPROLOL SUCCINATE 25 MG: 25 TABLET, FILM COATED, EXTENDED RELEASE ORAL at 08:33

## 2023-11-10 RX ADMIN — PANTOPRAZOLE SODIUM 40 MG: 40 TABLET, DELAYED RELEASE ORAL at 08:33

## 2023-11-10 RX ADMIN — LEVOTHYROXINE SODIUM 50 MCG: 0.05 TABLET ORAL at 08:33

## 2023-11-10 RX ADMIN — HYDROCODONE BITARTRATE AND ACETAMINOPHEN 10 ML: 5; 217 SOLUTION ORAL at 04:06

## 2023-11-10 RX ADMIN — HYDROMORPHONE HYDROCHLORIDE 0.5 MG: 1 INJECTION, SOLUTION INTRAMUSCULAR; INTRAVENOUS; SUBCUTANEOUS at 00:17

## 2023-11-10 ASSESSMENT — PAIN DESCRIPTION - PAIN TYPE
TYPE: SURGICAL PAIN
TYPE: SURGICAL PAIN

## 2023-11-10 ASSESSMENT — PAIN DESCRIPTION - FREQUENCY
FREQUENCY: CONTINUOUS
FREQUENCY: CONTINUOUS

## 2023-11-10 ASSESSMENT — PAIN - FUNCTIONAL ASSESSMENT
PAIN_FUNCTIONAL_ASSESSMENT: ACTIVITIES ARE NOT PREVENTED
PAIN_FUNCTIONAL_ASSESSMENT: ACTIVITIES ARE NOT PREVENTED

## 2023-11-10 ASSESSMENT — PAIN DESCRIPTION - ORIENTATION
ORIENTATION: INNER

## 2023-11-10 ASSESSMENT — PAIN SCALES - GENERAL
PAINLEVEL_OUTOF10: 2
PAINLEVEL_OUTOF10: 8
PAINLEVEL_OUTOF10: 2
PAINLEVEL_OUTOF10: 5

## 2023-11-10 ASSESSMENT — PAIN DESCRIPTION - DESCRIPTORS
DESCRIPTORS: ACHING;DISCOMFORT

## 2023-11-10 ASSESSMENT — PAIN DESCRIPTION - LOCATION
LOCATION: THROAT;NECK

## 2023-11-10 ASSESSMENT — PAIN DESCRIPTION - ONSET
ONSET: ON-GOING
ONSET: ON-GOING

## 2023-11-10 NOTE — PROGRESS NOTES
Discharge teaching and instructions for diagnosis/procedure of ENT surgery completed with patient using teachback method. AVS reviewed. Patient voiced understanding regarding prescriptions, follow up appointments, and care of self at home. Discharged ambulatory to  independent living per family.

## 2023-11-10 NOTE — FLOWSHEET NOTE
11/10/23 1050   Safe Environment   Safety Measures Call light within reach; Family at bedside;Standard Safety Measures     Call placed to patients room, patient responds to audio, permitted video. Patient alert and oriented, in chair. Patient denied any voiced concerns/complaints at this time. Patient educated on utilizing call light. Call light within reach, no signs or symtpoms of distress noted.

## 2023-11-10 NOTE — CARE COORDINATION
11/10/23, 7:11 AM EST    Patient goals/plan/ treatment preferences discussed by  and . Patient goals/plan/ treatment preferences reviewed with patient/ family. Patient/ family verbalize understanding of discharge plan and are in agreement with goal/plan/treatment preferences. Understanding was demonstrated using the teach back method. AVS provided by RN at time of discharge, which includes all necessary medical information pertaining to the patients current course of illness, treatment, post-discharge goals of care, and treatment preferences.      Services At/After Discharge: None  plans home w spouse [facility] when medically cleared ([place] Diet), has CPAP, SR HME Kristal amin       IMM Letter  IMM Letter given to Patient/Family/Significant other/Guardian/POA/by[de-identified] RENUKA Thompson CM  IMM Letter date given[de-identified] 11/08/23  IMM Letter time given[de-identified] 8038

## 2023-11-10 NOTE — PROGRESS NOTES
Physician Progress Note      PATIENT:               Sonal Salcido  CSN #:                  [id number]  :                       1955  ADMIT DATE:       2023 10:19 AM  DISCH DATE:  Rigoberto Mackey  PROVIDER #:        Steve Perea          QUERY TEXT:    Patient admitted with Carcinoma of ventricular fold, right. Noted to have   Malnutrition Status: Moderate malnutrition (23 1321) by dietician. If   possible, please document in progress notes and discharge summary if you are   evaluating and /or treating any of the following: The medical record reflects the following:  Risk Factors: Per dietitian, pt meets ASPEN criteria for  Moderate   malnutrition. .  Clinical Indicators: Pt. moderately malnourished AEB criteria as listed above. At risk for further nutrition compromise r/t admit with post-op bleeding  s/p partial supraglottic laryngectomy 10/20/23, s/p larynoscopy to control   bleed ,  and underlying medical condition (PMH: Brown's esophagus, CAD,   hepatitis C, HLD, HTN, CABG , wears dentures). Treatment: ADULT ORAL NUTRITION SUPPLEMENT; Breakfast, Lunch, Dinner; Standard   High Calorie/High Protein Oral Supplement    ASPEN Criteria:    https://aspenjournals. onlinelibrary. waterman. com/doi/full/10.1177/328083033242562  5    Thank you. Tatum He RN, Clinical Documentation Integrity, [employer], [facility]), CRCR  Options provided:  -- Moderate Protein Calorie Malnutrition confirmed  -- Moderate Protein Calorie Malnutrition ruled out  -- Other - I will add my own diagnosis  -- Disagree - Not applicable / Not valid  -- Disagree - Clinically unable to determine / Unknown  -- Refer to Clinical Documentation Reviewer    PROVIDER RESPONSE TEXT:    The diagnosis of Moderate Protein Calorie Malnutrition was confirmed. Query created by: An Diez on 11/10/2023 10:21 AM      Electronically signed by:   Steve Perea 11/10/2023 11:53 AM

## 2023-11-10 NOTE — PROGRESS NOTES
CLINICAL PHARMACY: DISCHARGE MED RECONCILIATION/REVIEW    [facility] ([facility]) Select Patient?: No  Total # of Interventions Recommended: 0  Total # Interventions Accepted: 0  Intervention Severity:   - Level 1 Intervention Present?: No   - Level 2 #: 0   - Level 3 #: 0   Time Spent (min): 15    Additional Documentation: p

## 2023-11-10 NOTE — PROGRESS NOTES
Resident Consult Progress Note (Hospitalist)    Name: Tejal Cochran, male, : 1955, MRN: [id number]      Date of Admission: 2023  Date of Service: Pt seen/examined on 11/10/23      ASSESSMENT/PLAN:    Hemoptysis 2/2 carcinoma of R ventricular fold: S/p bilateral vertical partial laryngectomy 10/20/2023 + Laryngoscopy/ Control of Bleed:   Hx of Barretts esophagus:  Patient continues to have recurrent hemoptysis, was seen at ENT office on  and was recommended to be admitted. Clot is also noted. Patient was given intravenous TXA. : Substantially organized clot in the upper AE fold. Surgical pathology was taken. - On IV fluids 75 mL/hour per ENT. - On Norco liquid and Dilaudid for pain. - ASA and Brilinta held per ENT.   - Daily CBC monitoring. Hemoglobin stable at this time     Acute mild transaminitis 2/2 likely Tylenol usage (Resolving): On arrival LFT: , ALT 92, AST 34. Pt states he uses Tylenol Extra strength  q4H due to pain, he does not want to use Norco.  He also has history of hepatitis 60+ years ago. Denies significant alcohol usage. Hepatitis panel negative. HIV panel negative. Hep C negative. - We will trend CMP every 48 hours  - Recommended minimizing usage of Tylenol and using it as directed. Chronic:  CAD s/p CABG  + [facility] 2019: On ASA, Brilinta, statin and BB. Hypothyroidism: TSH wnl. Continue Synthroid  HTN: On Lopressor. PRN hydralazine ordered. Mildly elevated BP can consider adding second agent if it remains persistently elevated. HLD: On statin, continue. GERD: Continue PPI therapy. BPH: On terazosin, continue. QUERY request by Olive Gaspar RN: Moderate Protein Calorie Malnutrition. Thank you, Jian Garcia MD, for the consultation. Hospitalist service will sign off.       Electronically signed by Zenia Canada MD on 11/10/2023 at 7:41 AM      ===================================================================      Chief Complaint:

## 2023-11-10 NOTE — DISCHARGE SUMMARY
of blood staining to posterior oropharyngeal wall. Hard and soft palates symmetrical and intact. Uvula midline. Gag reflex present    Neck: Trachea midline. Thyroid not enlarged, no palpable masses or tenderness. No overlying erythema or ecchymosis. Neck without edema or palpable crepitus, induration, or fluctuance. Lymphatic: No cervical lymphadenopathy noted. Eyes: ROSEMARIE, EOM intact. Conjunctiva moist without discharge. Lungs: Normal effort of breathing, not obviously distressed. No stridor or wheezing noted, on room air. LABS     Recent Labs     11/08/23  0317 11/09/23  0026 11/09/23  0803 11/10/23  0333   WBC 5.6  --  9.4 5.7   HGB 12.5*  --  12.8* 11.9*   HCT 38.9*  --  39.7* 36.8*     --  220 210   NA  --  142  --   --    K  --  3.9  --   --    CL  --  109  --   --    CO2  --  25  --   --    BUN  --  7  --   --    CREATININE  --  0.8  --   --        DISCHARGE INSTRUCTIONS     Discharge Medications:      Medication List        START taking these medications      ondansetron 4 MG disintegrating tablet  Commonly known as: ZOFRAN-ODT  Take 1 tablet by mouth 3 times daily as needed for Nausea or Vomiting            CHANGE how you take these medications      aspirin 81 MG EC tablet  Take 1 tablet by mouth daily Okay to resume tomorrow AM 11/11/23  Start taking on: November 11, 2023  What changed: additional instructions     gabapentin 300 MG capsule  Commonly known as: NEURONTIN  Take 1 capsule by mouth 2 times daily for 60 days. What changed:   when to take this  reasons to take this     HYDROcodone-acetaminophen 2.5-108 mg/5 mL solution  Take 10 mLs by mouth every 4 hours as needed for Pain for up to 3 days.  Max Daily Amount: 60 mLs  What changed:   when to take this  reasons to take this     terazosin 5 MG capsule  Commonly known as: HYTRIN  TAKE 1 CAPSULE NIGHTLY  What changed:   when to take this  additional instructions     ticagrelor 90 MG Tabs tablet  Commonly known as: Brilinta  Take 1 tablet by mouth 2 times daily Okay to resume MONDAY 11/13/23 only taking ONE per day for one week and then call Dr. Shila Kamara office to ensure no further bleeding; then decision will be made to resume full dose at that time. Start taking on: November 13, 2023  What changed:   additional instructions  These instructions start on November 13, 2023. If you are unsure what to do until then, ask your doctor or other care provider. CONTINUE taking these medications      arginine 500 MG tablet     atorvastatin 40 MG tablet  Commonly known as: LIPITOR  TAKE 1 TABLET NIGHTLY     Cepacol 15-2.3 MG Lozg  Generic drug: Benzocaine-Menthol  Take 1 lozenge by mouth every 3 hours as needed (Throat pain)     Co Q-10 200 MG Caps     levothyroxine 50 MCG tablet  Commonly known as: SYNTHROID  TAKE 1 TABLET DAILY     metoprolol succinate 50 MG extended release tablet  Commonly known as: TOPROL XL  Take 0.5 tablets by mouth 2 times daily     Multi-Vitamin Tabs     nitroGLYCERIN 0.4 MG SL tablet  Commonly known as: NITROSTAT  up to max of 3 total doses. If no relief after 1 dose, call 911. omeprazole 40 MG delayed release capsule  Commonly known as: PRILOSEC            STOP taking these medications      diphenhydrAMINE-APAP (sleep)  MG tablet  Commonly known as: TYLENOL PM EXTRA STRENGTH     enoxaparin 80 MG/0.8ML  Commonly known as: Lovenox               Where to Get Your Medications        These medications were sent to [facility], [address] [place] [address] Evan ChoudharyHernan Jes [phone]  [address] [place] [id number]      Phone: [phone]   HYDROcodone-acetaminophen 2.5-108 mg/5 mL solution  ondansetron 4 MG disintegrating tablet       Information about where to get these medications is not yet available    Ask your nurse or doctor about these medications  aspirin 81 MG EC tablet  ticagrelor 90 MG Tabs tablet       Diet: diet as tolerated.  Avoid

## 2023-11-10 NOTE — OP NOTE
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 Jacobo Gonzalez  Revision vertical anterior lateral partial laryngectomy performed by Dr. Wallace Members):  MD Cierra Esteban MD    Assistant:   * No surgical staff found *    Anesthesia: General    Estimated Blood Loss (mL): less than 50     Complications: None    Specimens:   ID Type Source Tests Collected by Time Destination   A : Right posterior true vocal cord ventricular sulcus and residual false vocal cord Tissue Larynx SURGICAL PATHOLOGY Cierra Hernandez MD 11/8/2023 1813    B : Right ae fold organized clot and sorrounding tissues,Not for margin Tissue Larynx SURGICAL PATHOLOGY Jacobo Gonzalez MD 11/8/2023 1837    C : Anterior right vanessa larynx for deep margin only. Clip at anterior commissure false fold confluence. Tissue Larynx SURGICAL PATHOLOGY Jacobo Gonzalez MD 11/8/2023 1845        Implants:  * No implants in log *      Drains: * No LDAs found *    Findings: 1. Abnormal appearing granular tissue of the superior surface of the true cord the residual ventricular sulcus and the vertical soft tissue surfaces of the false cord and aryepiglottic fold several areas of which were positive on spot biopsies performed by Dr. Kenya Foster  2.  2 large specimens were generated using transoral laser microsurgical techniques to remove the entirety of the surface plus a large depth of tissue to increase the chances that the patient's larynx can be rendered cancer free           Detailed Description of Procedure:      The patient was taken to the operating room by Dr. Kenya Foster and suspension laryngoscopy was enabled in preparation for control of hemorrhage anterior half of the glottis and supraglottis. Reorienting the transoral system I was able to get a more tangential anteriorly oriented view. Beginning at the posterior superior segment of the residual specimen I incised the posterior corner and then grasped with the suction graspers allowing me to dissect deep    Through the aryepiglottic fold to the edge of the epiglottic cartilage where I incised it off of the cartilage and took the incision inferiorly broadly drying the residual false fold then the residual ventricular sulcus down onto the true vocal fold level. I turned medially and incised the attachment of the soft tissues to the medial edge of the true vocal fold at the same level that the posterior incision was made through the cord. I reflected these tissues superiorly and laterally incising the substance of the true fold in the ventricular floor in the process and drying the tissues anteriorly to separate them from the deep surface. I then completed the incision along the inferior border of the epiglottis down towards the petiole meeting the anterior incision through the true cord in the process. I took this specimen off the field and placed in orienting titanium clip on it so that it could be processed by pathology accurately. There were numerous bleeding spots of the supraglottis and glottis which I cauterized with suction cautery. I then took the laser with a 1.6 mm [place] and 0.1-second leg and shaved down the protruding portions of the soft tissues of the residual true cord and the aryepiglotticus muscle as well as the thyroid arytenoid muscle in the field. I used suction cautery to continue maintaining adequate hemostasis. I pulled the microscope away and examined the field with Dr. Brandee Fitch using a 30 degree telescope finding no additional pathology warranting resection.   As such turned the case back over to him for his completion of the hemostasis correction that needed to happen and

## 2023-11-10 NOTE — DISCHARGE INSTRUCTIONS
Okay to resume 81 mg ASA starting tomorrow morning. Monitoring for any recurrence of bleeding. If bleeding were to recur: call ENT on call. Okay to resume Brilinta MONDAY 11/13/23 if no recurrence of bleeding; only taking ONE per day for one week: 90 mg once a day and then call Dr. Oscar Abbott office on 11/20/23 with an update and to ensure no further bleeding; then decision will be made to resume full dose at that time by Dr. Luisito Simons. DO NOT TAKE LOVENOX you were previously prescribed. DO NOT TAKE extra tylenol. DO NOT TAKE ibuprofen (it increases your risk of bleeding). Call ENT on call anytime if you have any questions/concerns.

## 2023-11-11 NOTE — OP NOTE
Operative Note      Patient: Carolynn Diaz  YOB: 1955  MRN: [id number]    Date of Procedure: 11/8/2023    Pre-Op Diagnosis Codes: * Throat hemorrhage [R04.1]     *  Carcinoma of ventricular fold, right        Post-Op Diagnosis: Same       Procedure(s):  Laryngoscopy Control of Bleed     Surgeon(s):  MD Deniz Roque MD     Assistant:  * No surgical staff found *     Anesthesia: General     Estimated Blood Loss (mL): less than 50      Complications: None     Specimens:   ID Type Source Tests Collected by Time Destination   A : Right posterior true vocal cord ventricular sulcus and residual false vocal cord Tissue Larynx SURGICAL PATHOLOGY Deniz Resendiz MD 11/8/2023 1813     B : Right ae fold organized clot and sorrounding tissues,Not for margin Tissue Larynx SURGICAL PATHOLOGY Graciela De La Torre MD 11/8/2023 1837     C : Anterior right vanessa larynx for deep margin only. Clip at anterior commissure false fold confluence. Tissue Larynx SURGICAL PATHOLOGY Graciela De La Torre MD 11/8/2023 1845           Implants:  * No implants in log *      Drains: * No LDAs found *     Findings: There was a substantially organized clot in the upper AE fold. There were suspicious areas of possible recurrence in the resection bed from surgery from October 20. There is a horizontal vertical partial laryngectomy basically resecting most of the right false vocal cord. This procedure consisted of another layer of tissue basically removing any remaining portion of the false cord down to the level of the free margin of the true vocal cord. Multiple specimens were obtained and sent for surgical pathology. He tolerated the procedure well     Detailed Description of Procedure:   My of the procedure involved direct microlaryngoscopy with control of bleeding from the left supraglottic larynx. Please refer to Dr. Chiang Mems op note for many of the details regarding the cancer resection.     After

## 2023-11-13 ENCOUNTER — CARE COORDINATION (OUTPATIENT)
Dept: CASE MANAGEMENT | Age: 68
End: 2023-11-13

## 2023-11-13 NOTE — CARE COORDINATION
Care Transitions Outreach Attempt    Call within 2 business days of discharge: Yes   Attempted to reach patient for transitions of care follow up. Unable to reach patient. Patient: Tim Olszewski Patient : 1955 MRN: [id number]    Last Discharge Facility       Date Complaint Diagnosis Description Type Department Provider    23  Acute post-operative pain . .. Admission (Discharged) Don Sutton MD              Was this an external facility discharge? No Discharge [facility]: [facility]    Noted following upcoming appointments from discharge chart review:   [facility] follow up appointment(s):   Future Appointments   Date Time Provider [address]   11/15/2023  3:00 PM Joelle Bianchi MD AFL [facility]   2023 11:00 AM Vinod Broderick MD N [facility]   2023  1:15 PM Lester Child MD SRP[facility] Physic [facility]   2023  9:00 AM MD NOVA Mai [facility]   2023  9:00 AM MD Sania Sterling [facility] [address]   2024  9:00 AM Vinod Broderick MD N ENT [address]     1st attempt to contact pt for initial care transition follow up. Unable to reach pt. Left message with contact information and request for call back.     Benjamín Jj RN

## 2023-11-14 ENCOUNTER — CARE COORDINATION (OUTPATIENT)
Dept: CASE MANAGEMENT | Age: 68
End: 2023-11-14

## 2023-11-14 NOTE — CARE COORDINATION
Care Transitions Initial Follow Up Call    Call within 2 business days of discharge: Yes    Patient Current Location:  Home: [address] [place] [phone]    Care Transition Nurse contacted the  Reyes Rivas, pt's spouse  by telephone to perform post hospital discharge assessment. Verified name and  with  spouse  as identifiers. Provided introduction to self, and explanation of the Care Transition Nurse role. Patient: Ramin Tubbs Patient : 1955   MRN: [id number]  Reason for Admission: Acute post op pain, Throat hemorrhage  Discharge Date: 11/10/23 RARS: Readmission Risk Score: 15.6      Last Discharge Facility       Date Complaint Diagnosis Description Type Department Provider    23  Acute post-operative pain . .. Admission (Discharged) Lorne Alvarez MD            Was this an external facility discharge? No Discharge Facility: [address] [place] [address] to be reviewed by the provider   Additional needs identified to be addressed with provider: No  none               Method of communication with provider: none. Contacted pt for initial care transition follow up. Spoke with pt's spouse Reyes Rivas. She states that Rigoberto Perez is doing much better this time around. Has seen much improvement in the last 3 days. Rigoberto Perez is getting up and moving around more. Reports pain has been tolerable and no longer having to take the hydrocodone-acetaminophen liquid. Last dose was at 11 pm on Saturday. No longer having any bleeding. Denies having any cough or shortness of breath. Diet has also gotten better. He has been able to swallow his medications including the supplements w/o issues or in applesauce. He is still not taking the Multivitamin currently. The only concern that she had was Rigoberto Perez was unable to move his bowels but he moved them this morning. Reviewed medication list.  Confirmed he started the Brilinta yesterday.   She is aware of the instructions to take one per day for one week and

## 2023-11-16 ENCOUNTER — OFFICE VISIT (OUTPATIENT)
Dept: ENT CLINIC | Age: 68
End: 2023-11-16

## 2023-11-16 VITALS
WEIGHT: 172.3 LBS | OXYGEN SATURATION: 97 % | BODY MASS INDEX: 24.12 KG/M2 | HEART RATE: 63 BPM | TEMPERATURE: 96.7 F | HEIGHT: 71 IN | RESPIRATION RATE: 20 BRPM | SYSTOLIC BLOOD PRESSURE: 122 MMHG | DIASTOLIC BLOOD PRESSURE: 74 MMHG

## 2023-11-16 DIAGNOSIS — C32.1: ICD-10-CM

## 2023-11-16 DIAGNOSIS — G47.33 OSA ON CPAP: ICD-10-CM

## 2023-11-16 DIAGNOSIS — C32.1 MALIGNANT NEOPLASM OF FALSE VOCAL CORDS (HCC): ICD-10-CM

## 2023-11-16 DIAGNOSIS — Z90.02: ICD-10-CM

## 2023-11-16 DIAGNOSIS — C32.9 PRIMARY SQUAMOUS CELL CARCINOMA OF LARYNX (HCC): Primary | ICD-10-CM

## 2023-11-16 PROCEDURE — 99024 POSTOP FOLLOW-UP VISIT: CPT | Performed by: OTOLARYNGOLOGY

## 2023-11-20 ENCOUNTER — TELEPHONE (OUTPATIENT)
Dept: CARDIOLOGY CLINIC | Age: 68
End: 2023-11-20

## 2023-11-20 ENCOUNTER — TELEPHONE (OUTPATIENT)
Dept: ENT CLINIC | Age: 68
End: 2023-11-20

## 2023-11-20 NOTE — TELEPHONE ENCOUNTER
AMARIS... Patient informed to hold Brilinta 7 days prior to surgery. Pt takes Brilinta for previous PCI in 2019. No indication for Lovenox bridge per Dr Hui Galan office. Dr Yony Haque is currently out of town so I double checked with Pre admission testing and they said anesthesia is okay with using the previous clearance from September.

## 2023-11-20 NOTE — TELEPHONE ENCOUNTER
Pt takes Brilinta for previous PCI in 2019. No indication for Lovenox bridge. Dr Hali Brown is out this week, will route to him, but likely no response before next week.

## 2023-11-20 NOTE — TELEPHONE ENCOUNTER
Esthela Vivar is scheduled for a revision surgery with Dr Abhijeet Mayorga on 11/27/23. We had cardiac clearance for his previous surgery. Clarifying if okay to use the same clearance? Surgery:  Revision Laryngoscopy with excision of tumor and/or stripping of vocal cord or epiglottis (3 primary areas to resect in region of vertical partial hemilaryngectomy and associated spot)    He is holding his Brilinta and aspirin 1 week prior (11/20/23). Does he need bridged with Lovenox? Please advise. Thank you!

## 2023-11-20 NOTE — TELEPHONE ENCOUNTER
Spoke with Alesia Vigil in pre admission testing. Anesthesia is okay with the previous clearance from September for this revision surgery on 11/27/23. Thank you!

## 2023-11-21 ENCOUNTER — PREP FOR PROCEDURE (OUTPATIENT)
Dept: ENT CLINIC | Age: 68
End: 2023-11-21

## 2023-11-21 ENCOUNTER — CARE COORDINATION (OUTPATIENT)
Dept: CASE MANAGEMENT | Age: 68
End: 2023-11-21

## 2023-11-21 NOTE — CARE COORDINATION
Care Transitions Follow Up Call    Patient Current Location:  Home: [address] [place] [phone]    Care Transition Nurse contacted the spouse/partner by telephone to follow up after admission on 23. Verified name and  with spouse/partner as identifiers. Patient: Francisco Condon  Patient : 1955   MRN: [id number]  Reason for Admission: Acute post op pain, Throat hemorrhage  Discharge Date: 11/10/23 RARS: Readmission Risk Score: 15.6      Needs to be reviewed by the provider   Additional needs identified to be addressed with provider: No  none             Method of communication with provider: none. Contacted pt for care transition follow up. Spoke with pt's spouse Mayra Lentz. She reports that Brie Ronquillo is doing better this time around. No further active bleeding since returning home. Minimal cough. No c/o shortness of breath. Pain still present but reports it is more of a sore throat. He is tolerating his oral intake. Mayra Lentz reports that he is gradually increasing his diet. Pt has an upcoming procedure on 23. Unsure if he will be admitted. No questions or concerns at this time. Addressed changes since last contact:   will be having procedure on 23  Discussed follow-up appointments. If no appointment was previously scheduled, appointment scheduling offered: Yes. Is follow up appointment scheduled within 7 days of discharge? Yes. Follow Up  Future Appointments   Date Time Provider [address]   2023  1:15 PM Manuelito Levy MD SRPX Physic [facility]   2023  9:00 AM MD NOVA Loza ENT [facility]   2023  9:00 AM Clair Roberson MD [facility] [address]   2024  9:00 AM Mag Christina MD N ENT [facility] Transition Nurse reviewed medical action plan with spouse/partner and discussed any barriers to care and/or understanding of plan of care after discharge.  Discussed appropriate site of care based on symptoms and

## 2023-11-26 PROBLEM — Z90.02: Status: ACTIVE | Noted: 2023-11-26

## 2023-11-26 RX ORDER — SODIUM CHLORIDE 9 MG/ML
INJECTION, SOLUTION INTRAVENOUS PRN
Status: CANCELLED | OUTPATIENT
Start: 2023-11-26

## 2023-11-26 RX ORDER — SODIUM CHLORIDE 0.9 % (FLUSH) 0.9 %
5-40 SYRINGE (ML) INJECTION PRN
Status: CANCELLED | OUTPATIENT
Start: 2023-11-26

## 2023-11-26 RX ORDER — SODIUM CHLORIDE 0.9 % (FLUSH) 0.9 %
5-40 SYRINGE (ML) INJECTION EVERY 12 HOURS SCHEDULED
Status: CANCELLED | OUTPATIENT
Start: 2023-11-26

## 2023-11-27 ENCOUNTER — HOSPITAL ENCOUNTER (OUTPATIENT)
Age: 68
Setting detail: OBSERVATION
Discharge: HOME | End: 2023-11-29
Attending: OTOLARYNGOLOGY | Admitting: OTOLARYNGOLOGY
Payer: MEDICARE

## 2023-11-27 ENCOUNTER — ANESTHESIA EVENT (OUTPATIENT)
Dept: OPERATING ROOM | Age: 68
End: 2023-11-27
Payer: MEDICARE

## 2023-11-27 ENCOUNTER — ANESTHESIA (OUTPATIENT)
Dept: OPERATING ROOM | Age: 68
End: 2023-11-27
Payer: MEDICARE

## 2023-11-27 ENCOUNTER — APPOINTMENT (OUTPATIENT)
Dept: GENERAL RADIOLOGY | Age: 68
End: 2023-11-27
Attending: OTOLARYNGOLOGY
Payer: MEDICARE

## 2023-11-27 DIAGNOSIS — C32.1 MALIGNANT NEOPLASM OF FALSE VOCAL CORDS (HCC): ICD-10-CM

## 2023-11-27 DIAGNOSIS — C32.1: ICD-10-CM

## 2023-11-27 DIAGNOSIS — D02.0 CARCINOMA IN SITU OF LARYNX: ICD-10-CM

## 2023-11-27 DIAGNOSIS — Z90.02: Primary | ICD-10-CM

## 2023-11-27 PROCEDURE — 3700000000 HC ANESTHESIA ATTENDED CARE: Performed by: OTOLARYNGOLOGY

## 2023-11-27 PROCEDURE — 6360000002 HC RX W HCPCS: Performed by: REGISTERED NURSE

## 2023-11-27 PROCEDURE — 3600000004 HC SURGERY LEVEL 4 BASE: Performed by: OTOLARYNGOLOGY

## 2023-11-27 PROCEDURE — 6360000002 HC RX W HCPCS

## 2023-11-27 PROCEDURE — 7100000001 HC PACU RECOVERY - ADDTL 15 MIN: Performed by: OTOLARYNGOLOGY

## 2023-11-27 PROCEDURE — 2580000003 HC RX 258: Performed by: NURSE PRACTITIONER

## 2023-11-27 PROCEDURE — 6360000002 HC RX W HCPCS: Performed by: NURSE PRACTITIONER

## 2023-11-27 PROCEDURE — 7100000000 HC PACU RECOVERY - FIRST 15 MIN: Performed by: OTOLARYNGOLOGY

## 2023-11-27 PROCEDURE — 3600000014 HC SURGERY LEVEL 4 ADDTL 15MIN: Performed by: OTOLARYNGOLOGY

## 2023-11-27 PROCEDURE — 6370000000 HC RX 637 (ALT 250 FOR IP): Performed by: NURSE PRACTITIONER

## 2023-11-27 PROCEDURE — 2700000000 HC OXYGEN THERAPY PER DAY

## 2023-11-27 PROCEDURE — 2709999900 HC NON-CHARGEABLE SUPPLY: Performed by: OTOLARYNGOLOGY

## 2023-11-27 PROCEDURE — 31382 PARTIAL REMOVAL OF LARYNX: CPT | Performed by: OTOLARYNGOLOGY

## 2023-11-27 PROCEDURE — G0378 HOSPITAL OBSERVATION PER HR: HCPCS

## 2023-11-27 PROCEDURE — 6360000002 HC RX W HCPCS: Performed by: ANESTHESIOLOGY

## 2023-11-27 PROCEDURE — 94640 AIRWAY INHALATION TREATMENT: CPT

## 2023-11-27 PROCEDURE — 71045 X-RAY EXAM CHEST 1 VIEW: CPT

## 2023-11-27 PROCEDURE — 2500000003 HC RX 250 WO HCPCS: Performed by: ANESTHESIOLOGY

## 2023-11-27 PROCEDURE — APPNB45 APP NON BILLABLE 31-45 MINUTES: Performed by: NURSE PRACTITIONER

## 2023-11-27 PROCEDURE — 3700000001 HC ADD 15 MINUTES (ANESTHESIA): Performed by: OTOLARYNGOLOGY

## 2023-11-27 PROCEDURE — 2500000003 HC RX 250 WO HCPCS: Performed by: REGISTERED NURSE

## 2023-11-27 PROCEDURE — 88305 TISSUE EXAM BY PATHOLOGIST: CPT

## 2023-11-27 RX ORDER — TERAZOSIN 5 MG/1
5 CAPSULE ORAL NIGHTLY
Status: DISCONTINUED | OUTPATIENT
Start: 2023-11-27 | End: 2023-11-27 | Stop reason: CLARIF

## 2023-11-27 RX ORDER — LABETALOL HYDROCHLORIDE 5 MG/ML
10 INJECTION INTRAVENOUS
Status: DISCONTINUED | OUTPATIENT
Start: 2023-11-27 | End: 2023-11-27 | Stop reason: HOSPADM

## 2023-11-27 RX ORDER — SODIUM CHLORIDE 0.9 % (FLUSH) 0.9 %
5-40 SYRINGE (ML) INJECTION EVERY 12 HOURS SCHEDULED
Status: DISCONTINUED | OUTPATIENT
Start: 2023-11-27 | End: 2023-11-27 | Stop reason: HOSPADM

## 2023-11-27 RX ORDER — ACETAMINOPHEN 325 MG/1
650 TABLET ORAL EVERY 4 HOURS PRN
Status: DISCONTINUED | OUTPATIENT
Start: 2023-11-27 | End: 2023-11-29 | Stop reason: HOSPADM

## 2023-11-27 RX ORDER — LIDOCAINE HCL/PF 100 MG/5ML
SYRINGE (ML) INJECTION PRN
Status: DISCONTINUED | OUTPATIENT
Start: 2023-11-27 | End: 2023-11-27 | Stop reason: SDUPTHER

## 2023-11-27 RX ORDER — ONDANSETRON 4 MG/1
4 TABLET, ORALLY DISINTEGRATING ORAL 3 TIMES DAILY PRN
Status: DISCONTINUED | OUTPATIENT
Start: 2023-11-27 | End: 2023-11-27 | Stop reason: SDUPTHER

## 2023-11-27 RX ORDER — FENTANYL CITRATE 50 UG/ML
INJECTION, SOLUTION INTRAMUSCULAR; INTRAVENOUS PRN
Status: DISCONTINUED | OUTPATIENT
Start: 2023-11-27 | End: 2023-11-27 | Stop reason: SDUPTHER

## 2023-11-27 RX ORDER — SODIUM CHLORIDE FOR INHALATION 0.9 %
3 VIAL, NEBULIZER (ML) INHALATION
Status: DISCONTINUED | OUTPATIENT
Start: 2023-11-27 | End: 2023-11-29 | Stop reason: HOSPADM

## 2023-11-27 RX ORDER — ONDANSETRON 2 MG/ML
4 INJECTION INTRAMUSCULAR; INTRAVENOUS
Status: DISCONTINUED | OUTPATIENT
Start: 2023-11-27 | End: 2023-11-27 | Stop reason: HOSPADM

## 2023-11-27 RX ORDER — DEXAMETHASONE SODIUM PHOSPHATE 10 MG/ML
10 INJECTION, EMULSION INTRAMUSCULAR; INTRAVENOUS ONCE
Status: COMPLETED | OUTPATIENT
Start: 2023-11-27 | End: 2023-11-27

## 2023-11-27 RX ORDER — HYDROCODONE BITARTRATE AND ACETAMINOPHEN 5; 325 MG/1; MG/1
2 TABLET ORAL EVERY 4 HOURS PRN
Status: DISCONTINUED | OUTPATIENT
Start: 2023-11-27 | End: 2023-11-29 | Stop reason: HOSPADM

## 2023-11-27 RX ORDER — LEVOTHYROXINE SODIUM 0.05 MG/1
50 TABLET ORAL DAILY
Status: DISCONTINUED | OUTPATIENT
Start: 2023-11-27 | End: 2023-11-29 | Stop reason: HOSPADM

## 2023-11-27 RX ORDER — PROPOFOL 10 MG/ML
INJECTION, EMULSION INTRAVENOUS CONTINUOUS PRN
Status: DISCONTINUED | OUTPATIENT
Start: 2023-11-27 | End: 2023-11-27 | Stop reason: SDUPTHER

## 2023-11-27 RX ORDER — GABAPENTIN 300 MG/1
300 CAPSULE ORAL 2 TIMES DAILY PRN
Status: DISCONTINUED | OUTPATIENT
Start: 2023-11-27 | End: 2023-11-29 | Stop reason: HOSPADM

## 2023-11-27 RX ORDER — UBIDECARENONE 75 MG
1 CAPSULE ORAL DAILY
Status: DISCONTINUED | OUTPATIENT
Start: 2023-11-27 | End: 2023-11-27

## 2023-11-27 RX ORDER — MULTIVITAMIN WITH IRON
1 TABLET ORAL DAILY
Status: DISCONTINUED | OUTPATIENT
Start: 2023-11-28 | End: 2023-11-29 | Stop reason: HOSPADM

## 2023-11-27 RX ORDER — ONDANSETRON 2 MG/ML
4 INJECTION INTRAMUSCULAR; INTRAVENOUS EVERY 6 HOURS PRN
Status: DISCONTINUED | OUTPATIENT
Start: 2023-11-27 | End: 2023-11-29 | Stop reason: HOSPADM

## 2023-11-27 RX ORDER — PROPOFOL 10 MG/ML
INJECTION, EMULSION INTRAVENOUS PRN
Status: DISCONTINUED | OUTPATIENT
Start: 2023-11-27 | End: 2023-11-27 | Stop reason: SDUPTHER

## 2023-11-27 RX ORDER — FENTANYL CITRATE 50 UG/ML
50 INJECTION, SOLUTION INTRAMUSCULAR; INTRAVENOUS EVERY 5 MIN PRN
Status: COMPLETED | OUTPATIENT
Start: 2023-11-27 | End: 2023-11-27

## 2023-11-27 RX ORDER — MIDAZOLAM HYDROCHLORIDE 1 MG/ML
INJECTION INTRAMUSCULAR; INTRAVENOUS PRN
Status: DISCONTINUED | OUTPATIENT
Start: 2023-11-27 | End: 2023-11-27 | Stop reason: SDUPTHER

## 2023-11-27 RX ORDER — SODIUM CHLORIDE 0.9 % (FLUSH) 0.9 %
5-40 SYRINGE (ML) INJECTION PRN
Status: DISCONTINUED | OUTPATIENT
Start: 2023-11-27 | End: 2023-11-29 | Stop reason: HOSPADM

## 2023-11-27 RX ORDER — ROCURONIUM BROMIDE 10 MG/ML
INJECTION, SOLUTION INTRAVENOUS PRN
Status: DISCONTINUED | OUTPATIENT
Start: 2023-11-27 | End: 2023-11-27 | Stop reason: SDUPTHER

## 2023-11-27 RX ORDER — ONDANSETRON 4 MG/1
4 TABLET, ORALLY DISINTEGRATING ORAL EVERY 8 HOURS PRN
Status: DISCONTINUED | OUTPATIENT
Start: 2023-11-27 | End: 2023-11-29 | Stop reason: HOSPADM

## 2023-11-27 RX ORDER — ASPIRIN 81 MG/1
81 TABLET ORAL DAILY
Status: DISCONTINUED | OUTPATIENT
Start: 2023-11-27 | End: 2023-11-29 | Stop reason: HOSPADM

## 2023-11-27 RX ORDER — SODIUM CHLORIDE 0.9 % (FLUSH) 0.9 %
5-40 SYRINGE (ML) INJECTION PRN
Status: DISCONTINUED | OUTPATIENT
Start: 2023-11-27 | End: 2023-11-27 | Stop reason: HOSPADM

## 2023-11-27 RX ORDER — ATORVASTATIN CALCIUM 40 MG/1
40 TABLET, FILM COATED ORAL NIGHTLY
Status: DISCONTINUED | OUTPATIENT
Start: 2023-11-27 | End: 2023-11-29 | Stop reason: HOSPADM

## 2023-11-27 RX ORDER — SODIUM CHLORIDE 0.9 % (FLUSH) 0.9 %
5-40 SYRINGE (ML) INJECTION EVERY 12 HOURS SCHEDULED
Status: DISCONTINUED | OUTPATIENT
Start: 2023-11-27 | End: 2023-11-29 | Stop reason: HOSPADM

## 2023-11-27 RX ORDER — DOXAZOSIN MESYLATE 4 MG/1
4 TABLET ORAL NIGHTLY
Status: DISCONTINUED | OUTPATIENT
Start: 2023-11-27 | End: 2023-11-29 | Stop reason: HOSPADM

## 2023-11-27 RX ORDER — HYDROCODONE BITARTRATE AND ACETAMINOPHEN 5; 325 MG/1; MG/1
1 TABLET ORAL EVERY 4 HOURS PRN
Status: DISCONTINUED | OUTPATIENT
Start: 2023-11-27 | End: 2023-11-29 | Stop reason: HOSPADM

## 2023-11-27 RX ORDER — PANTOPRAZOLE SODIUM 40 MG/1
40 TABLET, DELAYED RELEASE ORAL EVERY EVENING
Status: DISCONTINUED | OUTPATIENT
Start: 2023-11-27 | End: 2023-11-29 | Stop reason: HOSPADM

## 2023-11-27 RX ORDER — HYDRALAZINE HYDROCHLORIDE 20 MG/ML
INJECTION INTRAMUSCULAR; INTRAVENOUS PRN
Status: DISCONTINUED | OUTPATIENT
Start: 2023-11-27 | End: 2023-11-27 | Stop reason: SDUPTHER

## 2023-11-27 RX ORDER — ARGININE 500 MG
2000 TABLET ORAL DAILY
Status: DISCONTINUED | OUTPATIENT
Start: 2023-11-27 | End: 2023-11-27

## 2023-11-27 RX ORDER — SODIUM CHLORIDE 9 MG/ML
INJECTION, SOLUTION INTRAVENOUS PRN
Status: DISCONTINUED | OUTPATIENT
Start: 2023-11-27 | End: 2023-11-27 | Stop reason: HOSPADM

## 2023-11-27 RX ORDER — ONDANSETRON 2 MG/ML
INJECTION INTRAMUSCULAR; INTRAVENOUS PRN
Status: DISCONTINUED | OUTPATIENT
Start: 2023-11-27 | End: 2023-11-27 | Stop reason: SDUPTHER

## 2023-11-27 RX ORDER — SODIUM CHLORIDE 9 MG/ML
INJECTION, SOLUTION INTRAVENOUS PRN
Status: DISCONTINUED | OUTPATIENT
Start: 2023-11-27 | End: 2023-11-29 | Stop reason: HOSPADM

## 2023-11-27 RX ORDER — POLYETHYLENE GLYCOL 3350 17 G/17G
17 POWDER, FOR SOLUTION ORAL DAILY PRN
Status: DISCONTINUED | OUTPATIENT
Start: 2023-11-27 | End: 2023-11-29 | Stop reason: HOSPADM

## 2023-11-27 RX ORDER — FENTANYL CITRATE 50 UG/ML
INJECTION, SOLUTION INTRAMUSCULAR; INTRAVENOUS
Status: COMPLETED
Start: 2023-11-27 | End: 2023-11-27

## 2023-11-27 RX ORDER — OMEPRAZOLE 20 MG/1
40 CAPSULE, DELAYED RELEASE ORAL EVERY EVENING
Status: DISCONTINUED | OUTPATIENT
Start: 2023-11-27 | End: 2023-11-27 | Stop reason: CLARIF

## 2023-11-27 RX ORDER — METOPROLOL SUCCINATE 25 MG/1
25 TABLET, EXTENDED RELEASE ORAL 2 TIMES DAILY
Status: DISCONTINUED | OUTPATIENT
Start: 2023-11-27 | End: 2023-11-29 | Stop reason: HOSPADM

## 2023-11-27 RX ORDER — HYDRALAZINE HYDROCHLORIDE 20 MG/ML
10 INJECTION INTRAMUSCULAR; INTRAVENOUS
Status: DISCONTINUED | OUTPATIENT
Start: 2023-11-27 | End: 2023-11-27 | Stop reason: HOSPADM

## 2023-11-27 RX ADMIN — METOPROLOL SUCCINATE 25 MG: 25 TABLET, EXTENDED RELEASE ORAL at 20:21

## 2023-11-27 RX ADMIN — FENTANYL CITRATE 50 MCG: 50 INJECTION, SOLUTION INTRAMUSCULAR; INTRAVENOUS at 13:40

## 2023-11-27 RX ADMIN — DEXAMETHASONE SODIUM PHOSPHATE 10 MG: 10 INJECTION, EMULSION INTRAMUSCULAR; INTRAVENOUS at 10:56

## 2023-11-27 RX ADMIN — HYDROCODONE BITARTRATE AND ACETAMINOPHEN 1 TABLET: 5; 325 TABLET ORAL at 21:37

## 2023-11-27 RX ADMIN — HYDROMORPHONE HYDROCHLORIDE 0.5 MG: 1 INJECTION, SOLUTION INTRAMUSCULAR; INTRAVENOUS; SUBCUTANEOUS at 15:30

## 2023-11-27 RX ADMIN — PROPOFOL 150 MCG/KG/MIN: 10 INJECTION, EMULSION INTRAVENOUS at 13:17

## 2023-11-27 RX ADMIN — Medication 60 MG: at 13:17

## 2023-11-27 RX ADMIN — SODIUM CHLORIDE: 9 INJECTION, SOLUTION INTRAVENOUS at 10:46

## 2023-11-27 RX ADMIN — ATORVASTATIN CALCIUM 40 MG: 40 TABLET, FILM COATED ORAL at 20:21

## 2023-11-27 RX ADMIN — MIDAZOLAM 2 MG: 1 INJECTION INTRAMUSCULAR; INTRAVENOUS at 13:42

## 2023-11-27 RX ADMIN — HYDROMORPHONE HYDROCHLORIDE 0.5 MG: 1 INJECTION, SOLUTION INTRAMUSCULAR; INTRAVENOUS; SUBCUTANEOUS at 15:24

## 2023-11-27 RX ADMIN — SODIUM CHLORIDE: 9 INJECTION, SOLUTION INTRAVENOUS at 19:02

## 2023-11-27 RX ADMIN — FENTANYL CITRATE 50 MCG: 50 INJECTION INTRAMUSCULAR; INTRAVENOUS at 15:35

## 2023-11-27 RX ADMIN — DOXAZOSIN 4 MG: 4 TABLET ORAL at 20:21

## 2023-11-27 RX ADMIN — ROCURONIUM BROMIDE 20 MG: 10 INJECTION INTRAVENOUS at 13:40

## 2023-11-27 RX ADMIN — SODIUM CHLORIDE 3000 MG: 900 INJECTION INTRAVENOUS at 13:20

## 2023-11-27 RX ADMIN — ONDANSETRON 4 MG: 2 INJECTION INTRAMUSCULAR; INTRAVENOUS at 13:20

## 2023-11-27 RX ADMIN — SODIUM CHLORIDE 3000 MG: 900 INJECTION INTRAVENOUS at 19:03

## 2023-11-27 RX ADMIN — PANTOPRAZOLE SODIUM 40 MG: 40 TABLET, DELAYED RELEASE ORAL at 20:21

## 2023-11-27 RX ADMIN — LEVOTHYROXINE SODIUM 50 MCG: 0.05 TABLET ORAL at 20:21

## 2023-11-27 RX ADMIN — ROCURONIUM BROMIDE 50 MG: 10 INJECTION INTRAVENOUS at 13:34

## 2023-11-27 RX ADMIN — ROCURONIUM BROMIDE 30 MG: 10 INJECTION INTRAVENOUS at 13:38

## 2023-11-27 RX ADMIN — FENTANYL CITRATE 50 MCG: 50 INJECTION INTRAMUSCULAR; INTRAVENOUS at 15:40

## 2023-11-27 RX ADMIN — Medication 3 ML: at 17:47

## 2023-11-27 RX ADMIN — SUGAMMADEX 200 MG: 100 INJECTION, SOLUTION INTRAVENOUS at 14:54

## 2023-11-27 RX ADMIN — PROPOFOL 150 MG: 10 INJECTION, EMULSION INTRAVENOUS at 13:17

## 2023-11-27 RX ADMIN — HYDRALAZINE HYDROCHLORIDE 10 MG: 20 INJECTION, SOLUTION INTRAMUSCULAR; INTRAVENOUS at 13:52

## 2023-11-27 RX ADMIN — FENTANYL CITRATE 50 MCG: 50 INJECTION, SOLUTION INTRAMUSCULAR; INTRAVENOUS at 13:37

## 2023-11-27 ASSESSMENT — PAIN SCALES - GENERAL
PAINLEVEL_OUTOF10: 2
PAINLEVEL_OUTOF10: 8
PAINLEVEL_OUTOF10: 7
PAINLEVEL_OUTOF10: 3
PAINLEVEL_OUTOF10: 7
PAINLEVEL_OUTOF10: 8

## 2023-11-27 ASSESSMENT — PAIN DESCRIPTION - LOCATION: LOCATION: THROAT

## 2023-11-27 ASSESSMENT — PAIN DESCRIPTION - DESCRIPTORS: DESCRIPTORS: SORE

## 2023-11-27 NOTE — FLOWSHEET NOTE
11/27/23 1734   Safe Environment   Safety Measures Call light within reach; Family at bedside;Standard Safety Measures  (virtual nurse safety round complete)     VN called into patients room and introduced myself and role. Patient's wife answered and permitted video. Video activated. Pt resting in bed awake and alert with RN at bedside. Patient voiced no needs or concerns at this time but is in pain related to surgery.  VN educated pt and wife on utilizing call light if condition changes, verbalized understanding

## 2023-11-27 NOTE — ANESTHESIA POSTPROCEDURE EVALUATION
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     Maggie Phase I: Maggie Score: 8    Maggie Phase II:        Anesthesia Post Evaluation    Patient location during evaluation: PACU  Patient participation: complete - patient participated  Level of consciousness: awake and alert  Airway patency: patent  Nausea & Vomiting: no nausea  Complications: no  Cardiovascular status: blood pressure returned to baseline and hemodynamically stable  Respiratory status: acceptable and spontaneous ventilation  Hydration status: euvolemic  Pain management: adequate

## 2023-11-27 NOTE — ANESTHESIA PRE PROCEDURE
Department of Anesthesiology  Preprocedure Note       Name:  Lalo Queen   Age:  77 y.o.  :  1955                                          MRN:  [id number]         Date:  2023      Surgeon: Jesenia Gold):  Chasity Feldman MD    Procedure: Procedure(s):  Laryngoscopy, Direct Excision of Tumor and or Stripping of Vocal Cords or Epiglottis, with Microscope/ Telescope    Medications prior to admission:   Prior to Admission medications    Medication Sig Start Date End Date Taking? Authorizing Provider   cefadroxil (DURICEF) 500 MG capsule Take 1 capsule by mouth 2 times daily 23   Tari Woodall MD   aspirin 81 MG EC tablet Take 1 tablet by mouth daily Okay to resume tomorrow AM 23   Jessenia Pyle APRN - CNP   ticagrelor (BRILINTA) 90 MG TABS tablet Take 1 tablet by mouth 2 times daily Okay to resume 23 only taking ONE per day for one week and then call Dr. Danna Keita office to ensure no further bleeding; then decision will be made to resume full dose at that time.  23   Jessenia Pyle APRN - CNP   ondansetron (ZOFRAN-ODT) 4 MG disintegrating tablet Take 1 tablet by mouth 3 times daily as needed for Nausea or Vomiting 11/10/23   Fabiana Pyle APRN - CNP   Benzocaine-Menthol (CEPACOL) 15-2.3 MG LOZG Take 1 lozenge by mouth every 3 hours as needed (Throat pain) 10/23/23   NO Castano   metoprolol succinate (TOPROL XL) 50 MG extended release tablet Take 0.5 tablets by mouth 2 times daily 10/16/23   Penelope Caab MD   levothyroxine (SYNTHROID) 50 MCG tablet TAKE 1 TABLET DAILY 10/16/23   Penelope Caba MD   atorvastatin (LIPITOR) 40 MG tablet TAKE 1 TABLET NIGHTLY 10/16/23   Penelope Caba MD   terazosin (HYTRIN) 5 MG capsule TAKE 1 CAPSULE NIGHTLY  Patient taking differently: Take 1 capsule by mouth nightly TAKE 1 CAPSULE NIGHTLY 10/16/23   Penelope Caba MD   gabapentin (NEURONTIN) 300 MG capsule Take 1 capsule by mouth 2 times daily for 60

## 2023-11-27 NOTE — OP NOTE
Suction cautery was necessary for hemostasis. Carefully examining the wound distally I found there to be a significant rim of residual epithelium above the level of the true cords mostly to the left of midline. A small amount went to the right of midline. I grasped this posteriorly on the left side with left going triangular forceps and then lasered the additional epithelium down to the level of the true vocal fold epithelium broadly and retracted the specimen medially to enable the complete extirpation of this tissue. I handed off the field with a clip indicating the midline orientation of this specimen. There was a tiny bit of additional epithelium to the right of midline which with my assistant pressing down with a substantial force on the larynx I was able to get to with line of sight laser. I used a 1.0 mm [place] with 0.1 seconds delay in the pattern tracing to ablate the remaining epithelium above the level of the true forward into the subepithelial space. Suction cautery was necessary for hemostasis. Carefully examining the larynx with a 30 degree telescope I found the entirety of the anterior residual tumor bearing epithelium had been removed with the majority of it being removed and specimens that were oriented with a substantial amount of subepithelial tissue for additional protection. There was no additional epithelium above the true folds along the entire rim of the true folds on the right side and the anterior aspect of the left true vocal fold on the left. I checked for hemostasis and found no need for additional cautery. I suctioned out the airway and turned the case back over to [employer] who worked with the anesthetist to reverse the patient from general anesthesia after reintubating him through the transoral system and removing the transoral hardware including the jet ventilation catheter.   She connected the ventilatory circuit up and then turned the patient back to

## 2023-11-28 PROCEDURE — 6370000000 HC RX 637 (ALT 250 FOR IP): Performed by: NURSE PRACTITIONER

## 2023-11-28 PROCEDURE — 2580000003 HC RX 258: Performed by: NURSE PRACTITIONER

## 2023-11-28 PROCEDURE — 99024 POSTOP FOLLOW-UP VISIT: CPT | Performed by: PHYSICIAN ASSISTANT

## 2023-11-28 PROCEDURE — G0378 HOSPITAL OBSERVATION PER HR: HCPCS

## 2023-11-28 PROCEDURE — 2700000000 HC OXYGEN THERAPY PER DAY

## 2023-11-28 PROCEDURE — 94640 AIRWAY INHALATION TREATMENT: CPT

## 2023-11-28 PROCEDURE — 6360000002 HC RX W HCPCS: Performed by: NURSE PRACTITIONER

## 2023-11-28 PROCEDURE — 94761 N-INVAS EAR/PLS OXIMETRY MLT: CPT

## 2023-11-28 RX ADMIN — HYDROCODONE BITARTRATE AND ACETAMINOPHEN 1 TABLET: 5; 325 TABLET ORAL at 05:51

## 2023-11-28 RX ADMIN — SODIUM CHLORIDE: 9 INJECTION, SOLUTION INTRAVENOUS at 13:23

## 2023-11-28 RX ADMIN — SODIUM CHLORIDE 3000 MG: 900 INJECTION INTRAVENOUS at 13:24

## 2023-11-28 RX ADMIN — HYDROCODONE BITARTRATE AND ACETAMINOPHEN 2 TABLET: 5; 325 TABLET ORAL at 12:08

## 2023-11-28 RX ADMIN — Medication 3 ML: at 09:12

## 2023-11-28 RX ADMIN — ATORVASTATIN CALCIUM 40 MG: 40 TABLET, FILM COATED ORAL at 20:31

## 2023-11-28 RX ADMIN — HYDROCODONE BITARTRATE AND ACETAMINOPHEN 2 TABLET: 5; 325 TABLET ORAL at 20:30

## 2023-11-28 RX ADMIN — SODIUM CHLORIDE 3000 MG: 900 INJECTION INTRAVENOUS at 01:16

## 2023-11-28 RX ADMIN — PANTOPRAZOLE SODIUM 40 MG: 40 TABLET, DELAYED RELEASE ORAL at 18:58

## 2023-11-28 RX ADMIN — SODIUM CHLORIDE 3000 MG: 900 INJECTION INTRAVENOUS at 05:52

## 2023-11-28 RX ADMIN — DOXAZOSIN 4 MG: 4 TABLET ORAL at 20:31

## 2023-11-28 RX ADMIN — SODIUM CHLORIDE: 9 INJECTION, SOLUTION INTRAVENOUS at 18:58

## 2023-11-28 RX ADMIN — HYDROCODONE BITARTRATE AND ACETAMINOPHEN 1 TABLET: 5; 325 TABLET ORAL at 16:24

## 2023-11-28 RX ADMIN — Medication 1 TABLET: at 08:49

## 2023-11-28 RX ADMIN — SODIUM CHLORIDE, PRESERVATIVE FREE 10 ML: 5 INJECTION INTRAVENOUS at 20:29

## 2023-11-28 RX ADMIN — SODIUM CHLORIDE 3000 MG: 900 INJECTION INTRAVENOUS at 18:59

## 2023-11-28 RX ADMIN — METOPROLOL SUCCINATE 25 MG: 25 TABLET, EXTENDED RELEASE ORAL at 08:49

## 2023-11-28 ASSESSMENT — PAIN DESCRIPTION - ORIENTATION
ORIENTATION: INNER

## 2023-11-28 ASSESSMENT — PAIN DESCRIPTION - ONSET
ONSET: ON-GOING

## 2023-11-28 ASSESSMENT — PAIN DESCRIPTION - PAIN TYPE
TYPE: SURGICAL PAIN

## 2023-11-28 ASSESSMENT — PAIN DESCRIPTION - LOCATION
LOCATION: THROAT

## 2023-11-28 ASSESSMENT — PAIN DESCRIPTION - FREQUENCY
FREQUENCY: CONTINUOUS
FREQUENCY: INTERMITTENT

## 2023-11-28 ASSESSMENT — PAIN DESCRIPTION - DESCRIPTORS
DESCRIPTORS: SORE;TENDER
DESCRIPTORS: SORE
DESCRIPTORS: TENDER;SORE
DESCRIPTORS: DISCOMFORT

## 2023-11-28 ASSESSMENT — PAIN SCALES - GENERAL
PAINLEVEL_OUTOF10: 4
PAINLEVEL_OUTOF10: 3
PAINLEVEL_OUTOF10: 7
PAINLEVEL_OUTOF10: 2

## 2023-11-28 ASSESSMENT — PAIN - FUNCTIONAL ASSESSMENT
PAIN_FUNCTIONAL_ASSESSMENT: ACTIVITIES ARE NOT PREVENTED

## 2023-11-28 NOTE — PLAN OF CARE
Problem: Discharge Planning  Goal: Discharge to home or other facility with appropriate resources  Outcome: Progressing  Flowsheets (Taken 11/28/2023 1655)  Discharge to home or other facility with appropriate resources:   Identify barriers to discharge with patient and caregiver   Identify discharge learning needs (meds, wound care, etc)   Refer to discharge planning if patient needs post-hospital services based on physician order or complex needs related to functional status, cognitive ability or social support system   Arrange for needed discharge resources and transportation as appropriate   Arrange for interpreters to assist at discharge as needed  Note: Patient plans to return home with wife at discharge and no needs voiced at this time. Patient working with social work for discharge planning. Problem: Pain  Goal: Verbalizes/displays adequate comfort level or baseline comfort level  Outcome: Progressing  Flowsheets (Taken 11/28/2023 1655)  Verbalizes/displays adequate comfort level or baseline comfort level:   Encourage patient to monitor pain and request assistance   Administer analgesics based on type and severity of pain and evaluate response   Consider cultural and social influences on pain and pain management   Assess pain using appropriate pain scale   Implement non-pharmacological measures as appropriate and evaluate response   Notify Licensed Independent Practitioner if interventions unsuccessful or patient reports new pain  Note: Pain Assessment: 0-10  Pain Level: 4   Patient's Stated Pain Goal: 2   Is pain goal met at this time?   No     Non-Pharmaceutical Pain Intervention(s): Rest       Problem: ABCDS Injury Assessment  Goal: Absence of physical injury  Outcome: Progressing  Flowsheets (Taken 11/28/2023 1655)  Absence of Physical Injury: Implement safety measures based on patient assessment     Problem: Safety - Adult  Goal: Free from fall injury  Outcome: Progressing  Flowsheets (Taken

## 2023-11-28 NOTE — FLOWSHEET NOTE
11/28/23 1300   Safe Environment   Safety Measures Bed in low position;Call light within reach; Side rails X 2;Visitors at bedside  (Virtual nurse safety round completed)       Call placed to patients room, patient responds to audio, permitted video. Patient alert and oriented. Patient denied any concerns/complaints at this time. Patient educated on utilizing call light. Call light within reach, no signs or symptoms of distress noted.

## 2023-11-28 NOTE — PLAN OF CARE
Problem: Respiratory - Adult  Goal: Achieves optimal ventilation and oxygenation  Outcome: Progressing   Patient agreed on goals

## 2023-11-28 NOTE — CARE COORDINATION
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    PONV (postoperative nausea and vomiting)     only after OHS    S/P CABG (coronary artery bypass graft) 2002    [facility]    Thyroid disease     Vision loss of right eye     during hyperbaric chamber oxygen therapy     Vitreous opacities     right    Wears dentures     full upper, no bottom teeth and no use of dentures on bottom     Barriers to Discharge:   From SDS  Laryngeal Cancer  History: CABG, 10/20 Laryngectomy  11/27 False Vocal Cord Revision Anterior Lateral Vertical Partial Resection  IV Ampicillin  RA HFO    PCP: Chidi Rice MD    Readmission Risk Low 0-14, Mod 15-19), High > 20: Readmission Risk Score: 15.6      Advance Directives:      Code Status: Full Code   Patient's Primary Decision Maker is:      Primary Decision MakerMkely Bear Spouse - [phone]    Secondary Decision Maker: Rebecca Soares Child - [phone]    Secondary Decision Maker: Pa Carr - Child - [phone]    Patient Goals/Plan/Treatment Preferences: plans home w spouse Beatriz,rosalie amin,

## 2023-11-28 NOTE — FLOWSHEET NOTE
11/28/23 1126   Safe Environment   Safety Measures Bed in low position;Call light within reach; Side rails X 2  (Virtual nurse safety round completed)     Call placed to patients room, patient responds to audio, permitted video. Patient alert and oriented. Patient denied any concerns/complaints at this time. Patient educated on utilizing call light. Call light within reach, no signs or symptoms of distress noted.

## 2023-11-29 VITALS
TEMPERATURE: 98.4 F | OXYGEN SATURATION: 96 % | DIASTOLIC BLOOD PRESSURE: 75 MMHG | BODY MASS INDEX: 24.72 KG/M2 | RESPIRATION RATE: 18 BRPM | HEART RATE: 51 BPM | WEIGHT: 176.59 LBS | SYSTOLIC BLOOD PRESSURE: 139 MMHG | HEIGHT: 71 IN

## 2023-11-29 PROCEDURE — 6370000000 HC RX 637 (ALT 250 FOR IP): Performed by: NURSE PRACTITIONER

## 2023-11-29 PROCEDURE — G0378 HOSPITAL OBSERVATION PER HR: HCPCS

## 2023-11-29 PROCEDURE — 94760 N-INVAS EAR/PLS OXIMETRY 1: CPT

## 2023-11-29 PROCEDURE — 94640 AIRWAY INHALATION TREATMENT: CPT

## 2023-11-29 PROCEDURE — 2580000003 HC RX 258: Performed by: NURSE PRACTITIONER

## 2023-11-29 PROCEDURE — 6360000002 HC RX W HCPCS: Performed by: NURSE PRACTITIONER

## 2023-11-29 PROCEDURE — 99024 POSTOP FOLLOW-UP VISIT: CPT | Performed by: PHYSICIAN ASSISTANT

## 2023-11-29 RX ORDER — AMOXICILLIN 500 MG/1
500 CAPSULE ORAL 2 TIMES DAILY
Qty: 14 CAPSULE | Refills: 0 | Status: SHIPPED | OUTPATIENT
Start: 2023-11-29 | End: 2023-12-06

## 2023-11-29 RX ORDER — ENOXAPARIN SODIUM 100 MG/ML
80 INJECTION SUBCUTANEOUS 2 TIMES DAILY
Qty: 11.2 ML | Refills: 0 | Status: SHIPPED | OUTPATIENT
Start: 2023-11-29 | End: 2023-12-06

## 2023-11-29 RX ORDER — HYDROCODONE BITARTRATE AND ACETAMINOPHEN 5; 325 MG/1; MG/1
1 TABLET ORAL EVERY 4 HOURS PRN
Qty: 18 TABLET | Refills: 0 | Status: SHIPPED | OUTPATIENT
Start: 2023-11-29 | End: 2023-12-02

## 2023-11-29 RX ADMIN — Medication 3 ML: at 08:11

## 2023-11-29 RX ADMIN — LEVOTHYROXINE SODIUM 50 MCG: 0.05 TABLET ORAL at 06:58

## 2023-11-29 RX ADMIN — Medication 1 TABLET: at 08:29

## 2023-11-29 RX ADMIN — SODIUM CHLORIDE 3000 MG: 900 INJECTION INTRAVENOUS at 06:58

## 2023-11-29 RX ADMIN — SODIUM CHLORIDE 3000 MG: 900 INJECTION INTRAVENOUS at 01:14

## 2023-11-29 RX ADMIN — SODIUM CHLORIDE, PRESERVATIVE FREE 10 ML: 5 INJECTION INTRAVENOUS at 08:29

## 2023-11-29 RX ADMIN — HYDROCODONE BITARTRATE AND ACETAMINOPHEN 1 TABLET: 5; 325 TABLET ORAL at 06:59

## 2023-11-29 RX ADMIN — HYDROCODONE BITARTRATE AND ACETAMINOPHEN 1 TABLET: 5; 325 TABLET ORAL at 01:14

## 2023-11-29 ASSESSMENT — PAIN DESCRIPTION - ONSET
ONSET: ON-GOING

## 2023-11-29 ASSESSMENT — PAIN - FUNCTIONAL ASSESSMENT
PAIN_FUNCTIONAL_ASSESSMENT: ACTIVITIES ARE NOT PREVENTED

## 2023-11-29 ASSESSMENT — PAIN DESCRIPTION - PAIN TYPE
TYPE: SURGICAL PAIN

## 2023-11-29 ASSESSMENT — PAIN DESCRIPTION - ORIENTATION
ORIENTATION: INNER

## 2023-11-29 ASSESSMENT — PAIN DESCRIPTION - DESCRIPTORS
DESCRIPTORS: DISCOMFORT

## 2023-11-29 ASSESSMENT — PAIN SCALES - GENERAL
PAINLEVEL_OUTOF10: 3
PAINLEVEL_OUTOF10: 3
PAINLEVEL_OUTOF10: 4

## 2023-11-29 ASSESSMENT — PAIN DESCRIPTION - LOCATION
LOCATION: THROAT

## 2023-11-29 ASSESSMENT — PAIN DESCRIPTION - FREQUENCY
FREQUENCY: CONTINUOUS
FREQUENCY: CONTINUOUS
FREQUENCY: INTERMITTENT

## 2023-11-29 NOTE — PROGRESS NOTES
1502: pt arrives to pacu. Pt on room air. Pt responds to verbal stimulation. VSS. Respirations unlabored. Pt coughing up thick secretions   1510: Dr. Magdalene Alegre at bedside   [phone]: pt placed on high flow (40L, 30%FiO2)  1524: pt given 0.5mg of dilaudid   1528: xray at bedside   1530: pt given 0.5mg of dilaudid   1535: pt given 50mcg of fentanyl   1540: pt given 50mcg of fentanyl   1543: report called to [facility] lezama   1600: pt meets discharge criteria from pacu.  Pt waiting on room to be cleaned   1630: still waiting on room to be cleaned   1713: pt transported to [facility]
Admitted to Same Day Surgery and oriented to the unit. Patient verbalized approval for first name, last initial and physician name on the unit white board. Fall and allergy band on patient. Wife Lesli at bedside.
Discharge teaching and instructions for diagnosis/procedure of primary squamous cell carcinoma of larynx completed with patient using teachback method. AVS reviewed. Printed prescriptions given to patient. Patient voiced understanding regarding prescriptions, follow up appointments, and care of self at home.  Discharged in a wheelchair to  home with support per family
(NORCO) 5-325 MG per tablet 2 tablet, 2 tablet, Oral, Q4H PRN  HYDROmorphone (DILAUDID) injection 0.25 mg, 0.25 mg, IntraVENous, Q3H PRN **OR** HYDROmorphone (DILAUDID) injection 0.5 mg, 0.5 mg, IntraVENous, Q3H PRN  pantoprazole (PROTONIX) tablet 40 mg, 40 mg, Oral, QPM  doxazosin (CARDURA) tablet 4 mg, 4 mg, Oral, Nightly    ASSESSMENT AND PLAN    POD 1 s/p revision of anterior lateral vertical partial resection of false vocal cord    -Patient reports he is doing very well today. He also reports the recovery from the secondary surgery seems much more minor compared to his other surgery early November  -Diet will be advanced to minced and moist as tolerated by patient. Advised patient to continue chin tuck. Request for nursing staff if he has increased difficulty with swallowing or signs of aspiration  -Continue to monitor for postop bleeding/hemoptysis  -Continue 0.9% saline nebs 3 times daily as scheduled to help with mobilizing secretions/bloody coagulum. Okay to keep patient off of high flow nasal cannula for time being, but if he has increased secretions or signs of mucous plugging will recommend resuming heated high flow as well  -Progressive activity as tolerated. Ambulation with nursing staff and potentially with family. Dr. Clemente Wilde requests ambulation up at least 1 flight of stairs and monitor for bleeding afterward  -Monitor overnight with hopeful discharge tomorrow if patient continues to do well. Contact ENT with increased work of breathing, hemoptysis or other concerns in the meantime.     Electronically signed by ON Brink on 11/28/2023 at 8:59 AM

## 2023-11-29 NOTE — FLOWSHEET NOTE
11/29/23 1008   Safe Environment   Safety Measures Call light within reach;Standard Safety Measures; Other (comment)  (Virtual Nurse Safety Round Complete)     Call placed to patients room, patient responds to audio, permitted video. Patient alert and oriented. Patient denied any voiced concerns/complaints at this time. Patient educated on utilizing call light. Call light within reach, no signs or symtpoms of distress noted.

## 2023-11-29 NOTE — PLAN OF CARE
Problem: Discharge Planning  Goal: Discharge to home or other facility with appropriate resources  11/28/2023 2347 by Chrissy Clements RN  Outcome: Progressing  11/28/2023 1655 by Cy Lozano RN  Outcome: Progressing  Flowsheets (Taken 11/28/2023 1655)  Discharge to home or other facility with appropriate resources:   Identify barriers to discharge with patient and caregiver   Identify discharge learning needs (meds, wound care, etc)   Refer to discharge planning if patient needs post-hospital services based on physician order or complex needs related to functional status, cognitive ability or social support system   Arrange for needed discharge resources and transportation as appropriate   Arrange for interpreters to assist at discharge as needed  Note: Patient plans to return home with wife at discharge and no needs voiced at this time. Patient working with social work for discharge planning. Problem: Pain  Goal: Verbalizes/displays adequate comfort level or baseline comfort level  11/28/2023 2347 by Chrissy Clements RN  Outcome: Progressing  11/28/2023 1655 by Cy Lozano RN  Outcome: Progressing  Flowsheets (Taken 11/28/2023 1655)  Verbalizes/displays adequate comfort level or baseline comfort level:   Encourage patient to monitor pain and request assistance   Administer analgesics based on type and severity of pain and evaluate response   Consider cultural and social influences on pain and pain management   Assess pain using appropriate pain scale   Implement non-pharmacological measures as appropriate and evaluate response   Notify Licensed Independent Practitioner if interventions unsuccessful or patient reports new pain  Note: Pain Assessment: 0-10  Pain Level: 4   Patient's Stated Pain Goal: 2   Is pain goal met at this time?   No     Non-Pharmaceutical Pain Intervention(s): Rest       Problem: ABCDS Injury Assessment  Goal: Absence of physical injury  11/28/2023 2347 by Laila Gee

## 2023-11-29 NOTE — DISCHARGE INSTRUCTIONS
-You will be placed on Lovenox for the next week. Use twice daily for 7 days. After the 7 days are completed, resume your Brilinta the following day.  Hold Lovenox or Brilinta for bleeding or spitting up blood  -

## 2023-11-30 ENCOUNTER — CARE COORDINATION (OUTPATIENT)
Dept: CASE MANAGEMENT | Age: 68
End: 2023-11-30

## 2023-11-30 DIAGNOSIS — C32.1: Primary | ICD-10-CM

## 2023-11-30 PROCEDURE — 1111F DSCHRG MED/CURRENT MED MERGE: CPT | Performed by: FAMILY MEDICINE

## 2023-11-30 NOTE — CARE COORDINATION
Care Transitions Initial Follow Up Call    Call within 2 business days of discharge: Yes    Patient Current Location:  Home: [address] [place] [id number]    Care Transition Nurse contacted the spouse/partner by telephone to perform post hospital discharge assessment. Verified name and  with spouse/partner as identifiers. Provided introduction to self, and explanation of the Care Transition Nurse role. Patient: Roberta Samuels Patient : 1955   MRN: [id number]  Reason for Admission: s/p revision of anterior lateral vertical partial resection  Discharge Date: 23 RARS: Readmission Risk Score: 15.6      Last Discharge Facility       Date Complaint Diagnosis Description Type Department Provider    23  H/O partial laryngectomy . .. Admission (Discharged) Emerita Talley MD            Was this an external facility discharge? No Discharge Facility: [address] [place] [address] to be reviewed by the provider   Additional needs identified to be addressed with provider: No  none               Method of communication with provider: none. Contacted pt for initial care transition follow up. Spoke with pt's spouse Shanika Cadet. She states that Tosni Situ is doing pretty good. Reports throat is still sore. Denies having any cough this time around. Denies having any blood noted in sputum or abnormal bleeding. Reports Tosin Situ is eating and drinking w/o issues. He is eating soft foods and drinking protein shakes. Pt able to swallow his medications. Reviewed medication list.  She confirmed he is taking the new medications. Their daughter is an RN and will be administering the Lovenox injections. Discussed discharge instructions-placed on Lovenox for the next week; use twice daily for 7 days; after 7 days are completed resume Brilinta the following day, hold Lovenox and Brilinta for bleeding or spitting up blood. Pt has an appt with PCP on 23, IM appt on 23, ENT on 23.   She is

## 2023-12-07 ENCOUNTER — CARE COORDINATION (OUTPATIENT)
Dept: CASE MANAGEMENT | Age: 68
End: 2023-12-07

## 2023-12-07 ENCOUNTER — NURSE ONLY (OUTPATIENT)
Dept: LAB | Age: 68
End: 2023-12-07

## 2023-12-07 DIAGNOSIS — E78.5 HYPERLIPIDEMIA, UNSPECIFIED HYPERLIPIDEMIA TYPE: ICD-10-CM

## 2023-12-07 DIAGNOSIS — I10 ESSENTIAL HYPERTENSION: ICD-10-CM

## 2023-12-07 LAB
ALBUMIN SERPL BCG-MCNC: 3.9 G/DL (ref 3.5–5.1)
ALP SERPL-CCNC: 117 U/L (ref 38–126)
ALT SERPL W/O P-5'-P-CCNC: 42 U/L (ref 11–66)
ANION GAP SERPL CALC-SCNC: 10 MEQ/L (ref 8–16)
AST SERPL-CCNC: 28 U/L (ref 5–40)
BILIRUB CONJ SERPL-MCNC: < 0.2 MG/DL (ref 0–0.3)
BILIRUB SERPL-MCNC: 0.4 MG/DL (ref 0.3–1.2)
BUN SERPL-MCNC: 13 MG/DL (ref 7–22)
CALCIUM SERPL-MCNC: 8.9 MG/DL (ref 8.5–10.5)
CHLORIDE SERPL-SCNC: 107 MEQ/L (ref 98–111)
CHOLEST SERPL-MCNC: 125 MG/DL (ref 100–199)
CO2 SERPL-SCNC: 26 MEQ/L (ref 23–33)
CREAT SERPL-MCNC: 0.8 MG/DL (ref 0.4–1.2)
GFR SERPL CREATININE-BSD FRML MDRD: > 60 ML/MIN/1.73M2
GLUCOSE SERPL-MCNC: 102 MG/DL (ref 70–108)
HDLC SERPL-MCNC: 42 MG/DL
LDLC SERPL CALC-MCNC: 66 MG/DL
POTASSIUM SERPL-SCNC: 4.2 MEQ/L (ref 3.5–5.2)
PROT SERPL-MCNC: 5.8 G/DL (ref 6.1–8)
SODIUM SERPL-SCNC: 143 MEQ/L (ref 135–145)
TRIGL SERPL-MCNC: 84 MG/DL (ref 0–199)
TSH SERPL DL<=0.005 MIU/L-ACNC: 1.37 UIU/ML (ref 0.4–4.2)

## 2023-12-07 NOTE — CARE COORDINATION
Care Transitions Outreach Attempt    Attempted to reach patient for transitions of care follow up. Unable to reach patient. Patient: Franky Brown Patient : 1955 MRN: [id number]    Last Discharge Facility       Date Complaint Diagnosis Description Type Department Provider    23  H/O partial laryngectomy . .. Admission (Discharged) Lennox Marinelli MD            Noted following upcoming appointments from discharge chart review:   [facility] follow up appointment(s):   Future Appointments   Date Time Provider [address] [place] [address]   2023  1:15 PM Aspen Londono MD SR[facility] Physic [facility] [place]   2023  9:00 AM MD NOVA Contreras ENT [facility] [place] [facility]   [facility] [address]   2024  9:00 AM MD [facility] [place] [facility]     Attempted to contact pt for care transition follow up. Unable to reach pt. Left message with contact information and request for call back.       Catherine Al RN

## 2023-12-11 ENCOUNTER — CARE COORDINATION (OUTPATIENT)
Dept: CASE MANAGEMENT | Age: 68
End: 2023-12-11

## 2023-12-11 NOTE — CARE COORDINATION
Care Transitions Follow Up Call    Patient Current Location:  Home: [address] [place] [phone]    Care Transition Nurse contacted the spouse/partner by telephone to follow up after admission on 23. Verified name and  with spouse/partner as identifiers. Patient: Jt King  Patient : 1955   MRN: [id number]  Reason for Admission: s/p revision of anterior lateral vertical partial resection of false vocal cord  Discharge Date: 23 RARS: Readmission Risk Score: 15.6      Needs to be reviewed by the provider   Additional needs identified to be addressed with provider: No  none             Method of communication with provider: none. CTN call to Emerald Husain (wife) today and she says Sandra Arias is feeling good. She says his voice is coming back, although hoarse, it's above a whisper. Denies hemoptysis, bleeding, dysphagia, fever, chills, cough, n/v/d, sob, chest pain, dizziness. Sore throat 2/10 not taking pain med. PCP HFU 23-> lamisil for toe fungus  ENT 23  He is driving without any problems. Avoiding strenuous activity or heavy lifting. Eating, drinking & sleeping without any issues. Denies problems w/ urination/bowels. Agrees to have [facility] mail ACP docs to them w/ f/u. Message sent to [address] [place] [address]. No other concerns voiced at this time. Will continue to follow. Addressed changes since last contact:  medications-Lamisil per PCP  Discussed follow-up appointments. If no appointment was previously scheduled, appointment scheduling offered: Yes. Is follow up appointment scheduled within 7 days of discharge? Yes.     Follow Up  Future Appointments   Date Time Provider [address]   2023  1:15 PM Javier Bruce MD SRP[facility] Physic [facility] [place]   2023  9:00 AM MD NOVA Valera ENT [facility] [place]   2023  9:00 AM MD Gurmeet Oliveira Hrt [facility]   2024  9:00 AM MD [facility]OVA Valera [facility]     External follow up appointment(s):

## 2023-12-14 ENCOUNTER — OFFICE VISIT (OUTPATIENT)
Dept: INTERNAL MEDICINE CLINIC | Age: 68
End: 2023-12-14
Payer: MEDICARE

## 2023-12-14 VITALS
DIASTOLIC BLOOD PRESSURE: 58 MMHG | SYSTOLIC BLOOD PRESSURE: 106 MMHG | WEIGHT: 171.2 LBS | HEART RATE: 66 BPM | RESPIRATION RATE: 18 BRPM | OXYGEN SATURATION: 96 % | BODY MASS INDEX: 23.88 KG/M2 | TEMPERATURE: 98 F

## 2023-12-14 DIAGNOSIS — I25.10 CORONARY ARTERY DISEASE INVOLVING NATIVE CORONARY ARTERY OF NATIVE HEART WITHOUT ANGINA PECTORIS: ICD-10-CM

## 2023-12-14 DIAGNOSIS — E78.2 MIXED HYPERLIPIDEMIA: Primary | ICD-10-CM

## 2023-12-14 DIAGNOSIS — I10 PRIMARY HYPERTENSION: ICD-10-CM

## 2023-12-14 PROCEDURE — 1123F ACP DISCUSS/DSCN MKR DOCD: CPT | Performed by: INTERNAL MEDICINE

## 2023-12-14 PROCEDURE — G8420 CALC BMI NORM PARAMETERS: HCPCS | Performed by: INTERNAL MEDICINE

## 2023-12-14 PROCEDURE — G8484 FLU IMMUNIZE NO ADMIN: HCPCS | Performed by: INTERNAL MEDICINE

## 2023-12-14 PROCEDURE — 3017F COLORECTAL CA SCREEN DOC REV: CPT | Performed by: INTERNAL MEDICINE

## 2023-12-14 PROCEDURE — 99213 OFFICE O/P EST LOW 20 MIN: CPT | Performed by: INTERNAL MEDICINE

## 2023-12-14 PROCEDURE — 3078F DIAST BP <80 MM HG: CPT | Performed by: INTERNAL MEDICINE

## 2023-12-14 PROCEDURE — G8427 DOCREV CUR MEDS BY ELIG CLIN: HCPCS | Performed by: INTERNAL MEDICINE

## 2023-12-14 PROCEDURE — 1036F TOBACCO NON-USER: CPT | Performed by: INTERNAL MEDICINE

## 2023-12-14 PROCEDURE — 3074F SYST BP LT 130 MM HG: CPT | Performed by: INTERNAL MEDICINE

## 2023-12-14 NOTE — PROGRESS NOTES
[facility]   [facility] [address] [address] [facility]  [place], [address] [facility],[address]  Dept: [phone]  Dept Fax: [phone]    YOB: 1955    Hypertension and CABG / hosp visit     77 y.o. male   here for follow-up of above issues. He does have medical issues, but no CP or SOB, denies any recent fever or chillls. Recent hosp discharge post ENT surgery      Had plans robert  getting  biopsy of vocal cords, as he is having hoarseness , hx of SCC followed by dr. Toña Brambila  had recent surgery . On further questioning he gets occasional chest wall discomfort, he is lifting upto 50 lbs per his spouse. He does have CABG , going back to ~ 20 yrs. They plan of going to [facility] , for few DAYS. He does have chronic back pain. Conservative Rx does not help. He is able to eat, and here with his spouse on this visit, no anginal issues.      Current Outpatient Medications   Medication Sig Dispense Refill    terbinafine (LAMISIL) 250 MG tablet Take 1 tablet by mouth daily 30 tablet 2    ticagrelor (BRILINTA) 90 MG TABS tablet Take 1 tablet by mouth 2 times daily Okay to resume Thursday 12/7/23 (after completing 7 days of Lovenox) 180 tablet 1    aspirin 81 MG EC tablet Take 1 tablet by mouth daily Okay to resume tomorrow AM 11/11/23 30 tablet 3    ondansetron (ZOFRAN-ODT) 4 MG disintegrating tablet Take 1 tablet by mouth 3 times daily as needed for Nausea or Vomiting 21 tablet 0    Benzocaine-Menthol (CEPACOL) 15-2.3 MG LOZG Take 1 lozenge by mouth every 3 hours as needed (Throat pain) 16 lozenge 1    metoprolol succinate (TOPROL XL) 50 MG extended release tablet Take 0.5 tablets by mouth 2 times daily 90 tablet 1    levothyroxine (SYNTHROID) 50 MCG tablet TAKE 1 TABLET DAILY 90 tablet 0    atorvastatin (LIPITOR) 40 MG tablet TAKE 1 TABLET NIGHTLY 90 tablet 1    terazosin (HYTRIN) 5 MG capsule TAKE 1 CAPSULE NIGHTLY 90 capsule 0    gabapentin (NEURONTIN) 300 MG capsule Take 1 capsule by mouth 2

## 2023-12-19 ENCOUNTER — ENROLLMENT (OUTPATIENT)
Dept: CARE COORDINATION | Age: 68
End: 2023-12-19

## 2023-12-21 ENCOUNTER — OFFICE VISIT (OUTPATIENT)
Dept: ENT CLINIC | Age: 68
End: 2023-12-21

## 2023-12-21 VITALS
SYSTOLIC BLOOD PRESSURE: 118 MMHG | DIASTOLIC BLOOD PRESSURE: 62 MMHG | BODY MASS INDEX: 24.47 KG/M2 | HEIGHT: 71 IN | OXYGEN SATURATION: 99 % | RESPIRATION RATE: 18 BRPM | TEMPERATURE: 97 F | HEART RATE: 56 BPM | WEIGHT: 174.8 LBS

## 2023-12-21 DIAGNOSIS — Z90.02: ICD-10-CM

## 2023-12-21 DIAGNOSIS — C32.9 PRIMARY SQUAMOUS CELL CARCINOMA OF LARYNX (HCC): Primary | ICD-10-CM

## 2023-12-21 DIAGNOSIS — G47.33 OSA ON CPAP: ICD-10-CM

## 2023-12-21 PROCEDURE — 99024 POSTOP FOLLOW-UP VISIT: CPT | Performed by: OTOLARYNGOLOGY

## 2023-12-21 NOTE — PROGRESS NOTES
[facility] [place] [facility]  [facility] EAR, NOSE AND THROAT  [address]  [address]  [facility] [address]  Dept: [phone]  Dept Fax: [phone]  Loc: [phone]    Abel Singer is a 68 y.o. male who was referred by No ref. provider found for:  Chief Complaint   Patient presents with    Follow-up     Patient is here for a 1 month f/u and scope. He said that he is doing well, but when he talks for about 10 minutes he loses his voice.    .    HPI:     Abel Singer is a 68 y.o. male who presents today for follow-up on his surgical procedure 1127 and his larynx by Dr. Antoine.  He had a partial horizontal supraglottic laryngectomy removing more tissue laterally and anteriorly.  Pathology was negative for atypia or malignancy..    History:     Allergies   Allergen Reactions    Morphine Nausea And Vomiting     Current Outpatient Medications   Medication Sig Dispense Refill    terbinafine (LAMISIL) 250 MG tablet Take 1 tablet by mouth daily 30 tablet 2    ticagrelor (BRILINTA) 90 MG TABS tablet Take 1 tablet by mouth 2 times daily Okay to resume Thursday 12/7/23 (after completing 7 days of Lovenox) 180 tablet 1    aspirin 81 MG EC tablet Take 1 tablet by mouth daily Okay to resume tomorrow AM 11/11/23 30 tablet 3    ondansetron (ZOFRAN-ODT) 4 MG disintegrating tablet Take 1 tablet by mouth 3 times daily as needed for Nausea or Vomiting 21 tablet 0    Benzocaine-Menthol (CEPACOL) 15-2.3 MG LOZG Take 1 lozenge by mouth every 3 hours as needed (Throat pain) 16 lozenge 1    metoprolol succinate (TOPROL XL) 50 MG extended release tablet Take 0.5 tablets by mouth 2 times daily 90 tablet 1    levothyroxine (SYNTHROID) 50 MCG tablet TAKE 1 TABLET DAILY 90 tablet 0    atorvastatin (LIPITOR) 40 MG tablet TAKE 1 TABLET NIGHTLY 90 tablet 1    terazosin (HYTRIN) 5 MG capsule TAKE 1 CAPSULE NIGHTLY 90 capsule 0    omeprazole (PRILOSEC) 40 MG delayed release capsule Take 1 capsule by mouth every

## 2023-12-27 ENCOUNTER — OFFICE VISIT (OUTPATIENT)
Dept: CARDIOLOGY CLINIC | Age: 68
End: 2023-12-27
Payer: MEDICARE

## 2023-12-27 VITALS
BODY MASS INDEX: 25.05 KG/M2 | SYSTOLIC BLOOD PRESSURE: 124 MMHG | WEIGHT: 175 LBS | HEART RATE: 64 BPM | DIASTOLIC BLOOD PRESSURE: 70 MMHG | HEIGHT: 70 IN

## 2023-12-27 DIAGNOSIS — I25.10 CORONARY ARTERY DISEASE INVOLVING NATIVE CORONARY ARTERY OF NATIVE HEART WITHOUT ANGINA PECTORIS: Primary | ICD-10-CM

## 2023-12-27 PROCEDURE — 3017F COLORECTAL CA SCREEN DOC REV: CPT | Performed by: INTERNAL MEDICINE

## 2023-12-27 PROCEDURE — 99214 OFFICE O/P EST MOD 30 MIN: CPT | Performed by: INTERNAL MEDICINE

## 2023-12-27 PROCEDURE — G8484 FLU IMMUNIZE NO ADMIN: HCPCS | Performed by: INTERNAL MEDICINE

## 2023-12-27 PROCEDURE — G8417 CALC BMI ABV UP PARAM F/U: HCPCS | Performed by: INTERNAL MEDICINE

## 2023-12-27 PROCEDURE — 1123F ACP DISCUSS/DSCN MKR DOCD: CPT | Performed by: INTERNAL MEDICINE

## 2023-12-27 PROCEDURE — G8428 CUR MEDS NOT DOCUMENT: HCPCS | Performed by: INTERNAL MEDICINE

## 2023-12-27 PROCEDURE — 3078F DIAST BP <80 MM HG: CPT | Performed by: INTERNAL MEDICINE

## 2023-12-27 PROCEDURE — 1036F TOBACCO NON-USER: CPT | Performed by: INTERNAL MEDICINE

## 2023-12-27 PROCEDURE — 3074F SYST BP LT 130 MM HG: CPT | Performed by: INTERNAL MEDICINE

## 2023-12-27 NOTE — PROGRESS NOTES
Pt here for 1 yr check up     Pt states med list is correct from 12/21/23    Pt concerned about hr running low in 40s-50s metoprolol lowered in [facility] to 12.5 mg bid     Pt continues with heart palpitations after surgery for a few days ,
[address] [facility]    CORONARY ANGIOPLASTY WITH STENT PLACEMENT N/A 07/12/2019    MID LAD    CORONARY ARTERY BYPASS GRAFT  2002 [address] [facility]    DENTAL SURGERY      bottom teeth removal    HEMORRHOID SURGERY      KNEE SURGERY Right     LARYNGECTOMY N/A 10/20/2023    Laterovertical Partial Laryngectomy performed by Anitra Pastrana MD [address]  10/4/2017    LARYNGOSCOPY MICRO WITH BIOPSY performed by Anitra Pastrana MD [address]  5/12/2021    LARYNGOSCOPY MICRO AND BIOPSY, WITH JET VENT performed by Anitra Pastrana MD at [address] [place] [address] N/A 6/2/2021    (DR. HUNTER) SUSPENSION MICROLARYNGOSCOPY WITH RECONSTRUCTION AND ADVANCEMENT FLAP X2; (DR. GONGORA)  MICROLARYNGOSCOPY AND BX POSTERIOR RIGHT VOCAL CORD      JET VENTILATION AND CO2 LASER performed by Ida Hernandez MD at [address] [place] [address] N/A 7/27/2022    SUSPENSION DIRECT LARYNGOSCOPY WITH EXCISIONAL BIOPSY OF RIGHT SIDED LARYNGEAL MASS performed by Anitra Pastrana MD at [address] [place] [address] N/A 8/22/2022    MICROLARYNGOSCOPY WITH BIOPSY, SUSPENSION LARYNGOSCOPY WITH JET VENTILATION AND C02 LASER, RE-EXCISION FOR MARGINS performed by Anitra Pastrana MD at [address] [place] [address] N/A 2/27/2023    MICROLARYNGOSCOPY WITH BIOPSY performed by Anitra Pastrana MD at [address] [place] [address] N/ 10/4/2023    MicroLaryngoscopy with Biopsy performed by Anitra Pastrana MD at [facility] [address]  11/8/2023    Laryngoscopy Control of Bleed performed by Anitra Pastrana MD at [address] [place] [address] N/A 11/27/2023    REVISION ANTERIOR LATERAL VERTICAL PARTAL RESECTION performed by Ida Hernandez MD at [facility]  04/28/2016    by Dr Jimmy Avila Right 8/24/2023    right advanced sacroiliac joint block performed by Mathieu Rodriguez DO at [address] [facility] SUBRETINAL/CHOROIDAL Right 8/27/2018    VITRECTOMY 25 GAUGE performed by Renuka Aleman MD at

## 2024-01-04 ENCOUNTER — CARE COORDINATION (OUTPATIENT)
Dept: CARE COORDINATION | Age: 69
End: 2024-01-04

## 2024-01-05 NOTE — CARE COORDINATION
SW called and spoke with pt wife Beatriz to discuss ACP documents and assist in completing. Beatriz stated they have looked over them but have not completed them. Beatriz will call me if assistance is needed to complete.  Provided her with SW contact information. Will resolve at this time.

## 2024-01-17 DIAGNOSIS — I10 ESSENTIAL HYPERTENSION: ICD-10-CM

## 2024-01-17 RX ORDER — TERAZOSIN 5 MG/1
5 CAPSULE ORAL NIGHTLY
Qty: 90 CAPSULE | Refills: 1 | Status: SHIPPED | OUTPATIENT
Start: 2024-01-17

## 2024-01-17 RX ORDER — GABAPENTIN 300 MG/1
300 CAPSULE ORAL 2 TIMES DAILY
Qty: 60 CAPSULE | Refills: 2 | Status: CANCELLED | OUTPATIENT
Start: 2024-01-17 | End: 2024-03-17

## 2024-01-17 RX ORDER — GABAPENTIN 300 MG/1
300 CAPSULE ORAL 2 TIMES DAILY
Qty: 180 CAPSULE | Refills: 1 | Status: SHIPPED | OUTPATIENT
Start: 2024-01-17 | End: 2024-07-15

## 2024-01-17 RX ORDER — LEVOTHYROXINE SODIUM 0.05 MG/1
50 TABLET ORAL DAILY
Qty: 90 TABLET | Refills: 1 | Status: SHIPPED | OUTPATIENT
Start: 2024-01-17

## 2024-01-25 PROBLEM — E44.0 MODERATE MALNUTRITION (HCC): Status: RESOLVED | Noted: 2023-11-09 | Resolved: 2024-01-25

## 2024-01-30 RX ORDER — GABAPENTIN 300 MG/1
300 CAPSULE ORAL 2 TIMES DAILY
Qty: 180 CAPSULE | Refills: 1 | Status: CANCELLED | OUTPATIENT
Start: 2024-01-30 | End: 2024-07-28

## 2024-01-31 RX ORDER — METOPROLOL SUCCINATE 50 MG/1
25 TABLET, EXTENDED RELEASE ORAL 2 TIMES DAILY
Qty: 90 TABLET | Refills: 1 | OUTPATIENT
Start: 2024-01-31

## 2024-01-31 RX ORDER — GABAPENTIN 300 MG/1
300 CAPSULE ORAL 2 TIMES DAILY
Qty: 180 CAPSULE | Refills: 1 | OUTPATIENT
Start: 2024-01-31 | End: 2024-07-29

## 2024-01-31 RX ORDER — METOPROLOL SUCCINATE 50 MG/1
25 TABLET, EXTENDED RELEASE ORAL 2 TIMES DAILY
Qty: 90 TABLET | Refills: 1 | Status: SHIPPED | OUTPATIENT
Start: 2024-01-31

## 2024-01-31 NOTE — TELEPHONE ENCOUNTER
Last ordered 10/16, disp 90, refill 1    Last visit 12/14:    2. Hypertension is stable on the current regimen which includes Toprol 1/2 tab 50 mg BID and Hytrin 5 mg daily     Next visit 6/12

## 2024-02-20 ENCOUNTER — OFFICE VISIT (OUTPATIENT)
Dept: ENT CLINIC | Age: 69
End: 2024-02-20

## 2024-02-20 VITALS
HEIGHT: 70 IN | DIASTOLIC BLOOD PRESSURE: 68 MMHG | OXYGEN SATURATION: 96 % | TEMPERATURE: 97.1 F | SYSTOLIC BLOOD PRESSURE: 122 MMHG | RESPIRATION RATE: 16 BRPM | HEART RATE: 53 BPM | BODY MASS INDEX: 25.88 KG/M2 | WEIGHT: 180.8 LBS

## 2024-02-20 DIAGNOSIS — C32.9 PRIMARY SQUAMOUS CELL CARCINOMA OF LARYNX (HCC): Primary | ICD-10-CM

## 2024-02-20 DIAGNOSIS — T66.XXXD EFFECTS, RADIATION, SUBSEQUENT ENCOUNTER: ICD-10-CM

## 2024-02-20 DIAGNOSIS — K22.70 BARRETT'S ESOPHAGUS WITHOUT DYSPLASIA: ICD-10-CM

## 2024-02-20 DIAGNOSIS — Z90.02: ICD-10-CM

## 2024-02-20 DIAGNOSIS — E03.4 HYPOTHYROIDISM DUE TO ACQUIRED ATROPHY OF THYROID: ICD-10-CM

## 2024-02-20 NOTE — PROGRESS NOTES
[facility] [place] [facility]  [facility] EAR, NOSE AND THROAT  [address]  [facility] [address]  Dept: [phone]  Dept Fax: [phone]  Loc: [phone]    Abel Singer is a 68 y.o. male who was referred by No ref. provider found for:  Chief Complaint   Patient presents with    Follow-up     Patient here for 2 month f/u. Patient states he is doing well.   .    HPI:     Abel Singer is a 68 y.o. male who presents today for ***.    History:     Allergies   Allergen Reactions    Morphine Nausea And Vomiting     Current Outpatient Medications   Medication Sig Dispense Refill    metoprolol succinate (TOPROL XL) 50 MG extended release tablet Take 0.5 tablets by mouth 2 times daily 90 tablet 1    cefadroxil (DURICEF) 500 MG capsule Take 1 capsule by mouth 2 times daily 14 capsule 0    nystatin-triamcinolone (MYCOLOG II) 600918-7.1 UNIT/GM-% cream Apply topically 2 times daily. 30 g 1    levothyroxine (SYNTHROID) 50 MCG tablet Take 1 tablet by mouth daily 90 tablet 1    terazosin (HYTRIN) 5 MG capsule Take 1 capsule by mouth at bedtime 90 capsule 1    gabapentin (NEURONTIN) 300 MG capsule Take 1 capsule by mouth 2 times daily for 180 days. 180 capsule 1    terbinafine (LAMISIL) 250 MG tablet Take 1 tablet by mouth daily 30 tablet 2    ticagrelor (BRILINTA) 90 MG TABS tablet Take 1 tablet by mouth 2 times daily Okay to resume Thursday 12/7/23 (after completing 7 days of Lovenox) 180 tablet 1    aspirin 81 MG EC tablet Take 1 tablet by mouth daily Okay to resume tomorrow AM 11/11/23 30 tablet 3    ondansetron (ZOFRAN-ODT) 4 MG disintegrating tablet Take 1 tablet by mouth 3 times daily as needed for Nausea or Vomiting 21 tablet 0    Benzocaine-Menthol (CEPACOL) 15-2.3 MG LOZG Take 1 lozenge by mouth every 3 hours as needed (Throat pain) 16 lozenge 1    atorvastatin (LIPITOR) 40 MG tablet TAKE 1 TABLET NIGHTLY 90 tablet 1    omeprazole (PRILOSEC) 40 MG delayed release capsule Take 1

## 2024-02-23 ENCOUNTER — OFFICE VISIT (OUTPATIENT)
Dept: PHYSICAL MEDICINE AND REHAB | Age: 69
End: 2024-02-23
Payer: MEDICARE

## 2024-02-23 ENCOUNTER — TELEPHONE (OUTPATIENT)
Dept: PHYSICAL MEDICINE AND REHAB | Age: 69
End: 2024-02-23

## 2024-02-23 VITALS
DIASTOLIC BLOOD PRESSURE: 64 MMHG | WEIGHT: 180 LBS | SYSTOLIC BLOOD PRESSURE: 114 MMHG | HEIGHT: 70 IN | BODY MASS INDEX: 25.77 KG/M2

## 2024-02-23 DIAGNOSIS — M79.2 NEUROPATHIC PAIN: ICD-10-CM

## 2024-02-23 DIAGNOSIS — M54.12 CERVICAL RADICULOPATHY: Primary | ICD-10-CM

## 2024-02-23 DIAGNOSIS — G89.4 CHRONIC PAIN SYNDROME: ICD-10-CM

## 2024-02-23 PROCEDURE — 99215 OFFICE O/P EST HI 40 MIN: CPT | Performed by: NURSE PRACTITIONER

## 2024-02-23 PROCEDURE — 1123F ACP DISCUSS/DSCN MKR DOCD: CPT | Performed by: NURSE PRACTITIONER

## 2024-02-23 PROCEDURE — 3017F COLORECTAL CA SCREEN DOC REV: CPT | Performed by: NURSE PRACTITIONER

## 2024-02-23 PROCEDURE — 3074F SYST BP LT 130 MM HG: CPT | Performed by: NURSE PRACTITIONER

## 2024-02-23 PROCEDURE — G8427 DOCREV CUR MEDS BY ELIG CLIN: HCPCS | Performed by: NURSE PRACTITIONER

## 2024-02-23 PROCEDURE — 1036F TOBACCO NON-USER: CPT | Performed by: NURSE PRACTITIONER

## 2024-02-23 PROCEDURE — 3078F DIAST BP <80 MM HG: CPT | Performed by: NURSE PRACTITIONER

## 2024-02-23 PROCEDURE — G8417 CALC BMI ABV UP PARAM F/U: HCPCS | Performed by: NURSE PRACTITIONER

## 2024-02-23 PROCEDURE — G8484 FLU IMMUNIZE NO ADMIN: HCPCS | Performed by: NURSE PRACTITIONER

## 2024-02-23 RX ORDER — GABAPENTIN 300 MG/1
CAPSULE ORAL
Qty: 90 CAPSULE | Refills: 0 | Status: SHIPPED | OUTPATIENT
Start: 2024-02-23 | End: 2024-03-23

## 2024-02-23 NOTE — PROGRESS NOTES
Functionality Assessment/Goals Worksheet     On a scale of 0 (Does not Interfere) to 10 (Completely Interferes)     1.  Which number describes how during the past week pain has interfered with           the following:  A.  General Activity:  3  B.  Mood: 2  C.  Walking Ability:  1  D.  Normal Work (Includes both work outside the home and housework):  3  E.  Relations with Other People:   2  F.  Sleep:   7  G.  Enjoyment of Life:   4    2.  Patient Prefers to Take their Pain Medications:     [x]  On a regular basis   []  Only when necessary    []  Does not take pain medications    3.  What are the Patient's Goals/Expectations for Visiting Pain Management?     []  Learn about my pain    [x]  Receive Medication   []  Physical Therapy     []  Treat Depression   [x]  Receive Injections    [x]  Treat Sleep   []  Deal with Anxiety and Stress   []  Treat Opoid Dependence/Addiction   []  Other:                                
Put on back burner.   10 years ago  EMG- right across bola hennessy. Twice. Pinched nerve in neck  Trouble sleeping. Hasn't been this bad. It was there but right was. Same regardless of what he's doing. Started plaing indoor golf.   Push shoulder  At night its in his muscle   No shoulder imaging.   Weak in left arm. Still has     Nallu. Cardiology. 3-4 stent. Held for throat procedure   
symmetrical. Negative Stauffer bilaterally.   Skin: no skin rashes or lesions noted   Psychological: Cooperative, no exaggerated pain behaviors     Assessment:    Diagnosis Orders   1. Cervical radiculopathy  CHG FLUOR NEEDLE/CATH SPINE/PARASPINAL DX/THER ADDON    DC NJX DX/THER SBST INTRLMNR CRV/THRC W/IMG GDN      2. Neuropathic pain        3. Chronic pain syndrome            Abel Singer is a 68 y.o.male presenting to the pain clinic for evaluation of right hip pain. His history and physical are consistent with right SI joint dysfunction.  He had a successful response to the right SI joint injection and right SI advanced joint block with Dr. Camp while lasting about 4 weeks.   Exercises for his SI joint seem to aggravate his pain.   We discussed potentially pursuing a right lumbar medial branch block to RFA as well as SI fusion.  I have advised him to wean his gabapentin to 300 mg x 4 days then stop.  I have encouraged him to alternate ice and heat as well as using lidocaine patches and Voltaren gel to assist with pain.  He can follow-up as needed and may pursue an SI injection prior to a trip in the future.    In regards to his left neck, shoulder, arm pain this is consistent with cervical radiculopathy.  His cervical MRI from 5 years ago show stenosis.  We discussed potentially updating this MRI.  He is also had an EMG in the past which confirmed a pinched nerve in his neck.  I have set him up for a cervical epidural steroid injection at C6-7 with Dr. Camp with fluoroscopy.  He will have to hold his Brilinta for 5 days prior to the procedure, we will get clearance from Dr. Burdick.  In the meantime, I have increased his gabapentin to 300 mg in the morning and 600 mg at night.    Plan:   The following treatment recommendations and plan were discussed in detail with Abel Singer.    Imaging:   I have reviewed patient’s imaging of cervical MRI and results were discussed with patient today.     Analgesics:

## 2024-03-03 PROBLEM — E03.4 HYPOTHYROIDISM DUE TO ACQUIRED ATROPHY OF THYROID: Status: ACTIVE | Noted: 2024-03-03

## 2024-03-05 DIAGNOSIS — I25.10 CORONARY ARTERY DISEASE INVOLVING NATIVE CORONARY ARTERY OF NATIVE HEART WITHOUT ANGINA PECTORIS: Primary | ICD-10-CM

## 2024-03-05 DIAGNOSIS — K22.719 BARRETT'S ESOPHAGUS WITH DYSPLASIA: ICD-10-CM

## 2024-03-05 DIAGNOSIS — I25.82 CHRONIC TOTAL OCCLUSION OF CORONARY ARTERY: ICD-10-CM

## 2024-03-05 RX ORDER — OMEPRAZOLE 40 MG/1
40 CAPSULE, DELAYED RELEASE ORAL EVERY EVENING
Qty: 90 CAPSULE | Refills: 1 | Status: SHIPPED | OUTPATIENT
Start: 2024-03-05

## 2024-03-05 NOTE — TELEPHONE ENCOUNTER
Patient had to switch mail order pharmacy at beginning of year and just needing refill on Brilinta and Prilosec    Last visit 12/14

## 2024-03-14 ENCOUNTER — TELEPHONE (OUTPATIENT)
Dept: PHYSICAL MEDICINE AND REHAB | Age: 69
End: 2024-03-14

## 2024-03-20 ENCOUNTER — TELEPHONE (OUTPATIENT)
Dept: CARDIOLOGY CLINIC | Age: 69
End: 2024-03-20

## 2024-03-20 NOTE — TELEPHONE ENCOUNTER
Pre op Risk Assessment    Procedure cervical 6-7 epidural steroid   Physician Neuroscience   Date of surgery/procedure TBD     Last OV 12/27/23  Last Stress 7/3/19  Last Echo 7/27/12  Last Cath 7/12/19  Last Stent 7/12/19  Is patient on blood thinners ASA & Brilinta   Hold Meds/how many days 5 days    Fax: epic

## 2024-03-21 ENCOUNTER — OFFICE VISIT (OUTPATIENT)
Dept: ENT CLINIC | Age: 69
End: 2024-03-21
Payer: MEDICARE

## 2024-03-21 VITALS
HEART RATE: 55 BPM | RESPIRATION RATE: 18 BRPM | OXYGEN SATURATION: 97 % | HEIGHT: 70 IN | WEIGHT: 182.7 LBS | TEMPERATURE: 96.8 F | BODY MASS INDEX: 26.16 KG/M2 | DIASTOLIC BLOOD PRESSURE: 66 MMHG | SYSTOLIC BLOOD PRESSURE: 118 MMHG

## 2024-03-21 DIAGNOSIS — K22.719 BARRETT'S ESOPHAGUS WITH DYSPLASIA: ICD-10-CM

## 2024-03-21 DIAGNOSIS — E03.4 HYPOTHYROIDISM DUE TO ACQUIRED ATROPHY OF THYROID: ICD-10-CM

## 2024-03-21 DIAGNOSIS — C32.8 SQUAMOUS CELL CARCINOMA OF OVERLAPPING SITES OF LARYNX (HCC): Primary | ICD-10-CM

## 2024-03-21 DIAGNOSIS — T66.XXXD EFFECTS, RADIATION, SUBSEQUENT ENCOUNTER: ICD-10-CM

## 2024-03-21 PROCEDURE — 3074F SYST BP LT 130 MM HG: CPT | Performed by: OTOLARYNGOLOGY

## 2024-03-21 PROCEDURE — 99213 OFFICE O/P EST LOW 20 MIN: CPT | Performed by: OTOLARYNGOLOGY

## 2024-03-21 PROCEDURE — G8417 CALC BMI ABV UP PARAM F/U: HCPCS | Performed by: OTOLARYNGOLOGY

## 2024-03-21 PROCEDURE — G8484 FLU IMMUNIZE NO ADMIN: HCPCS | Performed by: OTOLARYNGOLOGY

## 2024-03-21 PROCEDURE — 31575 DIAGNOSTIC LARYNGOSCOPY: CPT | Performed by: OTOLARYNGOLOGY

## 2024-03-21 PROCEDURE — 3017F COLORECTAL CA SCREEN DOC REV: CPT | Performed by: OTOLARYNGOLOGY

## 2024-03-21 PROCEDURE — 1036F TOBACCO NON-USER: CPT | Performed by: OTOLARYNGOLOGY

## 2024-03-21 PROCEDURE — 1123F ACP DISCUSS/DSCN MKR DOCD: CPT | Performed by: OTOLARYNGOLOGY

## 2024-03-21 PROCEDURE — G8427 DOCREV CUR MEDS BY ELIG CLIN: HCPCS | Performed by: OTOLARYNGOLOGY

## 2024-03-21 PROCEDURE — 3078F DIAST BP <80 MM HG: CPT | Performed by: OTOLARYNGOLOGY

## 2024-03-21 NOTE — PROGRESS NOTES
[facility] [place] [facility]  [facility] EAR, NOSE AND THROAT  [address]   [address]  [facility] [address]  Dept: [phone]  Dept Fax: [phone]  Loc: [phone]    Abel Singer is a 68 y.o. male who was referred by No ref. provider found for:  Chief Complaint   Patient presents with    Follow-up     1 month follow up.  Patient still gets occasional coughing spells.  Patient report no pain or discomfort.   .    HPI:     Abel Singer is a 68 y.o. male who presents today for interval surveillance for his laryngeal carcinoma.  His swallowing is better and his voice is a little bit stronger.  He is working 2 hours a day, which involves a lot of talking.    History:     Allergies   Allergen Reactions    Morphine Nausea And Vomiting     Current Outpatient Medications   Medication Sig Dispense Refill    ticagrelor (BRILINTA) 90 MG TABS tablet Take 1 tablet by mouth 2 times daily Okay to resume Thursday 12/7/23 (after completing 7 days of Lovenox) 180 tablet 1    omeprazole (PRILOSEC) 40 MG delayed release capsule Take 1 capsule by mouth every evening 90 capsule 1    gabapentin (NEURONTIN) 300 MG capsule 300 mg in the morning and 600 mg at night 90 capsule 0    metoprolol succinate (TOPROL XL) 50 MG extended release tablet Take 0.5 tablets by mouth 2 times daily 90 tablet 1    cefadroxil (DURICEF) 500 MG capsule Take 1 capsule by mouth 2 times daily 14 capsule 0    nystatin-triamcinolone (MYCOLOG II) 620421-6.1 UNIT/GM-% cream Apply topically 2 times daily. 30 g 1    levothyroxine (SYNTHROID) 50 MCG tablet Take 1 tablet by mouth daily 90 tablet 1    terazosin (HYTRIN) 5 MG capsule Take 1 capsule by mouth at bedtime 90 capsule 1    aspirin 81 MG EC tablet Take 1 tablet by mouth daily Okay to resume tomorrow AM 11/11/23 30 tablet 3    Benzocaine-Menthol (CEPACOL) 15-2.3 MG LOZG Take 1 lozenge by mouth every 3 hours as needed (Throat pain) 16 lozenge 1    atorvastatin (LIPITOR) 40 MG tablet

## 2024-04-03 NOTE — DISCHARGE INSTRUCTIONS
Post procedure Instructions:    No driving or making significant decisions for the remainder of the day.   Be cautions with walking and activity for 24 hours, do not over exert yourself.  Ok to resume pre-procedure activity level today.  Apply ice to site of injection site if you have pain or discomfort.  Do not submerge sit of injection during bath or pool activities for 48 hours post-procedure.  Resume blood thinning medications in 24 hours.     Call office [phone] if you have:  Temperature greater than 100.4  Persistent nausea and vomiting  Severe uncontrolled pain  Redness, tenderness, or signs of infection (pain, swelling, redness, odor or green/yellow discharge around the site)  Difficulty breathing, headache or visual disturbances  Hives  Persistent dizziness or light-headedness  Extreme fatigue  Any other questions or concerns you may have after discharge    In an emergency, call 911 or go to an Emergency Department at a nearby hospital    “Surgical Site Infections”      How can we work together to prevent Surgical Site Infections?   We would like to thank you for choosing [facility] for your Surgical Care.  Below you will find helpful information on how we can work together to prevent Surgical Site Infections.    What is a Surgical Site Infection (SSI)?  A surgical site infection is an infection that occurs after surgery in the part of the body where the surgery took place. Most patients who have surgery do not develop an infection. However, infections develop in about 1 to 3 out of every 100 patients who have surgery.  Some of the common symptoms of a surgical site infection are:  Redness and pain around the area where you had surgery  Drainage of cloudy fluid from your surgical wound  Fever    Can SSIs be treated?  Yes. Most surgical site infections can be treated with antibiotics. The antibiotic given to you depends on the bacteria (germs) causing the infection. Sometimes patients with

## 2024-04-08 NOTE — H&P
lesions noted   Psychological: Cooperative, no exaggerated pain behaviors     Assessment:    Diagnosis Orders   1. Cervical radiculopathy  CHG FLUOR NEEDLE/CATH SPINE/PARASPINAL DX/THER ADDON    VT NJX DX/THER SBST INTRLMNR CRV/THRC W/IMG GDN      2. Neuropathic pain        3. Chronic pain syndrome            Abel Singer is a 68 y.o.male presenting to the pain clinic for evaluation of right hip pain. His history and physical are consistent with right SI joint dysfunction.  He had a successful response to the right SI joint injection and right SI advanced joint block with Dr. Camp while lasting about 4 weeks.   Exercises for his SI joint seem to aggravate his pain.   We discussed potentially pursuing a right lumbar medial branch block to RFA as well as SI fusion.  I have advised him to wean his gabapentin to 300 mg x 4 days then stop.  I have encouraged him to alternate ice and heat as well as using lidocaine patches and Voltaren gel to assist with pain.  He can follow-up as needed and may pursue an SI injection prior to a trip in the future.    In regards to his left neck, shoulder, arm pain this is consistent with cervical radiculopathy.  His cervical MRI from 5 years ago show stenosis.  We discussed potentially updating this MRI.  He is also had an EMG in the past which confirmed a pinched nerve in his neck.  I have set him up for a cervical epidural steroid injection at C6-7 with Dr. Camp with fluoroscopy.  He will have to hold his Brilinta for 5 days prior to the procedure, we will get clearance from Dr. Burdick.  In the meantime, I have increased his gabapentin to 300 mg in the morning and 600 mg at night.    Plan:   The following treatment recommendations and plan were discussed in detail with Abel Singer.    Imaging:   I have reviewed patient’s imaging of cervical MRI and results were discussed with patient today.     Analgesics:   Patient is taking Acetaminophen. Patient informed that the maximum

## 2024-04-09 ENCOUNTER — APPOINTMENT (OUTPATIENT)
Dept: GENERAL RADIOLOGY | Age: 69
End: 2024-04-09
Attending: ANESTHESIOLOGY
Payer: MEDICARE

## 2024-04-09 ENCOUNTER — HOSPITAL ENCOUNTER (OUTPATIENT)
Age: 69
Setting detail: OUTPATIENT SURGERY
Discharge: HOME | End: 2024-04-09
Attending: ANESTHESIOLOGY | Admitting: ANESTHESIOLOGY
Payer: MEDICARE

## 2024-04-09 VITALS
RESPIRATION RATE: 16 BRPM | OXYGEN SATURATION: 98 % | HEART RATE: 54 BPM | WEIGHT: 179 LBS | SYSTOLIC BLOOD PRESSURE: 186 MMHG | HEIGHT: 71 IN | DIASTOLIC BLOOD PRESSURE: 83 MMHG | BODY MASS INDEX: 25.06 KG/M2 | TEMPERATURE: 97.8 F

## 2024-04-09 PROCEDURE — 3600000054 HC PAIN LEVEL 3 BASE: Performed by: ANESTHESIOLOGY

## 2024-04-09 PROCEDURE — 62321 NJX INTERLAMINAR CRV/THRC: CPT | Performed by: ANESTHESIOLOGY

## 2024-04-09 PROCEDURE — 6360000002 HC RX W HCPCS: Performed by: ANESTHESIOLOGY

## 2024-04-09 PROCEDURE — 2709999900 HC NON-CHARGEABLE SUPPLY: Performed by: ANESTHESIOLOGY

## 2024-04-09 PROCEDURE — 6360000004 HC RX CONTRAST MEDICATION: Performed by: ANESTHESIOLOGY

## 2024-04-09 PROCEDURE — 2500000003 HC RX 250 WO HCPCS: Performed by: ANESTHESIOLOGY

## 2024-04-09 PROCEDURE — 7100000010 HC PHASE II RECOVERY - FIRST 15 MIN: Performed by: ANESTHESIOLOGY

## 2024-04-09 RX ORDER — IOPAMIDOL 612 MG/ML
INJECTION, SOLUTION INTRATHECAL PRN
Status: DISCONTINUED | OUTPATIENT
Start: 2024-04-09 | End: 2024-04-09 | Stop reason: ALTCHOICE

## 2024-04-09 RX ORDER — ATORVASTATIN CALCIUM 40 MG/1
40 TABLET, FILM COATED ORAL
Qty: 90 TABLET | Refills: 3 | Status: SHIPPED | OUTPATIENT
Start: 2024-04-09

## 2024-04-09 RX ORDER — DEXAMETHASONE SODIUM PHOSPHATE 4 MG/ML
INJECTION, SOLUTION INTRA-ARTICULAR; INTRALESIONAL; INTRAMUSCULAR; INTRAVENOUS; SOFT TISSUE PRN
Status: DISCONTINUED | OUTPATIENT
Start: 2024-04-09 | End: 2024-04-09 | Stop reason: ALTCHOICE

## 2024-04-09 RX ORDER — LIDOCAINE HYDROCHLORIDE 10 MG/ML
INJECTION, SOLUTION EPIDURAL; INFILTRATION; INTRACAUDAL; PERINEURAL PRN
Status: DISCONTINUED | OUTPATIENT
Start: 2024-04-09 | End: 2024-04-09 | Stop reason: ALTCHOICE

## 2024-04-09 RX ORDER — ACYCLOVIR 200 MG/1
CAPSULE ORAL PRN
Status: DISCONTINUED | OUTPATIENT
Start: 2024-04-09 | End: 2024-04-09 | Stop reason: ALTCHOICE

## 2024-04-09 ASSESSMENT — PAIN - FUNCTIONAL ASSESSMENT
PAIN_FUNCTIONAL_ASSESSMENT: NONE - DENIES PAIN
PAIN_FUNCTIONAL_ASSESSMENT: 0-10

## 2024-04-09 NOTE — TELEPHONE ENCOUNTER
Last ordered 10/16, disp 90, refill 1     Last visit 12/14:      1. Dyslipidemia On lipitor , tolerating it well and recent lipids are opitmal     Next visit 6/12

## 2024-04-09 NOTE — PROGRESS NOTES
1334 Patient arrived to phase II via cart.  Spontaneous respiraitons even and unlabored.  Placed on monitor--VSS.  Report received from OR RN    1335 Assessment completed.  Patient is alert and oriented x4.  Patient denies pain--will monitor.  Injection sites clean and dry.      1336 Pt. Denies all other needs at this time and states readiness for discharge.    1338 Pt. Standing at bedside. With stand by assist of RN. Pt. Denies weakness or dizziness.    1341 Pt. Getting self dressed. Family notified of discharge.    1346 Pt. Ambulated to private vehicle in stable condition with stand by assist of RN.

## 2024-04-12 ENCOUNTER — TELEPHONE (OUTPATIENT)
Dept: CARDIOLOGY CLINIC | Age: 69
End: 2024-04-12

## 2024-04-12 NOTE — TELEPHONE ENCOUNTER
Pre op Risk Assessment    Procedure Colonoscopy/EGD  Physician [facility]   Date of surgery/procedure TBD    Last OV 12/27/2023  Last Stress 07/2019  Last Echo 07/2012  Last Cath 07/2019  Is patient on blood thinners Brilinta, ASA  Hold Meds/how many days ??    Fax clearance to [phone]

## 2024-04-24 ENCOUNTER — OFFICE VISIT (OUTPATIENT)
Dept: PHYSICAL MEDICINE AND REHAB | Age: 69
End: 2024-04-24
Payer: MEDICARE

## 2024-04-24 VITALS
BODY MASS INDEX: 25.06 KG/M2 | WEIGHT: 179.01 LBS | DIASTOLIC BLOOD PRESSURE: 72 MMHG | HEIGHT: 71 IN | SYSTOLIC BLOOD PRESSURE: 140 MMHG

## 2024-04-24 DIAGNOSIS — M54.12 CERVICAL RADICULOPATHY: ICD-10-CM

## 2024-04-24 DIAGNOSIS — M53.3 SACROILIAC JOINT DYSFUNCTION OF RIGHT SIDE: ICD-10-CM

## 2024-04-24 DIAGNOSIS — M79.2 NEUROPATHIC PAIN: Primary | ICD-10-CM

## 2024-04-24 DIAGNOSIS — G89.4 CHRONIC PAIN SYNDROME: ICD-10-CM

## 2024-04-24 DIAGNOSIS — M47.816 LUMBAR SPONDYLOSIS: ICD-10-CM

## 2024-04-24 PROCEDURE — 99214 OFFICE O/P EST MOD 30 MIN: CPT | Performed by: NURSE PRACTITIONER

## 2024-04-24 PROCEDURE — 96372 THER/PROPH/DIAG INJ SC/IM: CPT | Performed by: NURSE PRACTITIONER

## 2024-04-24 PROCEDURE — G8427 DOCREV CUR MEDS BY ELIG CLIN: HCPCS | Performed by: NURSE PRACTITIONER

## 2024-04-24 PROCEDURE — 3077F SYST BP >= 140 MM HG: CPT | Performed by: NURSE PRACTITIONER

## 2024-04-24 PROCEDURE — 1036F TOBACCO NON-USER: CPT | Performed by: NURSE PRACTITIONER

## 2024-04-24 PROCEDURE — 3078F DIAST BP <80 MM HG: CPT | Performed by: NURSE PRACTITIONER

## 2024-04-24 PROCEDURE — G8420 CALC BMI NORM PARAMETERS: HCPCS | Performed by: NURSE PRACTITIONER

## 2024-04-24 PROCEDURE — 3017F COLORECTAL CA SCREEN DOC REV: CPT | Performed by: NURSE PRACTITIONER

## 2024-04-24 PROCEDURE — 1123F ACP DISCUSS/DSCN MKR DOCD: CPT | Performed by: NURSE PRACTITIONER

## 2024-04-24 RX ORDER — KETOROLAC TROMETHAMINE 30 MG/ML
30 INJECTION, SOLUTION INTRAMUSCULAR; INTRAVENOUS ONCE
Status: COMPLETED | OUTPATIENT
Start: 2024-04-24 | End: 2024-04-24

## 2024-04-24 RX ADMIN — KETOROLAC TROMETHAMINE 30 MG: 30 INJECTION, SOLUTION INTRAMUSCULAR; INTRAVENOUS at 16:37

## 2024-04-24 NOTE — PROGRESS NOTES
Functionality Assessment/Goals Worksheet     On a scale of 0 (Does not Interfere) to 10 (Completely Interferes)     1.  Which number describes how during the past week pain has interfered with           the following:  A.  General Activity:  0  B.  Mood: 0  C.  Walking Ability:  0  D.  Normal Work (Includes both work outside the home and housework):  0  E.  Relations with Other People:   0  F.  Sleep:   0  G.  Enjoyment of Life:   6    2.  Patient Prefers to Take their Pain Medications:     [x]  On a regular basis   []  Only when necessary    []  Does not take pain medications    3.  What are the Patient's Goals/Expectations for Visiting Pain Management?     []  Learn about my pain    []  Receive Medication   []  Physical Therapy     []  Treat Depression   [x]  Receive Injections    []  Treat Sleep   []  Deal with Anxiety and Stress   []  Treat Opoid Dependence/Addiction   []  Other:

## 2024-04-24 NOTE — PROGRESS NOTES
Chronic Pain/PM&R Clinic Note     Encounter Date: 4/24/24    Subjective:   Chief Complaint:   Chief Complaint   Patient presents with    Follow Up After Procedure     History of Present Illness:   Abel Singer is a 68 y.o. male seen in the clinic initially on 06/19/2023 upon request from Baldev George MD  for his history of right hip pain. Patient states he developed right hip pain about 2 years ago without any inciting event. He has put it on the back burner because he has been dealing with laryngeal cancer for the past 6 years. He states he just had a surgery with Dr. Brambila 3 months ago and has a follow up with him coming up with another potential scope. He had radiation 6 years ago but not since and he never had chemo. He states his right hip feels aching and will getting stabbing in nature if aggravated. Pain is aggravated by working in the woods picking up sticks and with activities involving bending. He also struggles with walking long distance. He denies any leg weakness, any history of falls. He denies any radiating leg pain. He states the pain will occasionally wake him at night. He rolls from side to side as he has right hip pain and feels better laying on his left but but has cervical radiculopathy and his left arm falls asleep laying on his left. He has completed PT for his neck but not for his low back or hips. He used to see Dr. Davila for his left arm pain and has had injections which are effective but they again were put on hold due to cancer. 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 He does work in his mothers woods and likes to golf but has been limited by his pain. He denies any saddle anesthesia or bowel/bladder incontinence.     Of note he has had a 5 vessel bypass 20 years ago and several stents placed, his most recent being about two years ago. He is currently on Brilinta from Dr. Burdick.     Today, 09/27/2023, patient presents for planned follow

## 2024-04-29 ENCOUNTER — HOSPITAL ENCOUNTER (OUTPATIENT)
Dept: MRI IMAGING | Age: 69
Discharge: HOME | End: 2024-04-29
Payer: MEDICARE

## 2024-04-29 DIAGNOSIS — M47.816 LUMBAR SPONDYLOSIS: ICD-10-CM

## 2024-04-29 DIAGNOSIS — M79.2 NEUROPATHIC PAIN: ICD-10-CM

## 2024-04-29 PROCEDURE — 72148 MRI LUMBAR SPINE W/O DYE: CPT

## 2024-05-17 RX ORDER — GABAPENTIN 300 MG/1
CAPSULE ORAL
Qty: 90 CAPSULE | Refills: 0 | Status: SHIPPED | OUTPATIENT
Start: 2024-05-17 | End: 2024-05-22

## 2024-05-22 ENCOUNTER — OFFICE VISIT (OUTPATIENT)
Dept: PHYSICAL MEDICINE AND REHAB | Age: 69
End: 2024-05-22
Payer: MEDICARE

## 2024-05-22 VITALS
BODY MASS INDEX: 25.06 KG/M2 | SYSTOLIC BLOOD PRESSURE: 132 MMHG | HEIGHT: 71 IN | DIASTOLIC BLOOD PRESSURE: 70 MMHG | WEIGHT: 179.01 LBS

## 2024-05-22 DIAGNOSIS — M54.12 CERVICAL RADICULOPATHY: ICD-10-CM

## 2024-05-22 DIAGNOSIS — M53.3 SACROILIAC JOINT DYSFUNCTION OF RIGHT SIDE: Primary | ICD-10-CM

## 2024-05-22 DIAGNOSIS — G89.4 CHRONIC PAIN SYNDROME: ICD-10-CM

## 2024-05-22 DIAGNOSIS — M79.2 NEUROPATHIC PAIN: ICD-10-CM

## 2024-05-22 DIAGNOSIS — M47.816 LUMBAR SPONDYLOSIS: ICD-10-CM

## 2024-05-22 PROCEDURE — 3075F SYST BP GE 130 - 139MM HG: CPT | Performed by: NURSE PRACTITIONER

## 2024-05-22 PROCEDURE — 99214 OFFICE O/P EST MOD 30 MIN: CPT | Performed by: NURSE PRACTITIONER

## 2024-05-22 PROCEDURE — 3017F COLORECTAL CA SCREEN DOC REV: CPT | Performed by: NURSE PRACTITIONER

## 2024-05-22 PROCEDURE — G8420 CALC BMI NORM PARAMETERS: HCPCS | Performed by: NURSE PRACTITIONER

## 2024-05-22 PROCEDURE — 1036F TOBACCO NON-USER: CPT | Performed by: NURSE PRACTITIONER

## 2024-05-22 PROCEDURE — 1123F ACP DISCUSS/DSCN MKR DOCD: CPT | Performed by: NURSE PRACTITIONER

## 2024-05-22 PROCEDURE — G8427 DOCREV CUR MEDS BY ELIG CLIN: HCPCS | Performed by: NURSE PRACTITIONER

## 2024-05-22 PROCEDURE — 3078F DIAST BP <80 MM HG: CPT | Performed by: NURSE PRACTITIONER

## 2024-05-22 RX ORDER — GABAPENTIN 600 MG/1
600 TABLET ORAL 2 TIMES DAILY
Qty: 60 TABLET | Refills: 2 | Status: SHIPPED | OUTPATIENT
Start: 2024-05-22 | End: 2024-08-20

## 2024-05-22 NOTE — PROGRESS NOTES
[facility] PROFESSIONAL SERVS  [facility] [address]  [facility] [address]  Dept: [phone]  Dept Fax: [phone]  Loc: [phone]    Visit Date: 5/22/2024    Functionality Assessment/Goals Worksheet     On a scale of 0 (Does not Interfere) to 10 (Completely Interferes)     1.  Which number describes how during the past week pain has interfered with       the following:  A.  General Activity:  4  B.  Mood: 2  C.  Walking Ability:  5  D.  Normal Work (Includes both work outside the home and housework):  6  E.  Relations with Other People:   1  F.  Sleep:   3  G.  Enjoyment of Life:   4    2.  Patient Prefers to Take their Pain Medications:     [x]  On a regular basis   []  Only when necessary    []  Does not take pain medications    3.  What are the Patient's Goals/Expectations for Visiting Pain Management?     [x]  Learn about my pain    []  Receive Medication   []  Physical Therapy     []  Treat Depression   [x]  Receive Injections    []  Treat Sleep   []  Deal with Anxiety and Stress   []  Treat Opoid Dependence/Addiction   []  Other:

## 2024-05-22 NOTE — PROGRESS NOTES
Chronic Pain/PM&R Clinic Note     Encounter Date: 5/22/24    Subjective:   Chief Complaint:   Chief Complaint   Patient presents with    Follow-up     Pt would like to know next steps for his hips and arms      History of Present Illness:   Abel Singer is a 68 y.o. male seen in the clinic initially on 06/19/2023 upon request from Baldev George MD  for his history of right hip pain. Patient states he developed right hip pain about 2 years ago without any inciting event. He has put it on the back burner because he has been dealing with laryngeal cancer for the past 6 years. He states he just had a surgery with Dr. Brambila 3 months ago and has a follow up with him coming up with another potential scope. He had radiation 6 years ago but not since and he never had chemo. He states his right hip feels aching and will getting stabbing in nature if aggravated. Pain is aggravated by working in the woods picking up sticks and with activities involving bending. He also struggles with walking long distance. He denies any leg weakness, any history of falls. He denies any radiating leg pain. He states the pain will occasionally wake him at night. He rolls from side to side as he has right hip pain and feels better laying on his left but but has cervical radiculopathy and his left arm falls asleep laying on his left. He has completed PT for his neck but not for his low back or hips. He used to see Dr. Davila for his left arm pain and has had injections which are effective but they again were put on hold due to cancer. 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 He does work in his mothers woods and likes to golf but has been limited by his pain. He denies any saddle anesthesia or bowel/bladder incontinence.     Of note he has had a 5 vessel bypass 20 years ago and several stents placed, his most recent being about two years ago. He is currently on Brilinta from Dr. Burdick.     Today,

## 2024-05-23 ENCOUNTER — OFFICE VISIT (OUTPATIENT)
Dept: ENT CLINIC | Age: 69
End: 2024-05-23
Payer: MEDICARE

## 2024-05-23 VITALS
SYSTOLIC BLOOD PRESSURE: 118 MMHG | HEIGHT: 71 IN | WEIGHT: 179.2 LBS | DIASTOLIC BLOOD PRESSURE: 70 MMHG | OXYGEN SATURATION: 98 % | BODY MASS INDEX: 25.09 KG/M2 | RESPIRATION RATE: 18 BRPM | TEMPERATURE: 97.3 F | HEART RATE: 54 BPM

## 2024-05-23 DIAGNOSIS — E03.4 HYPOTHYROIDISM DUE TO ACQUIRED ATROPHY OF THYROID: ICD-10-CM

## 2024-05-23 DIAGNOSIS — H61.23 BILATERAL IMPACTED CERUMEN: ICD-10-CM

## 2024-05-23 DIAGNOSIS — C32.8 SQUAMOUS CELL CARCINOMA OF OVERLAPPING SITES OF LARYNX (HCC): Primary | ICD-10-CM

## 2024-05-23 DIAGNOSIS — K22.719 BARRETT'S ESOPHAGUS WITH DYSPLASIA: ICD-10-CM

## 2024-05-23 DIAGNOSIS — Z90.02: ICD-10-CM

## 2024-05-23 DIAGNOSIS — T66.XXXD EFFECTS, RADIATION, SUBSEQUENT ENCOUNTER: ICD-10-CM

## 2024-05-23 PROCEDURE — G8417 CALC BMI ABV UP PARAM F/U: HCPCS | Performed by: OTOLARYNGOLOGY

## 2024-05-23 PROCEDURE — 3078F DIAST BP <80 MM HG: CPT | Performed by: OTOLARYNGOLOGY

## 2024-05-23 PROCEDURE — 69210 REMOVE IMPACTED EAR WAX UNI: CPT | Performed by: OTOLARYNGOLOGY

## 2024-05-23 PROCEDURE — 99213 OFFICE O/P EST LOW 20 MIN: CPT | Performed by: OTOLARYNGOLOGY

## 2024-05-23 PROCEDURE — 3017F COLORECTAL CA SCREEN DOC REV: CPT | Performed by: OTOLARYNGOLOGY

## 2024-05-23 PROCEDURE — G8427 DOCREV CUR MEDS BY ELIG CLIN: HCPCS | Performed by: OTOLARYNGOLOGY

## 2024-05-23 PROCEDURE — 3074F SYST BP LT 130 MM HG: CPT | Performed by: OTOLARYNGOLOGY

## 2024-05-23 PROCEDURE — 1036F TOBACCO NON-USER: CPT | Performed by: OTOLARYNGOLOGY

## 2024-05-23 PROCEDURE — 31575 DIAGNOSTIC LARYNGOSCOPY: CPT | Performed by: OTOLARYNGOLOGY

## 2024-05-23 PROCEDURE — 1123F ACP DISCUSS/DSCN MKR DOCD: CPT | Performed by: OTOLARYNGOLOGY

## 2024-05-31 NOTE — PROGRESS NOTES
Patient presents for follow-up on his laryngeal cancer.  He also notes that his ears are plugged.    Exam showed bilateral cerumen impactions.  These were cleaned under the microscope with appropriate instrumentation.    HIGH RESOLUTION FLEXIBLE VIDEOLARYNGOSOCPY    A fiberoptic laryngoscopy was performed under topical anesthesia, after using Afrin and Lidocaine spray in the nasal fossa. The nasal fossa, nasopharynx, hypopharynx and larynx were carefully examined. Base of tongue was symmetrical.  There appeared to be some bone contracture of the right aryepiglottic fold.  There was a slight irregularity in the mucosa of the left aryepiglottic fold on its edge.  Epiglottis appeared normal and was not retrodisplaced. True vocal cords had normal mobility. There was no erythema. No mucosal lesions or masses were noted. No pooling in the pyriform sinuses.                Diagnosis Orders   1. Squamous cell carcinoma of overlapping sites of larynx (HCC)  PA LARYNGOSCOPY FLEXIBLE DIAGNOSTIC      2. Brown's esophagus with dysplasia  PA LARYNGOSCOPY FLEXIBLE DIAGNOSTIC      3. Effects, radiation, subsequent encounter  PA LARYNGOSCOPY FLEXIBLE DIAGNOSTIC      4. Hypothyroidism due to acquired atrophy of thyroid  PA LARYNGOSCOPY FLEXIBLE DIAGNOSTIC      5. H/O partial laryngectomy  PA LARYNGOSCOPY FLEXIBLE DIAGNOSTIC      6. Bilateral impacted cerumen  PA REMOVAL IMPACTED CERUMEN INSTRUMENTATION UNILAT           Continue observation, return in 1 month

## 2024-06-06 ENCOUNTER — NURSE ONLY (OUTPATIENT)
Dept: LAB | Age: 69
End: 2024-06-06

## 2024-06-06 DIAGNOSIS — I25.10 CORONARY ARTERY DISEASE INVOLVING NATIVE CORONARY ARTERY OF NATIVE HEART WITHOUT ANGINA PECTORIS: ICD-10-CM

## 2024-06-06 DIAGNOSIS — E78.2 MIXED HYPERLIPIDEMIA: ICD-10-CM

## 2024-06-06 LAB
ALT SERPL W/O P-5'-P-CCNC: 10 U/L (ref 11–66)
AST SERPL-CCNC: 13 U/L (ref 5–40)
BASOPHILS ABSOLUTE: 0 THOU/MM3 (ref 0–0.1)
BASOPHILS NFR BLD AUTO: 0.5 %
CHOLEST SERPL-MCNC: 134 MG/DL (ref 100–199)
DEPRECATED RDW RBC AUTO: 47.3 FL (ref 35–45)
EOSINOPHIL NFR BLD AUTO: 0.9 %
EOSINOPHILS ABSOLUTE: 0 THOU/MM3 (ref 0–0.4)
ERYTHROCYTE [DISTWIDTH] IN BLOOD BY AUTOMATED COUNT: 13.4 % (ref 11.5–14.5)
HCT VFR BLD AUTO: 43.6 % (ref 42–52)
HDLC SERPL-MCNC: 40 MG/DL
HGB BLD-MCNC: 14 GM/DL (ref 14–18)
IMM GRANULOCYTES # BLD AUTO: 0.02 THOU/MM3 (ref 0–0.07)
IMM GRANULOCYTES NFR BLD AUTO: 0.4 %
LDLC SERPL CALC-MCNC: 61 MG/DL
LYMPHOCYTES ABSOLUTE: 1.1 THOU/MM3 (ref 1–4.8)
LYMPHOCYTES NFR BLD AUTO: 19.8 %
MCH RBC QN AUTO: 30.6 PG (ref 26–33)
MCHC RBC AUTO-ENTMCNC: 32.1 GM/DL (ref 32.2–35.5)
MCV RBC AUTO: 95.4 FL (ref 80–94)
MONOCYTES ABSOLUTE: 0.7 THOU/MM3 (ref 0.4–1.3)
MONOCYTES NFR BLD AUTO: 12.3 %
NEUTROPHILS ABSOLUTE: 3.6 THOU/MM3 (ref 1.8–7.7)
NEUTROPHILS NFR BLD AUTO: 66.1 %
NRBC BLD AUTO-RTO: 0 /100 WBC
PLATELET # BLD AUTO: 188 THOU/MM3 (ref 130–400)
PMV BLD AUTO: 9.9 FL (ref 9.4–12.4)
RBC # BLD AUTO: 4.57 MILL/MM3 (ref 4.7–6.1)
TRIGL SERPL-MCNC: 163 MG/DL (ref 0–199)
WBC # BLD AUTO: 5.5 THOU/MM3 (ref 4.8–10.8)

## 2024-06-11 ENCOUNTER — TELEPHONE (OUTPATIENT)
Dept: CARDIOLOGY CLINIC | Age: 69
End: 2024-06-11

## 2024-06-11 NOTE — TELEPHONE ENCOUNTER
Pre op Risk Assessment     Procedure cataract   Physician   Dr Kenneth Billy    Date of surgery/procedure 8/6 and 8/13     Last OV 12/27/2023  Last Stress 07/2019  Last Echo 07/2012  Last Cath 07/2019  Is patient on blood thinners Brilinta, ASA  Hold Meds/how many days ??    Fax [phone]

## 2024-06-17 NOTE — DISCHARGE INSTRUCTIONS
Post procedure Instructions:    No driving or making significant decisions for the remainder of the day.   Be cautions with walking and activity for 24 hours, do not over exert yourself.  Ok to resume pre-procedure activity level today.  Apply ice to site of injection site if you have pain or discomfort.  Do not submerge sit of injection during bath or pool activities for 48 hours post-procedure.  Resume blood thinning medications in 24 hours.     Call office [phone] if you have:  Temperature greater than 100.4  Persistent nausea and vomiting  Severe uncontrolled pain  Redness, tenderness, or signs of infection (pain, swelling, redness, odor or green/yellow discharge around the site)  Difficulty breathing, headache or visual disturbances  Hives  Persistent dizziness or light-headedness  Extreme fatigue  Any other questions or concerns you may have after discharge    In an emergency, call 911 or go to an Emergency Department at a nearby hospital    “Surgical Site Infections”      How can we work together to prevent Surgical Site Infections?   We would like to thank you for choosing [facility] for your Surgical Care.  Below you will find helpful information on how we can work together to prevent Surgical Site Infections.    What is a Surgical Site Infection (SSI)?  A surgical site infection is an infection that occurs after surgery in the part of the body where the surgery took place. Most patients who have surgery do not develop an infection. However, infections develop in about 1 to 3 out of every 100 patients who have surgery.  Some of the common symptoms of a surgical site infection are:  Redness and pain around the area where you had surgery  Drainage of cloudy fluid from your surgical wound  Fever    Can SSIs be treated?  Yes. Most surgical site infections can be treated with antibiotics. The antibiotic given to you depends on the bacteria (germs) causing the infection. Sometimes patients with

## 2024-06-17 NOTE — H&P
pursuing a peripheral nerve stimulator in the fall when he will not be submerging in water.  I have discussed this in detail with the patient and provided a pamphlet for more information.    In regards to his left neck, shoulder, arm pain this is consistent with cervical radiculopathy.  His cervical MRI from 5 years ago shows stenosis.  We discussed potentially updating this MRI in the future if needed.  At this point an updated MRI would not change our game plan.  He did have a significant response to the cervical epidural steroid injection at C6-7 with Dr. Camp.  He has ongoing pain, numbness, tingling in his left arm.  I increased his gabapentin to 600 mg twice daily.  He is also had an EMG in the past which confirmed a pinched nerve in his neck.  This can be repeated as needed.  Will get an updated MRI if symptoms worsen or he develops weakness in his left arm.    Plan:   The following treatment recommendations and plan were discussed in detail with Abel Singer.    Imaging:   I have reviewed patient’s imaging of cervical MRI and results were discussed with patient today.     Analgesics:   Patient is taking Acetaminophen. Patient informed that the maximum amount of acetaminophen taken on a regular basis should only be 4000 mg per day.     Patient is taking Ibuprofen rarely. Patient is advised to take as prescribed and not take on an empty stomach.     Adjuvants:   In light of the presence of a neuropathic component of pain, the patient is advised to increase gabapentin to 600 mg twice daily.    Interventions:   With examination consistent with right sacroiliac dysfunction/pain, we will proceed with a right sacroiliac joint injection.  The risks and benefits were discussed in detail with the patient.  Patient wants to proceed with the injection.     Anticoagulation/NPO Recommendations:   Patient does not need to hold any medications prior to the procedure.  Patient does not need to be NPO prior to the

## 2024-06-18 ENCOUNTER — HOSPITAL ENCOUNTER (OUTPATIENT)
Age: 69
Setting detail: OUTPATIENT SURGERY
Discharge: HOME | End: 2024-06-18
Attending: ANESTHESIOLOGY | Admitting: ANESTHESIOLOGY
Payer: MEDICARE

## 2024-06-18 ENCOUNTER — APPOINTMENT (OUTPATIENT)
Dept: GENERAL RADIOLOGY | Age: 69
End: 2024-06-18
Attending: ANESTHESIOLOGY
Payer: MEDICARE

## 2024-06-18 VITALS
BODY MASS INDEX: 24.5 KG/M2 | TEMPERATURE: 98 F | DIASTOLIC BLOOD PRESSURE: 74 MMHG | SYSTOLIC BLOOD PRESSURE: 168 MMHG | HEART RATE: 55 BPM | WEIGHT: 175 LBS | RESPIRATION RATE: 16 BRPM | OXYGEN SATURATION: 98 % | HEIGHT: 71 IN

## 2024-06-18 PROCEDURE — 27096 INJECT SACROILIAC JOINT: CPT | Performed by: ANESTHESIOLOGY

## 2024-06-18 PROCEDURE — 6360000002 HC RX W HCPCS: Performed by: ANESTHESIOLOGY

## 2024-06-18 PROCEDURE — 2500000003 HC RX 250 WO HCPCS: Performed by: ANESTHESIOLOGY

## 2024-06-18 PROCEDURE — 7100000010 HC PHASE II RECOVERY - FIRST 15 MIN: Performed by: ANESTHESIOLOGY

## 2024-06-18 PROCEDURE — 2709999900 HC NON-CHARGEABLE SUPPLY: Performed by: ANESTHESIOLOGY

## 2024-06-18 PROCEDURE — 6360000004 HC RX CONTRAST MEDICATION: Performed by: ANESTHESIOLOGY

## 2024-06-18 PROCEDURE — 3600000054 HC PAIN LEVEL 3 BASE: Performed by: ANESTHESIOLOGY

## 2024-06-18 RX ORDER — BUPIVACAINE HYDROCHLORIDE 5 MG/ML
INJECTION, SOLUTION PERINEURAL PRN
Status: DISCONTINUED | OUTPATIENT
Start: 2024-06-18 | End: 2024-06-18 | Stop reason: ALTCHOICE

## 2024-06-18 RX ORDER — METHYLPREDNISOLONE ACETATE 80 MG/ML
INJECTION, SUSPENSION INTRA-ARTICULAR; INTRALESIONAL; INTRAMUSCULAR; SOFT TISSUE PRN
Status: DISCONTINUED | OUTPATIENT
Start: 2024-06-18 | End: 2024-06-18 | Stop reason: ALTCHOICE

## 2024-06-18 RX ORDER — LIDOCAINE HYDROCHLORIDE 10 MG/ML
INJECTION, SOLUTION EPIDURAL; INFILTRATION; INTRACAUDAL; PERINEURAL PRN
Status: DISCONTINUED | OUTPATIENT
Start: 2024-06-18 | End: 2024-06-18 | Stop reason: ALTCHOICE

## 2024-06-18 ASSESSMENT — PAIN - FUNCTIONAL ASSESSMENT: PAIN_FUNCTIONAL_ASSESSMENT: 0-10

## 2024-06-18 NOTE — PROGRESS NOTES
Discharge instructions reviewed with patient .  All questions were addressed and answered.  Patient verbalized understanding of discharge plan.

## 2024-06-18 NOTE — PROGRESS NOTES
0908-Patient to Phase II. Report received from surgical RN. Patient awake and alert. Vital signs obtained and stable. Respirations unlabored on room air. Patient denies pain and nausea. Denies numbness and tingling in extremities. Injection site open to air and no drainage noted. Patient instructed to stay in bed. Call light in reach.     0910-getting dressed    0915-pt discharged home in stable condition. All belongings sent. Walked out with staff

## 2024-06-18 NOTE — PROCEDURES
Pre-operative Diagnosis:  SI joint pain     Post-operative Diagnosis:  SI joint pain     Procedure: RIGHT SI joint injection     Procedure Description:  After having signed the informed consent, the patient was placed in the prone position.  The patient's back was prepped with chloraprep solution, and draped in a sterile fashion. A total of 2 ml of 1% lidocaine were used to anesthetize the skin and underlying tissues.  Under fluoroscopic guidance a single 22-gauge, 3.5 inch spinal needle was advanced to lie within the inferior pole of the RIGHT sacroiliac joint.  There were no paresthesias or heme aspiration. Needle placement was confirmed in the AP view.  After negative aspiration, 0.5 ml of Omnipaque 300 contrast was injected with appropriate spread observed.  A total of 4 ml of 0.5% bupivicaine mixed with 40 mg depo-medrol were injected into the sacroiliac joint. The needle was withdrawn without any complications. The patient tolerated the procedure well, was transported to the recovery room and observed for 15 minutes and discharged in an ambulatory fashion. No immediate reported complications.    Procedural Complications: None  Estimated Blood Loss: 0 mL    Jason Camp DO  Interventional Pain Management/PM&R   [facility]

## 2024-06-19 ENCOUNTER — TELEPHONE (OUTPATIENT)
Dept: ENT CLINIC | Age: 69
End: 2024-06-19

## 2024-06-19 NOTE — TELEPHONE ENCOUNTER
I called and spoke with Abel's wife Beatriz to reschedule his appointment.  She was asking if Dr. Brambila had discussed Abel's last scope with Dr Antoine in regards to those findings and how to proceed.    He is rescheduled for 7/2/2024 @ 1:30 pm

## 2024-07-16 ENCOUNTER — OFFICE VISIT (OUTPATIENT)
Dept: INTERNAL MEDICINE CLINIC | Age: 69
End: 2024-07-16
Payer: MEDICARE

## 2024-07-16 VITALS
SYSTOLIC BLOOD PRESSURE: 128 MMHG | RESPIRATION RATE: 18 BRPM | TEMPERATURE: 97 F | OXYGEN SATURATION: 97 % | DIASTOLIC BLOOD PRESSURE: 62 MMHG | BODY MASS INDEX: 24.49 KG/M2 | HEART RATE: 55 BPM | WEIGHT: 175.6 LBS

## 2024-07-16 DIAGNOSIS — I10 PRIMARY HYPERTENSION: Primary | ICD-10-CM

## 2024-07-16 DIAGNOSIS — E78.2 MIXED HYPERLIPIDEMIA: ICD-10-CM

## 2024-07-16 DIAGNOSIS — Z87.891 EX-SMOKER FOR MORE THAN 1 YEAR: ICD-10-CM

## 2024-07-16 DIAGNOSIS — Z95.1 S/P CABG (CORONARY ARTERY BYPASS GRAFT): ICD-10-CM

## 2024-07-16 PROCEDURE — 3074F SYST BP LT 130 MM HG: CPT | Performed by: INTERNAL MEDICINE

## 2024-07-16 PROCEDURE — 99214 OFFICE O/P EST MOD 30 MIN: CPT | Performed by: INTERNAL MEDICINE

## 2024-07-16 PROCEDURE — G8420 CALC BMI NORM PARAMETERS: HCPCS | Performed by: INTERNAL MEDICINE

## 2024-07-16 PROCEDURE — 3017F COLORECTAL CA SCREEN DOC REV: CPT | Performed by: INTERNAL MEDICINE

## 2024-07-16 PROCEDURE — 1123F ACP DISCUSS/DSCN MKR DOCD: CPT | Performed by: INTERNAL MEDICINE

## 2024-07-16 PROCEDURE — G8427 DOCREV CUR MEDS BY ELIG CLIN: HCPCS | Performed by: INTERNAL MEDICINE

## 2024-07-16 PROCEDURE — 3078F DIAST BP <80 MM HG: CPT | Performed by: INTERNAL MEDICINE

## 2024-07-16 PROCEDURE — 1036F TOBACCO NON-USER: CPT | Performed by: INTERNAL MEDICINE

## 2024-07-16 SDOH — ECONOMIC STABILITY: FOOD INSECURITY: WITHIN THE PAST 12 MONTHS, YOU WORRIED THAT YOUR FOOD WOULD RUN OUT BEFORE YOU GOT MONEY TO BUY MORE.: NEVER TRUE

## 2024-07-16 SDOH — ECONOMIC STABILITY: TRANSPORTATION INSECURITY
IN THE PAST 12 MONTHS, HAS LACK OF TRANSPORTATION KEPT YOU FROM MEETINGS, WORK, OR FROM GETTING THINGS NEEDED FOR DAILY LIVING?: NO

## 2024-07-16 SDOH — ECONOMIC STABILITY: FOOD INSECURITY: WITHIN THE PAST 12 MONTHS, THE FOOD YOU BOUGHT JUST DIDN'T LAST AND YOU DIDN'T HAVE MONEY TO GET MORE.: NEVER TRUE

## 2024-07-16 SDOH — ECONOMIC STABILITY: INCOME INSECURITY: HOW HARD IS IT FOR YOU TO PAY FOR THE VERY BASICS LIKE FOOD, HOUSING, MEDICAL CARE, AND HEATING?: NOT HARD AT ALL

## 2024-07-16 ASSESSMENT — PATIENT HEALTH QUESTIONNAIRE - PHQ9
SUM OF ALL RESPONSES TO PHQ9 QUESTIONS 1 & 2: 0
2. FEELING DOWN, DEPRESSED OR HOPELESS: NOT AT ALL
SUM OF ALL RESPONSES TO PHQ9 QUESTIONS 1 & 2: 0
SUM OF ALL RESPONSES TO PHQ QUESTIONS 1-9: 0
1. LITTLE INTEREST OR PLEASURE IN DOING THINGS: NOT AT ALL
2. FEELING DOWN, DEPRESSED OR HOPELESS: NOT AT ALL
SUM OF ALL RESPONSES TO PHQ QUESTIONS 1-9: 0
1. LITTLE INTEREST OR PLEASURE IN DOING THINGS: NOT AT ALL

## 2024-07-16 NOTE — TELEPHONE ENCOUNTER
Abel is scheduled for 7/25/2024 - just checking to see if you have discussed Abel's last scope with Dr Antoine.    Please advise

## 2024-07-16 NOTE — PROGRESS NOTES
auscultation, no wheezes, rales or rhonchi, symmetric air entry  Heart - normal rate, regular rhythm, normal S1, S2, no murmurs, rubs, clicks or gallops  Abdomen - soft, nontender, nondistended, no masses or organomegaly  no abdominal bruits.  Non-obese Protuberant: No  Neurological - alert, oriented, normal speech, no focal findings or movement disorder noted, motor and sensory grossly normal bilaterally  Musculoskeletal - no joint tenderness, deformity or swelling  But left leg weakness.  Extremities - peripheral pulses normal, no pedal edema, no clubbing or cyanosis, Petra's sign negative bilaterally  Prosthesis: No  Skin - normal coloration and turgor, no rashes, no suspicious skin lesions noted      I have reviewed recent diagnostic testing including labs,     Diagnosis Orders   1. Primary hypertension        2. Ex-smoker for more than 1 year  Low Dose Chest CT -Abnormal Lung Screen Follow up      3. S/P CABG (coronary artery bypass graft)        4. Mixed hyperlipidemia              Orders Placed This Encounter   Procedures    Low Dose Chest CT -Abnormal Lung Screen Follow up     Age: 68 y.o.   Smoking History:   Social History    Tobacco Use      Smoking status: Former        Packs/day: 0.00        Years: 2.0 packs/day for 20.0 years (40.0 ttl pk-yrs)        Types: Cigarettes        Start date: 1981        Quit date: 2001        Years since quittin.6        Passive exposure: Past      Smokeless tobacco: Never    Vaping Use      Vaping Use: Never used    Alcohol use: No      Alcohol/week: 0.0 standard drinks of alcohol    Drug use: No    Pack years: 0  Last CT lung screen: 3/15/2021     Standing Status:   Future     Standing Expiration Date:   2025     Order Specific Question:   Reason for exam:     Answer:   ex smoker, and ascend aortic aneurysm       Outpatient Encounter Medications as of 2024   Medication Sig Dispense Refill    gabapentin (NEURONTIN) 600 MG tablet Take 1 tablet by

## 2024-07-17 NOTE — TELEPHONE ENCOUNTER
I spoke with Dr. Brambila last night and he states that he has not yet been able to speak with Dr. Antoine due to being out of the office.

## 2024-07-22 ENCOUNTER — HOSPITAL ENCOUNTER (OUTPATIENT)
Dept: CT IMAGING | Age: 69
Discharge: HOME | End: 2024-07-22
Attending: INTERNAL MEDICINE
Payer: MEDICARE

## 2024-07-22 DIAGNOSIS — I10 ESSENTIAL HYPERTENSION: ICD-10-CM

## 2024-07-22 DIAGNOSIS — Z87.891 EX-SMOKER FOR MORE THAN 1 YEAR: ICD-10-CM

## 2024-07-22 PROCEDURE — 71250 CT THORAX DX C-: CPT

## 2024-07-22 RX ORDER — LEVOTHYROXINE SODIUM 0.05 MG/1
50 TABLET ORAL DAILY
Qty: 90 TABLET | Refills: 3 | Status: SHIPPED | OUTPATIENT
Start: 2024-07-22

## 2024-07-22 RX ORDER — TERAZOSIN 5 MG/1
CAPSULE ORAL
Qty: 90 CAPSULE | Refills: 3 | Status: SHIPPED | OUTPATIENT
Start: 2024-07-22

## 2024-07-25 ENCOUNTER — OFFICE VISIT (OUTPATIENT)
Dept: ENT CLINIC | Age: 69
End: 2024-07-25
Payer: MEDICARE

## 2024-07-25 VITALS
SYSTOLIC BLOOD PRESSURE: 136 MMHG | BODY MASS INDEX: 24.9 KG/M2 | DIASTOLIC BLOOD PRESSURE: 60 MMHG | HEART RATE: 58 BPM | OXYGEN SATURATION: 100 % | HEIGHT: 71 IN | RESPIRATION RATE: 16 BRPM | TEMPERATURE: 97.5 F | WEIGHT: 177.9 LBS

## 2024-07-25 DIAGNOSIS — C32.8 SQUAMOUS CELL CARCINOMA OF OVERLAPPING SITES OF LARYNX (HCC): Primary | ICD-10-CM

## 2024-07-25 DIAGNOSIS — Z90.02: ICD-10-CM

## 2024-07-25 DIAGNOSIS — K22.719 BARRETT'S ESOPHAGUS WITH DYSPLASIA: ICD-10-CM

## 2024-07-25 DIAGNOSIS — E03.4 HYPOTHYROIDISM DUE TO ACQUIRED ATROPHY OF THYROID: ICD-10-CM

## 2024-07-25 DIAGNOSIS — T66.XXXD EFFECTS, RADIATION, SUBSEQUENT ENCOUNTER: ICD-10-CM

## 2024-07-25 PROCEDURE — 1123F ACP DISCUSS/DSCN MKR DOCD: CPT | Performed by: OTOLARYNGOLOGY

## 2024-07-25 PROCEDURE — 3078F DIAST BP <80 MM HG: CPT | Performed by: OTOLARYNGOLOGY

## 2024-07-25 PROCEDURE — G8427 DOCREV CUR MEDS BY ELIG CLIN: HCPCS | Performed by: OTOLARYNGOLOGY

## 2024-07-25 PROCEDURE — G8420 CALC BMI NORM PARAMETERS: HCPCS | Performed by: OTOLARYNGOLOGY

## 2024-07-25 PROCEDURE — 1036F TOBACCO NON-USER: CPT | Performed by: OTOLARYNGOLOGY

## 2024-07-25 PROCEDURE — 99213 OFFICE O/P EST LOW 20 MIN: CPT | Performed by: OTOLARYNGOLOGY

## 2024-07-25 PROCEDURE — 3017F COLORECTAL CA SCREEN DOC REV: CPT | Performed by: OTOLARYNGOLOGY

## 2024-07-25 PROCEDURE — 3075F SYST BP GE 130 - 139MM HG: CPT | Performed by: OTOLARYNGOLOGY

## 2024-07-25 PROCEDURE — 31575 DIAGNOSTIC LARYNGOSCOPY: CPT | Performed by: OTOLARYNGOLOGY

## 2024-07-25 RX ORDER — LOTILANER OPHTHALMIC SOLUTION 2.5 MG/ML
SOLUTION/ DROPS OPHTHALMIC
COMMUNITY
Start: 2024-05-23

## 2024-07-25 NOTE — PROGRESS NOTES
[facility] [place] [facility]  [facility] EAR, NOSE AND THROAT  [address]  [address]  [facility] [address]  Dept: [phone]  Dept Fax: [phone]  Loc: [phone]    Abel Singer is a 68 y.o. male who was referred by No ref. provider found for:  Chief Complaint   Patient presents with    Follow-up     Patient is here for a follow up cancer surveillance.  He said he is feeling okay.   He still has a persistent cough.     .    HPI:     Abel Singer is a 68 y.o. male who presents today for continued surveillance of his squamous cell carcinoma of the larynx.    History:     Allergies   Allergen Reactions    Morphine Nausea And Vomiting     Current Outpatient Medications   Medication Sig Dispense Refill    XDEMVY 0.25 % SOLN INSTILL 1 DROP IN EACH EYE TWICE DAILY FOR 6 WEEKS      levothyroxine (SYNTHROID) 50 MCG tablet TAKE 1 TABLET DAILY 90 tablet 3    terazosin (HYTRIN) 5 MG capsule TAKE 1 CAPSULE AT BEDTIME 90 capsule 3    atorvastatin (LIPITOR) 40 MG tablet TAKE 1 TABLET NIGHTLY 90 tablet 3    ticagrelor (BRILINTA) 90 MG TABS tablet Take 1 tablet by mouth 2 times daily Okay to resume Thursday 12/7/23 (after completing 7 days of Lovenox) 180 tablet 1    omeprazole (PRILOSEC) 40 MG delayed release capsule Take 1 capsule by mouth every evening 90 capsule 1    metoprolol succinate (TOPROL XL) 50 MG extended release tablet Take 0.5 tablets by mouth 2 times daily 90 tablet 1    aspirin 81 MG EC tablet Take 1 tablet by mouth daily Okay to resume tomorrow AM 11/11/23 30 tablet 3    Coenzyme Q10 (CO Q-10) 200 MG CAPS Take 1 capsule by mouth daily      Multiple Vitamin (MULTI-VITAMIN) TABS Take 1 tablet by mouth daily      arginine 500 MG tablet Take 4 tablets by mouth daily      PROLENSA 0.07 % SOLN  (Patient not taking: Reported on 7/30/2024)      moxifloxacin (VIGAMOX) 0.5 % ophthalmic solution  (Patient not taking: Reported on 7/30/2024)      gabapentin (NEURONTIN) 600 MG tablet Take 1

## 2024-07-30 ENCOUNTER — OFFICE VISIT (OUTPATIENT)
Dept: PHYSICAL MEDICINE AND REHAB | Age: 69
End: 2024-07-30
Payer: MEDICARE

## 2024-07-30 VITALS
HEIGHT: 71 IN | SYSTOLIC BLOOD PRESSURE: 114 MMHG | DIASTOLIC BLOOD PRESSURE: 68 MMHG | WEIGHT: 177 LBS | BODY MASS INDEX: 24.78 KG/M2

## 2024-07-30 DIAGNOSIS — M53.3 SACROILIAC JOINT DYSFUNCTION OF RIGHT SIDE: Primary | ICD-10-CM

## 2024-07-30 DIAGNOSIS — M47.816 LUMBAR SPONDYLOSIS: ICD-10-CM

## 2024-07-30 DIAGNOSIS — M79.2 NEUROPATHIC PAIN: ICD-10-CM

## 2024-07-30 DIAGNOSIS — G89.4 CHRONIC PAIN SYNDROME: ICD-10-CM

## 2024-07-30 DIAGNOSIS — M54.12 CERVICAL RADICULOPATHY: ICD-10-CM

## 2024-07-30 PROCEDURE — G8420 CALC BMI NORM PARAMETERS: HCPCS | Performed by: NURSE PRACTITIONER

## 2024-07-30 PROCEDURE — 3017F COLORECTAL CA SCREEN DOC REV: CPT | Performed by: NURSE PRACTITIONER

## 2024-07-30 PROCEDURE — 3078F DIAST BP <80 MM HG: CPT | Performed by: NURSE PRACTITIONER

## 2024-07-30 PROCEDURE — 1123F ACP DISCUSS/DSCN MKR DOCD: CPT | Performed by: NURSE PRACTITIONER

## 2024-07-30 PROCEDURE — 3074F SYST BP LT 130 MM HG: CPT | Performed by: NURSE PRACTITIONER

## 2024-07-30 PROCEDURE — 99214 OFFICE O/P EST MOD 30 MIN: CPT | Performed by: NURSE PRACTITIONER

## 2024-07-30 PROCEDURE — G8427 DOCREV CUR MEDS BY ELIG CLIN: HCPCS | Performed by: NURSE PRACTITIONER

## 2024-07-30 PROCEDURE — 1036F TOBACCO NON-USER: CPT | Performed by: NURSE PRACTITIONER

## 2024-07-30 RX ORDER — MOXIFLOXACIN 5 MG/ML
SOLUTION/ DROPS OPHTHALMIC
COMMUNITY
Start: 2024-07-25

## 2024-07-30 RX ORDER — GABAPENTIN 600 MG/1
600 TABLET ORAL 2 TIMES DAILY
Qty: 60 TABLET | Refills: 2 | Status: SHIPPED | OUTPATIENT
Start: 2024-07-30 | End: 2024-10-28

## 2024-07-30 RX ORDER — BROMFENAC SODIUM 0.7 MG/ML
SOLUTION/ DROPS OPHTHALMIC
COMMUNITY
Start: 2024-07-25

## 2024-07-30 NOTE — PROGRESS NOTES
Functionality Assessment/Goals Worksheet     On a scale of 0 (Does not Interfere) to 10 (Completely Interferes)     1.  Which number describes how during the past week pain has interfered with           the following:  A.  General Activity:  4  B.  Mood: 0  C.  Walking Ability:  3  D.  Normal Work (Includes both work outside the home and housework):  4  E.  Relations with Other People:   0  F.  Sleep:   4  G.  Enjoyment of Life:   0    2.  Patient Prefers to Take their Pain Medications:     [x]  On a regular basis   []  Only when necessary    []  Does not take pain medications    3.  What are the Patient's Goals/Expectations for Visiting Pain Management?     []  Learn about my pain    [x]  Receive Medication   []  Physical Therapy     []  Treat Depression   [x]  Receive Injections    []  Treat Sleep   []  Deal with Anxiety and Stress   []  Treat Opoid Dependence/Addiction   []  Other:                                
in his neck.  This can be repeated as needed.  Will get an updated MRI if symptoms worsen or he develops weakness in his left arm.    Plan:   The following treatment recommendations and plan were discussed in detail with Abel Singer.    Imaging:   I have reviewed patient’s imaging of cervical MRI and results were discussed with patient today.     Analgesics:   Patient is taking Acetaminophen. Patient informed that the maximum amount of acetaminophen taken on a regular basis should only be 4000 mg per day.     Patient is taking Ibuprofen rarely. Patient is advised to take as prescribed and not take on an empty stomach.     Adjuvants:   In light of the presence of a neuropathic component of pain, the patient is advised to continue gabapentin to 600 mg twice daily.    Interventions:   With examination consistent with right sacroiliac dysfunction/pain, we will proceed with a right sacroiliac joint injection.  The risks and benefits were discussed in detail with the patient.  Patient wants to proceed with the injection.     Anticoagulation/NPO Recommendations:   Patient does not need to hold any medications prior to the procedure.  Patient does not need to be NPO prior to the procedure.  We will do a LOCAL injection    Multidisciplinary Pain Management:   In the presence of complex, chronic, and multi-factorial pain, the importance of a multidisciplinary approach to pain management in the patient’s management regimen was emphasized and discussed in great detail.   PHYSICAL THERAPY: Patient is advised to see a physical therapist for gentle stretching exercises and conditioning exercises for management of pain.   PSYCHOLOGY: Patient is advised to see a clinical pain psychologist, for the psychosocial aspect of pain care through coping skills, relaxation strategies, cognitive group therapy etc.     Referrals:  None     Prescriptions Written This Visit:   Gabapentin 600 mg    Follow-up: SI inj. In office 8 weeks

## 2024-08-13 NOTE — PATIENT INSTRUCTIONS
Your Provider for Today: Dr. Burdick  Your nurses for today: Tanisha and Felicita     You may receive a survey regarding the care you received during your visit.  Your input is valuable to us.  We encourage you to complete and return your survey.  We hope you will choose us in the future for your healthcare needs.

## 2024-08-14 ENCOUNTER — OFFICE VISIT (OUTPATIENT)
Dept: CARDIOLOGY CLINIC | Age: 69
End: 2024-08-14
Payer: MEDICARE

## 2024-08-14 VITALS
DIASTOLIC BLOOD PRESSURE: 74 MMHG | HEIGHT: 71 IN | BODY MASS INDEX: 24.64 KG/M2 | HEART RATE: 64 BPM | WEIGHT: 176 LBS | SYSTOLIC BLOOD PRESSURE: 136 MMHG

## 2024-08-14 DIAGNOSIS — I25.10 CORONARY ARTERY DISEASE DUE TO LIPID RICH PLAQUE: Primary | ICD-10-CM

## 2024-08-14 DIAGNOSIS — I25.83 CORONARY ARTERY DISEASE DUE TO LIPID RICH PLAQUE: Primary | ICD-10-CM

## 2024-08-14 PROCEDURE — 3017F COLORECTAL CA SCREEN DOC REV: CPT | Performed by: INTERNAL MEDICINE

## 2024-08-14 PROCEDURE — 1036F TOBACCO NON-USER: CPT | Performed by: INTERNAL MEDICINE

## 2024-08-14 PROCEDURE — 3078F DIAST BP <80 MM HG: CPT | Performed by: INTERNAL MEDICINE

## 2024-08-14 PROCEDURE — 1123F ACP DISCUSS/DSCN MKR DOCD: CPT | Performed by: INTERNAL MEDICINE

## 2024-08-14 PROCEDURE — G8427 DOCREV CUR MEDS BY ELIG CLIN: HCPCS | Performed by: INTERNAL MEDICINE

## 2024-08-14 PROCEDURE — 3075F SYST BP GE 130 - 139MM HG: CPT | Performed by: INTERNAL MEDICINE

## 2024-08-14 PROCEDURE — 99214 OFFICE O/P EST MOD 30 MIN: CPT | Performed by: INTERNAL MEDICINE

## 2024-08-14 PROCEDURE — G8420 CALC BMI NORM PARAMETERS: HCPCS | Performed by: INTERNAL MEDICINE

## 2024-08-14 RX ORDER — METOPROLOL SUCCINATE 50 MG/1
25 TABLET, EXTENDED RELEASE ORAL 2 TIMES DAILY
Qty: 90 TABLET | Refills: 3 | Status: SHIPPED | OUTPATIENT
Start: 2024-08-14

## 2024-08-14 NOTE — PROGRESS NOTES
Pt here for 8 month check up     Pt is questioning the Low Dose Chest CT-    Still fighting throat cancer.

## 2024-08-14 NOTE — PROGRESS NOTES
SR[facility]X [facility] PROFESSIONAL [facility] CARDIOLOGY  [address]  [address]  [facility] [address]  Dept: [phone]  Dept Fax: [phone]  Loc: [phone]    Visit Date: 8/14/2024    Mr. Singer is a 68 y.o. male  who presented for:  CAD; follow up    HPI:   69 yo M c hx of CAD s/p CABG (VG-RCA, VG-OM; LIMA to LAD (occluded)); s/p PCI, FOUZIA, HTN, HLD, thoracic anuerysm (4.1cm, follows CT-Surgery at [facility]) is here for a follow up.    Denies any chest pain, sob, palpitations, lightheadedness, dizziness, orthopnea, PND or pedal edema.   Had throat surgery for cancer.  Follows up with ENT.        Current Outpatient Medications:     metoprolol succinate (TOPROL XL) 50 MG extended release tablet, Take 0.5 tablets by mouth 2 times daily, Disp: 90 tablet, Rfl: 3    PROLENSA 0.07 % SOLN, , Disp: , Rfl:     gabapentin (NEURONTIN) 600 MG tablet, Take 1 tablet by mouth 2 times daily for 90 days., Disp: 60 tablet, Rfl: 2    XDEMVY 0.25 % SOLN, INSTILL 1 DROP IN EACH EYE TWICE DAILY FOR 6 WEEKS, Disp: , Rfl:     levothyroxine (SYNTHROID) 50 MCG tablet, TAKE 1 TABLET DAILY, Disp: 90 tablet, Rfl: 3    terazosin (HYTRIN) 5 MG capsule, TAKE 1 CAPSULE AT BEDTIME, Disp: 90 capsule, Rfl: 3    atorvastatin (LIPITOR) 40 MG tablet, TAKE 1 TABLET NIGHTLY, Disp: 90 tablet, Rfl: 3    ticagrelor (BRILINTA) 90 MG TABS tablet, Take 1 tablet by mouth 2 times daily Okay to resume Thursday 12/7/23 (after completing 7 days of Lovenox), Disp: 180 tablet, Rfl: 1    omeprazole (PRILOSEC) 40 MG delayed release capsule, Take 1 capsule by mouth every evening, Disp: 90 capsule, Rfl: 1    aspirin 81 MG EC tablet, Take 1 tablet by mouth daily Okay to resume tomorrow AM 11/11/23, Disp: 30 tablet, Rfl: 3    nitroGLYCERIN (NITROSTAT) 0.4 MG SL tablet, up to max of 3 total doses. If no relief after 1 dose, call 911., Disp: 25 tablet, Rfl: 3    Coenzyme Q10 (CO Q-10) 200 MG CAPS, Take 1 capsule by mouth daily, Disp: , Rfl:     Multiple

## 2024-08-22 NOTE — DISCHARGE INSTRUCTIONS
Post procedure Instructions:    No driving or making significant decisions for the remainder of the day.   Be cautions with walking and activity for 24 hours, do not over exert yourself.  Ok to resume pre-procedure activity level today.  Apply ice to site of injection site if you have pain or discomfort.  Do not submerge sit of injection during bath or pool activities for 48 hours post-procedure.  Resume blood thinning medications in 24 hours.     Call office [phone] if you have:  Temperature greater than 100.4  Persistent nausea and vomiting  Severe uncontrolled pain  Redness, tenderness, or signs of infection (pain, swelling, redness, odor or green/yellow discharge around the site)  Difficulty breathing, headache or visual disturbances  Hives  Persistent dizziness or light-headedness  Extreme fatigue  Any other questions or concerns you may have after discharge    In an emergency, call 911 or go to an Emergency Department at a nearby hospital    “Surgical Site Infections”      How can we work together to prevent Surgical Site Infections?   We would like to thank you for choosing [facility] for your Surgical Care.  Below you will find helpful information on how we can work together to prevent Surgical Site Infections.    What is a Surgical Site Infection (SSI)?  A surgical site infection is an infection that occurs after surgery in the part of the body where the surgery took place. Most patients who have surgery do not develop an infection. However, infections develop in about 1 to 3 out of every 100 patients who have surgery.  Some of the common symptoms of a surgical site infection are:  Redness and pain around the area where you had surgery  Drainage of cloudy fluid from your surgical wound  Fever    Can SSIs be treated?  Yes. Most surgical site infections can be treated with antibiotics. The antibiotic given to you depends on the bacteria (germs) causing the infection. Sometimes patients with

## 2024-08-23 ENCOUNTER — OFFICE VISIT (OUTPATIENT)
Dept: ENT CLINIC | Age: 69
End: 2024-08-23
Payer: MEDICARE

## 2024-08-23 VITALS
TEMPERATURE: 97.4 F | SYSTOLIC BLOOD PRESSURE: 122 MMHG | RESPIRATION RATE: 16 BRPM | WEIGHT: 179.1 LBS | OXYGEN SATURATION: 99 % | DIASTOLIC BLOOD PRESSURE: 72 MMHG | HEIGHT: 71 IN | BODY MASS INDEX: 25.07 KG/M2 | HEART RATE: 51 BPM

## 2024-08-23 DIAGNOSIS — C32.8 SQUAMOUS CELL CARCINOMA OF OVERLAPPING SITES OF LARYNX (HCC): Primary | ICD-10-CM

## 2024-08-23 DIAGNOSIS — K22.70 BARRETT'S ESOPHAGUS WITHOUT DYSPLASIA: ICD-10-CM

## 2024-08-23 DIAGNOSIS — T66.XXXD EFFECTS, RADIATION, SUBSEQUENT ENCOUNTER: ICD-10-CM

## 2024-08-23 DIAGNOSIS — E03.4 HYPOTHYROIDISM DUE TO ACQUIRED ATROPHY OF THYROID: ICD-10-CM

## 2024-08-23 PROCEDURE — 99213 OFFICE O/P EST LOW 20 MIN: CPT | Performed by: OTOLARYNGOLOGY

## 2024-08-23 PROCEDURE — 3078F DIAST BP <80 MM HG: CPT | Performed by: OTOLARYNGOLOGY

## 2024-08-23 PROCEDURE — 3074F SYST BP LT 130 MM HG: CPT | Performed by: OTOLARYNGOLOGY

## 2024-08-23 PROCEDURE — G8427 DOCREV CUR MEDS BY ELIG CLIN: HCPCS | Performed by: OTOLARYNGOLOGY

## 2024-08-23 PROCEDURE — 31575 DIAGNOSTIC LARYNGOSCOPY: CPT | Performed by: OTOLARYNGOLOGY

## 2024-08-23 PROCEDURE — 1123F ACP DISCUSS/DSCN MKR DOCD: CPT | Performed by: OTOLARYNGOLOGY

## 2024-08-23 PROCEDURE — 3017F COLORECTAL CA SCREEN DOC REV: CPT | Performed by: OTOLARYNGOLOGY

## 2024-08-23 PROCEDURE — G8420 CALC BMI NORM PARAMETERS: HCPCS | Performed by: OTOLARYNGOLOGY

## 2024-08-23 PROCEDURE — 1036F TOBACCO NON-USER: CPT | Performed by: OTOLARYNGOLOGY

## 2024-08-23 RX ORDER — LOTEPREDNOL ETABONATE 3.8 MG/G
GEL OPHTHALMIC
COMMUNITY
Start: 2024-07-25

## 2024-08-26 ENCOUNTER — TELEPHONE (OUTPATIENT)
Dept: PHYSICAL MEDICINE AND REHAB | Age: 69
End: 2024-08-26

## 2024-08-26 NOTE — H&P
of right side  CHG FLUOR NEEDLE/CATH SPINE/PARASPINAL DX/THER ADDON    OK INJECT SI JOINT ARTHRGRPHY&/ANES/STEROID W/MOMO      2. Neuropathic pain        3. Cervical radiculopathy        4. Lumbar spondylosis        5. Chronic pain syndrome              Abel Singer is a 68 y.o.male presenting to the pain clinic for evaluation of right hip pain. His history and physical are consistent with right SI joint dysfunction.  He had a successful response to the right SI joint injection and right SI advanced joint block with Dr. Camp while lasting about 4 weeks.   Exercises for his SI joint seem to aggravate his pain.   I have encouraged him to alternate ice and heat as well as using lidocaine patches and Voltaren gel to assist with pain.  We reviewed his lumbar MRI which did not show any changes that would cause the pain radiating into his right abdomen/groin.  His symptoms continue to be consistent with right SI joint dysfunction.  I set him up for a repeat right SI joint injection with Dr. Camp with fluoroscopy at the end of August, prior to going to [place].    We discussed pursuing a peripheral nerve stimulator in the fall when he will not be submerging in water.  I have discussed this in detail with the patient and provided a pamphlet for more information.  In regards to the paresthesias in his feet, this seems more like idiopathic neuropathy.  We discussed getting an EMG but he would like to hold off at this time.  If this becomes any worse we will likely pursue the EMG.    In regards to his left neck, shoulder, arm pain this is consistent with cervical radiculopathy.  His cervical MRI from 5 years ago shows stenosis.  We discussed potentially updating this MRI in the future if needed.  At this point an updated MRI would not change our game plan.  He did have a significant response to the cervical epidural steroid injection at C6-7 with Dr. Camp.  He has ongoing pain, numbness, tingling in his left  arm.  I continued gabapentin 600 mg twice daily.  He is also had an EMG in the past which confirmed a pinched nerve in his neck.  This can be repeated as needed.  Will get an updated MRI if symptoms worsen or he develops weakness in his left arm.    Plan:   The following treatment recommendations and plan were discussed in detail with Abel Singer.    Imaging:   I have reviewed patient’s imaging of cervical MRI and results were discussed with patient today.     Analgesics:   Patient is taking Acetaminophen. Patient informed that the maximum amount of acetaminophen taken on a regular basis should only be 4000 mg per day.     Patient is taking Ibuprofen rarely. Patient is advised to take as prescribed and not take on an empty stomach.     Adjuvants:   In light of the presence of a neuropathic component of pain, the patient is advised to continue gabapentin to 600 mg twice daily.    Interventions:   With examination consistent with right sacroiliac dysfunction/pain, we will proceed with a right sacroiliac joint injection.  The risks and benefits were discussed in detail with the patient.  Patient wants to proceed with the injection.     Anticoagulation/NPO Recommendations:   Patient does not need to hold any medications prior to the procedure.  Patient does not need to be NPO prior to the procedure.  We will do a LOCAL injection    Multidisciplinary Pain Management:   In the presence of complex, chronic, and multi-factorial pain, the importance of a multidisciplinary approach to pain management in the patient’s management regimen was emphasized and discussed in great detail.   PHYSICAL THERAPY: Patient is advised to see a physical therapist for gentle stretching exercises and conditioning exercises for management of pain.   PSYCHOLOGY: Patient is advised to see a clinical pain psychologist, for the psychosocial aspect of pain care through coping skills, relaxation strategies, cognitive group therapy etc.

## 2024-08-26 NOTE — TELEPHONE ENCOUNTER
OARRS reviewed. UDS: none in last year.   Last seen: Visit date not found. Follow-up:   Future Appointments   Date Time Provider Department Center   9/26/2024  8:05 AM Jeremías Brambila MD N ENT [facility] [place]   10/22/2024  9:00 AM Radha Gilbert APRN - CNP N [facility] Pain [facility] [place]   10/24/2024  4:00 PM Jeremías Brambila MD N [facility] - [place]   11/21/2024 11:45 AM Jeremías Brambila MD N ENT [facility] [place]   12/19/2024 10:40 AM Jeremías Brambila MD N ENT [facility] [place]   1/23/2025 11:30 AM Jeremías Brambila MD N [facility]"" - [place]   1/23/2025  2:00 PM Baldev George MD SRPX Physic [facility] E[facility] DEP   8/20/2025 11:00 AM Wander Burdick MD N [facility]    Requested refill gabapentin but recalled and lvm that has 2 refills

## 2024-08-27 ENCOUNTER — APPOINTMENT (OUTPATIENT)
Dept: GENERAL RADIOLOGY | Age: 69
End: 2024-08-27
Attending: ANESTHESIOLOGY
Payer: MEDICARE

## 2024-08-27 ENCOUNTER — HOSPITAL ENCOUNTER (OUTPATIENT)
Age: 69
Setting detail: OUTPATIENT SURGERY
Discharge: HOME | End: 2024-08-27
Attending: ANESTHESIOLOGY | Admitting: ANESTHESIOLOGY
Payer: MEDICARE

## 2024-08-27 VITALS
OXYGEN SATURATION: 99 % | WEIGHT: 177 LBS | TEMPERATURE: 97 F | HEART RATE: 53 BPM | RESPIRATION RATE: 16 BRPM | DIASTOLIC BLOOD PRESSURE: 88 MMHG | SYSTOLIC BLOOD PRESSURE: 197 MMHG | BODY MASS INDEX: 24.78 KG/M2 | HEIGHT: 71 IN

## 2024-08-27 PROCEDURE — 3600000054 HC PAIN LEVEL 3 BASE: Performed by: ANESTHESIOLOGY

## 2024-08-27 PROCEDURE — 6360000002 HC RX W HCPCS: Performed by: ANESTHESIOLOGY

## 2024-08-27 PROCEDURE — 7100000010 HC PHASE II RECOVERY - FIRST 15 MIN: Performed by: ANESTHESIOLOGY

## 2024-08-27 PROCEDURE — 7100000011 HC PHASE II RECOVERY - ADDTL 15 MIN: Performed by: ANESTHESIOLOGY

## 2024-08-27 PROCEDURE — 27096 INJECT SACROILIAC JOINT: CPT | Performed by: ANESTHESIOLOGY

## 2024-08-27 PROCEDURE — 6360000004 HC RX CONTRAST MEDICATION: Performed by: ANESTHESIOLOGY

## 2024-08-27 PROCEDURE — 2500000003 HC RX 250 WO HCPCS: Performed by: ANESTHESIOLOGY

## 2024-08-27 PROCEDURE — 2709999900 HC NON-CHARGEABLE SUPPLY: Performed by: ANESTHESIOLOGY

## 2024-08-27 RX ORDER — IOPAMIDOL 612 MG/ML
INJECTION, SOLUTION INTRAVASCULAR PRN
Status: DISCONTINUED | OUTPATIENT
Start: 2024-08-27 | End: 2024-08-27 | Stop reason: ALTCHOICE

## 2024-08-27 RX ORDER — BUPIVACAINE HYDROCHLORIDE 5 MG/ML
INJECTION, SOLUTION PERINEURAL PRN
Status: DISCONTINUED | OUTPATIENT
Start: 2024-08-27 | End: 2024-08-27 | Stop reason: ALTCHOICE

## 2024-08-27 RX ORDER — METHYLPREDNISOLONE ACETATE 40 MG/ML
INJECTION, SUSPENSION INTRA-ARTICULAR; INTRALESIONAL; INTRAMUSCULAR; SOFT TISSUE PRN
Status: DISCONTINUED | OUTPATIENT
Start: 2024-08-27 | End: 2024-08-27 | Stop reason: ALTCHOICE

## 2024-08-27 RX ORDER — LIDOCAINE HYDROCHLORIDE 10 MG/ML
INJECTION, SOLUTION EPIDURAL; INFILTRATION; INTRACAUDAL; PERINEURAL PRN
Status: DISCONTINUED | OUTPATIENT
Start: 2024-08-27 | End: 2024-08-27 | Stop reason: ALTCHOICE

## 2024-08-27 ASSESSMENT — PAIN - FUNCTIONAL ASSESSMENT
PAIN_FUNCTIONAL_ASSESSMENT: NONE - DENIES PAIN
PAIN_FUNCTIONAL_ASSESSMENT: NONE - DENIES PAIN

## 2024-08-27 NOTE — PROGRESS NOTES
1101 Patient arrives to recovery room via cart.  Spontaneous respirations, vss, report received from surgical RN.  Patient denies pain, numbness, tingling , nausea.  Injection site clean, dry, intact. HOB elevated. IV capped. Snack and drink given.  Call light within reach.    1115 Up to dress self  1125 Discharge to home in stable ambulatory condition with wife

## 2024-08-27 NOTE — PROCEDURES
Pre-operative Diagnosis:  SI joint pain     Post-operative Diagnosis:  SI joint pain     Procedure: RIGHT SI joint injection     Procedure Description:  After having signed the informed consent, the patient was placed in the prone position.  The patient's back was prepped with chloraprep solution, and draped in a sterile fashion. A total of 2 ml of 1% lidocaine were used to anesthetize the skin and underlying tissues.  Under fluoroscopic guidance a single 22-gauge, 3.5 inch spinal needle was advanced to lie within the inferior pole of the RIGHT sacroiliac joint.  There were no paresthesias or heme aspiration. Needle placement was confirmed in the AP view.  After negative aspiration, 0.5 ml of Omnipaque 300 contrast was injected with appropriate spread observed.  A total of 4 ml of 0.5% bupivicaine mixed with 40 mg depo-medrol were injected into the sacroiliac joint. The needle was withdrawn without any complications. The patient tolerated the procedure well, was transported to the recovery room and observed for 15 minutes and discharged in an ambulatory fashion. No immediate reported complications.    Procedural Complications: None  Estimated Blood Loss: 0 mL    Jason Camp DO  Interventional Pain Management/PM&R   [facility]

## 2024-09-03 NOTE — PROGRESS NOTES
[facility] PHYSICIANS [place] SPECIALTY  [facility] EAR, NOSE AND THROAT  [address]  [address]  [facility] [address]  Dept: [phone]  Dept Fax: [phone]  Loc: [phone]    Abel Singer is a 68 y.o. male who was referred by No ref. provider found for:  Chief Complaint   Patient presents with    Follow-up     Patient is here for a follow up cancer surveillance.  He said he is feeling okay.   He still has a persistent cough.     .    HPI:     Abel Singer is a 68 y.o. male who presents today for continued surveillance of his squamous cell carcinoma of the larynx.    History:     Allergies   Allergen Reactions    Morphine Nausea And Vomiting     Current Outpatient Medications   Medication Sig Dispense Refill    XDEMVY 0.25 % SOLN INSTILL 1 DROP IN EACH EYE TWICE DAILY FOR 6 WEEKS      levothyroxine (SYNTHROID) 50 MCG tablet TAKE 1 TABLET DAILY 90 tablet 3    terazosin (HYTRIN) 5 MG capsule TAKE 1 CAPSULE AT BEDTIME 90 capsule 3    atorvastatin (LIPITOR) 40 MG tablet TAKE 1 TABLET NIGHTLY 90 tablet 3    ticagrelor (BRILINTA) 90 MG TABS tablet Take 1 tablet by mouth 2 times daily Okay to resume Thursday 12/7/23 (after completing 7 days of Lovenox) 180 tablet 1    omeprazole (PRILOSEC) 40 MG delayed release capsule Take 1 capsule by mouth every evening 90 capsule 1    metoprolol succinate (TOPROL XL) 50 MG extended release tablet Take 0.5 tablets by mouth 2 times daily 90 tablet 1    aspirin 81 MG EC tablet Take 1 tablet by mouth daily Okay to resume tomorrow AM 11/11/23 30 tablet 3    Coenzyme Q10 (CO Q-10) 200 MG CAPS Take 1 capsule by mouth daily      Multiple Vitamin (MULTI-VITAMIN) TABS Take 1 tablet by mouth daily      arginine 500 MG tablet Take 4 tablets by mouth daily      PROLENSA 0.07 % SOLN  (Patient not taking: Reported on 7/30/2024)      moxifloxacin (VIGAMOX) 0.5 % ophthalmic solution  (Patient not taking: Reported on 7/30/2024)      gabapentin (NEURONTIN) 600 MG tablet Take 1 
MICRO AND BIOPSY, WITH JET VENT performed by Jeremías Brambila MD at [facility] OR    LARYNGOSCOPY N/A 6/2/2021    (DR. HUNTER) SUSPENSION MICROLARYNGOSCOPY WITH RECONSTRUCTION AND ADVANCEMENT FLAP X2; (DR. BRAMBILA)  MICROLARYNGOSCOPY AND BX POSTERIOR RIGHT VOCAL CORD      JET VENTILATION AND CO2 LASER performed by Ambrose Hunter MD at [facility] OR    LARYNGOSCOPY N/A 7/27/2022    SUSPENSION DIRECT LARYNGOSCOPY WITH EXCISIONAL BIOPSY OF RIGHT SIDED LARYNGEAL MASS performed by Jeremías Brambila MD at [facility] OR    LARYNGOSCOPY N/A 8/22/2022    MICROLARYNGOSCOPY WITH BIOPSY, SUSPENSION LARYNGOSCOPY WITH JET VENTILATION AND C02 LASER, RE-EXCISION FOR MARGINS performed by Jeremías Brambila MD at [facility] OR    LARYNGOSCOPY N/A 2/27/2023    MICROLARYNGOSCOPY WITH BIOPSY performed by Jeremías Brambila MD at [facility] OR    LARYNGOSCOPY N/A 10/4/2023    MicroLaryngoscopy with Biopsy performed by Jeremías Brambila MD at [facility] OR    LARYNGOSCOPY N/A 11/8/2023    Laryngoscopy Control of Bleed performed by Jeremías Brambila MD at [facility] OR    LARYNGOSCOPY N/A 11/27/2023    REVISION ANTERIOR LATERAL VERTICAL PARTAL RESECTION performed by Ambrose Hunter MD at [facility] OR    MICROLARYNGOSCOPY W BIOPSY  04/28/2016    by Dr Brambila    NERVE BLOCK Right 8/24/2023    right advanced sacroiliac joint block performed by Jason Camp DO at [facility] [place] OR    PAIN MANAGEMENT PROCEDURE Right 4/9/2024    Cervical 6-7 epidural steroid injection performed by Jason Camp DO at [facility] OR    OR ASPIRATION/RELEASE VITREOUS SUBRETINAL/CHOROIDAL Right 8/27/2018    VITRECTOMY 25 GAUGE performed by Hugh Leonard MD at [facility] OR    VITRECTOMY Right 08/27/2018     VITRECTOMY 25 GAUGE (Right Eye)     Family History   Problem Relation Age of Onset    Heart Disease Father         MI    High Blood Pressure Father     Stroke Father     Cancer Maternal Grandmother     Cancer Maternal Grandfather     Diabetes Neg Hx      Social History     Tobacco Use

## 2024-09-26 ENCOUNTER — OFFICE VISIT (OUTPATIENT)
Dept: ENT CLINIC | Age: 69
End: 2024-09-26

## 2024-09-26 VITALS
TEMPERATURE: 97.9 F | BODY MASS INDEX: 24.89 KG/M2 | OXYGEN SATURATION: 96 % | SYSTOLIC BLOOD PRESSURE: 108 MMHG | WEIGHT: 177.8 LBS | DIASTOLIC BLOOD PRESSURE: 64 MMHG | HEART RATE: 71 BPM | HEIGHT: 71 IN

## 2024-09-26 DIAGNOSIS — K22.70 BARRETT'S ESOPHAGUS WITHOUT DYSPLASIA: ICD-10-CM

## 2024-09-26 DIAGNOSIS — C32.8 SQUAMOUS CELL CARCINOMA OF OVERLAPPING SITES OF LARYNX (HCC): Primary | ICD-10-CM

## 2024-09-26 DIAGNOSIS — T66.XXXD EFFECTS, RADIATION, SUBSEQUENT ENCOUNTER: ICD-10-CM

## 2024-09-26 DIAGNOSIS — E03.4 HYPOTHYROIDISM DUE TO ACQUIRED ATROPHY OF THYROID: ICD-10-CM

## 2024-09-26 DIAGNOSIS — G47.33 OSA ON CPAP: ICD-10-CM

## 2024-09-26 DIAGNOSIS — Z90.02: ICD-10-CM

## 2024-09-26 RX ORDER — ERYTHROMYCIN 5 MG/G
OINTMENT OPHTHALMIC
COMMUNITY
Start: 2024-09-17

## 2024-09-26 NOTE — PROGRESS NOTES
[facility] PHYSICIANS [place] SPECIALTY  [facility] EAR, NOSE AND THROAT  [address]   [address]  [facility] [address]  Dept: [phone]  Dept Fax: [phone]  Loc: [phone]    Abel Singer is a 69 y.o. male who was referred by No ref. provider found for:  Chief Complaint   Patient presents with    Follow-up     Patient here for cancer surveillance with scope one month follow up. Patient has no other concerns.    .    HPI:     Abel Singer is a 69 y.o. male who presents today for ***.    History:     Allergies   Allergen Reactions    Morphine Nausea And Vomiting     Current Outpatient Medications   Medication Sig Dispense Refill    erythromycin (ROMYCIN) 5 MG/GM ophthalmic ointment Apply to the lower eyelids at bedtime for two to four weeks      metoprolol succinate (TOPROL XL) 50 MG extended release tablet Take 0.5 tablets by mouth 2 times daily 90 tablet 3    gabapentin (NEURONTIN) 600 MG tablet Take 1 tablet by mouth 2 times daily for 90 days. 60 tablet 2    levothyroxine (SYNTHROID) 50 MCG tablet TAKE 1 TABLET DAILY 90 tablet 3    terazosin (HYTRIN) 5 MG capsule TAKE 1 CAPSULE AT BEDTIME 90 capsule 3    atorvastatin (LIPITOR) 40 MG tablet TAKE 1 TABLET NIGHTLY 90 tablet 3    ticagrelor (BRILINTA) 90 MG TABS tablet Take 1 tablet by mouth 2 times daily Okay to resume Thursday 12/7/23 (after completing 7 days of Lovenox) 180 tablet 1    omeprazole (PRILOSEC) 40 MG delayed release capsule Take 1 capsule by mouth every evening 90 capsule 1    aspirin 81 MG EC tablet Take 1 tablet by mouth daily Okay to resume tomorrow AM 11/11/23 30 tablet 3    nitroGLYCERIN (NITROSTAT) 0.4 MG SL tablet up to max of 3 total doses. If no relief after 1 dose, call 911. 25 tablet 3    Coenzyme Q10 (CO Q-10) 200 MG CAPS Take 1 capsule by mouth daily      Multiple Vitamin (MULTI-VITAMIN) TABS Take 1 tablet by mouth daily      arginine 500 MG tablet Take 4 tablets by mouth daily       No current facility-administered 
Verbal order from Dr Brambila to anesthetize the patient's nasal cavity.  After checking the patient's allergy list to confirm no listed medication allergy and verbally asking the patient, the patient's nasal cavity was anesthetized with topical 4% Xylocaine 4 sprays each nostril and Abhishek-Synephrine 4 sprays each nostril .   
medications for this visit.     Past Medical History:   Diagnosis Date    Ascending Aneurysm (HCC) 02/2017    Ascending, 4.2 cm per pt    Brown's esophagus     CAD (coronary artery disease)     Cancer (HCC)     throat cancer, s/p radiation    Chronic back pain     Hepatitis C     as child    History of hyperbaric oxygen therapy     after bottom teeth removed after rdiation therapy    Hyperlipidemia     Hypertension     Left shoulder pain     nerve pain , ? 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    PONV (postoperative nausea and vomiting)     only after OHS    S/P CABG (coronary artery bypass graft) 2002    [facility]    Thyroid disease     Vision loss of right eye     during hyperbaric chamber oxygen therapy     Vitreous opacities     right    Wears dentures     full upper, no bottom teeth and no use of dentures on bottom      Past Surgical History:   Procedure Laterality Date    BACK INJECTION Right 06/22/2023    right sacroiliac joint injection performed by Jason Camp DO at [facility] OR    BACK INJECTION Right 06/18/2024    right sacroiliac joint injection performed by Jason Camp DO at [facility] OR    BACK INJECTION Right 08/27/2024    right sacroiliac injection performed by Jason Camp DO at [facility] OR    CARDIAC CATHETERIZATION  8/2002, 4/2004    [facility]    CATARACT REMOVAL Bilateral 08/15/2024    COLONOSCOPY      CORONARY ANGIOPLASTY WITH STENT PLACEMENT  07/2002    [facility]    CORONARY ANGIOPLASTY WITH STENT PLACEMENT N/A 07/12/2019    MID LAD    CORONARY ARTERY BYPASS GRAFT  2002    [facility]    DENTAL SURGERY      bottom teeth removal    HEMORRHOID SURGERY      KNEE SURGERY Right     LARYNGECTOMY N/A 10/20/2023    Laterovertical Partial Laryngectomy performed by Jeremías Brambila MD at [facility] OR    LARYNGOSCOPY N/A 10/04/2017    LARYNGOSCOPY MICRO WITH BIOPSY performed by Jeremías

## 2024-09-30 DIAGNOSIS — I25.82 CHRONIC TOTAL OCCLUSION OF CORONARY ARTERY: ICD-10-CM

## 2024-09-30 DIAGNOSIS — K22.719 BARRETT'S ESOPHAGUS WITH DYSPLASIA: ICD-10-CM

## 2024-09-30 RX ORDER — TICAGRELOR 90 MG/1
TABLET ORAL
Qty: 180 TABLET | Refills: 1 | Status: SHIPPED | OUTPATIENT
Start: 2024-09-30

## 2024-09-30 RX ORDER — OMEPRAZOLE 40 MG/1
CAPSULE, DELAYED RELEASE ORAL
Qty: 90 CAPSULE | Refills: 1 | Status: SHIPPED | OUTPATIENT
Start: 2024-09-30

## 2024-10-16 DIAGNOSIS — I25.82 CHRONIC TOTAL OCCLUSION OF CORONARY ARTERY: ICD-10-CM

## 2024-10-22 ENCOUNTER — OFFICE VISIT (OUTPATIENT)
Dept: PHYSICAL MEDICINE AND REHAB | Age: 69
End: 2024-10-22
Payer: MEDICARE

## 2024-10-22 VITALS
SYSTOLIC BLOOD PRESSURE: 130 MMHG | DIASTOLIC BLOOD PRESSURE: 66 MMHG | WEIGHT: 177 LBS | HEART RATE: 66 BPM | BODY MASS INDEX: 24.78 KG/M2 | HEIGHT: 71 IN

## 2024-10-22 DIAGNOSIS — G89.4 CHRONIC PAIN SYNDROME: ICD-10-CM

## 2024-10-22 DIAGNOSIS — M54.12 CERVICAL RADICULOPATHY: Primary | ICD-10-CM

## 2024-10-22 DIAGNOSIS — M53.3 SACROILIAC JOINT DYSFUNCTION OF RIGHT SIDE: ICD-10-CM

## 2024-10-22 DIAGNOSIS — M79.2 NEUROPATHIC PAIN: ICD-10-CM

## 2024-10-22 PROCEDURE — 3017F COLORECTAL CA SCREEN DOC REV: CPT | Performed by: NURSE PRACTITIONER

## 2024-10-22 PROCEDURE — 99214 OFFICE O/P EST MOD 30 MIN: CPT | Performed by: NURSE PRACTITIONER

## 2024-10-22 PROCEDURE — G8484 FLU IMMUNIZE NO ADMIN: HCPCS | Performed by: NURSE PRACTITIONER

## 2024-10-22 PROCEDURE — 3078F DIAST BP <80 MM HG: CPT | Performed by: NURSE PRACTITIONER

## 2024-10-22 PROCEDURE — 1036F TOBACCO NON-USER: CPT | Performed by: NURSE PRACTITIONER

## 2024-10-22 PROCEDURE — 3075F SYST BP GE 130 - 139MM HG: CPT | Performed by: NURSE PRACTITIONER

## 2024-10-22 PROCEDURE — G8420 CALC BMI NORM PARAMETERS: HCPCS | Performed by: NURSE PRACTITIONER

## 2024-10-22 PROCEDURE — G8427 DOCREV CUR MEDS BY ELIG CLIN: HCPCS | Performed by: NURSE PRACTITIONER

## 2024-10-22 PROCEDURE — 1123F ACP DISCUSS/DSCN MKR DOCD: CPT | Performed by: NURSE PRACTITIONER

## 2024-10-22 RX ORDER — GABAPENTIN 600 MG/1
600 TABLET ORAL 2 TIMES DAILY
Qty: 60 TABLET | Refills: 2 | Status: SHIPPED | OUTPATIENT
Start: 2024-10-22 | End: 2025-01-20

## 2024-10-22 NOTE — PROGRESS NOTES
Functionality Assessment/Goals Worksheet     On a scale of 0 (Does not Interfere) to 10 (Completely Interferes)     1.  Which number describes how during the past week pain has interfered with           the following:  A.  General Activity:  3  B.  Mood: 1  C.  Walking Ability:  3  D.  Normal Work (Includes both work outside the home and housework):  3  E.  Relations with Other People:   5  F.  Sleep:   6  G.  Enjoyment of Life:   5    2.  Patient Prefers to Take their Pain Medications:     [x]  On a regular basis   []  Only when necessary    []  Does not take pain medications    3.  What are the Patient's Goals/Expectations for Visiting Pain Management?     [x]  Learn about my pain    []  Receive Medication   []  Physical Therapy     []  Treat Depression   [x]  Receive Injections    []  Treat Sleep   []  Deal with Anxiety and Stress   []  Treat Opoid Dependence/Addiction   []  Other:                                
  Anticoagulation/NPO Recommendations:   Patient does need to hold Brillinta x 5 days prior to the procedure. Dr. Burdick  Patient does not need to be NPO prior to the procedure.  We will do a LOCAL injection      Multidisciplinary Pain Management:   In the presence of complex, chronic, and multi-factorial pain, the importance of a multidisciplinary approach to pain management in the patient’s management regimen was emphasized and discussed in great detail.   PHYSICAL THERAPY: Patient is advised to see a physical therapist for gentle stretching exercises and conditioning exercises for management of pain.   PSYCHOLOGY: Patient is advised to see a clinical pain psychologist, for the psychosocial aspect of pain care through coping skills, relaxation strategies, cognitive group therapy etc.     Referrals:  None     Prescriptions Written This Visit:   Gabapentin 600 mg    Follow-up: BLADIMIR, in office 4 weeks after    DEA Simpson - CNP

## 2024-10-23 ENCOUNTER — TELEPHONE (OUTPATIENT)
Dept: PHYSICAL MEDICINE AND REHAB | Age: 69
End: 2024-10-23

## 2024-10-23 ENCOUNTER — TELEPHONE (OUTPATIENT)
Dept: CARDIOLOGY CLINIC | Age: 69
End: 2024-10-23

## 2024-10-23 NOTE — TELEPHONE ENCOUNTER
Pre op Risk Assessment    Procedure Cervical 6/7 Epidural Steroid Injection  Physician Dr. Jason Camp  Date of surgery/procedure TBD    Last OV 08/14/2024  Last Stress 07/03/2019  Last Echo 07/27/2012  Last Cath 07/12/2019  Last Stent 07/12/2019  Is patient on blood thinners Aspirin and Brilinta  Hold Meds/how many days Brilinta for 5 days        Fax: [phone]  Phone: [phone]

## 2024-10-24 ENCOUNTER — OFFICE VISIT (OUTPATIENT)
Dept: ENT CLINIC | Age: 69
End: 2024-10-24

## 2024-10-24 VITALS
HEIGHT: 71 IN | DIASTOLIC BLOOD PRESSURE: 78 MMHG | HEART RATE: 55 BPM | WEIGHT: 178 LBS | BODY MASS INDEX: 24.92 KG/M2 | SYSTOLIC BLOOD PRESSURE: 132 MMHG | OXYGEN SATURATION: 96 % | TEMPERATURE: 97.5 F

## 2024-10-24 DIAGNOSIS — C32.8 SQUAMOUS CELL CARCINOMA OF OVERLAPPING SITES OF LARYNX (HCC): Primary | ICD-10-CM

## 2024-10-24 DIAGNOSIS — G47.33 OSA ON CPAP: ICD-10-CM

## 2024-10-24 DIAGNOSIS — E03.4 HYPOTHYROIDISM DUE TO ACQUIRED ATROPHY OF THYROID: ICD-10-CM

## 2024-10-24 DIAGNOSIS — Z90.02: ICD-10-CM

## 2024-10-24 DIAGNOSIS — T66.XXXD EFFECTS, RADIATION, SUBSEQUENT ENCOUNTER: ICD-10-CM

## 2024-10-24 DIAGNOSIS — K22.70 BARRETT'S ESOPHAGUS WITHOUT DYSPLASIA: ICD-10-CM

## 2024-10-24 NOTE — PROGRESS NOTES
[facility] PHYSICIANS [place] SPECIALTY  [facility] EAR, NOSE AND THROAT  [address]   [address]  [facility] [address]  Dept: [phone]  Dept Fax: [phone]  Loc: [phone]    Abel Singer is a 69 y.o. male who was referred by No ref. provider found for:  Chief Complaint   Patient presents with    Follow-up     Patient here for 1 month follow up for cancer surveillance.    .    HPI:     Abel Singer is a 69 y.o. male who presents today for ***.    History:     Allergies   Allergen Reactions    Morphine Nausea And Vomiting     Current Outpatient Medications   Medication Sig Dispense Refill    gabapentin (NEURONTIN) 600 MG tablet Take 1 tablet by mouth 2 times daily for 90 days. 60 tablet 2    ticagrelor (BRILINTA) 90 MG TABS tablet Take 1 tablet by mouth 2 times daily 180 tablet 3    omeprazole (PRILOSEC) 40 MG delayed release capsule TAKE 1 CAPSULE EVERY EVENING 90 capsule 1    erythromycin (ROMYCIN) 5 MG/GM ophthalmic ointment Apply to the lower eyelids at bedtime for two to four weeks      metoprolol succinate (TOPROL XL) 50 MG extended release tablet Take 0.5 tablets by mouth 2 times daily 90 tablet 3    levothyroxine (SYNTHROID) 50 MCG tablet TAKE 1 TABLET DAILY 90 tablet 3    terazosin (HYTRIN) 5 MG capsule TAKE 1 CAPSULE AT BEDTIME 90 capsule 3    atorvastatin (LIPITOR) 40 MG tablet TAKE 1 TABLET NIGHTLY 90 tablet 3    aspirin 81 MG EC tablet Take 1 tablet by mouth daily Okay to resume tomorrow AM 11/11/23 30 tablet 3    nitroGLYCERIN (NITROSTAT) 0.4 MG SL tablet up to max of 3 total doses. If no relief after 1 dose, call 911. 25 tablet 3    Coenzyme Q10 (CO Q-10) 200 MG CAPS Take 1 capsule by mouth daily      Multiple Vitamin (MULTI-VITAMIN) TABS Take 1 tablet by mouth daily      arginine 500 MG tablet Take 4 tablets by mouth daily       No current facility-administered medications for this visit.     Past Medical History:   Diagnosis Date    Ascending Aneurysm (HCC) 02/2017

## 2024-11-01 NOTE — PROGRESS NOTES
[facility] [place] [facility]  [facility] EAR, NOSE AND THROAT  [address]  [address]  [facility] [address]  Dept: [phone]  Dept Fax: [phone]  Loc: [phone]    Abel Singer is a 68 y.o. male who was referred by No ref. provider found for:  Chief Complaint   Patient presents with    Follow-up     Patient is here for a follow up cancer surveillance.  He said he is feeling okay.      .    HPI:     Abel Singer is a 68 y.o. male who presents today for continued surveillance of his squamous cell carcinoma of the larynx.  His cough is better.    History:     Allergies   Allergen Reactions    Morphine Nausea And Vomiting     Current Outpatient Medications   Medication Sig Dispense Refill    XDEMVY 0.25 % SOLN INSTILL 1 DROP IN EACH EYE TWICE DAILY FOR 6 WEEKS      levothyroxine (SYNTHROID) 50 MCG tablet TAKE 1 TABLET DAILY 90 tablet 3    terazosin (HYTRIN) 5 MG capsule TAKE 1 CAPSULE AT BEDTIME 90 capsule 3    atorvastatin (LIPITOR) 40 MG tablet TAKE 1 TABLET NIGHTLY 90 tablet 3    ticagrelor (BRILINTA) 90 MG TABS tablet Take 1 tablet by mouth 2 times daily Okay to resume Thursday 12/7/23 (after completing 7 days of Lovenox) 180 tablet 1    omeprazole (PRILOSEC) 40 MG delayed release capsule Take 1 capsule by mouth every evening 90 capsule 1    metoprolol succinate (TOPROL XL) 50 MG extended release tablet Take 0.5 tablets by mouth 2 times daily 90 tablet 1    aspirin 81 MG EC tablet Take 1 tablet by mouth daily Okay to resume tomorrow AM 11/11/23 30 tablet 3    Coenzyme Q10 (CO Q-10) 200 MG CAPS Take 1 capsule by mouth daily      Multiple Vitamin (MULTI-VITAMIN) TABS Take 1 tablet by mouth daily      arginine 500 MG tablet Take 4 tablets by mouth daily      PROLENSA 0.07 % SOLN  (Patient not taking: Reported on 7/30/2024)      moxifloxacin (VIGAMOX) 0.5 % ophthalmic solution  (Patient not taking: Reported on 7/30/2024)      gabapentin (NEURONTIN) 600 MG tablet Take 1 tablet by mouth

## 2024-11-21 ENCOUNTER — OFFICE VISIT (OUTPATIENT)
Dept: ENT CLINIC | Age: 69
End: 2024-11-21

## 2024-11-21 VITALS
TEMPERATURE: 97.2 F | BODY MASS INDEX: 24.85 KG/M2 | RESPIRATION RATE: 18 BRPM | HEIGHT: 71 IN | HEART RATE: 65 BPM | OXYGEN SATURATION: 96 % | DIASTOLIC BLOOD PRESSURE: 70 MMHG | WEIGHT: 177.5 LBS | SYSTOLIC BLOOD PRESSURE: 122 MMHG

## 2024-11-21 DIAGNOSIS — T66.XXXD EFFECTS, RADIATION, SUBSEQUENT ENCOUNTER: ICD-10-CM

## 2024-11-21 DIAGNOSIS — K22.70 BARRETT'S ESOPHAGUS WITHOUT DYSPLASIA: ICD-10-CM

## 2024-11-21 DIAGNOSIS — C32.8 SQUAMOUS CELL CARCINOMA OF OVERLAPPING SITES OF LARYNX (HCC): Primary | ICD-10-CM

## 2024-11-21 DIAGNOSIS — E03.4 HYPOTHYROIDISM DUE TO ACQUIRED ATROPHY OF THYROID: ICD-10-CM

## 2024-11-21 NOTE — PROGRESS NOTES
Ht 1.803 m (5' 10.98\")   Wt 80.5 kg (177 lb 8 oz)   SpO2 96%   BMI 24.77 kg/m²     Physical Exam    {[id number]}    Data:  All of the past medical history, past surgical history, family history,social history, allergies   and current medications were reviewed with the patient.    Assessment & Plan     Diagnoses and all orders for this visit:    {No diagnosis found. (Refresh or delete this SmartLink)}    The findings were explained and his questions were answered.      No follow-ups on file.         Jeremías Brambila MD    **This report has been created using voice recognition software. It may contain minor errors which are inherent in voicerecognition technology.**

## 2024-12-09 ENCOUNTER — TELEPHONE (OUTPATIENT)
Dept: PHYSICAL MEDICINE AND REHAB | Age: 69
End: 2024-12-09

## 2024-12-09 NOTE — DISCHARGE INSTRUCTIONS
Post procedure Instructions:    No driving or making significant decisions for the remainder of the day.   Be cautions with walking and activity for 24 hours, do not over exert yourself.  Ok to resume pre-procedure activity level today.  Apply ice to site of injection site if you have pain or discomfort.  Do not submerge sit of injection during bath or pool activities for 48 hours post-procedure.  Resume blood thinning medications in 24 hours.     Call office [phone] if you have:  Temperature greater than 100.4  Persistent nausea and vomiting  Severe uncontrolled pain  Redness, tenderness, or signs of infection (pain, swelling, redness, odor or green/yellow discharge around the site)  Difficulty breathing, headache or visual disturbances  Hives  Persistent dizziness or light-headedness  Extreme fatigue  Any other questions or concerns you may have after discharge    In an emergency, call 911 or go to an Emergency Department at a nearby hospital    “Surgical Site Infections”      How can we work together to prevent Surgical Site Infections?   We would like to thank you for choosing [facility] for your Surgical Care.  Below you will find helpful information on how we can work together to prevent Surgical Site Infections.    What is a Surgical Site Infection (SSI)?  A surgical site infection is an infection that occurs after surgery in the part of the body where the surgery took place. Most patients who have surgery do not develop an infection. However, infections develop in about 1 to 3 out of every 100 patients who have surgery.  Some of the common symptoms of a surgical site infection are:  Redness and pain around the area where you had surgery  Drainage of cloudy fluid from your surgical wound  Fever    Can SSIs be treated?  Yes. Most surgical site infections can be treated with antibiotics. The antibiotic given to you depends on the bacteria (germs) causing the infection. Sometimes patients with

## 2024-12-09 NOTE — H&P
10/20/2023    Laterovertical Partial Laryngectomy performed by Jeremías Brambila MD at [facility] OR    LARYNGOSCOPY N/A 10/04/2017    LARYNGOSCOPY MICRO WITH BIOPSY performed by Jeremías Brambila MD at [facility] OR    LARYNGOSCOPY N/A 05/12/2021    LARYNGOSCOPY MICRO AND BIOPSY, WITH JET VENT performed by Jeremías Brambila MD at [facility] OR    LARYNGOSCOPY N/A 06/02/2021    (DR. HUNTER) SUSPENSION MICROLARYNGOSCOPY WITH RECONSTRUCTION AND ADVANCEMENT FLAP X2; (DR. BRAMBILA)  MICROLARYNGOSCOPY AND BX POSTERIOR RIGHT VOCAL CORD      JET VENTILATION AND CO2 LASER performed by Ambrose Hunter MD at [facility] OR    LARYNGOSCOPY N/A 07/27/2022    SUSPENSION DIRECT LARYNGOSCOPY WITH EXCISIONAL BIOPSY OF RIGHT SIDED LARYNGEAL MASS performed by Jeremías Brambila MD at [facility] OR    LARYNGOSCOPY N/A 08/22/2022    MICROLARYNGOSCOPY WITH BIOPSY, SUSPENSION LARYNGOSCOPY WITH JET VENTILATION AND C02 LASER, RE-EXCISION FOR MARGINS performed by Jeremías Brambila MD at [facility] OR    LARYNGOSCOPY N/A 02/27/2023    MICROLARYNGOSCOPY WITH BIOPSY performed by Jeremías Brambila MD at [facility] OR    LARYNGOSCOPY N/A 10/04/2023    MicroLaryngoscopy with Biopsy performed by Jeremías Brambila MD at [facility] OR    LARYNGOSCOPY N/A 11/08/2023    Laryngoscopy Control of Bleed performed by Jeremías Brambila MD at [facility] OR    LARYNGOSCOPY N/A 11/27/2023    REVISION ANTERIOR LATERAL VERTICAL PARTAL RESECTION performed by Ambrose Hunter MD at [facility] OR    MICROLARYNGOSCOPY W BIOPSY  04/28/2016    by Dr Brambila    NERVE BLOCK Right 08/24/2023    right advanced sacroiliac joint block performed by Jason Camp DO at [facility] [place] OR    PAIN MANAGEMENT PROCEDURE Right 04/09/2024    Cervical 6-7 epidural steroid injection performed by Jason Camp DO at [facility] [place] OR    OK ASPIRATION/RELEASE VITREOUS SUBRETINAL/CHOROIDAL Right 08/27/2018    VITRECTOMY 25 GAUGE performed by Hugh Leonard MD at [facility] OR    VITRECTOMY Right 08/27/2018     VITRECTOMY 25 GAUGE (Right

## 2024-12-10 ENCOUNTER — HOSPITAL ENCOUNTER (OUTPATIENT)
Age: 69
Setting detail: OUTPATIENT SURGERY
Discharge: HOME | End: 2024-12-10
Attending: ANESTHESIOLOGY | Admitting: ANESTHESIOLOGY
Payer: MEDICARE

## 2024-12-10 ENCOUNTER — APPOINTMENT (OUTPATIENT)
Dept: GENERAL RADIOLOGY | Age: 69
End: 2024-12-10
Attending: ANESTHESIOLOGY
Payer: MEDICARE

## 2024-12-10 VITALS
SYSTOLIC BLOOD PRESSURE: 165 MMHG | HEIGHT: 71 IN | WEIGHT: 176 LBS | OXYGEN SATURATION: 99 % | HEART RATE: 61 BPM | DIASTOLIC BLOOD PRESSURE: 77 MMHG | TEMPERATURE: 96.7 F | RESPIRATION RATE: 9 BRPM | BODY MASS INDEX: 24.64 KG/M2

## 2024-12-10 PROCEDURE — 2500000003 HC RX 250 WO HCPCS: Performed by: ANESTHESIOLOGY

## 2024-12-10 PROCEDURE — 3600000054 HC PAIN LEVEL 3 BASE: Performed by: ANESTHESIOLOGY

## 2024-12-10 PROCEDURE — 6360000002 HC RX W HCPCS: Performed by: ANESTHESIOLOGY

## 2024-12-10 PROCEDURE — 2709999900 HC NON-CHARGEABLE SUPPLY: Performed by: ANESTHESIOLOGY

## 2024-12-10 PROCEDURE — 7100000010 HC PHASE II RECOVERY - FIRST 15 MIN: Performed by: ANESTHESIOLOGY

## 2024-12-10 PROCEDURE — 62321 NJX INTERLAMINAR CRV/THRC: CPT | Performed by: ANESTHESIOLOGY

## 2024-12-10 PROCEDURE — 6360000004 HC RX CONTRAST MEDICATION: Performed by: ANESTHESIOLOGY

## 2024-12-10 RX ORDER — DEXAMETHASONE SODIUM PHOSPHATE 4 MG/ML
INJECTION, SOLUTION INTRA-ARTICULAR; INTRALESIONAL; INTRAMUSCULAR; INTRAVENOUS; SOFT TISSUE PRN
Status: DISCONTINUED | OUTPATIENT
Start: 2024-12-10 | End: 2024-12-10 | Stop reason: ALTCHOICE

## 2024-12-10 RX ORDER — IOPAMIDOL 612 MG/ML
INJECTION, SOLUTION INTRATHECAL PRN
Status: DISCONTINUED | OUTPATIENT
Start: 2024-12-10 | End: 2024-12-10 | Stop reason: ALTCHOICE

## 2024-12-10 RX ORDER — ACYCLOVIR 200 MG/1
CAPSULE ORAL PRN
Status: DISCONTINUED | OUTPATIENT
Start: 2024-12-10 | End: 2024-12-10 | Stop reason: ALTCHOICE

## 2024-12-10 RX ORDER — LIDOCAINE HYDROCHLORIDE 10 MG/ML
INJECTION, SOLUTION EPIDURAL; INFILTRATION; INTRACAUDAL; PERINEURAL PRN
Status: DISCONTINUED | OUTPATIENT
Start: 2024-12-10 | End: 2024-12-10 | Stop reason: ALTCHOICE

## 2024-12-10 ASSESSMENT — PAIN - FUNCTIONAL ASSESSMENT: PAIN_FUNCTIONAL_ASSESSMENT: 0-10

## 2024-12-10 NOTE — PROGRESS NOTES
1044 Patient to phase 2, report from Tere JONES. Patient alert, oriented, appropriate. Vitals stable on room air. Instructed to monitor BP at home and call MD if remains high. Injection site with no complications.   1050 Patient walked to discharge doors in stable condition. Discharged with AVS and all belongings. Denies any questions regarding AVS.

## 2024-12-10 NOTE — PROCEDURES
Pre-operative Diagnosis: Radicular arm pain     Post-operative Diagnosis: Radicular arm pain     Procedure: Cervical epidural steroid injection    Procedure Description:  After having obtained a signed informed consent, the patient was taken to the fluoroscopy suite and placed in the prone position.  The neck was prepped with chloraprep and draped in a sterile fashion.  A total of 1 cc of  1% lidocaine was used to anesthetize the skin and underlying tissues.  Under fluoroscopic guidance, a single 20G Tuohy needle was advanced midline at the C6/C7 interspace until gaining the epidural space using the loss of resistance to saline syringe technique.  There were no paresthesias, heme, or CSF aspiration. A total of 0.25 cc of Omnipaque 300 were injected having had adequate dye spread within the epidural space. Needle placement was confirmed using the AP and contra-lateral oblique views.  10 mg of dexamethasone flushed with 0.25 cc of saline solution were injected in the epidural space. The needle was removed without any complication.  The patient tolerated the procedure well and was transported to the recovery room where he was observed for 15 minutes to then be discharged in ambulatory fashion.      Procedural Complications: None  Estimated Blood Loss: 0 mL        Jason Camp DO  Interventional Pain Management/PM&R   [facility]

## 2024-12-19 ENCOUNTER — OFFICE VISIT (OUTPATIENT)
Dept: ENT CLINIC | Age: 69
End: 2024-12-19

## 2024-12-19 VITALS
BODY MASS INDEX: 23.52 KG/M2 | OXYGEN SATURATION: 97 % | SYSTOLIC BLOOD PRESSURE: 126 MMHG | HEART RATE: 52 BPM | WEIGHT: 177.5 LBS | TEMPERATURE: 98.4 F | DIASTOLIC BLOOD PRESSURE: 64 MMHG | HEIGHT: 73 IN

## 2024-12-19 DIAGNOSIS — K22.70 BARRETT'S ESOPHAGUS WITHOUT DYSPLASIA: ICD-10-CM

## 2024-12-19 DIAGNOSIS — C32.8 SQUAMOUS CELL CARCINOMA OF OVERLAPPING SITES OF LARYNX (HCC): Primary | ICD-10-CM

## 2024-12-19 DIAGNOSIS — E03.4 HYPOTHYROIDISM DUE TO ACQUIRED ATROPHY OF THYROID: ICD-10-CM

## 2024-12-19 DIAGNOSIS — G47.33 OSA ON CPAP: ICD-10-CM

## 2024-12-19 DIAGNOSIS — Z90.02: ICD-10-CM

## 2024-12-23 ENCOUNTER — OFFICE VISIT (OUTPATIENT)
Dept: ENT CLINIC | Age: 69
End: 2024-12-23
Payer: MEDICARE

## 2024-12-23 VITALS
HEART RATE: 58 BPM | BODY MASS INDEX: 23.56 KG/M2 | HEIGHT: 73 IN | SYSTOLIC BLOOD PRESSURE: 118 MMHG | WEIGHT: 177.8 LBS | OXYGEN SATURATION: 100 % | TEMPERATURE: 98.1 F | DIASTOLIC BLOOD PRESSURE: 68 MMHG | RESPIRATION RATE: 20 BRPM

## 2024-12-23 DIAGNOSIS — J02.9 THROAT SORENESS: Primary | ICD-10-CM

## 2024-12-23 PROCEDURE — G8484 FLU IMMUNIZE NO ADMIN: HCPCS | Performed by: OTOLARYNGOLOGY

## 2024-12-23 PROCEDURE — G8427 DOCREV CUR MEDS BY ELIG CLIN: HCPCS | Performed by: OTOLARYNGOLOGY

## 2024-12-23 PROCEDURE — 99212 OFFICE O/P EST SF 10 MIN: CPT | Performed by: OTOLARYNGOLOGY

## 2024-12-23 PROCEDURE — 3078F DIAST BP <80 MM HG: CPT | Performed by: OTOLARYNGOLOGY

## 2024-12-23 PROCEDURE — 3074F SYST BP LT 130 MM HG: CPT | Performed by: OTOLARYNGOLOGY

## 2024-12-23 PROCEDURE — G8420 CALC BMI NORM PARAMETERS: HCPCS | Performed by: OTOLARYNGOLOGY

## 2024-12-23 PROCEDURE — 1159F MED LIST DOCD IN RCRD: CPT | Performed by: OTOLARYNGOLOGY

## 2024-12-23 PROCEDURE — 1123F ACP DISCUSS/DSCN MKR DOCD: CPT | Performed by: OTOLARYNGOLOGY

## 2024-12-23 PROCEDURE — 3017F COLORECTAL CA SCREEN DOC REV: CPT | Performed by: OTOLARYNGOLOGY

## 2024-12-23 PROCEDURE — 1036F TOBACCO NON-USER: CPT | Performed by: OTOLARYNGOLOGY

## 2024-12-23 NOTE — PROGRESS NOTES
[facility] [place] [facility]  [facility] EAR, NOSE AND THROAT  [address]   [address]  [facility] [address]  Dept: [phone]  Dept Fax: [phone]  Loc: [phone]    Abel Singer is a 69 y.o. male who was referred by No ref. provider found for:  Chief Complaint   Patient presents with    Follow-up     Patient is here for a f/u sore throat needs a culture ok'd per Dr. Brambila. Patient has a sore neck since Friday. He said it has to swallow including liquids. Patient is producing mucus and harder to swallow and coughing more. He said when he lays down to sleep he gurgles.    .    HPI:     Able Singer is a 69 y.o. male who presents today for ***.    History:     Allergies   Allergen Reactions    Morphine Nausea And Vomiting     Current Outpatient Medications   Medication Sig Dispense Refill    gabapentin (NEURONTIN) 600 MG tablet Take 1 tablet by mouth 2 times daily for 90 days. 60 tablet 2    ticagrelor (BRILINTA) 90 MG TABS tablet Take 1 tablet by mouth 2 times daily 180 tablet 3    omeprazole (PRILOSEC) 40 MG delayed release capsule TAKE 1 CAPSULE EVERY EVENING 90 capsule 1    erythromycin (ROMYCIN) 5 MG/GM ophthalmic ointment Apply to the lower eyelids at bedtime for two to four weeks      metoprolol succinate (TOPROL XL) 50 MG extended release tablet Take 0.5 tablets by mouth 2 times daily 90 tablet 3    levothyroxine (SYNTHROID) 50 MCG tablet TAKE 1 TABLET DAILY 90 tablet 3    terazosin (HYTRIN) 5 MG capsule TAKE 1 CAPSULE AT BEDTIME 90 capsule 3    atorvastatin (LIPITOR) 40 MG tablet TAKE 1 TABLET NIGHTLY 90 tablet 3    aspirin 81 MG EC tablet Take 1 tablet by mouth daily Okay to resume tomorrow AM 11/11/23 30 tablet 3    nitroGLYCERIN (NITROSTAT) 0.4 MG SL tablet up to max of 3 total doses. If no relief after 1 dose, call 911. 25 tablet 3    Coenzyme Q10 (CO Q-10) 200 MG CAPS Take 1 capsule by mouth daily      Multiple Vitamin (MULTI-VITAMIN) TABS Take 1 tablet by mouth daily

## 2024-12-25 LAB — BACTERIA THROAT AEROBE CULT: NORMAL

## 2024-12-27 NOTE — PROGRESS NOTES
[facility] [place] [facility]  [facility] EAR, NOSE AND THROAT  [address]   [address]  [facility] [address]  Dept: [phone]  Dept Fax: [phone]  Loc: [phone]    Abel Singer is a 69 y.o. male who was referred by No ref. provider found for:  Chief Complaint   Patient presents with    Follow-up     Patient here for a one month cancer surveillance. Patient has no other concerns.      .    HPI:     Abel Singer is a 69 y.o. male who presents today for ***.    History:     Allergies   Allergen Reactions    Morphine Nausea And Vomiting     Current Outpatient Medications   Medication Sig Dispense Refill    gabapentin (NEURONTIN) 600 MG tablet Take 1 tablet by mouth 2 times daily for 90 days. 60 tablet 2    ticagrelor (BRILINTA) 90 MG TABS tablet Take 1 tablet by mouth 2 times daily 180 tablet 3    omeprazole (PRILOSEC) 40 MG delayed release capsule TAKE 1 CAPSULE EVERY EVENING 90 capsule 1    erythromycin (ROMYCIN) 5 MG/GM ophthalmic ointment Apply to the lower eyelids at bedtime for two to four weeks      metoprolol succinate (TOPROL XL) 50 MG extended release tablet Take 0.5 tablets by mouth 2 times daily 90 tablet 3    levothyroxine (SYNTHROID) 50 MCG tablet TAKE 1 TABLET DAILY 90 tablet 3    terazosin (HYTRIN) 5 MG capsule TAKE 1 CAPSULE AT BEDTIME 90 capsule 3    atorvastatin (LIPITOR) 40 MG tablet TAKE 1 TABLET NIGHTLY 90 tablet 3    aspirin 81 MG EC tablet Take 1 tablet by mouth daily Okay to resume tomorrow AM 11/11/23 30 tablet 3    nitroGLYCERIN (NITROSTAT) 0.4 MG SL tablet up to max of 3 total doses. If no relief after 1 dose, call 911. 25 tablet 3    Coenzyme Q10 (CO Q-10) 200 MG CAPS Take 1 capsule by mouth daily      Multiple Vitamin (MULTI-VITAMIN) TABS Take 1 tablet by mouth daily      arginine 500 MG tablet Take 4 tablets by mouth daily       No current facility-administered medications for this visit.     Past Medical History:   Diagnosis Date    Ascending Aneurysm 
122/72 (Site: Left Upper Arm, Position: Sitting)  Pulse 51  Temp 97.4 °F (36.3 °C) (Temporal)  Resp 16  Ht 1.803 m (5' 11\")  Wt 81.2 kg (179 lb 1.6 oz)  SpO2 99%  BMI 24.98 kg/m²     Physical Exam  His voice is the same    HIGH RESOLUTION FLEXIBLE VIDEOLARYNGOSOCPY    A fiberoptic laryngoscopy was performed under topical anesthesia, after using Afrin and Lidocaine spray in the nasal fossa. The nasal fossa, nasopharynx, hypopharynx and larynx were carefully examined. Base of tongue was symmetrical.  Right aryepiglottic fold and epiglottis had some contracture from the prior surgery.  I was able to get into the endolarynx and there were no mucosal lesions.  True vocal cords had normal mobility. No pooling in the pyriform sinuses.          Data:  All of the past medical history, past surgical history, family history,social history, allergies   and current medications were reviewed with the patient.    Assessment & Plan     Diagnoses and all orders for this visit:     Diagnosis Orders   1. Squamous cell carcinoma of overlapping sites of larynx (HCC)  [facility] LARYNGOSCOPY FLEXIBLE DIAGNOSTIC      2. Brown's esophagus with dysplasia  [facility] LARYNGOSCOPY FLEXIBLE DIAGNOSTIC      3. Effects, radiation, subsequent encounter  [facility] LARYNGOSCOPY FLEXIBLE DIAGNOSTIC      4. Hypothyroidism due to acquired atrophy of thyroid  [facility] LARYNGOSCOPY FLEXIBLE DIAGNOSTIC      5. H/O partial laryngectomy  [facility] LARYNGOSCOPY FLEXIBLE DIAGNOSTIC          The findings were explained and his questions were answered.  Captured stills were dark, but the video showed no evidence of recurrence in the endolarynx.  Continue interval cancer surveillance       Jeremías Brambila MD    **This report has been created using voice recognition software. It may contain minor errors which are inherent in voicerecognition technology.**

## 2024-12-31 NOTE — PROGRESS NOTES
Patient presents with an acute sore throat.  He has had full course radiation to the entire area.  It is so painful he is having difficulty eating.    Throat culture was obtained.  Pharynx was quite erythematous and he is started on Augmentin.    He will keep his originally scheduled appointment for his cancer surveillance.

## 2025-01-07 ENCOUNTER — OFFICE VISIT (OUTPATIENT)
Dept: PHYSICAL MEDICINE AND REHAB | Age: 70
End: 2025-01-07
Payer: MEDICARE

## 2025-01-07 VITALS
HEIGHT: 71 IN | BODY MASS INDEX: 24.5 KG/M2 | SYSTOLIC BLOOD PRESSURE: 114 MMHG | DIASTOLIC BLOOD PRESSURE: 58 MMHG | WEIGHT: 175 LBS

## 2025-01-07 DIAGNOSIS — G89.4 CHRONIC PAIN SYNDROME: ICD-10-CM

## 2025-01-07 DIAGNOSIS — M79.2 NEUROPATHIC PAIN: ICD-10-CM

## 2025-01-07 DIAGNOSIS — M54.12 CERVICAL RADICULOPATHY: Primary | ICD-10-CM

## 2025-01-07 DIAGNOSIS — M53.3 SACROILIAC JOINT DYSFUNCTION OF RIGHT SIDE: ICD-10-CM

## 2025-01-07 PROCEDURE — 1123F ACP DISCUSS/DSCN MKR DOCD: CPT | Performed by: NURSE PRACTITIONER

## 2025-01-07 PROCEDURE — G8420 CALC BMI NORM PARAMETERS: HCPCS | Performed by: NURSE PRACTITIONER

## 2025-01-07 PROCEDURE — 99214 OFFICE O/P EST MOD 30 MIN: CPT | Performed by: NURSE PRACTITIONER

## 2025-01-07 PROCEDURE — M1300 PR FLU IMM NO ADMIN DOC REA: HCPCS | Performed by: NURSE PRACTITIONER

## 2025-01-07 PROCEDURE — 3078F DIAST BP <80 MM HG: CPT | Performed by: NURSE PRACTITIONER

## 2025-01-07 PROCEDURE — G8427 DOCREV CUR MEDS BY ELIG CLIN: HCPCS | Performed by: NURSE PRACTITIONER

## 2025-01-07 PROCEDURE — 3074F SYST BP LT 130 MM HG: CPT | Performed by: NURSE PRACTITIONER

## 2025-01-07 PROCEDURE — 1036F TOBACCO NON-USER: CPT | Performed by: NURSE PRACTITIONER

## 2025-01-07 PROCEDURE — 1159F MED LIST DOCD IN RCRD: CPT | Performed by: NURSE PRACTITIONER

## 2025-01-07 PROCEDURE — 3017F COLORECTAL CA SCREEN DOC REV: CPT | Performed by: NURSE PRACTITIONER

## 2025-01-07 RX ORDER — GABAPENTIN 300 MG/1
300 CAPSULE ORAL NIGHTLY
Qty: 30 CAPSULE | Refills: 0 | Status: SHIPPED | OUTPATIENT
Start: 2025-01-07 | End: 2025-02-06

## 2025-01-07 RX ORDER — GABAPENTIN 600 MG/1
600 TABLET ORAL 2 TIMES DAILY
Qty: 60 TABLET | Refills: 2 | Status: SHIPPED | OUTPATIENT
Start: 2025-01-07 | End: 2025-04-07

## 2025-01-07 NOTE — PROGRESS NOTES
[facility] PROFESSIONAL SERVS  [facility] NEUROSCIENCE AND REHABILITATION [address]  [facility] [address]  Dept: [phone]  Dept Fax: [phone]  Loc: [phone]    Visit Date: 1/7/2025    Functionality Assessment/Goals Worksheet     On a scale of 0 (Does not Interfere) to 10 (Completely Interferes)     1.  Which number describes how during the past week pain has interfered with       the following:  A.  General Activity:  3  B.  Mood: 1  C.  Walking Ability:  3  D.  Normal Work (Includes both work outside the home and housework):  4  E.  Relations with Other People:   3  F.  Sleep:   4  G.  Enjoyment of Life:   3    2.  Patient Prefers to Take their Pain Medications:     [x]  On a regular basis   []  Only when necessary    []  Does not take pain medications    3.  What are the Patient's Goals/Expectations for Visiting Pain Management?     []  Learn about my pain    [x]  Receive Medication   []  Physical Therapy     []  Treat Depression   [x]  Receive Injections    []  Treat Sleep   []  Deal with Anxiety and Stress   []  Treat Opoid Dependence/Addiction   []  Other:

## 2025-01-07 NOTE — PROGRESS NOTES
Chronic Pain/PM&R Clinic Note     Encounter Date: 1/7/25    Subjective:   Chief Complaint:   Chief Complaint   Patient presents with    Follow-up     Cervical radiculopathy     History of Present Illness:   Abel Singer is a 69 y.o. male seen in the clinic initially on 06/19/2023 upon request from Baldev George MD  for his history of right hip pain. Patient states he developed right hip pain about 2 years ago without any inciting event. He has put it on the back burner because he has been dealing with laryngeal cancer for the past 6 years. He states he just had a surgery with Dr. Brambila 3 months ago and has a follow up with him coming up with another potential scope. He had radiation 6 years ago but not since and he never had chemo. He states his right hip feels aching and will getting stabbing in nature if aggravated. Pain is aggravated by working in the woods picking up sticks and with activities involving bending. He also struggles with walking long distance. He denies any leg weakness, any history of falls. He denies any radiating leg pain. He states the pain will occasionally wake him at night. He rolls from side to side as he has right hip pain and feels better laying on his left but but has cervical radiculopathy and his left arm falls asleep laying on his left. He has completed PT for his neck but not for his low back or hips. He used to see Dr. Davila for his left arm pain and has had injections which are effective but they again were put on hold due to cancer. 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 He does work in his mothers woods and likes to golf but has been limited by his pain. He denies any saddle anesthesia or bowel/bladder incontinence.     Of note he has had a 5 vessel bypass 20 years ago and several stents placed, his most recent being about two years ago. He is currently on Brilinta from Dr. Burdick.     Today, 09/27/2023, patient presents for

## 2025-01-20 ENCOUNTER — LAB (OUTPATIENT)
Dept: LAB | Age: 70
End: 2025-01-20

## 2025-01-20 DIAGNOSIS — E78.2 MIXED HYPERLIPIDEMIA: ICD-10-CM

## 2025-01-20 DIAGNOSIS — I10 PRIMARY HYPERTENSION: ICD-10-CM

## 2025-01-20 LAB
ALBUMIN SERPL BCG-MCNC: 4.2 G/DL (ref 3.5–5.1)
ALP SERPL-CCNC: 93 U/L (ref 38–126)
ALT SERPL W/O P-5'-P-CCNC: 16 U/L (ref 11–66)
ANION GAP SERPL CALC-SCNC: 11 MEQ/L (ref 8–16)
AST SERPL-CCNC: 17 U/L (ref 5–40)
BASOPHILS ABSOLUTE: 0 THOU/MM3 (ref 0–0.1)
BASOPHILS NFR BLD AUTO: 0.5 %
BILIRUB SERPL-MCNC: 0.7 MG/DL (ref 0.3–1.2)
BUN SERPL-MCNC: 16 MG/DL (ref 7–22)
CALCIUM SERPL-MCNC: 9.4 MG/DL (ref 8.5–10.5)
CHLORIDE SERPL-SCNC: 103 MEQ/L (ref 98–111)
CHOLEST SERPL-MCNC: 144 MG/DL (ref 100–199)
CO2 SERPL-SCNC: 26 MEQ/L (ref 23–33)
CREAT SERPL-MCNC: 0.8 MG/DL (ref 0.4–1.2)
DEPRECATED RDW RBC AUTO: 45.8 FL (ref 35–45)
EOSINOPHIL NFR BLD AUTO: 1 %
EOSINOPHILS ABSOLUTE: 0.1 THOU/MM3 (ref 0–0.4)
ERYTHROCYTE [DISTWIDTH] IN BLOOD BY AUTOMATED COUNT: 13.1 % (ref 11.5–14.5)
GFR SERPL CREATININE-BSD FRML MDRD: > 90 ML/MIN/1.73M2
GLUCOSE SERPL-MCNC: 85 MG/DL (ref 70–108)
HCT VFR BLD AUTO: 44.7 % (ref 42–52)
HDLC SERPL-MCNC: 45 MG/DL
HGB BLD-MCNC: 14.3 GM/DL (ref 14–18)
IMM GRANULOCYTES # BLD AUTO: 0.02 THOU/MM3 (ref 0–0.07)
IMM GRANULOCYTES NFR BLD AUTO: 0.3 %
LDLC SERPL CALC-MCNC: 80 MG/DL
LYMPHOCYTES ABSOLUTE: 0.7 THOU/MM3 (ref 1–4.8)
LYMPHOCYTES NFR BLD AUTO: 12.3 %
MCH RBC QN AUTO: 30.2 PG (ref 26–33)
MCHC RBC AUTO-ENTMCNC: 32 GM/DL (ref 32.2–35.5)
MCV RBC AUTO: 94.5 FL (ref 80–94)
MONOCYTES ABSOLUTE: 0.6 THOU/MM3 (ref 0.4–1.3)
MONOCYTES NFR BLD AUTO: 9.6 %
NEUTROPHILS ABSOLUTE: 4.6 THOU/MM3 (ref 1.8–7.7)
NEUTROPHILS NFR BLD AUTO: 76.3 %
NRBC BLD AUTO-RTO: 0 /100 WBC
PLATELET # BLD AUTO: 192 THOU/MM3 (ref 130–400)
PMV BLD AUTO: 10.2 FL (ref 9.4–12.4)
POTASSIUM SERPL-SCNC: 3.9 MEQ/L (ref 3.5–5.2)
PROT SERPL-MCNC: 6.2 G/DL (ref 6.1–8)
RBC # BLD AUTO: 4.73 MILL/MM3 (ref 4.7–6.1)
SODIUM SERPL-SCNC: 140 MEQ/L (ref 135–145)
TRIGL SERPL-MCNC: 97 MG/DL (ref 0–199)
WBC # BLD AUTO: 6 THOU/MM3 (ref 4.8–10.8)

## 2025-01-23 ENCOUNTER — OFFICE VISIT (OUTPATIENT)
Dept: INTERNAL MEDICINE CLINIC | Age: 70
End: 2025-01-23

## 2025-01-23 VITALS
RESPIRATION RATE: 18 BRPM | SYSTOLIC BLOOD PRESSURE: 104 MMHG | HEART RATE: 64 BPM | DIASTOLIC BLOOD PRESSURE: 62 MMHG | OXYGEN SATURATION: 98 % | TEMPERATURE: 98.2 F

## 2025-01-23 DIAGNOSIS — I10 PRIMARY HYPERTENSION: Primary | ICD-10-CM

## 2025-01-23 DIAGNOSIS — M79.672 BILATERAL FOOT PAIN: ICD-10-CM

## 2025-01-23 DIAGNOSIS — M79.671 BILATERAL FOOT PAIN: ICD-10-CM

## 2025-01-23 DIAGNOSIS — M79.671 FOOT PAIN, BILATERAL: ICD-10-CM

## 2025-01-23 DIAGNOSIS — C32.8 SQUAMOUS CELL CARCINOMA OF OVERLAPPING SITES OF LARYNX (HCC): ICD-10-CM

## 2025-01-23 DIAGNOSIS — M79.672 FOOT PAIN, BILATERAL: ICD-10-CM

## 2025-01-23 DIAGNOSIS — E78.2 MIXED HYPERLIPIDEMIA: ICD-10-CM

## 2025-01-23 DIAGNOSIS — Z95.1 S/P CABG (CORONARY ARTERY BYPASS GRAFT): ICD-10-CM

## 2025-01-23 SDOH — ECONOMIC STABILITY: FOOD INSECURITY: WITHIN THE PAST 12 MONTHS, THE FOOD YOU BOUGHT JUST DIDN'T LAST AND YOU DIDN'T HAVE MONEY TO GET MORE.: NEVER TRUE

## 2025-01-23 SDOH — ECONOMIC STABILITY: FOOD INSECURITY: WITHIN THE PAST 12 MONTHS, YOU WORRIED THAT YOUR FOOD WOULD RUN OUT BEFORE YOU GOT MONEY TO BUY MORE.: NEVER TRUE

## 2025-01-23 ASSESSMENT — PATIENT HEALTH QUESTIONNAIRE - PHQ9
SUM OF ALL RESPONSES TO PHQ QUESTIONS 1-9: 0
2. FEELING DOWN, DEPRESSED OR HOPELESS: NOT AT ALL
SUM OF ALL RESPONSES TO PHQ QUESTIONS 1-9: 0
1. LITTLE INTEREST OR PLEASURE IN DOING THINGS: NOT AT ALL
SUM OF ALL RESPONSES TO PHQ9 QUESTIONS 1 & 2: 0
SUM OF ALL RESPONSES TO PHQ QUESTIONS 1-9: 0
SUM OF ALL RESPONSES TO PHQ QUESTIONS 1-9: 0

## 2025-01-28 ENCOUNTER — HOSPITAL ENCOUNTER (OUTPATIENT)
Age: 70
Discharge: HOME | End: 2025-01-28
Payer: MEDICARE

## 2025-01-28 ENCOUNTER — HOSPITAL ENCOUNTER (OUTPATIENT)
Dept: GENERAL RADIOLOGY | Age: 70
Discharge: HOME | End: 2025-01-28
Payer: MEDICARE

## 2025-01-28 DIAGNOSIS — M79.671 FOOT PAIN, BILATERAL: ICD-10-CM

## 2025-01-28 DIAGNOSIS — M79.672 FOOT PAIN, BILATERAL: ICD-10-CM

## 2025-01-28 PROCEDURE — 73630 X-RAY EXAM OF FOOT: CPT

## 2025-01-31 RX ORDER — GABAPENTIN 300 MG/1
CAPSULE ORAL
Qty: 30 CAPSULE | Refills: 1 | OUTPATIENT
Start: 2025-02-06 | End: 2025-04-07

## 2025-02-04 RX ORDER — GABAPENTIN 300 MG/1
300 CAPSULE ORAL NIGHTLY
Qty: 30 CAPSULE | Refills: 0 | Status: SHIPPED | OUTPATIENT
Start: 2025-02-06 | End: 2025-02-11 | Stop reason: SDUPTHER

## 2025-02-04 NOTE — TELEPHONE ENCOUNTER
If anything he may need a refill on gabapentin 600 mg . I have him on 600 mg am and pm and addition 300 mg pm = 900 mg. He has an appt. Tuesday. Can you check if he needs a refill before then. I'd like to discuss his dose Tuesday before refilling if he has enough.   
No problem. Refill sent.   
OARRS reviewed. UDS: No UDS on file.   Last seen: 1/7/2025. Follow-up: 2/4/25    
Refill of 600 mg gabapentin at the pharmacy. Not due for gabapentin 300 mg until 02/07/2025. Will discuss at appt.   
Wife called and Abel is ill with a fever so canceled aptm Will call back to reschedule. Said needs refill on Gabapentin 300mg    
PAST MEDICAL HISTORY:  Coronary heart disease     Hypertension     Osteoporosis     Rheumatoid arthritis

## 2025-02-11 ENCOUNTER — OFFICE VISIT (OUTPATIENT)
Dept: PHYSICAL MEDICINE AND REHAB | Age: 70
End: 2025-02-11
Payer: MEDICARE

## 2025-02-11 VITALS
DIASTOLIC BLOOD PRESSURE: 64 MMHG | HEIGHT: 71 IN | SYSTOLIC BLOOD PRESSURE: 130 MMHG | BODY MASS INDEX: 24.5 KG/M2 | WEIGHT: 175 LBS

## 2025-02-11 DIAGNOSIS — M54.12 CERVICAL RADICULOPATHY: Primary | ICD-10-CM

## 2025-02-11 DIAGNOSIS — M53.3 SACROILIAC JOINT DYSFUNCTION OF RIGHT SIDE: ICD-10-CM

## 2025-02-11 DIAGNOSIS — G89.4 CHRONIC PAIN SYNDROME: ICD-10-CM

## 2025-02-11 DIAGNOSIS — M79.2 NEUROPATHIC PAIN: ICD-10-CM

## 2025-02-11 PROCEDURE — 3078F DIAST BP <80 MM HG: CPT | Performed by: NURSE PRACTITIONER

## 2025-02-11 PROCEDURE — G8427 DOCREV CUR MEDS BY ELIG CLIN: HCPCS | Performed by: NURSE PRACTITIONER

## 2025-02-11 PROCEDURE — 3017F COLORECTAL CA SCREEN DOC REV: CPT | Performed by: NURSE PRACTITIONER

## 2025-02-11 PROCEDURE — 1123F ACP DISCUSS/DSCN MKR DOCD: CPT | Performed by: NURSE PRACTITIONER

## 2025-02-11 PROCEDURE — 1159F MED LIST DOCD IN RCRD: CPT | Performed by: NURSE PRACTITIONER

## 2025-02-11 PROCEDURE — G8420 CALC BMI NORM PARAMETERS: HCPCS | Performed by: NURSE PRACTITIONER

## 2025-02-11 PROCEDURE — 1125F AMNT PAIN NOTED PAIN PRSNT: CPT | Performed by: NURSE PRACTITIONER

## 2025-02-11 PROCEDURE — 1036F TOBACCO NON-USER: CPT | Performed by: NURSE PRACTITIONER

## 2025-02-11 PROCEDURE — 99214 OFFICE O/P EST MOD 30 MIN: CPT | Performed by: NURSE PRACTITIONER

## 2025-02-11 PROCEDURE — 3075F SYST BP GE 130 - 139MM HG: CPT | Performed by: NURSE PRACTITIONER

## 2025-02-11 RX ORDER — GABAPENTIN 300 MG/1
300 CAPSULE ORAL 2 TIMES DAILY
Qty: 60 CAPSULE | Refills: 1 | Status: SHIPPED | OUTPATIENT
Start: 2025-02-11 | End: 2025-04-12

## 2025-02-11 RX ORDER — GABAPENTIN 300 MG/1
300 CAPSULE ORAL NIGHTLY
Qty: 60 CAPSULE | Refills: 0 | Status: SHIPPED | OUTPATIENT
Start: 2025-02-11 | End: 2025-02-11 | Stop reason: SDUPTHER

## 2025-02-11 NOTE — PROGRESS NOTES
Functionality Assessment/Goals Worksheet     On a scale of 0 (Does not Interfere) to 10 (Completely Interferes)     1.  Which number describes how during the past week pain has interfered with           the following:  A.  General Activity:  2  B.  Mood: 2  C.  Walking Ability:  4  D.  Normal Work (Includes both work outside the home and housework):  3  E.  Relations with Other People:   2  F.  Sleep:   4  G.  Enjoyment of Life:   2    2.  Patient Prefers to Take their Pain Medications:     []  On a regular basis   [x]  Only when necessary    []  Does not take pain medications    3.  What are the Patient's Goals/Expectations for Visiting Pain Management?     []  Learn about my pain    [x]  Receive Medication   []  Physical Therapy     []  Treat Depression   [x]  Receive Injections    []  Treat Sleep   []  Deal with Anxiety and Stress   []  Treat Opoid Dependence/Addiction   []  Other:

## 2025-02-11 NOTE — PROGRESS NOTES
Chronic Pain/PM&R Clinic Note     Encounter Date: 2/11/25    Subjective:   Chief Complaint:   Chief Complaint   Patient presents with    Follow-up    Medication Refill     Gabapentin 300 MG      History of Present Illness:   Abel Singer is a 69 y.o. male seen in the clinic initially on 06/19/2023 upon request from Baldev George MD  for his history of right hip pain. Patient states he developed right hip pain about 2 years ago without any inciting event. He has put it on the back burner because he has been dealing with laryngeal cancer for the past 6 years. He states he just had a surgery with Dr. Brambila 3 months ago and has a follow up with him coming up with another potential scope. He had radiation 6 years ago but not since and he never had chemo. He states his right hip feels aching and will getting stabbing in nature if aggravated. Pain is aggravated by working in the woods picking up sticks and with activities involving bending. He also struggles with walking long distance. He denies any leg weakness, any history of falls. He denies any radiating leg pain. He states the pain will occasionally wake him at night. He rolls from side to side as he has right hip pain and feels better laying on his left but but has cervical radiculopathy and his left arm falls asleep laying on his left. He has completed PT for his neck but not for his low back or hips. He used to see Dr. Davila for his left arm pain and has had injections which are effective but they again were put on hold due to cancer. 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 He does work in his mothers woods and likes to golf but has been limited by his pain. He denies any saddle anesthesia or bowel/bladder incontinence.     Of note he has had a 5 vessel bypass 20 years ago and several stents placed, his most recent being about two years ago. He is currently on Brilinta from Dr. Burdick.     Today, 09/27/2023, patient

## 2025-02-14 ENCOUNTER — TELEPHONE (OUTPATIENT)
Dept: PHYSICAL MEDICINE AND REHAB | Age: 70
End: 2025-02-14

## 2025-02-14 NOTE — TELEPHONE ENCOUNTER
Patient contacted to discuss if he would like to pursue peripheral nerve stimulation for his right gluteal pain consistent with SI joint dysfunction.  I am okay with setting this up over the phone as this should not be an issue from an insurance perspective.  Last we spoke, he wanted to do this before summer but we have been more focused on his neck and shoulder pain.  I am happy to call him back if needed.  Otherwise him okay with setting this up with Dr. Camp in the surgery center.    LVM for patient to call back.

## 2025-02-19 NOTE — TELEPHONE ENCOUNTER
See note - can you try to reach out to Abel again. If needed I can call him again tomorrow when I am back in the office.

## 2025-02-19 NOTE — TELEPHONE ENCOUNTER
That is absolutely fine, just did not want to stall if he was considering the PNS since it will be in place 60 days and Dr. Camp is booked out 5-6 weeks.

## 2025-02-19 NOTE — TELEPHONE ENCOUNTER
Called pt and reviewed you input.  He is getting a MRI  of his neck next week because that is causing more issues than his back/gluteal area.  He wants to get results on that and then see you in March to discuss furthe before deciding on the PNS..

## 2025-02-21 NOTE — TELEPHONE ENCOUNTER
Was this information relayed to the patient ? Could probably send through [employer].

## 2025-02-24 ENCOUNTER — HOSPITAL ENCOUNTER (OUTPATIENT)
Dept: MRI IMAGING | Age: 70
Discharge: HOME | End: 2025-02-24
Payer: MEDICARE

## 2025-02-24 ENCOUNTER — OFFICE VISIT (OUTPATIENT)
Dept: ENT CLINIC | Age: 70
End: 2025-02-24

## 2025-02-24 VITALS
TEMPERATURE: 98.7 F | BODY MASS INDEX: 24.92 KG/M2 | OXYGEN SATURATION: 97 % | SYSTOLIC BLOOD PRESSURE: 122 MMHG | WEIGHT: 178 LBS | HEART RATE: 68 BPM | HEIGHT: 71 IN | DIASTOLIC BLOOD PRESSURE: 70 MMHG

## 2025-02-24 DIAGNOSIS — Z08 ENCOUNTER FOR FOLLOW-UP SURVEILLANCE OF HEAD AND NECK CANCER: Primary | ICD-10-CM

## 2025-02-24 DIAGNOSIS — M54.12 CERVICAL RADICULOPATHY: ICD-10-CM

## 2025-02-24 DIAGNOSIS — Z85.89 ENCOUNTER FOR FOLLOW-UP SURVEILLANCE OF HEAD AND NECK CANCER: Primary | ICD-10-CM

## 2025-02-24 DIAGNOSIS — C32.8 SQUAMOUS CELL CARCINOMA OF OVERLAPPING SITES OF LARYNX (HCC): ICD-10-CM

## 2025-02-24 DIAGNOSIS — M79.2 NEUROPATHIC PAIN: ICD-10-CM

## 2025-02-24 DIAGNOSIS — T66.XXXD EFFECTS, RADIATION, SUBSEQUENT ENCOUNTER: ICD-10-CM

## 2025-02-24 DIAGNOSIS — J02.9 THROAT SORENESS: ICD-10-CM

## 2025-02-24 PROCEDURE — 72141 MRI NECK SPINE W/O DYE: CPT

## 2025-02-25 ENCOUNTER — TELEPHONE (OUTPATIENT)
Dept: PHYSICAL MEDICINE AND REHAB | Age: 70
End: 2025-02-25

## 2025-02-25 NOTE — TELEPHONE ENCOUNTER
----- Message from DEA Sanabria CNP sent at 2/25/2025 11:28 AM EST -----  Moderate to severe stenosis. Facet arthritis. MRI look lightly worse than prior MRI. Will discuss at follow up.

## 2025-03-02 PROBLEM — J02.9 THROAT SORENESS: Status: ACTIVE | Noted: 2025-03-02

## 2025-03-02 PROBLEM — Z85.89 ENCOUNTER FOR FOLLOW-UP SURVEILLANCE OF HEAD AND NECK CANCER: Status: ACTIVE | Noted: 2025-03-02

## 2025-03-02 PROBLEM — Z08 ENCOUNTER FOR FOLLOW-UP SURVEILLANCE OF HEAD AND NECK CANCER: Status: ACTIVE | Noted: 2025-03-02

## 2025-03-11 ENCOUNTER — OFFICE VISIT (OUTPATIENT)
Dept: PHYSICAL MEDICINE AND REHAB | Age: 70
End: 2025-03-11
Payer: MEDICARE

## 2025-03-11 VITALS
BODY MASS INDEX: 24.92 KG/M2 | WEIGHT: 178 LBS | DIASTOLIC BLOOD PRESSURE: 64 MMHG | SYSTOLIC BLOOD PRESSURE: 132 MMHG | HEIGHT: 71 IN

## 2025-03-11 DIAGNOSIS — G89.4 CHRONIC PAIN SYNDROME: ICD-10-CM

## 2025-03-11 DIAGNOSIS — M53.3 SACROILIAC JOINT DYSFUNCTION OF RIGHT SIDE: ICD-10-CM

## 2025-03-11 DIAGNOSIS — M54.17 RADICULOPATHY, LUMBOSACRAL REGION: ICD-10-CM

## 2025-03-11 DIAGNOSIS — M79.2 NEUROPATHIC PAIN: ICD-10-CM

## 2025-03-11 DIAGNOSIS — M47.816 LUMBAR SPONDYLOSIS: ICD-10-CM

## 2025-03-11 DIAGNOSIS — M54.12 CERVICAL RADICULOPATHY: Primary | ICD-10-CM

## 2025-03-11 PROCEDURE — 3075F SYST BP GE 130 - 139MM HG: CPT | Performed by: NURSE PRACTITIONER

## 2025-03-11 PROCEDURE — 1036F TOBACCO NON-USER: CPT | Performed by: NURSE PRACTITIONER

## 2025-03-11 PROCEDURE — 1159F MED LIST DOCD IN RCRD: CPT | Performed by: NURSE PRACTITIONER

## 2025-03-11 PROCEDURE — 1125F AMNT PAIN NOTED PAIN PRSNT: CPT | Performed by: NURSE PRACTITIONER

## 2025-03-11 PROCEDURE — 3078F DIAST BP <80 MM HG: CPT | Performed by: NURSE PRACTITIONER

## 2025-03-11 PROCEDURE — G8427 DOCREV CUR MEDS BY ELIG CLIN: HCPCS | Performed by: NURSE PRACTITIONER

## 2025-03-11 PROCEDURE — 1123F ACP DISCUSS/DSCN MKR DOCD: CPT | Performed by: NURSE PRACTITIONER

## 2025-03-11 PROCEDURE — 99215 OFFICE O/P EST HI 40 MIN: CPT | Performed by: NURSE PRACTITIONER

## 2025-03-11 PROCEDURE — 3017F COLORECTAL CA SCREEN DOC REV: CPT | Performed by: NURSE PRACTITIONER

## 2025-03-11 PROCEDURE — G8420 CALC BMI NORM PARAMETERS: HCPCS | Performed by: NURSE PRACTITIONER

## 2025-03-11 RX ORDER — GABAPENTIN 300 MG/1
300 CAPSULE ORAL 3 TIMES DAILY
Qty: 90 CAPSULE | Refills: 2 | Status: SHIPPED | OUTPATIENT
Start: 2025-03-11 | End: 2025-06-09

## 2025-03-11 NOTE — PROGRESS NOTES
Chronic Pain/PM&R Clinic Note     Encounter Date: 3/11/25    Subjective:   Chief Complaint:   Chief Complaint   Patient presents with    Follow-up     Discuss MRI      History of Present Illness:   Abel Singer is a 69 y.o. male seen in the clinic initially on 06/19/2023 upon request from Baldev George MD  for his history of right hip pain. Patient states he developed right hip pain about 2 years ago without any inciting event. He has put it on the back burner because he has been dealing with laryngeal cancer for the past 6 years. He states he just had a surgery with Dr. Brmabila 3 months ago and has a follow up with him coming up with another potential scope. He had radiation 6 years ago but not since and he never had chemo. He states his right hip feels aching and will getting stabbing in nature if aggravated. Pain is aggravated by working in the woods picking up sticks and with activities involving bending. He also struggles with walking long distance. He denies any leg weakness, any history of falls. He denies any radiating leg pain. He states the pain will occasionally wake him at night. He rolls from side to side as he has right hip pain and feels better laying on his left but but has cervical radiculopathy and his left arm falls asleep laying on his left. He has completed PT for his neck but not for his low back or hips. He used to see Dr. Davila for his left arm pain and has had injections which are effective but they again were put on hold due to cancer. 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 He does work in his mothers woods and likes to golf but has been limited by his pain. He denies any saddle anesthesia or bowel/bladder incontinence.     Of note he has had a 5 vessel bypass 20 years ago and several stents placed, his most recent being about two years ago. He is currently on Brilinta from Dr. Burdick.     Today, 09/27/2023, patient presents for planned follow

## 2025-03-11 NOTE — PROGRESS NOTES
Functionality Assessment/Goals Worksheet     On a scale of 0 (Does not Interfere) to 10 (Completely Interferes)     1.  Which number describes how during the past week pain has interfered with           the following:  A.  General Activity:  2  B.  Mood: 2  C.  Walking Ability:  2  D.  Normal Work (Includes both work outside the home and housework):  2  E.  Relations with Other People:   0  F.  Sleep:   2  G.  Enjoyment of Life:   2    2.  Patient Prefers to Take their Pain Medications:     []  On a regular basis   [x]  Only when necessary    []  Does not take pain medications    3.  What are the Patient's Goals/Expectations for Visiting Pain Management?     [x]  Learn about my pain    []  Receive Medication   []  Physical Therapy     []  Treat Depression   []  Receive Injections    []  Treat Sleep   []  Deal with Anxiety and Stress   []  Treat Opoid Dependence/Addiction   []  Other:

## 2025-03-18 RX ORDER — ATORVASTATIN CALCIUM 40 MG/1
40 TABLET, FILM COATED ORAL
Qty: 90 TABLET | Refills: 3 | Status: SHIPPED | OUTPATIENT
Start: 2025-03-18

## 2025-03-18 NOTE — TELEPHONE ENCOUNTER
Patient of Dr. George     Last ordered 4/9/2024, disp 90, refill 3    Last visit 1/23:     1. Dyslipidemia  On lipitor , tolerating it well and recent lipids are opitmal , recent lipid panel done  And reviewed.  Vascular screen about 3 yrs ago was normal. Recommend he cuts down the red meat and re check labs pre next visit.     Lipid Panel              Component  Ref Range & Units (hover) 1/20/25 0919 6/6/24 0918 12/7/23 0816 5/9/22 0832 10/5/21 0855 1/15/21 0850 6/1/20 0914   Cholesterol, Total 144 134      CM   Comment:      <200          Desirable       200 - 239     Borderline High       >239          High   Triglycerides 97 163 CM 84   CM 91 CM 60 CM   Comment:      <150          Desirable       150 - 199     Borderline High       200 - 499     High       >449          Very High       Ranges are based upon NCEP/ATP III guidelines.   HDL 45 40 CM 42 CM 48 CM 48 CM 47 CM 47 CM   Comment:      Refer to General Chemistry for CHOL and TRIG results.       HDL CLASSIFICATIONS FOR PATIENTS > 20 YEARS OLD.       <40               Undesirable (Major Risk Factor)       >60               Protective (Negative Risk Factor)   LDL Cholesterol 80 61 CM 66 CM 82 CM 68 CM 73 CM 60 CM   Comment:      Refer to General Chemistry for CHOL and TRIG results.       LDL CLASSIFICATIONS FOR PATIENTS >20 YEARS OLD:          ** Determination Invalid if TRIG >400 **       <100          Optimal       100 - 129     Near or Above Optimal       130 - 159     Borderline High       160 - 189     High Risk       >189          Very High Risk  Performed at [facility] [address] [place] [address]   Resulting Agency  - [facility]  - [facility]  - [facility]  - [facility]  - [facility]  - [facility]  - [facility]        Next visit 7/2

## 2025-03-31 DIAGNOSIS — K22.719 BARRETT'S ESOPHAGUS WITH DYSPLASIA: ICD-10-CM

## 2025-04-02 RX ORDER — OMEPRAZOLE 40 MG/1
CAPSULE, DELAYED RELEASE ORAL
Qty: 90 CAPSULE | Refills: 1 | Status: SHIPPED | OUTPATIENT
Start: 2025-04-02

## 2025-04-14 ENCOUNTER — OFFICE VISIT (OUTPATIENT)
Dept: ENT CLINIC | Age: 70
End: 2025-04-14
Payer: MEDICARE

## 2025-04-14 VITALS
DIASTOLIC BLOOD PRESSURE: 60 MMHG | OXYGEN SATURATION: 99 % | TEMPERATURE: 97.7 F | SYSTOLIC BLOOD PRESSURE: 108 MMHG | HEIGHT: 71 IN | HEART RATE: 55 BPM | WEIGHT: 182 LBS | BODY MASS INDEX: 25.48 KG/M2

## 2025-04-14 DIAGNOSIS — Z08 ENCOUNTER FOR FOLLOW-UP SURVEILLANCE OF HEAD AND NECK CANCER: Primary | ICD-10-CM

## 2025-04-14 DIAGNOSIS — Z85.89 ENCOUNTER FOR FOLLOW-UP SURVEILLANCE OF HEAD AND NECK CANCER: Primary | ICD-10-CM

## 2025-04-14 DIAGNOSIS — T66.XXXD EFFECTS, RADIATION, SUBSEQUENT ENCOUNTER: ICD-10-CM

## 2025-04-14 DIAGNOSIS — J38.7 LEUKOPLAKIA OF LARYNX: ICD-10-CM

## 2025-04-14 DIAGNOSIS — C32.9 SQUAMOUS CELL CARCINOMA OF LARYNX: ICD-10-CM

## 2025-04-14 PROCEDURE — 99213 OFFICE O/P EST LOW 20 MIN: CPT | Performed by: OTOLARYNGOLOGY

## 2025-04-14 PROCEDURE — 3017F COLORECTAL CA SCREEN DOC REV: CPT | Performed by: OTOLARYNGOLOGY

## 2025-04-14 PROCEDURE — 1036F TOBACCO NON-USER: CPT | Performed by: OTOLARYNGOLOGY

## 2025-04-14 PROCEDURE — 1159F MED LIST DOCD IN RCRD: CPT | Performed by: OTOLARYNGOLOGY

## 2025-04-14 PROCEDURE — G8417 CALC BMI ABV UP PARAM F/U: HCPCS | Performed by: OTOLARYNGOLOGY

## 2025-04-14 PROCEDURE — 3074F SYST BP LT 130 MM HG: CPT | Performed by: OTOLARYNGOLOGY

## 2025-04-14 PROCEDURE — 31575 DIAGNOSTIC LARYNGOSCOPY: CPT | Performed by: OTOLARYNGOLOGY

## 2025-04-14 PROCEDURE — 3078F DIAST BP <80 MM HG: CPT | Performed by: OTOLARYNGOLOGY

## 2025-04-14 PROCEDURE — 1123F ACP DISCUSS/DSCN MKR DOCD: CPT | Performed by: OTOLARYNGOLOGY

## 2025-04-14 PROCEDURE — G8427 DOCREV CUR MEDS BY ELIG CLIN: HCPCS | Performed by: OTOLARYNGOLOGY

## 2025-04-14 NOTE — PROGRESS NOTES
[facility] [place] [facility]  [facility] EAR, NOSE AND THROAT  [address]  [address]  [facility] [address]  Dept: [phone]  Dept Fax: [phone]  Loc: [phone]    Abel Singer is a 69 y.o. male who was referred by No ref. provider found for:  Chief Complaint   Patient presents with    Follow-up     Patient here for a  2 month follow up, patient has no other concerns and is feeling good.         HPI:       History of Present Illness  Abel Singer is a 69 y.o. male who returns for a follow-up evaluation with a history of laryngeal carcinoma that recurred post radiation therapy, for which he has undergone multiple transoral laser procedures. He is accompanied by his wife.  He is continuing to work full-time and his [employer] company.    He reports persistent hoarseness and has developed a cough triggered by the consumption of cold beverages or sweet foods such as chocolate. He can localize the sensation prompting the cough to a specific area in his throat. His last surgical intervention was on 11/23/2023, after which he has been faring well. He also mentions the presence of thick mucus. He has undergone radiation therapy to the neck, which resulted in skin changes extending up to his earlobes. The radiation was administered from chin to chin, causing significant blistering. He recalls being informed that he would develop a \"turkey neck\" post radiation, but notes that his hair has since regrown.        History:     Allergies   Allergen Reactions    Morphine Nausea And Vomiting     Current Outpatient Medications   Medication Sig Dispense Refill    omeprazole (PRILOSEC) 40 MG delayed release capsule TAKE 1 CAPSULE EVERY EVENING 90 capsule 1    atorvastatin (LIPITOR) 40 MG tablet TAKE 1 TABLET NIGHTLY 90 tablet 3    gabapentin (NEURONTIN) 300 MG capsule Take 1 capsule by mouth 3 times daily for 90 days. In addition to 600 mg  = 900 mg am and pm, additional 300 mg at noon 90

## 2025-05-08 ENCOUNTER — TELEPHONE (OUTPATIENT)
Dept: ENT CLINIC | Age: 70
End: 2025-05-08

## 2025-05-08 NOTE — TELEPHONE ENCOUNTER
Patient is now scheduled. Patient's wife requested patient also be placed on the wait list if we have a cancellation sooner. Told wife I will add him to the list and let her know if something comes up sooner.  Patient's wife verbalized understanding and thanked me.

## 2025-05-08 NOTE — TELEPHONE ENCOUNTER
I received a call from Abel's wife and she was hoping to get him seen for an ear cleaning.   I explained that we really do not have any openings, but that I would speak to you about his condition.   She said his right ear has been plugged for approx a week and he is getting some pain.   They have been using drops, she thinks they are Debrox to help.   She said Dr. Brambila had told them to never see PCP to have them flushed so she wants to  know what they should do to get him some relief.   She said she is open to seeing anyone with an opening.   Please advise.

## 2025-05-08 NOTE — TELEPHONE ENCOUNTER
As of right now, I do not have anything available unless you find something on someone's schedule.

## 2025-05-12 RX ORDER — GABAPENTIN 600 MG/1
600 TABLET ORAL 2 TIMES DAILY
Qty: 60 TABLET | Refills: 2 | Status: SHIPPED | OUTPATIENT
Start: 2025-05-12 | End: 2025-05-13 | Stop reason: SDUPTHER

## 2025-05-13 NOTE — PROGRESS NOTES
[address] [place] [address] [address] [place] [address]  Dept: [phone]  Dept Fax: [phone]  Loc: [phone]    Visit Date: 10/27/2021    Mr. Sarah Ivory is a 74 y.o. male  who presented for:  No chief complaint on file. CAD; follow up    HPI:   73 yo M c hx of CAD s/p CABG (VG-RCA, VG-OM; LIMA to LAD (occluded)); s/p PCI, FOUZIA, HTN, HLD, thoracic anuerysm (4.1cm, follows CT-Surgery at [facility]) is here for a follow up. He is scheduled for EGD for evaluation of barretts esophagus. Denies any chest pain, sob, palpitations, lightheadedness, dizziness, orthopnea, PND or pedal edema. Current Outpatient Medications:     acetaminophen (TYLENOL) 500 MG tablet, Take 1,000 mg by mouth nightly, Disp: , Rfl:     aspirin 81 MG EC tablet, Take 81 mg by mouth daily, Disp: , Rfl:     metoprolol succinate (TOPROL XL) 50 MG extended release tablet, Take 0.5 tablets by mouth 2 times daily, Disp: 10 tablet, Rfl: 0    ticagrelor (BRILINTA) 90 MG TABS tablet, Take 1 tablet by mouth 2 times daily, Disp: 60 tablet, Rfl: 0    levothyroxine (SYNTHROID) 50 MCG tablet, Take 1 tablet by mouth daily, Disp: 90 tablet, Rfl: 1    atorvastatin (LIPITOR) 40 MG tablet, Take 1 tablet by mouth daily, Disp: 90 tablet, Rfl: 1    terazosin (HYTRIN) 5 MG capsule, TAKE 1 CAPSULE NIGHTLY, Disp: 90 capsule, Rfl: 1    gabapentin (NEURONTIN) 300 MG capsule, Take 300 mg by mouth daily. , Disp: , Rfl:     ipratropium (ATROVENT) 0.06 % nasal spray, 2 sprays by Each Nostril route 3 times daily as needed for Rhinitis, Disp: , Rfl:     nitroGLYCERIN (NITROSTAT) 0.4 MG SL tablet, up to max of 3 total doses.  If no relief after 1 dose, call 911., Disp: 25 tablet, Rfl: 3    diphenhydrAMINE-APAP, sleep, (TYLENOL PM EXTRA STRENGTH)  MG tablet, Take 2 tablets by mouth nightly, Disp: , Rfl:     omeprazole (PRILOSEC) 20 MG delayed release capsule, Take 20 mg by mouth Daily , Disp: , Rfl: Patient Quality Outreach    Patient is due for the following:   Physical Annual Wellness Visit and fasting lab work    Action(s) Taken:   Schedule a Annual Wellness Visit with fasting lab work    Type of outreach:    Sent [employer] message.    Questions for provider review:    None         Mandy Jones  Chart routed to None.         Coenzyme Q10 (CO Q-10) 200 MG CAPS, Take  by mouth daily. , Disp: , Rfl:     Multiple Vitamin (MULTI-VITAMIN) TABS, Take  by mouth daily. , Disp: , Rfl:     ARGININE PO, Take 500 mg by mouth daily 4 tab, Disp: , Rfl:     Past Medical History  Raheem Pinto  has a past medical history of Ascending Aneurysm ([facility] [address] Brown's esophagus, CAD (coronary artery disease), Cancer ([facility] [address] Chronic back pain, Hepatitis C, History of hyperbaric oxygen therapy, Hyperlipidemia, Hypertension, Left shoulder pain, FOUZIA on CPAP, Osteoradionecrosis of mandible, PONV (postoperative nausea and vomiting), S/P CABG (coronary artery bypass graft), Thyroid disease, Vision loss of right eye, Vitreous opacities, and Wears dentures. Social History  Raheem Pinto  reports that he quit smoking about 19 years ago. He has a 40.00 pack-year smoking history. He has never used smokeless tobacco. He reports that he does not drink alcohol and does not use drugs. Family History  Raheem Pinto family history includes Cancer in his maternal grandfather and maternal grandmother; Heart Disease in his father; High Blood Pressure in his father; Stroke in his father. Past Surgical History   Past Surgical History:   Procedure Laterality Date    CARD[facility]AC [facility]IZATION  8/2002, 4/[facility]    [facility]    COLONOSCOPY      CORONARY ANGIOPLASTY WITH STENT PLACEMENT  7/2002    [facility]    CORONARY ANGIOPLASTY WITH STENT PLACEMENT N/A 07/12/2019    [facility]    CORONARY ARTERY BYPASS GRAFT  2002    [facility]    DENTAL SURGERY      bottom teeth removal    HEMORRHOID SURGERY      KNEE SURGERY Right     LARYNGOSCOPY N/A 10/4/2017    LARYNGOSCOPY MICRO WITH BIOPSY performed by Liset Ortiz MD at [address] [place] [address] E 5/12/2021    LARYNGOSCOPY MICRO AND BIOPSY, WITH JET VENT performed by Liset Ortiz MD at [address] N/A 6/2/2021    (DR. HUNTER) SUSPENSION MICROLARYNGOSCOPY WITH RECONSTRUCTION AND ADVANCEMENT FLAP X2; (DR. GONGORA)  MICROLARYNGOSCOPY AND BX Color Doppler    Name: Latia Richter  : [id number]  MR #: [id number]  Study date: 2012  Account #: [de-identified]  Ht-Wt-BSA: 71 in- 184.6 lb- 2.04 mÂ²    Reading Physician:  Yuan Hannah DO  Technologist:  Petra Diallo, RDCS,RVT,RDMS  Referring Physician:  MD David Hercules for study: hyperlipidemia, CAD, CABG, chest pain    HISTORY: PRIOR HISTORY: CAD, CABG, HTN, hyperlipidemia, ex smoker, chest pain  /PROCEDURE: The procedure was performed in outpatient. This was a routine study. Diastolic blood pressure was 95 mmHg, at the start of the study. Image quality  was adequate. LEFT VENTRICLE: Size was normal. Systolic function was normal. Ejection  fraction was estimated in the range of 55 % to 65 %. There were no regional  wall motion abnormalities. Wall thickness was normal. DOPPLER: Doppler  parameters were consistent with abnormal left ventricular relaxation (grade 1  diastolic dysfunction). AORTIC VALVE: The valve was possibly bicuspid. Leaflets exhibited normal  thickness, calcification, and normal cuspal separation. DOPPLER: Transaortic  velocity was within the normal range. There was no evidence for stenosis. There  was trivial regurgitation. AORTA: The root exhibited mild dilatation. MITRAL VALVE: Valve structure was normal. There was normal leaflet separation. DOPPLER: The transmitral velocity was within the normal range. There was no  evidence for stenosis. There was trivial regurgitation. LEFT ATRIUM: Size was normal.    RIGHT VENTRICLE: The ventricle was mildly to moderately dilated. Systolic  function was normal. Wall thickness was normal.    PULMONIC VALVE: Leaflets exhibited normal thickness, no calcification, and  normal cuspal separation. DOPPLER: The transpulmonic velocity was within the  normal range. There was trivial regurgitation. PULMONARY ARTERY: The size was normal. DOPPLER: Systolic pressure was within  the normal range.     TRICUSPID VALVE: The valve structure was normal. There was normal leaflet  separation. DOPPLER: The transtricuspid velocity was within the normal range. There was no evidence for stenosis. There was trivial regurgitation. RIGHT ATRIUM: Size was normal.    SYSTEMIC VEINS: IVC: The inferior vena cava was normal in size. PERICARDIUM: There was no pericardial effusion. The pericardium was normal in  appearance. SYSTEM MEASUREMENT TABLES    2D mode  LA Dimension (2D): 3.3 cm  FS (2D-Cubed): 30 %  FS (2D-Teich): 30 %  IVSd (2D): 1 cm  IVSd; Mean chosen (2D): 1 cm  LVIDd (2D): 5.4 cm  LVIDs (2D): 3.8 cm  LVOT Area (2D): 3.1 cm2  LVPWd (2D): 1 cm  RVIDd (2D): 3.2 cm    M mode  AoR Diam (MM): 3.3 cm  AV Cusp Sep (MM): 2.5 cm  MV D-E Exc: 1.6 cm  MV EF [place] (MM): 9.4 cm/s    Unspecified Scan Mode  VTI; Antegrade Flow: 20.9 cm  Vmax; Antegrade Flow: 1 m/s  LVOT Diam: 2 cm  LVOT Vmax: 82.8 cm/s  MV Peak A Stephen; Mean: 53.8 cm/s  MV Peak E Stephen; Antegrade Flow: 65.2 cm/s  Vmax; Antegrade Flow: 74.5 cm/s  TV Peak A Stephen: 41 cm/s  TV Peak E Stephen; Antegrade Flow: 52.8 cm/s    SUMMARY:    Left ventricle:  Size was normal.  Systolic function was normal. Ejection fraction was estimated in the range of  55 % to 65 %. There were no regional wall motion abnormalities. Doppler parameters were consistent with abnormal left ventricular relaxation  (grade 1 diastolic dysfunction). Right ventricle: The ventricle was mildly to moderately dilated. Aortic valve: The valve was possibly bicuspid. Leaflets exhibited normal thickness,  calcification, and normal cuspal separation. Aorta, systemic arteries: The root exhibited mild dilatation. Prepared and signed by    Rufus Mason DO  Signed 27-Jul-2012 17:43:35         STRESS:    CATH:    Assessment/Plan       Diagnosis Orders   1.  Coronary artery disease due to lipid rich plaque       Preop for EGD  CAD PCI of LAD after FFR in 7/2019  CAD s/p CABG (LIMA to LAD, VG to OM and VG RCA) , s/p PCI  Bradycardia Thoracic anuerysm 4.1 cm  HTN  HLD     No cardiac symptoms  May proceed with EGD  On Aspirin, Brilinta, statin   No bleeding issues  LDL is 68   BP controlled  Patietn placed himself back on metoprolol after being taken off due to bradycardia and fatigue  The patient is asked to make an attempt to improve diet and exercise patterns to aid in medical management of this problem. Advised more plant based nutrition/meditarrean diet   Advised patient to call office or seek immediate medical attention if there is any new onset of  any chest pain, sob, palpitations, lightheadedness, dizziness, orthopnea, PND or pedal edema. All medication side effects were discussed in details. Thank youfor allowing me to participate in the care of this patient. Please do not hesitate to contact me for any further questions. Return in about 1 year (around 10/27/2022), or if symptoms worsen or fail to improve, for Regular follow up, Review testing.        Electronically signed by Junior Barrow MD [facility]  10/27/2021 at 11:07 AM

## 2025-05-13 NOTE — TELEPHONE ENCOUNTER
Abel Singer called requesting a refill on the following medications:  Requested Prescriptions     Pending Prescriptions Disp Refills    gabapentin (NEURONTIN) 600 MG tablet 60 tablet 2     Sig: Take 1 tablet by mouth 2 times daily for 90 days.     Pharmacy verified:[facility], ph. [phone]  .pv      Date of last visit: 03.11.2025  Date of next visit (if applicable): 06.10.2025

## 2025-05-14 RX ORDER — GABAPENTIN 600 MG/1
600 TABLET ORAL 2 TIMES DAILY
Qty: 60 TABLET | Refills: 2 | Status: SHIPPED | OUTPATIENT
Start: 2025-05-14 | End: 2025-08-12

## 2025-05-14 NOTE — TELEPHONE ENCOUNTER
OARRS reviewed. UDS: no UDS on file.   Last seen: 3/11/2025. Follow-up:   Future Appointments   Date Time Provider Department Center   6/10/2025  9:40 AM Radha Gilbert APRN - CNP N [facility] Pain [facility]   6/25/2025  9:00 AM Ambrose Antoine MD N ENT [facility]   7/2/2025  1:15 PM Baldev George MD SRPX Physic [facility] DEP   8/20/2025 11:00 AM Wander Burdick MD N [facility]   8/25/2025 11:00 AM Ambrose Antoine MD N ENT [facility]   10/27/2025 11:00 AM Ambrose Antoine MD N [facility]

## 2025-05-16 NOTE — BRIEF OP NOTE
"Subjective   Patient ID: Iglesia Rojo is a 54 y.o. male who presents for Annual Exam.    HPI     Review of Systems   Constitutional:  Negative for fatigue and fever.   HENT:  Negative for congestion, ear pain, hearing loss, rhinorrhea and sore throat.    Eyes:  Negative for visual disturbance.   Respiratory:  Negative for cough, shortness of breath and wheezing.    Cardiovascular:  Negative for chest pain and palpitations.   Gastrointestinal:  Negative for blood in stool, constipation, diarrhea, nausea and vomiting.   Endocrine: Negative for polydipsia and polyuria.   Genitourinary:  Negative for difficulty urinating, hematuria, scrotal swelling and testicular pain.   Musculoskeletal:  Negative for arthralgias and back pain.   Skin:  Negative for rash.   Neurological:  Negative for seizures and syncope.   Hematological:  Does not bruise/bleed easily.   Psychiatric/Behavioral:  Negative for dysphoric mood and suicidal ideas. The patient is not nervous/anxious.        Objective   /68   Pulse 100   Temp 36.8 °C (98.2 °F)   Resp 20   Ht 1.803 m (5' 11\")   Wt 135 kg (298 lb)   BMI 41.56 kg/m²     Physical Exam  Vitals and nursing note reviewed.   Constitutional:       General: He is not in acute distress.     Appearance: Normal appearance. He is obese.   HENT:      Head: Normocephalic and atraumatic.      Right Ear: Tympanic membrane, ear canal and external ear normal.      Left Ear: Tympanic membrane, ear canal and external ear normal.      Nose: Nose normal.      Mouth/Throat:      Mouth: Mucous membranes are moist.      Pharynx: Oropharynx is clear.   Eyes:      Extraocular Movements: Extraocular movements intact.      Conjunctiva/sclera: Conjunctivae normal.      Pupils: Pupils are equal, round, and reactive to light.   Neck:      Vascular: Carotid bruit present.   Cardiovascular:      Rate and Rhythm: Normal rate and regular rhythm.      Heart sounds: Normal heart sounds.   Pulmonary:      Effort: " Brief Postoperative Note      Patient: Rodrigue Subramanian  YOB: 1955  MRN: [id number]    Date of Procedure: 10/4/2023    Pre-Op Diagnosis Codes:     * Squamous cell carcinoma of larynx ([address]) [C32.9]     * Leukoplakia of supraglottic larynx    Post-Op Diagnosis: Same       Procedure(s):  MicroLaryngoscopy with Biopsy    Surgeon(s):  Sarabjit Villareal MD    Assistant:  * No surgical staff found *    Anesthesia: General    Estimated Blood Loss (mL): Minimal    Complications: None    Specimens:   ID Type Source Tests Collected by Time Destination   A : RIGHT VENTRICLE Tissue Vocal Cord SURGICAL PATHOLOGY Sarabjit Villareal MD 10/4/2023 1428    B : RIGHT ANTERIOR FALSE VOCAL CORD Tissue Vocal Cord SURGICAL PATHOLOGY Sarabjit Villareal MD 10/4/2023 1428        Implants:  * No implants in log *      Drains: * No LDAs found *    Findings: The white frosty ridge of leukoplakia on the right false vocal cord was not present. This was documented by video photography. Perhaps it was simply wiped off with introduction of the endotracheal tube. At any rate the only slightly suspicious areas were in the shallow right laryngeal ventricle. There was a slight whitish firm bulge of the right anterior false vocal cord that was mucosal covered. Both of these areas were biopsied. No obvious carcinoma was encountered. The procedure was unremarkable otherwise.       Electronically signed by Tran Knight MD on 10/4/2023 at 2:49 PM Pulmonary effort is normal.      Breath sounds: Normal breath sounds.   Abdominal:      General: Bowel sounds are normal.      Palpations: Abdomen is soft.      Tenderness: There is no right CVA tenderness or left CVA tenderness.   Musculoskeletal:         General: No deformity.      Cervical back: Neck supple.   Lymphadenopathy:      Cervical: No cervical adenopathy.   Skin:     General: Skin is warm and dry.   Neurological:      General: No focal deficit present.      Mental Status: He is alert.      Sensory: No sensory deficit.      Motor: No weakness.      Gait: Gait normal.   Psychiatric:         Mood and Affect: Mood normal.         Behavior: Behavior normal.         Assessment/Plan   Problem List Items Addressed This Visit           ICD-10-CM    Chronic obstructive pulmonary disease (Multi) J44.9    Does not use hfa  Has been stable after wt loss surg         Hyperlipemia E78.5    Relevant Medications    atorvastatin (Lipitor) 20 mg tablet    Other Relevant Orders    Lipid Panel    TSH with reflex to Free T4 if abnormal    Hypertension I10    Pt on lisinopril , hydrochlorothiazide  Meds wel tolerated  Has been stable         Relevant Medications    hydroCHLOROthiazide (Microzide) 12.5 mg tablet    lisinopril 40 mg tablet    Other Relevant Orders    TSH with reflex to Free T4 if abnormal    Diabetes (Multi) E11.9    Pt has gastric sleeve  Bs and hba1c has decreased  No meds  Will check hba1c today         Relevant Orders    Lipid Panel    Hemoglobin A1C    Albumin-Creatinine Ratio, Urine Random    Vitamin D deficiency E55.9    Relevant Orders    Vitamin D 25-Hydroxy,Total    Anxiety F41.9    Relevant Medications    propranolol (Inderal) 10 mg tablet    Prostate cancer (Multi) C61    Pt cont to f/up with urol  Has been stable with tx         Morbid obesity with BMI of 40.0-44.9, adult (Multi) E66.01, Z68.41    Pt has had significant wt loss with gastric sleeve surgery         Relevant Orders    Hemoglobin A1C     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    Pt with prostate cancer  LN being tx also per pt          Other Visit Diagnoses         Codes      Encounter for annual physical exam    -  Primary Z00.00    Relevant Orders    Lipid Panel    TSH with reflex to Free T4 if abnormal    Vitamin D 25-Hydroxy,Total    Hemoglobin A1C    Follow Up In [facility] - PCP - Health Maintenance      Colon cancer screening     Z12.11    Relevant Orders    Colonoscopy Screening; Average Risk Patient      Elevated hemoglobin A1c     R73.09    Relevant Orders    Hemoglobin A1C      Immunization due     Z23    Relevant Orders    Tdap vaccine, age 7 years and older (Completed)               Patient reported health Fair    Regular Dental Visits yes    Regular Eye Visits yes    Hearing Loss no    Balanced Diet yes    Weight Concerns no    Exercise Irregular

## 2025-06-10 ENCOUNTER — OFFICE VISIT (OUTPATIENT)
Dept: PHYSICAL MEDICINE AND REHAB | Age: 70
End: 2025-06-10
Payer: MEDICARE

## 2025-06-10 VITALS
DIASTOLIC BLOOD PRESSURE: 64 MMHG | BODY MASS INDEX: 25.48 KG/M2 | WEIGHT: 182 LBS | HEIGHT: 71 IN | SYSTOLIC BLOOD PRESSURE: 128 MMHG

## 2025-06-10 DIAGNOSIS — G89.4 CHRONIC PAIN SYNDROME: ICD-10-CM

## 2025-06-10 DIAGNOSIS — M53.3 SACROILIAC JOINT DYSFUNCTION OF RIGHT SIDE: ICD-10-CM

## 2025-06-10 DIAGNOSIS — M47.816 LUMBAR SPONDYLOSIS: ICD-10-CM

## 2025-06-10 DIAGNOSIS — M79.2 NEUROPATHIC PAIN: ICD-10-CM

## 2025-06-10 DIAGNOSIS — M54.12 CERVICAL RADICULOPATHY: Primary | ICD-10-CM

## 2025-06-10 PROCEDURE — 1125F AMNT PAIN NOTED PAIN PRSNT: CPT | Performed by: NURSE PRACTITIONER

## 2025-06-10 PROCEDURE — 3078F DIAST BP <80 MM HG: CPT | Performed by: NURSE PRACTITIONER

## 2025-06-10 PROCEDURE — G8417 CALC BMI ABV UP PARAM F/U: HCPCS | Performed by: NURSE PRACTITIONER

## 2025-06-10 PROCEDURE — 99214 OFFICE O/P EST MOD 30 MIN: CPT | Performed by: NURSE PRACTITIONER

## 2025-06-10 PROCEDURE — 3074F SYST BP LT 130 MM HG: CPT | Performed by: NURSE PRACTITIONER

## 2025-06-10 PROCEDURE — G8427 DOCREV CUR MEDS BY ELIG CLIN: HCPCS | Performed by: NURSE PRACTITIONER

## 2025-06-10 PROCEDURE — 1123F ACP DISCUSS/DSCN MKR DOCD: CPT | Performed by: NURSE PRACTITIONER

## 2025-06-10 PROCEDURE — 1159F MED LIST DOCD IN RCRD: CPT | Performed by: NURSE PRACTITIONER

## 2025-06-10 PROCEDURE — 3017F COLORECTAL CA SCREEN DOC REV: CPT | Performed by: NURSE PRACTITIONER

## 2025-06-10 PROCEDURE — 1036F TOBACCO NON-USER: CPT | Performed by: NURSE PRACTITIONER

## 2025-06-10 RX ORDER — GABAPENTIN 300 MG/1
300 CAPSULE ORAL 3 TIMES DAILY
Qty: 90 CAPSULE | Refills: 1 | Status: SHIPPED | OUTPATIENT
Start: 2025-06-10 | End: 2025-08-09

## 2025-06-10 NOTE — PROGRESS NOTES
Functionality Assessment/Goals Worksheet     On a scale of 0 (Does not Interfere) to 10 (Completely Interferes)     1.  Which number describes how during the past week pain has interfered with           the following:  A.  General Activity:  3  B.  Mood: 2  C.  Walking Ability:  4  D.  Normal Work (Includes both work outside the home and housework):  2  E.  Relations with Other People:   1  F.  Sleep:   4  G.  Enjoyment of Life:   1    2.  Patient Prefers to Take their Pain Medications:     [x]  On a regular basis   []  Only when necessary    []  Does not take pain medications    3.  What are the Patient's Goals/Expectations for Visiting Pain Management?     [x]  Learn about my pain    []  Receive Medication   []  Physical Therapy     []  Treat Depression   []  Receive Injections    []  Treat Sleep   []  Deal with Anxiety and Stress   []  Treat Opoid Dependence/Addiction   []  Other:                                
he is scheduled to go to [place] in June as well as September.  He has been taking the gabapentin 300 mg in the morning and 600 mg at night.  He continues to have numbness in his left upper arm.  This is worse at night and will interfere with sleep if laying on his right side.  Pain improves when laying on his left.  He states he has been able to somewhat deal with this pain but also wondering what options he has for treatment and where to go from here.  He denies any new symptoms at this time.    Today, 7/30/2024, patient presents for planned follow-up on chronic pain.  He completed the right SI joint injection on 6/18/2024.  He states he obtained significant relief from this injection.  He states a few days ago he did aggravate the pain in his right low back/buttocks when he was doing more work and lifting outside.  He is wondering how often he can have the steroid injection.  He is scheduled to go back to [place] in September and is wondering about having the injection before he goes.  He did increase the gabapentin to 600 mg twice a day and feels this medication does help significantly with sleep.  He does still get the paresthesias into his left arm but overall this has been tolerable.  He is wondering if his right SI joint pain could be causing altered sensation in his feet.  He states this has been going on for a while but he has not brought it up.  He states he feels like he has balled up socks underneath his feet that he is walking on.  He states this symptom has remained the same for a while but he wanted to ask what may be the cause.  He denies any leg paresthesias.  He denies any new symptoms at this time.    Today, 10/22/2024, patient presents for planned follow-up on chronic pain.  Patient states the right SI joint injection back at the end of August seem to take the edge off his pain.  He did leave for [place] soon after and did a lot of walking.  He states he had a lot of pain with

## 2025-06-11 DIAGNOSIS — I25.82 CHRONIC TOTAL OCCLUSION OF CORONARY ARTERY: ICD-10-CM

## 2025-06-11 RX ORDER — TICAGRELOR 90 MG/1
90 TABLET, FILM COATED ORAL 2 TIMES DAILY
Qty: 180 TABLET | Refills: 3 | Status: SHIPPED | OUTPATIENT
Start: 2025-06-11

## 2025-06-25 ENCOUNTER — OFFICE VISIT (OUTPATIENT)
Dept: ENT CLINIC | Age: 70
End: 2025-06-25

## 2025-06-25 VITALS
HEIGHT: 71 IN | DIASTOLIC BLOOD PRESSURE: 60 MMHG | TEMPERATURE: 97.6 F | HEART RATE: 56 BPM | WEIGHT: 177.7 LBS | BODY MASS INDEX: 24.88 KG/M2 | RESPIRATION RATE: 18 BRPM | SYSTOLIC BLOOD PRESSURE: 118 MMHG | OXYGEN SATURATION: 99 %

## 2025-06-25 DIAGNOSIS — R49.0 HOARSENESS: ICD-10-CM

## 2025-06-25 DIAGNOSIS — J38.7 LEUKOPLAKIA OF LARYNX: ICD-10-CM

## 2025-06-25 DIAGNOSIS — Z85.89 ENCOUNTER FOR FOLLOW-UP SURVEILLANCE OF HEAD AND NECK CANCER: Primary | ICD-10-CM

## 2025-06-25 DIAGNOSIS — C32.9 SQUAMOUS CELL CARCINOMA OF LARYNX (HCC): ICD-10-CM

## 2025-06-25 DIAGNOSIS — T66.XXXD EFFECTS, RADIATION, SUBSEQUENT ENCOUNTER: ICD-10-CM

## 2025-06-25 DIAGNOSIS — Z08 ENCOUNTER FOR FOLLOW-UP SURVEILLANCE OF HEAD AND NECK CANCER: Primary | ICD-10-CM

## 2025-06-25 NOTE — PROGRESS NOTES
[facility] [place] [facility]  [facility]AR, NOSE AND TH[facility]  [address]  [address]  [facility] [address]  Dept: [phone]  Dept Fax: [phone]  Loc: [phone]    Abel Singer is a 69 y.o. male who was referred by No ref. provider found for:  Chief Complaint   Patient presents with    Follow-up     Patient here for 2 month f/u for cancer surveillance.  Patient stated that he has been doing well.       HPI:       History of Present Illness  Abel Singer is a 69 y.o. male who presents for follow-up cancer surveillance and evaluation of hoarseness.    He reports no instances of hemoptysis or otalgia. However, he has been experiencing intermittent hoarseness, which is more pronounced during periods of increased vocal activity.        History:     Allergies   Allergen Reactions    Morphine Nausea And Vomiting     Current Outpatient Medications   Medication Sig Dispense Refill    ticagrelor (BRILINTA) 90 MG TABS tablet Take 1 tablet by mouth 2 times daily 180 tablet 3    gabapentin (NEURONTIN) 300 MG capsule Take 1 capsule by mouth 3 times daily for 60 days. In addition to 600 mg  = 900 mg am and pm, additional 300 mg at noon 90 capsule 1    gabapentin (NEURONTIN) 600 MG tablet Take 1 tablet by mouth 2 times daily for 90 days. 60 tablet 2    omeprazole (PRILOSEC) 40 MG delayed release capsule TAKE 1 CAPSULE EVERY EVENING 90 capsule 1    atorvastatin (LIPITOR) 40 MG tablet TAKE 1 TABLET NIGHTLY 90 tablet 3    erythromycin (ROMYCIN) 5 MG/GM ophthalmic ointment Apply to the lower eyelids at bedtime for two to four weeks      metoprolol succinate (TOPROL XL) 50 MG extended release tablet Take 0.5 tablets by mouth 2 times daily 90 tablet 3    levothyroxine (SYNTHROID) 50 MCG tablet TAKE 1 TABLET DAILY 90 tablet 3    terazosin (HYTRIN) 5 MG capsule TAKE 1 CAPSULE AT BEDTIME 90 capsule 3    aspirin 81 MG EC tablet Take 1 tablet by mouth daily Okay to resume tomorrow AM 11/11/23 30 tablet 3

## 2025-06-26 ENCOUNTER — LAB (OUTPATIENT)
Dept: LAB | Age: 70
End: 2025-06-26

## 2025-06-26 DIAGNOSIS — E78.2 MIXED HYPERLIPIDEMIA: ICD-10-CM

## 2025-06-26 DIAGNOSIS — I10 PRIMARY HYPERTENSION: ICD-10-CM

## 2025-06-26 DIAGNOSIS — Z95.1 S/P CABG (CORONARY ARTERY BYPASS GRAFT): ICD-10-CM

## 2025-06-26 LAB
ALT SERPL W/O P-5'-P-CCNC: 15 U/L (ref 10–50)
AST SERPL-CCNC: 21 U/L (ref 10–50)
BASOPHILS ABSOLUTE: 0 THOU/MM3 (ref 0–0.1)
BASOPHILS NFR BLD AUTO: 0.6 %
CHOLEST SERPL-MCNC: 128 MG/DL (ref 100–199)
DEPRECATED RDW RBC AUTO: 45.3 FL (ref 35–45)
EOSINOPHIL NFR BLD AUTO: 1.9 %
EOSINOPHILS ABSOLUTE: 0.1 THOU/MM3 (ref 0–0.4)
ERYTHROCYTE [DISTWIDTH] IN BLOOD BY AUTOMATED COUNT: 13.2 % (ref 11.5–14.5)
HCT VFR BLD AUTO: 45.7 % (ref 42–52)
HDLC SERPL-MCNC: 46 MG/DL
HGB BLD-MCNC: 14.7 GM/DL (ref 14–18)
IMM GRANULOCYTES # BLD AUTO: 0.02 THOU/MM3 (ref 0–0.07)
IMM GRANULOCYTES NFR BLD AUTO: 0.4 %
LDLC SERPL CALC-MCNC: 62 MG/DL
LYMPHOCYTES ABSOLUTE: 0.8 THOU/MM3 (ref 1–4.8)
LYMPHOCYTES NFR BLD AUTO: 15 %
MCH RBC QN AUTO: 30.4 PG (ref 26–33)
MCHC RBC AUTO-ENTMCNC: 32.2 GM/DL (ref 32.2–35.5)
MCV RBC AUTO: 94.6 FL (ref 80–94)
MONOCYTES ABSOLUTE: 0.6 THOU/MM3 (ref 0.4–1.3)
MONOCYTES NFR BLD AUTO: 10.4 %
NEUTROPHILS ABSOLUTE: 3.9 THOU/MM3 (ref 1.8–7.7)
NEUTROPHILS NFR BLD AUTO: 71.7 %
NRBC BLD AUTO-RTO: 0 /100 WBC
PLATELET # BLD AUTO: 215 THOU/MM3 (ref 130–400)
PMV BLD AUTO: 10.3 FL (ref 9.4–12.4)
RBC # BLD AUTO: 4.83 MILL/MM3 (ref 4.7–6.1)
TRIGL SERPL-MCNC: 101 MG/DL (ref 0–199)
WBC # BLD AUTO: 5.4 THOU/MM3 (ref 4.8–10.8)

## 2025-07-01 ENCOUNTER — OFFICE VISIT (OUTPATIENT)
Dept: INTERNAL MEDICINE CLINIC | Age: 70
End: 2025-07-01

## 2025-07-01 DIAGNOSIS — I10 PRIMARY HYPERTENSION: Primary | ICD-10-CM

## 2025-07-01 DIAGNOSIS — Z95.1 S/P CABG (CORONARY ARTERY BYPASS GRAFT): ICD-10-CM

## 2025-07-01 DIAGNOSIS — I71.23 ANEURYSM OF DESCENDING THORACIC AORTA WITHOUT RUPTURE: ICD-10-CM

## 2025-07-01 DIAGNOSIS — K22.719 BARRETT'S ESOPHAGUS WITH DYSPLASIA: ICD-10-CM

## 2025-07-01 DIAGNOSIS — I10 ESSENTIAL HYPERTENSION: ICD-10-CM

## 2025-07-01 DIAGNOSIS — E78.2 MIXED HYPERLIPIDEMIA: ICD-10-CM

## 2025-07-01 RX ORDER — TERAZOSIN 5 MG/1
5 CAPSULE ORAL NIGHTLY
Qty: 90 CAPSULE | Refills: 1 | Status: SHIPPED | OUTPATIENT
Start: 2025-07-01

## 2025-07-01 RX ORDER — OMEPRAZOLE 40 MG/1
40 CAPSULE, DELAYED RELEASE ORAL DAILY
Qty: 90 CAPSULE | Refills: 1 | Status: SHIPPED | OUTPATIENT
Start: 2025-07-01

## 2025-07-01 RX ORDER — LEVOTHYROXINE SODIUM 50 UG/1
50 TABLET ORAL DAILY
Qty: 90 TABLET | Refills: 1 | Status: SHIPPED | OUTPATIENT
Start: 2025-07-01

## 2025-07-01 NOTE — PROGRESS NOTES
[facility]   [address]  [place] [id number]  Dept: [phone]  Dept Fax: [phone]    YOB: 1955    Hypertension and CABG /dyslipidemia    69 y.o. male   here for follow-up of above issues. He does have medical issues, but no CP or SOB, denies any recent fever or chillls.   Patient is here with his spouse on this office visit. No recent hospitalizations.     hx of [facility] followed by dr. Grullon  On further questioning he gets occasional chest wall discomfort, he is lifting upto 50 lbs per his spouse. He does have CABG , going back to ~ 20+  yrs. He has a follow up with ENT I,  But day-to-day he does not get any chest pain, no exertional pain and not short of breath, denies any fever chills and no syncopal episodes.      He is able to eat, and here with his spouse on this visit,  He plans on getting   Cataract surgery, and plans on seeing dr. Burdick for his cardiac issues.  He does have an ascending   Aortic aneurysm of 4.3 cm, back on 11/21 needs a repeat will check again, ex smoker quit about 30 yrs ago.   Patient denies any syncopal episodes no unusual bleeding issues and significant back pain or chest discomfort.    He has seen moises Brambila and Ericka. He was diagnosed with laryngeal cancer about 9 + yrs .       Current Outpatient Medications   Medication Sig Dispense Refill    ARGININE PO Take 1,500 mg by mouth daily 5 capsules of 1500 mg capsules every morning      ticagrelor (BRILINTA) 90 MG TABS tablet Take 1 tablet by mouth 2 times daily 180 tablet 3    gabapentin (NEURONTIN) 300 MG capsule Take 1 capsule by mouth 3 times daily for 60 days. In addition to 600 mg  = 900 mg am and pm, additional 300 mg at noon 90 capsule 1    gabapentin (NEURONTIN) 600 MG tablet Take 1 tablet by mouth 2 times daily for 90 days. 60 tablet 2    omeprazole (PRILOSEC) 40 MG delayed release capsule TAKE 1 CAPSULE EVERY EVENING 90 capsule 1    atorvastatin

## 2025-07-08 ENCOUNTER — HOSPITAL ENCOUNTER (OUTPATIENT)
Dept: CT IMAGING | Age: 70
Discharge: HOME | End: 2025-07-08
Attending: INTERNAL MEDICINE
Payer: MEDICARE

## 2025-07-08 DIAGNOSIS — I71.23 ANEURYSM OF DESCENDING THORACIC AORTA WITHOUT RUPTURE: ICD-10-CM

## 2025-07-08 DIAGNOSIS — I10 PRIMARY HYPERTENSION: ICD-10-CM

## 2025-07-08 DIAGNOSIS — Z95.1 S/P CABG (CORONARY ARTERY BYPASS GRAFT): ICD-10-CM

## 2025-07-08 PROCEDURE — 71250 CT THORAX DX C-: CPT

## 2025-07-17 ENCOUNTER — TELEPHONE (OUTPATIENT)
Dept: CARDIOTHORACIC SURGERY | Age: 70
End: 2025-07-17

## 2025-07-17 DIAGNOSIS — I71.23 ANEURYSM OF DESCENDING THORACIC AORTA WITHOUT RUPTURE: Primary | ICD-10-CM

## 2025-07-17 NOTE — TELEPHONE ENCOUNTER
Received a referral from Dr. George to follow patient for a stable ascending aortic aneurysm that is 4.1 cm     Patient has history of Multivessel CABG back in 2002     Dr. Rae, should patient continue to follow with Dr. George,or will you start to follow patient?       Last CT chest  completed 07/05/2025            CTA CHEST  04/01/2019

## 2025-07-21 NOTE — TELEPHONE ENCOUNTER
Informed patient's wife, Beatriz,  per  patient's aneurysm does not meet size criteria for surgery, and to continue to follow with Dr. George. I told Beatriz I would forward this encounter to him so that he is up to date.

## 2025-07-22 RX ORDER — METOPROLOL SUCCINATE 50 MG/1
25 TABLET, EXTENDED RELEASE ORAL 2 TIMES DAILY
Qty: 90 TABLET | Refills: 0 | Status: SHIPPED | OUTPATIENT
Start: 2025-07-22

## 2025-08-20 ENCOUNTER — OFFICE VISIT (OUTPATIENT)
Dept: CARDIOLOGY CLINIC | Age: 70
End: 2025-08-20
Payer: MEDICARE

## 2025-08-20 VITALS
DIASTOLIC BLOOD PRESSURE: 78 MMHG | BODY MASS INDEX: 25.77 KG/M2 | HEIGHT: 70 IN | HEART RATE: 51 BPM | WEIGHT: 180 LBS | SYSTOLIC BLOOD PRESSURE: 144 MMHG

## 2025-08-20 DIAGNOSIS — I25.83 CORONARY ARTERY DISEASE DUE TO LIPID RICH PLAQUE: Primary | ICD-10-CM

## 2025-08-20 DIAGNOSIS — I25.10 CORONARY ARTERY DISEASE DUE TO LIPID RICH PLAQUE: Primary | ICD-10-CM

## 2025-08-20 PROCEDURE — 3077F SYST BP >= 140 MM HG: CPT | Performed by: INTERNAL MEDICINE

## 2025-08-20 PROCEDURE — 1159F MED LIST DOCD IN RCRD: CPT | Performed by: INTERNAL MEDICINE

## 2025-08-20 PROCEDURE — G8417 CALC BMI ABV UP PARAM F/U: HCPCS | Performed by: INTERNAL MEDICINE

## 2025-08-20 PROCEDURE — 1036F TOBACCO NON-USER: CPT | Performed by: INTERNAL MEDICINE

## 2025-08-20 PROCEDURE — 1123F ACP DISCUSS/DSCN MKR DOCD: CPT | Performed by: INTERNAL MEDICINE

## 2025-08-20 PROCEDURE — 99214 OFFICE O/P EST MOD 30 MIN: CPT | Performed by: INTERNAL MEDICINE

## 2025-08-20 PROCEDURE — 3017F COLORECTAL CA SCREEN DOC REV: CPT | Performed by: INTERNAL MEDICINE

## 2025-08-20 PROCEDURE — G8427 DOCREV CUR MEDS BY ELIG CLIN: HCPCS | Performed by: INTERNAL MEDICINE

## 2025-08-20 PROCEDURE — 93000 ELECTROCARDIOGRAM COMPLETE: CPT | Performed by: INTERNAL MEDICINE

## 2025-08-20 PROCEDURE — 3078F DIAST BP <80 MM HG: CPT | Performed by: INTERNAL MEDICINE

## 2025-08-20 RX ORDER — METOPROLOL SUCCINATE 25 MG/1
25 TABLET, EXTENDED RELEASE ORAL DAILY
Qty: 30 TABLET | Refills: 3 | Status: SHIPPED | OUTPATIENT
Start: 2025-08-20

## (undated) DEVICE — PACK-MAJOR

## (undated) DEVICE — 6 ML SYRINGE LUER-LOCK TIP: Brand: MONOJECT

## (undated) DEVICE — PAD,NON-ADHERENT,3X8,STERILE,LF,1/PK: Brand: MEDLINE

## (undated) DEVICE — SURGICAL PROCEDURE PACK 25 GA VITRECTOMY

## (undated) DEVICE — SC PAIN PACK: Brand: MEDLINE INDUSTRIES, INC.

## (undated) DEVICE — ENT: Brand: MEDLINE INDUSTRIES, INC.

## (undated) DEVICE — CODMAN® SURGICAL PATTIES 1/2" X 3" (1.27CM X 7.62CM): Brand: CODMAN®

## (undated) DEVICE — APPLICATOR MEDICATED 26 CC SOLUTION HI LT ORNG CHLORAPREP

## (undated) DEVICE — TOWEL,OR,DSP,ST,BLUE,STD,4/PK,20PK/CS: Brand: MEDLINE

## (undated) DEVICE — NEEDLE HYPO 18GA L1.5IN THN WALL PIVOTING SHLD BVL ORIENTED

## (undated) DEVICE — GLOVE ORANGE PI 7   MSG9070

## (undated) DEVICE — HYPODERMIC SAFETY NEEDLE: Brand: MAGELLAN

## (undated) DEVICE — TOWEL,OR,DSP,ST,WHITE,DLX,XR,4/PK,20PK/C: Brand: MEDLINE

## (undated) DEVICE — SOLUTION SURG PREP POV IOD 7.5% 4 OZ

## (undated) DEVICE — CLIP LIG M TI 6 SIL HNDL FOR OPN AND ENDOSCP SGL APPL

## (undated) DEVICE — SUTURE PERMAHAND SZ 4-0 L18IN NONABSORBABLE BLK SILK BRAID A183H

## (undated) DEVICE — CODMAN® SURGICAL PATTIES 1/2" X 1/2" (1.27CM X 1.27CM): Brand: CODMAN®

## (undated) DEVICE — Device

## (undated) DEVICE — AGENT VISCOELASTIC 1ML 2% HYDROXYPROPYL METHCELL PRELD GLS

## (undated) DEVICE — UNIVERSAL MONOPOLAR LAPAROSCOPIC CABLE 10FT, 4MM PIN CONNECTOR: Brand: CONMED

## (undated) DEVICE — PACK,UNIVERSAL,NO GOWNS: Brand: MEDLINE

## (undated) DEVICE — SUTURE PERMAHAND SZ 2-0 L18IN NONABSORBABLE BLK L26MM SH C012D

## (undated) DEVICE — STANDARD HYPODERMIC NEEDLE,ALUMINUM HUB: Brand: MONOJECT

## (undated) DEVICE — PACK PROCEDURE SURG SET UP SRMC

## (undated) DEVICE — GAUZE SPONGES,USP TYPE VII GAUZE, 12 PLY: Brand: CURITY

## (undated) DEVICE — OCCLUDER EYE POLYETH HYPOALRG ADH TAPE W/O PINHOLE

## (undated) DEVICE — CYCLOGEL 1% GTTS

## (undated) DEVICE — SPONGE,PEANUT,XRAY,ST,SM,3/8",5/CARD: Brand: MEDLINE INDUSTRIES, INC.

## (undated) DEVICE — NEEDLE FLTR 18GA L1.5IN MEM THK5UM BLNT DISP

## (undated) DEVICE — NEEDLE SPNL 22GA L3.5IN BLK HUB S STL REG WALL FIT STYL

## (undated) DEVICE — SOLUTION IRRIG 1000ML PENTALYTE PLAS POUR BTL TIS U SOL

## (undated) DEVICE — 3M™ STERI-DRAPE™ INSTRUMENT POUCH 1018: Brand: STERI-DRAPE™

## (undated) DEVICE — NEEDLE SPNL 22GA L3.5IN BLK HUB S STL REG WALL FIT STYL W/

## (undated) DEVICE — SYRINGE MED 10ML LUERLOCK TIP W/O SFTY DISP

## (undated) DEVICE — GLOVE BIOGEL POWDER FREE SZ 8

## (undated) DEVICE — BLADE,CARBON-STEEL,15,STRL,DISPOSABLE,TB: Brand: MEDLINE

## (undated) DEVICE — GLOVE SURG SZ 75 L12IN FNGR THK94MIL WHT POLYMER LTX BEAD

## (undated) DEVICE — GLOVE ORANGE PI 8 1/2   MSG9085

## (undated) DEVICE — GAUZE,SPONGE,8"X4",12PLY,XRAY,STRL,LF: Brand: MEDLINE

## (undated) DEVICE — SYRINGE IRRIG 60ML SFT PLIABLE BLB EZ TO GRP 1 HND USE W/

## (undated) DEVICE — GOWN,SIRUS,NONRNF,SETINSLV,XL,20/CS: Brand: MEDLINE

## (undated) DEVICE — JELLY,LUBE,STERILE,FLIP TOP,TUBE,2-OZ: Brand: MEDLINE

## (undated) DEVICE — SUTURE PROL SZ 2-0 L36IN NONABSORBABLE BLU SH L26MM 1/2 CIR 8523H

## (undated) DEVICE — GOWN,SIRUS,NON REINFRCD,LARGE,SET IN SL: Brand: MEDLINE

## (undated) DEVICE — CORD,CAUTERY,BIPOLAR,STERILE: Brand: MEDLINE

## (undated) DEVICE — 4-PORT MANIFOLD: Brand: NEPTUNE 2

## (undated) DEVICE — GLOVE SURG 7.5 PF POLYMER WHT STRL SIGN LTX ESSENTIAL LTX

## (undated) DEVICE — SYRINGE MEDICAL 3ML CLEAR PLASTIC STANDARD NON CONTROL LUERLOCK TIP DISPOSABLE

## (undated) DEVICE — GLOVE SURG SZ 7.5 L11.73IN FNGR THK9.8MIL STRW LTX POLYMER

## (undated) DEVICE — KIT,ANTI FOG,W/SPONGE & FLUID,SOFT PACK: Brand: MEDLINE

## (undated) DEVICE — SUTURE ETHBND EXCEL SZ 0 L30IN NONABSORBABLE GRN CT1 L36MM X424H

## (undated) DEVICE — SUTURE ETHLN SZ 3-0 L18IN NONABSORBABLE BLK FS-1 L24MM 3/8 663H

## (undated) DEVICE — PENCIL SMK EVAC ALL IN 1 DSGN ENH VISIBILITY IMPROVED AIR

## (undated) DEVICE — SUTURE MCRYL SZ 4 0 L18IN ABSRB UD P 3 3 8 CIR REV CUT NDL MCP494G

## (undated) DEVICE — INTENDED FOR TISSUE SEPARATION, AND OTHER PROCEDURES THAT REQUIRE A SHARP SURGICAL BLADE TO PUNCTURE OR CUT.: Brand: BARD-PARKER ® CARBON RIB-BACK BLADES

## (undated) DEVICE — MARKER,SKIN,WI/RULER AND LABELS: Brand: MEDLINE

## (undated) DEVICE — NEEDLE EPI L3.5IN OD20GA CLR POLYCARB HUB WNG N DEHP TUOHY

## (undated) DEVICE — STRIP,CLOSURE,WOUND,MEDI-STRIP,1/2X4: Brand: MEDLINE

## (undated) DEVICE — GLOVE SURG SZ 65 THK91MIL LTX FREE SYN POLYISOPRENE

## (undated) DEVICE — DILATION SYRINGE: Brand: COOK

## (undated) DEVICE — 1840 FOAM BLOCK NEEDLE COUNTER: Brand: DEVON

## (undated) DEVICE — LARYNGOSCOPY - BRONCHOSCOPY: Brand: MEDLINE INDUSTRIES, INC.

## (undated) DEVICE — TUBING, SUCTION, 1/4" X 20', STRAIGHT: Brand: MEDLINE INDUSTRIES, INC.

## (undated) DEVICE — AGENT HEMSTAT W2XL4IN OXIDIZED REGENERATED CELOS ABSRB SFT

## (undated) DEVICE — SUTURE MCRYL + SZ 3-0 L27IN ABSRB UD L26MM SH 1/2 CIR MCP416H

## (undated) DEVICE — SUTURE PERMAHAND SZ 3-0 L18IN NONABSORBABLE BLK SILK BRAID A184H

## (undated) DEVICE — SUTURE 2/0 24 SILK BLK BR SH K533H

## (undated) DEVICE — SYRINGE MED 7ML PLAS LUERSLIP LOSS OF RESISTANCE EPILOR

## (undated) DEVICE — APPLICATOR MEDICATED 10.5 CC SOLUTION CLR STRL CHLORAPREP

## (undated) DEVICE — BLADE ES L4IN INSUL EDGE

## (undated) DEVICE — CONTAINER,SPECIMEN,PNEU TUBE,4OZ,OR STRL: Brand: MEDLINE

## (undated) DEVICE — ALCOHOL PREP,2 PLY, MEDIUM: Brand: WEBCOL

## (undated) DEVICE — SUTURE PROL SZ 2-0 L18IN NONABSORBABLE BLU FS L26MM 3/8 CIR 8685H

## (undated) DEVICE — GLOVE SURG SZ 75 L12IN FNGR THK94MIL TRNSLUC YEL LTX

## (undated) DEVICE — SYRINGE MED 3ML CLR PLAS STD N CTRL LUERLOCK TIP DISP

## (undated) DEVICE — RETINA PK

## (undated) DEVICE — 25GA EZPASS SOFT TIP CANNULA BOX OF 5: Brand: VORTEX SURGICAL INC

## (undated) DEVICE — DRAPE,EENT,SPLIT,STERILE: Brand: MEDLINE

## (undated) DEVICE — BLADE ES ELASTOMERIC COAT INSUL DURABLE BEND UPTO 90DEG

## (undated) DEVICE — GOWN,AURORA,NONREINFORCED,LARGE: Brand: MEDLINE

## (undated) DEVICE — 1 EACH 40411 STERILE DISPOSABLE SUPER VIEW® LENS SET & 1 EACH 40100 STERILE MICROSCOPE DRAPE: Brand: SUPER VIEW® PACK

## (undated) DEVICE — SYRINGE, LUER LOCK, 10ML: Brand: MEDLINE